# Patient Record
Sex: FEMALE | Race: WHITE | Employment: OTHER
[De-identification: names, ages, dates, MRNs, and addresses within clinical notes are randomized per-mention and may not be internally consistent; named-entity substitution may affect disease eponyms.]

---

## 2017-03-13 ENCOUNTER — SURGERY (OUTPATIENT)
Age: 67
End: 2017-03-13

## 2017-03-13 ENCOUNTER — HOSPITAL ENCOUNTER (OUTPATIENT)
Age: 67
Setting detail: OUTPATIENT SURGERY
Discharge: HOME | End: 2017-03-13
Attending: SPECIALIST | Admitting: SPECIALIST
Payer: MEDICARE

## 2017-03-13 VITALS
SYSTOLIC BLOOD PRESSURE: 121 MMHG | TEMPERATURE: 97.6 F | BODY MASS INDEX: 21.82 KG/M2 | WEIGHT: 144 LBS | OXYGEN SATURATION: 99 % | HEIGHT: 68 IN | DIASTOLIC BLOOD PRESSURE: 70 MMHG | HEART RATE: 98 BPM | RESPIRATION RATE: 21 BRPM

## 2017-03-13 PROCEDURE — 76040000019: Performed by: SPECIALIST

## 2017-03-13 PROCEDURE — 77030009426 HC FCPS BIOP ENDOSC BSC -B: Performed by: SPECIALIST

## 2017-03-13 PROCEDURE — 88305 TISSUE EXAM BY PATHOLOGIST: CPT | Performed by: SPECIALIST

## 2017-03-13 PROCEDURE — 74011250636 HC RX REV CODE- 250/636: Performed by: SPECIALIST

## 2017-03-13 RX ORDER — EPINEPHRINE 0.1 MG/ML
1 INJECTION INTRACARDIAC; INTRAVENOUS
Status: DISCONTINUED | OUTPATIENT
Start: 2017-03-13 | End: 2017-03-13 | Stop reason: HOSPADM

## 2017-03-13 RX ORDER — GLUCOSAMINE SULFATE 1500 MG
1000 POWDER IN PACKET (EA) ORAL DAILY
COMMUNITY

## 2017-03-13 RX ORDER — FENTANYL CITRATE 50 UG/ML
200 INJECTION, SOLUTION INTRAMUSCULAR; INTRAVENOUS
Status: DISCONTINUED | OUTPATIENT
Start: 2017-03-13 | End: 2017-03-13 | Stop reason: HOSPADM

## 2017-03-13 RX ORDER — NALOXONE HYDROCHLORIDE 0.4 MG/ML
0.4 INJECTION, SOLUTION INTRAMUSCULAR; INTRAVENOUS; SUBCUTANEOUS
Status: DISCONTINUED | OUTPATIENT
Start: 2017-03-13 | End: 2017-03-13 | Stop reason: HOSPADM

## 2017-03-13 RX ORDER — BISMUTH SUBSALICYLATE 262 MG
1 TABLET,CHEWABLE ORAL DAILY
COMMUNITY

## 2017-03-13 RX ORDER — SODIUM CHLORIDE 9 MG/ML
50 INJECTION, SOLUTION INTRAVENOUS CONTINUOUS
Status: DISCONTINUED | OUTPATIENT
Start: 2017-03-13 | End: 2017-03-13 | Stop reason: HOSPADM

## 2017-03-13 RX ORDER — ASCORBIC ACID 500 MG
1000 TABLET ORAL DAILY
COMMUNITY

## 2017-03-13 RX ORDER — SODIUM CHLORIDE 0.9 % (FLUSH) 0.9 %
5-10 SYRINGE (ML) INJECTION AS NEEDED
Status: DISCONTINUED | OUTPATIENT
Start: 2017-03-13 | End: 2017-03-13 | Stop reason: HOSPADM

## 2017-03-13 RX ORDER — MIDAZOLAM HYDROCHLORIDE 1 MG/ML
.25-1 INJECTION, SOLUTION INTRAMUSCULAR; INTRAVENOUS
Status: DISCONTINUED | OUTPATIENT
Start: 2017-03-13 | End: 2017-03-13 | Stop reason: HOSPADM

## 2017-03-13 RX ORDER — ATROPINE SULFATE 0.1 MG/ML
0.5 INJECTION INTRAVENOUS
Status: DISCONTINUED | OUTPATIENT
Start: 2017-03-13 | End: 2017-03-13 | Stop reason: HOSPADM

## 2017-03-13 RX ORDER — LANSOPRAZOLE 30 MG/1
30 CAPSULE, DELAYED RELEASE ORAL
COMMUNITY

## 2017-03-13 RX ORDER — SODIUM CHLORIDE 0.9 % (FLUSH) 0.9 %
5-10 SYRINGE (ML) INJECTION EVERY 8 HOURS
Status: DISCONTINUED | OUTPATIENT
Start: 2017-03-13 | End: 2017-03-13 | Stop reason: HOSPADM

## 2017-03-13 RX ORDER — FLUMAZENIL 0.1 MG/ML
0.2 INJECTION INTRAVENOUS
Status: DISCONTINUED | OUTPATIENT
Start: 2017-03-13 | End: 2017-03-13 | Stop reason: HOSPADM

## 2017-03-13 RX ORDER — DEXTROMETHORPHAN/PSEUDOEPHED 2.5-7.5/.8
1.2 DROPS ORAL
Status: DISCONTINUED | OUTPATIENT
Start: 2017-03-13 | End: 2017-03-13 | Stop reason: HOSPADM

## 2017-03-13 RX ADMIN — MIDAZOLAM HYDROCHLORIDE 2 MG: 1 INJECTION, SOLUTION INTRAMUSCULAR; INTRAVENOUS at 12:33

## 2017-03-13 RX ADMIN — FENTANYL CITRATE 25 MCG: 50 INJECTION, SOLUTION INTRAMUSCULAR; INTRAVENOUS at 12:39

## 2017-03-13 RX ADMIN — FENTANYL CITRATE 50 MCG: 50 INJECTION, SOLUTION INTRAMUSCULAR; INTRAVENOUS at 12:24

## 2017-03-13 RX ADMIN — SODIUM CHLORIDE 50 ML/HR: 900 INJECTION, SOLUTION INTRAVENOUS at 12:17

## 2017-03-13 RX ADMIN — MIDAZOLAM HYDROCHLORIDE 2 MG: 1 INJECTION, SOLUTION INTRAMUSCULAR; INTRAVENOUS at 12:29

## 2017-03-13 RX ADMIN — MIDAZOLAM HYDROCHLORIDE 2 MG: 1 INJECTION, SOLUTION INTRAMUSCULAR; INTRAVENOUS at 12:24

## 2017-03-13 RX ADMIN — MIDAZOLAM HYDROCHLORIDE 1 MG: 1 INJECTION, SOLUTION INTRAMUSCULAR; INTRAVENOUS at 12:39

## 2017-03-13 RX ADMIN — FENTANYL CITRATE 50 MCG: 50 INJECTION, SOLUTION INTRAMUSCULAR; INTRAVENOUS at 12:29

## 2017-03-13 NOTE — ROUTINE PROCESS
Odessa Arriola  1950  [id number]    Situation:  Verbal report received from: Joel Villafana RN  Procedure: Procedure(s):  ESOPHAGOGASTRODUODENOSCOPY (EGD)  ESOPHAGOGASTRODUODENAL (EGD) BIOPSY    Background:    Preoperative diagnosis: BARRETTS  Postoperative diagnosis: 1.- Spaulding's Esophagus    :  Dr. Kelly Frances  Assistant(s): Endoscopy Technician-1: Luciano Sarkar IV  Endoscopy RN-1: Aguila Nicholas RN    Specimens:   ID Type Source Tests Collected by Time Destination   1 : Esophageal Biopsies at 35 cm Preservative   Milan Capps MD 3/13/2017 1236 Pathology   2 : Esophageal Biopsies at MD Roberto 3/13/2017 1239 Pathology     H. Pylori  no    Assessment:  Intra-procedure medications   Versed 7 mg  Fentanyl 125 mcg  Anesthesia gave intra-procedure sedation and medications, see anesthesia flow sheet no    Intravenous fluids: NS@ KVO     Vital signs stable     Abdominal assessment: round and soft     Recommendation:  Discharge patient per MD order.     Family or Friend   Permission to share finding with family or friend yes

## 2017-03-13 NOTE — H&P
Pre-endoscopy H and P     The patient was seen and examined in the endoscopy suite. The airway was assessed and docuemented. The problem list and medications were reviewed. There is no problem list on file for this patient. Social History     Social History    Marital status: SINGLE     Spouse name: N/A    Number of children: N/A    Years of education: N/A     Occupational History    Not on file. Social History Main Topics    Smoking status: Never Smoker    Smokeless tobacco: Not on file    Alcohol use 0.6 oz/week     1 Glasses of wine per week    Drug use: No    Sexual activity: Not on file     Other Topics Concern    Not on file     Social History Narrative    No narrative on file     History reviewed. No pertinent past medical history. Prior to Admission Medications   Prescriptions Last Dose Informant Patient Reported? Taking? Omega-3 Fatty Acids (FISH OIL) 500 mg cap 3/13/2017 at Unknown time  Yes Yes   Sig: Take  by mouth. ascorbic acid, vitamin C, (VITAMIN C) 500 mg tablet 3/12/2017 at Unknown time  Yes Yes   Sig: Take 500 mg by mouth. cholecalciferol (VITAMIN D3) 1,000 unit cap 3/12/2017 at Unknown time  Yes Yes   Sig: Take 1,000 Units by mouth daily. lansoprazole (PREVACID) 30 mg capsule 3/13/2017 at Unknown time  Yes Yes   Sig: Take 30 mg by mouth Daily (before breakfast). multivitamin (ONE A DAY) tablet 3/6/2017 at Unknown time  Yes Yes   Sig: Take 1 Tab by mouth daily. Facility-Administered Medications: None       Chief complaint, history of present illness, and review of systems and Past medical History are positive for: GERD and Spaulding esophagus. The heart, lungs and mental status were satisfactory for the administration of sedation and for the procedure. I discussed with the patient the objectives, risks, consequences and alternatives to the procedure.      Plan: Endoscopic procedure with sedation     Rashi Hernandez MD   3/13/2017  12:20 PM

## 2017-03-13 NOTE — PROCEDURES
[address] [facility], [address] [place] [address]                 NAME:  Riana Alexander   :   1950   MRN:   [id number]     Date/Time:  3/13/2017 12:48 PM    Esophagogastroduodenoscopy (EGD) Procedure Note    :  Tracee Dsouza MD    Referring Provider:  PROVIDER UNKNOWN    Anethesia/Sedation:  Versed 6 mg IV and Fentanyl 100 mcg IV    Preoperative diagnosis: BARRETTS    Postoperative diagnosis: 1.- Spaulding's Esophagus    Procedure Details     After infom consent was obtained for the procedure, with all risks and benefits of procedure explained the patient was taken to the endoscopy suite and placed in the left lateral decubitus position. Following sequential administration of sedation as per above, the [id number] gastroscope was inserted into the mouth and advanced under direct vision to second portion of the duodenum. A careful inspection was made as the gastroscope was withdrawn, including a retroflexed view of the proximal stomach; findings and interventions are described below. Findings:  Esophagus:Spaulding esophagus, four quadrant biopsies were obtained from 35, 33 cm according to [facility] protocol. Moderate hiatal hernia. Stomach:normal   Duodenum/jejunum:normal      Therapies:  none    Specimens: esophageal biopsy           EBL: None    Complications:   None; patient tolerated the procedure well. Impression:    See Postoperative diagnosis above    Recommendations:  -Acid suppression with a proton pump inhibitor. , -Await pathology.     Discharge disposition:  Home in the company of  when able to ambulate    Tracee Dsouza MD

## 2017-03-13 NOTE — DISCHARGE INSTRUCTIONS
Allen Pepe  [id number]  1950    EGD DISCHARGE INSTRUCTIONS  Discomfort:  Sore throat- warm salt water gargle  redness at IV site- apply warm compress to area; if redness or soreness persist- contact your physician  Gaseous discomfort- walking, belching will help relieve any discomfort  You may not operate a vehicle for 12 hours  You may not engage in an occupation involving machinery or appliances for rest of today. You may not drink alcoholic beverages for at least 12 hours  Avoid making any critical decisions for at least 24 hour  DIET  You may resume your regular diet - however -  remember your colon is empty and a heavy meal will produce gas. Avoid these foods:  vegetables, fried / greasy foods, carbonated drinks  MEDICATIONS   Regarding Aspirin or Nonsteroidal medications specifically, please see below. ACTIVITY  You may resume your normal daily activities. Spend the remainder of the day resting -  avoid any strenuous activity. CALL M.D. ANY SIGN OF   Increasing pain, nausea, vomiting  Abdominal distension (swelling)  New increased bleeding (oral or rectal)  Fever (chills)  Pain in chest area  Bloody discharge from nose or mouth  Shortness of breath    You may not  take any Advil, Aspirin, Ibuprofen, Motrin, Aleve, or Goodys for 10 days, ONLY  Tylenol as needed for pain.     Follow-up Instructions:   Call Dr. Sabino Oppenheim  Results of procedure / biopsy in 10 days  Telephone #  [phone]        DISCHARGE SUMMARY from Nurse    The following personal items collected during your admission are returned to you:   Dental Appliance: Dental Appliances: None  Vision: Visual Aid: Glasses  Hearing Aid:    Jewelry:    Clothing:    Other Valuables:    Valuables sent to safe:

## 2017-03-13 NOTE — IP AVS SNAPSHOT
Summary of Care Report The Summary of Care report has been created to help improve care coordination. Users with access to [employer] or [address] (Web-based application) may access additional patient information including the Discharge Summary. If you are not currently a [address] user and need more information, please call the number listed below in the [place] [address] section and ask to be connected with Medical Records. Facility Information Name Address Phone [address] [id number] [phone] Patient Information Patient Name Sex  Gris Urban ([id number]) Female 1950 Discharge Information Admitting Provider Service Area Unit Slava Nazario MD / [address] [place] [address] [phone] Discharge Provider Discharge Date/Time Discharge Disposition Destination (none) (none) (none) (none) Patient Language Language ENGLISH [13] You are allergic to the following No active allergies Current Discharge Medication List  
  
CONTINUE these medications which have NOT CHANGED Dose & Instructions Dispensing Information Comments FISH  mg Cap Generic drug:  Omega-3 Fatty Acids Take  by mouth. Refills:  0  
   
 multivitamin tablet Commonly known as:  ONE A DAY Dose:  1 Tab Take 1 Tab by mouth daily. Refills:  0 PREVACID 30 mg capsule Generic drug:  lansoprazole Dose:  30 mg Take 30 mg by mouth Daily (before breakfast). Refills:  0  
   
 VITAMIN C 500 mg tablet Generic drug:  ascorbic acid (vitamin C) Dose:  500 mg Take 500 mg by mouth. Refills:  0  
   
 VITAMIN D3 1,000 unit Cap Generic drug:  cholecalciferol Dose:  1000 Units Take 1,000 Units by mouth daily. Refills:  0 Surgery Information ID Date/Time Status Primary Surgeon All Procedures Location [id number] 3/13/2017 1200 Unposted Scar Slaughter MD ESOPHAGOGASTRODUODENOSCOPY (EGD) ESOPHAGOGASTRODUODENAL (EGD) BIOPSY [facility] ENDOSCOPY Follow-up Information None Discharge Instructions Tamy Murphy 
[id number] 1950 EGD DISCHARGE INSTRUCTIONS Discomfort: 
Sore throat- warm salt water gargle 
redness at IV site- apply warm compress to area; if redness or soreness persist- contact your physician Gaseous discomfort- walking, belching will help relieve any discomfort You may not operate a vehicle for 12 hours You may not engage in an occupation involving machinery or appliances for rest of today. You may not drink alcoholic beverages for at least 12 hours Avoid making any critical decisions for at least 24 hour DIET You may resume your regular diet  however -  remember your colon is empty and a heavy meal will produce gas. Avoid these foods:  vegetables, fried / greasy foods, carbonated drinks MEDICATIONS Regarding Aspirin or Nonsteroidal medications specifically, please see below. ACTIVITY You may resume your normal daily activities. Spend the remainder of the day resting -  avoid any strenuous activity. CALL M.D. ANY SIGN OF Increasing pain, nausea, vomiting Abdominal distension (swelling) New increased bleeding (oral or rectal) Fever (chills) Pain in chest area Bloody discharge from nose or mouth Shortness of breath You may not  take any Advil, Aspirin, Ibuprofen, Motrin, Aleve, or Goodys for 10 days, ONLY  Tylenol as needed for pain. Follow-up Instructions: 
 Call Dr. Mark Cortes Results of procedure / biopsy in 10 days Telephone #  [phone] DISCHARGE SUMMARY from Nurse The following personal items collected during your admission are returned to you:  
Dental Appliance: Dental Appliances: None Vision: Visual Aid: Glasses Hearing Aid:   
Jewelry:   
Clothing: Other Valuables:   
Valuables sent to safe:   
 
 
 
 
  
 
 
Chart Review Routing History No Routing History on File

## 2017-03-13 NOTE — IP AVS SNAPSHOT
[address] [place] [address] 
[phone] Patient: Jane Phillips MRN: [id number] FYE:[birth year] You are allergic to the following No active allergies Recent Documentation Height Weight Breastfeeding? BMI Smoking Status 1.727 m 65.3 kg No 21.9 kg/m2 Never Smoker Emergency Contacts Name Discharge Info Relation Home Work Mobile Tanya Rosen  Unknown [9] [phone] About your hospitalization You were admitted on:  March 13, 2017 You last received care in the:  [facility] ENDOSCOPY You were discharged on:  March 13, 2017 Unit phone number:  [phone] Why you were hospitalized Your primary diagnosis was:  Not on File Providers Seen During Your Hospitalizations Provider Role Specialty Primary office phone Yoko Singh MD Attending Provider Gastroenterology [phone] Your Primary Care Physician (PCP) Primary Care Physician Office Phone Office Fax UNKNOWN, PROVIDER ** None ** ** None ** Follow-up Information None Current Discharge Medication List  
  
CONTINUE these medications which have NOT CHANGED Dose & Instructions Dispensing Information Comments Morning Noon Evening Bedtime FISH  mg Cap Generic drug:  Omega-3 Fatty Acids Your last dose was: Your next dose is: Other:  _________ Take  by mouth. Refills:  0  
     
   
   
   
  
 multivitamin tablet Commonly known as:  ONE A DAY Your last dose was: Your next dose is: Other:  _________ Dose:  1 Tab Take 1 Tab by mouth daily. Refills:  0 PREVACID 30 mg capsule Generic drug:  lansoprazole Your last dose was: Your next dose is: Other:  _________ Dose:  30 mg Take 30 mg by mouth Daily (before breakfast). Refills:  0 VITAMIN C 500 mg tablet Generic drug:  ascorbic acid (vitamin C) Your last dose was: Your next dose is: Other:  _________ Dose:  500 mg Take 500 mg by mouth. Refills:  0  
     
   
   
   
  
 VITAMIN D3 1,000 unit Cap Generic drug:  cholecalciferol Your last dose was: Your next dose is: Other:  _________ Dose:  1000 Units Take 1,000 Units by mouth daily. Refills:  0 Discharge Instructions Areli Landrum 
[id number] 1950 EGD DISCHARGE INSTRUCTIONS Discomfort: 
Sore throat- warm salt water gargle 
redness at IV site- apply warm compress to area; if redness or soreness persist- contact your physician Gaseous discomfort- walking, belching will help relieve any discomfort You may not operate a vehicle for 12 hours You may not engage in an occupation involving machinery or appliances for rest of today. You may not drink alcoholic beverages for at least 12 hours Avoid making any critical decisions for at least 24 hour DIET You may resume your regular diet  however -  remember your colon is empty and a heavy meal will produce gas. Avoid these foods:  vegetables, fried / greasy foods, carbonated drinks MEDICATIONS Regarding Aspirin or Nonsteroidal medications specifically, please see below. ACTIVITY You may resume your normal daily activities. Spend the remainder of the day resting -  avoid any strenuous activity. CALL M.D. ANY SIGN OF Increasing pain, nausea, vomiting Abdominal distension (swelling) New increased bleeding (oral or rectal) Fever (chills) Pain in chest area Bloody discharge from nose or mouth Shortness of breath You may not  take any Advil, Aspirin, Ibuprofen, Motrin, Aleve, or Goodys for 10 days, ONLY  Tylenol as needed for pain. Follow-up Instructions: 
 Call Dr. Haydee Rene Results of procedure / biopsy in 10 days Telephone #  [phone] DISCHARGE SUMMARY from Nurse The following personal items collected during your admission are returned to you:  
Dental Appliance: Dental Appliances: None Vision: Visual Aid: Glasses Hearing Aid:   
Jewelry:   
Clothing:   
Other Valuables:   
Valuables sent to safe:   
 
 
 
 
  
 
 
Discharge Orders None Introducing [facility]! [employer] introduces [employer] patient portal. Now you can access parts of your medical record, email your doctor's office, and request medication refills online. 1. In your internet browser, go to [email or web address] 2. Click on the First Time User? Click Here link in the Sign In box. You will see the New Member Sign Up page. 3. Enter your [employer] Access Code exactly as it appears below. You will not need to use this code after youve completed the sign-up process. If you do not sign up before the expiration date, you must request a new code. · [employer] Access Code: [email or web address] Expires: 6/11/2017 11:10 AM 
 
4. Enter the last four digits of your Social Security Number (xxxx) and Date of Birth (mm/dd/yyyy) as indicated and click Submit. You will be taken to the next sign-up page. 5. Create a Starline ID. This will be your Starline login ID and cannot be changed, so think of one that is secure and easy to remember. 6. Create a [employer] password. You can change your password at any time. 7. Enter your Password Reset Question and Answer. This can be used at a later time if you forget your password. 8. Enter your e-mail address. You will receive e-mail notification when new information is available in [address] 9. Click Sign Up. You can now view and download portions of your medical record. 10. Click the Download Summary menu link to download a portable copy of your medical information.  
 
If you have questions, please visit the Frequently Asked Questions section of the [employer] Remember, MyChart is NOT to be used for urgent needs. For medical emergencies, dial 911. Now available from your iPhone and Android! General Information Please provide this summary of care documentation to your next provider. Patient Signature:  ____________________________________________________________ Date:  ____________________________________________________________  
  
Janyth Mins Provider Signature:  ____________________________________________________________ Date:  ____________________________________________________________

## 2020-02-13 ENCOUNTER — ANESTHESIA (OUTPATIENT)
Dept: ENDOSCOPY | Age: 70
End: 2020-02-13
Payer: MEDICARE

## 2020-02-13 ENCOUNTER — ANESTHESIA EVENT (OUTPATIENT)
Dept: ENDOSCOPY | Age: 70
End: 2020-02-13
Payer: MEDICARE

## 2020-02-13 ENCOUNTER — HOSPITAL ENCOUNTER (OUTPATIENT)
Age: 70
Setting detail: OUTPATIENT SURGERY
Discharge: HOME | End: 2020-02-13
Attending: SPECIALIST | Admitting: SPECIALIST
Payer: MEDICARE

## 2020-02-13 VITALS
HEIGHT: 67 IN | WEIGHT: 138 LBS | TEMPERATURE: 97.9 F | OXYGEN SATURATION: 100 % | HEART RATE: 91 BPM | BODY MASS INDEX: 21.66 KG/M2 | RESPIRATION RATE: 18 BRPM | DIASTOLIC BLOOD PRESSURE: 75 MMHG | SYSTOLIC BLOOD PRESSURE: 120 MMHG

## 2020-02-13 PROCEDURE — 77030021593 HC FCPS BIOP ENDOSC BSC -A: Performed by: SPECIALIST

## 2020-02-13 PROCEDURE — 76060000031 HC ANESTHESIA FIRST 0.5 HR: Performed by: SPECIALIST

## 2020-02-13 PROCEDURE — 76040000019: Performed by: SPECIALIST

## 2020-02-13 PROCEDURE — 74011000250 HC RX REV CODE- 250: Performed by: NURSE ANESTHETIST, CERTIFIED REGISTERED

## 2020-02-13 PROCEDURE — 88305 TISSUE EXAM BY PATHOLOGIST: CPT

## 2020-02-13 PROCEDURE — 74011250636 HC RX REV CODE- 250/636: Performed by: NURSE ANESTHETIST, CERTIFIED REGISTERED

## 2020-02-13 PROCEDURE — 74011250636 HC RX REV CODE- 250/636: Performed by: SPECIALIST

## 2020-02-13 RX ORDER — NALOXONE HYDROCHLORIDE 0.4 MG/ML
0.4 INJECTION, SOLUTION INTRAMUSCULAR; INTRAVENOUS; SUBCUTANEOUS
Status: DISCONTINUED | OUTPATIENT
Start: 2020-02-13 | End: 2020-02-13 | Stop reason: HOSPADM

## 2020-02-13 RX ORDER — SODIUM CHLORIDE 9 MG/ML
50 INJECTION, SOLUTION INTRAVENOUS CONTINUOUS
Status: DISCONTINUED | OUTPATIENT
Start: 2020-02-13 | End: 2020-02-13 | Stop reason: HOSPADM

## 2020-02-13 RX ORDER — DEXTROMETHORPHAN/PSEUDOEPHED 2.5-7.5/.8
1.2 DROPS ORAL
Status: DISCONTINUED | OUTPATIENT
Start: 2020-02-13 | End: 2020-02-13 | Stop reason: HOSPADM

## 2020-02-13 RX ORDER — FLUMAZENIL 0.1 MG/ML
0.2 INJECTION INTRAVENOUS
Status: DISCONTINUED | OUTPATIENT
Start: 2020-02-13 | End: 2020-02-13 | Stop reason: HOSPADM

## 2020-02-13 RX ORDER — SODIUM CHLORIDE 0.9 % (FLUSH) 0.9 %
5-40 SYRINGE (ML) INJECTION EVERY 8 HOURS
Status: DISCONTINUED | OUTPATIENT
Start: 2020-02-13 | End: 2020-02-13 | Stop reason: HOSPADM

## 2020-02-13 RX ORDER — SODIUM CHLORIDE 0.9 % (FLUSH) 0.9 %
5-40 SYRINGE (ML) INJECTION AS NEEDED
Status: DISCONTINUED | OUTPATIENT
Start: 2020-02-13 | End: 2020-02-13 | Stop reason: HOSPADM

## 2020-02-13 RX ORDER — EPINEPHRINE 0.1 MG/ML
1 INJECTION INTRACARDIAC; INTRAVENOUS
Status: DISCONTINUED | OUTPATIENT
Start: 2020-02-13 | End: 2020-02-13 | Stop reason: HOSPADM

## 2020-02-13 RX ORDER — PROPOFOL 10 MG/ML
INJECTION, EMULSION INTRAVENOUS AS NEEDED
Status: DISCONTINUED | OUTPATIENT
Start: 2020-02-13 | End: 2020-02-13 | Stop reason: HOSPADM

## 2020-02-13 RX ORDER — LIDOCAINE HYDROCHLORIDE 20 MG/ML
INJECTION, SOLUTION EPIDURAL; INFILTRATION; INTRACAUDAL; PERINEURAL AS NEEDED
Status: DISCONTINUED | OUTPATIENT
Start: 2020-02-13 | End: 2020-02-13 | Stop reason: HOSPADM

## 2020-02-13 RX ORDER — ATROPINE SULFATE 0.1 MG/ML
0.5 INJECTION INTRAVENOUS
Status: DISCONTINUED | OUTPATIENT
Start: 2020-02-13 | End: 2020-02-13 | Stop reason: HOSPADM

## 2020-02-13 RX ADMIN — PROPOFOL 130 MG: 10 INJECTION, EMULSION INTRAVENOUS at 13:26

## 2020-02-13 RX ADMIN — LIDOCAINE HYDROCHLORIDE 100 MG: 20 INJECTION, SOLUTION EPIDURAL; INFILTRATION; INTRACAUDAL; PERINEURAL at 13:26

## 2020-02-13 NOTE — ANESTHESIA PREPROCEDURE EVALUATION
Relevant Problems   No relevant active problems       Anesthetic History   No history of anesthetic complications            Review of Systems / Medical History  Patient summary reviewed, nursing notes reviewed and pertinent labs reviewed    Pulmonary  Within defined limits                 Neuro/Psych   Within defined limits           Cardiovascular  Within defined limits                     GI/Hepatic/Renal  Within defined limits              Endo/Other  Within defined limits           Other Findings              Physical Exam    Airway  Mallampati: II  TM Distance: > 6 cm  Neck ROM: normal range of motion   Mouth opening: Normal     Cardiovascular  Regular rate and rhythm,  S1 and S2 normal,  no murmur, click, rub, or gallop             Dental  No notable dental hx       Pulmonary  Breath sounds clear to auscultation               Abdominal  GI exam deferred       Other Findings            Anesthetic Plan    ASA: 1  Anesthesia type: MAC            Anesthetic plan and risks discussed with: Patient

## 2020-02-13 NOTE — PROCEDURES
[address]  [address]                 NAME:  Silvia Aldana   :   1950   MRN:   [id number]     Date/Time:  2020 1:42 PM    Esophagogastroduodenoscopy (EGD) Procedure Note    :  Mo Harrison MD    Referring Provider:  Unknown, Provider    Anethesia/Sedation:  MAC anesthesia Propofol    Preoperative diagnosis: BARRETTS ESOPHAGUS, GERD    Postoperative diagnosis: 1)Barretts esophagus  2)hiatal hernia    Procedure Details     After infom consent was obtained for the procedure, with all risks and benefits of procedure explained the patient was taken to the endoscopy suite and placed in the left lateral decubitus position. Following sequential administration of sedation as per above, the [id number] gastroscope was inserted into the mouth and advanced under direct vision to second portion of the duodenum. A careful inspection was made as the gastroscope was withdrawn, including a retroflexed view of the proximal stomach; findings and interventions are described below. Findings:  Esophagus:Spaulding mucosa with Z line at 32 cm. Four quadrants biopsies done at 32, 34, 36 cm. Moderate hiatal hernia noted. Stomach:normal   Duodenum/jejunum:normal      Therapies:  none    Specimens: distal esophageal bx           EBL: None    Complications:   None; patient tolerated the procedure well. Impression:    See Postoperative diagnosis above    Recommendations:  -Acid suppression with a proton pump inhibitor. , -Await pathology.     Discharge disposition:  Home in the company of  when able to ambulate    Mo Harrison MD

## 2020-02-13 NOTE — DISCHARGE INSTRUCTIONS
Amelia Actis  [id number]  1950    EGD DISCHARGE INSTRUCTIONS  Discomfort:  Sore throat- throat lozenges or warm salt water gargle  redness at IV site- apply warm compress to area; if redness or soreness persist- contact your physician  Gaseous discomfort- walking, belching will help relieve any discomfort  You may not operate a vehicle for 12 hours  You may not engage in an occupation involving machinery or appliances for rest of today. You may not drink alcoholic beverages for at least 12 hours  Avoid making any critical decisions for at least 24 hour  DIET  You may resume your regular diet - however -  remember your colon is empty and a heavy meal will produce gas. Avoid these foods:  vegetables, fried / greasy foods, carbonated drinks  MEDICATIONS   Regarding Aspirin or Nonsteroidal medications specifically, please see below. ACTIVITY  You may resume your normal daily activities. Spend the remainder of the day resting -  avoid any strenuous activity. CALL M.D. ANY SIGN OF   Increasing pain, nausea, vomiting  Abdominal distension (swelling)  New increased bleeding (oral or rectal)  Fever (chills)  Pain in chest area  Bloody discharge from nose or mouth  Shortness of breath    You may not  take any Advil, Aspirin, Ibuprofen, Motrin, Aleve, or Goodys for 10 days, ONLY  Tylenol as needed for pain.     Post procedure diagnosis: 1)Barretts esophagus  2)hiatal hernia      Follow-up Instructions:   Call Dr. Brigid Hobbs  Results of procedure / biopsy in 10 days  Telephone #  [phone]        DISCHARGE SUMMARY from Nurse    The following personal items collected during your admission are returned to you:   Dental Appliance: Dental Appliances: None  Vision: Visual Aid: Glasses  Hearing Aid:    Barrington Silk:    Clothing:    Other Valuables:    Valuables sent to safe:          Patient Education        Hiatal Hernia: Care Instructions  Your Care Instructions  A hiatal hernia occurs when part of the stomach bulges into the chest cavity. A hiatal hernia may allow stomach acid and juices to back up into the esophagus (acid reflux). This can cause a feeling of burning, warmth, heat, or pain behind the breastbone. This feeling may often occur after you eat, soon after you lie down, or when you bend forward, and it may come and go. You also may have a sour taste in your mouth. These symptoms are commonly known as heartburn or reflux. But not all hiatal hernias cause symptoms. Follow-up care is a key part of your treatment and safety. Be sure to make and go to all appointments, and call your doctor if you are having problems. It's also a good idea to know your test results and keep a list of the medicines you take. How can you care for yourself at home? · Take your medicines exactly as prescribed. Call your doctor if you think you are having a problem with your medicine. · Do not take aspirin or other nonsteroidal anti-inflammatory drugs (NSAIDs), such as ibuprofen (Advil, Motrin) or naproxen (Aleve), unless your doctor says it is okay. Ask your doctor what you can take for pain. · Your doctor may recommend over-the-counter medicine. For mild or occasional indigestion, antacids such as Tums, Gaviscon, Maalox, or Mylanta may help. Your doctor also may recommend over-the-counter acid reducers, such as famotidine (Pepcid AC), cimetidine (Tagamet HB), ranitidine (Zantac 75 and Zantac 150), or omeprazole (Prilosec). Read and follow all instructions on the label. If you use these medicines often, talk with your doctor. · Change your eating habits. ? It's best to eat several small meals instead of two or three large meals. ? After you eat, wait 2 to 3 hours before you lie down. Late-night snacks aren't a good idea. ? Chocolate, mint, and alcohol can make heartburn worse. They relax the valve between the esophagus and the stomach. ?  Spicy foods, foods that have a lot of acid (like tomatoes and oranges), and coffee can make heartburn symptoms worse in some people. If your symptoms are worse after you eat a certain food, you may want to stop eating that food to see if your symptoms get better. · Do not smoke or chew tobacco.  · If you get heartburn at night, raise the head of your bed 6 to 8 inches by putting the frame on blocks or placing a foam wedge under the head of your mattress. (Adding extra pillows does not work.)  · Do not wear tight clothing around your middle. · Lose weight if you need to. Losing just 5 to 10 pounds can help. When should you call for help? Call your doctor now or seek immediate medical care if:    · You have new or worse belly pain.     · You are vomiting.    Watch closely for changes in your health, and be sure to contact your doctor if:    · You have new or worse symptoms of indigestion.     · You have trouble or pain swallowing.     · You are losing weight.     · You do not get better as expected. Where can you learn more? Go to http://yoseph-marta.info/. Enter Y667 in the search box to learn more about \"Hiatal Hernia: Care Instructions. \"  Current as of: November 7, 2018  Content Version: 12.2  © 5928-5135 [employer]. Care instructions adapted under license by [employer] (which disclaims liability or warranty for this information). If you have questions about a medical condition or this instruction, always ask your healthcare professional. [place] [address] any warranty or liability for your use of this information.

## 2020-02-13 NOTE — H&P
Pre-endoscopy H and P     The patient was seen and examined in the endoscopy suite. The airway was assessed and docuemented. The problem list and medications were reviewed. There is no problem list on file for this patient. Social History     Socioeconomic History    Marital status: SINGLE     Spouse name: Not on file    Number of children: Not on file    Years of education: Not on file    Highest education level: Not on file   Occupational History    Not on file   Social Needs    Financial resource strain: Not on file    Food insecurity:     Worry: Not on file     Inability: Not on file    Transportation needs:     Medical: Not on file     Non-medical: Not on file   Tobacco Use    Smoking status: Never Smoker   Substance and Sexual Activity    Alcohol use: Yes     Alcohol/week: 1.0 standard drinks     Types: 1 Glasses of wine per week    Drug use: No    Sexual activity: Not on file   Lifestyle    Physical activity:     Days per week: Not on file     Minutes per session: Not on file    Stress: Not on file   Relationships    Social connections:     Talks on phone: Not on file     Gets together: Not on file     Attends Worship service: Not on file     Active member of club or organization: Not on file     Attends meetings of clubs or organizations: Not on file     Relationship status: Not on file    Intimate partner violence:     Fear of current or ex partner: Not on file     Emotionally abused: Not on file     Physically abused: Not on file     Forced sexual activity: Not on file   Other Topics Concern    Not on file   Social History Narrative    Not on file     No past medical history on file. Prior to Admission Medications   Prescriptions Last Dose Informant Patient Reported? Taking? Omega-3 Fatty Acids (FISH OIL) 500 mg cap   Yes No   Sig: Take  by mouth. ascorbic acid, vitamin C, (VITAMIN C) 500 mg tablet   Yes No   Sig: Take 500 mg by mouth.    cholecalciferol (VITAMIN D3) 1,000 unit cap   Yes No   Sig: Take 1,000 Units by mouth daily. lansoprazole (PREVACID) 30 mg capsule   Yes No   Sig: Take 30 mg by mouth Daily (before breakfast). multivitamin (ONE A DAY) tablet   Yes No   Sig: Take 1 Tab by mouth daily. Facility-Administered Medications: None       Chief complaint, history of present illness, and review of systems and Past medical History are positive for: Spaulding esophagus and GERD    The heart, lungs and mental status were satisfactory for the administration of sedation and for the procedure. I discussed with the patient the objectives, risks, consequences and alternatives to the procedure.      Plan: Endoscopic procedure with sedation     Slava Nazario MD   2/13/2020  12:39 PM

## 2020-02-13 NOTE — ROUTINE PROCESS
Jane Kim  1950  [id number]    Situation  Verbal report received from: BETY Guthrie, RN  Procedure: Procedure(s):  ESOPHAGOGASTRODUODENOSCOPY (EGD)  ESOPHAGOGASTRODUODENAL (EGD) BIOPSY    Background:    Preoperative diagnosis: BARRETTS ESOPHAGUS, GERD  Postoperative diagnosis: 1)Barretts esophagus  2)hiatal hernia    :  Dr. Luann Cervantes  Assistant(s): Endoscopy Technician-1: Cait RAYMOND  Endoscopy RN-1: Avani Vieyra RN    Specimens:   ID Type Source Tests Collected by Time Destination   1 : 36cm bx for Barretts Preservative Esophagus, Distal  Aiden Casiano MD 2/13/2020 1331 Pathology   2 : 34cm bx for Barretts Preservative Esophagus, Distal  Aiden Casiano MD 2/13/2020 1333 Pathology   3 : 32cm bx for Barretts Preservative Esophagus, Distal  Aiden Casiano MD 2/13/2020 1334 Pathology     H. Pylori  no    Assessment:  Intra-procedure medications Anesthesia gave intra-procedure sedation and medications, see anesthesia flow sheet yes    Intravenous fluids: NS@ KVO     Vital signs stable     Abdominal assessment: round and soft     Recommendation:  Discharge patient per MD order.     Family or Friend   Permission to share finding with family or friend yes

## 2020-02-13 NOTE — ANESTHESIA POSTPROCEDURE EVALUATION
Procedure(s):  ESOPHAGOGASTRODUODENOSCOPY (EGD)  ESOPHAGOGASTRODUODENAL (EGD) BIOPSY. MAC    <BSHSIANPOST>    Vitals Value Taken Time   /65 2/13/2020  1:44 PM   Temp     Pulse 92 2/13/2020  1:49 PM   Resp 22 2/13/2020  1:49 PM   SpO2 100 % 2/13/2020  1:49 PM   Vitals shown include unvalidated device data.

## 2020-09-10 ENCOUNTER — HOSPITAL ENCOUNTER (OUTPATIENT)
Dept: CT IMAGING | Age: 70
Discharge: HOME | DRG: 329 | End: 2020-09-10
Attending: SPECIALIST
Payer: MEDICARE

## 2020-09-10 DIAGNOSIS — R10.32 LEFT LOWER QUADRANT PAIN: ICD-10-CM

## 2020-09-10 DIAGNOSIS — R10.31 RIGHT LOWER QUADRANT PAIN: ICD-10-CM

## 2020-09-10 DIAGNOSIS — R19.4 CHANGE IN BOWEL HABITS: ICD-10-CM

## 2020-09-10 DIAGNOSIS — R63.4 UNEXPLAINED WEIGHT LOSS: ICD-10-CM

## 2020-09-10 LAB — CREAT BLD-MCNC: 0.8 MG/DL (ref 0.6–1.3)

## 2020-09-10 PROCEDURE — 74011000636 HC RX REV CODE- 636: Performed by: SPECIALIST

## 2020-09-10 PROCEDURE — 74177 CT ABD & PELVIS W/CONTRAST: CPT

## 2020-09-10 PROCEDURE — 74011000258 HC RX REV CODE- 258: Performed by: SPECIALIST

## 2020-09-10 PROCEDURE — 82565 ASSAY OF CREATININE: CPT

## 2020-09-10 RX ORDER — SODIUM CHLORIDE 0.9 % (FLUSH) 0.9 %
10 SYRINGE (ML) INJECTION
Status: COMPLETED | OUTPATIENT
Start: 2020-09-10 | End: 2020-09-10

## 2020-09-10 RX ADMIN — SODIUM CHLORIDE 100 ML: 900 INJECTION, SOLUTION INTRAVENOUS at 14:41

## 2020-09-10 RX ADMIN — IOPAMIDOL 100 ML: 755 INJECTION, SOLUTION INTRAVENOUS at 14:41

## 2020-09-10 RX ADMIN — Medication 10 ML: at 14:41

## 2020-09-10 RX ADMIN — IOHEXOL 50 ML: 240 INJECTION, SOLUTION INTRATHECAL; INTRAVASCULAR; INTRAVENOUS; ORAL at 14:41

## 2020-09-11 ENCOUNTER — HOSPITAL ENCOUNTER (INPATIENT)
Age: 70
LOS: 24 days | Discharge: NURSING, REHAB OR LONG-TERM CARE | DRG: 329 | End: 2020-10-05
Attending: EMERGENCY MEDICINE | Admitting: HOSPITALIST
Payer: MEDICARE

## 2020-09-11 ENCOUNTER — APPOINTMENT (OUTPATIENT)
Dept: GENERAL RADIOLOGY | Age: 70
DRG: 329 | End: 2020-09-11
Attending: EMERGENCY MEDICINE
Payer: MEDICARE

## 2020-09-11 ENCOUNTER — HOSPITAL ENCOUNTER (OUTPATIENT)
Dept: PREADMISSION TESTING | Age: 70
Discharge: HOME | DRG: 329 | End: 2020-09-11
Payer: MEDICARE

## 2020-09-11 DIAGNOSIS — D72.829 LEUKOCYTOSIS, UNSPECIFIED TYPE: ICD-10-CM

## 2020-09-11 DIAGNOSIS — Z90.710 H/O TOTAL HYSTERECTOMY: ICD-10-CM

## 2020-09-11 DIAGNOSIS — R19.00 PELVIC MASS: Primary | ICD-10-CM

## 2020-09-11 DIAGNOSIS — N13.30 HYDRONEPHROSIS, RIGHT: ICD-10-CM

## 2020-09-11 DIAGNOSIS — D75.839 THROMBOCYTOSIS: ICD-10-CM

## 2020-09-11 DIAGNOSIS — C18.7 MALIGNANT NEOPLASM OF SIGMOID COLON (HCC): Chronic | ICD-10-CM

## 2020-09-11 DIAGNOSIS — Z01.812 PRE-PROCEDURE LAB EXAM: ICD-10-CM

## 2020-09-11 LAB
ALBUMIN SERPL-MCNC: 2.7 G/DL (ref 3.5–5)
ALBUMIN/GLOB SERPL: 0.6 {RATIO} (ref 1.1–2.2)
ALP SERPL-CCNC: 95 U/L (ref 45–117)
ALT SERPL-CCNC: 21 U/L (ref 12–78)
ANION GAP SERPL CALC-SCNC: 7 MMOL/L (ref 5–15)
ANION GAP SERPL CALC-SCNC: 9 MMOL/L (ref 5–15)
APPEARANCE UR: ABNORMAL
AST SERPL-CCNC: 23 U/L (ref 15–37)
BACTERIA URNS QL MICRO: NEGATIVE /HPF
BASOPHILS # BLD: 0 K/UL (ref 0–0.1)
BASOPHILS NFR BLD: 0 % (ref 0–1)
BILIRUB SERPL-MCNC: 0.5 MG/DL (ref 0.2–1)
BILIRUB UR QL: NEGATIVE
BUN SERPL-MCNC: 11 MG/DL (ref 6–20)
BUN SERPL-MCNC: 12 MG/DL (ref 6–20)
BUN/CREAT SERPL: 14 (ref 12–20)
BUN/CREAT SERPL: 14 (ref 12–20)
CALCIUM SERPL-MCNC: 7.8 MG/DL (ref 8.5–10.1)
CALCIUM SERPL-MCNC: 8 MG/DL (ref 8.5–10.1)
CEA SERPL-MCNC: 7.4 NG/ML
CHLORIDE SERPL-SCNC: 90 MMOL/L (ref 97–108)
CHLORIDE SERPL-SCNC: 92 MMOL/L (ref 97–108)
CO2 SERPL-SCNC: 23 MMOL/L (ref 21–32)
CO2 SERPL-SCNC: 24 MMOL/L (ref 21–32)
COLOR UR: ABNORMAL
CORTIS SERPL-MCNC: 26.9 UG/DL
CREAT SERPL-MCNC: 0.78 MG/DL (ref 0.55–1.02)
CREAT SERPL-MCNC: 0.84 MG/DL (ref 0.55–1.02)
DIFFERENTIAL METHOD BLD: ABNORMAL
EOSINOPHIL # BLD: 0 K/UL (ref 0–0.4)
EOSINOPHIL NFR BLD: 0 % (ref 0–7)
EPITH CASTS URNS QL MICRO: ABNORMAL /LPF
ERYTHROCYTE [DISTWIDTH] IN BLOOD BY AUTOMATED COUNT: 16.8 % (ref 11.5–14.5)
FERRITIN SERPL-MCNC: 20 NG/ML (ref 26–388)
GLOBULIN SER CALC-MCNC: 4.2 G/DL (ref 2–4)
GLUCOSE SERPL-MCNC: 106 MG/DL (ref 65–100)
GLUCOSE SERPL-MCNC: 112 MG/DL (ref 65–100)
GLUCOSE UR STRIP.AUTO-MCNC: NEGATIVE MG/DL
HCT VFR BLD AUTO: 29.1 % (ref 35–47)
HGB BLD-MCNC: 9 G/DL (ref 11.5–16)
HGB UR QL STRIP: NEGATIVE
IMM GRANULOCYTES # BLD AUTO: 0.1 K/UL (ref 0–0.04)
IMM GRANULOCYTES NFR BLD AUTO: 1 % (ref 0–0.5)
INR PPP: 1 (ref 0.9–1.1)
IRON SATN MFR SERPL: 4 % (ref 20–50)
IRON SERPL-MCNC: 13 UG/DL (ref 35–150)
KETONES UR QL STRIP.AUTO: 15 MG/DL
LEUKOCYTE ESTERASE UR QL STRIP.AUTO: ABNORMAL
LIPASE SERPL-CCNC: 98 U/L (ref 73–393)
LYMPHOCYTES # BLD: 0.7 K/UL (ref 0.8–3.5)
LYMPHOCYTES NFR BLD: 5 % (ref 12–49)
MCH RBC QN AUTO: 20.5 PG (ref 26–34)
MCHC RBC AUTO-ENTMCNC: 30.9 G/DL (ref 30–36.5)
MCV RBC AUTO: 66.4 FL (ref 80–99)
MONOCYTES # BLD: 0.7 K/UL (ref 0–1)
MONOCYTES NFR BLD: 5 % (ref 5–13)
NEUTS SEG # BLD: 13 K/UL (ref 1.8–8)
NEUTS SEG NFR BLD: 89 % (ref 32–75)
NITRITE UR QL STRIP.AUTO: NEGATIVE
NRBC # BLD: 0 K/UL (ref 0–0.01)
NRBC BLD-RTO: 0 PER 100 WBC
OSMOLALITY SERPL: 256 MOSM/KG H2O
OSMOLALITY UR: 646 MOSM/KG H2O
PH UR STRIP: 7 [PH] (ref 5–8)
PLATELET # BLD AUTO: 604 K/UL (ref 150–400)
PMV BLD AUTO: 8.2 FL (ref 8.9–12.9)
POTASSIUM SERPL-SCNC: 3.4 MMOL/L (ref 3.5–5.1)
POTASSIUM SERPL-SCNC: 3.4 MMOL/L (ref 3.5–5.1)
PROT SERPL-MCNC: 6.9 G/DL (ref 6.4–8.2)
PROT UR STRIP-MCNC: 100 MG/DL
PROTHROMBIN TIME: 10.3 SEC (ref 9–11.1)
RBC # BLD AUTO: 4.38 M/UL (ref 3.8–5.2)
RBC #/AREA URNS HPF: ABNORMAL /HPF (ref 0–5)
RBC MORPH BLD: ABNORMAL
SODIUM SERPL-SCNC: 122 MMOL/L (ref 136–145)
SODIUM SERPL-SCNC: 123 MMOL/L (ref 136–145)
SODIUM UR-SCNC: 75 MMOL/L
SP GR UR REFRACTOMETRY: 1.03 (ref 1–1.03)
TIBC SERPL-MCNC: 338 UG/DL (ref 250–450)
TSH SERPL DL<=0.05 MIU/L-ACNC: 1.04 UIU/ML (ref 0.36–3.74)
TSH SERPL DL<=0.05 MIU/L-ACNC: 1.09 UIU/ML (ref 0.36–3.74)
UR CULT HOLD, URHOLD: NORMAL
UROBILINOGEN UR QL STRIP.AUTO: 1 EU/DL (ref 0.2–1)
WBC # BLD AUTO: 14.5 K/UL (ref 3.6–11)
WBC URNS QL MICRO: ABNORMAL /HPF (ref 0–4)

## 2020-09-11 PROCEDURE — 74011250636 HC RX REV CODE- 250/636: Performed by: HOSPITALIST

## 2020-09-11 PROCEDURE — 65660000000 HC RM CCU STEPDOWN

## 2020-09-11 PROCEDURE — 84443 ASSAY THYROID STIM HORMONE: CPT

## 2020-09-11 PROCEDURE — 85610 PROTHROMBIN TIME: CPT

## 2020-09-11 PROCEDURE — 71045 X-RAY EXAM CHEST 1 VIEW: CPT

## 2020-09-11 PROCEDURE — 82728 ASSAY OF FERRITIN: CPT

## 2020-09-11 PROCEDURE — 74011000258 HC RX REV CODE- 258: Performed by: HOSPITALIST

## 2020-09-11 PROCEDURE — 80053 COMPREHEN METABOLIC PANEL: CPT

## 2020-09-11 PROCEDURE — 83540 ASSAY OF IRON: CPT

## 2020-09-11 PROCEDURE — 83930 ASSAY OF BLOOD OSMOLALITY: CPT

## 2020-09-11 PROCEDURE — 86301 IMMUNOASSAY TUMOR CA 19-9: CPT

## 2020-09-11 PROCEDURE — 87635 SARS-COV-2 COVID-19 AMP PRB: CPT

## 2020-09-11 PROCEDURE — 81001 URINALYSIS AUTO W/SCOPE: CPT

## 2020-09-11 PROCEDURE — 83935 ASSAY OF URINE OSMOLALITY: CPT

## 2020-09-11 PROCEDURE — 84300 ASSAY OF URINE SODIUM: CPT

## 2020-09-11 PROCEDURE — 85025 COMPLETE CBC W/AUTO DIFF WBC: CPT

## 2020-09-11 PROCEDURE — 82533 TOTAL CORTISOL: CPT

## 2020-09-11 PROCEDURE — 36415 COLL VENOUS BLD VENIPUNCTURE: CPT

## 2020-09-11 PROCEDURE — 99284 EMERGENCY DEPT VISIT MOD MDM: CPT

## 2020-09-11 PROCEDURE — 82378 CARCINOEMBRYONIC ANTIGEN: CPT

## 2020-09-11 PROCEDURE — 83690 ASSAY OF LIPASE: CPT

## 2020-09-11 PROCEDURE — 87086 URINE CULTURE/COLONY COUNT: CPT

## 2020-09-11 RX ORDER — ACETAMINOPHEN 650 MG/1
650 SUPPOSITORY RECTAL
Status: DISCONTINUED | OUTPATIENT
Start: 2020-09-11 | End: 2020-09-18

## 2020-09-11 RX ORDER — POLYETHYLENE GLYCOL 3350 17 G/17G
17 POWDER, FOR SOLUTION ORAL DAILY PRN
Status: DISCONTINUED | OUTPATIENT
Start: 2020-09-11 | End: 2020-09-18

## 2020-09-11 RX ORDER — PROMETHAZINE HYDROCHLORIDE 25 MG/1
12.5 TABLET ORAL
Status: DISCONTINUED | OUTPATIENT
Start: 2020-09-11 | End: 2020-09-18

## 2020-09-11 RX ORDER — PANTOPRAZOLE SODIUM 40 MG/1
40 TABLET, DELAYED RELEASE ORAL
Status: DISCONTINUED | OUTPATIENT
Start: 2020-09-12 | End: 2020-09-20

## 2020-09-11 RX ORDER — SODIUM CHLORIDE 0.9 % (FLUSH) 0.9 %
5-40 SYRINGE (ML) INJECTION AS NEEDED
Status: DISCONTINUED | OUTPATIENT
Start: 2020-09-11 | End: 2020-10-05 | Stop reason: HOSPADM

## 2020-09-11 RX ORDER — ONDANSETRON 2 MG/ML
4 INJECTION INTRAMUSCULAR; INTRAVENOUS
Status: DISCONTINUED | OUTPATIENT
Start: 2020-09-11 | End: 2020-09-18

## 2020-09-11 RX ORDER — ACETAMINOPHEN 325 MG/1
650 TABLET ORAL
Status: DISCONTINUED | OUTPATIENT
Start: 2020-09-11 | End: 2020-09-18

## 2020-09-11 RX ORDER — SODIUM CHLORIDE 0.9 % (FLUSH) 0.9 %
5-40 SYRINGE (ML) INJECTION EVERY 8 HOURS
Status: DISCONTINUED | OUTPATIENT
Start: 2020-09-11 | End: 2020-10-05 | Stop reason: HOSPADM

## 2020-09-11 RX ORDER — SODIUM CHLORIDE 9 MG/ML
50 INJECTION, SOLUTION INTRAVENOUS CONTINUOUS
Status: DISPENSED | OUTPATIENT
Start: 2020-09-11 | End: 2020-09-12

## 2020-09-11 RX ADMIN — SODIUM CHLORIDE 75 ML/HR: 9 INJECTION, SOLUTION INTRAVENOUS at 15:30

## 2020-09-11 RX ADMIN — Medication 10 ML: at 21:35

## 2020-09-11 RX ADMIN — CEFTRIAXONE SODIUM 1 G: 1 INJECTION, POWDER, FOR SOLUTION INTRAMUSCULAR; INTRAVENOUS at 14:59

## 2020-09-11 NOTE — ACP (ADVANCE CARE PLANNING)
Advance Care Planning Note    Name: Zeferino Pyle  YOB: 1950  MRN: [id number]  Admission Date: 9/11/2020 11:17 AM    Date of discussion: 9/11/2020    Active Diagnoses:    Hospital Problems  Never Reviewed          Codes Class Noted POA    Hydronephrosis ICD-10-CM: N13.30  ICD-9-CM: 591  9/11/2020 Unknown        Pelvic mass ICD-10-CM: R19.00  ICD-9-CM: 789.30  9/11/2020 Unknown               These active diagnoses are of sufficient risk that focused discussion on advance care planning is indicated in order to allow the patient to thoughtfully consider personal goals of care, and if situations arise that prevent the ability to personally give input, to ensure appropriate representation of their personal desires for different levels and aggressiveness of care. Discussion:     Persons present and participating in discussion: Joe Feliciano MD,     Discussion: CPR/ACLS/Cardioversion/Intubation/BiPAP/Renal failure requiring dialysis. Patient confirmed full code. No Order FULL CODE   Time Spent:     Total time spent face-to-face in education and discussion: 10 minutes. Shabnam Bardales MD  9/11/2020  2:22 PM    Patient's emergency contacts:  Extended Emergency Contact Information  [facility]  [facility] Phone: [phone]  Relation: Other Relative  Secondary Emergency Contact: Sree Cervantes, [address] Phone: [phone]  Relation: Chandan Christy

## 2020-09-11 NOTE — CONSULTS
[facility]   [address] [facility], [address] [place] [address], [facility]  Phone: [phone] NOTE     Patient: Bryson Garcia MRN: [id number]  PCP: Ambrosio Maynard MD   :     1950  Age:   72 y.o. Sex:  female      Referring physician: Dejuan Aaron MD  Reason for consultation: 72 y.o. female with Pelvic mass [R19.00]  Hydronephrosis [K85.16] complicated by Hyponatremia   Admission Date: 2020 11:17 AM  LOS: 0 days      ASSESSMENT and PLAN :   Hyponatremia :  - acute on chronic   - baseline Na unknown   - looks clinically dry on exam   - r/o SIADH secondary to maligancy   - Urine chemistries pending   - continue w/ NS as ordered   - labs Q6 hr     R hydronephrosis   - Likely 2/2 extrinsic compression from sigmoid mass   - urology consulted     Sigmoid mass w/ possible LN mets   - per GI     Microcytic anemia   GERD     Care Plan discussed with:  Pt       Thank you for consulting [facility] in the care of your patient. Subjective:   HPI: Bryson Garcia is a 72 y.o.  female who has been admitted to the hospital for worsening abdominal pain. She was diagnosed with sigmoid mass / possible malignancy on CT yesterday and was scheduled to CSY by Dr Jodi Bliss next week.  On admission she was noted to have Hyponatremia with Na of 122 for which we were asked to see her   She reports hx of Hyponatremia when she was at [facility] in    Reports 10 lb weight loss in last 6 months   Denies any N/V/CP/SOB   Reports worsening R sided pelvic and loin pain   CT showed R hydronephrosis and urology has been consulted   Denies any diuretic use  No Alcohol use reported   Non smoker         Past Medical Hx:   Past Medical History:   Diagnosis Date    GERD (gastroesophageal reflux disease)     Ill-defined condition     Spaulding's esophagus        Past Surgical Hx:     Past Surgical History:   Procedure Laterality Date    HX APPENDECTOMY      HX ENDOSCOPY  03/13/2017    Dr. Gavi Coley HX ENDOSCOPY  02/13/2020    Dr. Gavi Coley HX GYN      Left oopherectomy         No Known Allergies    Social Hx:  reports that she has never smoked. She has never used smokeless tobacco. She reports current alcohol use of about 1.0 standard drinks of alcohol per week. She reports that she does not use drugs. Family History   Problem Relation Age of Onset    Cancer Mother     Heart Disease Father     Diabetes Brother        Review of Systems:  A thorough twelve point review of system was performed today. Pertinent positives and negatives are mentioned in the HPI. The reminder of the ROS is negative and noncontributory. Objective:    Vitals:    Vitals:    09/11/20 1027 09/11/20 1739   BP: 124/74 126/70   Pulse: 91 85   Resp: 16 15   Temp: 98.2 °F (36.8 °C) 98.4 °F (36.9 °C)   SpO2: 100% 100%     I&O's:  No intake/output data recorded. Visit Vitals  /70 (BP 1 Location: Left arm, BP Patient Position: At rest)   Pulse 85   Temp 98.4 °F (36.9 °C)   Resp 15   SpO2 100%       Physical Exam:  General:  No apparent Distress  HEENT: PERRL,  No Pallor , No Icterus  Neck: Supple,no mass palpable  Lungs : CTA  CVS: RRR, S1 S2 normal, No murmur   Abdomen: Soft, NT, BS +  Extremities: no Edema  Skin: No rash or lesions.   MS: No joint swelling, erythema, warmth  Neurologic: non focal, AAO x 3  Psych: normal affect/    Laboratory Results:    Recent Labs     09/11/20  1455 09/11/20  1139   * 123*   K 3.4* 3.4*   CL 90* 92*   CO2 23 24   * 112*   BUN 11 12   CREA 0.78 0.84   CA 7.8* 8.0*   ALB  --  2.7*   ALT  --  21   INR  --  1.0     Recent Labs     09/11/20  1139   WBC 14.5*   HGB 9.0*   HCT 29.1*   *     No results found for: SDES  No results found for: CULT  Recent Results (from the past 24 hour(s))   URINALYSIS W/MICROSCOPIC    Collection Time: 09/11/20 10:57 AM   Result Value Ref Range    Color YELLOW/STRAW      Appearance TURBID (A) CLEAR      Specific gravity 1.027 1.003 - 1.030      pH (UA) 7.0 5.0 - 8.0      Protein 100 (A) NEG mg/dL    Glucose Negative NEG mg/dL    Ketone 15 (A) NEG mg/dL    Bilirubin Negative NEG      Blood Negative NEG      Urobilinogen 1.0 0.2 - 1.0 EU/dL    Nitrites Negative NEG      Leukocyte Esterase TRACE (A) NEG      WBC 0-4 0 - 4 /hpf    RBC 0-5 0 - 5 /hpf    Epithelial cells FEW FEW /lpf    Bacteria Negative NEG /hpf   URINE CULTURE HOLD SAMPLE    Collection Time: 09/11/20 10:57 AM    Specimen: Serum; Urine   Result Value Ref Range    Urine culture hold        Urine on hold in Microbiology dept for 2 days. If unpreserved urine is submitted, it cannot be used for addtional testing after 24 hours, recollection will be required. CBC WITH AUTOMATED DIFF    Collection Time: 09/11/20 11:39 AM   Result Value Ref Range    WBC 14.5 (H) 3.6 - 11.0 K/uL    RBC 4.38 3.80 - 5.20 M/uL    HGB 9.0 (L) 11.5 - 16.0 g/dL    HCT 29.1 (L) 35.0 - 47.0 %    MCV 66.4 (L) 80.0 - 99.0 FL    MCH 20.5 (L) 26.0 - 34.0 PG    MCHC 30.9 30.0 - 36.5 g/dL    RDW 16.8 (H) 11.5 - 14.5 %    PLATELET 164 (H) 185 - 400 K/uL    MPV 8.2 (L) 8.9 - 12.9 FL    NRBC 0.0 0  WBC    ABSOLUTE NRBC 0.00 0.00 - 0.01 K/uL    NEUTROPHILS 89 (H) 32 - 75 %    LYMPHOCYTES 5 (L) 12 - 49 %    MONOCYTES 5 5 - 13 %    EOSINOPHILS 0 0 - 7 %    BASOPHILS 0 0 - 1 %    IMMATURE GRANULOCYTES 1 (H) 0.0 - 0.5 %    ABS. NEUTROPHILS 13.0 (H) 1.8 - 8.0 K/UL    ABS. LYMPHOCYTES 0.7 (L) 0.8 - 3.5 K/UL    ABS. MONOCYTES 0.7 0.0 - 1.0 K/UL    ABS. EOSINOPHILS 0.0 0.0 - 0.4 K/UL    ABS. BASOPHILS 0.0 0.0 - 0.1 K/UL    ABS. IMM.  GRANS. 0.1 (H) 0.00 - 0.04 K/UL    DF SMEAR SCANNED      RBC COMMENTS HYPOCHROMIA  3+        RBC COMMENTS MICROCYTOSIS  2+        RBC COMMENTS OVALOCYTES  PRESENT       PROTHROMBIN TIME + INR    Collection Time: 09/11/20 11:39 AM   Result Value Ref Range    INR 1.0 0.9 - 1.1      Prothrombin time 10.3 9.0 - 56.0 sec   METABOLIC PANEL, COMPREHENSIVE Collection Time: 09/11/20 11:39 AM   Result Value Ref Range    Sodium 123 (L) 136 - 145 mmol/L    Potassium 3.4 (L) 3.5 - 5.1 mmol/L    Chloride 92 (L) 97 - 108 mmol/L    CO2 24 21 - 32 mmol/L    Anion gap 7 5 - 15 mmol/L    Glucose 112 (H) 65 - 100 mg/dL    BUN 12 6 - 20 MG/DL    Creatinine 0.84 0.55 - 1.02 MG/DL    BUN/Creatinine ratio 14 12 - 20      GFR est AA >60 >60 ml/min/1.73m2    GFR est non-AA >60 >60 ml/min/1.73m2    Calcium 8.0 (L) 8.5 - 10.1 MG/DL    Bilirubin, total 0.5 0.2 - 1.0 MG/DL    ALT (SGPT) 21 12 - 78 U/L    AST (SGOT) 23 15 - 37 U/L    Alk.  phosphatase 95 45 - 117 U/L    Protein, total 6.9 6.4 - 8.2 g/dL    Albumin 2.7 (L) 3.5 - 5.0 g/dL    Globulin 4.2 (H) 2.0 - 4.0 g/dL    A-G Ratio 0.6 (L) 1.1 - 2.2     LIPASE    Collection Time: 09/11/20 11:39 AM   Result Value Ref Range    Lipase 98 73 - 393 U/L   OSMOLALITY, SERUM/PLASMA    Collection Time: 09/11/20  2:55 PM   Result Value Ref Range    Osmolality, serum/plasma 256 mOsm/kg O6T   METABOLIC PANEL, BASIC    Collection Time: 09/11/20  2:55 PM   Result Value Ref Range    Sodium 122 (L) 136 - 145 mmol/L    Potassium 3.4 (L) 3.5 - 5.1 mmol/L    Chloride 90 (L) 97 - 108 mmol/L    CO2 23 21 - 32 mmol/L    Anion gap 9 5 - 15 mmol/L    Glucose 106 (H) 65 - 100 mg/dL    BUN 11 6 - 20 MG/DL    Creatinine 0.78 0.55 - 1.02 MG/DL    BUN/Creatinine ratio 14 12 - 20      GFR est AA >60 >60 ml/min/1.73m2    GFR est non-AA >60 >60 ml/min/1.73m2    Calcium 7.8 (L) 8.5 - 10.1 MG/DL   TSH 3RD GENERATION    Collection Time: 09/11/20  2:55 PM   Result Value Ref Range    TSH 1.04 0.36 - 3.74 uIU/mL   CORTISOL    Collection Time: 09/11/20  2:55 PM   Result Value Ref Range    Cortisol, random 26.9 ug/dL   FERRITIN    Collection Time: 09/11/20  2:55 PM   Result Value Ref Range    Ferritin 20 (L) 26 - 388 NG/ML   IRON PROFILE    Collection Time: 09/11/20  2:55 PM   Result Value Ref Range    Iron 13 (L) 35 - 150 ug/dL    TIBC 338 250 - 450 ug/dL    Iron % saturation 4 (L) 20 - 50 %   CEA    Collection Time: 09/11/20  2:55 PM   Result Value Ref Range    CEA 7.4 ng/mL   TSH 3RD GENERATION    Collection Time: 09/11/20  2:55 PM   Result Value Ref Range    TSH 1.09 0.36 - 3.74 uIU/mL         Urine dipstick:   Lab Results   Component Value Date/Time    Color YELLOW/STRAW 09/11/2020 10:57 AM    Appearance TURBID (A) 09/11/2020 10:57 AM    Specific gravity 1.027 09/11/2020 10:57 AM    pH (UA) 7.0 09/11/2020 10:57 AM    Protein 100 (A) 09/11/2020 10:57 AM    Glucose Negative 09/11/2020 10:57 AM    Ketone 15 (A) 09/11/2020 10:57 AM    Bilirubin Negative 09/11/2020 10:57 AM    Urobilinogen 1.0 09/11/2020 10:57 AM    Nitrites Negative 09/11/2020 10:57 AM    Leukocyte Esterase TRACE (A) 09/11/2020 10:57 AM    Epithelial cells FEW 09/11/2020 10:57 AM    Bacteria Negative 09/11/2020 10:57 AM    WBC 0-4 09/11/2020 10:57 AM    RBC 0-5 09/11/2020 10:57 AM       I have reviewed the following: All pertinent labs, microbiology data, radiology imaging for my assessment     Medications list Personally Reviewed   [x]      Yes     []               No       Medications:  Prior to Admission medications    Medication Sig Start Date End Date Taking? Authorizing Provider   denosumab (PROLIA) 60 mg/mL injection 60 mg by SubCUTAneous route. Provider, Historical   lansoprazole (PREVACID) 30 mg capsule Take 30 mg by mouth Daily (before breakfast). Provider, Historical   multivitamin (ONE A DAY) tablet Take 1 Tab by mouth daily. Provider, Historical   ascorbic acid, vitamin C, (VITAMIN C) 500 mg tablet Take 500 mg by mouth. Provider, Historical   cholecalciferol (VITAMIN D3) 1,000 unit cap Take 1,000 Units by mouth daily. Provider, Historical   Omega-3 Fatty Acids (FISH OIL) 500 mg cap Take  by mouth. Provider, Historical        Thank you for allowing us to participate in the care of this patient. We will follow patient.  Please dont hesitate to call with any questions    Edwin Mackenzie [address] [facility]   [address] [facility] [place] , [facility]  Phone - [phone]   Fax - [phone]  [email or web address]

## 2020-09-11 NOTE — CONSULTS
Requesting Provider: Yajaira Goncalves MD - Reason for Consultation: \"b/l hydronephrosis, R>L, pelvic mass\"  Pre-existing [facility] Patient:   No                Patient: Lynette Reid MRN: [id number]  SSN: [id number]    YOB: 1950  Age: 72 y.o. Sex: female     Location: R33/R33       Code Status: No Order   PCP: Chanel Austin MD  - [phone]   Emergency Contact:  Primary Emergency Contact: Loren Celeste, Home Phone: [phone]   Race/Islam/Language: WHITE OR Varun Kevin / Dianaluigiitte Duck / Darryl Horseman: Payor: Tiffanie Vyas / Plan: Ijeoma Norwood / Product Type: Medicare /    Prior Admission Data: 2/13/20 [facility] ENDOSCOPY Anahy Dia   Hospitalized:  Hospital Day: 1 - Admitted 9/11/2020 11:17 AM     CONSULTANTS  IP CONSULT TO GASTROENTEROLOGY  IP CONSULT TO UROLOGY  IP CONSULT TO NEPHROLOGY  IP CONSULT TO COLORECTAL SURGERY   ADMISSION DIAGNOSES    ICD-10-CM ICD-9-CM   1. Pelvic mass  R19.00 789.30   2. Hydronephrosis, right  N13.30 591         Assessment/Plan:       · B/L hydronephrosis, R>L  · Pelvic mass    CT abd/pelvis: 9/10/2020  IMPRESSION:     Large, irregular pelvic mass measuring approximately 9 cm likely representing  neoplasm arising from the sigmoid colon. Adnexal neoplasm is a possibility. This  does appear to be separate from the uterus. Small amount of pelvic free fluid. Associated colonic bowel wall thickening. There is focal gas in the upper  presacral region which is unclear if this is intraluminal or contained  extraluminal collection.     No definite distant metastatic disease. Borderline enlarged retroperitoneal  lymph nodes. 6 mm right lower lobe lung nodule. Mass effect from the pelvic mass  causing moderate right and minimal left hydronephrosis. -NPO after midnight for possible b/l stent placement (okay to order regular diet from  standpoint for the remainder of the day). -Cr wnl, however pt is experiencing R>L flank pain.  Hydro is present from kidney, down ureter to pelvic mass, would consider drainage, if agreeable to all services involved. Would only consider IR neph tube placement if unable to pass stents.   -Will await input from consultants prior to definitive stent placement. Suspect gyn mass over colorectal. On call MD will see pt tomorrow.  -Continue to monitor creatinine. CC: Back Pain   HPI: She is a 72 y.o. female w/ no significant past medical hx, that presented to ER w/ cc of gas pain for several months, right lower quad pain x 2 days. CT scan yesterday that revealed a large pelvic mass possibly causing moderate right hydronephrosis, mild left hydronephrosis. AFVSS  WBC 14.5  Started on Rocephin. UA w/ trace leuks. Cr WNL. Unable to perform pelvic exam, pt in chair, no bed. Pt states she had salpingo-oophorecetomy several years ago but still has all other female organs. She denies gross hematuria and blood in stool. She states she has been experiencing flank pain, R>L for the past 2 days. Problem: flank pain; Location: right kidney; Severity: moderate; Timin days, Context: as above in HPI; Better/Worse: TBD, Associated s/s:pain     Temp (24hrs), Av.2 °F (36.8 °C), Min:98.2 °F (36.8 °C), Max:98.2 °F (36.8 °C)    Urinary Status: Voiding, Frequency, Right flank pain  Creatinine   Date/Time Value Ref Range Status   2020 11:39 AM 0.84 0.55 - 1.02 MG/DL Final     Current Antimicrobial Therapy (168h ago, onward)    Ordered     Start Stop    20 1418  cefTRIAXone (ROCEPHIN) 1 g in 0.9% sodium chloride (MBP/ADV) 50 mL  1 g,   IntraVENous,   EVERY 24 HOURS      20 1500 20 1459        Key Anti-Platelet Anticoagulant Meds     The patient is on no antiplatelet meds or anticoagulants.         Diet: No diet orders on file -       Labs     Lab Results   Component Value Date/Time    WBC 14.5 (H) 2020 11:39 AM    HCT 29.1 (L) 2020 11:39 AM    PLATELET 818 (H)  11:39 AM    Sodium 123 (L) 09/11/2020 11:39 AM    Potassium 3.4 (L) 09/11/2020 11:39 AM    Chloride 92 (L) 09/11/2020 11:39 AM    CO2 24 09/11/2020 11:39 AM    BUN 12 09/11/2020 11:39 AM    Creatinine 0.84 09/11/2020 11:39 AM    Glucose 112 (H) 09/11/2020 11:39 AM    Calcium 8.0 (L) 09/11/2020 11:39 AM    INR 1.0 09/11/2020 11:39 AM     UA:   Lab Results   Component Value Date/Time    Color YELLOW/STRAW 09/11/2020 10:57 AM    Appearance TURBID (A) 09/11/2020 10:57 AM    Specific gravity 1.027 09/11/2020 10:57 AM    pH (UA) 7.0 09/11/2020 10:57 AM    Protein 100 (A) 09/11/2020 10:57 AM    Glucose Negative 09/11/2020 10:57 AM    Ketone 15 (A) 09/11/2020 10:57 AM    Bilirubin Negative 09/11/2020 10:57 AM    Urobilinogen 1.0 09/11/2020 10:57 AM    Nitrites Negative 09/11/2020 10:57 AM    Leukocyte Esterase TRACE (A) 09/11/2020 10:57 AM    Epithelial cells FEW 09/11/2020 10:57 AM    Bacteria Negative 09/11/2020 10:57 AM    WBC 0-4 09/11/2020 10:57 AM    RBC 0-5 09/11/2020 10:57 AM     Imaging     Results for orders placed during the hospital encounter of 09/10/20   CT ABD PELV W CONT    Narrative INDICATION: Left lower abdominal pain    COMPARISON: None    TECHNIQUE:   Thin axial images were obtained through the abdomen and pelvis following  intravenous iodinated contrast administration. Coronal and sagittal  reconstructions were generated. Oral contrast was administered. CT dose  reduction was achieved through use of a standardized protocol tailored for this  examination and automatic exposure control for dose modulation. FINDINGS:     LIVER: No mass or biliary dilatation. GALLBLADDER: No calcified gallstone  SPLEEN: Unremarkable  PANCREAS: No mass or ductal dilatation. ADRENALS: Unremarkable. KIDNEYS/URETERS: Symmetric nephrograms with a couple low-density left renal  lesions too small to characterize. No evidence for enhancing renal mass. Moderate right hydronephrosis and hydroureter down into the pelvis.  Minimal  prominence of the left collecting system. PERITONEUM: Borderline enlarged aortocaval and periaortic retroperitoneal lymph  nodes (series 3, image 46) of uncertain significance. No definite abdominal  lymphadenopathy. No ascites. COLON: Large irregular mass in the lower pelvis which may be arising from the  sigmoid colon measuring roughly 9.0 x 8.1 cm. There is adjacent moderate  irregular colon wall thickening. This appears to be separate from the uterus  most apparent on the sagittal view. There is focal gas in the presacral region  measuring 4.8 x 2.6 cm (series 3, image 64) which is unclear if this is  intraluminal or contained extraluminal collection. APPENDIX: Not clearly seen  SMALL BOWEL: No dilatation or wall thickening. STOMACH: Unremarkable. PELVIS: No pelvic lymphadenopathy. Small amount of pelvic free fluid. BONES: No destructive bone lesion. VISUALIZED THORAX: Stable 6 mm right lower lobe lung nodule (series 3, image 11)  ADDITIONAL COMMENTS: N/A      Impression IMPRESSION:    Large, irregular pelvic mass measuring approximately 9 cm likely representing  neoplasm arising from the sigmoid colon. Adnexal neoplasm is a possibility. This  does appear to be separate from the uterus. Small amount of pelvic free fluid. Associated colonic bowel wall thickening. There is focal gas in the upper  presacral region which is unclear if this is intraluminal or contained  extraluminal collection. No definite distant metastatic disease. Borderline enlarged retroperitoneal  lymph nodes. 6 mm right lower lobe lung nodule. Mass effect from the pelvic mass  causing moderate right and minimal left hydronephrosis. Findings discussed with Dr. Danita Marcum at the time of dictation           US Results (most recent):  Results from [place] encounter on 03/08/11   US ABDOMEN COMPLETE    Narrative **Final Report**       ICD Codes / Adm. Diagnosis: 789.01  789.06 / RIGHT UPPER QUADRANT PAIN    Examination:  [facility]  - [id number] - Mar  8 2011 10:55AM  Accession No:  [id number]  Reason:  RIGHT UPPER QUAD PAIN      REPORT:  Ultrasonography of the abdomen was performed. The aorta and pancreas are   obscured by bowel gas. The liver is normal in echotexture. Portal vein flow   is antegrade. There is no intrahepatic ductal dilatation or mass. The   common bile duct measures 3.6 mm. The gallbladder is fluid filled. There is   a wall adherent echogenic focus in the gallbladder. There is no   cholelithiasis, pericholecystic fluid collection, or gallbladder wall   thickening. The spleen appears unremarkable. The right kidney measures 11.6   cm and the left kidney measures 10.7 cm. The kidneys are normal in   echotexture and there is no hydronephrosis or perinephric fluid collection. There are calcifications in the lower pole of the left kidney. The inferior   vena cava was not well-seen. IMPRESSION:   1. Gallbladder polyp. 2. Calcifications lower pole left kidney. Interpreting/Reading Doctor: Jeremy Rebolledo ([id number])  Transcribed:  on 03/08/2011  Approved: Jeremy Rebolledo ([id number])  03/08/2011             Distribution:  Attending Doctor: Shea Go               Cultures     All Micro Results     Procedure Component Value Units Date/Time    CULTURE, URINE [[id number]] Collected:  09/11/20 1057    Order Status:  Completed Specimen:  Urine from Clean catch Updated:  09/11/20 1506    CULTURE, URINE [[id number]]     Order Status:  Canceled Specimen:  Urine from [address] [place] [address] [[id number]] Collected:  09/11/20 1057    Order Status:  Completed Specimen:  Urine from Serum Updated:  09/11/20 1116     Urine culture hold       Urine on hold in Microbiology dept for 2 days. If unpreserved urine is submitted, it cannot be used for addtional testing after 24 hours, recollection will be required.                  Past History: (Complete 2+/3 areas)   No Known Allergies   Current Facility-Administered Medications Medication Dose Route Frequency    0.9% sodium chloride infusion  75 mL/hr IntraVENous CONTINUOUS    cefTRIAXone (ROCEPHIN) 1 g in 0.9% sodium chloride (MBP/ADV) 50 mL  1 g IntraVENous Q24H     Current Outpatient Medications   Medication Sig    denosumab (PROLIA) 60 mg/mL injection 60 mg by SubCUTAneous route.  lansoprazole (PREVACID) 30 mg capsule Take 30 mg by mouth Daily (before breakfast).  multivitamin (ONE A DAY) tablet Take 1 Tab by mouth daily.  ascorbic acid, vitamin C, (VITAMIN C) 500 mg tablet Take 500 mg by mouth.  cholecalciferol (VITAMIN D3) 1,000 unit cap Take 1,000 Units by mouth daily.  Omega-3 Fatty Acids (FISH OIL) 500 mg cap Take  by mouth. Prior to Admission medications    Medication Sig Start Date End Date Taking? Authorizing Provider   denosumab (PROLIA) 60 mg/mL injection 60 mg by SubCUTAneous route. Provider, Historical   lansoprazole (PREVACID) 30 mg capsule Take 30 mg by mouth Daily (before breakfast). Provider, Historical   multivitamin (ONE A DAY) tablet Take 1 Tab by mouth daily. Provider, Historical   ascorbic acid, vitamin C, (VITAMIN C) 500 mg tablet Take 500 mg by mouth. Provider, Historical   cholecalciferol (VITAMIN D3) 1,000 unit cap Take 1,000 Units by mouth daily. Provider, Historical   Omega-3 Fatty Acids (FISH OIL) 500 mg cap Take  by mouth. Provider, Historical        PMHx:  has a past medical history of GERD (gastroesophageal reflux disease) and Ill-defined condition. PSurgHx:  has a past surgical history that includes hx gyn and hx appendectomy. PSocHx:  reports that she has never smoked. She has never used smokeless tobacco. She reports current alcohol use of about 1.0 standard drinks of alcohol per week. She reports that she does not use drugs.    ROS:  (Complete - 10 systems) - DENIES: Weightloss (Constitutional), Dry mouth (ENMT), Chest pain (CV), SOB (Respiratory), Constipation (GI), Weakness (MS), Pallor (Skin), TIA Sx (Neuro), Confusion (Psych), Easy bruising (Heme)    Physical Exam: (Comprehesive - 8+ 1995 Systems)     (1) Constitutional:  FIO2:   on SpO2: O2 Sat (%): 100 %       Patient Vitals for the past 24 hrs:   BP Temp Pulse Resp SpO2   09/11/20 1027 124/74 98.2 °F (36.8 °C) 91 16 100 %          (2) ENMT:   moist mucous membranes, normal sinuses   (3) Respiratory:  breathing easily, no distress   (4) GI:  no abdominal masses, tenderness   (5) :   No Hogan, urine clear   (6) Lymphatic:  no adenopathy, neck supple   (7) Muscloskeletal:  no gross deformity, normal ROM   (8) Skin:  no rash, warm & dry   (9) Neuro:  no focal deficits, normal speech      Signed By: Ravi Young NP  - September 11, 2020

## 2020-09-11 NOTE — ED PROVIDER NOTES
HPI .  Patient reports several months of gas pain and \"\"blockages. \"  Patient feels like she is lost 12 pounds. She says her appetite is okay. Pain has involved the left lower quadrant but more recently she has developed right lower quadrant pain especially right flank pain in the last day or 2. Patient had a CT scan yesterday which shows a large pelvic mass possibly arising from her sigmoid colon and also right hydronephrosis. Pain is constant. Patient had a COVID test and brought in stool specimens earlier today because she has a colonoscopy planned in 3 days. Pain is described as moderate and constant. Past Medical History:   Diagnosis Date    GERD (gastroesophageal reflux disease)     Ill-defined condition     Spaulding's esophagus       Past Surgical History:   Procedure Laterality Date    HX APPENDECTOMY      HX GYN      Left oopherectomy         Family History:   Problem Relation Age of Onset    Cancer Mother     Heart Disease Father     Diabetes Brother        Social History     Socioeconomic History    Marital status: SINGLE     Spouse name: Not on file    Number of children: Not on file    Years of education: Not on file    Highest education level: Not on file   Occupational History    Not on file   Social Needs    Financial resource strain: Not on file    Food insecurity     Worry: Not on file     Inability: Not on file    Transportation needs     Medical: Not on file     Non-medical: Not on file   Tobacco Use    Smoking status: Never Smoker    Smokeless tobacco: Never Used   Substance and Sexual Activity    Alcohol use:  Yes     Alcohol/week: 1.0 standard drinks     Types: 1 Glasses of wine per week    Drug use: No    Sexual activity: Not on file   Lifestyle    Physical activity     Days per week: Not on file     Minutes per session: Not on file    Stress: Not on file   Relationships    Social connections     Talks on phone: Not on file     Gets together: Not on file Attends [employer] service: Not on file     Active member of club or organization: Not on file     Attends meetings of clubs or organizations: Not on file     Relationship status: Not on file    Intimate partner violence     Fear of current or ex partner: Not on file     Emotionally abused: Not on file     Physically abused: Not on file     Forced sexual activity: Not on file   Other Topics Concern    Not on file   Social History Narrative    Not on file         ALLERGIES: Patient has no known allergies. Review of Systems   Constitutional: Positive for unexpected weight change. Negative for appetite change. HENT: Negative for congestion. Eyes: Negative for redness. Respiratory: Negative for shortness of breath. Cardiovascular: Negative for chest pain. Gastrointestinal: Positive for abdominal pain. Genitourinary: Positive for difficulty urinating and flank pain. Neurological: Negative for speech difficulty. Psychiatric/Behavioral: Positive for dysphoric mood. Negative for agitation and behavioral problems. Vitals:    09/11/20 1027   BP: 124/74   Pulse: 91   Resp: 16   Temp: 98.2 °F (36.8 °C)   SpO2: 100%            Physical Exam  Vitals signs and nursing note reviewed. Constitutional:       Appearance: She is well-developed. Comments: thin   HENT:      Head: Normocephalic and atraumatic. Eyes:      Pupils: Pupils are equal, round, and reactive to light. Neck:      Musculoskeletal: Normal range of motion and neck supple. Cardiovascular:      Rate and Rhythm: Normal rate and regular rhythm. Heart sounds: Normal heart sounds. No murmur. No friction rub. No gallop. Pulmonary:      Effort: Pulmonary effort is normal. No respiratory distress. Breath sounds: No wheezing or rales. Abdominal:      Palpations: Abdomen is soft. Tenderness: There is abdominal tenderness. There is no rebound.       Comments: Lower abdominal tenderness   Musculoskeletal: Normal range of motion. General: No tenderness. Skin:     Findings: No erythema. Neurological:      Mental Status: She is alert. Cranial Nerves: No cranial nerve deficit. Comments: Motor; symmetric   Psychiatric:         Behavior: Behavior normal.          MDM       Procedures          Hospitalist Marco Text for Admission  12:37 PM    ED Room Number: R33/R33  Patient Name and age:  Ulysses Corporal 72 y.o.  female  Working Diagnosis:   1. Pelvic mass    2.  Hydronephrosis, right      Readmission: no  Isolation Requirements:  no  Recommended Level of Care:  med/surg  Code Status:  FULL  Other: Consults for Dr. Baldo Trinidad gastroenterology and [facility] placed but they have not called back yet  Department:[facility] Adult ED - [phone]

## 2020-09-11 NOTE — CONSULTS
[address]  [address] [facility] 140 [facility], [address]  [phone]                     GI CONSULTATION NOTE      NAME:  Herchel Gowers   :   1950   MRN:   [id number]       Referring Physician: Dr. Lucas Adler Date: 2020     Chief Complaint: Abdominal pain/abdominal mass    History of Present Illness:  Patient is a 72 y.o. who is seen in consultation at the request of Dr. Donnie Sepulveda for abdominal pain/mass. Patient comes into emergency room with complaint of left abdominal/ flank pain that was sudden and severe. Patient reports she already having intermittent left upper quadrant  right lower abdominal pain for several months. Patient reports having follow up with Dr. Perry Cao on last week 2020 for symptoms and is scheduled for colonoscopy on this Tuesday 9/15/2020. She also had CT scan and lab work ordered that was performed  on yesteday. She stated that she had unintentional weight loss of 12-15 pounds in the last months. She denies changes with appetite Denies fever, no chills, no nausea, no vomiting, no chest pain, no shortness of breath. She reports having loose stools and took stool studies sample to [facility] today. Denies any melena/ no hematochezia. Patient denies smoking, no alcohol       PMH:  Past Medical History:   Diagnosis Date    GERD (gastroesophageal reflux disease)     Ill-defined condition     Spaulding's esophagus       PSH:  Past Surgical History:   Procedure Laterality Date    HX APPENDECTOMY      HX GYN      Left oopherectomy       Allergies:  No Known Allergies    Home Medications:  Prior to Admission Medications   Prescriptions Last Dose Informant Patient Reported? Taking? Omega-3 Fatty Acids (FISH OIL) 500 mg cap   Yes No   Sig: Take  by mouth. ascorbic acid, vitamin C, (VITAMIN C) 500 mg tablet   Yes No   Sig: Take 500 mg by mouth. cholecalciferol (VITAMIN D3) 1,000 unit cap   Yes No   Sig: Take 1,000 Units by mouth daily.    denosumab (PROLIA) 60 mg/mL injection   Yes No   Si mg by SubCUTAneous route. lansoprazole (PREVACID) 30 mg capsule   Yes No   Sig: Take 30 mg by mouth Daily (before breakfast). multivitamin (ONE A DAY) tablet   Yes No   Sig: Take 1 Tab by mouth daily. Facility-Administered Medications: None       Hospital Medications:  Current Facility-Administered Medications   Medication Dose Route Frequency    0.9% sodium chloride infusion  75 mL/hr IntraVENous CONTINUOUS    cefTRIAXone (ROCEPHIN) 1 g in 0.9% sodium chloride (MBP/ADV) 50 mL  1 g IntraVENous Q24H     Current Outpatient Medications   Medication Sig    denosumab (PROLIA) 60 mg/mL injection 60 mg by SubCUTAneous route.  lansoprazole (PREVACID) 30 mg capsule Take 30 mg by mouth Daily (before breakfast).  multivitamin (ONE A DAY) tablet Take 1 Tab by mouth daily.  ascorbic acid, vitamin C, (VITAMIN C) 500 mg tablet Take 500 mg by mouth.  cholecalciferol (VITAMIN D3) 1,000 unit cap Take 1,000 Units by mouth daily.  Omega-3 Fatty Acids (FISH OIL) 500 mg cap Take  by mouth. Social History:  Social History     Tobacco Use    Smoking status: Never Smoker    Smokeless tobacco: Never Used   Substance Use Topics    Alcohol use:  Yes     Alcohol/week: 1.0 standard drinks     Types: 1 Glasses of wine per week       Family History:  Family History   Problem Relation Age of Onset    Cancer Mother     Heart Disease Father     Diabetes Brother        Review of Systems:    Constitutional: negative fever, negative chills, + weight loss  Eyes:   negative visual changes  ENT:   negative sore throat, tongue or lip swelling  Respiratory:  negative cough, negative dyspnea  Cards:  negative for chest pain, palpitations, lower extremity edema  GI:   See HPI  :  negative for frequency, dysuria  Integument:  negative for rash and pruritus  Heme:  negative for easy bruising and gum/nose bleeding  Musculoskel: negative for myalgias,  back pain and muscle weakness  Neuro: negative for headaches, dizziness, vertigo  Psych:  negative for feelings of anxiety, depression      Objective:     Patient Vitals for the past 8 hrs:   BP Temp Pulse Resp SpO2   09/11/20 1027 124/74 98.2 °F (36.8 °C) 91 16 100 %     No intake/output data recorded. No intake/output data recorded. PHYSICAL EXAM:  General: WD, WN. Alert, cooperative, no acute distress   HEENT: NC, Atraumatic. PERRLA, EOMI. Anicteric sclerae. Lungs:  CTA Bilaterally. No Wheezing/Rhonchi/Rales. Heart:  Regular  rhythm,  No murmur (), No Rubs, No Gallops  Abdomen: Soft, rounded, + hypoactive bowel sounds,    Extremities: No c/c/e  Neurologic:  CN 2-12 gi, Alert and oriented X 3. No acute neurological distress   Psych:   Good insight. Not anxious nor agitated. Data Review     Recent Labs     09/11/20  1139   WBC 14.5*   HGB 9.0*   HCT 29.1*   *     Recent Labs     09/11/20  1139   *   K 3.4*   CL 92*   CO2 24   BUN 12   CREA 0.84   *   CA 8.0*     Recent Labs     09/11/20  1139   AP 95   TP 6.9   ALB 2.7*   GLOB 4.2*   LPSE 98     Recent Labs     09/11/20  1139   INR 1.0   PTP 10.3        Assessment:   Patient Active Problem List   Diagnosis Code    Hydronephrosis N13.30    Pelvic mass R19.00              Plan:   Abdominal pain:  -Abdomen/pelvic CT scan 9/10/2020:Large, irregular pelvic mass measuring approximately 9 cm likely representing neoplasm arising from the sigmoid colon. Adnexal neoplasm is a possibility. This does appear to be separate from the uterus. Small amount of pelvic free fluid. Associated colonic bowel wall thickening. There is focal gas in the upper presacral region which is unclear if this is intraluminal or contained  extraluminal collection. No definite distant metastatic disease. Borderline enlarged retroperitoneallymph nodes. 6 mm right lower lobe lung nodule. Mass effect from the pelvic masscausing moderate right and minimal left hydronephrosis.   -Admit per hospitalist   -IV hydration  -May have clear liquids.    -pain management   -Plans for colonoscopy of Tuesday with Dr. Jama Kirby, with gently prep patient with Golytely starting at 10 am on Monday 9/14/2020  -Consult Colorectal surgery  -CEA and Ca Ag 19 ordered    Discussed with Dr. Serina Christine

## 2020-09-11 NOTE — ROUTINE PROCESS
TRANSFER - OUT REPORT:    Verbal report given to Staff, RN (name) on Blake Garcia  being transferred to [facility] (unit) for routine progression of care       Report consisted of patients Situation, Background, Assessment and   Recommendations(SBAR). Information from the following report(s) SBAR, ED Summary, Intake/Output, MAR and Recent Results was reviewed with the receiving nurse. Lines:   Peripheral IV 09/11/20 Left Antecubital (Active)        Opportunity for questions and clarification was provided.       Patient transported with:   Kari

## 2020-09-11 NOTE — H&P
History & Physical    Primary Care Provider: Mary Grace Bar MD  Source of Information: Patient     History of Presenting Illness:   Zeferino Pyle is a 72 y.o. female who presents with abdominal pain      72 WF with history of GERD, Spaulding's esophagus. Patient reports several months of gas pain and \"\"blockages. \"  Patient feels like she is lost 12 pounds last 3 months. she was scheduled to have colonoscopy in July but was canceled. She says her appetite is okay. Pain has involved the left lower quadrant but more recently she has developed right lower quadrant pain especially right flank pain in the last day or 2. Increasing urinary frequency and slow flow of urine. Patient had a CT scan yesterday which shows a large pelvic mass possibly arising from her sigmoid colon and also right hydronephrosis. Pain is constant. She is scheduled to have colonoscopy next Tuesday. Patient had a COVID screen test this morning for her future procedure done in [facility] outpatient  lab and brought in stool specimens earlier today. Pain is described as moderate and constant, more in RLQ. Last BM was yesterday, soft. Denied blood in stool   Poor oral intake, is drinking water. She said she was in [facility] last months and she was told she has low sodium problem. Lives home alone, denied fever, no COVID 19 contact history      Review of Systems:  General: no fever, HPI  HEENT: no headache, no vision changes, no nose discharge, no hearing changes   RES: no wheezing, no cough, no sob  CVS: no cp, no palpitation.   Muscular: no joint swelling, no muscle pain, no leg swelling  Skin: no rash, no itching   GI: no vomiting,HPI,   : hpi, + dysuria, no hematuria  Hemo: no gum bleeding, no petechial   Neuro: no sensation changes, no focal weakness   Endo: no polydipsia   Psych: denied depression     Past Medical History:   Diagnosis Date    GERD (gastroesophageal reflux disease)     Ill-defined condition Spaulding's esophagus      Past Surgical History:   Procedure Laterality Date    HX APPENDECTOMY      HX GYN      Left oopherectomy     Prior to Admission medications    Medication Sig Start Date End Date Taking? Authorizing Provider   denosumab (PROLIA) 60 mg/mL injection 60 mg by SubCUTAneous route. Provider, Historical   lansoprazole (PREVACID) 30 mg capsule Take 30 mg by mouth Daily (before breakfast). Provider, Historical   multivitamin (ONE A DAY) tablet Take 1 Tab by mouth daily. Provider, Historical   ascorbic acid, vitamin C, (VITAMIN C) 500 mg tablet Take 500 mg by mouth. Provider, Historical   cholecalciferol (VITAMIN D3) 1,000 unit cap Take 1,000 Units by mouth daily. Provider, Historical   Omega-3 Fatty Acids (FISH OIL) 500 mg cap Take  by mouth. Provider, Historical     No Known Allergies   Family History   Problem Relation Age of Onset    Cancer Mother     Heart Disease Father     Diabetes Brother         SOCIAL HISTORY:  Patient resides:  Independently x   Assisted Living    SNF    With family care       Smoking history:   None x   Former    Chronic      Alcohol history:   None x   Social    Chronic      Ambulates:   Independently x   w/cane    w/walker    w/wc    CODE STATUS:  DNR    Full x   Other      Objective:     Physical Exam:     Visit Vitals  /74 (BP 1 Location: Left arm, BP Patient Position: Sitting)   Pulse 91   Temp 98.2 °F (36.8 °C)   Resp 16   SpO2 100%           General:  Alert, cooperative, no distress, appears stated age. Ox4    Head:  Normocephalic, without obvious abnormality, atraumatic. Eyes:  Conjunctivae/corneas clear. PERRL, EOMs intact. Nose: Nares normal. Septum midline. Mucosa normal. No drainage or sinus tenderness. Throat: Lips, mucosa, and tongue normal. Teeth and gums normal.   Neck: Supple, symmetrical, trachea midline, no adenopathy, thyroid: no enlargement/tenderness/nodules, no carotid bruit and no JVD.    Back:   Symmetric, no curvature. ROM normal. No CVA tenderness. Lungs:   Clear to auscultation bilaterally. Chest wall:  No tenderness or deformity. Heart:  Regular rate and rhythm, S1, S2 normal, no murmur, click, rub or gallop. Abdomen:   Soft, RLQ tenderness, no guarding. Rt cva tenderness. Extremities: Extremities normal, atraumatic, no cyanosis or edema. Pulses: 2+ and symmetric all extremities. Skin: Skin color, texture, turgor normal. No rashes or lesions   Neurologic: CNII-XII intact. 5/5, no sensation changes. Data Review:     Recent Days:  Recent Labs     09/11/20  1139   WBC 14.5*   HGB 9.0*   HCT 29.1*   *     Recent Labs     09/11/20  1139   *   K 3.4*   CL 92*   CO2 24   *   BUN 12   CREA 0.84   CA 8.0*   ALB 2.7*   ALT 21   INR 1.0     No results for input(s): PH, PCO2, PO2, HCO3, FIO2 in the last 72 hours. 24 Hour Results:  Recent Results (from the past 24 hour(s))   CREATININE, POC    Collection Time: 09/10/20  2:32 PM   Result Value Ref Range    Creatinine (POC) 0.8 0.6 - 1.3 mg/dL    GFRAA, POC >60 >60 ml/min/1.73m2    GFRNA, POC >60 >60 ml/min/1.73m2   URINALYSIS W/MICROSCOPIC    Collection Time: 09/11/20 10:57 AM   Result Value Ref Range    Color YELLOW/STRAW      Appearance TURBID (A) CLEAR      Specific gravity 1.027 1.003 - 1.030      pH (UA) 7.0 5.0 - 8.0      Protein 100 (A) NEG mg/dL    Glucose Negative NEG mg/dL    Ketone 15 (A) NEG mg/dL    Bilirubin Negative NEG      Blood Negative NEG      Urobilinogen 1.0 0.2 - 1.0 EU/dL    Nitrites Negative NEG      Leukocyte Esterase TRACE (A) NEG      WBC 0-4 0 - 4 /hpf    RBC 0-5 0 - 5 /hpf    Epithelial cells FEW FEW /lpf    Bacteria Negative NEG /hpf   URINE CULTURE HOLD SAMPLE    Collection Time: 09/11/20 10:57 AM    Specimen: Serum; Urine   Result Value Ref Range    Urine culture hold        Urine on hold in Microbiology dept for 2 days.   If unpreserved urine is submitted, it cannot be used for addtional testing after 24 hours, recollection will be required. CBC WITH AUTOMATED DIFF    Collection Time: 09/11/20 11:39 AM   Result Value Ref Range    WBC 14.5 (H) 3.6 - 11.0 K/uL    RBC 4.38 3.80 - 5.20 M/uL    HGB 9.0 (L) 11.5 - 16.0 g/dL    HCT 29.1 (L) 35.0 - 47.0 %    MCV 66.4 (L) 80.0 - 99.0 FL    MCH 20.5 (L) 26.0 - 34.0 PG    MCHC 30.9 30.0 - 36.5 g/dL    RDW 16.8 (H) 11.5 - 14.5 %    PLATELET 542 (H) 534 - 400 K/uL    MPV 8.2 (L) 8.9 - 12.9 FL    NRBC 0.0 0  WBC    ABSOLUTE NRBC 0.00 0.00 - 0.01 K/uL    NEUTROPHILS 89 (H) 32 - 75 %    LYMPHOCYTES 5 (L) 12 - 49 %    MONOCYTES 5 5 - 13 %    EOSINOPHILS 0 0 - 7 %    BASOPHILS 0 0 - 1 %    IMMATURE GRANULOCYTES 1 (H) 0.0 - 0.5 %    ABS. NEUTROPHILS 13.0 (H) 1.8 - 8.0 K/UL    ABS. LYMPHOCYTES 0.7 (L) 0.8 - 3.5 K/UL    ABS. MONOCYTES 0.7 0.0 - 1.0 K/UL    ABS. EOSINOPHILS 0.0 0.0 - 0.4 K/UL    ABS. BASOPHILS 0.0 0.0 - 0.1 K/UL    ABS. IMM. GRANS. 0.1 (H) 0.00 - 0.04 K/UL    DF SMEAR SCANNED      RBC COMMENTS HYPOCHROMIA  3+        RBC COMMENTS MICROCYTOSIS  2+        RBC COMMENTS OVALOCYTES  PRESENT       PROTHROMBIN TIME + INR    Collection Time: 09/11/20 11:39 AM   Result Value Ref Range    INR 1.0 0.9 - 1.1      Prothrombin time 10.3 9.0 - 01.7 sec   METABOLIC PANEL, COMPREHENSIVE    Collection Time: 09/11/20 11:39 AM   Result Value Ref Range    Sodium 123 (L) 136 - 145 mmol/L    Potassium 3.4 (L) 3.5 - 5.1 mmol/L    Chloride 92 (L) 97 - 108 mmol/L    CO2 24 21 - 32 mmol/L    Anion gap 7 5 - 15 mmol/L    Glucose 112 (H) 65 - 100 mg/dL    BUN 12 6 - 20 MG/DL    Creatinine 0.84 0.55 - 1.02 MG/DL    BUN/Creatinine ratio 14 12 - 20      GFR est AA >60 >60 ml/min/1.73m2    GFR est non-AA >60 >60 ml/min/1.73m2    Calcium 8.0 (L) 8.5 - 10.1 MG/DL    Bilirubin, total 0.5 0.2 - 1.0 MG/DL    ALT (SGPT) 21 12 - 78 U/L    AST (SGOT) 23 15 - 37 U/L    Alk.  phosphatase 95 45 - 117 U/L    Protein, total 6.9 6.4 - 8.2 g/dL    Albumin 2.7 (L) 3.5 - 5.0 g/dL    Globulin 4.2 (H) 2.0 - 4.0 g/dL    A-G Ratio 0.6 (L) 1.1 - 2.2     LIPASE    Collection Time: 09/11/20 11:39 AM   Result Value Ref Range    Lipase 98 73 - 393 U/L         Imaging:   Ct Abd Pelv W Cont    Result Date: 9/10/2020  IMPRESSION: Large, irregular pelvic mass measuring approximately 9 cm likely representing neoplasm arising from the sigmoid colon. Adnexal neoplasm is a possibility. This does appear to be separate from the uterus. Small amount of pelvic free fluid. Associated colonic bowel wall thickening. There is focal gas in the upper presacral region which is unclear if this is intraluminal or contained extraluminal collection. No definite distant metastatic disease. Borderline enlarged retroperitoneal lymph nodes. 6 mm right lower lobe lung nodule. Mass effect from the pelvic mass causing moderate right and minimal left hydronephrosis. Findings discussed with Dr. Boogie Bermudez at the time of dictation     Xr Chest [facility]    Result Date: 9/11/2020  IMPRESSION: No acute process. Assessment:     Active Problems:    Hydronephrosis (9/11/2020)      Pelvic mass (9/11/2020)           Plan:     1. Large pelvic mass: with history of weight lost, low appetite, and microcytic anemia likely colon cancer. GI consult. She will need colorectal surgeon consult. Plan discussed with Dr. Boogie Bermudez. Will give clear liquid diet for now. 2. Moderate right hydronephrosis and minimal left hydronephrosis: caused by external mass effect, has right flank pain, urinary frequency. UA is clear. But with her leukocytosis, would give IV rocephin for possible urinary infection. Urologist consult   3. Hyponatremia: may due to poor oral intake, can't rule out SIADH. Renal consult. Check urine and plasma osmolarity, urine Na, TSH, cortisol level. Start IV NS at 76 cc/hr for now. Will check Na in 6 hours. Keep tele. 4. Microcytic anemia: likely iron deficiency, ? From slow GIB. Check ferritin and iron level   5.  H/o GERD with Spaulding's esophagea: last EGD Feb this year.  Continue PPI. 6. Pt had her COVID 19 screen test done this morning at [facility] outpatient lab. She does not need isolation. Pt confirmed she is full code. Pt has full capacity, making her own decision. Would like her friend Alfred Yariel [phone] as her emergency contact.          Signed By: Yolanda Kenney MD     September 11, 2020

## 2020-09-11 NOTE — ED TRIAGE NOTES
Arrives ambulatory for c/o Right flank/back that started yesterday. Pt also reports increased urination. Denies fevers. C/o RLQ abdominal pain that is chronic and is Colonoscopy scheduled for Tuesday.

## 2020-09-11 NOTE — CONSULTS
Colorectal Surgery Consultation Note          NAME:  Nate Hollis   :   1950   MRN:   [id number]     Referring Provider/Physician:  Viktoriya Ledezma NP; Adine Schlatter, MD    Consultation Date: 2020    Chief Complaint:  Pelvic mass     History of Present Illness: The patient is a 67-year-old female who has been experiencing abdominal pain for a period of weeks. This pain has been accompanied by unintentional weight loss and a change in her stools. She reports that she usually moves her bowels once per day and that the frequency has not really changed. The stool consistency has, however, become more variable and generally looser than it used to be. She denies seeing any blood in the stool. Her last colonoscopy was probably performed approximately 10 years ago, and she had been scheduled to undergo another one on 9/15/2020. A CT scan of the abdomen and plevis was performed yesterday, and it was interpreted as revealing a large pelvic mass possibly arising from th sigmoid colon. After the scan, she experienced a marked increase in her pain, particularly in the right flank. She presented to the ER,  and she is now being admitted for further evaluation and treatment. PMH:  Past Medical History:   Diagnosis Date    GERD (gastroesophageal reflux disease)     Ill-defined condition     Spaulding's esophagus       PSH:  Past Surgical History:   Procedure Laterality Date    HX APPENDECTOMY  age 17    HX COLONOSCOPY  circa     No neoplasms.  HX ENDOSCOPY  2017    Dr. Mk Payne HX ENDOSCOPY  2020    Dr. Mehdi Bennett oopherectomy for treatment of benign mass. Home Medications:  Prior to Admission Medications   Prescriptions Last Dose Informant Patient Reported? Taking? Omega-3 Fatty Acids (FISH OIL) 500 mg cap   Yes No   Sig: Take  by mouth. ascorbic acid, vitamin C, (VITAMIN C) 500 mg tablet   Yes No   Sig: Take 500 mg by mouth.    cholecalciferol (VITAMIN D3) 1,000 unit cap   Yes No   Sig: Take 1,000 Units by mouth daily. denosumab (PROLIA) 60 mg/mL injection   Yes No   Si mg by SubCUTAneous route. lansoprazole (PREVACID) 30 mg capsule   Yes No   Sig: Take 30 mg by mouth Daily (before breakfast). multivitamin (ONE A DAY) tablet   Yes No   Sig: Take 1 Tab by mouth daily. Facility-Administered Medications: None       Hospital Medications:  Current Facility-Administered Medications   Medication Dose Route Frequency    0.9% sodium chloride infusion  75 mL/hr IntraVENous CONTINUOUS    cefTRIAXone (ROCEPHIN) 1 g in 0.9% sodium chloride (MBP/ADV) 50 mL  1 g IntraVENous Q24H     Current Outpatient Medications   Medication Sig    denosumab (PROLIA) 60 mg/mL injection 60 mg by SubCUTAneous route.  lansoprazole (PREVACID) 30 mg capsule Take 30 mg by mouth Daily (before breakfast).  multivitamin (ONE A DAY) tablet Take 1 Tab by mouth daily.  ascorbic acid, vitamin C, (VITAMIN C) 500 mg tablet Take 500 mg by mouth.  cholecalciferol (VITAMIN D3) 1,000 unit cap Take 1,000 Units by mouth daily.  Omega-3 Fatty Acids (FISH OIL) 500 mg cap Take  by mouth. Allergies:  No Known Allergies    Family History:  Family History   Problem Relation Age of Onset    Cancer Mother     Heart Disease Father     Diabetes Brother        Social History:  Social History     Tobacco Use    Smoking status: Never Smoker    Smokeless tobacco: Never Used   Substance Use Topics    Alcohol use:  Yes     Alcohol/week: 1.0 standard drinks     Types: 1 Glasses of wine per week       Review of Systems:    Symptom Y/N Comments  Symptom Y/N Comments   Fever/Chills N   Chest Pain N    Cough N   Abdominal Pain Y    Sputum N   Joint Pain     SOB/ECHEVERRIA N   Pruritis/Rash     Nausea/vomit N   Tolerating PT/OT     Diarrhea    Tolerating Diet Y    Constipation    Other       Could NOT obtain due to:        Objective:     Patient Vitals for the past 8 hrs:   BP Temp Pulse Resp SpO2 09/11/20 1739 126/70 98.4 °F (36.9 °C) 85 15 100 %   09/11/20 1027 124/74 98.2 °F (36.8 °C) 91 16 100 %     09/11 0701 - 09/11 1900  In: 50 [I.V.:50]  Out: -   No intake/output data recorded. PHYSICAL EXAMINATION:    GENERAL:  Pale and fatigued; siting in chair in a room in the ER hallway. HEENT:  Anicteric. ABDOMEN:  Examination deferred until the patient is in a more approriate location. NEURO:  Alert and oriented. Data Review     Recent Labs     09/11/20  1139   WBC 14.5*   HGB 9.0*   HCT 29.1*   *     Recent Labs     09/11/20  1455 09/11/20  1139   * 123*   K 3.4* 3.4*   CL 90* 92*   CO2 23 24   BUN 11 12   CREA 0.78 0.84   * 112*   CA 7.8* 8.0*     Recent Labs     09/11/20  1139   AP 95   TP 6.9   ALB 2.7*   GLOB 4.2*   LPSE 98     Recent Labs     09/11/20  1139   INR 1.0   PTP 10.3                       Assessment and Plan:      I agree with the plans for ureteral stent placement tomorrow and colonoscopy on Monday or Tuesday (9/14/2020 or 9/15/2020). It is unclear whether the pelvic mass is colonic or gynecologic in origin, and the colonoscopy should help to sort that out. If it is colonic, then surgery will be needed and could perhaps be performed as soon as Tuesday 9/15/2020. Although it might be premature to discuss all of the details of surgery, I suggested to the patient we we should have a preliminary discussion sometime before the colonoscopy has been performed. I will follow her progress.     Active Problems:    Hydronephrosis (9/11/2020)      Pelvic mass (9/11/2020)

## 2020-09-12 ENCOUNTER — APPOINTMENT (OUTPATIENT)
Dept: GENERAL RADIOLOGY | Age: 70
DRG: 329 | End: 2020-09-12
Attending: UROLOGY
Payer: MEDICARE

## 2020-09-12 ENCOUNTER — ANESTHESIA EVENT (OUTPATIENT)
Dept: SURGERY | Age: 70
DRG: 329 | End: 2020-09-12
Payer: MEDICARE

## 2020-09-12 ENCOUNTER — ANESTHESIA (OUTPATIENT)
Dept: SURGERY | Age: 70
DRG: 329 | End: 2020-09-12
Payer: MEDICARE

## 2020-09-12 LAB
ALBUMIN SERPL-MCNC: 2.2 G/DL (ref 3.5–5)
ALBUMIN SERPL-MCNC: 2.5 G/DL (ref 3.5–5)
ALBUMIN/GLOB SERPL: 0.6 {RATIO} (ref 1.1–2.2)
ALP SERPL-CCNC: 92 U/L (ref 45–117)
ALT SERPL-CCNC: 19 U/L (ref 12–78)
ANION GAP SERPL CALC-SCNC: 7 MMOL/L (ref 5–15)
ANION GAP SERPL CALC-SCNC: 9 MMOL/L (ref 5–15)
AST SERPL-CCNC: 21 U/L (ref 15–37)
BACTERIA SPEC CULT: NORMAL
BASOPHILS # BLD: 0 K/UL (ref 0–0.1)
BASOPHILS NFR BLD: 0 % (ref 0–1)
BILIRUB SERPL-MCNC: 0.6 MG/DL (ref 0.2–1)
BUN SERPL-MCNC: 8 MG/DL (ref 6–20)
BUN SERPL-MCNC: 9 MG/DL (ref 6–20)
BUN/CREAT SERPL: 10 (ref 12–20)
BUN/CREAT SERPL: 11 (ref 12–20)
CALCIUM SERPL-MCNC: 7.5 MG/DL (ref 8.5–10.1)
CALCIUM SERPL-MCNC: 7.7 MG/DL (ref 8.5–10.1)
CC UR VC: NORMAL
CHLORIDE SERPL-SCNC: 101 MMOL/L (ref 97–108)
CHLORIDE SERPL-SCNC: 96 MMOL/L (ref 97–108)
CO2 SERPL-SCNC: 22 MMOL/L (ref 21–32)
CO2 SERPL-SCNC: 24 MMOL/L (ref 21–32)
CORTIS AM PEAK SERPL-MCNC: 29.9 UG/DL (ref 4.3–22.45)
CREAT SERPL-MCNC: 0.78 MG/DL (ref 0.55–1.02)
CREAT SERPL-MCNC: 0.83 MG/DL (ref 0.55–1.02)
DIFFERENTIAL METHOD BLD: ABNORMAL
EOSINOPHIL # BLD: 0 K/UL (ref 0–0.4)
EOSINOPHIL NFR BLD: 0 % (ref 0–7)
ERYTHROCYTE [DISTWIDTH] IN BLOOD BY AUTOMATED COUNT: 17 % (ref 11.5–14.5)
GLOBULIN SER CALC-MCNC: 4 G/DL (ref 2–4)
GLUCOSE SERPL-MCNC: 89 MG/DL (ref 65–100)
GLUCOSE SERPL-MCNC: 95 MG/DL (ref 65–100)
HCT VFR BLD AUTO: 29.9 % (ref 35–47)
HGB BLD-MCNC: 9.1 G/DL (ref 11.5–16)
IMM GRANULOCYTES # BLD AUTO: 0.2 K/UL (ref 0–0.04)
IMM GRANULOCYTES NFR BLD AUTO: 1 % (ref 0–0.5)
LYMPHOCYTES # BLD: 1 K/UL (ref 0.8–3.5)
LYMPHOCYTES NFR BLD: 5 % (ref 12–49)
MAGNESIUM SERPL-MCNC: 2.1 MG/DL (ref 1.6–2.4)
MCH RBC QN AUTO: 20.2 PG (ref 26–34)
MCHC RBC AUTO-ENTMCNC: 30.4 G/DL (ref 30–36.5)
MCV RBC AUTO: 66.4 FL (ref 80–99)
MONOCYTES # BLD: 0.8 K/UL (ref 0–1)
MONOCYTES NFR BLD: 4 % (ref 5–13)
NEUTS SEG # BLD: 17.5 K/UL (ref 1.8–8)
NEUTS SEG NFR BLD: 90 % (ref 32–75)
NRBC # BLD: 0 K/UL (ref 0–0.01)
NRBC BLD-RTO: 0 PER 100 WBC
PHOSPHATE SERPL-MCNC: 1.6 MG/DL (ref 2.6–4.7)
PHOSPHATE SERPL-MCNC: 1.7 MG/DL (ref 2.6–4.7)
PLATELET # BLD AUTO: 663 K/UL (ref 150–400)
PMV BLD AUTO: 8.9 FL (ref 8.9–12.9)
POTASSIUM SERPL-SCNC: 3.5 MMOL/L (ref 3.5–5.1)
POTASSIUM SERPL-SCNC: 3.5 MMOL/L (ref 3.5–5.1)
PROT SERPL-MCNC: 6.5 G/DL (ref 6.4–8.2)
RBC # BLD AUTO: 4.5 M/UL (ref 3.8–5.2)
RBC MORPH BLD: ABNORMAL
SARS-COV-2, COV2NT: NOT DETECTED
SERVICE CMNT-IMP: NORMAL
SODIUM SERPL-SCNC: 127 MMOL/L (ref 136–145)
SODIUM SERPL-SCNC: 132 MMOL/L (ref 136–145)
WBC # BLD AUTO: 19.5 K/UL (ref 3.6–11)

## 2020-09-12 PROCEDURE — 77030019927 HC TBNG IRR CYSTO BAXT -A: Performed by: UROLOGY

## 2020-09-12 PROCEDURE — 80053 COMPREHEN METABOLIC PANEL: CPT

## 2020-09-12 PROCEDURE — 74011000250 HC RX REV CODE- 250: Performed by: NURSE ANESTHETIST, CERTIFIED REGISTERED

## 2020-09-12 PROCEDURE — 76060000032 HC ANESTHESIA 0.5 TO 1 HR: Performed by: UROLOGY

## 2020-09-12 PROCEDURE — 74011000258 HC RX REV CODE- 258: Performed by: INTERNAL MEDICINE

## 2020-09-12 PROCEDURE — 85025 COMPLETE CBC W/AUTO DIFF WBC: CPT

## 2020-09-12 PROCEDURE — 74011000258 HC RX REV CODE- 258: Performed by: HOSPITALIST

## 2020-09-12 PROCEDURE — 84100 ASSAY OF PHOSPHORUS: CPT

## 2020-09-12 PROCEDURE — 83735 ASSAY OF MAGNESIUM: CPT

## 2020-09-12 PROCEDURE — 74011250636 HC RX REV CODE- 250/636: Performed by: INTERNAL MEDICINE

## 2020-09-12 PROCEDURE — C1769 GUIDE WIRE: HCPCS | Performed by: UROLOGY

## 2020-09-12 PROCEDURE — 82533 TOTAL CORTISOL: CPT

## 2020-09-12 PROCEDURE — 74011250636 HC RX REV CODE- 250/636: Performed by: NURSE ANESTHETIST, CERTIFIED REGISTERED

## 2020-09-12 PROCEDURE — 74011250636 HC RX REV CODE- 250/636: Performed by: HOSPITALIST

## 2020-09-12 PROCEDURE — 80069 RENAL FUNCTION PANEL: CPT

## 2020-09-12 PROCEDURE — 74011000250 HC RX REV CODE- 250: Performed by: INTERNAL MEDICINE

## 2020-09-12 PROCEDURE — 74420 UROGRAPHY RTRGR +-KUB: CPT

## 2020-09-12 PROCEDURE — 36415 COLL VENOUS BLD VENIPUNCTURE: CPT

## 2020-09-12 PROCEDURE — C1758 CATHETER, URETERAL: HCPCS | Performed by: UROLOGY

## 2020-09-12 PROCEDURE — 87040 BLOOD CULTURE FOR BACTERIA: CPT

## 2020-09-12 PROCEDURE — BT1D1ZZ FLUOROSCOPY OF RIGHT KIDNEY, URETER AND BLADDER USING LOW OSMOLAR CONTRAST: ICD-10-PCS | Performed by: UROLOGY

## 2020-09-12 PROCEDURE — 65410000002 HC RM PRIVATE OB

## 2020-09-12 PROCEDURE — 77030018832 HC SOL IRR H20 ICUM -A: Performed by: UROLOGY

## 2020-09-12 PROCEDURE — 77030040830 HC CATH URETH FOL MDII -A: Performed by: UROLOGY

## 2020-09-12 PROCEDURE — 77030008684 HC TU ET CUF COVD -B: Performed by: NURSE ANESTHETIST, CERTIFIED REGISTERED

## 2020-09-12 PROCEDURE — 76210000016 HC OR PH I REC 1 TO 1.5 HR: Performed by: UROLOGY

## 2020-09-12 PROCEDURE — 76010000138 HC OR TIME 0.5 TO 1 HR: Performed by: UROLOGY

## 2020-09-12 PROCEDURE — 0T768DZ DILATION OF RIGHT URETER WITH INTRALUMINAL DEVICE, VIA NATURAL OR ARTIFICIAL OPENING ENDOSCOPIC: ICD-10-PCS | Performed by: UROLOGY

## 2020-09-12 PROCEDURE — 77030026438 HC STYL ET INTUB CARD -A: Performed by: NURSE ANESTHETIST, CERTIFIED REGISTERED

## 2020-09-12 PROCEDURE — 74011000636 HC RX REV CODE- 636: Performed by: UROLOGY

## 2020-09-12 PROCEDURE — 2709999900 HC NON-CHARGEABLE SUPPLY: Performed by: UROLOGY

## 2020-09-12 RX ORDER — FENTANYL CITRATE 50 UG/ML
25 INJECTION, SOLUTION INTRAMUSCULAR; INTRAVENOUS
Status: DISCONTINUED | OUTPATIENT
Start: 2020-09-12 | End: 2020-09-12 | Stop reason: HOSPADM

## 2020-09-12 RX ORDER — SODIUM CHLORIDE, SODIUM LACTATE, POTASSIUM CHLORIDE, CALCIUM CHLORIDE 600; 310; 30; 20 MG/100ML; MG/100ML; MG/100ML; MG/100ML
INJECTION, SOLUTION INTRAVENOUS
Status: DISCONTINUED | OUTPATIENT
Start: 2020-09-12 | End: 2020-09-12 | Stop reason: HOSPADM

## 2020-09-12 RX ORDER — SODIUM CHLORIDE 0.9 % (FLUSH) 0.9 %
5-40 SYRINGE (ML) INJECTION EVERY 8 HOURS
Status: DISCONTINUED | OUTPATIENT
Start: 2020-09-12 | End: 2020-09-12 | Stop reason: HOSPADM

## 2020-09-12 RX ORDER — PHENYLEPHRINE HCL IN 0.9% NACL 0.4MG/10ML
SYRINGE (ML) INTRAVENOUS AS NEEDED
Status: DISCONTINUED | OUTPATIENT
Start: 2020-09-12 | End: 2020-09-12 | Stop reason: HOSPADM

## 2020-09-12 RX ORDER — ONDANSETRON 2 MG/ML
4 INJECTION INTRAMUSCULAR; INTRAVENOUS AS NEEDED
Status: DISCONTINUED | OUTPATIENT
Start: 2020-09-12 | End: 2020-09-12 | Stop reason: HOSPADM

## 2020-09-12 RX ORDER — HYDROMORPHONE HYDROCHLORIDE 1 MG/ML
0.2 INJECTION, SOLUTION INTRAMUSCULAR; INTRAVENOUS; SUBCUTANEOUS
Status: DISCONTINUED | OUTPATIENT
Start: 2020-09-12 | End: 2020-09-12 | Stop reason: HOSPADM

## 2020-09-12 RX ORDER — SODIUM CHLORIDE 0.9 % (FLUSH) 0.9 %
5-40 SYRINGE (ML) INJECTION AS NEEDED
Status: DISCONTINUED | OUTPATIENT
Start: 2020-09-12 | End: 2020-09-12 | Stop reason: SDUPTHER

## 2020-09-12 RX ORDER — LIDOCAINE HYDROCHLORIDE 10 MG/ML
0.1 INJECTION, SOLUTION EPIDURAL; INFILTRATION; INTRACAUDAL; PERINEURAL AS NEEDED
Status: DISCONTINUED | OUTPATIENT
Start: 2020-09-12 | End: 2020-09-12 | Stop reason: HOSPADM

## 2020-09-12 RX ORDER — SODIUM CHLORIDE, SODIUM LACTATE, POTASSIUM CHLORIDE, CALCIUM CHLORIDE 600; 310; 30; 20 MG/100ML; MG/100ML; MG/100ML; MG/100ML
50 INJECTION, SOLUTION INTRAVENOUS CONTINUOUS
Status: DISCONTINUED | OUTPATIENT
Start: 2020-09-12 | End: 2020-09-12 | Stop reason: HOSPADM

## 2020-09-12 RX ORDER — ACETAMINOPHEN 325 MG/1
650 TABLET ORAL ONCE
Status: DISCONTINUED | OUTPATIENT
Start: 2020-09-12 | End: 2020-09-12 | Stop reason: HOSPADM

## 2020-09-12 RX ORDER — SODIUM CHLORIDE 0.9 % (FLUSH) 0.9 %
5-40 SYRINGE (ML) INJECTION EVERY 8 HOURS
Status: DISCONTINUED | OUTPATIENT
Start: 2020-09-12 | End: 2020-09-12 | Stop reason: SDUPTHER

## 2020-09-12 RX ORDER — LIDOCAINE HYDROCHLORIDE 20 MG/ML
INJECTION, SOLUTION EPIDURAL; INFILTRATION; INTRACAUDAL; PERINEURAL AS NEEDED
Status: DISCONTINUED | OUTPATIENT
Start: 2020-09-12 | End: 2020-09-12 | Stop reason: HOSPADM

## 2020-09-12 RX ORDER — ENOXAPARIN SODIUM 100 MG/ML
40 INJECTION SUBCUTANEOUS DAILY
Status: DISCONTINUED | OUTPATIENT
Start: 2020-09-12 | End: 2020-09-18

## 2020-09-12 RX ORDER — FENTANYL CITRATE 50 UG/ML
50 INJECTION, SOLUTION INTRAMUSCULAR; INTRAVENOUS AS NEEDED
Status: DISCONTINUED | OUTPATIENT
Start: 2020-09-12 | End: 2020-09-12 | Stop reason: HOSPADM

## 2020-09-12 RX ORDER — FENTANYL CITRATE 50 UG/ML
INJECTION, SOLUTION INTRAMUSCULAR; INTRAVENOUS AS NEEDED
Status: DISCONTINUED | OUTPATIENT
Start: 2020-09-12 | End: 2020-09-12 | Stop reason: HOSPADM

## 2020-09-12 RX ORDER — ONDANSETRON 2 MG/ML
INJECTION INTRAMUSCULAR; INTRAVENOUS AS NEEDED
Status: DISCONTINUED | OUTPATIENT
Start: 2020-09-12 | End: 2020-09-12 | Stop reason: HOSPADM

## 2020-09-12 RX ORDER — MIDAZOLAM HYDROCHLORIDE 1 MG/ML
1 INJECTION, SOLUTION INTRAMUSCULAR; INTRAVENOUS AS NEEDED
Status: DISCONTINUED | OUTPATIENT
Start: 2020-09-12 | End: 2020-09-12 | Stop reason: HOSPADM

## 2020-09-12 RX ORDER — DEXMEDETOMIDINE HYDROCHLORIDE 100 UG/ML
INJECTION, SOLUTION INTRAVENOUS AS NEEDED
Status: DISCONTINUED | OUTPATIENT
Start: 2020-09-12 | End: 2020-09-12 | Stop reason: HOSPADM

## 2020-09-12 RX ORDER — PROPOFOL 10 MG/ML
INJECTION, EMULSION INTRAVENOUS AS NEEDED
Status: DISCONTINUED | OUTPATIENT
Start: 2020-09-12 | End: 2020-09-12 | Stop reason: HOSPADM

## 2020-09-12 RX ORDER — DEXAMETHASONE SODIUM PHOSPHATE 4 MG/ML
INJECTION, SOLUTION INTRA-ARTICULAR; INTRALESIONAL; INTRAMUSCULAR; INTRAVENOUS; SOFT TISSUE AS NEEDED
Status: DISCONTINUED | OUTPATIENT
Start: 2020-09-12 | End: 2020-09-12 | Stop reason: HOSPADM

## 2020-09-12 RX ORDER — MIDAZOLAM HYDROCHLORIDE 1 MG/ML
INJECTION, SOLUTION INTRAMUSCULAR; INTRAVENOUS AS NEEDED
Status: DISCONTINUED | OUTPATIENT
Start: 2020-09-12 | End: 2020-09-12 | Stop reason: HOSPADM

## 2020-09-12 RX ORDER — SODIUM CHLORIDE 0.9 % (FLUSH) 0.9 %
5-40 SYRINGE (ML) INJECTION AS NEEDED
Status: DISCONTINUED | OUTPATIENT
Start: 2020-09-12 | End: 2020-09-12 | Stop reason: HOSPADM

## 2020-09-12 RX ADMIN — SODIUM CHLORIDE 75 ML/HR: 9 INJECTION, SOLUTION INTRAVENOUS at 09:11

## 2020-09-12 RX ADMIN — Medication 10 ML: at 06:43

## 2020-09-12 RX ADMIN — MIDAZOLAM 2 MG: 1 INJECTION INTRAMUSCULAR; INTRAVENOUS at 16:08

## 2020-09-12 RX ADMIN — FENTANYL CITRATE 25 MCG: 50 INJECTION, SOLUTION INTRAMUSCULAR; INTRAVENOUS at 16:28

## 2020-09-12 RX ADMIN — PROPOFOL 30 MG: 10 INJECTION, EMULSION INTRAVENOUS at 16:24

## 2020-09-12 RX ADMIN — LIDOCAINE HYDROCHLORIDE 60 MG: 20 INJECTION, SOLUTION EPIDURAL; INFILTRATION; INTRACAUDAL; PERINEURAL at 16:15

## 2020-09-12 RX ADMIN — CEFTRIAXONE SODIUM 1 G: 1 INJECTION, POWDER, FOR SOLUTION INTRAMUSCULAR; INTRAVENOUS at 15:18

## 2020-09-12 RX ADMIN — SODIUM CHLORIDE, POTASSIUM CHLORIDE, SODIUM LACTATE AND CALCIUM CHLORIDE: 600; 310; 30; 20 INJECTION, SOLUTION INTRAVENOUS at 15:30

## 2020-09-12 RX ADMIN — DEXMEDETOMIDINE HYDROCHLORIDE 10 MCG: 100 INJECTION, SOLUTION, CONCENTRATE INTRAVENOUS at 16:28

## 2020-09-12 RX ADMIN — Medication 80 MCG: at 16:33

## 2020-09-12 RX ADMIN — CALCIUM GLUCONATE 2 G: 94 INJECTION, SOLUTION INTRAVENOUS at 11:39

## 2020-09-12 RX ADMIN — FENTANYL CITRATE 50 MCG: 50 INJECTION, SOLUTION INTRAMUSCULAR; INTRAVENOUS at 16:15

## 2020-09-12 RX ADMIN — SODIUM CHLORIDE: 900 INJECTION, SOLUTION INTRAVENOUS at 20:58

## 2020-09-12 RX ADMIN — DEXAMETHASONE SODIUM PHOSPHATE 8 MG: 4 INJECTION, SOLUTION INTRAMUSCULAR; INTRAVENOUS at 16:17

## 2020-09-12 RX ADMIN — ONDANSETRON HYDROCHLORIDE 4 MG: 2 INJECTION, SOLUTION INTRAMUSCULAR; INTRAVENOUS at 16:40

## 2020-09-12 RX ADMIN — FENTANYL CITRATE 25 MCG: 50 INJECTION, SOLUTION INTRAMUSCULAR; INTRAVENOUS at 16:34

## 2020-09-12 RX ADMIN — PROPOFOL 120 MG: 10 INJECTION, EMULSION INTRAVENOUS at 16:15

## 2020-09-12 NOTE — PROGRESS NOTES
Gastroenterology Progress Note    9/12/2020    Admit Date: 9/11/2020    Subjective: Follow up for: abnormal CT      She is beeing ruled out for COVID 19. Urology/CRS notes reviewed. I discussed the case with her RN,      Current Facility-Administered Medications   Medication Dose Route Frequency    potassium phosphate 20 mmol in 0.9% sodium chloride 250 mL infusion   IntraVENous ONCE    enoxaparin (LOVENOX) injection 40 mg  40 mg SubCUTAneous DAILY    0.9% sodium chloride infusion  50 mL/hr IntraVENous CONTINUOUS    pantoprazole (PROTONIX) tablet 40 mg  40 mg Oral ACB&D    sodium chloride (NS) flush 5-40 mL  5-40 mL IntraVENous Q8H    sodium chloride (NS) flush 5-40 mL  5-40 mL IntraVENous PRN    acetaminophen (TYLENOL) tablet 650 mg  650 mg Oral Q6H PRN    Or    acetaminophen (TYLENOL) suppository 650 mg  650 mg Rectal Q6H PRN    polyethylene glycol (MIRALAX) packet 17 g  17 g Oral DAILY PRN    promethazine (PHENERGAN) tablet 12.5 mg  12.5 mg Oral Q6H PRN    Or    ondansetron (ZOFRAN) injection 4 mg  4 mg IntraVENous Q6H PRN    cefTRIAXone (ROCEPHIN) 1 g in 0.9% sodium chloride (MBP/ADV) 50 mL  1 g IntraVENous Q24H        Objective:     Blood pressure 127/72, pulse 95, temperature 99 °F (37.2 °C), resp. rate 24, height 5' 7\" (1.702 m), weight 58.6 kg (129 lb 3 oz), SpO2 90 %. No intake/output data recorded.     09/10 1901 - 09/12 0700  In: 50 [I.V.:50]  Out: -         Physical Examination:     Pt not seen as she is a COVID r/o    Data Review    Recent Results (from the past 24 hour(s))   OSMOLALITY, SERUM/PLASMA    Collection Time: 09/11/20  2:55 PM   Result Value Ref Range    Osmolality, serum/plasma 256 mOsm/kg U3T   METABOLIC PANEL, BASIC    Collection Time: 09/11/20  2:55 PM   Result Value Ref Range    Sodium 122 (L) 136 - 145 mmol/L    Potassium 3.4 (L) 3.5 - 5.1 mmol/L    Chloride 90 (L) 97 - 108 mmol/L    CO2 23 21 - 32 mmol/L    Anion gap 9 5 - 15 mmol/L    Glucose 106 (H) 65 - 100 mg/dL    BUN 11 6 - 20 MG/DL    Creatinine 0.78 0.55 - 1.02 MG/DL    BUN/Creatinine ratio 14 12 - 20      GFR est AA >60 >60 ml/min/1.73m2    GFR est non-AA >60 >60 ml/min/1.73m2    Calcium 7.8 (L) 8.5 - 10.1 MG/DL   TSH 3RD GENERATION    Collection Time: 09/11/20  2:55 PM   Result Value Ref Range    TSH 1.04 0.36 - 3.74 uIU/mL   CORTISOL    Collection Time: 09/11/20  2:55 PM   Result Value Ref Range    Cortisol, random 26.9 ug/dL   FERRITIN    Collection Time: 09/11/20  2:55 PM   Result Value Ref Range    Ferritin 20 (L) 26 - 388 NG/ML   IRON PROFILE    Collection Time: 09/11/20  2:55 PM   Result Value Ref Range    Iron 13 (L) 35 - 150 ug/dL    TIBC 338 250 - 450 ug/dL    Iron % saturation 4 (L) 20 - 50 %   CEA    Collection Time: 09/11/20  2:55 PM   Result Value Ref Range    CEA 7.4 ng/mL   TSH 3RD GENERATION    Collection Time: 09/11/20  2:55 PM   Result Value Ref Range    TSH 1.09 0.36 - 3.74 uIU/mL   CBC WITH AUTOMATED DIFF    Collection Time: 09/12/20  3:27 AM   Result Value Ref Range    WBC 19.5 (H) 3.6 - 11.0 K/uL    RBC 4.50 3.80 - 5.20 M/uL    HGB 9.1 (L) 11.5 - 16.0 g/dL    HCT 29.9 (L) 35.0 - 47.0 %    MCV 66.4 (L) 80.0 - 99.0 FL    MCH 20.2 (L) 26.0 - 34.0 PG    MCHC 30.4 30.0 - 36.5 g/dL    RDW 17.0 (H) 11.5 - 14.5 %    PLATELET 771 (H) 971 - 400 K/uL    MPV 8.9 8.9 - 12.9 FL    NRBC 0.0 0  WBC    ABSOLUTE NRBC 0.00 0.00 - 0.01 K/uL    NEUTROPHILS 90 (H) 32 - 75 %    LYMPHOCYTES 5 (L) 12 - 49 %    MONOCYTES 4 (L) 5 - 13 %    EOSINOPHILS 0 0 - 7 %    BASOPHILS 0 0 - 1 %    IMMATURE GRANULOCYTES 1 (H) 0.0 - 0.5 %    ABS. NEUTROPHILS 17.5 (H) 1.8 - 8.0 K/UL    ABS. LYMPHOCYTES 1.0 0.8 - 3.5 K/UL    ABS. MONOCYTES 0.8 0.0 - 1.0 K/UL    ABS. EOSINOPHILS 0.0 0.0 - 0.4 K/UL    ABS. BASOPHILS 0.0 0.0 - 0.1 K/UL    ABS. IMM.  GRANS. 0.2 (H) 0.00 - 0.04 K/UL    DF SMEAR SCANNED      RBC COMMENTS ANISOCYTOSIS  1+        RBC COMMENTS HYPOCHROMIA  3+        RBC COMMENTS MICROCYTOSIS  2+       METABOLIC PANEL, COMPREHENSIVE    Collection Time: 09/12/20  3:27 AM   Result Value Ref Range    Sodium 127 (L) 136 - 145 mmol/L    Potassium 3.5 3.5 - 5.1 mmol/L    Chloride 96 (L) 97 - 108 mmol/L    CO2 22 21 - 32 mmol/L    Anion gap 9 5 - 15 mmol/L    Glucose 89 65 - 100 mg/dL    BUN 9 6 - 20 MG/DL    Creatinine 0.83 0.55 - 1.02 MG/DL    BUN/Creatinine ratio 11 (L) 12 - 20      GFR est AA >60 >60 ml/min/1.73m2    GFR est non-AA >60 >60 ml/min/1.73m2    Calcium 7.7 (L) 8.5 - 10.1 MG/DL    Bilirubin, total 0.6 0.2 - 1.0 MG/DL    ALT (SGPT) 19 12 - 78 U/L    AST (SGOT) 21 15 - 37 U/L    Alk.  phosphatase 92 45 - 117 U/L    Protein, total 6.5 6.4 - 8.2 g/dL    Albumin 2.5 (L) 3.5 - 5.0 g/dL    Globulin 4.0 2.0 - 4.0 g/dL    A-G Ratio 0.6 (L) 1.1 - 2.2     MAGNESIUM    Collection Time: 09/12/20  3:27 AM   Result Value Ref Range    Magnesium 2.1 1.6 - 2.4 mg/dL   PHOSPHORUS    Collection Time: 09/12/20  3:27 AM   Result Value Ref Range    Phosphorus 1.6 (L) 2.6 - 4.7 MG/DL   CORTISOL, AM    Collection Time: 09/12/20  3:27 AM   Result Value Ref Range    Cortisol, a.m. 29.9 (H) 4.30 - 22.45 ug/dL   RENAL FUNCTION PANEL    Collection Time: 09/12/20 11:51 AM   Result Value Ref Range    Sodium 132 (L) 136 - 145 mmol/L    Potassium 3.5 3.5 - 5.1 mmol/L    Chloride 101 97 - 108 mmol/L    CO2 24 21 - 32 mmol/L    Anion gap 7 5 - 15 mmol/L    Glucose 95 65 - 100 mg/dL    BUN 8 6 - 20 MG/DL    Creatinine 0.78 0.55 - 1.02 MG/DL    BUN/Creatinine ratio 10 (L) 12 - 20      GFR est AA >60 >60 ml/min/1.73m2    GFR est non-AA >60 >60 ml/min/1.73m2    Calcium 7.5 (L) 8.5 - 10.1 MG/DL    Phosphorus 1.7 (L) 2.6 - 4.7 MG/DL    Albumin 2.2 (L) 3.5 - 5.0 g/dL     Recent Labs     09/12/20  0327 09/11/20  1139   WBC 19.5* 14.5*   HGB 9.1* 9.0*   HCT 29.9* 29.1*   * 604*     Recent Labs     09/12/20  1151 09/12/20  0327 09/11/20  1455   * 127* 122*   K 3.5 3.5 3.4*    96* 90*   CO2 24 22 23   BUN 8 9 11   CREA 0.78 0.83 0.78   GLU 95 89 106*   CA 7.5* 7.7* 7.8*   MG  --  2.1  --    PHOS 1.7* 1.6*  --      Recent Labs     09/12/20  1151 09/12/20  0327 09/11/20  1139   AP  --  92 95   TP  --  6.5 6.9   ALB 2.2* 2.5* 2.7*   GLOB  --  4.0 4.2*   LPSE  --   --  98     Recent Labs     09/11/20  1139   INR 1.0   PTP 10.3      Recent Labs     09/11/20  1455   TIBC 338   PSAT 4*   FERR 20*      No results found for: FOL, RBCF   No results for input(s): PH, PCO2, PO2 in the last 72 hours. No results for input(s): CPK, CKNDX, TROIQ in the last 72 hours. No lab exists for component: CPKMB  No results found for: CHOL, CHOLX, CHLST, CHOLV, HDL, HDLP, LDL, LDLC, DLDLP, TGLX, TRIGL, TRIGP, CHHD, CHHDX  No components found for: [place]  Lab Results   Component Value Date/Time    Color YELLOW/STRAW 09/11/2020 10:57 AM    Appearance TURBID (A) 09/11/2020 10:57 AM    Specific gravity 1.027 09/11/2020 10:57 AM    pH (UA) 7.0 09/11/2020 10:57 AM    Protein 100 (A) 09/11/2020 10:57 AM    Glucose Negative 09/11/2020 10:57 AM    Ketone 15 (A) 09/11/2020 10:57 AM    Bilirubin Negative 09/11/2020 10:57 AM    Urobilinogen 1.0 09/11/2020 10:57 AM    Nitrites Negative 09/11/2020 10:57 AM    Leukocyte Esterase TRACE (A) 09/11/2020 10:57 AM    Epithelial cells FEW 09/11/2020 10:57 AM    Bacteria Negative 09/11/2020 10:57 AM    WBC 0-4 09/11/2020 10:57 AM    RBC 0-5 09/11/2020 10:57 AM        ROS: -CP, SOB, Dysuria, palpitations, cough. Assessment:    Active Problems:    Hydronephrosis (9/11/2020)      Pelvic mass (9/11/2020)             Plan/Discussion:     · Abdomen/pelvic CT scan 9/10/2020 with a large, irregular pelvic mass measuring a 9 cm likely representing neoplasm arising from the sigmoid colon. Adnexal neoplasm is a possibility. · Colonoscopy after the weekend to help delineate site of origin. · We will prep her tomorrow and see if we can do a colonoscopy Monday. · I reviewed urology and CRS notes and plans. Signed By: Jeimy Figueroa.  Olesya Wang MD    9/12/2020 12:50 PM

## 2020-09-12 NOTE — PROGRESS NOTES
Hospitalist Progress Note  Alyse Myers MD  Answering service: [phone] from in house phone      Date of Service:  2020  NAME:  Ulysses Corporal  :  1950  MRN:  [id number]    Admission Summary:   70F p/w abdo pain  Interval history / Subjective:   Patient seen and examined at bedside, feels ok, no acute complaint/symptoms. No n/v. No abdo pain now. Hx revisited. Was due for routine colonoscopy now. Explained CT findings/possible ca. Assessment & Plan:     #. Large pelvic mass: suspect colonic Ca  #. Microcytic Anemia: likely GI blood loss- 2nd to above. Monitor HB. #. R side moderate hydroUretero-Nephrosis. Mild L side: 2nd to above- Urology cs pending. No UTI  - clear liquid diet, GI following, colorectal surgery pending. Screening COVID test pending  - CXR: NAD. CT a/p: large 9CM pelvic mass likely colonic Ca. UA: no bacteria. #. Leucocytosis: no clears signs of infections. Urine culture pending. Empiric Abx. Check BCs  #. GERD: chronic, Stable, home regimen  #. HypoNatremia: mild, monitor    Code status: Full  DVT prophylaxis: lovenox  Care Plan discussed with: Patient/Family and Nurse  Disposition: TBD     Hospital Problems  Never Reviewed          Codes Class Noted POA    Hydronephrosis ICD-10-CM: N13.30  ICD-9-CM: 720  2020 Unknown        Pelvic mass ICD-10-CM: R19.00  ICD-9-CM: 789.30  2020 Unknown            Review of Systems:   Pertinent items are mentioned in interval history. Vital Signs:    Last 24hrs VS reviewed since prior progress note.  Most recent are:  Visit Vitals  /77 (BP 1 Location: Left arm, BP Patient Position: At rest)   Pulse (!) 101   Temp 99.3 °F (37.4 °C)   Resp 27   Wt 58.6 kg (129 lb 3 oz)   SpO2 97%   BMI 20.23 kg/m²         Intake/Output Summary (Last 24 hours) at 2020 1052  Last data filed at 2020 0804  Gross per 24 hour   Intake 50 ml   Output --   Net 50 ml Physical Examination:   General:  Alert, oriented, No acute distress, slim  Resp:  No accessory muscle use, Good AE, no wheezes, no rhonchi  Abd:  Soft, non-tender, non-distended, BS+  Extremities:  No cyanosis or clubbing, no significant edema  Neuro:  Grossly normal, no focal neuro deficits, follows commands   Psych:  Good insight, not agitated. Data Review:    Review and/or order of clinical lab test  Review and/or order of tests in the radiology section of CPT  Review and/or order of tests in the medicine section of [facility]  Labs:     Recent Labs     09/12/20 0327 09/11/20  1139   WBC 19.5* 14.5*   HGB 9.1* 9.0*   HCT 29.9* 29.1*   * 604*     Recent Labs     09/12/20 0327 09/11/20  1455 09/11/20  1139   * 122* 123*   K 3.5 3.4* 3.4*   CL 96* 90* 92*   CO2 22 23 24   BUN 9 11 12   CREA 0.83 0.78 0.84   GLU 89 106* 112*   CA 7.7* 7.8* 8.0*   MG 2.1  --   --    PHOS 1.6*  --   --      Recent Labs     09/12/20 0327 09/11/20  1139   ALT 19 21   AP 92 95   TBILI 0.6 0.5   TP 6.5 6.9   ALB 2.5* 2.7*   GLOB 4.0 4.2*   LPSE  --  98     Recent Labs     09/11/20  1139   INR 1.0   PTP 10.3      Recent Labs     09/11/20  1455   TIBC 338   PSAT 4*   FERR 20*      No results found for: FOL, RBCF   No results for input(s): PH, PCO2, PO2 in the last 72 hours. No results for input(s): CPK, CKNDX, TROIQ in the last 72 hours.     No lab exists for component: CPKMB  No results found for: CHOL, CHOLX, CHLST, CHOLV, HDL, HDLP, LDL, LDLC, DLDLP, TGLX, TRIGL, TRIGP, CHHD, CHHDX  No results found for: [facility]  Lab Results   Component Value Date/Time    Color YELLOW/STRAW 09/11/2020 10:57 AM    Appearance TURBID (A) 09/11/2020 10:57 AM    Specific gravity 1.027 09/11/2020 10:57 AM    pH (UA) 7.0 09/11/2020 10:57 AM    Protein 100 (A) 09/11/2020 10:57 AM    Glucose Negative 09/11/2020 10:57 AM    Ketone 15 (A) 09/11/2020 10:57 AM    Bilirubin Negative 09/11/2020 10:57 AM    Urobilinogen 1.0 09/11/2020 10:57 AM Nitrites Negative 09/11/2020 10:57 AM    Leukocyte Esterase TRACE (A) 09/11/2020 10:57 AM    Epithelial cells FEW 09/11/2020 10:57 AM    Bacteria Negative 09/11/2020 10:57 AM    WBC 0-4 09/11/2020 10:57 AM    RBC 0-5 09/11/2020 10:57 AM     Medications Reviewed:     Current Facility-Administered Medications   Medication Dose Route Frequency    0.9% sodium chloride infusion  50 mL/hr IntraVENous CONTINUOUS    pantoprazole (PROTONIX) tablet 40 mg  40 mg Oral ACB&D    sodium chloride (NS) flush 5-40 mL  5-40 mL IntraVENous Q8H    sodium chloride (NS) flush 5-40 mL  5-40 mL IntraVENous PRN    acetaminophen (TYLENOL) tablet 650 mg  650 mg Oral Q6H PRN    Or    acetaminophen (TYLENOL) suppository 650 mg  650 mg Rectal Q6H PRN    polyethylene glycol (MIRALAX) packet 17 g  17 g Oral DAILY PRN    promethazine (PHENERGAN) tablet 12.5 mg  12.5 mg Oral Q6H PRN    Or    ondansetron (ZOFRAN) injection 4 mg  4 mg IntraVENous Q6H PRN    cefTRIAXone (ROCEPHIN) 1 g in 0.9% sodium chloride (MBP/ADV) 50 mL  1 g IntraVENous Q24H   ______________________________________________________________________  EXPECTED LENGTH OF STAY: 2d 2h  ACTUAL LENGTH OF STAY:          1               Apoorva Caicedo MD

## 2020-09-12 NOTE — PROGRESS NOTES
Nephrology Progress Note  Aurora Bolden  Date of Admission : 9/11/2020    CC: Follow up for hyponatremia       Assessment and Plan     Hyponatremia :  - acute on chronic   - baseline Na unknown   - appears to be SIADH   - continue w/ NS as ordered   - follow labs Q12 hrs for now     R hydronephrosis   - Likely 2/2 extrinsic compression from sigmoid mass   - possible stent placement today     Sigmoid mass w/ possible LN mets   - per GI      Microcytic anemia   GERD        Interval History:  Seen and examined. NPO for possible stent. Na improving on NS. No cp, sob, nv/d/ reported. Current Medications: all current  Medications have been eviewed in EPIC  Review of Systems: Pertinent items are noted in HPI. Objective:  Vitals:    Vitals:    09/11/20 2243 09/12/20 0325 09/12/20 0804 09/12/20 0915   BP: (!) 155/80 137/71 132/77    Pulse: (!) 101 (!) 108 98 (!) 101   Resp: 25 30 22 27   Temp: 98.4 °F (36.9 °C) 98.5 °F (36.9 °C) 98.3 °F (36.8 °C) 99.3 °F (37.4 °C)   SpO2: 100% 98% 98% 97%   Weight:  58.6 kg (129 lb 3 oz)       Intake and Output:  No intake/output data recorded. 09/10 1901 - 09/12 0700  In: 50 [I.V.:50]  Out: -     Physical Examination:  General: NAD,Conversant   Neck:  Supple, no mass  Resp:  Lungs CTA B/L, no wheezing , normal respiratory effort  CV:  RRR,  no murmur or rub, no LE edema  GI:  Soft, NT, + Bowel sounds, no hepatosplenomegaly  Neurologic:  Non focal  Psych:             AAO x 3 appropriate affect   Skin:  No Rash      []    High complexity decision making was performed  []    Patient is at high-risk of decompensation with multiple organ involvement    Lab Data Personally Reviewed: I have reviewed all the pertinent labs, microbiology data and radiology studies during assessment.     Recent Labs     09/12/20  0327 09/11/20  1455 09/11/20  1139   * 122* 123*   K 3.5 3.4* 3.4*   CL 96* 90* 92*   CO2 22 23 24   GLU 89 106* 112*   BUN 9 11 12   CREA 0.83 0.78 0.84   CA 7.7* 7.8* 8.0*   MG 2.1  --   --    PHOS 1.6*  --   --    ALB 2.5*  --  2.7*   ALT 19  --  21   INR  --   --  1.0     Recent Labs     09/12/20  0327 09/11/20  1139   WBC 19.5* 14.5*   HGB 9.1* 9.0*   HCT 29.9* 29.1*   * 604*     No results found for: SDES  No results found for: CULT  Recent Results (from the past 24 hour(s))   URINALYSIS W/MICROSCOPIC    Collection Time: 09/11/20 10:57 AM   Result Value Ref Range    Color YELLOW/STRAW      Appearance TURBID (A) CLEAR      Specific gravity 1.027 1.003 - 1.030      pH (UA) 7.0 5.0 - 8.0      Protein 100 (A) NEG mg/dL    Glucose Negative NEG mg/dL    Ketone 15 (A) NEG mg/dL    Bilirubin Negative NEG      Blood Negative NEG      Urobilinogen 1.0 0.2 - 1.0 EU/dL    Nitrites Negative NEG      Leukocyte Esterase TRACE (A) NEG      WBC 0-4 0 - 4 /hpf    RBC 0-5 0 - 5 /hpf    Epithelial cells FEW FEW /lpf    Bacteria Negative NEG /hpf   URINE CULTURE HOLD SAMPLE    Collection Time: 09/11/20 10:57 AM    Specimen: Serum; Urine   Result Value Ref Range    Urine culture hold        Urine on hold in Microbiology dept for 2 days. If unpreserved urine is submitted, it cannot be used for addtional testing after 24 hours, recollection will be required.    OSMOLALITY, UR    Collection Time: 09/11/20 10:57 AM   Result Value Ref Range    Osmolality,urine 646 MOSM/kg H2O   SODIUM, UR, RANDOM    Collection Time: 09/11/20 10:57 AM   Result Value Ref Range    Sodium,urine random 75 MMOL/L   CBC WITH AUTOMATED DIFF    Collection Time: 09/11/20 11:39 AM   Result Value Ref Range    WBC 14.5 (H) 3.6 - 11.0 K/uL    RBC 4.38 3.80 - 5.20 M/uL    HGB 9.0 (L) 11.5 - 16.0 g/dL    HCT 29.1 (L) 35.0 - 47.0 %    MCV 66.4 (L) 80.0 - 99.0 FL    MCH 20.5 (L) 26.0 - 34.0 PG    MCHC 30.9 30.0 - 36.5 g/dL    RDW 16.8 (H) 11.5 - 14.5 %    PLATELET 204 (H) 251 - 400 K/uL    MPV 8.2 (L) 8.9 - 12.9 FL    NRBC 0.0 0  WBC    ABSOLUTE NRBC 0.00 0.00 - 0.01 K/uL    NEUTROPHILS 89 (H) 32 - 75 %    LYMPHOCYTES 5 (L) 12 - 49 %    MONOCYTES 5 5 - 13 %    EOSINOPHILS 0 0 - 7 %    BASOPHILS 0 0 - 1 %    IMMATURE GRANULOCYTES 1 (H) 0.0 - 0.5 %    ABS. NEUTROPHILS 13.0 (H) 1.8 - 8.0 K/UL    ABS. LYMPHOCYTES 0.7 (L) 0.8 - 3.5 K/UL    ABS. MONOCYTES 0.7 0.0 - 1.0 K/UL    ABS. EOSINOPHILS 0.0 0.0 - 0.4 K/UL    ABS. BASOPHILS 0.0 0.0 - 0.1 K/UL    ABS. IMM. GRANS. 0.1 (H) 0.00 - 0.04 K/UL    DF SMEAR SCANNED      RBC COMMENTS HYPOCHROMIA  3+        RBC COMMENTS MICROCYTOSIS  2+        RBC COMMENTS OVALOCYTES  PRESENT       PROTHROMBIN TIME + INR    Collection Time: 09/11/20 11:39 AM   Result Value Ref Range    INR 1.0 0.9 - 1.1      Prothrombin time 10.3 9.0 - 09.1 sec   METABOLIC PANEL, COMPREHENSIVE    Collection Time: 09/11/20 11:39 AM   Result Value Ref Range    Sodium 123 (L) 136 - 145 mmol/L    Potassium 3.4 (L) 3.5 - 5.1 mmol/L    Chloride 92 (L) 97 - 108 mmol/L    CO2 24 21 - 32 mmol/L    Anion gap 7 5 - 15 mmol/L    Glucose 112 (H) 65 - 100 mg/dL    BUN 12 6 - 20 MG/DL    Creatinine 0.84 0.55 - 1.02 MG/DL    BUN/Creatinine ratio 14 12 - 20      GFR est AA >60 >60 ml/min/1.73m2    GFR est non-AA >60 >60 ml/min/1.73m2    Calcium 8.0 (L) 8.5 - 10.1 MG/DL    Bilirubin, total 0.5 0.2 - 1.0 MG/DL    ALT (SGPT) 21 12 - 78 U/L    AST (SGOT) 23 15 - 37 U/L    Alk.  phosphatase 95 45 - 117 U/L    Protein, total 6.9 6.4 - 8.2 g/dL    Albumin 2.7 (L) 3.5 - 5.0 g/dL    Globulin 4.2 (H) 2.0 - 4.0 g/dL    A-G Ratio 0.6 (L) 1.1 - 2.2     LIPASE    Collection Time: 09/11/20 11:39 AM   Result Value Ref Range    Lipase 98 73 - 393 U/L   OSMOLALITY, SERUM/PLASMA    Collection Time: 09/11/20  2:55 PM   Result Value Ref Range    Osmolality, serum/plasma 256 mOsm/kg I2V   METABOLIC PANEL, BASIC    Collection Time: 09/11/20  2:55 PM   Result Value Ref Range    Sodium 122 (L) 136 - 145 mmol/L    Potassium 3.4 (L) 3.5 - 5.1 mmol/L    Chloride 90 (L) 97 - 108 mmol/L    CO2 23 21 - 32 mmol/L    Anion gap 9 5 - 15 mmol/L    Glucose 106 (H) 65 - 100 mg/dL BUN 11 6 - 20 MG/DL    Creatinine 0.78 0.55 - 1.02 MG/DL    BUN/Creatinine ratio 14 12 - 20      GFR est AA >60 >60 ml/min/1.73m2    GFR est non-AA >60 >60 ml/min/1.73m2    Calcium 7.8 (L) 8.5 - 10.1 MG/DL   TSH 3RD GENERATION    Collection Time: 09/11/20  2:55 PM   Result Value Ref Range    TSH 1.04 0.36 - 3.74 uIU/mL   CORTISOL    Collection Time: 09/11/20  2:55 PM   Result Value Ref Range    Cortisol, random 26.9 ug/dL   FERRITIN    Collection Time: 09/11/20  2:55 PM   Result Value Ref Range    Ferritin 20 (L) 26 - 388 NG/ML   IRON PROFILE    Collection Time: 09/11/20  2:55 PM   Result Value Ref Range    Iron 13 (L) 35 - 150 ug/dL    TIBC 338 250 - 450 ug/dL    Iron % saturation 4 (L) 20 - 50 %   CEA    Collection Time: 09/11/20  2:55 PM   Result Value Ref Range    CEA 7.4 ng/mL   TSH 3RD GENERATION    Collection Time: 09/11/20  2:55 PM   Result Value Ref Range    TSH 1.09 0.36 - 3.74 uIU/mL   CBC WITH AUTOMATED DIFF    Collection Time: 09/12/20  3:27 AM   Result Value Ref Range    WBC 19.5 (H) 3.6 - 11.0 K/uL    RBC 4.50 3.80 - 5.20 M/uL    HGB 9.1 (L) 11.5 - 16.0 g/dL    HCT 29.9 (L) 35.0 - 47.0 %    MCV 66.4 (L) 80.0 - 99.0 FL    MCH 20.2 (L) 26.0 - 34.0 PG    MCHC 30.4 30.0 - 36.5 g/dL    RDW 17.0 (H) 11.5 - 14.5 %    PLATELET 396 (H) 029 - 400 K/uL    MPV 8.9 8.9 - 12.9 FL    NRBC 0.0 0  WBC    ABSOLUTE NRBC 0.00 0.00 - 0.01 K/uL    NEUTROPHILS 90 (H) 32 - 75 %    LYMPHOCYTES 5 (L) 12 - 49 %    MONOCYTES 4 (L) 5 - 13 %    EOSINOPHILS 0 0 - 7 %    BASOPHILS 0 0 - 1 %    IMMATURE GRANULOCYTES 1 (H) 0.0 - 0.5 %    ABS. NEUTROPHILS 17.5 (H) 1.8 - 8.0 K/UL    ABS. LYMPHOCYTES 1.0 0.8 - 3.5 K/UL    ABS. MONOCYTES 0.8 0.0 - 1.0 K/UL    ABS. EOSINOPHILS 0.0 0.0 - 0.4 K/UL    ABS. BASOPHILS 0.0 0.0 - 0.1 K/UL    ABS. IMM.  GRANS. 0.2 (H) 0.00 - 0.04 K/UL    DF SMEAR SCANNED      RBC COMMENTS ANISOCYTOSIS  1+        RBC COMMENTS HYPOCHROMIA  3+        RBC COMMENTS MICROCYTOSIS  2+       METABOLIC PANEL, COMPREHENSIVE    Collection Time: 09/12/20  3:27 AM   Result Value Ref Range    Sodium 127 (L) 136 - 145 mmol/L    Potassium 3.5 3.5 - 5.1 mmol/L    Chloride 96 (L) 97 - 108 mmol/L    CO2 22 21 - 32 mmol/L    Anion gap 9 5 - 15 mmol/L    Glucose 89 65 - 100 mg/dL    BUN 9 6 - 20 MG/DL    Creatinine 0.83 0.55 - 1.02 MG/DL    BUN/Creatinine ratio 11 (L) 12 - 20      GFR est AA >60 >60 ml/min/1.73m2    GFR est non-AA >60 >60 ml/min/1.73m2    Calcium 7.7 (L) 8.5 - 10.1 MG/DL    Bilirubin, total 0.6 0.2 - 1.0 MG/DL    ALT (SGPT) 19 12 - 78 U/L    AST (SGOT) 21 15 - 37 U/L    Alk. phosphatase 92 45 - 117 U/L    Protein, total 6.5 6.4 - 8.2 g/dL    Albumin 2.5 (L) 3.5 - 5.0 g/dL    Globulin 4.0 2.0 - 4.0 g/dL    A-G Ratio 0.6 (L) 1.1 - 2.2     MAGNESIUM    Collection Time: 09/12/20  3:27 AM   Result Value Ref Range    Magnesium 2.1 1.6 - 2.4 mg/dL   PHOSPHORUS    Collection Time: 09/12/20  3:27 AM   Result Value Ref Range    Phosphorus 1.6 (L) 2.6 - 4.7 MG/DL   CORTISOL, AM    Collection Time: 09/12/20  3:27 AM   Result Value Ref Range    Cortisol, a.m. 29.9 (H) 4.30 - 22.45 ug/dL                 Kenya Simmons MD  [address] [place] [address]  Phone - [phone]   Fax - [phone]  [email or web address]

## 2020-09-12 NOTE — PERIOP NOTES
Barbara Macario TRANSFER - OUT REPORT:    Verbal report given to [facility] RN(name) on Aurelio Gilbert  being transferred to 309(unit) for routine post - op       Report consisted of patients Situation, Background, Assessment and   Recommendations(SBAR). Time Pre op antibiotic given:n/a  Hogan Present on Transfer to floor:yes  Order for Hogan on Chart:yes      Information from the following report(s) SBAR, Procedure Summary, Intake/Output and MAR was reviewed with the receiving nurse. Opportunity for questions and clarification was provided. Is the patient on 02? NO           Is the patient on a monitor? NO    Is the nurse transporting with the patient? NO    Surgical Waiting Area notified of patient's transfer from PACU? The following personal items collected during your admission accompanied patient upon transfer:   Dental Appliance: Dental Appliances: None  Vision: Visual Aid: Glasses, With patient  Hearing Aid:    Jewelry: Jewelry: None  Clothing: Clothing: None  Other Valuables: Other Valuables:  At bedside(glasses at bedside in pacu)  Valuables sent to safe:

## 2020-09-12 NOTE — PERIOP NOTES
[employer] 8 f x 28 cm stent inserted I right ureter by Dr Linden Guzman. Reference number [id number].  Expiration date 01-

## 2020-09-12 NOTE — PROGRESS NOTES
TRANSFER - IN REPORT:    Verbal report received from [facility] (name) on Alvin Pratt  being received from ED (unit) for routine progression of care      Report consisted of patients Situation, Background, Assessment and   Recommendations(SBAR). Information from the following report(s) SBAR, Kardex, Intake/Output, MAR, Accordion and Recent Results was reviewed with the receiving nurse. Opportunity for questions and clarification was provided. Assessment completed upon patients arrival to unit and care assumed. Bedside shift change report given to Tom Johnson RN  (oncoming nurse) by Janee Hurst RN  (offgoing nurse). Report included the following information SBAR, Kardex, Intake/Output, MAR and Accordion. Problem: Falls - Risk of  Goal: *Absence of Falls  Description: Document Richard Merlin Fall Risk and appropriate interventions in the flowsheet.   Outcome: Progressing Towards Goal  Note: Fall Risk Interventions:                 Elimination Interventions: Call light in reach, Patient to call for help with toileting needs, Toileting schedule/hourly rounds

## 2020-09-12 NOTE — ANESTHESIA PREPROCEDURE EVALUATION
Relevant Problems   No relevant active problems       Anesthetic History   No history of anesthetic complications            Review of Systems / Medical History  Patient summary reviewed, nursing notes reviewed and pertinent labs reviewed    Pulmonary  Within defined limits                 Neuro/Psych   Within defined limits           Cardiovascular  Within defined limits                     GI/Hepatic/Renal     GERD           Endo/Other  Within defined limits           Other Findings   Comments: R hydronephrosis due to large pelvic mass         Physical Exam    Airway  Mallampati: I  TM Distance: 4 - 6 cm  Neck ROM: normal range of motion   Mouth opening: Normal     Cardiovascular  Regular rate and rhythm,  S1 and S2 normal,  no murmur, click, rub, or gallop             Dental  No notable dental hx       Pulmonary  Breath sounds clear to auscultation               Abdominal  GI exam deferred       Other Findings            Anesthetic Plan    ASA: 2  Anesthesia type: general          Induction: Intravenous  Anesthetic plan and risks discussed with: Patient

## 2020-09-12 NOTE — PROGRESS NOTES
R hydro due to pelvic mass  Leukocytosis  Afebrile at current  NPO  Plan cystoscopy and stent this afternoon

## 2020-09-12 NOTE — PROGRESS NOTES
Problem: Falls - Risk of 
Goal: *Absence of Falls Description: Document Doroteo Castano Fall Risk and appropriate interventions in the flowsheet. Outcome: Progressing Towards Goal 
Note: Fall Risk Interventions: 
  
 
  
 
Medication Interventions: Assess postural VS orthostatic hypotension, Evaluate medications/consider consulting pharmacy Elimination Interventions: Call light in reach History of Falls Interventions: Utilize gait belt for transfer/ambulation

## 2020-09-12 NOTE — ANESTHESIA POSTPROCEDURE EVALUATION
Post-Anesthesia Evaluation and Assessment    Patient: Tam Mcgowan MRN: [id number]  SSN: [id number]    YOB: 1950  Age: 72 y.o. Sex: female      I have evaluated the patient and they are stable and ready for discharge from the PACU. Cardiovascular Function/Vital Signs  Visit Vitals  BP (!) 96/54   Pulse 85   Temp 36.2 °C (97.2 °F)   Resp 22   Ht 5' 7\" (1.702 m)   Wt 58.6 kg (129 lb 3 oz)   SpO2 99%   BMI 20.23 kg/m²       Patient is status post General anesthesia for Procedure(s):  CYSTOSCOPY INSERTION URETERAL STENT RIGHT. Nausea/Vomiting: None    Postoperative hydration reviewed and adequate. Pain:  Pain Scale 1: Numeric (0 - 10) (09/12/20 1455)  Pain Intensity 1: 3 (09/12/20 1455)   Managed    Neurological Status: At baseline    Mental Status, Level of Consciousness: Alert and  oriented to person, place, and time    Pulmonary Status:   O2 Device: Room air (09/12/20 1651)   Adequate oxygenation and airway patent    Complications related to anesthesia: None    Post-anesthesia assessment completed.  No concerns    Signed By: Jeff Reddy MD     September 12, 2020

## 2020-09-12 NOTE — PROGRESS NOTES
Received report from floor nurse, Sade Brewer, [place]. IV Calcium Gluconate and Potassium Phosphate have not been administered yet due to patient being taken to surgery. Patient currently in PACU. TRANSFER - IN REPORT:    Verbal report received from ZAKIA Carvajal(name) on Aurelio Gilbert  being received from PACU(unit) for routine progression of care      Report consisted of patients Situation, Background, Assessment and   Recommendations(SBAR). Information from the following report(s) SBAR, Kardex, OR Summary, Procedure Summary, Intake/Output, MAR, Accordion and Recent Results was reviewed with the receiving nurse. Opportunity for questions and clarification was provided. Assessment completed upon patients arrival to unit and care assumed.

## 2020-09-12 NOTE — PROGRESS NOTES
TRANSFER - OUT REPORT:    Verbal report given to Ute(name) on Aurora Bolden  being transferred to 3[facility](unit) for routine progression of care       Report consisted of patients Situation, Background, Assessment and   Recommendations(SBAR). Information from the following report(s) SBAR, Kardex, Intake/Output, MAR, Accordion and Recent Results was reviewed with the receiving nurse. Lines:   Peripheral IV 09/11/20 Left Antecubital (Active)   Site Assessment Clean, dry, & intact 09/12/20 1732   Phlebitis Assessment 0 09/12/20 1732   Infiltration Assessment 0 09/12/20 1732   Dressing Status Clean, dry, & intact 09/12/20 1732   Dressing Type Transparent 09/12/20 1732   Hub Color/Line Status Infusing 09/12/20 1732   Action Taken Open ports on tubing capped 09/12/20 1732   Alcohol Cap Used Yes 09/12/20 1732        Opportunity for questions and clarification was provided. Patient transported with:   Patient-specific medications from Pharmacy     Pt cellphone, ring, necklace, watch, purse and other personal belongings handed to ZAKIA Castro on                         Problem: Falls - Risk of  Goal: *Absence of Falls  Description: Document Rochele [place] Fall Risk and appropriate interventions in the flowsheet.   Outcome: Progressing Towards Goal  Note: Fall Risk Interventions:            Medication Interventions: Evaluate medications/consider consulting pharmacy, Patient to call before getting OOB, Teach patient to arise slowly, Utilize gait belt for transfers/ambulation    Elimination Interventions: Call light in reach, Patient to call for help with toileting needs, Stay With Me (per policy), Toilet paper/wipes in reach, Toileting schedule/hourly rounds    History of Falls Interventions: Evaluate medications/consider consulting pharmacy, Room close to nurse's station, Utilize gait belt for transfer/ambulation         Problem: Patient Education: Go to Patient Education Activity  Goal: Patient/Family Education  Outcome: Progressing Towards Goal

## 2020-09-12 NOTE — BRIEF OP NOTE
Brief Postoperative Note    Patient: Herchel Gowers  YOB: 1950  MRN: [id number]    Date of Procedure: 9/12/2020     Pre-Op Diagnosis: Right pelvic mass    Post-Op Diagnosis: Same as preoperative diagnosis. Procedure(s):  CYSTOSCOPY INSERTION URETERAL STENT RIGHT    Surgeon(s):  Karen Warren MD    Surgical Assistant: None    Anesthesia: General     Estimated Blood Loss (mL): Minimal    Complications: None    Specimens: * No specimens in log *     Implants: * No implants in log *    Drains:   Nephroureteral Drain 09/12/20 Right Ureter (Active)       Findings: 8x 28 stent, severe hydro    Electronically Signed by Marilyn De Paz MD on 9/12/2020 at 4:38 PM    Dictation#: [id number]    Dispo: molly pepper in AM, follow for post obstructive diuresis.

## 2020-09-13 LAB
ANION GAP SERPL CALC-SCNC: 9 MMOL/L (ref 5–15)
BUN SERPL-MCNC: 12 MG/DL (ref 6–20)
BUN/CREAT SERPL: 16 (ref 12–20)
CALCIUM SERPL-MCNC: 8.6 MG/DL (ref 8.5–10.1)
CHLORIDE SERPL-SCNC: 104 MMOL/L (ref 97–108)
CO2 SERPL-SCNC: 20 MMOL/L (ref 21–32)
CREAT SERPL-MCNC: 0.73 MG/DL (ref 0.55–1.02)
GLUCOSE SERPL-MCNC: 114 MG/DL (ref 65–100)
HCT VFR BLD AUTO: 35.2 % (ref 35–47)
HGB BLD-MCNC: 10.4 G/DL (ref 11.5–16)
POTASSIUM SERPL-SCNC: 4.2 MMOL/L (ref 3.5–5.1)
SODIUM SERPL-SCNC: 133 MMOL/L (ref 136–145)

## 2020-09-13 PROCEDURE — 85018 HEMOGLOBIN: CPT

## 2020-09-13 PROCEDURE — 74011250636 HC RX REV CODE- 250/636: Performed by: INTERNAL MEDICINE

## 2020-09-13 PROCEDURE — 74011000258 HC RX REV CODE- 258: Performed by: HOSPITALIST

## 2020-09-13 PROCEDURE — 74011000250 HC RX REV CODE- 250: Performed by: INTERNAL MEDICINE

## 2020-09-13 PROCEDURE — 74011250636 HC RX REV CODE- 250/636: Performed by: HOSPITALIST

## 2020-09-13 PROCEDURE — 80048 BASIC METABOLIC PNL TOTAL CA: CPT

## 2020-09-13 PROCEDURE — 74011250637 HC RX REV CODE- 250/637: Performed by: HOSPITALIST

## 2020-09-13 PROCEDURE — 65410000002 HC RM PRIVATE OB

## 2020-09-13 PROCEDURE — 36415 COLL VENOUS BLD VENIPUNCTURE: CPT

## 2020-09-13 RX ORDER — FLUMAZENIL 0.1 MG/ML
0.2 INJECTION INTRAVENOUS
Status: CANCELLED | OUTPATIENT
Start: 2020-09-13 | End: 2020-09-13

## 2020-09-13 RX ORDER — SODIUM CHLORIDE 0.9 % (FLUSH) 0.9 %
5-40 SYRINGE (ML) INJECTION EVERY 8 HOURS
Status: CANCELLED | OUTPATIENT
Start: 2020-09-13

## 2020-09-13 RX ORDER — MIDAZOLAM HYDROCHLORIDE 1 MG/ML
.25-5 INJECTION, SOLUTION INTRAMUSCULAR; INTRAVENOUS
Status: CANCELLED | OUTPATIENT
Start: 2020-09-13 | End: 2020-09-13

## 2020-09-13 RX ORDER — ATROPINE SULFATE 0.1 MG/ML
0.5 INJECTION INTRAVENOUS
Status: CANCELLED | OUTPATIENT
Start: 2020-09-13 | End: 2020-09-14

## 2020-09-13 RX ORDER — EPINEPHRINE 0.1 MG/ML
1 INJECTION INTRACARDIAC; INTRAVENOUS
Status: CANCELLED | OUTPATIENT
Start: 2020-09-13 | End: 2020-09-14

## 2020-09-13 RX ORDER — DEXTROMETHORPHAN/PSEUDOEPHED 2.5-7.5/.8
1.2 DROPS ORAL
Status: CANCELLED | OUTPATIENT
Start: 2020-09-13

## 2020-09-13 RX ORDER — NALOXONE HYDROCHLORIDE 0.4 MG/ML
0.4 INJECTION, SOLUTION INTRAMUSCULAR; INTRAVENOUS; SUBCUTANEOUS
Status: CANCELLED | OUTPATIENT
Start: 2020-09-13 | End: 2020-09-13

## 2020-09-13 RX ORDER — SODIUM CHLORIDE 0.9 % (FLUSH) 0.9 %
5-40 SYRINGE (ML) INJECTION AS NEEDED
Status: CANCELLED | OUTPATIENT
Start: 2020-09-13

## 2020-09-13 RX ADMIN — ENOXAPARIN SODIUM 40 MG: 40 INJECTION SUBCUTANEOUS at 11:48

## 2020-09-13 RX ADMIN — CEFTRIAXONE SODIUM 1 G: 1 INJECTION, POWDER, FOR SOLUTION INTRAMUSCULAR; INTRAVENOUS at 15:09

## 2020-09-13 RX ADMIN — PANTOPRAZOLE SODIUM 40 MG: 40 TABLET, DELAYED RELEASE ORAL at 16:06

## 2020-09-13 RX ADMIN — POLYETHYLENE GLYCOL 3350, SODIUM SULFATE ANHYDROUS, SODIUM BICARBONATE, SODIUM CHLORIDE, POTASSIUM CHLORIDE 4000 ML: 236; 22.74; 6.74; 5.86; 2.97 POWDER, FOR SOLUTION ORAL at 15:16

## 2020-09-13 RX ADMIN — Medication 10 ML: at 22:00

## 2020-09-13 RX ADMIN — PANTOPRAZOLE SODIUM 40 MG: 40 TABLET, DELAYED RELEASE ORAL at 06:40

## 2020-09-13 NOTE — PROGRESS NOTES
Results reviewed  Na stable at 133 today  Cont present care  Will f/u in AM  Call with any questions

## 2020-09-13 NOTE — PROGRESS NOTES
0800 Verbal shift change report given to Elui Gonzales RN (oncoming nurse) by Lupis Chong RN (offgoing nurse). Report included the following information SBAR, Kardex, Procedure Summary, Intake/Output, MAR, Accordion, Recent Results and Med Rec Status.

## 2020-09-13 NOTE — PROGRESS NOTES
Bedside and Verbal shift change report given to JAIRO Romo RN (oncoming nurse) by Nunu Jensen RN (offgoing nurse). Report included the following information SBAR, Kardex, Procedure Summary, Intake/Output, MAR, Accordion and Recent Results.

## 2020-09-13 NOTE — PROGRESS NOTES
Hospitalist Progress Note  Sean Kenyon MD  Answering service: [phone] OR [phone] from in house phone      Date of Service:  2020  NAME:  Herchel Gowers  :  1950  MRN:  [id number]    Admission Summary:   70F p/w abdo pain  Interval history / Subjective:   Patient seen and examined this afternoon. No new complaint. \" I have to drink this showing me the [place]. \" No other complaints. Assessment & Plan:     # Large pelvic mass: suspect colonic Ca  # Microcytic Anemia: likely GI blood loss- 2nd to above. Monitor HB. - clear liquid diet, Bowel prep tonight   - Plan for Colonoscopy tomorrow   - GI/Colorectal surgery following       # R side moderate hydroUreteroNephrosis. Mild L side: 2nd to above- Urology cs pending. No UTI  - CXR: NAD. CT a/p: large 9CM pelvic mass likely colonic Ca. UA: no bacteria.  - Doing well post JJ stent placement  - Will need outpatient  follow up for stent management. #. Leucocytosis: no clears signs of infections. Urine culture pending. Empiric Abx. Check BCs  #. GERD: chronic, Stable, home regimen  #. HypoNatremia: mild, monitor    COVID negative     Code status: Full  DVT prophylaxis: lovenox  Care Plan discussed with: Patient/Family and Nurse  Disposition: TBD     Hospital Problems  Never Reviewed          Codes Class Noted POA    Hydronephrosis ICD-10-CM: N13.30  ICD-9-CM: 342  2020 Unknown        Pelvic mass ICD-10-CM: R19.00  ICD-9-CM: 789.30  2020 Unknown            Review of Systems:   Pertinent items are mentioned in interval history. Vital Signs:    Last 24hrs VS reviewed since prior progress note.  Most recent are:  Visit Vitals  /63 (BP 1 Location: Right arm, BP Patient Position: At rest)   Pulse 100   Temp 97.6 °F (36.4 °C)   Resp 16   Ht 5' 7\" (1.702 m)   Wt 58.6 kg (129 lb 3 oz)   SpO2 100%   Breastfeeding No   BMI 20.23 kg/m²         Intake/Output Summary (Last 24 hours) at 9/13/2020 1741  Last data filed at 9/13/2020 1058  Gross per 24 hour   Intake --   Output 2000 ml   Net -2000 ml        Physical Examination:   General:  Alert, oriented, No acute distress, slim  Resp:  No accessory muscle use, Good AE, no wheezes, no rhonchi  Abd:  Soft, non-tender, non-distended, BS+  Extremities:  No cyanosis or clubbing, no significant edema  Neuro:  Grossly normal, no focal neuro deficits, follows commands   Psych:  Good insight, not agitated. Data Review:    Review and/or order of clinical lab test  Review and/or order of tests in the radiology section of CPT  Review and/or order of tests in the medicine section of [facility]  Labs:     Recent Labs     09/13/20  0600 09/12/20  0327 09/11/20  1139   WBC  --  19.5* 14.5*   HGB 10.4* 9.1* 9.0*   HCT 35.2 29.9* 29.1*   PLT  --  663* 604*     Recent Labs     09/13/20  0600 09/12/20  1151 09/12/20  0327   * 132* 127*   K 4.2 3.5 3.5    101 96*   CO2 20* 24 22   BUN 12 8 9   CREA 0.73 0.78 0.83   * 95 89   CA 8.6 7.5* 7.7*   MG  --   --  2.1   PHOS  --  1.7* 1.6*     Recent Labs     09/12/20  1151 09/12/20  0327 09/11/20  1139   ALT  --  19 21   AP  --  92 95   TBILI  --  0.6 0.5   TP  --  6.5 6.9   ALB 2.2* 2.5* 2.7*   GLOB  --  4.0 4.2*   LPSE  --   --  98     Recent Labs     09/11/20  1139   INR 1.0   PTP 10.3      Recent Labs     09/11/20  1455   TIBC 338   PSAT 4*   FERR 20*      No results found for: FOL, RBCF   No results for input(s): PH, PCO2, PO2 in the last 72 hours. No results for input(s): CPK, CKNDX, TROIQ in the last 72 hours.     No lab exists for component: CPKMB  No results found for: CHOL, CHOLX, CHLST, CHOLV, HDL, HDLP, LDL, LDLC, DLDLP, TGLX, TRIGL, TRIGP, CHHD, CHHDX  No results found for: [facility]  Lab Results   Component Value Date/Time    Color YELLOW/STRAW 09/11/2020 10:57 AM    Appearance TURBID (A) 09/11/2020 10:57 AM    Specific gravity 1.027 09/11/2020 10:57 AM    pH (UA) 7.0 09/11/2020 10:57 AM Protein 100 (A) 09/11/2020 10:57 AM    Glucose Negative 09/11/2020 10:57 AM    Ketone 15 (A) 09/11/2020 10:57 AM    Bilirubin Negative 09/11/2020 10:57 AM    Urobilinogen 1.0 09/11/2020 10:57 AM    Nitrites Negative 09/11/2020 10:57 AM    Leukocyte Esterase TRACE (A) 09/11/2020 10:57 AM    Epithelial cells FEW 09/11/2020 10:57 AM    Bacteria Negative 09/11/2020 10:57 AM    WBC 0-4 09/11/2020 10:57 AM    RBC 0-5 09/11/2020 10:57 AM     Medications Reviewed:     Current Facility-Administered Medications   Medication Dose Route Frequency    enoxaparin (LOVENOX) injection 40 mg  40 mg SubCUTAneous DAILY    pantoprazole (PROTONIX) tablet 40 mg  40 mg Oral ACB&D    sodium chloride (NS) flush 5-40 mL  5-40 mL IntraVENous Q8H    sodium chloride (NS) flush 5-40 mL  5-40 mL IntraVENous PRN    acetaminophen (TYLENOL) tablet 650 mg  650 mg Oral Q6H PRN    Or    acetaminophen (TYLENOL) suppository 650 mg  650 mg Rectal Q6H PRN    polyethylene glycol (MIRALAX) packet 17 g  17 g Oral DAILY PRN    promethazine (PHENERGAN) tablet 12.5 mg  12.5 mg Oral Q6H PRN    Or    ondansetron (ZOFRAN) injection 4 mg  4 mg IntraVENous Q6H PRN    cefTRIAXone (ROCEPHIN) 1 g in 0.9% sodium chloride (MBP/ADV) 50 mL  1 g IntraVENous Q24H   ______________________________________________________________________  EXPECTED LENGTH OF STAY: 2d 2h  ACTUAL LENGTH OF STAY:          2               Funmi Travis MD

## 2020-09-13 NOTE — OP NOTES
[facility]  OPERATIVE REPORT    Name:  Belen Moya  MR#:  [id number]  :  1950  ACCOUNT #:  [de-identified]  DATE OF SERVICE:  2020    PREOPERATIVE DIAGNOSES:  1.  Pelvic mass. 2.  Right hydronephrosis from extrinsic compression. POSTOPERATIVE DIAGNOSES:  1.  Pelvic mass. 2.  Right hydronephrosis from extrinsic compression. PROCEDURES PERFORMED:  1. Cystoscopy. 2.  Right retrograde pyelogram with interpretation. 3.  Right ureteral stent placement. 4.  Hogan catheter placement. SURGEON:  Leo Car MD    ANESTHESIA:  General.    COMPLICATIONS:  None. SPECIMENS REMOVED:  None. IMPLANTS:  None. ESTIMATED BLOOD LOSS:  None. DRAINS:  None. FINDINGS:  8 x 28 ureteral stent on right. 16-Swedish Hogan catheter. Severe hydronephrosis. INDICATIONS FOR PROCEDURE:  The patient is a 71-year-old female, who was found to have pelvic mass causing external compression of the right ureter and severe right hydronephrosis. She has elevated white count and there is a concern for infected kidney. The patient is being brought to the operating room today for ureteral stent. PROCEDURE IN DETAIL:  After obtaining informed consent, the patient was brought back to the operating room, placed on the operating room table in supine position. Anesthesia was induced. Airway was established without complication, and the patient was prepped and draped in the usual aseptic fashion in dorsal lithotomy position. After final time-out, rigid cystoscope was inserted into the bladder. Urethra was normal.  Bladder neck normal.  Trigone normal.  There were no urothelial lesions. There was external compression on the dome of the bladder. The right ureteral orifice was cannulated with TigerTail and contrast was injected. RADIOGRAPHIC INTERPRETATION OF RIGHT RETROGRADE PYELOGRAM:  Fluoroscopic imaging revealed hydronephrotic ureter with tortuosity, which was severe. Distally, there was narrowing of the ureter consistent with extrinsic compression causing obstruction. Next, 0.035 sensor wire was advanced up to the kidney and the TigerTail was exchanged for an 8 x 28 ureteral stent. After removing the wire, a good curl was seen in the kidney and bladder ensuring appropriate positioning. There was no string. Urine was seen to drain through the stent. The bladder was drained and the scope removed. A 16-Macedonian Hogan was inserted into the bladder. The patient was awoken from anesthesia and taken to recovery in healthy and stable condition having tolerated the procedure well.         MD KAILEE Beasley/[email or web address]/  D:  09/12/2020 16:44  T:  09/12/2020 20:02  JOB #:  [id number]

## 2020-09-13 NOTE — PROGRESS NOTES
Gastroenterology Progress Note    9/13/2020    Admit Date: 9/11/2020    Subjective: Follow up for: abnormal CT      Delightful lady in NAD. Had stenting done. Eating red/organe jello      Current Facility-Administered Medications   Medication Dose Route Frequency    enoxaparin (LOVENOX) injection 40 mg  40 mg SubCUTAneous DAILY    pantoprazole (PROTONIX) tablet 40 mg  40 mg Oral ACB&D    sodium chloride (NS) flush 5-40 mL  5-40 mL IntraVENous Q8H    sodium chloride (NS) flush 5-40 mL  5-40 mL IntraVENous PRN    acetaminophen (TYLENOL) tablet 650 mg  650 mg Oral Q6H PRN    Or    acetaminophen (TYLENOL) suppository 650 mg  650 mg Rectal Q6H PRN    polyethylene glycol (MIRALAX) packet 17 g  17 g Oral DAILY PRN    promethazine (PHENERGAN) tablet 12.5 mg  12.5 mg Oral Q6H PRN    Or    ondansetron (ZOFRAN) injection 4 mg  4 mg IntraVENous Q6H PRN    cefTRIAXone (ROCEPHIN) 1 g in 0.9% sodium chloride (MBP/ADV) 50 mL  1 g IntraVENous Q24H        Objective:     Blood pressure 106/67, pulse 87, temperature 97.4 °F (36.3 °C), resp. rate 16, height 5' 7\" (1.702 m), weight 58.6 kg (129 lb 3 oz), SpO2 100 %, not currently breastfeeding. 09/13 0701 - 09/13 1900  In: -   Out: 200 [Urine:200]    09/11 1901 - 09/13 0700  In: 5507 [I.V.:1650]  Out: 1975 [FAEIX:4389]        Physical Examination:     Gen:AAO x 3,  HEENT: EOMI  GI: soft w/+BS.     Data Review    Recent Results (from the past 24 hour(s))   METABOLIC PANEL, BASIC    Collection Time: 09/13/20  6:00 AM   Result Value Ref Range    Sodium 133 (L) 136 - 145 mmol/L    Potassium 4.2 3.5 - 5.1 mmol/L    Chloride 104 97 - 108 mmol/L    CO2 20 (L) 21 - 32 mmol/L    Anion gap 9 5 - 15 mmol/L    Glucose 114 (H) 65 - 100 mg/dL    BUN 12 6 - 20 MG/DL    Creatinine 0.73 0.55 - 1.02 MG/DL    BUN/Creatinine ratio 16 12 - 20      GFR est AA >60 >60 ml/min/1.73m2    GFR est non-AA >60 >60 ml/min/1.73m2    Calcium 8.6 8.5 - 10.1 MG/DL   HGB & HCT    Collection Time: 09/13/20  6:00 AM   Result Value Ref Range    HGB 10.4 (L) 11.5 - 16.0 g/dL    HCT 35.2 35.0 - 47.0 %     Recent Labs     09/13/20  0600 09/12/20 0327 09/11/20  1139   WBC  --  19.5* 14.5*   HGB 10.4* 9.1* 9.0*   HCT 35.2 29.9* 29.1*   PLT  --  663* 604*     Recent Labs     09/13/20  0600 09/12/20  1151 09/12/20  0327   * 132* 127*   K 4.2 3.5 3.5    101 96*   CO2 20* 24 22   BUN 12 8 9   CREA 0.73 0.78 0.83   * 95 89   CA 8.6 7.5* 7.7*   MG  --   --  2.1   PHOS  --  1.7* 1.6*     Recent Labs     09/12/20  1151 09/12/20 0327 09/11/20  1139   AP  --  92 95   TP  --  6.5 6.9   ALB 2.2* 2.5* 2.7*   GLOB  --  4.0 4.2*   LPSE  --   --  98     Recent Labs     09/11/20  1139   INR 1.0   PTP 10.3      Recent Labs     09/11/20  1455   TIBC 338   PSAT 4*   FERR 20*      No results found for: FOL, RBCF   No results for input(s): PH, PCO2, PO2 in the last 72 hours. No results for input(s): CPK, CKNDX, TROIQ in the last 72 hours. No lab exists for component: CPKMB  No results found for: CHOL, CHOLX, CHLST, CHOLV, HDL, HDLP, LDL, LDLC, DLDLP, TGLX, TRIGL, TRIGP, CHHD, CHHDX  No components found for: [place]  Lab Results   Component Value Date/Time    Color YELLOW/STRAW 09/11/2020 10:57 AM    Appearance TURBID (A) 09/11/2020 10:57 AM    Specific gravity 1.027 09/11/2020 10:57 AM    pH (UA) 7.0 09/11/2020 10:57 AM    Protein 100 (A) 09/11/2020 10:57 AM    Glucose Negative 09/11/2020 10:57 AM    Ketone 15 (A) 09/11/2020 10:57 AM    Bilirubin Negative 09/11/2020 10:57 AM    Urobilinogen 1.0 09/11/2020 10:57 AM    Nitrites Negative 09/11/2020 10:57 AM    Leukocyte Esterase TRACE (A) 09/11/2020 10:57 AM    Epithelial cells FEW 09/11/2020 10:57 AM    Bacteria Negative 09/11/2020 10:57 AM    WBC 0-4 09/11/2020 10:57 AM    RBC 0-5 09/11/2020 10:57 AM        ROS: -CP, SOB, Dysuria, palpitations, cough.     Assessment:    Active Problems:    Hydronephrosis (9/11/2020)      Pelvic mass (9/11/2020) Plan/Discussion:     · Abdomen/pelvic CT scan 9/10/2020 with a large, irregular pelvic mass measuring a 9 cm likely representing neoplasm arising from the sigmoid colon. Adnexal neoplasm is a possibility. Colonoscopy tommorow to help delineate site of origin. · CRS notes and plans reviewed. · I spoke with her RN(Ayse). No red/orange fluid. Will order prep. Signed By: Kamilah Penn.  Willie Rubio MD    9/13/2020

## 2020-09-13 NOTE — PROGRESS NOTES
Colorectal Surgery Note    The patient is having less right flank pain since undergoing ureteral stent placement yesterday. She has been tolerating a low fiber diet. She remains hemodynamically stable and afebrile. Although it remains unclear what the diagnosis is or whether or not she will need a colon resection, we spent approximately 30 minutes discussing what would be involved if a sigmoid resection were to be performed. We mostly concentrated on the risks anfd the functional consequences. We did not discuss length of stay, post-discharge convalescence, or any of the other treatment issues that will be important if the diagnosis is colon cancer. I believe that Dr. Toni Whiting plans to perform a colonoscopy on her tomorrow. If so, bowel preparation will be ordered by the covering gastroenterologist.  For now, I will change her diet to clear liquids. I will add her on for surgery for 9/15/2020 (just in case) and I will follow up with her after the colonoscopy tomorrow.

## 2020-09-14 ENCOUNTER — ANESTHESIA EVENT (OUTPATIENT)
Dept: ENDOSCOPY | Age: 70
DRG: 329 | End: 2020-09-14
Payer: MEDICARE

## 2020-09-14 ENCOUNTER — ANESTHESIA (OUTPATIENT)
Dept: ENDOSCOPY | Age: 70
DRG: 329 | End: 2020-09-14
Payer: MEDICARE

## 2020-09-14 PROBLEM — C18.7 MALIGNANT NEOPLASM OF SIGMOID COLON (HCC): Chronic | Status: ACTIVE | Noted: 2020-09-14

## 2020-09-14 LAB
ANION GAP SERPL CALC-SCNC: 8 MMOL/L (ref 5–15)
BUN SERPL-MCNC: 15 MG/DL (ref 6–20)
BUN/CREAT SERPL: 17 (ref 12–20)
CALCIUM SERPL-MCNC: 7.8 MG/DL (ref 8.5–10.1)
CHLORIDE SERPL-SCNC: 101 MMOL/L (ref 97–108)
CO2 SERPL-SCNC: 24 MMOL/L (ref 21–32)
CREAT SERPL-MCNC: 0.89 MG/DL (ref 0.55–1.02)
ERYTHROCYTE [DISTWIDTH] IN BLOOD BY AUTOMATED COUNT: 17 % (ref 11.5–14.5)
GLUCOSE SERPL-MCNC: 113 MG/DL (ref 65–100)
HCT VFR BLD AUTO: 26.5 % (ref 35–47)
HGB BLD-MCNC: 8.3 G/DL (ref 11.5–16)
MCH RBC QN AUTO: 20.3 PG (ref 26–34)
MCHC RBC AUTO-ENTMCNC: 31.3 G/DL (ref 30–36.5)
MCV RBC AUTO: 64.8 FL (ref 80–99)
NRBC # BLD: 0 K/UL (ref 0–0.01)
NRBC BLD-RTO: 0 PER 100 WBC
PLATELET # BLD AUTO: 582 K/UL (ref 150–400)
PMV BLD AUTO: 9 FL (ref 8.9–12.9)
POTASSIUM SERPL-SCNC: 3.4 MMOL/L (ref 3.5–5.1)
RBC # BLD AUTO: 4.09 M/UL (ref 3.8–5.2)
SODIUM SERPL-SCNC: 133 MMOL/L (ref 136–145)
WBC # BLD AUTO: 28.1 K/UL (ref 3.6–11)

## 2020-09-14 PROCEDURE — 80048 BASIC METABOLIC PNL TOTAL CA: CPT

## 2020-09-14 PROCEDURE — 74011250636 HC RX REV CODE- 250/636: Performed by: INTERNAL MEDICINE

## 2020-09-14 PROCEDURE — 74011250636 HC RX REV CODE- 250/636: Performed by: NURSE ANESTHETIST, CERTIFIED REGISTERED

## 2020-09-14 PROCEDURE — 74011000258 HC RX REV CODE- 258: Performed by: INTERNAL MEDICINE

## 2020-09-14 PROCEDURE — 77030021593 HC FCPS BIOP ENDOSC BSC -A: Performed by: INTERNAL MEDICINE

## 2020-09-14 PROCEDURE — 88341 IMHCHEM/IMCYTCHM EA ADD ANTB: CPT

## 2020-09-14 PROCEDURE — 88305 TISSUE EXAM BY PATHOLOGIST: CPT

## 2020-09-14 PROCEDURE — 65410000002 HC RM PRIVATE OB

## 2020-09-14 PROCEDURE — 74011000258 HC RX REV CODE- 258: Performed by: HOSPITALIST

## 2020-09-14 PROCEDURE — 0DBN8ZX EXCISION OF SIGMOID COLON, VIA NATURAL OR ARTIFICIAL OPENING ENDOSCOPIC, DIAGNOSTIC: ICD-10-PCS | Performed by: INTERNAL MEDICINE

## 2020-09-14 PROCEDURE — 76040000019: Performed by: INTERNAL MEDICINE

## 2020-09-14 PROCEDURE — 76060000031 HC ANESTHESIA FIRST 0.5 HR: Performed by: INTERNAL MEDICINE

## 2020-09-14 PROCEDURE — 36415 COLL VENOUS BLD VENIPUNCTURE: CPT

## 2020-09-14 PROCEDURE — 74011250637 HC RX REV CODE- 250/637: Performed by: HOSPITALIST

## 2020-09-14 PROCEDURE — 74011250637 HC RX REV CODE- 250/637: Performed by: COLON & RECTAL SURGERY

## 2020-09-14 PROCEDURE — 74011000250 HC RX REV CODE- 250: Performed by: NURSE ANESTHETIST, CERTIFIED REGISTERED

## 2020-09-14 PROCEDURE — 74011250636 HC RX REV CODE- 250/636: Performed by: HOSPITALIST

## 2020-09-14 PROCEDURE — 85027 COMPLETE CBC AUTOMATED: CPT

## 2020-09-14 PROCEDURE — 88342 IMHCHEM/IMCYTCHM 1ST ANTB: CPT

## 2020-09-14 RX ORDER — NEOMYCIN SULFATE 500 MG/1
1000 TABLET ORAL ONCE
Status: COMPLETED | OUTPATIENT
Start: 2020-09-14 | End: 2020-09-14

## 2020-09-14 RX ORDER — NALOXONE HYDROCHLORIDE 0.4 MG/ML
0.4 INJECTION, SOLUTION INTRAMUSCULAR; INTRAVENOUS; SUBCUTANEOUS
Status: DISCONTINUED | OUTPATIENT
Start: 2020-09-14 | End: 2020-09-14 | Stop reason: HOSPADM

## 2020-09-14 RX ORDER — PROPOFOL 10 MG/ML
INJECTION, EMULSION INTRAVENOUS AS NEEDED
Status: DISCONTINUED | OUTPATIENT
Start: 2020-09-14 | End: 2020-09-14 | Stop reason: HOSPADM

## 2020-09-14 RX ORDER — METRONIDAZOLE 250 MG/1
500 TABLET ORAL ONCE
Status: COMPLETED | OUTPATIENT
Start: 2020-09-15 | End: 2020-09-15

## 2020-09-14 RX ORDER — METRONIDAZOLE 250 MG/1
500 TABLET ORAL ONCE
Status: COMPLETED | OUTPATIENT
Start: 2020-09-14 | End: 2020-09-14

## 2020-09-14 RX ORDER — MAGNESIUM CITRATE
296 SOLUTION, ORAL ORAL
Status: COMPLETED | OUTPATIENT
Start: 2020-09-14 | End: 2020-09-14

## 2020-09-14 RX ORDER — FLUMAZENIL 0.1 MG/ML
0.2 INJECTION INTRAVENOUS
Status: DISCONTINUED | OUTPATIENT
Start: 2020-09-14 | End: 2020-09-14 | Stop reason: HOSPADM

## 2020-09-14 RX ORDER — ATROPINE SULFATE 0.1 MG/ML
0.5 INJECTION INTRAVENOUS
Status: DISCONTINUED | OUTPATIENT
Start: 2020-09-14 | End: 2020-09-14 | Stop reason: HOSPADM

## 2020-09-14 RX ORDER — SODIUM CHLORIDE 0.9 % (FLUSH) 0.9 %
5-40 SYRINGE (ML) INJECTION AS NEEDED
Status: CANCELLED | OUTPATIENT
Start: 2020-09-14

## 2020-09-14 RX ORDER — SODIUM CHLORIDE 0.9 % (FLUSH) 0.9 %
5-40 SYRINGE (ML) INJECTION EVERY 8 HOURS
Status: CANCELLED | OUTPATIENT
Start: 2020-09-14

## 2020-09-14 RX ORDER — LIDOCAINE HYDROCHLORIDE 20 MG/ML
INJECTION, SOLUTION EPIDURAL; INFILTRATION; INTRACAUDAL; PERINEURAL AS NEEDED
Status: DISCONTINUED | OUTPATIENT
Start: 2020-09-14 | End: 2020-09-14 | Stop reason: HOSPADM

## 2020-09-14 RX ORDER — DEXTROMETHORPHAN/PSEUDOEPHED 2.5-7.5/.8
1.2 DROPS ORAL
Status: DISCONTINUED | OUTPATIENT
Start: 2020-09-14 | End: 2020-09-14 | Stop reason: HOSPADM

## 2020-09-14 RX ORDER — SODIUM CHLORIDE 9 MG/ML
INJECTION, SOLUTION INTRAVENOUS
Status: DISCONTINUED | OUTPATIENT
Start: 2020-09-14 | End: 2020-09-14 | Stop reason: HOSPADM

## 2020-09-14 RX ORDER — EPINEPHRINE 0.1 MG/ML
1 INJECTION INTRACARDIAC; INTRAVENOUS
Status: DISCONTINUED | OUTPATIENT
Start: 2020-09-14 | End: 2020-09-14 | Stop reason: HOSPADM

## 2020-09-14 RX ORDER — SODIUM CHLORIDE 9 MG/ML
50 INJECTION, SOLUTION INTRAVENOUS CONTINUOUS
Status: CANCELLED | OUTPATIENT
Start: 2020-09-14 | End: 2020-09-14

## 2020-09-14 RX ORDER — NEOMYCIN SULFATE 500 MG/1
1000 TABLET ORAL ONCE
Status: COMPLETED | OUTPATIENT
Start: 2020-09-15 | End: 2020-09-15

## 2020-09-14 RX ADMIN — METRONIDAZOLE 500 MG: 250 TABLET ORAL at 23:05

## 2020-09-14 RX ADMIN — SODIUM CHLORIDE: 900 INJECTION, SOLUTION INTRAVENOUS at 14:15

## 2020-09-14 RX ADMIN — LIDOCAINE HYDROCHLORIDE 60 MG: 20 INJECTION, SOLUTION EPIDURAL; INFILTRATION; INTRACAUDAL; PERINEURAL at 14:26

## 2020-09-14 RX ADMIN — PROPOFOL 25 MG: 10 INJECTION, EMULSION INTRAVENOUS at 14:29

## 2020-09-14 RX ADMIN — Medication 10 ML: at 06:39

## 2020-09-14 RX ADMIN — Medication 10 ML: at 21:55

## 2020-09-14 RX ADMIN — PROPOFOL 75 MG: 10 INJECTION, EMULSION INTRAVENOUS at 14:26

## 2020-09-14 RX ADMIN — IRON SUCROSE 200 MG: 20 INJECTION, SOLUTION INTRAVENOUS at 11:54

## 2020-09-14 RX ADMIN — METRONIDAZOLE 500 MG: 250 TABLET ORAL at 21:54

## 2020-09-14 RX ADMIN — PANTOPRAZOLE SODIUM 40 MG: 40 TABLET, DELAYED RELEASE ORAL at 15:48

## 2020-09-14 RX ADMIN — CEFTRIAXONE SODIUM 1 G: 1 INJECTION, POWDER, FOR SOLUTION INTRAMUSCULAR; INTRAVENOUS at 15:47

## 2020-09-14 RX ADMIN — MAGNESIUM CITRATE 296 ML: 1.75 LIQUID ORAL at 20:15

## 2020-09-14 RX ADMIN — Medication 10 ML: at 15:49

## 2020-09-14 RX ADMIN — NEOMYCIN SULFATE 1000 MG: 500 TABLET ORAL at 21:54

## 2020-09-14 RX ADMIN — PROPOFOL 25 MG: 10 INJECTION, EMULSION INTRAVENOUS at 14:31

## 2020-09-14 RX ADMIN — PHENYLEPHRINE HYDROCHLORIDE 200 MCG: 10 INJECTION INTRAVENOUS at 14:31

## 2020-09-14 RX ADMIN — NEOMYCIN SULFATE 1000 MG: 500 TABLET ORAL at 23:06

## 2020-09-14 NOTE — PROGRESS NOTES
Hospitalist Progress Note  Nyasia Pizano MD  Answering service: [phone] from in house phone      Date of Service:  2020  NAME:  Ana Rosa Palacios  :  1950  MRN:  [id number]    Admission Summary:   70F p/w abdo pain  Interval history / Subjective:     2020 : For Colon  Stent feels \"uncomfortable\" for stent management post discharge;      Assessment & Plan:     # Large pelvic mass: suspect colonic Ca--colon 2020 pending. # Microcytic Anemia: likely GI blood loss- 2nd to above. Monitor HB. - clear liquid diet, Bowel prep tonight   - Plan for Colonoscopy tomorrow   - GI/Colorectal surgery following   Lab Results   Component Value Date/Time    Iron 13 (L) 2020 02:55 PM    TIBC 338 2020 02:55 PM    Iron % saturation 4 (L) 2020 02:55 PM    Ferritin 20 (L) 2020 02:55 PM    for iv replacement of iron --iron sucrose     Iron  deficiency, see above       # R side moderate hydroUreteroNephrosis. Mild L side: 2nd to above- Urology cs pending. No UTI  - CXR: NAD. CT a/p: large 9CM pelvic mass likely colonic Ca. UA: no bacteria.  - Doing well post JJ stent placement  - Will need outpatient  follow up for stent management. #. Leucocytosis: no clears signs of infections. Urine culture pending. Empiric Abx. Check BCs  #. GERD: chronic, Stable, home regimen  #. HypoNatremia: mild, monitor    COVID negative     Code status: Full  DVT prophylaxis: lovenox  Care Plan discussed with: Patient/Family and Nurse  Disposition: TBD     Hospital Problems  Date Reviewed: 2020          Codes Class Noted POA    Hydronephrosis ICD-10-CM: N13.30  ICD-9-CM: 697  2020 Unknown        Pelvic mass ICD-10-CM: R19.00  ICD-9-CM: 789.30  2020 Unknown            Review of Systems:   Pertinent items are mentioned in interval history. Vital Signs:    Last 24hrs VS reviewed since prior progress note.  Most recent are:  Visit Vitals  /67 (BP 1 Location: Left arm, BP Patient Position: At rest)   Pulse 93   Temp 97.6 °F (36.4 °C)   Resp 18   Ht 5' 7\" (1.702 m)   Wt 58.6 kg (129 lb 3 oz)   SpO2 100%   Breastfeeding No   BMI 20.23 kg/m²         Intake/Output Summary (Last 24 hours) at 9/14/2020 1018  Last data filed at 9/13/2020 1058  Gross per 24 hour   Intake --   Output 200 ml   Net -200 ml        Physical Examination:   General:  Alert, oriented, No acute distress, slim  Resp:  No accessory muscle use, Good AE, no wheezes, no rhonchi  Abd:  Soft, non-tender, non-distended, BS+  Extremities:  No cyanosis or clubbing, no significant edema  Neuro:  Grossly normal, no focal neuro deficits, follows commands   Psych:  Good insight, not agitated. Data Review:    Review and/or order of clinical lab test  Review and/or order of tests in the radiology section of CPT  Review and/or order of tests in the medicine section of CPT  Labs:     Recent Labs     09/14/20  0526 09/13/20  0600 09/12/20  0327   WBC 28.1*  --  19.5*   HGB 8.3* 10.4* 9.1*   HCT 26.5* 35.2 29.9*   *  --  663*     Recent Labs     09/14/20  0526 09/13/20  0600 09/12/20  1151 09/12/20  0327   * 133* 132* 127*   K 3.4* 4.2 3.5 3.5    104 101 96*   CO2 24 20* 24 22   BUN 15 12 8 9   CREA 0.89 0.73 0.78 0.83   * 114* 95 89   CA 7.8* 8.6 7.5* 7.7*   MG  --   --   --  2.1   PHOS  --   --  1.7* 1.6*     Recent Labs     09/12/20  1151 09/12/20  0327 09/11/20  1139   ALT  --  19 21   AP  --  92 95   TBILI  --  0.6 0.5   TP  --  6.5 6.9   ALB 2.2* 2.5* 2.7*   GLOB  --  4.0 4.2*   LPSE  --   --  98     Recent Labs     09/11/20  1139   INR 1.0   PTP 10.3      Recent Labs     09/11/20  1455   TIBC 338   PSAT 4*   FERR 20*      No results found for: FOL, RBCF   No results for input(s): PH, PCO2, PO2 in the last 72 hours. No results for input(s): CPK, CKNDX, TROIQ in the last 72 hours.     No lab exists for component: CPKMB  No results found for: CHOL, CHOLX, CHLST, CHOLV, HDL, HDLP, LDL, LDLC, DLDLP, TGLX, TRIGL, TRIGP, CHHD, CHHDX  No results found for: [facility]  Lab Results   Component Value Date/Time    Color YELLOW/STRAW 09/11/2020 10:57 AM    Appearance TURBID (A) 09/11/2020 10:57 AM    Specific gravity 1.027 09/11/2020 10:57 AM    pH (UA) 7.0 09/11/2020 10:57 AM    Protein 100 (A) 09/11/2020 10:57 AM    Glucose Negative 09/11/2020 10:57 AM    Ketone 15 (A) 09/11/2020 10:57 AM    Bilirubin Negative 09/11/2020 10:57 AM    Urobilinogen 1.0 09/11/2020 10:57 AM    Nitrites Negative 09/11/2020 10:57 AM    Leukocyte Esterase TRACE (A) 09/11/2020 10:57 AM    Epithelial cells FEW 09/11/2020 10:57 AM    Bacteria Negative 09/11/2020 10:57 AM    WBC 0-4 09/11/2020 10:57 AM    RBC 0-5 09/11/2020 10:57 AM     Medications Reviewed:     Current Facility-Administered Medications   Medication Dose Route Frequency    enoxaparin (LOVENOX) injection 40 mg  40 mg SubCUTAneous DAILY    pantoprazole (PROTONIX) tablet 40 mg  40 mg Oral ACB&D    sodium chloride (NS) flush 5-40 mL  5-40 mL IntraVENous Q8H    sodium chloride (NS) flush 5-40 mL  5-40 mL IntraVENous PRN    acetaminophen (TYLENOL) tablet 650 mg  650 mg Oral Q6H PRN    Or    acetaminophen (TYLENOL) suppository 650 mg  650 mg Rectal Q6H PRN    polyethylene glycol (MIRALAX) packet 17 g  17 g Oral DAILY PRN    promethazine (PHENERGAN) tablet 12.5 mg  12.5 mg Oral Q6H PRN    Or    ondansetron (ZOFRAN) injection 4 mg  4 mg IntraVENous Q6H PRN    cefTRIAXone (ROCEPHIN) 1 g in 0.9% sodium chloride (MBP/ADV) 50 mL  1 g IntraVENous Q24H   ______________________________________________________________________  EXPECTED LENGTH OF STAY: 2d 2h  ACTUAL LENGTH OF STAY:          3               Gigi Bailey MD

## 2020-09-14 NOTE — PROGRESS NOTES
Bedside and Verbal shift change report given to Yuval Almeida (oncoming nurse) by Wilian Haro RN (offgoing nurse). Report included the following information SBAR, Kardex, Intake/Output, MAR and Recent Results.        1315: pt off unit to colonoscopy

## 2020-09-14 NOTE — PROCEDURES
[facility].  [address] [address] [address], [address]  [phone]                              Colonoscopy Procedure Note      Indications:    Abnormal CT with pelvic mass     :  Faustina Diaz MD    Surgical Assistant: None    Implants: none    Referring Provider: Viviane Mendieta MD    Sedation:  MAC anesthesia    Procedure Details:  After informed consent was obtained with all risks and benefits of procedure explained and preoperative exam completed, the patient was taken to the endoscopy suite and placed in the left lateral decubitus position. Upon sequential sedation as per above, a digital rectal exam was performed  And was normal.  The Olympus videocolonoscope  was inserted in the rectum and carefully advanced to the sigmoid colon. The quality of preparation was inadequate. The colonoscope was slowly withdrawn with careful evaluation between folds. Retroflexion in the rectum was performed and was normal..     Findings:   Rectum: no mucosal lesion appreciated  stool present;  Sigmoid: A large circumferential, ulcerated fungating mass was noted in proximal sigmoid colon. Mass was obstructing the lumen and could not be traversed. Gayland Tyler was present. Descending Colon: not intubated  Transverse Colon: not intubated  Ascending Colon: not intubated  Cecum: not intubated  Terminal Ileum: not intubated    Interventions:  biopsy of colon sigmoid colon    Specimen Removed:    ID Type Source Tests Collected by Time Destination   1 : Sigmoid mass biopsy Preservative   Hari Parson MD 9/14/2020 1429 Pathology       Complications: None. EBL:  None. Recommendations:   -Await pathology.  -Clear liquid diet  -Resume normal medication(s).      Discharge Disposition:  Continue care in hospital.    Faustina Diaz MD  9/14/2020  2:40 PM

## 2020-09-14 NOTE — ROUTINE PROCESS
[facility]  1950  [id number]    Situation:  Verbal report received from: Arin Lozano  Procedure: Procedure(s):  COLONOSCOPY  COLON BIOPSY    Background:    Preoperative diagnosis: Anemia  Postoperative diagnosis: 1. Sigmoid Mass       :  Dr. Rivas Hdz  Assistant(s): Endoscopy Technician-1: Jose Alvarez  Endoscopy RN-1: Billy Cartagena RN    Specimens:   ID Type Source Tests Collected by Time Destination   1 : Sigmoid mass biopsy Preservative   Shireen Skelton MD 9/14/2020 1429 Pathology     H. Pylori  no    Assessment:  Intra-procedure medications   Anesthesia gave intra-procedure sedation and medications, see anesthesia flow sheet yes    Intravenous fluids: NS@ KVO     Vital signs stable yes    Abdominal assessment: round and soft yes    Recommendation:  Discharge patient per MD order yes.   Return to floor yes  Family or Friend fam  Permission to share finding with family or friend yes

## 2020-09-14 NOTE — PROGRESS NOTES
General Daily Progress Note    Admission Date: 9/11/2020  Hospital Day 4  Post-Op Day Not Applicable  Subjective:   No pain. No nausea. Tolerating clear liquids. Tolerated bowel preparation yesterday for today's colonoscopy, but still passing some fecal material.      Objective:     Patient Vitals for the past 24 hrs:   BP Temp Pulse Resp SpO2   09/14/20 1711 116/70 98.5 °F (36.9 °C) 99 16 99 %   09/14/20 1611 112/64 98.1 °F (36.7 °C) 98 16 98 %   09/14/20 1511 103/64 98.2 °F (36.8 °C) 94 16 --   09/14/20 1435 (!) 112/55 -- 71 21 100 %   09/14/20 1326 118/71 98.9 °F (37.2 °C) 98 26 100 %   09/14/20 1226 112/62 98.3 °F (36.8 °C) 96 18 96 %   09/14/20 0756 108/67 97.6 °F (36.4 °C) 93 18 100 %   09/14/20 0017 106/69 97.5 °F (36.4 °C) (!) 110 18 96 %     No intake/output data recorded. 09/13 0701 - 09/14 1900  In: 440 [P.O.:240; I.V.:200]  Out: 200 [Urine:200]    Physical Examination:  General Appearance - Pale. No distress. Abdomen - Soft. Somewhat distended. Tender to palpation int he suprapubic area. Infraumbilical midline and transverse right lower quadrant scars. No inguinal lymphadenopathy. Neuro - Alert and oriented.             Data Review   Recent Results (from the past 24 hour(s))   CBC W/O DIFF    Collection Time: 09/14/20  5:26 AM   Result Value Ref Range    WBC 28.1 (H) 3.6 - 11.0 K/uL    RBC 4.09 3.80 - 5.20 M/uL    HGB 8.3 (L) 11.5 - 16.0 g/dL    HCT 26.5 (L) 35.0 - 47.0 %    MCV 64.8 (L) 80.0 - 99.0 FL    MCH 20.3 (L) 26.0 - 34.0 PG    MCHC 31.3 30.0 - 36.5 g/dL    RDW 17.0 (H) 11.5 - 14.5 %    PLATELET 044 (H) 159 - 400 K/uL    MPV 9.0 8.9 - 12.9 FL    NRBC 0.0 0  WBC    ABSOLUTE NRBC 0.00 0.00 - 5.85 K/uL   METABOLIC PANEL, BASIC    Collection Time: 09/14/20  5:26 AM   Result Value Ref Range    Sodium 133 (L) 136 - 145 mmol/L    Potassium 3.4 (L) 3.5 - 5.1 mmol/L    Chloride 101 97 - 108 mmol/L    CO2 24 21 - 32 mmol/L    Anion gap 8 5 - 15 mmol/L    Glucose 113 (H) 65 - 100 mg/dL BUN 15 6 - 20 MG/DL    Creatinine 0.89 0.55 - 1.02 MG/DL    BUN/Creatinine ratio 17 12 - 20      GFR est AA >60 >60 ml/min/1.73m2    GFR est non-AA >60 >60 ml/min/1.73m2    Calcium 7.8 (L) 8.5 - 10.1 MG/DL           Assessment:     Principal Problem:    Malignant neoplasm of sigmoid colon ([facility] [address] (9/14/2020)    Active Problems:    Hydronephrosis (9/11/2020)      Pelvic mass (9/11/2020)      The colonoscopy today revealed a fungating proximal sigmoid colon mass that was partially obstructing and that would not permit the passage of the colonoscope beyond it. Biopsies were obtained. The mass is almost certainly malignant but could still be either primary colon cancer or metastatic disease from an ovarian primary. The increasing leukocytosis is concerning for the possibility of a contained perforation and a developing abscess, and the partial obstruction is also worrisome. Although the biopsy results are unlikely to be available tomorrow, I have proposed performing surgery in the afternoon to resect the diseased colonic segment.   We discussed the risks of surgery in detail yesterday, and today we discussed other issues/topics including:  colorectal cancer staging; the chance of needing to convert from the hand-assisted laparoscopic technique to the open technique; the chance that there could be other important colonic pathology that has not yet been diagnosed secondary to the colonoscopy being incomplete; the increased risk of anastomotic leak associated with malnutrition and/or the presence of a pelvic abscess; the possibility that a Elvis's procedure will need to be performed; the possibility that a loop ileostomy could be created in order to divert the flow of stool while a coloproctostomy heals; the possibility that an intra-operative gynecologic oncology consultation could be needed and could result in a hysterectomy and/or oophorectomy being performed; expectations during the recover from surgery, the usual post-operative activity restrictions, and the possible need to go to a transitional facility before going home. The patient appeared to have understood all of the above, and she has agreed to proceed with the operation. Plan:   Clear liquid diet until 10:00 AM tomorrow. Additional bowel preparation tonight and tomorrow with magnesium citrate, neomycin, and oral metronidazole. Repeat labs, including type and screen, in the morning. Ostomy site marking by CURTIS tomorrow (for possible colostomy or ileostomy).     Surgery has been scheduled for tomorrow at 1:30 PM.

## 2020-09-14 NOTE — PROGRESS NOTES
Nephrology Progress Note  Kay Settler  Date of Admission : 9/11/2020    CC: Follow up for hyponatremia       Assessment and Plan     Hyponatremia :  - acute on chronic   - baseline Na unknown   - appears to be SIADH   - Na stable  - hold on further IVF  - daily labs while here     R hydronephrosis   - Likely 2/2 extrinsic compression from sigmoid mass   - s/p stent placement on 9/12 and doing well     Sigmoid mass w/ possible LN mets   - per GI   - colonoscopy today     Microcytic anemia   GERD        Interval History:  Seen and examined. Na stable. For colonoscopy today. No cp, sob, nv/d/ reported. Current Medications: all current  Medications have been eviewed in EPIC  Review of Systems: Pertinent items are noted in HPI. Objective:  Vitals:    Vitals:    09/13/20 1544 09/13/20 1935 09/14/20 0017 09/14/20 0756   BP: 111/63 120/75 106/69 108/67   Pulse: 100 100 (!) 110 93   Resp: 16 16 18 18   Temp: 97.6 °F (36.4 °C) 97.4 °F (36.3 °C) 97.5 °F (36.4 °C) 97.6 °F (36.4 °C)   SpO2: 100% 98% 96% 100%   Weight:       Height:         Intake and Output:  No intake/output data recorded. 09/12 1901 - 09/14 0700  In: -   Out: 2000 [Urine:2000]    Physical Examination:  General: NAD,Conversant   Neck:  Supple, no mass  Resp:  Lungs CTA B/L, no wheezing , normal respiratory effort  CV:  RRR,  no murmur or rub, no LE edema  GI:  Soft, NT, + Bowel sounds, no hepatosplenomegaly  Neurologic:  Non focal  Psych:             AAO x 3 appropriate affect   Skin:  No Rash      []    High complexity decision making was performed  []    Patient is at high-risk of decompensation with multiple organ involvement    Lab Data Personally Reviewed: I have reviewed all the pertinent labs, microbiology data and radiology studies during assessment.     Recent Labs     09/14/20  0526 09/13/20  0600 09/12/20  1151 09/12/20  0327   * 133* 132* 127*   K 3.4* 4.2 3.5 3.5    104 101 96*   CO2 24 20* 24 22   * 114* 95 89 BUN 15 12 8 9   CREA 0.89 0.73 0.78 0.83   CA 7.8* 8.6 7.5* 7.7*   MG  --   --   --  2.1   PHOS  --   --  1.7* 1.6*   ALB  --   --  2.2* 2.5*   ALT  --   --   --  19     Recent Labs     09/14/20  0526 09/13/20  0600 09/12/20  0327   WBC 28.1*  --  19.5*   HGB 8.3* 10.4* 9.1*   HCT 26.5* 35.2 29.9*   *  --  663*     No results found for: SDES  Lab Results   Component Value Date/Time    Culture result: NO GROWTH 2 DAYS 09/12/2020 11:51 AM    Culture result: MIXED UROGENITAL DILCIA ISOLATED 09/11/2020 10:57 AM     Recent Results (from the past 24 hour(s))   CBC W/O DIFF    Collection Time: 09/14/20  5:26 AM   Result Value Ref Range    WBC 28.1 (H) 3.6 - 11.0 K/uL    RBC 4.09 3.80 - 5.20 M/uL    HGB 8.3 (L) 11.5 - 16.0 g/dL    HCT 26.5 (L) 35.0 - 47.0 %    MCV 64.8 (L) 80.0 - 99.0 FL    MCH 20.3 (L) 26.0 - 34.0 PG    MCHC 31.3 30.0 - 36.5 g/dL    RDW 17.0 (H) 11.5 - 14.5 %    PLATELET 672 (H) 404 - 400 K/uL    MPV 9.0 8.9 - 12.9 FL    NRBC 0.0 0  WBC    ABSOLUTE NRBC 0.00 0.00 - 1.22 K/uL   METABOLIC PANEL, BASIC    Collection Time: 09/14/20  5:26 AM   Result Value Ref Range    Sodium 133 (L) 136 - 145 mmol/L    Potassium 3.4 (L) 3.5 - 5.1 mmol/L    Chloride 101 97 - 108 mmol/L    CO2 24 21 - 32 mmol/L    Anion gap 8 5 - 15 mmol/L    Glucose 113 (H) 65 - 100 mg/dL    BUN 15 6 - 20 MG/DL    Creatinine 0.89 0.55 - 1.02 MG/DL    BUN/Creatinine ratio 17 12 - 20      GFR est AA >60 >60 ml/min/1.73m2    GFR est non-AA >60 >60 ml/min/1.73m2    Calcium 7.8 (L) 8.5 - 10.1 MG/DL                 Sandeep Olivia MD  [facility]   [address] [address]  Phone - [phone]   Fax - [phone]  [email or web address]

## 2020-09-14 NOTE — PROGRESS NOTES
T.O.C:   Pt expected to d/c to home   Family to provide transport at d/c   Emergency Contact: Rebekah Vogel [phone]     Reason for Admission:   Abdominal pain; rt pelvic mass                   RUR Score:    10%                 Plan for utilizing home health:   None       PCP: First and Last name:  Emmanuel aNik  [phone]   Name of Practice:    Are you a current patient: Yes/No:    Approximate date of last visit: Aug 31 2020   Can you participate in a virtual visit with your PCP:  yes                    Current Advanced Directive/Advance Care Plan: Has AMD at home, advised to bring to hospital                         Transition of Care Plan:  D/C to home with f/u with  Dr Jose Drake and PCP (has appointment in Nov. for labs.)                CM met with pt at bedside; verified demographics, insurance, PCP and emergency contact. Pt expected to d/c to home  to provide transport herself if able; has friends that can come if transport is needed. Prior to admission pt ambulates independently; has adequate support. Pt does have AMD, not on file; advised to bring copy in to be placed in chart. No other issues at present. CM will continue to follow. Nayla Bella RN    Care Management Interventions  PCP Verified by CM:  Yes  Last Visit to PCP: 08/31/20  Palliative Care Criteria Met (RRAT>21 & CHF Dx)?: No  MyChart Signup: No  Discharge Durable Medical Equipment: No  Physical Therapy Consult: No  Occupational Therapy Consult: No  Speech Therapy Consult: No  Current Support Network: Lives Alone  Confirm Follow Up Transport: Self(if unable to drive self, has friends to )  The Plan for Transition of Care is Related to the Following Treatment Goals : medically stable  The Patient and/or Patient Representative was Provided with a Choice of Provider and Agrees with the Discharge Plan?: No(n/a)  Freedom of Choice List was Provided with Basic Dialogue that Supports the Patient's Individualized Plan of Care/Goals, Treatment Preferences and Shares the Quality Data Associated with the Providers?: No(n/a)  Discharge Location  Discharge Placement: Gurwinder Davila RN    Medicare pt has received, reviewed, and signed 1st IM letter informing them of their right to appeal the discharge. Signed copy has been placed on pt bedside chart.     Sarath Garza RN

## 2020-09-14 NOTE — ANESTHESIA PREPROCEDURE EVALUATION
Relevant Problems   No relevant active problems       Anesthetic History   No history of anesthetic complications            Review of Systems / Medical History  Patient summary reviewed, nursing notes reviewed and pertinent labs reviewed    Pulmonary  Within defined limits                 Neuro/Psych   Within defined limits           Cardiovascular  Within defined limits                Exercise tolerance: >4 METS     GI/Hepatic/Renal     GERD: well controlled    Renal disease      Comments: hydroneph s/p stent on 9/10 Endo/Other  Within defined limits           Other Findings   Comments: Pelvic/colon mass         Physical Exam    Airway  Mallampati: II  TM Distance: > 6 cm  Neck ROM: normal range of motion   Mouth opening: Normal     Cardiovascular  Regular rate and rhythm,  S1 and S2 normal,  no murmur, click, rub, or gallop             Dental  No notable dental hx       Pulmonary  Breath sounds clear to auscultation               Abdominal  GI exam deferred       Other Findings            Anesthetic Plan    ASA: 3  Anesthesia type: MAC          Induction: Intravenous  Anesthetic plan and risks discussed with: Patient

## 2020-09-14 NOTE — PROGRESS NOTES
Bedside and Verbal shift change report given to Pardeep Jimenez (oncoming nurse) by Andrew Major (offgoing nurse). Report included the following information SBAR, Kardex, Procedure Summary, Intake/Output, MAR, Accordion and Recent Results.

## 2020-09-14 NOTE — PROGRESS NOTES
TRANSFER - IN REPORT:    Verbal report received from [facility]) on Aurelio Vladimir  being received from Endo(unit) for routine progression of care      Report consisted of patients Situation, Background, Assessment and   Recommendations(SBAR). Information from the following report(s) SBAR, Kardex, Intake/Output, MAR and Recent Results was reviewed with the receiving nurse. Opportunity for questions and clarification was provided. Assessment completed upon patients arrival to unit and care assumed.

## 2020-09-14 NOTE — ANESTHESIA POSTPROCEDURE EVALUATION
Post-Anesthesia Evaluation and Assessment    Patient: Cristela Victor MRN: [id number]  SSN: [id number]    YOB: 1950  Age: 72 y.o. Sex: female      I have evaluated the patient and they are stable and ready for discharge from the PACU. Cardiovascular Function/Vital Signs  Visit Vitals  /64 (BP 1 Location: Right arm, BP Patient Position: At rest)   Pulse 94   Temp 36.8 °C (98.2 °F)   Resp 16   Ht 5' 7\" (1.702 m)   Wt 58.6 kg (129 lb 3 oz)   SpO2 100%   Breastfeeding No   BMI 20.23 kg/m²       Patient is status post MAC anesthesia for Procedure(s):  COLONOSCOPY  COLON BIOPSY. Nausea/Vomiting: None    Postoperative hydration reviewed and adequate. Pain:  Pain Scale 1: Numeric (0 - 10) (09/14/20 1326)  Pain Intensity 1: 0 (09/14/20 1326)   Managed    Neurological Status:   Neuro (WDL): Within Defined Limits (09/12/20 1735)  Neuro  LUE Motor Response: Purposeful (09/12/20 1735)  LLE Motor Response: Purposeful (09/12/20 1735)  RUE Motor Response: Purposeful (09/12/20 1735)  RLE Motor Response: Purposeful (09/12/20 1735)   At baseline    Mental Status, Level of Consciousness: Alert and  oriented to person, place, and time    Pulmonary Status:   O2 Device: Nasal cannula (09/14/20 1435)   Adequate oxygenation and airway patent    Complications related to anesthesia: None    Post-anesthesia assessment completed. No concerns    Signed By: Jailyn Hurst MD     September 14, 2020              Procedure(s):  COLONOSCOPY  COLON BIOPSY. MAC    <BSHSIANPOST>    INITIAL Post-op Vital signs:   Vitals Value Taken Time   /64 9/14/2020  3:11 PM   Temp 36.8 °C (98.2 °F) 9/14/2020  3:11 PM   Pulse 94 9/14/2020  3:11 PM   Resp 0 9/14/2020  3:39 PM   SpO2 0 % 9/14/2020  3:03 PM   Vitals shown include unvalidated device data.

## 2020-09-14 NOTE — PROGRESS NOTES
Corinne Burnett  1950  [id number]    Situation:    Scheduled Procedure: Procedure(s):  COLONOSCOPY  Verbal report received from: Tricia Nunn RN  Preoperative diagnosis: Anemia    Background:    Procedure: Procedure(s):  COLONOSCOPY  Physician performing procedure; Dr. Paco Painting RN    NPO Status/Last PO Intake: since midnight    Pregnancy Test:Not applicable     Is the patient taking Blood Thinners: NO     Is the patient diabetic:no             Does the patient have a Pacemaker/Defibrillator in place?: no   Does the patient need antibiotics before/during/after procedure: no     If the patient is having a colon, How much prep was drank? 100%   What were the Colon prep results? good   Does the patient have SCD in place:yes     Assessment:  Are the vital signs stable prior to patient coming to ENDO?  yes  Is the patient alert/oriented and able to sign consent for the procedures:yes  How does the patient's abdomen feel prior to coming to ENDO? round and soft yes   Does the patient have a patient IV in place? yes

## 2020-09-14 NOTE — PROGRESS NOTES
TRANSFER - OUT REPORT:    Verbal report given to Maxwell Cedillo RN(name) on Aurelio Gilbert  being transferred to [facility](unit) for routine progression of care       Report consisted of patients Situation, Background, Assessment and   Recommendations(SBAR). Information from the following report(s) SBAR was reviewed with the receiving nurse. Lines:   Peripheral IV 09/11/20 Left Antecubital (Active)   Site Assessment Clean, dry, & intact 09/14/20 0809   Phlebitis Assessment 0 09/14/20 0809   Infiltration Assessment 0 09/14/20 0809   Dressing Status Clean, dry, & intact 09/14/20 0809   Dressing Type Transparent;Tape 09/14/20 0809   Hub Color/Line Status Capped 09/14/20 0809   Action Taken Open ports on tubing capped 09/14/20 0809   Alcohol Cap Used Yes 09/14/20 0809        Opportunity for questions and clarification was provided.

## 2020-09-15 ENCOUNTER — APPOINTMENT (OUTPATIENT)
Dept: CT IMAGING | Age: 70
DRG: 329 | End: 2020-09-15
Attending: PHYSICIAN ASSISTANT
Payer: MEDICARE

## 2020-09-15 LAB
ALBUMIN SERPL-MCNC: 2.1 G/DL (ref 3.5–5)
ALBUMIN/GLOB SERPL: 0.6 {RATIO} (ref 1.1–2.2)
ALP SERPL-CCNC: 84 U/L (ref 45–117)
ALT SERPL-CCNC: 16 U/L (ref 12–78)
ANION GAP SERPL CALC-SCNC: 8 MMOL/L (ref 5–15)
AST SERPL-CCNC: 20 U/L (ref 15–37)
BASOPHILS # BLD: 0 K/UL (ref 0–0.1)
BASOPHILS NFR BLD: 0 % (ref 0–1)
BILIRUB SERPL-MCNC: 0.3 MG/DL (ref 0.2–1)
BUN SERPL-MCNC: 6 MG/DL (ref 6–20)
BUN/CREAT SERPL: 14 (ref 12–20)
CALCIUM SERPL-MCNC: 7.2 MG/DL (ref 8.5–10.1)
CANCER AG125 SERPL-ACNC: 11 U/ML (ref 1.5–35)
CHLORIDE SERPL-SCNC: 101 MMOL/L (ref 97–108)
CO2 SERPL-SCNC: 22 MMOL/L (ref 21–32)
CREAT SERPL-MCNC: 0.43 MG/DL (ref 0.55–1.02)
DIFFERENTIAL METHOD BLD: ABNORMAL
EOSINOPHIL # BLD: 0 K/UL (ref 0–0.4)
EOSINOPHIL NFR BLD: 0 % (ref 0–7)
ERYTHROCYTE [DISTWIDTH] IN BLOOD BY AUTOMATED COUNT: 17.3 % (ref 11.5–14.5)
GLOBULIN SER CALC-MCNC: 3.3 G/DL (ref 2–4)
GLUCOSE SERPL-MCNC: 96 MG/DL (ref 65–100)
HCT VFR BLD AUTO: 25.2 % (ref 35–47)
HGB BLD-MCNC: 7.6 G/DL (ref 11.5–16)
IMM GRANULOCYTES # BLD AUTO: 0.1 K/UL (ref 0–0.04)
IMM GRANULOCYTES NFR BLD AUTO: 1 % (ref 0–0.5)
LYMPHOCYTES # BLD: 1.2 K/UL (ref 0.8–3.5)
LYMPHOCYTES NFR BLD: 8 % (ref 12–49)
MAGNESIUM SERPL-MCNC: 1.6 MG/DL (ref 1.6–2.4)
MCH RBC QN AUTO: 19.8 PG (ref 26–34)
MCHC RBC AUTO-ENTMCNC: 30.2 G/DL (ref 30–36.5)
MCV RBC AUTO: 65.8 FL (ref 80–99)
MONOCYTES # BLD: 1 K/UL (ref 0–1)
MONOCYTES NFR BLD: 7 % (ref 5–13)
NEUTS SEG # BLD: 12.4 K/UL (ref 1.8–8)
NEUTS SEG NFR BLD: 84 % (ref 32–75)
NRBC # BLD: 0 K/UL (ref 0–0.01)
NRBC BLD-RTO: 0 PER 100 WBC
PHOSPHATE SERPL-MCNC: 1.4 MG/DL (ref 2.6–4.7)
PLATELET # BLD AUTO: 244 K/UL (ref 150–400)
PMV BLD AUTO: 10 FL (ref 8.9–12.9)
POTASSIUM SERPL-SCNC: 2.7 MMOL/L (ref 3.5–5.1)
PROT SERPL-MCNC: 5.4 G/DL (ref 6.4–8.2)
RBC # BLD AUTO: 3.83 M/UL (ref 3.8–5.2)
RBC MORPH BLD: ABNORMAL
SODIUM SERPL-SCNC: 131 MMOL/L (ref 136–145)
WBC # BLD AUTO: 14.7 K/UL (ref 3.6–11)

## 2020-09-15 PROCEDURE — 3E0436Z INTRODUCTION OF NUTRITIONAL SUBSTANCE INTO CENTRAL VEIN, PERCUTANEOUS APPROACH: ICD-10-PCS | Performed by: COLON & RECTAL SURGERY

## 2020-09-15 PROCEDURE — 74011250636 HC RX REV CODE- 250/636: Performed by: HOSPITALIST

## 2020-09-15 PROCEDURE — 74011000258 HC RX REV CODE- 258: Performed by: COLON & RECTAL SURGERY

## 2020-09-15 PROCEDURE — 74011250636 HC RX REV CODE- 250/636: Performed by: COLON & RECTAL SURGERY

## 2020-09-15 PROCEDURE — 02HV33Z INSERTION OF INFUSION DEVICE INTO SUPERIOR VENA CAVA, PERCUTANEOUS APPROACH: ICD-10-PCS | Performed by: COLON & RECTAL SURGERY

## 2020-09-15 PROCEDURE — 86900 BLOOD TYPING SEROLOGIC ABO: CPT

## 2020-09-15 PROCEDURE — 76937 US GUIDE VASCULAR ACCESS: CPT

## 2020-09-15 PROCEDURE — 74011000636 HC RX REV CODE- 636: Performed by: RADIOLOGY

## 2020-09-15 PROCEDURE — 74011000250 HC RX REV CODE- 250: Performed by: HOSPITALIST

## 2020-09-15 PROCEDURE — 74011250637 HC RX REV CODE- 250/637: Performed by: COLON & RECTAL SURGERY

## 2020-09-15 PROCEDURE — 74011250636 HC RX REV CODE- 250/636: Performed by: INTERNAL MEDICINE

## 2020-09-15 PROCEDURE — 71260 CT THORAX DX C+: CPT

## 2020-09-15 PROCEDURE — 74011000258 HC RX REV CODE- 258: Performed by: HOSPITALIST

## 2020-09-15 PROCEDURE — 77030041978 HC DVC TIP TRC VPS TELE -B

## 2020-09-15 PROCEDURE — 86304 IMMUNOASSAY TUMOR CA 125: CPT

## 2020-09-15 PROCEDURE — 77030020365 HC SOL INJ SOD CL 0.9% 50ML

## 2020-09-15 PROCEDURE — 74011000258 HC RX REV CODE- 258: Performed by: INTERNAL MEDICINE

## 2020-09-15 PROCEDURE — 84100 ASSAY OF PHOSPHORUS: CPT

## 2020-09-15 PROCEDURE — 36573 INSJ PICC RS&I 5 YR+: CPT | Performed by: COLON & RECTAL SURGERY

## 2020-09-15 PROCEDURE — 36415 COLL VENOUS BLD VENIPUNCTURE: CPT

## 2020-09-15 PROCEDURE — 74011000250 HC RX REV CODE- 250: Performed by: COLON & RECTAL SURGERY

## 2020-09-15 PROCEDURE — 85025 COMPLETE CBC W/AUTO DIFF WBC: CPT

## 2020-09-15 PROCEDURE — 99223 1ST HOSP IP/OBS HIGH 75: CPT | Performed by: OBSTETRICS & GYNECOLOGY

## 2020-09-15 PROCEDURE — 65410000002 HC RM PRIVATE OB

## 2020-09-15 PROCEDURE — 86923 COMPATIBILITY TEST ELECTRIC: CPT

## 2020-09-15 PROCEDURE — 80053 COMPREHEN METABOLIC PANEL: CPT

## 2020-09-15 PROCEDURE — 83735 ASSAY OF MAGNESIUM: CPT

## 2020-09-15 PROCEDURE — 74011250637 HC RX REV CODE- 250/637: Performed by: HOSPITALIST

## 2020-09-15 PROCEDURE — C1751 CATH, INF, PER/CENT/MIDLINE: HCPCS

## 2020-09-15 RX ORDER — MAGNESIUM SULFATE 100 %
4 CRYSTALS MISCELLANEOUS AS NEEDED
Status: DISCONTINUED | OUTPATIENT
Start: 2020-09-15 | End: 2020-09-28

## 2020-09-15 RX ORDER — DEXTROSE MONOHYDRATE 100 MG/ML
0-250 INJECTION, SOLUTION INTRAVENOUS AS NEEDED
Status: DISCONTINUED | OUTPATIENT
Start: 2020-09-15 | End: 2020-09-18

## 2020-09-15 RX ORDER — INSULIN LISPRO 100 [IU]/ML
INJECTION, SOLUTION INTRAVENOUS; SUBCUTANEOUS EVERY 6 HOURS
Status: DISCONTINUED | OUTPATIENT
Start: 2020-09-15 | End: 2020-09-28

## 2020-09-15 RX ORDER — POTASSIUM CHLORIDE 14.9 MG/ML
10 INJECTION INTRAVENOUS
Status: COMPLETED | OUTPATIENT
Start: 2020-09-15 | End: 2020-09-15

## 2020-09-15 RX ORDER — SODIUM CHLORIDE 0.9 % (FLUSH) 0.9 %
10 SYRINGE (ML) INJECTION
Status: DISPENSED | OUTPATIENT
Start: 2020-09-15 | End: 2020-09-16

## 2020-09-15 RX ADMIN — POTASSIUM CHLORIDE 10 MEQ: 14.9 INJECTION, SOLUTION INTRAVENOUS at 11:08

## 2020-09-15 RX ADMIN — PANTOPRAZOLE SODIUM 40 MG: 40 TABLET, DELAYED RELEASE ORAL at 16:08

## 2020-09-15 RX ADMIN — CEFTRIAXONE SODIUM 1 G: 1 INJECTION, POWDER, FOR SOLUTION INTRAMUSCULAR; INTRAVENOUS at 16:07

## 2020-09-15 RX ADMIN — Medication 10 ML: at 06:35

## 2020-09-15 RX ADMIN — POTASSIUM CHLORIDE 10 MEQ: 14.9 INJECTION, SOLUTION INTRAVENOUS at 13:49

## 2020-09-15 RX ADMIN — IOPAMIDOL 100 ML: 612 INJECTION, SOLUTION INTRAVENOUS at 19:22

## 2020-09-15 RX ADMIN — IRON SUCROSE 200 MG: 20 INJECTION, SOLUTION INTRAVENOUS at 12:08

## 2020-09-15 RX ADMIN — PANTOPRAZOLE SODIUM 40 MG: 40 TABLET, DELAYED RELEASE ORAL at 06:34

## 2020-09-15 RX ADMIN — ASCORBIC ACID: 500 INJECTION, SOLUTION INTRAMUSCULAR; INTRAVENOUS; SUBCUTANEOUS at 18:56

## 2020-09-15 RX ADMIN — Medication 10 ML: at 14:00

## 2020-09-15 RX ADMIN — Medication 10 ML: at 21:00

## 2020-09-15 RX ADMIN — NEOMYCIN SULFATE 1000 MG: 500 TABLET ORAL at 06:34

## 2020-09-15 RX ADMIN — POTASSIUM CHLORIDE 10 MEQ: 14.9 INJECTION, SOLUTION INTRAVENOUS at 14:00

## 2020-09-15 RX ADMIN — METRONIDAZOLE 500 MG: 250 TABLET ORAL at 06:34

## 2020-09-15 RX ADMIN — SODIUM CHLORIDE: 900 INJECTION, SOLUTION INTRAVENOUS at 12:44

## 2020-09-15 RX ADMIN — POTASSIUM CHLORIDE 10 MEQ: 14.9 INJECTION, SOLUTION INTRAVENOUS at 10:45

## 2020-09-15 RX ADMIN — POTASSIUM CHLORIDE 10 MEQ: 14.9 INJECTION, SOLUTION INTRAVENOUS at 09:49

## 2020-09-15 NOTE — PROGRESS NOTES
Nephrology Progress Note  Shalonda Espinosa  Date of Admission : 9/11/2020    CC: Follow up for hyponatremia       Assessment and Plan     Hyponatremia :  - appears to be SIADH   - Na stable  - hold on further IVF  - due to start TPN today  - daily labs while here    Hypokalemia:  - IV repletion ordered  - should improve w/ TPN     R hydronephrosis   - Likely 2/2 extrinsic compression from sigmoid mass   - s/p stent placement on 9/12 and doing well     Sigmoid mass w/ possible LN mets    - colonoscopy with biopsy done 9/15     Microcytic anemia   GERD        Interval History:  Seen and examined. Na stable. K low. She feels ok. No cp, sob, n/v/d reported at this time    Current Medications: all current  Medications have been eviewed in EPIC  Review of Systems: Pertinent items are noted in HPI. Objective:  Vitals:    Vitals:    09/15/20 0633 09/15/20 0723 09/15/20 0752 09/15/20 1038   BP: 121/69 112/69 (!) 107/58    Pulse: 93 (!) 102 90    Resp: 16 18 18    Temp: 98.9 °F (37.2 °C) 97.5 °F (36.4 °C) 97.8 °F (36.6 °C)    SpO2: 95% 97% 99%    Weight:    54.4 kg (120 lb)   Height:    5' 6\" (1.676 m)     Intake and Output:  09/15 0701 - 09/15 1900  In: 240 [P.O.:240]  Out: -   09/13 1901 - 09/15 0700  In: 440 [P.O.:240; I.V.:200]  Out: -     Physical Examination:  General: NAD,Conversant   Neck:  Supple, no mass  Resp:  Lungs CTA B/L, no wheezing , normal respiratory effort  CV:  RRR,  no murmur or rub, no LE edema  GI:  Soft, NT, + Bowel sounds, no hepatosplenomegaly  Neurologic:  Non focal  Psych:             AAO x 3 appropriate affect   Skin:  No Rash      []    High complexity decision making was performed  []    Patient is at high-risk of decompensation with multiple organ involvement    Lab Data Personally Reviewed: I have reviewed all the pertinent labs, microbiology data and radiology studies during assessment.     Recent Labs     09/15/20  0641 09/14/20  0526 09/13/20  0600 09/12/20  1151   * 133* 133* 132*   K 2.7* 3.4* 4.2 3.5    101 104 101   CO2 22 24 20* 24   GLU 96 113* 114* 95   BUN 6 15 12 8   CREA 0.43* 0.89 0.73 0.78   CA 7.2* 7.8* 8.6 7.5*   MG 1.6  --   --   --    PHOS 1.4*  --   --  1.7*   ALB 2.1*  --   --  2.2*   ALT 16  --   --   --      Recent Labs     09/15/20  0641 09/14/20  0526 09/13/20  0600   WBC 14.7* 28.1*  --    HGB 7.6* 8.3* 10.4*   HCT 25.2* 26.5* 35.2    582*  --      No results found for: SDES  Lab Results   Component Value Date/Time    Culture result: NO GROWTH 3 DAYS 09/12/2020 11:51 AM    Culture result: MIXED UROGENITAL DILCIA ISOLATED 09/11/2020 10:57 AM     Recent Results (from the past 24 hour(s))   CBC WITH AUTOMATED DIFF    Collection Time: 09/15/20  6:41 AM   Result Value Ref Range    WBC 14.7 (H) 3.6 - 11.0 K/uL    RBC 3.83 3.80 - 5.20 M/uL    HGB 7.6 (L) 11.5 - 16.0 g/dL    HCT 25.2 (L) 35.0 - 47.0 %    MCV 65.8 (L) 80.0 - 99.0 FL    MCH 19.8 (L) 26.0 - 34.0 PG    MCHC 30.2 30.0 - 36.5 g/dL    RDW 17.3 (H) 11.5 - 14.5 %    PLATELET 321 879 - 508 K/uL    MPV 10.0 8.9 - 12.9 FL    NRBC 0.0 0  WBC    ABSOLUTE NRBC 0.00 0.00 - 0.01 K/uL    NEUTROPHILS 84 (H) 32 - 75 %    LYMPHOCYTES 8 (L) 12 - 49 %    MONOCYTES 7 5 - 13 %    EOSINOPHILS 0 0 - 7 %    BASOPHILS 0 0 - 1 %    IMMATURE GRANULOCYTES 1 (H) 0.0 - 0.5 %    ABS. NEUTROPHILS 12.4 (H) 1.8 - 8.0 K/UL    ABS. LYMPHOCYTES 1.2 0.8 - 3.5 K/UL    ABS. MONOCYTES 1.0 0.0 - 1.0 K/UL    ABS. EOSINOPHILS 0.0 0.0 - 0.4 K/UL    ABS. BASOPHILS 0.0 0.0 - 0.1 K/UL    ABS. IMM.  GRANS. 0.1 (H) 0.00 - 0.04 K/UL    DF SMEAR SCANNED      RBC COMMENTS ANISOCYTOSIS  1+        RBC COMMENTS MICROCYTOSIS  2+        RBC COMMENTS HYPOCHROMIA  3+        RBC COMMENTS MARILEE CELLS  PRESENT        RBC COMMENTS OVALOCYTES  PRESENT       METABOLIC PANEL, COMPREHENSIVE    Collection Time: 09/15/20  6:41 AM   Result Value Ref Range    Sodium 131 (L) 136 - 145 mmol/L    Potassium 2.7 (LL) 3.5 - 5.1 mmol/L    Chloride 101 97 - 108 mmol/L CO2 22 21 - 32 mmol/L    Anion gap 8 5 - 15 mmol/L    Glucose 96 65 - 100 mg/dL    BUN 6 6 - 20 MG/DL    Creatinine 0.43 (L) 0.55 - 1.02 MG/DL    BUN/Creatinine ratio 14 12 - 20      GFR est AA >60 >60 ml/min/1.73m2    GFR est non-AA >60 >60 ml/min/1.73m2    Calcium 7.2 (L) 8.5 - 10.1 MG/DL    Bilirubin, total 0.3 0.2 - 1.0 MG/DL    ALT (SGPT) 16 12 - 78 U/L    AST (SGOT) 20 15 - 37 U/L    Alk. phosphatase 84 45 - 117 U/L    Protein, total 5.4 (L) 6.4 - 8.2 g/dL    Albumin 2.1 (L) 3.5 - 5.0 g/dL    Globulin 3.3 2.0 - 4.0 g/dL    A-G Ratio 0.6 (L) 1.1 - 2.2     MAGNESIUM    Collection Time: 09/15/20  6:41 AM   Result Value Ref Range    Magnesium 1.6 1.6 - 2.4 mg/dL   PHOSPHORUS    Collection Time: 09/15/20  6:41 AM   Result Value Ref Range    Phosphorus 1.4 (L) 2.6 - 4.7 MG/DL   TYPE & SCREEN    Collection Time: 09/15/20  6:41 AM   Result Value Ref Range    Crossmatch Expiration 09/18/2020     ABO/Rh(D) B POSITIVE     Antibody screen NEG                  Jono Davis MD  [address]  Phone - [phone]   Fax - [phone]  [email or web address]. [email or web address]

## 2020-09-15 NOTE — PROGRESS NOTES
Bedside and Verbal shift change report given to [address] Elizabeth (oncoming nurse) by Woodrow Cardoza RN (offgoing nurse).  Report included the following information SBAR, Kardex, Procedure Summary, Intake/Output and MAR.    0800- assessment complete  0930- patient no longer doing to surgery today, no longer NPO can be on clear liquid diet  0950- Potassium runs started  1000James Guerrier MD consulted from Urology  1010[facility] Perez MD consulted from [address] [place] [address]

## 2020-09-15 NOTE — CONSULTS
Requesting Provider: Carlos Altman MD - Reason for Consultation: \"ureteral resection\"  Pre-existing [facility] Patient:   No                Patient: Pancho Robertr MRN: [id number]  SSN: [id number]    YOB: 1950  Age: 72 y.o. Sex: female     Location: [facility]/01       Code Status: Full Code   PCP: Viviane Mendieta MD  - [phone]   Emergency Contact:  Primary Emergency Contact: Emily Rhodes, Home Phone: [phone]   Race/Buddhism/Language: WHITE OR  / Jennett Dues / Will Bickers: Payor: King Lebron / Plan: Wali Purple / Product Type: Medicare /    Prior Admission Data: 2/13/20 [facility] ENDOSCOPY Marcie Garcia   Hospitalized:  Hospital Day: 5 - Admitted 9/11/2020 11:17 AM   POD # 1 Day Post-Op Procedure(s):  COLONOSCOPY  COLON BIOPSY by Hari Parson MD - Blood Loss: * No values recorded between 9/14/2020  2:25 PM and 9/14/2020  2:36 PM * 0 Hr 11 Min 4 Sec     CONSULTANTS  IP CONSULT TO GASTROENTEROLOGY  IP CONSULT TO UROLOGY  IP CONSULT TO NEPHROLOGY  IP CONSULT TO GYNECOLOGICAL ONCOLOGY  IP CONSULT TO UROLOGY  IP CONSULT TO COLORECTAL SURGERY  IP CONSULT TO COLORECTAL SURGERY   ADMISSION DIAGNOSES    ICD-10-CM ICD-9-CM   1. Pelvic mass  R19.00 789.30   2. Hydronephrosis, right  N13.30 591         Assessment/Plan:       pod3 R ureteral stent placement   B/L hydronephrosis, R>L  Pelvic mass  - agree with proceeding with possible R ureteral resection. Surgery will likely be Thursday, 09/17 afternoon with colorectal and gyn/onc. Dr. Kole Pabon will touch base with time to coordinate an available urologist.    Case discussed with Dr. Bryson Avelar     CC: Back Pain   HPI: She is a 72 y.o. female w/ no significant past medical hx, that presented to ER w/ cc of gas pain for several months, right lower quad pain x 2 days. CT scan yesterday that revealed a large pelvic mass possibly causing moderate right hydronephrosis, mild left hydronephrosis.   AFVSS  WBC 14.5 Started on Rocephin. UA w/ trace leuks. Cr WNL. Originally consulted on Friday, 09/11 for a pelvic mass resulting in right hydronephrosis. S/p R ureteral stent placement on 09/12/2020 with Dr. Barbara Hernandez. Per op note findings Urethra was normal.  Bladder neck normal.  Trigone normal.  There were no urothelial lesions. There was external compression on the dome of the bladder. The right ureteral orifice was cannulated with TigerTail and contrast was injected. [facility] was reconsulted today for reevaluation for a possible ureteral resection. Per colorectal, there are new concerns about involvement of the uterus and the right ureter. It appears that a hysterectomy, colon resection, and resection of a segment of the right ureter could be required. Per gyn/onc, patient appears to have an advanced colorectal cancer arising within the sigmoid colon, possibly with adnexal, or even uterine, involvement. CT of the chest to rule out distant mets. We will also check a CA-125. She was tentatively scheduled by Dr. Robert Andrade for Thursday, 9/17, but neither I or my partner will be available. Either of us would be available on Friday 9/18.       afvss  WBC: 14.7  Hgb: 7.6  Cr: 0.43  CA-125: 11  UA: wnl  +rocpehin  +TPN     CT abd/pelv w:  KIDNEYS/URETERS: Symmetric nephrograms with a couple low-density left renal  lesions too small to characterize. No evidence for enhancing renal mass. Moderate right hydronephrosis and hydroureter down into the pelvis. Minimal  prominence of the left collecting system. PERITONEUM: Borderline enlarged aortocaval and periaortic retroperitoneal lymph  nodes (series 3, image 46) of uncertain significance. No definite abdominal  lymphadenopathy. No ascites. COLON: Large irregular mass in the lower pelvis which may be arising from the  sigmoid colon measuring roughly 9.0 x 8.1 cm. There is adjacent moderate  irregular colon wall thickening.  This appears to be separate from the uterus  most apparent on the sagittal view. There is focal gas in the presacral region  measuring 4.8 x 2.6 cm (series 3, image 64) which is unclear if this is  intraluminal or contained extraluminal collection. IMPRESSION:  Large, irregular pelvic mass measuring approximately 9 cm likely representing  neoplasm arising from the sigmoid colon. Adnexal neoplasm is a possibility. This  does appear to be separate from the uterus. Small amount of pelvic free fluid. Associated colonic bowel wall thickening. There is focal gas in the upper  presacral region which is unclear if this is intraluminal or contained  extraluminal collection.     No definite distant metastatic disease. Borderline enlarged retroperitoneal  lymph nodes. 6 mm right lower lobe lung nodule. Mass effect from the pelvic mass  causing moderate right and minimal left hydronephrosis. CT chest w:  IMPRESSION:     1. 7.5 mm left lower lobe pulmonary nodule. 2. Small right pleural effusion. 3. Mild right basilar atelectasis. Temp (24hrs), Av.3 °F (36.8 °C), Min:97.5 °F (36.4 °C), Max:98.9 °F (37.2 °C)    Urinary Status: Voiding  Creatinine   Date/Time Value Ref Range Status   09/15/2020 06:41 AM 0.43 (L) 0.55 - 1.02 MG/DL Final   2020 05:26 AM 0.89 0.55 - 1.02 MG/DL Final   2020 06:00 AM 0.73 0.55 - 1.02 MG/DL Final   2020 11:51 AM 0.78 0.55 - 1.02 MG/DL Final   2020 03:27 AM 0.83 0.55 - 1.02 MG/DL Final     Current Antimicrobial Therapy (168h ago, onward)    Ordered     Start Stop    20 1418  cefTRIAXone (ROCEPHIN) 1 g in 0.9% sodium chloride (MBP/ADV) 50 mL  1 g,   IntraVENous,   EVERY 24 HOURS      20 1500 20 1459        Key Anti-Platelet Anticoagulant Meds     The patient is on no antiplatelet meds or anticoagulants. Diet: DIET ONE TIME MESSAGE  DIET ONE TIME MESSAGE  DIET CLEAR LIQUID  DIET NUTRITIONAL SUPPLEMENTS All Meals;  Ensure Clear  TPN ADULT - CENTRAL - % Diet Eaten: 100 %     Labs     Lab Results   Component Value Date/Time    WBC 14.7 (H) 09/15/2020 06:41 AM    HCT 25.2 (L) 09/15/2020 06:41 AM    PLATELET 033 46/31/3068 06:41 AM    Sodium 131 (L) 09/15/2020 06:41 AM    Potassium 2.7 (LL) 09/15/2020 06:41 AM    Chloride 101 09/15/2020 06:41 AM    CO2 22 09/15/2020 06:41 AM    BUN 6 09/15/2020 06:41 AM    Creatinine 0.43 (L) 09/15/2020 06:41 AM    Glucose 96 09/15/2020 06:41 AM    Calcium 7.2 (L) 09/15/2020 06:41 AM    Magnesium 1.6 09/15/2020 06:41 AM    INR 1.0 09/11/2020 11:39 AM     UA:   Lab Results   Component Value Date/Time    Color YELLOW/STRAW 09/11/2020 10:57 AM    Appearance TURBID (A) 09/11/2020 10:57 AM    Specific gravity 1.027 09/11/2020 10:57 AM    pH (UA) 7.0 09/11/2020 10:57 AM    Protein 100 (A) 09/11/2020 10:57 AM    Glucose Negative 09/11/2020 10:57 AM    Ketone 15 (A) 09/11/2020 10:57 AM    Bilirubin Negative 09/11/2020 10:57 AM    Urobilinogen 1.0 09/11/2020 10:57 AM    Nitrites Negative 09/11/2020 10:57 AM    Leukocyte Esterase TRACE (A) 09/11/2020 10:57 AM    Epithelial cells FEW 09/11/2020 10:57 AM    Bacteria Negative 09/11/2020 10:57 AM    WBC 0-4 09/11/2020 10:57 AM    RBC 0-5 09/11/2020 10:57 AM     Imaging     Results for orders placed during the hospital encounter of 09/10/20   CT ABD PELV W CONT    Narrative INDICATION: Left lower abdominal pain    COMPARISON: None    TECHNIQUE:   Thin axial images were obtained through the abdomen and pelvis following  intravenous iodinated contrast administration. Coronal and sagittal  reconstructions were generated. Oral contrast was administered. CT dose  reduction was achieved through use of a standardized protocol tailored for this  examination and automatic exposure control for dose modulation. FINDINGS:     LIVER: No mass or biliary dilatation. GALLBLADDER: No calcified gallstone  SPLEEN: Unremarkable  PANCREAS: No mass or ductal dilatation. ADRENALS: Unremarkable.   KIDNEYS/URETERS: Symmetric nephrograms with a couple low-density left renal  lesions too small to characterize. No evidence for enhancing renal mass. Moderate right hydronephrosis and hydroureter down into the pelvis. Minimal  prominence of the left collecting system. PERITONEUM: Borderline enlarged aortocaval and periaortic retroperitoneal lymph  nodes (series 3, image 46) of uncertain significance. No definite abdominal  lymphadenopathy. No ascites. COLON: Large irregular mass in the lower pelvis which may be arising from the  sigmoid colon measuring roughly 9.0 x 8.1 cm. There is adjacent moderate  irregular colon wall thickening. This appears to be separate from the uterus  most apparent on the sagittal view. There is focal gas in the presacral region  measuring 4.8 x 2.6 cm (series 3, image 64) which is unclear if this is  intraluminal or contained extraluminal collection. APPENDIX: Not clearly seen  SMALL BOWEL: No dilatation or wall thickening. STOMACH: Unremarkable. PELVIS: No pelvic lymphadenopathy. Small amount of pelvic free fluid. BONES: No destructive bone lesion. VISUALIZED THORAX: Stable 6 mm right lower lobe lung nodule (series 3, image 11)  ADDITIONAL COMMENTS: N/A      Impression IMPRESSION:    Large, irregular pelvic mass measuring approximately 9 cm likely representing  neoplasm arising from the sigmoid colon. Adnexal neoplasm is a possibility. This  does appear to be separate from the uterus. Small amount of pelvic free fluid. Associated colonic bowel wall thickening. There is focal gas in the upper  presacral region which is unclear if this is intraluminal or contained  extraluminal collection. No definite distant metastatic disease. Borderline enlarged retroperitoneal  lymph nodes. 6 mm right lower lobe lung nodule. Mass effect from the pelvic mass  causing moderate right and minimal left hydronephrosis.     Findings discussed with Dr. Danita Marcum at the time of dictation           US Results (most recent):  Results from [facility] Encounter encounter on 03/08/11   US ABDOMEN COMPLETE    Narrative **Final Report**       ICD Codes / Adm. Diagnosis: 789.01  789.06 / RIGHT UPPER QUADRANT PAIN    Examination:  Dipti Morning  - [id number] - Mar  8 2011 10:55AM  Accession No:  [id number]  Reason:  RIGHT UPPER QUAD PAIN      REPORT:  Ultrasonography of the abdomen was performed. The aorta and pancreas are   obscured by bowel gas. The liver is normal in echotexture. Portal vein flow   is antegrade. There is no intrahepatic ductal dilatation or mass. The   common bile duct measures 3.6 mm. The gallbladder is fluid filled. There is   a wall adherent echogenic focus in the gallbladder. There is no   cholelithiasis, pericholecystic fluid collection, or gallbladder wall   thickening. The spleen appears unremarkable. The right kidney measures 11.6   cm and the left kidney measures 10.7 cm. The kidneys are normal in   echotexture and there is no hydronephrosis or perinephric fluid collection. There are calcifications in the lower pole of the left kidney. The inferior   vena cava was not well-seen. IMPRESSION:   1. Gallbladder polyp. 2. Calcifications lower pole left kidney.             Interpreting/Reading Doctor: Cheri Blanc ([id number])  Transcribed:  on 03/08/2011  Approved: Cheri Blanc ([id number])  03/08/2011             Distribution:  Attending Doctor: Kaylin Bowels               Cultures     All Micro Results     Procedure Component Value Units Date/Time    CULTURE, BLOOD, PAIRED [[id number]] Collected:  09/12/20 1151    Order Status:  Completed Specimen:  Blood Updated:  09/15/20 0506     Special Requests: NO SPECIAL REQUESTS        Culture result: NO GROWTH 3 DAYS       CULTURE, URINE [[id number]] Collected:  09/11/20 1057    Order Status:  Completed Specimen:  Urine from Clean catch Updated:  09/12/20 1225     Special Requests: NO SPECIAL REQUESTS        [place] Count --        [id number]  COLONIES/mL       Culture result:       MIXED UROGENITAL DILCIA ISOLATED          CULTURE, URINE [[id number]]     Order Status:  Canceled Specimen:  Urine from Clean catch     URINE CULTURE HOLD SAMPLE [[id number]] Collected:  09/11/20 1057    Order Status:  Completed Specimen:  Urine from Serum Updated:  09/11/20 1116     Urine culture hold       Urine on hold in Microbiology dept for 2 days. If unpreserved urine is submitted, it cannot be used for addtional testing after 24 hours, recollection will be required. Past History: (Complete 2+/3 areas)   No Known Allergies   Current Facility-Administered Medications   Medication Dose Route Frequency    potassium phosphate 30 mmol in 0.9% sodium chloride 250 mL infusion   IntraVENous ONCE    potassium chloride 10 mEq in 50 ml IVPB  10 mEq IntraVENous Q1H    TPN ADULT - CENTRAL   IntraVENous CONTINUOUS    insulin lispro (HUMALOG) injection   SubCUTAneous Q6H    glucose chewable tablet 16 g  4 Tab Oral PRN    glucagon (GLUCAGEN) injection 1 mg  1 mg IntraMUSCular PRN    dextrose 10% infusion 0-250 mL  0-250 mL IntraVENous PRN    iron sucrose (VENOFER) 200 mg in 0.9% sodium chloride 100 mL IVPB  200 mg IntraVENous Q24H    [Held by provider] enoxaparin (LOVENOX) injection 40 mg  40 mg SubCUTAneous DAILY    pantoprazole (PROTONIX) tablet 40 mg  40 mg Oral ACB&D    sodium chloride (NS) flush 5-40 mL  5-40 mL IntraVENous Q8H    sodium chloride (NS) flush 5-40 mL  5-40 mL IntraVENous PRN    acetaminophen (TYLENOL) tablet 650 mg  650 mg Oral Q6H PRN    Or    acetaminophen (TYLENOL) suppository 650 mg  650 mg Rectal Q6H PRN    polyethylene glycol (MIRALAX) packet 17 g  17 g Oral DAILY PRN    promethazine (PHENERGAN) tablet 12.5 mg  12.5 mg Oral Q6H PRN    Or    ondansetron (ZOFRAN) injection 4 mg  4 mg IntraVENous Q6H PRN    cefTRIAXone (ROCEPHIN) 1 g in 0.9% sodium chloride (MBP/ADV) 50 mL  1 g IntraVENous Q24H    Prior to Admission medications    Medication Sig Start Date End Date Taking?  Authorizing Provider denosumab (PROLIA) 60 mg/mL injection 60 mg by SubCUTAneous route. Provider, Historical   lansoprazole (PREVACID) 30 mg capsule Take 30 mg by mouth Daily (before breakfast). Provider, Historical   multivitamin (ONE A DAY) tablet Take 1 Tab by mouth daily. Provider, Historical   ascorbic acid, vitamin C, (VITAMIN C) 500 mg tablet Take 500 mg by mouth. Provider, Historical   cholecalciferol (VITAMIN D3) 1,000 unit cap Take 1,000 Units by mouth daily. Provider, Historical   Omega-3 Fatty Acids (FISH OIL) 500 mg cap Take  by mouth. Provider, Historical        PMHx:  has a past medical history of GERD (gastroesophageal reflux disease) and Ill-defined condition. PSurgHx:  has a past surgical history that includes hx gyn; hx appendectomy (age 17); hx endoscopy (03/13/2017); hx endoscopy (02/13/2020); hx colonoscopy (circa 2010); and colonoscopy (Left, 9/14/2020). PSocHx:  reports that she has never smoked. She has never used smokeless tobacco. She reports current alcohol use of about 1.0 standard drinks of alcohol per week. She reports that she does not use drugs.    ROS:  (Complete - 10 systems) - DENIES: Weightloss (Constitutional), Dry mouth (ENMT), Chest pain (CV), SOB (Respiratory), Constipation (GI), Weakness (MS), Pallor (Skin), TIA Sx (Neuro), Confusion (Psych), Easy bruising (Heme)    Physical Exam: (Comprehesive - 8+ 1995 Systems)     (1) Constitutional:  FIO2:   on SpO2: O2 Sat (%): 99 %  O2 Device: Room air    Patient Vitals for the past 24 hrs:   BP Temp Pulse Resp SpO2 Height Weight   09/15/20 1038 -- -- -- -- -- 5' 6\" (1.676 m) 54.4 kg (120 lb)   09/15/20 0752 (!) 107/58 97.8 °F (36.6 °C) 90 18 99 % -- --   09/15/20 0723 112/69 97.5 °F (36.4 °C) (!) 102 18 97 % -- --   09/15/20 0633 121/69 98.9 °F (37.2 °C) 93 16 95 % -- --   09/14/20 2114 114/69 98.1 °F (36.7 °C) (!) 103 16 100 % -- --   09/14/20 1711 116/70 98.5 °F (36.9 °C) 99 16 99 % -- --   09/14/20 1611 112/64 98.1 °F (36.7 °C) 98 16 98 % -- --   09/14/20 1511 103/64 98.2 °F (36.8 °C) 94 16 -- -- --   09/14/20 1435 (!) 112/55 -- 71 21 100 % -- --   09/14/20 1326 118/71 98.9 °F (37.2 °C) 98 26 100 % -- --   09/14/20 1226 112/62 98.3 °F (36.8 °C) 96 18 96 % -- --       Date 09/14/20 0700 - 09/15/20 0659 09/15/20 0700 - 09/16/20 0659   Shift 5562-0474 4367-9073 24 Hour Total 1449-3217 8267-8153 24 Hour Total   INTAKE   P.O. 240  240 240  240     P. O. 240  240 240  240   I. V.(mL/kg/hr) 200(0.3)  200(0.1)        Volume (0.9% sodium chloride infusion) 200  200      Shift Total(mL/kg) 440(7.5)  440(7.5) 240(4.4)  240(4.4)   OUTPUT   Urine(mL/kg/hr)           Urine Occurrence(s)    3 x  3 x   Stool           Stool Occurrence(s)    2 x  2 x   Shift Total(mL/kg)           440 240  240   Weight (kg) 58.6 58.6 58.6 54.4 54.4 54.4                                                         Signed By: Alysa Gonzales NP  - September 15, 2020

## 2020-09-15 NOTE — PROGRESS NOTES
Hospitalist Progress Note  Tono Phipps MD  Answering service: [phone] OR [phone] from in house phone      Date of Service:  9/15/2020  NAME:  Aspen Peña  :  1950  MRN:  [id number]    Admission Summary:   70F p/w abdominal  pain    Interval history / Subjective:     Patient seen and examined at bedside she was a scheduled for the surgery but later deemed to be rescheduled for Thursday because need to be coordinated with the GI and urology the pelvic mass seems to be from gynecologic origin and irregular and concerning for invading the ureter     Assessment & Plan:     # Large pelvic mass: suspect colonic Ca--colon 9/15/2020 pending. Now getting evaluated by GI OB/GYN and urology  -Surgical plan as per colorectal surgery       # Microcytic Anemia: likely GI blood loss- 2nd to above. Monitor HB. - clear liquid diet, Bowel prep tonight   - Plan for Colonoscopy tomorrow    - GI/Colorectal surgery following   -Iron supplement IV Venofer      # R side moderate hydroUreteroNephrosis. Mild L side: 2nd to above- Urology cs pending.   - No UTI  - CXR: NAD. CT a/p: large 9CM pelvic mass likely colonic Ca. UA: no bacteria.  - Doing well post JJ stent placement  - Will need outpatient  follow up for stent management. #. Leucocytosis: no clears signs of infections. Urine culture pending. Empiric Abx. Check BCs  #. GERD: chronic, Stable, home regimen  #.  HypoNatremia: mild, monitor  # Severe hypokalemia and hypophosphatemia replete IV    COVID negative     Code status: Full  DVT prophylaxis: lovenox  Care Plan discussed with: Patient/Family and Nurse  Disposition: TBD     Hospital Problems  Date Reviewed: 2020          Codes Class Noted POA    * (Principal) Malignant neoplasm of sigmoid colon ([facility] [address] (Chronic) ICD-10-CM: C18.7  ICD-9-CM: 153.3  2020 Yes        Hydronephrosis ICD-10-CM: N13.30  ICD-9-CM: 646  2020 Unknown Pelvic mass ICD-10-CM: R19.00  ICD-9-CM: 789.30  9/11/2020 Unknown            Review of Systems:   Pertinent items are mentioned in interval history. Xr Retrograde Pyelogram    Result Date: 9/12/2020  IMPRESSION: 1. INTERPRETATION PROVIDED FOR COMPLIANCE ONLY AT NO CHARGE     Xr Chest Port    Result Date: 9/11/2020  IMPRESSION: No acute process. Vital Signs:    Last 24hrs VS reviewed since prior progress note. Most recent are:  Visit Vitals  /78   Pulse 91   Temp 97.6 °F (36.4 °C)   Resp 18   Ht 5' 6\" (1.676 m)   Wt 54.4 kg (120 lb)   SpO2 100%   Breastfeeding No   BMI 19.37 kg/m²         Intake/Output Summary (Last 24 hours) at 9/15/2020 1303  Last data filed at 9/15/2020 0949  Gross per 24 hour   Intake 680 ml   Output --   Net 680 ml        Physical Examination:   General:  Alert, oriented, No acute distress, slim  Resp:  No accessory muscle use, Good AE, no wheezes, no rhonchi  Abd:  Soft, non-tender, non-distended, BS+  Extremities:  No cyanosis or clubbing, no significant edema  Neuro:  Grossly normal, no focal neuro deficits, follows commands   Psych:  Good insight, not agitated. Data Review:    Review and/or order of clinical lab test  Review and/or order of tests in the radiology section of CPT  Review and/or order of tests in the medicine section of CPT  Labs:     Recent Labs     09/15/20  0641 09/14/20  0526   WBC 14.7* 28.1*   HGB 7.6* 8.3*   HCT 25.2* 26.5*    582*     Recent Labs     09/15/20  0641 09/14/20  0526 09/13/20  0600   * 133* 133*   K 2.7* 3.4* 4.2    101 104   CO2 22 24 20*   BUN 6 15 12   CREA 0.43* 0.89 0.73   GLU 96 113* 114*   CA 7.2* 7.8* 8.6   MG 1.6  --   --    PHOS 1.4*  --   --      Recent Labs     09/15/20  0641   ALT 16   AP 84   TBILI 0.3   TP 5.4*   ALB 2.1*   GLOB 3.3     No results for input(s): INR, PTP, APTT, INREXT, INREXT in the last 72 hours. No results for input(s): FE, TIBC, PSAT, FERR in the last 72 hours.    No results found for: FOL, RBCF   No results for input(s): PH, PCO2, PO2 in the last 72 hours. No results for input(s): CPK, CKNDX, TROIQ in the last 72 hours.     No lab exists for component: CPKMB  No results found for: CHOL, CHOLX, CHLST, CHOLV, HDL, HDLP, LDL, LDLC, DLDLP, TGLX, TRIGL, TRIGP, CHHD, CHHDX  No results found for: [facility]  Lab Results   Component Value Date/Time    Color YELLOW/STRAW 09/11/2020 10:57 AM    Appearance TURBID (A) 09/11/2020 10:57 AM    Specific gravity 1.027 09/11/2020 10:57 AM    pH (UA) 7.0 09/11/2020 10:57 AM    Protein 100 (A) 09/11/2020 10:57 AM    Glucose Negative 09/11/2020 10:57 AM    Ketone 15 (A) 09/11/2020 10:57 AM    Bilirubin Negative 09/11/2020 10:57 AM    Urobilinogen 1.0 09/11/2020 10:57 AM    Nitrites Negative 09/11/2020 10:57 AM    Leukocyte Esterase TRACE (A) 09/11/2020 10:57 AM    Epithelial cells FEW 09/11/2020 10:57 AM    Bacteria Negative 09/11/2020 10:57 AM    WBC 0-4 09/11/2020 10:57 AM    RBC 0-5 09/11/2020 10:57 AM     Medications Reviewed:     Current Facility-Administered Medications   Medication Dose Route Frequency    potassium phosphate 30 mmol in 0.9% sodium chloride 250 mL infusion   IntraVENous ONCE    potassium chloride 10 mEq in 50 ml IVPB  10 mEq IntraVENous Q1H    TPN ADULT - CENTRAL   IntraVENous CONTINUOUS    insulin lispro (HUMALOG) injection   SubCUTAneous Q6H    glucose chewable tablet 16 g  4 Tab Oral PRN    glucagon (GLUCAGEN) injection 1 mg  1 mg IntraMUSCular PRN    dextrose 10% infusion 0-250 mL  0-250 mL IntraVENous PRN    iron sucrose (VENOFER) 200 mg in 0.9% sodium chloride 100 mL IVPB  200 mg IntraVENous Q24H    [Held by provider] enoxaparin (LOVENOX) injection 40 mg  40 mg SubCUTAneous DAILY    pantoprazole (PROTONIX) tablet 40 mg  40 mg Oral ACB&D    sodium chloride (NS) flush 5-40 mL  5-40 mL IntraVENous Q8H    sodium chloride (NS) flush 5-40 mL  5-40 mL IntraVENous PRN    acetaminophen (TYLENOL) tablet 650 mg  650 mg Oral Q6H PRN Or    acetaminophen (TYLENOL) suppository 650 mg  650 mg Rectal Q6H PRN    polyethylene glycol (MIRALAX) packet 17 g  17 g Oral DAILY PRN    promethazine (PHENERGAN) tablet 12.5 mg  12.5 mg Oral Q6H PRN    Or    ondansetron (ZOFRAN) injection 4 mg  4 mg IntraVENous Q6H PRN    cefTRIAXone (ROCEPHIN) 1 g in 0.9% sodium chloride (MBP/ADV) 50 mL  1 g IntraVENous Q24H   ______________________________________________________________________  EXPECTED LENGTH OF STAY: 2d 2h  ACTUAL LENGTH OF STAY:          4               Uma Hylton MD

## 2020-09-15 NOTE — CONSULTS
[facility] GYNECOLOGIC ONCOLOGY  [address] [facility], [facility] Mathias Moritz [address]  P [phone]  F [phone]       Pancho Gregory       [id number]  1950    Admitted 2020 Date 9/15/2020     Admit dx: Pelvic mass [R19.00]  Hydronephrosis [N13.30]      HPI:    Pancho Gregory is a 72 y.o.  female with a hx of barrets esophagus reports she was in good health until one week ago she noted back pain presenting to ED. She also reported ~15 lb wt loss over two months. She has chronic reflux, but denied any other sx including abd/pelvic pain, N/V, F/C, sweats, leg swelling, bloody stools/urine, dysuria, vaginal bleeding/discharge. Imaging followed 9/10/20 revealed a large pelvic mass:  Large, irregular pelvic mass measuring approximately 9 cm likely representing neoplasm arising from the sigmoid colon. Adnexal neoplasm is a possibility. No definite distant metastatic disease. Borderline enlarged retroperitoneal  lymph nodes. 6 mm right lower lobe lung nodule. Mass effect from the pelvic mass  causing moderate right and minimal left hydronephrosis. A right ureteral stent was placed on . Colonoscopy  showed an obstructing sigmoid mass, ulcerative/fungating. Path is pending. CEA is slightly elevated at 7.4. She was anticipated to have surgery this morning with Dr. Yamileth Jackson for planned sigmoid resection with possible colostomy. On further review of imaging, it appeared to involve the uterus/adnexa and ureter. Consultation is requested for further E&M and surgical coordination. She reports hx of appendectomy in distant past and oophorectomy of uncertain laterality in  for a benign pelvic mass. She is single, .  She was last seen in  by her gynecologist for women's exam and pap, reports this returned normal and does not recall any abnl findings during bimanual exam. Last colonoscopy prior to findings was 10 years ago, normal.  Mammogram current and normal.  Family hx significant for pancreatic cancer in mother at age 77 and \"stomach\" cancer in her paternal grandmother. She is nonsmoker, no alcohol/drugs, otherwise healthy and independent, lives alone with her pet cat. Patient Active Problem List   Diagnosis Code    Hydronephrosis N13.30    Pelvic mass R19.00    Malignant neoplasm of sigmoid colon (HCC) C18.7       Past Medical History:   Diagnosis Date    GERD (gastroesophageal reflux disease)     Ill-defined condition     Spaulding's esophagus      Past Surgical History:   Procedure Laterality Date    COLONOSCOPY Left 9/14/2020    COLONOSCOPY performed by Laurence Muñoz MD at [address] [place] [address] Sw ENDOSCOPY    HX APPENDECTOMY  age 17    HX COLONOSCOPY  circa 2010    No neoplasms.  HX ENDOSCOPY  03/13/2017    Dr. Chadwick Armendariz HX ENDOSCOPY  02/13/2020    Dr. Buzz Barry oopherectomy for treatment of benign mass. Social History     Tobacco Use    Smoking status: Never Smoker    Smokeless tobacco: Never Used   Substance Use Topics    Alcohol use:  Yes     Alcohol/week: 1.0 standard drinks     Types: 1 Glasses of wine per week      Family History   Problem Relation Age of Onset    Cancer Mother     Heart Disease Father     Diabetes Brother       Current Facility-Administered Medications   Medication Dose Route Frequency    potassium phosphate 30 mmol in 0.9% sodium chloride 250 mL infusion   IntraVENous ONCE    potassium chloride 10 mEq in 50 ml IVPB  10 mEq IntraVENous Q1H    TPN ADULT - CENTRAL   IntraVENous CONTINUOUS    insulin lispro (HUMALOG) injection   SubCUTAneous Q6H    glucose chewable tablet 16 g  4 Tab Oral PRN    glucagon (GLUCAGEN) injection 1 mg  1 mg IntraMUSCular PRN    dextrose 10% infusion 0-250 mL  0-250 mL IntraVENous PRN    iron sucrose (VENOFER) 200 mg in 0.9% sodium chloride 100 mL IVPB  200 mg IntraVENous Q24H    [Held by provider] enoxaparin (LOVENOX) injection 40 mg  40 mg SubCUTAneous DAILY    pantoprazole (PROTONIX) tablet 40 mg  40 mg Oral ACB&D    sodium chloride (NS) flush 5-40 mL  5-40 mL IntraVENous Q8H    sodium chloride (NS) flush 5-40 mL  5-40 mL IntraVENous PRN    acetaminophen (TYLENOL) tablet 650 mg  650 mg Oral Q6H PRN    Or    acetaminophen (TYLENOL) suppository 650 mg  650 mg Rectal Q6H PRN    polyethylene glycol (MIRALAX) packet 17 g  17 g Oral DAILY PRN    promethazine (PHENERGAN) tablet 12.5 mg  12.5 mg Oral Q6H PRN    Or    ondansetron (ZOFRAN) injection 4 mg  4 mg IntraVENous Q6H PRN    cefTRIAXone (ROCEPHIN) 1 g in 0.9% sodium chloride (MBP/ADV) 50 mL  1 g IntraVENous Q24H     No Known Allergies     Review of Systems  A comprehensive review of systems was negative except for that written in the History of Present Illness. , 10 point ROS    OBJECTIVE:    Physical Exam  Visit Vitals  BP (!) 107/58 (BP 1 Location: Right arm, BP Patient Position: At rest)   Pulse 90   Temp 97.8 °F (36.6 °C)   Resp 18   Ht 5' 6\" (1.676 m)   Wt 120 lb (54.4 kg)   SpO2 99%   Breastfeeding No   BMI 19.37 kg/m²     General appearance: alert, cooperative, no distress, appears stated age, facial wasting  Eyes: conjunctivae/corneas clear. PER, EOM's intact. Back: symmetric, no curvature. ROM normal. No CVA tenderness. Abdomen: soft, mildly distended, nontender throughout. Low midline scar d/t prior oophorectomy.  No hernia  Pelvic: deferred, patient getting PICC line for planned TPN  Extremities: extremities normal, atraumatic, no cyanosis or edema  Pulses: 2+ and symmetric  Skin: Skin color, texture, turgor normal. No rashes or lesions  Lymph nodes: Cervical, supraclavicular, and inguinal nodes normal.  Neurologic: Grossly normal    Data Review      Recent Results (from the past 24 hour(s))   CBC WITH AUTOMATED DIFF    Collection Time: 09/15/20  6:41 AM   Result Value Ref Range    WBC 14.7 (H) 3.6 - 11.0 K/uL    RBC 3.83 3.80 - 5.20 M/uL    HGB 7.6 (L) 11.5 - 16.0 g/dL    HCT 25.2 (L) 35.0 - 47.0 %    MCV 65.8 (L) 80.0 - 99.0 FL    MCH 19.8 (L) 26.0 - 34.0 PG    MCHC 30.2 30.0 - 36.5 g/dL    RDW 17.3 (H) 11.5 - 14.5 %    PLATELET 088 485 - 823 K/uL    MPV 10.0 8.9 - 12.9 FL    NRBC 0.0 0  WBC    ABSOLUTE NRBC 0.00 0.00 - 0.01 K/uL    NEUTROPHILS 84 (H) 32 - 75 %    LYMPHOCYTES 8 (L) 12 - 49 %    MONOCYTES 7 5 - 13 %    EOSINOPHILS 0 0 - 7 %    BASOPHILS 0 0 - 1 %    IMMATURE GRANULOCYTES 1 (H) 0.0 - 0.5 %    ABS. NEUTROPHILS 12.4 (H) 1.8 - 8.0 K/UL    ABS. LYMPHOCYTES 1.2 0.8 - 3.5 K/UL    ABS. MONOCYTES 1.0 0.0 - 1.0 K/UL    ABS. EOSINOPHILS 0.0 0.0 - 0.4 K/UL    ABS. BASOPHILS 0.0 0.0 - 0.1 K/UL    ABS. IMM. GRANS. 0.1 (H) 0.00 - 0.04 K/UL    DF SMEAR SCANNED      RBC COMMENTS ANISOCYTOSIS  1+        RBC COMMENTS MICROCYTOSIS  2+        RBC COMMENTS HYPOCHROMIA  3+        RBC COMMENTS MARILEE CELLS  PRESENT        RBC COMMENTS OVALOCYTES  PRESENT       METABOLIC PANEL, COMPREHENSIVE    Collection Time: 09/15/20  6:41 AM   Result Value Ref Range    Sodium 131 (L) 136 - 145 mmol/L    Potassium 2.7 (LL) 3.5 - 5.1 mmol/L    Chloride 101 97 - 108 mmol/L    CO2 22 21 - 32 mmol/L    Anion gap 8 5 - 15 mmol/L    Glucose 96 65 - 100 mg/dL    BUN 6 6 - 20 MG/DL    Creatinine 0.43 (L) 0.55 - 1.02 MG/DL    BUN/Creatinine ratio 14 12 - 20      GFR est AA >60 >60 ml/min/1.73m2    GFR est non-AA >60 >60 ml/min/1.73m2    Calcium 7.2 (L) 8.5 - 10.1 MG/DL    Bilirubin, total 0.3 0.2 - 1.0 MG/DL    ALT (SGPT) 16 12 - 78 U/L    AST (SGOT) 20 15 - 37 U/L    Alk.  phosphatase 84 45 - 117 U/L    Protein, total 5.4 (L) 6.4 - 8.2 g/dL    Albumin 2.1 (L) 3.5 - 5.0 g/dL    Globulin 3.3 2.0 - 4.0 g/dL    A-G Ratio 0.6 (L) 1.1 - 2.2     MAGNESIUM    Collection Time: 09/15/20  6:41 AM   Result Value Ref Range    Magnesium 1.6 1.6 - 2.4 mg/dL   PHOSPHORUS    Collection Time: 09/15/20  6:41 AM   Result Value Ref Range    Phosphorus 1.4 (L) 2.6 - 4.7 MG/DL   TYPE & SCREEN    Collection Time: 09/15/20  6:41 AM   Result Value Ref Range    Crossmatch Expiration 09/18/2020     ABO/Rh(D) B POSITIVE     Antibody screen NEG        Imaging  CT Results (most recent):  Results from [place] encounter on 09/10/20   CT ABD PELV W CONT    Narrative INDICATION: Left lower abdominal pain    COMPARISON: None    TECHNIQUE:   Thin axial images were obtained through the abdomen and pelvis following  intravenous iodinated contrast administration. Coronal and sagittal  reconstructions were generated. Oral contrast was administered. CT dose  reduction was achieved through use of a standardized protocol tailored for this  examination and automatic exposure control for dose modulation. FINDINGS:     LIVER: No mass or biliary dilatation. GALLBLADDER: No calcified gallstone  SPLEEN: Unremarkable  PANCREAS: No mass or ductal dilatation. ADRENALS: Unremarkable. KIDNEYS/URETERS: Symmetric nephrograms with a couple low-density left renal  lesions too small to characterize. No evidence for enhancing renal mass. Moderate right hydronephrosis and hydroureter down into the pelvis. Minimal  prominence of the left collecting system. PERITONEUM: Borderline enlarged aortocaval and periaortic retroperitoneal lymph  nodes (series 3, image 46) of uncertain significance. No definite abdominal  lymphadenopathy. No ascites. COLON: Large irregular mass in the lower pelvis which may be arising from the  sigmoid colon measuring roughly 9.0 x 8.1 cm. There is adjacent moderate  irregular colon wall thickening. This appears to be separate from the uterus  most apparent on the sagittal view. There is focal gas in the presacral region  measuring 4.8 x 2.6 cm (series 3, image 64) which is unclear if this is  intraluminal or contained extraluminal collection. APPENDIX: Not clearly seen  SMALL BOWEL: No dilatation or wall thickening. STOMACH: Unremarkable. PELVIS: No pelvic lymphadenopathy. Small amount of pelvic free fluid. BONES: No destructive bone lesion.    VISUALIZED THORAX: Stable 6 mm right lower lobe lung nodule (series 3, image 11)  ADDITIONAL COMMENTS: N/A      Impression IMPRESSION:    Large, irregular pelvic mass measuring approximately 9 cm likely representing  neoplasm arising from the sigmoid colon. Adnexal neoplasm is a possibility. This  does appear to be separate from the uterus. Small amount of pelvic free fluid. Associated colonic bowel wall thickening. There is focal gas in the upper  presacral region which is unclear if this is intraluminal or contained  extraluminal collection. No definite distant metastatic disease. Borderline enlarged retroperitoneal  lymph nodes. 6 mm right lower lobe lung nodule. Mass effect from the pelvic mass  causing moderate right and minimal left hydronephrosis. Findings discussed with Dr. Toni Whiting at the time of dictation             IMPRESSION/PLAN:    Patient Active Problem List   Diagnosis Code    Hydronephrosis N13.30    Pelvic mass R19.00    Malignant neoplasm of sigmoid colon ([facility] [address] C18.7       I reviewed with Kay Settler her medical records, physical exam, and review of symptoms. 72 y.o. female with a new pelvic mass/obstructing sigmoid lesion of uncertain primary with associated right hydronephrosis. -Path pending sigmoid lesion  -CT chest eval for metastatic disease  -Possible uterine/ovarian primary in differential as this can cause a similar clinical picture as presented  -Currently posted for Thursday afternoon.   Dr. Carissa Moser or Dr. Buddy Vaughn to coordinate for gynecologic oncology  -Anemia 2/2 possible microscopic blood loss, malignancy  -TPN anticipated today  -Urology/nephrology following      Thank you for allowing us to participate in her care      Signed By: Milad Dhaliwal PA-C     9/15/2020/11:49 AM

## 2020-09-15 NOTE — WOUND CARE
Stoma Marking Note:     Type of ostomy scheduled: marked for colostomy and ileostomy by Dr. Rinku Buck    Introduced self and services to patient. Patient able to verbalize knowledge of procedure consistent with consent. Stoma site marked with surgical marker in left and right upper quadrants. Stoma site chosen is in the patients visual field and within the rectus muscle while avoiding the following: umbilicus, wrinkles, dips, skin folds, rib cage, iliac crest and belt/pants/underwear line. Patient is small framed along with hunched posture. Site was assessed in the lying, sitting and standing positions. Abdominal topography unremarkable ( 2 abdominal creases near umbilicus). Patient understands that the final location will be determined by the surgeon and the site is only a guideline. Pt concerns discussed: none at this time, anxious to make a phone call. Plan to follow after surgery for teaching. Will likely need home health care for further teaching after discharge.     Mian Butler RN, BSN  Wound Care Department  office: [phone]  Pager [phone]

## 2020-09-15 NOTE — PROGRESS NOTES
Colorectal Surgery Note    After reviewing the CT scan again this morning with two radiologists, I have new concerns about involvement of the uterus and the right ureter. It appears that a hysterectomy might be needed along wioth the colon resection. It also appears likely that resection of a segment of the right ureter could be required. I have explained to the patient that I think we should postpone the operation and obtain Gyn-Onc and Urology consultations. In the meantime, she may continue to consume a clear liquid diet but I will request PICC placement and order TPN. I will reschedule the operation for Thursday (9/17/2020), but that too could of course change.

## 2020-09-15 NOTE — PROCEDURES
PICC Placement Note    PRE-PROCEDURE VERIFICATION  Correct Procedure: yes  Correct Site:  yes  Temperature: Temp: 97.8 °F (36.6 °C), Temperature Source: Temp Source: Oral  Recent Labs     09/15/20  0641   BUN 6   CREA 0.43*      WBC 14.7*     Allergies: Patient has no known allergies. Education materials, including PICC Booklet, for PICC Care given to patient: yes. See Patient Education activity for further details. PROCEDURE DETAIL  A triple lumen PICC line was started for vascular access. The following documentation is in addition to the PICC properties in the lines/airways flowsheet :  Lot #: [id number]  Was xylocaine 1% used intradermally:  yes  Catheter Length: 38 (cm)  External Length: 0 (cm)  Arm Circumference: 22 (cm)  Vein Selection for PICC:right basilic  Central Line Bundle followed yes  Complication Related to Insertion: none    The placement was verified by VPS tip trakcer: The  tip location is in the superior vena cava. See ECG results for PICC tip placement. Report given to nurse, Tray Parks is okay to use.     Lyudmila Alcaraz RN

## 2020-09-15 NOTE — PROGRESS NOTES
Gastroenterology Progress Note    9/15/2020    Admit Date: 9/11/2020    Subjective:     She has no complaints today after colonoscopy yesterday. She had a little more diarrhea afterwards, but denies blood in stool or abdominal pain. Current Facility-Administered Medications   Medication Dose Route Frequency    potassium phosphate 30 mmol in 0.9% sodium chloride 250 mL infusion   IntraVENous ONCE    potassium chloride 10 mEq in 50 ml IVPB  10 mEq IntraVENous Q1H    TPN ADULT - CENTRAL   IntraVENous CONTINUOUS    insulin lispro (HUMALOG) injection   SubCUTAneous Q6H    glucose chewable tablet 16 g  4 Tab Oral PRN    glucagon (GLUCAGEN) injection 1 mg  1 mg IntraMUSCular PRN    dextrose 10% infusion 0-250 mL  0-250 mL IntraVENous PRN    iron sucrose (VENOFER) 200 mg in 0.9% sodium chloride 100 mL IVPB  200 mg IntraVENous Q24H    [Held by provider] enoxaparin (LOVENOX) injection 40 mg  40 mg SubCUTAneous DAILY    pantoprazole (PROTONIX) tablet 40 mg  40 mg Oral ACB&D    sodium chloride (NS) flush 5-40 mL  5-40 mL IntraVENous Q8H    sodium chloride (NS) flush 5-40 mL  5-40 mL IntraVENous PRN    acetaminophen (TYLENOL) tablet 650 mg  650 mg Oral Q6H PRN    Or    acetaminophen (TYLENOL) suppository 650 mg  650 mg Rectal Q6H PRN    polyethylene glycol (MIRALAX) packet 17 g  17 g Oral DAILY PRN    promethazine (PHENERGAN) tablet 12.5 mg  12.5 mg Oral Q6H PRN    Or    ondansetron (ZOFRAN) injection 4 mg  4 mg IntraVENous Q6H PRN    cefTRIAXone (ROCEPHIN) 1 g in 0.9% sodium chloride (MBP/ADV) 50 mL  1 g IntraVENous Q24H        Objective:     Blood pressure (!) 107/58, pulse 90, temperature 97.8 °F (36.6 °C), resp. rate 18, height 5' 6\" (1.676 m), weight 54.4 kg (120 lb), SpO2 99 %, not currently breastfeeding. Physical Examination:     Gen:       AAO x 3,  HEENT: NC, AT  GI:          soft w/+BS.   Psych:   No anxiety or agitation    Data Review    Recent Results (from the past 24 hour(s)) CBC WITH AUTOMATED DIFF    Collection Time: 09/15/20  6:41 AM   Result Value Ref Range    WBC 14.7 (H) 3.6 - 11.0 K/uL    RBC 3.83 3.80 - 5.20 M/uL    HGB 7.6 (L) 11.5 - 16.0 g/dL    HCT 25.2 (L) 35.0 - 47.0 %    MCV 65.8 (L) 80.0 - 99.0 FL    MCH 19.8 (L) 26.0 - 34.0 PG    MCHC 30.2 30.0 - 36.5 g/dL    RDW 17.3 (H) 11.5 - 14.5 %    PLATELET 788 596 - 993 K/uL    MPV 10.0 8.9 - 12.9 FL    NRBC 0.0 0  WBC    ABSOLUTE NRBC 0.00 0.00 - 0.01 K/uL    NEUTROPHILS 84 (H) 32 - 75 %    LYMPHOCYTES 8 (L) 12 - 49 %    MONOCYTES 7 5 - 13 %    EOSINOPHILS 0 0 - 7 %    BASOPHILS 0 0 - 1 %    IMMATURE GRANULOCYTES 1 (H) 0.0 - 0.5 %    ABS. NEUTROPHILS 12.4 (H) 1.8 - 8.0 K/UL    ABS. LYMPHOCYTES 1.2 0.8 - 3.5 K/UL    ABS. MONOCYTES 1.0 0.0 - 1.0 K/UL    ABS. EOSINOPHILS 0.0 0.0 - 0.4 K/UL    ABS. BASOPHILS 0.0 0.0 - 0.1 K/UL    ABS. IMM. GRANS. 0.1 (H) 0.00 - 0.04 K/UL    DF SMEAR SCANNED      RBC COMMENTS ANISOCYTOSIS  1+        RBC COMMENTS MICROCYTOSIS  2+        RBC COMMENTS HYPOCHROMIA  3+        RBC COMMENTS MARILEE CELLS  PRESENT        RBC COMMENTS OVALOCYTES  PRESENT       METABOLIC PANEL, COMPREHENSIVE    Collection Time: 09/15/20  6:41 AM   Result Value Ref Range    Sodium 131 (L) 136 - 145 mmol/L    Potassium 2.7 (LL) 3.5 - 5.1 mmol/L    Chloride 101 97 - 108 mmol/L    CO2 22 21 - 32 mmol/L    Anion gap 8 5 - 15 mmol/L    Glucose 96 65 - 100 mg/dL    BUN 6 6 - 20 MG/DL    Creatinine 0.43 (L) 0.55 - 1.02 MG/DL    BUN/Creatinine ratio 14 12 - 20      GFR est AA >60 >60 ml/min/1.73m2    GFR est non-AA >60 >60 ml/min/1.73m2    Calcium 7.2 (L) 8.5 - 10.1 MG/DL    Bilirubin, total 0.3 0.2 - 1.0 MG/DL    ALT (SGPT) 16 12 - 78 U/L    AST (SGOT) 20 15 - 37 U/L    Alk.  phosphatase 84 45 - 117 U/L    Protein, total 5.4 (L) 6.4 - 8.2 g/dL    Albumin 2.1 (L) 3.5 - 5.0 g/dL    Globulin 3.3 2.0 - 4.0 g/dL    A-G Ratio 0.6 (L) 1.1 - 2.2     MAGNESIUM    Collection Time: 09/15/20  6:41 AM   Result Value Ref Range    Magnesium 1.6 1.6 - 2.4 mg/dL   PHOSPHORUS    Collection Time: 09/15/20  6:41 AM   Result Value Ref Range    Phosphorus 1.4 (L) 2.6 - 4.7 MG/DL   TYPE & SCREEN    Collection Time: 09/15/20  6:41 AM   Result Value Ref Range    Crossmatch Expiration 09/18/2020     ABO/Rh(D) B POSITIVE     Antibody screen NEG      Recent Labs     09/15/20  0641 09/14/20  0526   WBC 14.7* 28.1*   HGB 7.6* 8.3*   HCT 25.2* 26.5*    582*     Recent Labs     09/15/20  0641 09/14/20  0526 09/13/20  0600   * 133* 133*   K 2.7* 3.4* 4.2    101 104   CO2 22 24 20*   BUN 6 15 12   CREA 0.43* 0.89 0.73   GLU 96 113* 114*   CA 7.2* 7.8* 8.6   MG 1.6  --   --    PHOS 1.4*  --   --      Recent Labs     09/15/20  0641   AP 84   TP 5.4*   ALB 2.1*   GLOB 3.3       Lab Results   Component Value Date/Time    Color YELLOW/STRAW 09/11/2020 10:57 AM    Appearance TURBID (A) 09/11/2020 10:57 AM    Specific gravity 1.027 09/11/2020 10:57 AM    pH (UA) 7.0 09/11/2020 10:57 AM    Protein 100 (A) 09/11/2020 10:57 AM    Glucose Negative 09/11/2020 10:57 AM    Ketone 15 (A) 09/11/2020 10:57 AM    Bilirubin Negative 09/11/2020 10:57 AM    Urobilinogen 1.0 09/11/2020 10:57 AM    Nitrites Negative 09/11/2020 10:57 AM    Leukocyte Esterase TRACE (A) 09/11/2020 10:57 AM    Epithelial cells FEW 09/11/2020 10:57 AM    Bacteria Negative 09/11/2020 10:57 AM    WBC 0-4 09/11/2020 10:57 AM    RBC 0-5 09/11/2020 10:57 AM          Assessment / Plan:    Malignant neoplasm of sigmoid colon / Pelvic mass  - Abdomen/pelvic CT scan 9/10/2020 - large, irregular pelvic mass measuring a 9 cm likely representing neoplasm arising from the sigmoid colon. Adnexal neoplasm is a possibility.   - Colonoscopy 9/14/20 - A large circumferential, ulcerated fungating mass was noted in proximal sigmoid colon. Mass was obstructing the lumen and could not be traversed. [place] was present.   Path pending  - Dr. Angela Patterson for surgery - After reviewing the CT scan with two radiologists, I have new concerns about involvement of the uterus and the right ureter. It appears that a hysterectomy might be needed along wioth the colon resection. It also appears likely that resection of a segment of the right ureter could be required     I have examined the patient. I have reviewed the chart and agree with the documentation recorded by the NP, including the assessment, treatment plan, and disposition. ASSESSMENT AND PLAN:  Malignant mass of sigmoid colon with possible ureter, uterine involvement, surgery planned for later this week. Surgical management per Dr. Jaime Potts recommendations.       Lelo Salinas MD

## 2020-09-16 ENCOUNTER — ANESTHESIA EVENT (OUTPATIENT)
Dept: SURGERY | Age: 70
DRG: 329 | End: 2020-09-16
Payer: MEDICARE

## 2020-09-16 PROBLEM — E43 SEVERE PROTEIN-CALORIE MALNUTRITION (HCC): Chronic | Status: ACTIVE | Noted: 2020-09-16

## 2020-09-16 PROBLEM — D64.9 ANEMIA: Chronic | Status: ACTIVE | Noted: 2020-09-16

## 2020-09-16 LAB
ALBUMIN SERPL-MCNC: 2.1 G/DL (ref 3.5–5)
ALBUMIN/GLOB SERPL: 0.6 {RATIO} (ref 1.1–2.2)
ALP SERPL-CCNC: 76 U/L (ref 45–117)
ALT SERPL-CCNC: 15 U/L (ref 12–78)
ANION GAP SERPL CALC-SCNC: 6 MMOL/L (ref 5–15)
AST SERPL-CCNC: 17 U/L (ref 15–37)
BASOPHILS # BLD: 0 K/UL (ref 0–0.1)
BASOPHILS NFR BLD: 0 % (ref 0–1)
BILIRUB SERPL-MCNC: 0.2 MG/DL (ref 0.2–1)
BUN SERPL-MCNC: 4 MG/DL (ref 6–20)
BUN/CREAT SERPL: 7 (ref 12–20)
CALCIUM SERPL-MCNC: 7.8 MG/DL (ref 8.5–10.1)
CHLORIDE SERPL-SCNC: 106 MMOL/L (ref 97–108)
CO2 SERPL-SCNC: 24 MMOL/L (ref 21–32)
CREAT SERPL-MCNC: 0.54 MG/DL (ref 0.55–1.02)
DIFFERENTIAL METHOD BLD: ABNORMAL
EOSINOPHIL # BLD: 0.1 K/UL (ref 0–0.4)
EOSINOPHIL NFR BLD: 1 % (ref 0–7)
ERYTHROCYTE [DISTWIDTH] IN BLOOD BY AUTOMATED COUNT: 17.5 % (ref 11.5–14.5)
GLOBULIN SER CALC-MCNC: 3.8 G/DL (ref 2–4)
GLUCOSE BLD STRIP.AUTO-MCNC: 119 MG/DL (ref 65–100)
GLUCOSE BLD STRIP.AUTO-MCNC: 126 MG/DL (ref 65–100)
GLUCOSE BLD STRIP.AUTO-MCNC: 142 MG/DL (ref 65–100)
GLUCOSE BLD STRIP.AUTO-MCNC: 152 MG/DL (ref 65–100)
GLUCOSE BLD STRIP.AUTO-MCNC: 92 MG/DL (ref 65–100)
GLUCOSE SERPL-MCNC: 165 MG/DL (ref 65–100)
HCT VFR BLD AUTO: 27.5 % (ref 35–47)
HGB BLD-MCNC: 8.2 G/DL (ref 11.5–16)
IMM GRANULOCYTES # BLD AUTO: 0.1 K/UL (ref 0–0.04)
IMM GRANULOCYTES NFR BLD AUTO: 1 % (ref 0–0.5)
LYMPHOCYTES # BLD: 1 K/UL (ref 0.8–3.5)
LYMPHOCYTES NFR BLD: 9 % (ref 12–49)
MAGNESIUM SERPL-MCNC: 1.6 MG/DL (ref 1.6–2.4)
MCH RBC QN AUTO: 19.9 PG (ref 26–34)
MCHC RBC AUTO-ENTMCNC: 29.8 G/DL (ref 30–36.5)
MCV RBC AUTO: 66.6 FL (ref 80–99)
MONOCYTES # BLD: 0.5 K/UL (ref 0–1)
MONOCYTES NFR BLD: 4 % (ref 5–13)
NEUTS SEG # BLD: 9.8 K/UL (ref 1.8–8)
NEUTS SEG NFR BLD: 85 % (ref 32–75)
NRBC # BLD: 0 K/UL (ref 0–0.01)
NRBC BLD-RTO: 0 PER 100 WBC
PHOSPHATE SERPL-MCNC: 1.7 MG/DL (ref 2.6–4.7)
PLATELET # BLD AUTO: 259 K/UL (ref 150–400)
PMV BLD AUTO: 9.8 FL (ref 8.9–12.9)
POTASSIUM SERPL-SCNC: 3 MMOL/L (ref 3.5–5.1)
PROT SERPL-MCNC: 5.9 G/DL (ref 6.4–8.2)
RBC # BLD AUTO: 4.13 M/UL (ref 3.8–5.2)
RBC MORPH BLD: ABNORMAL
RBC MORPH BLD: ABNORMAL
SERVICE CMNT-IMP: ABNORMAL
SERVICE CMNT-IMP: NORMAL
SODIUM SERPL-SCNC: 136 MMOL/L (ref 136–145)
WBC # BLD AUTO: 11.5 K/UL (ref 3.6–11)

## 2020-09-16 PROCEDURE — 74011000250 HC RX REV CODE- 250: Performed by: COLON & RECTAL SURGERY

## 2020-09-16 PROCEDURE — 74011250637 HC RX REV CODE- 250/637: Performed by: COLON & RECTAL SURGERY

## 2020-09-16 PROCEDURE — 84100 ASSAY OF PHOSPHORUS: CPT

## 2020-09-16 PROCEDURE — 74011250636 HC RX REV CODE- 250/636: Performed by: INTERNAL MEDICINE

## 2020-09-16 PROCEDURE — 85025 COMPLETE CBC W/AUTO DIFF WBC: CPT

## 2020-09-16 PROCEDURE — 65410000002 HC RM PRIVATE OB

## 2020-09-16 PROCEDURE — 74011250636 HC RX REV CODE- 250/636: Performed by: COLON & RECTAL SURGERY

## 2020-09-16 PROCEDURE — 83735 ASSAY OF MAGNESIUM: CPT

## 2020-09-16 PROCEDURE — 74011000258 HC RX REV CODE- 258: Performed by: INTERNAL MEDICINE

## 2020-09-16 PROCEDURE — 80053 COMPREHEN METABOLIC PANEL: CPT

## 2020-09-16 PROCEDURE — 74011250637 HC RX REV CODE- 250/637: Performed by: HOSPITALIST

## 2020-09-16 PROCEDURE — 82962 GLUCOSE BLOOD TEST: CPT

## 2020-09-16 PROCEDURE — 74011250636 HC RX REV CODE- 250/636: Performed by: HOSPITALIST

## 2020-09-16 PROCEDURE — 74011000258 HC RX REV CODE- 258: Performed by: COLON & RECTAL SURGERY

## 2020-09-16 PROCEDURE — 36415 COLL VENOUS BLD VENIPUNCTURE: CPT

## 2020-09-16 RX ORDER — NEOMYCIN SULFATE 500 MG/1
500 TABLET ORAL ONCE
Status: COMPLETED | OUTPATIENT
Start: 2020-09-17 | End: 2020-09-17

## 2020-09-16 RX ORDER — POTASSIUM CHLORIDE 14.9 MG/ML
10 INJECTION INTRAVENOUS
Status: COMPLETED | OUTPATIENT
Start: 2020-09-16 | End: 2020-09-16

## 2020-09-16 RX ORDER — NEOMYCIN SULFATE 500 MG/1
500 TABLET ORAL ONCE
Status: COMPLETED | OUTPATIENT
Start: 2020-09-16 | End: 2020-09-16

## 2020-09-16 RX ORDER — METRONIDAZOLE 250 MG/1
500 TABLET ORAL ONCE
Status: COMPLETED | OUTPATIENT
Start: 2020-09-16 | End: 2020-09-16

## 2020-09-16 RX ORDER — METRONIDAZOLE 250 MG/1
500 TABLET ORAL ONCE
Status: COMPLETED | OUTPATIENT
Start: 2020-09-17 | End: 2020-09-17

## 2020-09-16 RX ADMIN — METRONIDAZOLE 500 MG: 250 TABLET ORAL at 15:57

## 2020-09-16 RX ADMIN — POTASSIUM CHLORIDE 10 MEQ: 14.9 INJECTION, SOLUTION INTRAVENOUS at 15:57

## 2020-09-16 RX ADMIN — METRONIDAZOLE 500 MG: 250 TABLET ORAL at 16:58

## 2020-09-16 RX ADMIN — POTASSIUM CHLORIDE 10 MEQ: 14.9 INJECTION, SOLUTION INTRAVENOUS at 13:32

## 2020-09-16 RX ADMIN — NEOMYCIN SULFATE 500 MG: 500 TABLET ORAL at 18:19

## 2020-09-16 RX ADMIN — Medication 10 ML: at 06:39

## 2020-09-16 RX ADMIN — NEOMYCIN SULFATE 500 MG: 500 TABLET ORAL at 15:00

## 2020-09-16 RX ADMIN — PANTOPRAZOLE SODIUM 40 MG: 40 TABLET, DELAYED RELEASE ORAL at 06:39

## 2020-09-16 RX ADMIN — POTASSIUM CHLORIDE 10 MEQ: 14.9 INJECTION, SOLUTION INTRAVENOUS at 14:41

## 2020-09-16 RX ADMIN — POTASSIUM CHLORIDE 10 MEQ: 14.9 INJECTION, SOLUTION INTRAVENOUS at 16:58

## 2020-09-16 RX ADMIN — I.V. FAT EMULSION 250 ML: 20 EMULSION INTRAVENOUS at 19:11

## 2020-09-16 RX ADMIN — PANTOPRAZOLE SODIUM 40 MG: 40 TABLET, DELAYED RELEASE ORAL at 15:57

## 2020-09-16 RX ADMIN — SODIUM CHLORIDE: 900 INJECTION, SOLUTION INTRAVENOUS at 11:43

## 2020-09-16 RX ADMIN — IRON SUCROSE 200 MG: 20 INJECTION, SOLUTION INTRAVENOUS at 11:43

## 2020-09-16 RX ADMIN — ASCORBIC ACID: 500 INJECTION, SOLUTION INTRAMUSCULAR; INTRAVENOUS; SUBCUTANEOUS at 19:11

## 2020-09-16 RX ADMIN — Medication 10 ML: at 14:00

## 2020-09-16 RX ADMIN — POTASSIUM CHLORIDE 10 MEQ: 14.9 INJECTION, SOLUTION INTRAVENOUS at 12:26

## 2020-09-16 NOTE — PROGRESS NOTES
Urology    Originally consulted on Friday, 09/11 for a pelvic mass resulting in right hydronephrosis. S/p R ureteral stent placement on 09/12/2020 with Dr. Yaritza Sanches. Per op note findings Urethra was normal.  Bladder neck normal.  Trigone normal.  There were no urothelial lesions. Saba Dart was external compression on the dome of the bladder.  The right ureteral orifice was cannulated with TigerTail and contrast was injected.     [facility] was reconsulted yesterday for reevaluation for a possible ureteral resection. Per colorectal, there are new concerns about involvement of the uterus and the right ureter.  It appears that a hysterectomy, colon resection, and resection of a segment of the right ureter could be required.     Per gyn/onc, patient appears to have an advanced colorectal cancer arising within the sigmoid colon, possibly with adnexal, or even uterine, involvement. CT of the chest to rule out distant mets.  We will also check a CA-125. Per Dr. Jayson Franklin, urology needed tomorrow for a a left ureteral catheter placement at the beginning and later a urethral resection if needed     afvss  WBC: 11.5  Hgb: 8.2  Cr: 0.54  CA-125: 11  UA: wnl  +rocephin     CT abd/pelv w:  KIDNEYS/URETERS: Symmetric nephrograms with a couple low-density left renal  lesions too small to characterize. No evidence for enhancing renal mass. Moderate right hydronephrosis and hydroureter down into the pelvis. Minimal  prominence of the left collecting system. PERITONEUM: Borderline enlarged aortocaval and periaortic retroperitoneal lymph  nodes (series 3, image 46) of uncertain significance. No definite abdominal  lymphadenopathy. No ascites. COLON: Large irregular mass in the lower pelvis which may be arising from the  sigmoid colon measuring roughly 9.0 x 8.1 cm. There is adjacent moderate  irregular colon wall thickening. This appears to be separate from the uterus  most apparent on the sagittal view.  There is focal gas in the presacral region  measuring 4.8 x 2.6 cm (series 3, image 64) which is unclear if this is  intraluminal or contained extraluminal collection.     IMPRESSION:  Large, irregular pelvic mass measuring approximately 9 cm likely representing  neoplasm arising from the sigmoid colon. Adnexal neoplasm is a possibility. This  does appear to be separate from the uterus. Small amount of pelvic free fluid. Associated colonic bowel wall thickening. There is focal gas in the upper  presacral region which is unclear if this is intraluminal or contained  extraluminal collection.     No definite distant metastatic disease. Borderline enlarged retroperitoneal  lymph nodes. 6 mm right lower lobe lung nodule. Mass effect from the pelvic mass  causing moderate right and minimal left hydronephrosis.     CT chest w:  IMPRESSION:     1. 7.5 mm left lower lobe pulmonary nodule. 2. Small right pleural effusion. 3. Mild right basilar atelectasis. PLAN:     pod4 R ureteral stent placement   B/L hydronephrosis, R>L  Pelvic mass  -  left ureteral catheter placement and later a right urethral resection if needed scheduled for tomorrow, 09/17/2020 at 12:30 with Dr. Amilcar Reyes. Urology will be working alongside Colorectal and Gyn/Onc for a sigmoid colon resection and possible hysterectomy. NPO after MN.  Please obtain consent     Case discussed with Dr. Haney Slice

## 2020-09-16 NOTE — PROGRESS NOTES
[address] [facility].  [address] [address] [address], [address]  [phone]                     GI PROGRESS NOTE    Patient Name: Mavis Ray      : 1950      MRN: [id number]  Admit Date: 2020  Today's Date: 2020    Subjective:     Chart review done today. Colorectal, Gyn onc and Nephrology on board. Objective:     Blood pressure (!) 145/83, pulse 89, temperature 97.8 °F (36.6 °C), resp. rate 16, height 5' 6\" (1.676 m), weight 54.1 kg (119 lb 3.2 oz), SpO2 99 %, not currently breastfeeding. Data Review:    Recent Results (from the past 24 hour(s))   GLUCOSE, POC    Collection Time: 20 12:27 AM   Result Value Ref Range    Glucose (POC) 126 (H) 65 - 100 mg/dL    Performed by Garland Sumner    GLUCOSE, POC    Collection Time: 20  6:42 AM   Result Value Ref Range    Glucose (POC) 92 65 - 100 mg/dL    Performed by Maria M Desouza    CBC WITH AUTOMATED DIFF    Collection Time: 20  8:42 AM   Result Value Ref Range    WBC 11.5 (H) 3.6 - 11.0 K/uL    RBC 4.13 3.80 - 5.20 M/uL    HGB 8.2 (L) 11.5 - 16.0 g/dL    HCT 27.5 (L) 35.0 - 47.0 %    MCV 66.6 (L) 80.0 - 99.0 FL    MCH 19.9 (L) 26.0 - 34.0 PG    MCHC 29.8 (L) 30.0 - 36.5 g/dL    RDW 17.5 (H) 11.5 - 14.5 %    PLATELET 674 095 - 880 K/uL    MPV 9.8 8.9 - 12.9 FL    NRBC 0.0 0  WBC    ABSOLUTE NRBC 0.00 0.00 - 0.01 K/uL    NEUTROPHILS 85 (H) 32 - 75 %    LYMPHOCYTES 9 (L) 12 - 49 %    MONOCYTES 4 (L) 5 - 13 %    EOSINOPHILS 1 0 - 7 %    BASOPHILS 0 0 - 1 %    IMMATURE GRANULOCYTES 1 (H) 0.0 - 0.5 %    ABS. NEUTROPHILS 9.8 (H) 1.8 - 8.0 K/UL    ABS. LYMPHOCYTES 1.0 0.8 - 3.5 K/UL    ABS. MONOCYTES 0.5 0.0 - 1.0 K/UL    ABS. EOSINOPHILS 0.1 0.0 - 0.4 K/UL    ABS. BASOPHILS 0.0 0.0 - 0.1 K/UL    ABS. IMM.  GRANS. 0.1 (H) 0.00 - 0.04 K/UL    DF SMEAR SCANNED      RBC COMMENTS ANISOCYTOSIS  1+        RBC COMMENTS OVALOCYTES  PRESENT       METABOLIC PANEL, COMPREHENSIVE    Collection Time: 20  8:42 AM Result Value Ref Range    Sodium 136 136 - 145 mmol/L    Potassium 3.0 (L) 3.5 - 5.1 mmol/L    Chloride 106 97 - 108 mmol/L    CO2 24 21 - 32 mmol/L    Anion gap 6 5 - 15 mmol/L    Glucose 165 (H) 65 - 100 mg/dL    BUN 4 (L) 6 - 20 MG/DL    Creatinine 0.54 (L) 0.55 - 1.02 MG/DL    BUN/Creatinine ratio 7 (L) 12 - 20      GFR est AA >60 >60 ml/min/1.73m2    GFR est non-AA >60 >60 ml/min/1.73m2    Calcium 7.8 (L) 8.5 - 10.1 MG/DL    Bilirubin, total 0.2 0.2 - 1.0 MG/DL    ALT (SGPT) 15 12 - 78 U/L    AST (SGOT) 17 15 - 37 U/L    Alk. phosphatase 76 45 - 117 U/L    Protein, total 5.9 (L) 6.4 - 8.2 g/dL    Albumin 2.1 (L) 3.5 - 5.0 g/dL    Globulin 3.8 2.0 - 4.0 g/dL    A-G Ratio 0.6 (L) 1.1 - 2.2     MAGNESIUM    Collection Time: 09/16/20  8:42 AM   Result Value Ref Range    Magnesium 1.6 1.6 - 2.4 mg/dL   PHOSPHORUS    Collection Time: 09/16/20  8:42 AM   Result Value Ref Range    Phosphorus 1.7 (L) 2.6 - 4.7 MG/DL   GLUCOSE, POC    Collection Time: 09/16/20 12:02 PM   Result Value Ref Range    Glucose (POC) 119 (H) 65 - 100 mg/dL    Performed by Coty Degroot          Assessment     Malignant neoplasm of sigmoid colon / Pelvic mass with possible adnexal and ureteral involvement    - Abdomen/pelvic CT scan 9/10/2020 - large, irregular pelvic mass measuring a 9 cm likely representing neoplasm arising from the sigmoid colon. Adnexal neoplasm is a possibility.   - Colonoscopy 9/14/20 - A large circumferential, ulcerated fungating mass was noted in proximal sigmoid colon. Mass was obstructing the lumen and could not be traversed. Na Durant was present.   Path pending  - Dr. Rachel Gallo for surgery - After reviewing the CT scan with two radiologists, I have new concerns about involvement of the uterus and the right ureter.  It appears that a hysterectomy might be needed along wioth the colon resection.  It also appears likely that resection of a segment of the right ureter could be required  - Nephrology and Gyn Onc on board as well    At this time, GI is signing off. Please call us if needed again during this admission.

## 2020-09-16 NOTE — PROGRESS NOTES
Bedside and Verbal shift change report given to VANDANA Donald RN (oncoming nurse) by Deangelo Cortez RN (offgoing nurse). Report included the following information SBAR, Kardex, Procedure Summary, Intake/Output, MAR, Accordion and Recent Results.

## 2020-09-16 NOTE — PROGRESS NOTES
Gynecologic Oncology - [address], [address] [facility]  P [phone]  F [phone]       Patient: Pancho Robertr  Admit Date: 9/11/2020  Admit Dx: Pelvic mass [R19.00]  Hydronephrosis [N13.30]    Subjective:     No pain, N/V. tolerating liquid diet    Objective:     Date 09/15/20 0700 - 09/16/20 0659 09/16/20 0700 - 09/17/20 0659   Shift 8111-0100 5168-9741 24 Hour Total 5314-7734 2148-7969 24 Hour Total   INTAKE   P.O. 240 240 480 600  600     P. O. 240 240 480 600  600   I. V.(mL/kg/hr)  65.8(0.1) 65.8(0.1)        Volume (TPN ADULT - CENTRAL)  65.8 65.8      Shift Total(mL/kg) 240(4.4) 305.8(5.7) 545. 8(10.1) 600(11.1)  600(11.1)   OUTPUT   Urine(mL/kg/hr)           Urine Occurrence(s) 3 x 1 x 4 x 1 x  1 x   Stool           Stool Occurrence(s) 2 x  2 x      Shift Total(mL/kg)          305.8 545.8 600  600   Weight (kg) 54.4 54.1 54.1 54.1 54.1 54.1       Physical Exam  /77 (BP 1 Location: Left arm, BP Patient Position: At rest)   Pulse 72   Temp 98.3 °F (36.8 °C)   Resp 16   Ht 5' 6\" (1.676 m)   Wt 119 lb 3.2 oz (54.1 kg)   SpO2 98%   Breastfeeding No   BMI 19.24 kg/m²      General:  alert, cooperative, no distress     Cardiac:  Regular rate and rhythm        Lungs:  CTA  Abdomen:  soft, protuberant, nondistended, nontender   Extremity: no edema    Wt Readings from Last 3 Encounters:   09/16/20 119 lb 3.2 oz (54.1 kg)   02/13/20 138 lb (62.6 kg)   03/13/17 144 lb (65.3 kg)         Data Review      Recent Labs     09/16/20  0842 09/15/20  0641 09/14/20  0526   WBC 11.5* 14.7* 28.1*   ANEU 9.8* 12.4*  --    HGB 8.2* 7.6* 8.3*   HCT 27.5* 25.2* 26.5*    244 582*     Recent Labs     09/16/20  0842 09/15/20  0641 09/14/20  0526    131* 133*   K 3.0* 2.7* 3.4*    101 101   * 96 113*   BUN 4* 6 15   CREA 0.54* 0.43* 0.89   CA 7.8* 7.2* 7.8*   MG 1.6 1.6  --    PHOS 1.7* 1.4*  --    ALB 2.1* 2.1*  --    TBILI 0.2 0.3  --    ALT 15 16 --        CEA (ng/mL)   Date Value   09/11/2020 7.4      CA-125 (U/mL)   Date Value   09/15/2020 11         9/10/20  INDICATION: Left lower abdominal pain     COMPARISON: None     TECHNIQUE:   Thin axial images were obtained through the abdomen and pelvis following  intravenous iodinated contrast administration. Coronal and sagittal  reconstructions were generated. Oral contrast was administered. CT dose  reduction was achieved through use of a standardized protocol tailored for this  examination and automatic exposure control for dose modulation.      FINDINGS:      LIVER: No mass or biliary dilatation. GALLBLADDER: No calcified gallstone  SPLEEN: Unremarkable  PANCREAS: No mass or ductal dilatation. ADRENALS: Unremarkable. KIDNEYS/URETERS: Symmetric nephrograms with a couple low-density left renal  lesions too small to characterize. No evidence for enhancing renal mass. Moderate right hydronephrosis and hydroureter down into the pelvis. Minimal  prominence of the left collecting system. PERITONEUM: Borderline enlarged aortocaval and periaortic retroperitoneal lymph  nodes (series 3, image 46) of uncertain significance. No definite abdominal  lymphadenopathy. No ascites. COLON: Large irregular mass in the lower pelvis which may be arising from the  sigmoid colon measuring roughly 9.0 x 8.1 cm. There is adjacent moderate  irregular colon wall thickening. This appears to be separate from the uterus  most apparent on the sagittal view. There is focal gas in the presacral region  measuring 4.8 x 2.6 cm (series 3, image 64) which is unclear if this is  intraluminal or contained extraluminal collection. APPENDIX: Not clearly seen  SMALL BOWEL: No dilatation or wall thickening. STOMACH: Unremarkable. PELVIS: No pelvic lymphadenopathy. Small amount of pelvic free fluid. BONES: No destructive bone lesion.    VISUALIZED THORAX: Stable 6 mm right lower lobe lung nodule (series 3, image 11)  ADDITIONAL COMMENTS: N/A     IMPRESSION  IMPRESSION:     Large, irregular pelvic mass measuring approximately 9 cm likely representing  neoplasm arising from the sigmoid colon. Adnexal neoplasm is a possibility. This  does appear to be separate from the uterus. Small amount of pelvic free fluid. Associated colonic bowel wall thickening. There is focal gas in the upper  presacral region which is unclear if this is intraluminal or contained  extraluminal collection.     No definite distant metastatic disease. Borderline enlarged retroperitoneal  lymph nodes. 6 mm right lower lobe lung nodule. Mass effect from the pelvic mass  causing moderate right and minimal left hydronephrosis. CT Results (most recent):  Results from Hospital Encounter encounter on 09/11/20   CT CHEST W CONT    Narrative INDICATION: extent of disease, new pelvic/colon mass. Cancer unknown primary    COMPARISON: None    TECHNIQUE:  Following the uneventful intravenous administration of 100 cc  Isovue-300, 5 mm axial images were obtained through the thorax. Coronal and  sagittal reformats were generated. CT dose reduction was achieved through use  of a standardized protocol tailored for this examination and automatic exposure  control for dose modulation. FINDINGS:    CHEST WALL: No mass or axillary lymphadenopathy. THYROID: No nodule. MEDIASTINUM: No mass or lymphadenopathy. MARYURI: No mass or lymphadenopathy. THORACIC AORTA: No dissection or aneurysm. MAIN PULMONARY ARTERY: Normal in caliber. TRACHEA/BRONCHI: Patent. ESOPHAGUS: No wall thickening or dilatation. HEART: Normal in size. PLEURA: Small right pleural effusion. Mild right basilar atelectasis. LUNGS: 7.5 mm left lower lobe pulmonary nodule. BONES: Dextroscoliosis. Impression IMPRESSION:    1. 7.5 mm left lower lobe pulmonary nodule. 2. Small right pleural effusion. 3. Mild right basilar atelectasis.        Path:  colonosocopy 9/14    FINAL PATHOLOGIC DIAGNOSIS   Sigmoid mass, biopsy: Adenocarcinoma, invasive (See comment)   Comment   Immunohistochemical stains to further classify this malignancy are in progress, and results will be issued in an addendum. Assessment/Plan:     Admitted for Pelvic mass [R19.00]  Hydronephrosis [N13.30]    Active Hospital Problems    Diagnosis Date Noted    Malignant neoplasm of sigmoid colon ([facility] [address] 09/14/2020    Hydronephrosis 09/11/2020    Pelvic mass 09/11/2020   Anemia    Mendy Mercerec 2 Days Post-Op Procedure(s):  COLONOSCOPY  COLON BIOPSY for 1. Sigmoid Mass       -Metastatic adenocarcinoma likely colon, possibly gynecologic origin  -Anemia: d/t above. Fe infusion. T/S for OR  -Right hydro s/p stent. Urology consulted for PRN debulking/ureteral resection/repair  -Hypokalemia: supplementing.   -Likely moderate prot calorie malnutrition: On TPN per primary team  -Leukocytosis: reactive vs infectious, no micro to suggest infection. On abx  Disposition: Plan to OR Thursday afternoon, discussed surgery and associated risks, consented for [facility], tumor debulking and staging. Colorectal lead for anticipated colon resection and urology consultation with possible urologic resection/repair.       Lamine Taylor PA-C

## 2020-09-16 NOTE — PROGRESS NOTES
Hospitalist Progress Note  Uma Hylton MD  Answering service: [phone] OR [phone] from in house phone      Date of Service:  2020  NAME:  Mehdi Chanel  :  1950  MRN:  [id number]    Admission Summary:   70F p/w abdominal  pain    Interval history / Subjective:     Patient seen and examined at bedside electrolytes being repleted patient is scheduled for surgery tomorrow she will have left ureteral catheter placement and later a right urethral resection if needed scheduled for tomorrow, 2020 at 12:30 with Dr. Ashley Medley. Urology will be working alongside Colorectal and Gyn/Onc for a sigmoid colon resection and possible hysterectomy. Assessment & Plan:     # Large pelvic mass: suspect colonic Ca--colon 2020 pending. Now getting evaluated by GI OB/GYN and urology  -Surgical plan as above    # Microcytic Anemia: likely GI blood loss- 2nd to above. Monitor HGB. - clear liquid diet, n.p.o. midnight  - GI/Colorectal surgery following   - Iron supplement IV Venofer    # R side moderate hydroUreteroNephrosis. Mild L side: 2nd to above- Urology cs pending.   - Doing well post JJ stent placement  -Further plan as above for surgery tomorrow      #. Leucocytosis: no clears signs of infections. Urine culture pending. Empiric Abx trending down  #. GERD: chronic, Stable, home regimen  #.  HypoNatremia: mild, monitor  # Severe hypokalemia and hypophosphatemia replete IV  #  TPN - per surgery    COVID negative     Code status: Full  DVT prophylaxis: lovenox  Care Plan discussed with: Patient/Family and Nurse  Disposition: TBD     Hospital Problems  Date Reviewed: 2020          Codes Class Noted POA    * (Principal) Malignant neoplasm of sigmoid colon ([facility] [address] (Chronic) ICD-10-CM: C18.7  ICD-9-CM: 153.3  2020 Yes        Hydronephrosis ICD-10-CM: N13.30  ICD-9-CM: 700  2020 Unknown        Pelvic mass ICD-10-CM: R19.00  ICD-9-CM: 789.30 9/11/2020 Unknown            Review of Systems:   Pertinent items are mentioned in interval history. Xr Retrograde Pyelogram    Result Date: 9/12/2020  IMPRESSION: 1. INTERPRETATION PROVIDED FOR COMPLIANCE ONLY AT NO CHARGE     Ct Chest W Cont    Result Date: 9/15/2020  IMPRESSION: 1. 7.5 mm left lower lobe pulmonary nodule. 2. Small right pleural effusion. 3. Mild right basilar atelectasis. Xr Chest Port    Result Date: 9/11/2020  IMPRESSION: No acute process. Vital Signs:    Last 24hrs VS reviewed since prior progress note. Most recent are:  Visit Vitals  /77 (BP 1 Location: Left arm, BP Patient Position: At rest)   Pulse 72   Temp 98.3 °F (36.8 °C)   Resp 16   Ht 5' 6\" (1.676 m)   Wt 54.1 kg (119 lb 3.2 oz)   SpO2 98%   Breastfeeding No   BMI 19.24 kg/m²         Intake/Output Summary (Last 24 hours) at 9/16/2020 1103  Last data filed at 9/16/2020 0802  Gross per 24 hour   Intake 905.8 ml   Output --   Net 905.8 ml        Physical Examination:   General:  Alert, oriented, No acute distress, slim  Resp:  No accessory muscle use, Good AE, no wheezes, no rhonchi  Abd:  Soft, non-tender, non-distended, BS+  Extremities:  No cyanosis or clubbing, no significant edema  Neuro:  Grossly normal, no focal neuro deficits, follows commands   Psych:  Good insight, not agitated.     Data Review:    Review and/or order of clinical lab test  Review and/or order of tests in the radiology section of CPT  Review and/or order of tests in the medicine section of CPT  Labs:     Recent Labs     09/16/20  0842 09/15/20  0641   WBC 11.5* 14.7*   HGB 8.2* 7.6*   HCT 27.5* 25.2*    244     Recent Labs     09/16/20  0842 09/15/20  0641 09/14/20  0526    131* 133*   K 3.0* 2.7* 3.4*    101 101   CO2 24 22 24   BUN 4* 6 15   CREA 0.54* 0.43* 0.89   * 96 113*   CA 7.8* 7.2* 7.8*   MG 1.6 1.6  --    PHOS 1.7* 1.4*  --      Recent Labs     09/16/20  0842 09/15/20  0641   ALT 15 16   AP 76 84   TBILI 0.2 0.3   TP 5.9* 5.4*   ALB 2.1* 2.1*   GLOB 3.8 3.3     No results for input(s): INR, PTP, APTT, INREXT, INREXT in the last 72 hours. No results for input(s): FE, TIBC, PSAT, FERR in the last 72 hours. No results found for: FOL, RBCF   No results for input(s): PH, PCO2, PO2 in the last 72 hours. No results for input(s): CPK, CKNDX, TROIQ in the last 72 hours.     No lab exists for component: CPKMB  No results found for: CHOL, CHOLX, CHLST, CHOLV, HDL, HDLP, LDL, LDLC, DLDLP, TGLX, TRIGL, TRIGP, CHHD, CHHDX  Lab Results   Component Value Date/Time    Glucose (POC) 92 09/16/2020 06:42 AM    Glucose (POC) 126 (H) 09/16/2020 12:27 AM     Lab Results   Component Value Date/Time    Color YELLOW/STRAW 09/11/2020 10:57 AM    Appearance TURBID (A) 09/11/2020 10:57 AM    Specific gravity 1.027 09/11/2020 10:57 AM    pH (UA) 7.0 09/11/2020 10:57 AM    Protein 100 (A) 09/11/2020 10:57 AM    Glucose Negative 09/11/2020 10:57 AM    Ketone 15 (A) 09/11/2020 10:57 AM    Bilirubin Negative 09/11/2020 10:57 AM    Urobilinogen 1.0 09/11/2020 10:57 AM    Nitrites Negative 09/11/2020 10:57 AM    Leukocyte Esterase TRACE (A) 09/11/2020 10:57 AM    Epithelial cells FEW 09/11/2020 10:57 AM    Bacteria Negative 09/11/2020 10:57 AM    WBC 0-4 09/11/2020 10:57 AM    RBC 0-5 09/11/2020 10:57 AM     Medications Reviewed:     Current Facility-Administered Medications   Medication Dose Route Frequency    potassium phosphate 20 mmol in 0.9% sodium chloride 250 mL infusion   IntraVENous ONCE    TPN ADULT - CENTRAL   IntraVENous CONTINUOUS    fat emulsion 20% (LIPOSYN, INTRAlipid) infusion 250 mL  250 mL IntraVENous Q MON, WED & FRI    potassium chloride 10 mEq in 50 ml IVPB  10 mEq IntraVENous Q1H    TPN ADULT - CENTRAL   IntraVENous CONTINUOUS    insulin lispro (HUMALOG) injection   SubCUTAneous Q6H    glucose chewable tablet 16 g  4 Tab Oral PRN    glucagon (GLUCAGEN) injection 1 mg  1 mg IntraMUSCular PRN    dextrose 10% infusion 0-250 mL  0-250 mL IntraVENous PRN    iron sucrose (VENOFER) 200 mg in 0.9% sodium chloride 100 mL IVPB  200 mg IntraVENous Q24H    [Held by provider] enoxaparin (LOVENOX) injection 40 mg  40 mg SubCUTAneous DAILY    pantoprazole (PROTONIX) tablet 40 mg  40 mg Oral ACB&D    sodium chloride (NS) flush 5-40 mL  5-40 mL IntraVENous Q8H    sodium chloride (NS) flush 5-40 mL  5-40 mL IntraVENous PRN    acetaminophen (TYLENOL) tablet 650 mg  650 mg Oral Q6H PRN    Or    acetaminophen (TYLENOL) suppository 650 mg  650 mg Rectal Q6H PRN    polyethylene glycol (MIRALAX) packet 17 g  17 g Oral DAILY PRN    promethazine (PHENERGAN) tablet 12.5 mg  12.5 mg Oral Q6H PRN    Or    ondansetron (ZOFRAN) injection 4 mg  4 mg IntraVENous Q6H PRN   ______________________________________________________________________  EXPECTED LENGTH OF STAY: 3d 14h  ACTUAL LENGTH OF STAY:          5               Alma Estrada MD

## 2020-09-16 NOTE — PROGRESS NOTES
General Daily Progress Note    Admission Date: 9/11/2020  Hospital Day 6  Post-Op Day Not Applicable  Subjective:   No pain. Tolerating clear liquid diet. Still passing liquid stool with some particles. Objective:     Patient Vitals for the past 24 hrs:   BP Temp Pulse Resp SpO2 Height Weight   09/16/20 1528 134/86 97.6 °F (36.4 °C) 88 16 99 % -- --   09/16/20 1236 (!) 145/83 97.8 °F (36.6 °C) 89 16 99 % -- --   09/16/20 0928 -- -- -- -- -- 5' 6\" (1.676 m) --   09/16/20 0802 129/77 98.3 °F (36.8 °C) 72 16 98 % -- --   09/16/20 0008 127/75 98 °F (36.7 °C) 92 16 96 % -- 54.1 kg (119 lb 3.2 oz)   09/15/20 2030 138/76 97.5 °F (36.4 °C) 90 16 99 % -- --   09/15/20 1614 127/77 98.6 °F (37 °C) 86 16 98 % -- --     09/16 0701 - 09/16 1900  In: 600 [P.O.:600]  Out: -   09/14 1901 - 09/16 0700  In: 545.8 [P.O.:480; I.V.:65.8]  Out: -       Physical Examination:  General Appearance - Pale. No distress. Abdomen - Soft. Somewhat distended. Mildly tender to palpation in the suprapubic area. Infraumbilical midline and transverse right lower quadrant scars. Bilateral ostomy site marks in place. Extremities - Right upper extremity PICC in place. No edema. Neuro - Alert and oriented.             Data Review   Recent Results (from the past 24 hour(s))   GLUCOSE, POC    Collection Time: 09/16/20 12:27 AM   Result Value Ref Range    Glucose (POC) 126 (H) 65 - 100 mg/dL    Performed by Garland Sumner    GLUCOSE, POC    Collection Time: 09/16/20  6:42 AM   Result Value Ref Range    Glucose (POC) 92 65 - 100 mg/dL    Performed by Maria M Desouza    CBC WITH AUTOMATED DIFF    Collection Time: 09/16/20  8:42 AM   Result Value Ref Range    WBC 11.5 (H) 3.6 - 11.0 K/uL    RBC 4.13 3.80 - 5.20 M/uL    HGB 8.2 (L) 11.5 - 16.0 g/dL    HCT 27.5 (L) 35.0 - 47.0 %    MCV 66.6 (L) 80.0 - 99.0 FL    MCH 19.9 (L) 26.0 - 34.0 PG    MCHC 29.8 (L) 30.0 - 36.5 g/dL    RDW 17.5 (H) 11.5 - 14.5 %    PLATELET 057 760 - 844 K/uL    MPV 9.8 8.9 - 12.9 FL    NRBC 0.0 0  WBC    ABSOLUTE NRBC 0.00 0.00 - 0.01 K/uL    NEUTROPHILS 85 (H) 32 - 75 %    LYMPHOCYTES 9 (L) 12 - 49 %    MONOCYTES 4 (L) 5 - 13 %    EOSINOPHILS 1 0 - 7 %    BASOPHILS 0 0 - 1 %    IMMATURE GRANULOCYTES 1 (H) 0.0 - 0.5 %    ABS. NEUTROPHILS 9.8 (H) 1.8 - 8.0 K/UL    ABS. LYMPHOCYTES 1.0 0.8 - 3.5 K/UL    ABS. MONOCYTES 0.5 0.0 - 1.0 K/UL    ABS. EOSINOPHILS 0.1 0.0 - 0.4 K/UL    ABS. BASOPHILS 0.0 0.0 - 0.1 K/UL    ABS. IMM. GRANS. 0.1 (H) 0.00 - 0.04 K/UL    DF SMEAR SCANNED      RBC COMMENTS ANISOCYTOSIS  1+        RBC COMMENTS OVALOCYTES  PRESENT       METABOLIC PANEL, COMPREHENSIVE    Collection Time: 09/16/20  8:42 AM   Result Value Ref Range    Sodium 136 136 - 145 mmol/L    Potassium 3.0 (L) 3.5 - 5.1 mmol/L    Chloride 106 97 - 108 mmol/L    CO2 24 21 - 32 mmol/L    Anion gap 6 5 - 15 mmol/L    Glucose 165 (H) 65 - 100 mg/dL    BUN 4 (L) 6 - 20 MG/DL    Creatinine 0.54 (L) 0.55 - 1.02 MG/DL    BUN/Creatinine ratio 7 (L) 12 - 20      GFR est AA >60 >60 ml/min/1.73m2    GFR est non-AA >60 >60 ml/min/1.73m2    Calcium 7.8 (L) 8.5 - 10.1 MG/DL    Bilirubin, total 0.2 0.2 - 1.0 MG/DL    ALT (SGPT) 15 12 - 78 U/L    AST (SGOT) 17 15 - 37 U/L    Alk. phosphatase 76 45 - 117 U/L    Protein, total 5.9 (L) 6.4 - 8.2 g/dL    Albumin 2.1 (L) 3.5 - 5.0 g/dL    Globulin 3.8 2.0 - 4.0 g/dL    A-G Ratio 0.6 (L) 1.1 - 2.2     MAGNESIUM    Collection Time: 09/16/20  8:42 AM   Result Value Ref Range    Magnesium 1.6 1.6 - 2.4 mg/dL   PHOSPHORUS    Collection Time: 09/16/20  8:42 AM   Result Value Ref Range    Phosphorus 1.7 (L) 2.6 - 4.7 MG/DL   GLUCOSE, POC    Collection Time: 09/16/20 12:02 PM   Result Value Ref Range    Glucose (POC) 119 (H) 65 - 100 mg/dL    Performed by [facility]      Chest CT (9/15/2020):  7.5 mm left lower lobe pulmonary nodule; small right pleural effusion; mild basilar atelectasis.         Assessment/Plan:     Principal Problem:    Malignant neoplasm of sigmoid colon ([address] (9/14/2020)    Active Problems:    Hydronephrosis (9/11/2020)      Pelvic mass (9/11/2020)      Severe protein-calorie malnutrition ([address] (9/16/2020)      Anemia (9/16/2020)      The patient remains stable. RUE PICC placement was performed on 9/15/2020 and TPN was begun. The electrolytes are being corrected. The patient has been seen by the Gynecologic-Oncology and Urology consultants, and we have coordinated to plan the operation for tomorrow. It will now consist of cystoscopy and placement of a left ureteral catheter, sigmoid colon resection and possible creation of a colostomy or an ileostomy, total abdominal hysterectomy and salpingo-oophorectomy, possible tumor debulking, possible staging procedures, and possible ureteral resection and reconstruction. The operation is scheduled to begin at 12:30 PM tomorrow and will be performed using an open technique. It will be an ERAS case; therefore epidural catheter placement of intraoperative and post-operative analgesia will be requested.

## 2020-09-16 NOTE — PROGRESS NOTES
Comprehensive Nutrition Assessment    Type and Reason for Visit: Initial    Nutrition Recommendations/Plan:     Continue thiamin in TPN x 10 days. Continue to monitor lytes for trends and replete prior to TPN advancements. Increase TPN gradually to goal: AA 5% D20 @ 70 ml/hr with 20%  ml x 3 /week = 1680 ml, 1692 total calories, 84 grams protein and GIR: 4.3 mgCHO/kg/min. Nutrition Assessment:      72year old admitted for abdominal pain with suspected colon cancer with concern for involvement of the uterus and the right ureter. Rt side moderate hydroureteronephrosis. Pt for surgical intervention tomorrow. TPN initiated at low rate. Agree with low volume d/t poor nutritional status upon admission. Potassium and phos remains depleted but trending upwards, corrected calcium 9.3 (wdl), sodium improved. Continue to adjust TPN towards nutritional needs. Agree with addition of thiamin and vit C. Pt noted to have 18# or 13% wt loss over the past 6 months reflecting severe wt change. Pt noted that appetite was good but just lost weight. Usually consumed 1-2 Premier Protein shakes weekly.          Malnutrition Assessment:  Malnutrition Status:  Severe malnutrition    Context:  Chronic illness     Findings of the 6 clinical characteristics of malnutrition:   Energy Intake:  7 - 75% or less est energy requirements for 1 month or longer  Weight Loss:  7.00 - Greater than 10% over 6 months     Body Fat Loss:  7 - Severe body fat loss, Fat overlying ribs, Triceps, Orbital   Muscle Mass Loss:  7 - Severe muscle mass loss, Clavicles (pectoralis &deltoids), Temples (temporalis), Hand (interosseous)   Strength:  Not performed       Estimated Daily Nutrient Needs:  Energy (kcal):  8795-2706 kcal/day ( 30-35 kcal/kg)  Protein (g):  65-70 gm/day (1.2-1.3 gm/kg)       Fluid (ml/day):  1 ml/kcal    Wounds:    None       Current Nutrition Therapies:   Current Parenteral Nutrition Orders:  · Type and Formula:   AA 5% D20 @ 42 ml/hr   · Lipids:  none  · Current PN Order Provides:   1000 ml, 880 total calories and 50 grams protein       Anthropometric Measures:  · Height:  5' 6\" (167.6 cm)  · Current Body Wt:  54.4 kg (119 lb 14.9 oz)     · Usual Body Wt:  136 lb     · Ideal Body Wt:  130 lbs:  92.3 %   · BMI Category:  Underweight (BMI less than 22) age over 72       Nutrition Diagnosis:   · Inadequate oral intake related to altered GI function as evidenced by nutrition support-parenteral nutrition      Nutrition Interventions:   Food and/or Nutrient Delivery: Continue current diet, Continue parenteral nutrition  Nutrition Education and Counseling: No recommendations at this time  Coordination of Nutrition Care: Continued inpatient monitoring    Goals:  Meet % nutritional needs via TPN x 5-7 days. Nutrition Monitoring and Evaluation:   Behavioral-Environmental Outcomes:    Food/Nutrient Intake Outcomes: Parenteral nutrition intake/tolerance  Physical Signs/Symptoms Outcomes: Biochemical data, GI status, Fluid status or edema, Hemodynamic status, Weight    Discharge Planning:     Too soon to determine     Electronically signed by Satya Linder RD on 9/16/2020 at 9:34 AM

## 2020-09-16 NOTE — PROGRESS NOTES
Nephrology Progress Note  Kelly Terry  Date of Admission : 9/11/2020    CC: Follow up for hyponatremia       Assessment and Plan     Hyponatremia :  - appears to be SIADH   - Na stable  - daily labs while here    Hypokalemia:  - adjust in TPN     R hydronephrosis   - Likely 2/2 extrinsic compression from sigmoid mass   - s/p stent placement on 9/12     Sigmoid mass w/ possible LN mets  With possible uterine and ureteral involvement  - colonoscopy with biopsy done 9/15  - plan to go to OR tomorrow     Microcytic anemia   GERD        Interval History:  Seen and examined. Na stable. K low. She feels ok. No cp, sob, n/v/d reported at this time    Current Medications: all current  Medications have been eviewed in EPIC  Review of Systems: Pertinent items are noted in HPI. Objective:  Vitals:    Vitals:    09/16/20 0008 09/16/20 0802 09/16/20 0928 09/16/20 1236   BP: 127/75 129/77  (!) 145/83   Pulse: 92 72  89   Resp: 16 16 16   Temp: 98 °F (36.7 °C) 98.3 °F (36.8 °C)  97.8 °F (36.6 °C)   SpO2: 96% 98%  99%   Weight: 54.1 kg (119 lb 3.2 oz)      Height:   5' 6\" (1.676 m)      Intake and Output:  09/16 0701 - 09/16 1900  In: 600 [P.O.:600]  Out: -   09/14 1901 - 09/16 0700  In: 545.8 [P.O.:480; I.V.:65.8]  Out: -     Physical Examination:  General: NAD,Conversant   Neck:  Supple, no mass  Resp:  Lungs CTA B/L, no wheezing , normal respiratory effort  CV:  RRR,  no murmur or rub, no LE edema  GI:  Soft, NT, + Bowel sounds, no hepatosplenomegaly  Neurologic:  Non focal  Psych:             AAO x 3 appropriate affect   Skin:  No Rash      []    High complexity decision making was performed  []    Patient is at high-risk of decompensation with multiple organ involvement    Lab Data Personally Reviewed: I have reviewed all the pertinent labs, microbiology data and radiology studies during assessment.     Recent Labs     09/16/20  0842 09/15/20  0641 09/14/20  0526    131* 133*   K 3.0* 2.7* 3.4*    101 101   CO2 24 22 24   * 96 113*   BUN 4* 6 15   CREA 0.54* 0.43* 0.89   CA 7.8* 7.2* 7.8*   MG 1.6 1.6  --    PHOS 1.7* 1.4*  --    ALB 2.1* 2.1*  --    ALT 15 16  --      Recent Labs     09/16/20  0842 09/15/20  0641 09/14/20  0526   WBC 11.5* 14.7* 28.1*   HGB 8.2* 7.6* 8.3*   HCT 27.5* 25.2* 26.5*    244 582*     No results found for: SDES  Lab Results   Component Value Date/Time    Culture result: NO GROWTH 4 DAYS 09/12/2020 11:51 AM    Culture result: MIXED UROGENITAL DILCIA ISOLATED 09/11/2020 10:57 AM     Recent Results (from the past 24 hour(s))   GLUCOSE, POC    Collection Time: 09/16/20 12:27 AM   Result Value Ref Range    Glucose (POC) 126 (H) 65 - 100 mg/dL    Performed by Serge Doss    GLUCOSE, POC    Collection Time: 09/16/20  6:42 AM   Result Value Ref Range    Glucose (POC) 92 65 - 100 mg/dL    Performed by Marjan Odonnell    CBC WITH AUTOMATED DIFF    Collection Time: 09/16/20  8:42 AM   Result Value Ref Range    WBC 11.5 (H) 3.6 - 11.0 K/uL    RBC 4.13 3.80 - 5.20 M/uL    HGB 8.2 (L) 11.5 - 16.0 g/dL    HCT 27.5 (L) 35.0 - 47.0 %    MCV 66.6 (L) 80.0 - 99.0 FL    MCH 19.9 (L) 26.0 - 34.0 PG    MCHC 29.8 (L) 30.0 - 36.5 g/dL    RDW 17.5 (H) 11.5 - 14.5 %    PLATELET 592 599 - 499 K/uL    MPV 9.8 8.9 - 12.9 FL    NRBC 0.0 0  WBC    ABSOLUTE NRBC 0.00 0.00 - 0.01 K/uL    NEUTROPHILS 85 (H) 32 - 75 %    LYMPHOCYTES 9 (L) 12 - 49 %    MONOCYTES 4 (L) 5 - 13 %    EOSINOPHILS 1 0 - 7 %    BASOPHILS 0 0 - 1 %    IMMATURE GRANULOCYTES 1 (H) 0.0 - 0.5 %    ABS. NEUTROPHILS 9.8 (H) 1.8 - 8.0 K/UL    ABS. LYMPHOCYTES 1.0 0.8 - 3.5 K/UL    ABS. MONOCYTES 0.5 0.0 - 1.0 K/UL    ABS. EOSINOPHILS 0.1 0.0 - 0.4 K/UL    ABS. BASOPHILS 0.0 0.0 - 0.1 K/UL    ABS. IMM.  GRANS. 0.1 (H) 0.00 - 0.04 K/UL    DF SMEAR SCANNED      RBC COMMENTS ANISOCYTOSIS  1+        RBC COMMENTS OVALOCYTES  PRESENT       METABOLIC PANEL, COMPREHENSIVE    Collection Time: 09/16/20  8:42 AM   Result Value Ref Range Sodium 136 136 - 145 mmol/L    Potassium 3.0 (L) 3.5 - 5.1 mmol/L    Chloride 106 97 - 108 mmol/L    CO2 24 21 - 32 mmol/L    Anion gap 6 5 - 15 mmol/L    Glucose 165 (H) 65 - 100 mg/dL    BUN 4 (L) 6 - 20 MG/DL    Creatinine 0.54 (L) 0.55 - 1.02 MG/DL    BUN/Creatinine ratio 7 (L) 12 - 20      GFR est AA >60 >60 ml/min/1.73m2    GFR est non-AA >60 >60 ml/min/1.73m2    Calcium 7.8 (L) 8.5 - 10.1 MG/DL    Bilirubin, total 0.2 0.2 - 1.0 MG/DL    ALT (SGPT) 15 12 - 78 U/L    AST (SGOT) 17 15 - 37 U/L    Alk. phosphatase 76 45 - 117 U/L    Protein, total 5.9 (L) 6.4 - 8.2 g/dL    Albumin 2.1 (L) 3.5 - 5.0 g/dL    Globulin 3.8 2.0 - 4.0 g/dL    A-G Ratio 0.6 (L) 1.1 - 2.2     MAGNESIUM    Collection Time: 09/16/20  8:42 AM   Result Value Ref Range    Magnesium 1.6 1.6 - 2.4 mg/dL   PHOSPHORUS    Collection Time: 09/16/20  8:42 AM   Result Value Ref Range    Phosphorus 1.7 (L) 2.6 - 4.7 MG/DL   GLUCOSE, POC    Collection Time: 09/16/20 12:02 PM   Result Value Ref Range    Glucose (POC) 119 (H) 65 - 100 mg/dL    Performed by Vita Fall MD  [address]  Phone - [phone]   Fax - [phone]  [email or web address]

## 2020-09-17 ENCOUNTER — ANESTHESIA (OUTPATIENT)
Dept: SURGERY | Age: 70
DRG: 329 | End: 2020-09-17
Payer: MEDICARE

## 2020-09-17 LAB
ALBUMIN SERPL-MCNC: 2.2 G/DL (ref 3.5–5)
ANION GAP SERPL CALC-SCNC: 4 MMOL/L (ref 5–15)
ANION GAP SERPL CALC-SCNC: 5 MMOL/L (ref 5–15)
BACTERIA SPEC CULT: NORMAL
BASOPHILS # BLD: 0.3 K/UL (ref 0–0.1)
BASOPHILS NFR BLD: 1 % (ref 0–1)
BASOPHILS NFR BLD: NORMAL % (ref 0–1)
BUN SERPL-MCNC: 12 MG/DL (ref 6–20)
BUN SERPL-MCNC: 6 MG/DL (ref 6–20)
BUN/CREAT SERPL: 13 (ref 12–20)
BUN/CREAT SERPL: 17 (ref 12–20)
CALCIUM SERPL-MCNC: 6 MG/DL (ref 8.5–10.1)
CALCIUM SERPL-MCNC: 8.1 MG/DL (ref 8.5–10.1)
CANCER AG19-9 SERPL-ACNC: 4 U/ML (ref 0–35)
CHLORIDE SERPL-SCNC: 106 MMOL/L (ref 97–108)
CHLORIDE SERPL-SCNC: 115 MMOL/L (ref 97–108)
CO2 SERPL-SCNC: 19 MMOL/L (ref 21–32)
CO2 SERPL-SCNC: 25 MMOL/L (ref 21–32)
CREAT SERPL-MCNC: 0.45 MG/DL (ref 0.55–1.02)
CREAT SERPL-MCNC: 0.71 MG/DL (ref 0.55–1.02)
DIFFERENTIAL METHOD BLD: ABNORMAL
EOSINOPHIL # BLD: 0 K/UL (ref 0–0.4)
EOSINOPHIL NFR BLD: 0 % (ref 0–7)
EOSINOPHIL NFR BLD: NORMAL % (ref 0–7)
ERYTHROCYTE [DISTWIDTH] IN BLOOD BY AUTOMATED COUNT: 24.6 % (ref 11.5–14.5)
ERYTHROCYTE [DISTWIDTH] IN BLOOD BY AUTOMATED COUNT: NORMAL % (ref 11.5–14.5)
GLUCOSE BLD STRIP.AUTO-MCNC: 121 MG/DL (ref 65–100)
GLUCOSE BLD STRIP.AUTO-MCNC: 282 MG/DL (ref 65–100)
GLUCOSE SERPL-MCNC: 120 MG/DL (ref 65–100)
GLUCOSE SERPL-MCNC: 272 MG/DL (ref 65–100)
HCT VFR BLD AUTO: 37.5 % (ref 35–47)
HCT VFR BLD AUTO: NORMAL % (ref 35–47)
HGB BLD-MCNC: 11.5 G/DL (ref 11.5–16)
HGB BLD-MCNC: NORMAL G/DL (ref 11.5–16)
HISTORY CHECKED?,CKHIST: NORMAL
IMM GRANULOCYTES # BLD AUTO: 0.6 K/UL (ref 0–0.04)
IMM GRANULOCYTES NFR BLD AUTO: 2 % (ref 0–0.5)
LYMPHOCYTES # BLD: 0.9 K/UL (ref 0.8–3.5)
LYMPHOCYTES NFR BLD: 3 % (ref 12–49)
LYMPHOCYTES NFR BLD: NORMAL % (ref 12–49)
MAGNESIUM SERPL-MCNC: 1.5 MG/DL (ref 1.6–2.4)
MAGNESIUM SERPL-MCNC: 1.7 MG/DL (ref 1.6–2.4)
MCH RBC QN AUTO: 23.5 PG (ref 26–34)
MCH RBC QN AUTO: NORMAL PG (ref 26–34)
MCHC RBC AUTO-ENTMCNC: 30.7 G/DL (ref 30–36.5)
MCHC RBC AUTO-ENTMCNC: NORMAL G/DL (ref 30–36.5)
MCV RBC AUTO: 76.7 FL (ref 80–99)
MCV RBC AUTO: NORMAL FL (ref 80–99)
MONOCYTES # BLD: 0.9 K/UL (ref 0–1)
MONOCYTES NFR BLD: 3 % (ref 5–13)
MONOCYTES NFR BLD: NORMAL % (ref 5–13)
NEUTS SEG # BLD: 26.8 K/UL (ref 1.8–8)
NEUTS SEG # BLD: NORMAL K/UL (ref 1.8–8)
NEUTS SEG NFR BLD: 91 % (ref 32–75)
NEUTS SEG NFR BLD: NORMAL % (ref 32–75)
NRBC # BLD: 0.03 K/UL (ref 0–0.01)
NRBC # BLD: NORMAL K/UL (ref 0–0.01)
NRBC BLD-RTO: 0.1 PER 100 WBC
NRBC BLD-RTO: NORMAL PER 100 WBC
PHOSPHATE SERPL-MCNC: 2.1 MG/DL (ref 2.6–4.7)
PHOSPHATE SERPL-MCNC: 2.6 MG/DL (ref 2.6–4.7)
PLATELET # BLD AUTO: 110 K/UL (ref 150–400)
PLATELET # BLD AUTO: NORMAL K/UL (ref 150–400)
PLATELET COMMENTS,PCOM: ABNORMAL
PMV BLD AUTO: 9.3 FL (ref 8.9–12.9)
PMV BLD AUTO: NORMAL FL (ref 8.9–12.9)
POTASSIUM SERPL-SCNC: 3.5 MMOL/L (ref 3.5–5.1)
POTASSIUM SERPL-SCNC: 3.9 MMOL/L (ref 3.5–5.1)
RBC # BLD AUTO: 4.89 M/UL (ref 3.8–5.2)
RBC # BLD AUTO: NORMAL M/UL (ref 3.8–5.2)
RBC MORPH BLD: ABNORMAL
SERVICE CMNT-IMP: ABNORMAL
SERVICE CMNT-IMP: ABNORMAL
SERVICE CMNT-IMP: NORMAL
SODIUM SERPL-SCNC: 136 MMOL/L (ref 136–145)
SODIUM SERPL-SCNC: 138 MMOL/L (ref 136–145)
WBC # BLD AUTO: 29.5 K/UL (ref 3.6–11)
WBC # BLD AUTO: NORMAL K/UL (ref 3.6–11)

## 2020-09-17 PROCEDURE — 74011250636 HC RX REV CODE- 250/636: Performed by: ANESTHESIOLOGY

## 2020-09-17 PROCEDURE — 80048 BASIC METABOLIC PNL TOTAL CA: CPT

## 2020-09-17 PROCEDURE — 77030008462 HC STPLR SKN PROX J&J -A: Performed by: COLON & RECTAL SURGERY

## 2020-09-17 PROCEDURE — 77030002966 HC SUT PDS J&J -A: Performed by: COLON & RECTAL SURGERY

## 2020-09-17 PROCEDURE — 74011636637 HC RX REV CODE- 636/637: Performed by: COLON & RECTAL SURGERY

## 2020-09-17 PROCEDURE — P9016 RBC LEUKOCYTES REDUCED: HCPCS

## 2020-09-17 PROCEDURE — 0UT10ZZ RESECTION OF LEFT OVARY, OPEN APPROACH: ICD-10-PCS | Performed by: OBSTETRICS & GYNECOLOGY

## 2020-09-17 PROCEDURE — 77030040923 HC STPLR ENDOSC ECHELON J&J -E: Performed by: COLON & RECTAL SURGERY

## 2020-09-17 PROCEDURE — 74011250637 HC RX REV CODE- 250/637: Performed by: COLON & RECTAL SURGERY

## 2020-09-17 PROCEDURE — 0DTN0ZZ RESECTION OF SIGMOID COLON, OPEN APPROACH: ICD-10-PCS | Performed by: COLON & RECTAL SURGERY

## 2020-09-17 PROCEDURE — 0T760DZ DILATION OF RIGHT URETER WITH INTRALUMINAL DEVICE, OPEN APPROACH: ICD-10-PCS | Performed by: UROLOGY

## 2020-09-17 PROCEDURE — 85018 HEMOGLOBIN: CPT

## 2020-09-17 PROCEDURE — C1765 ADHESION BARRIER: HCPCS | Performed by: COLON & RECTAL SURGERY

## 2020-09-17 PROCEDURE — 0T778DZ DILATION OF LEFT URETER WITH INTRALUMINAL DEVICE, VIA NATURAL OR ARTIFICIAL OPENING ENDOSCOPIC: ICD-10-PCS | Performed by: UROLOGY

## 2020-09-17 PROCEDURE — 74011000258 HC RX REV CODE- 258: Performed by: ANESTHESIOLOGY

## 2020-09-17 PROCEDURE — 76010000184 HC OR TIME 8.5 TO 9 HR INTENSV-TIER 1: Performed by: COLON & RECTAL SURGERY

## 2020-09-17 PROCEDURE — 84100 ASSAY OF PHOSPHORUS: CPT

## 2020-09-17 PROCEDURE — 30233N1 TRANSFUSION OF NONAUTOLOGOUS RED BLOOD CELLS INTO PERIPHERAL VEIN, PERCUTANEOUS APPROACH: ICD-10-PCS | Performed by: NURSE ANESTHETIST, CERTIFIED REGISTERED

## 2020-09-17 PROCEDURE — 00HU33Z INSERTION OF INFUSION DEVICE INTO SPINAL CANAL, PERCUTANEOUS APPROACH: ICD-10-PCS | Performed by: ANESTHESIOLOGY

## 2020-09-17 PROCEDURE — 74011000250 HC RX REV CODE- 250: Performed by: COLON & RECTAL SURGERY

## 2020-09-17 PROCEDURE — 77030002996 HC SUT SLK J&J -A: Performed by: COLON & RECTAL SURGERY

## 2020-09-17 PROCEDURE — 77030020061 HC IV BLD WRMR ADMIN SET 3M -B: Performed by: NURSE ANESTHETIST, CERTIFIED REGISTERED

## 2020-09-17 PROCEDURE — 77030005537 HC CATH URETH BARD -A: Performed by: COLON & RECTAL SURGERY

## 2020-09-17 PROCEDURE — C2617 STENT, NON-COR, TEM W/O DEL: HCPCS | Performed by: COLON & RECTAL SURGERY

## 2020-09-17 PROCEDURE — C1758 CATHETER, URETERAL: HCPCS | Performed by: COLON & RECTAL SURGERY

## 2020-09-17 PROCEDURE — P9045 ALBUMIN (HUMAN), 5%, 250 ML: HCPCS | Performed by: NURSE ANESTHETIST, CERTIFIED REGISTERED

## 2020-09-17 PROCEDURE — 0TP90DZ REMOVAL OF INTRALUMINAL DEVICE FROM URETER, OPEN APPROACH: ICD-10-PCS | Performed by: UROLOGY

## 2020-09-17 PROCEDURE — 74011000258 HC RX REV CODE- 258: Performed by: NURSE ANESTHETIST, CERTIFIED REGISTERED

## 2020-09-17 PROCEDURE — 77030019927 HC TBNG IRR CYSTO BAXT -A: Performed by: COLON & RECTAL SURGERY

## 2020-09-17 PROCEDURE — 77030040506 HC DRN WND MDII -A: Performed by: COLON & RECTAL SURGERY

## 2020-09-17 PROCEDURE — 0T160ZB BYPASS RIGHT URETER TO BLADDER, OPEN APPROACH: ICD-10-PCS | Performed by: UROLOGY

## 2020-09-17 PROCEDURE — 88360 TUMOR IMMUNOHISTOCHEM/MANUAL: CPT

## 2020-09-17 PROCEDURE — 74011000250 HC RX REV CODE- 250: Performed by: NURSE ANESTHETIST, CERTIFIED REGISTERED

## 2020-09-17 PROCEDURE — 65410000002 HC RM PRIVATE OB

## 2020-09-17 PROCEDURE — 77030008771 HC TU NG SALEM SUMP -A: Performed by: NURSE ANESTHETIST, CERTIFIED REGISTERED

## 2020-09-17 PROCEDURE — 77030040361 HC SLV COMPR DVT MDII -B: Performed by: COLON & RECTAL SURGERY

## 2020-09-17 PROCEDURE — 88307 TISSUE EXAM BY PATHOLOGIST: CPT

## 2020-09-17 PROCEDURE — 77030020263 HC SOL INJ SOD CL0.9% LFCR 1000ML: Performed by: COLON & RECTAL SURGERY

## 2020-09-17 PROCEDURE — 77030040831 HC BAG URINE DRNG MDII -A: Performed by: COLON & RECTAL SURGERY

## 2020-09-17 PROCEDURE — 74011000258 HC RX REV CODE- 258: Performed by: COLON & RECTAL SURGERY

## 2020-09-17 PROCEDURE — 2709999900 HC NON-CHARGEABLE SUPPLY: Performed by: COLON & RECTAL SURGERY

## 2020-09-17 PROCEDURE — 88309 TISSUE EXAM BY PATHOLOGIST: CPT

## 2020-09-17 PROCEDURE — 77030013079 HC BLNKT BAIR HGGR 3M -A: Performed by: NURSE ANESTHETIST, CERTIFIED REGISTERED

## 2020-09-17 PROCEDURE — 76060000048 HC ANESTHESIA 8.5 TO 9 HR: Performed by: COLON & RECTAL SURGERY

## 2020-09-17 PROCEDURE — 74011250636 HC RX REV CODE- 250/636: Performed by: INTERNAL MEDICINE

## 2020-09-17 PROCEDURE — 85025 COMPLETE CBC W/AUTO DIFF WBC: CPT

## 2020-09-17 PROCEDURE — A4300 CATH IMPL VASC ACCESS PORTAL: HCPCS

## 2020-09-17 PROCEDURE — 0D1B0Z4 BYPASS ILEUM TO CUTANEOUS, OPEN APPROACH: ICD-10-PCS | Performed by: COLON & RECTAL SURGERY

## 2020-09-17 PROCEDURE — 77030008684 HC TU ET CUF COVD -B: Performed by: NURSE ANESTHETIST, CERTIFIED REGISTERED

## 2020-09-17 PROCEDURE — 77030005513 HC CATH URETH FOL11 MDII -B: Performed by: COLON & RECTAL SURGERY

## 2020-09-17 PROCEDURE — 77030009962 HC RELD STPLR CR J&J -C: Performed by: COLON & RECTAL SURGERY

## 2020-09-17 PROCEDURE — 77030040922 HC BLNKT HYPOTHRM STRY -A

## 2020-09-17 PROCEDURE — 74011000250 HC RX REV CODE- 250: Performed by: ANESTHESIOLOGY

## 2020-09-17 PROCEDURE — 77030019702 HC WRP THER MENM -C: Performed by: COLON & RECTAL SURGERY

## 2020-09-17 PROCEDURE — 77030026438 HC STYL ET INTUB CARD -A: Performed by: NURSE ANESTHETIST, CERTIFIED REGISTERED

## 2020-09-17 PROCEDURE — 0UT60ZZ RESECTION OF LEFT FALLOPIAN TUBE, OPEN APPROACH: ICD-10-PCS | Performed by: OBSTETRICS & GYNECOLOGY

## 2020-09-17 PROCEDURE — 74011250637 HC RX REV CODE- 250/637: Performed by: HOSPITALIST

## 2020-09-17 PROCEDURE — 77030002916 HC SUT ETHLN J&J -A: Performed by: COLON & RECTAL SURGERY

## 2020-09-17 PROCEDURE — 77030005546 HC CATH URETH FOL 3W BARD -A: Performed by: COLON & RECTAL SURGERY

## 2020-09-17 PROCEDURE — 77030002986 HC SUT PROL J&J -A: Performed by: COLON & RECTAL SURGERY

## 2020-09-17 PROCEDURE — 83735 ASSAY OF MAGNESIUM: CPT

## 2020-09-17 PROCEDURE — P9045 ALBUMIN (HUMAN), 5%, 250 ML: HCPCS | Performed by: ANESTHESIOLOGY

## 2020-09-17 PROCEDURE — 0TB60ZZ EXCISION OF RIGHT URETER, OPEN APPROACH: ICD-10-PCS | Performed by: UROLOGY

## 2020-09-17 PROCEDURE — 82040 ASSAY OF SERUM ALBUMIN: CPT

## 2020-09-17 PROCEDURE — 88305 TISSUE EXAM BY PATHOLOGIST: CPT

## 2020-09-17 PROCEDURE — 77030034698 HC LIGASURE MRYLND OPN SEAL DIV COVD -F: Performed by: COLON & RECTAL SURGERY

## 2020-09-17 PROCEDURE — C1769 GUIDE WIRE: HCPCS | Performed by: COLON & RECTAL SURGERY

## 2020-09-17 PROCEDURE — 77030031139 HC SUT VCRL2 J&J -A: Performed by: COLON & RECTAL SURGERY

## 2020-09-17 PROCEDURE — 36415 COLL VENOUS BLD VENIPUNCTURE: CPT

## 2020-09-17 PROCEDURE — 77030011264 HC ELECTRD BLD EXT COVD -A: Performed by: COLON & RECTAL SURGERY

## 2020-09-17 PROCEDURE — 76210000004 HC OR PH I REC 4.5 TO 5 HR: Performed by: COLON & RECTAL SURGERY

## 2020-09-17 PROCEDURE — 77030016154: Performed by: COLON & RECTAL SURGERY

## 2020-09-17 PROCEDURE — 77030036731 HC STPLR ENDOSC J&J -F: Performed by: COLON & RECTAL SURGERY

## 2020-09-17 PROCEDURE — 82962 GLUCOSE BLOOD TEST: CPT

## 2020-09-17 PROCEDURE — 0UT90ZZ RESECTION OF UTERUS, OPEN APPROACH: ICD-10-PCS | Performed by: OBSTETRICS & GYNECOLOGY

## 2020-09-17 PROCEDURE — 0DBP0ZZ EXCISION OF RECTUM, OPEN APPROACH: ICD-10-PCS | Performed by: COLON & RECTAL SURGERY

## 2020-09-17 PROCEDURE — 74011250636 HC RX REV CODE- 250/636: Performed by: COLON & RECTAL SURGERY

## 2020-09-17 PROCEDURE — 74011250636 HC RX REV CODE- 250/636: Performed by: NURSE ANESTHETIST, CERTIFIED REGISTERED

## 2020-09-17 DEVICE — URETERAL STENT SET
Type: IMPLANTABLE DEVICE | Status: FUNCTIONAL
Brand: CONTOUR™

## 2020-09-17 RX ORDER — ACETAMINOPHEN 500 MG
1000 TABLET ORAL ONCE
Status: DISCONTINUED | OUTPATIENT
Start: 2020-09-17 | End: 2020-09-17 | Stop reason: HOSPADM

## 2020-09-17 RX ORDER — ONDANSETRON 2 MG/ML
4 INJECTION INTRAMUSCULAR; INTRAVENOUS AS NEEDED
Status: DISCONTINUED | OUTPATIENT
Start: 2020-09-17 | End: 2020-09-18 | Stop reason: HOSPADM

## 2020-09-17 RX ORDER — SODIUM CHLORIDE 9 MG/ML
75 INJECTION, SOLUTION INTRAVENOUS CONTINUOUS
Status: DISCONTINUED | OUTPATIENT
Start: 2020-09-17 | End: 2020-09-18

## 2020-09-17 RX ORDER — FENTANYL CITRATE 50 UG/ML
50 INJECTION, SOLUTION INTRAMUSCULAR; INTRAVENOUS
Status: DISCONTINUED | OUTPATIENT
Start: 2020-09-17 | End: 2020-09-17 | Stop reason: HOSPADM

## 2020-09-17 RX ORDER — PHENYLEPHRINE HCL IN 0.9% NACL 0.4MG/10ML
SYRINGE (ML) INTRAVENOUS AS NEEDED
Status: DISCONTINUED | OUTPATIENT
Start: 2020-09-17 | End: 2020-09-17 | Stop reason: HOSPADM

## 2020-09-17 RX ORDER — SODIUM CHLORIDE 9 MG/ML
250 INJECTION, SOLUTION INTRAVENOUS AS NEEDED
Status: DISCONTINUED | OUTPATIENT
Start: 2020-09-17 | End: 2020-09-18

## 2020-09-17 RX ORDER — LIDOCAINE HYDROCHLORIDE 20 MG/ML
INJECTION, SOLUTION EPIDURAL; INFILTRATION; INTRACAUDAL; PERINEURAL AS NEEDED
Status: DISCONTINUED | OUTPATIENT
Start: 2020-09-17 | End: 2020-09-17 | Stop reason: HOSPADM

## 2020-09-17 RX ORDER — ALBUMIN HUMAN 50 G/1000ML
25 SOLUTION INTRAVENOUS ONCE
Status: COMPLETED | OUTPATIENT
Start: 2020-09-18 | End: 2020-09-17

## 2020-09-17 RX ORDER — DEXAMETHASONE SODIUM PHOSPHATE 4 MG/ML
INJECTION, SOLUTION INTRA-ARTICULAR; INTRALESIONAL; INTRAMUSCULAR; INTRAVENOUS; SOFT TISSUE AS NEEDED
Status: DISCONTINUED | OUTPATIENT
Start: 2020-09-17 | End: 2020-09-17 | Stop reason: HOSPADM

## 2020-09-17 RX ORDER — MIDAZOLAM HYDROCHLORIDE 1 MG/ML
1 INJECTION, SOLUTION INTRAMUSCULAR; INTRAVENOUS AS NEEDED
Status: DISCONTINUED | OUTPATIENT
Start: 2020-09-17 | End: 2020-09-17 | Stop reason: HOSPADM

## 2020-09-17 RX ORDER — CELECOXIB 200 MG/1
200 CAPSULE ORAL ONCE
Status: DISCONTINUED | OUTPATIENT
Start: 2020-09-17 | End: 2020-09-17 | Stop reason: HOSPADM

## 2020-09-17 RX ORDER — MAGNESIUM SULFATE HEPTAHYDRATE 40 MG/ML
INJECTION, SOLUTION INTRAVENOUS AS NEEDED
Status: DISCONTINUED | OUTPATIENT
Start: 2020-09-17 | End: 2020-09-17 | Stop reason: HOSPADM

## 2020-09-17 RX ORDER — SODIUM CHLORIDE, SODIUM LACTATE, POTASSIUM CHLORIDE, CALCIUM CHLORIDE 600; 310; 30; 20 MG/100ML; MG/100ML; MG/100ML; MG/100ML
INJECTION, SOLUTION INTRAVENOUS
Status: DISCONTINUED | OUTPATIENT
Start: 2020-09-17 | End: 2020-09-17 | Stop reason: HOSPADM

## 2020-09-17 RX ORDER — ROCURONIUM BROMIDE 10 MG/ML
INJECTION, SOLUTION INTRAVENOUS AS NEEDED
Status: DISCONTINUED | OUTPATIENT
Start: 2020-09-17 | End: 2020-09-17 | Stop reason: HOSPADM

## 2020-09-17 RX ORDER — SUCCINYLCHOLINE CHLORIDE 20 MG/ML
INJECTION INTRAMUSCULAR; INTRAVENOUS AS NEEDED
Status: DISCONTINUED | OUTPATIENT
Start: 2020-09-17 | End: 2020-09-17 | Stop reason: HOSPADM

## 2020-09-17 RX ORDER — BUPIVACAINE HYDROCHLORIDE 5 MG/ML
INJECTION, SOLUTION EPIDURAL; INTRACAUDAL
Status: COMPLETED | OUTPATIENT
Start: 2020-09-17 | End: 2020-09-17

## 2020-09-17 RX ORDER — PROPOFOL 10 MG/ML
INJECTION, EMULSION INTRAVENOUS AS NEEDED
Status: DISCONTINUED | OUTPATIENT
Start: 2020-09-17 | End: 2020-09-17 | Stop reason: HOSPADM

## 2020-09-17 RX ORDER — FENTANYL CITRATE 50 UG/ML
25 INJECTION, SOLUTION INTRAMUSCULAR; INTRAVENOUS
Status: DISCONTINUED | OUTPATIENT
Start: 2020-09-17 | End: 2020-09-18 | Stop reason: HOSPADM

## 2020-09-17 RX ORDER — SODIUM CHLORIDE, SODIUM LACTATE, POTASSIUM CHLORIDE, CALCIUM CHLORIDE 600; 310; 30; 20 MG/100ML; MG/100ML; MG/100ML; MG/100ML
75 INJECTION, SOLUTION INTRAVENOUS CONTINUOUS
Status: DISCONTINUED | OUTPATIENT
Start: 2020-09-17 | End: 2020-09-17 | Stop reason: HOSPADM

## 2020-09-17 RX ORDER — ONDANSETRON 2 MG/ML
INJECTION INTRAMUSCULAR; INTRAVENOUS AS NEEDED
Status: DISCONTINUED | OUTPATIENT
Start: 2020-09-17 | End: 2020-09-17 | Stop reason: HOSPADM

## 2020-09-17 RX ORDER — GABAPENTIN 300 MG/1
600 CAPSULE ORAL ONCE
Status: DISCONTINUED | OUTPATIENT
Start: 2020-09-17 | End: 2020-09-17 | Stop reason: HOSPADM

## 2020-09-17 RX ORDER — CALCIUM CHLORIDE INJECTION 100 MG/ML
1.5 INJECTION, SOLUTION INTRAVENOUS
Status: DISCONTINUED | OUTPATIENT
Start: 2020-09-17 | End: 2020-09-17 | Stop reason: CLARIF

## 2020-09-17 RX ORDER — ALVIMOPAN 12 MG/1
12 CAPSULE ORAL ONCE
Status: COMPLETED | OUTPATIENT
Start: 2020-09-17 | End: 2020-09-17

## 2020-09-17 RX ORDER — FENTANYL CITRATE 50 UG/ML
INJECTION, SOLUTION INTRAMUSCULAR; INTRAVENOUS AS NEEDED
Status: DISCONTINUED | OUTPATIENT
Start: 2020-09-17 | End: 2020-09-17 | Stop reason: HOSPADM

## 2020-09-17 RX ORDER — BUPIVACAINE HYDROCHLORIDE AND EPINEPHRINE 5; 5 MG/ML; UG/ML
30 INJECTION, SOLUTION EPIDURAL; INTRACAUDAL; PERINEURAL ONCE
Status: COMPLETED | OUTPATIENT
Start: 2020-09-17 | End: 2020-09-17

## 2020-09-17 RX ORDER — ALBUMIN HUMAN 50 G/1000ML
SOLUTION INTRAVENOUS AS NEEDED
Status: DISCONTINUED | OUTPATIENT
Start: 2020-09-17 | End: 2020-09-17 | Stop reason: HOSPADM

## 2020-09-17 RX ORDER — SODIUM CHLORIDE 9 MG/ML
INJECTION, SOLUTION INTRAVENOUS
Status: DISCONTINUED | OUTPATIENT
Start: 2020-09-17 | End: 2020-09-17 | Stop reason: HOSPADM

## 2020-09-17 RX ORDER — KETAMINE HYDROCHLORIDE 10 MG/ML
INJECTION, SOLUTION INTRAMUSCULAR; INTRAVENOUS AS NEEDED
Status: DISCONTINUED | OUTPATIENT
Start: 2020-09-17 | End: 2020-09-17 | Stop reason: HOSPADM

## 2020-09-17 RX ADMIN — Medication 80 MCG: at 17:28

## 2020-09-17 RX ADMIN — ALVIMOPAN 12 MG: 12 CAPSULE ORAL at 11:35

## 2020-09-17 RX ADMIN — Medication 80 MCG: at 12:55

## 2020-09-17 RX ADMIN — DEXAMETHASONE SODIUM PHOSPHATE 8 MG: 4 INJECTION, SOLUTION INTRAMUSCULAR; INTRAVENOUS at 12:59

## 2020-09-17 RX ADMIN — PROPOFOL 100 MG: 10 INJECTION, EMULSION INTRAVENOUS at 12:53

## 2020-09-17 RX ADMIN — ROCURONIUM BROMIDE 10 MG: 10 SOLUTION INTRAVENOUS at 18:45

## 2020-09-17 RX ADMIN — SUGAMMADEX 104 MG: 100 INJECTION, SOLUTION INTRAVENOUS at 20:30

## 2020-09-17 RX ADMIN — Medication 80 MCG: at 20:12

## 2020-09-17 RX ADMIN — SODIUM CHLORIDE: 900 INJECTION, SOLUTION INTRAVENOUS at 12:50

## 2020-09-17 RX ADMIN — BUPIVACAINE HYDROCHLORIDE 5 ML/HR: 5 INJECTION, SOLUTION EPIDURAL; INTRACAUDAL at 21:58

## 2020-09-17 RX ADMIN — KETAMINE HYDROCHLORIDE 30 MG: 10 INJECTION, SOLUTION INTRAMUSCULAR; INTRAVENOUS at 12:53

## 2020-09-17 RX ADMIN — FENTANYL CITRATE 50 MCG: 50 INJECTION, SOLUTION INTRAMUSCULAR; INTRAVENOUS at 12:53

## 2020-09-17 RX ADMIN — ROCURONIUM BROMIDE 10 MG: 10 SOLUTION INTRAVENOUS at 19:16

## 2020-09-17 RX ADMIN — FENTANYL CITRATE 50 MCG: 50 INJECTION, SOLUTION INTRAMUSCULAR; INTRAVENOUS at 12:00

## 2020-09-17 RX ADMIN — BUPIVACAINE HYDROCHLORIDE 6 ML/HR: 5 INJECTION, SOLUTION EPIDURAL; INTRACAUDAL at 13:30

## 2020-09-17 RX ADMIN — ALBUMIN (HUMAN) 25 G: 12.5 INJECTION, SOLUTION INTRAVENOUS at 23:27

## 2020-09-17 RX ADMIN — CALCIUM CHLORIDE 1.5 G: 100 INJECTION INTRAVENOUS; INTRAVENTRICULAR at 23:03

## 2020-09-17 RX ADMIN — ALBUMIN (HUMAN) 250 ML: 12.5 INJECTION, SOLUTION INTRAVENOUS at 13:35

## 2020-09-17 RX ADMIN — ROCURONIUM BROMIDE 10 MG: 10 SOLUTION INTRAVENOUS at 12:53

## 2020-09-17 RX ADMIN — SODIUM CHLORIDE, POTASSIUM CHLORIDE, SODIUM LACTATE AND CALCIUM CHLORIDE: 600; 310; 30; 20 INJECTION, SOLUTION INTRAVENOUS at 12:50

## 2020-09-17 RX ADMIN — Medication 80 MCG: at 19:49

## 2020-09-17 RX ADMIN — ONDANSETRON HYDROCHLORIDE 4 MG: 2 INJECTION, SOLUTION INTRAMUSCULAR; INTRAVENOUS at 20:30

## 2020-09-17 RX ADMIN — PANTOPRAZOLE SODIUM 40 MG: 40 TABLET, DELAYED RELEASE ORAL at 06:53

## 2020-09-17 RX ADMIN — SODIUM CHLORIDE: 900 INJECTION, SOLUTION INTRAVENOUS at 19:36

## 2020-09-17 RX ADMIN — NEOMYCIN SULFATE 500 MG: 500 TABLET ORAL at 01:07

## 2020-09-17 RX ADMIN — PHENYLEPHRINE HYDROCHLORIDE 40 MCG/MIN: 10 INJECTION INTRAVENOUS at 13:00

## 2020-09-17 RX ADMIN — FENTANYL CITRATE 50 MCG: 50 INJECTION, SOLUTION INTRAMUSCULAR; INTRAVENOUS at 13:59

## 2020-09-17 RX ADMIN — SUCCINYLCHOLINE CHLORIDE 140 MG: 20 INJECTION, SOLUTION INTRAMUSCULAR; INTRAVENOUS at 12:54

## 2020-09-17 RX ADMIN — KETAMINE HYDROCHLORIDE 20 MG: 10 INJECTION, SOLUTION INTRAMUSCULAR; INTRAVENOUS at 13:10

## 2020-09-17 RX ADMIN — CEFOTETAN DISODIUM 2 G: 2 INJECTION, POWDER, FOR SOLUTION INTRAMUSCULAR; INTRAVENOUS at 13:20

## 2020-09-17 RX ADMIN — ALBUMIN (HUMAN) 250 ML: 12.5 INJECTION, SOLUTION INTRAVENOUS at 15:22

## 2020-09-17 RX ADMIN — ROCURONIUM BROMIDE 20 MG: 10 SOLUTION INTRAVENOUS at 15:30

## 2020-09-17 RX ADMIN — METRONIDAZOLE 500 MG: 250 TABLET ORAL at 01:07

## 2020-09-17 RX ADMIN — PHENYLEPHRINE HYDROCHLORIDE 60 MCG/MIN: 10 INJECTION INTRAVENOUS at 21:30

## 2020-09-17 RX ADMIN — ROCURONIUM BROMIDE 40 MG: 10 SOLUTION INTRAVENOUS at 13:10

## 2020-09-17 RX ADMIN — MAGNESIUM SULFATE 2 G: 2 INJECTION INTRAVENOUS at 13:05

## 2020-09-17 RX ADMIN — DEXMEDETOMIDINE HYDROCHLORIDE 6 MCG: 100 INJECTION, SOLUTION, CONCENTRATE INTRAVENOUS at 20:03

## 2020-09-17 RX ADMIN — DEXMEDETOMIDINE HYDROCHLORIDE 4 MCG: 100 INJECTION, SOLUTION, CONCENTRATE INTRAVENOUS at 21:22

## 2020-09-17 RX ADMIN — ONDANSETRON HYDROCHLORIDE 4 MG: 2 INJECTION, SOLUTION INTRAMUSCULAR; INTRAVENOUS at 13:45

## 2020-09-17 RX ADMIN — Medication 80 MCG: at 18:10

## 2020-09-17 RX ADMIN — INSULIN LISPRO 2 UNITS: 100 INJECTION, SOLUTION INTRAVENOUS; SUBCUTANEOUS at 23:43

## 2020-09-17 RX ADMIN — DEXMEDETOMIDINE HYDROCHLORIDE 10 MCG: 100 INJECTION, SOLUTION, CONCENTRATE INTRAVENOUS at 13:59

## 2020-09-17 RX ADMIN — SODIUM CHLORIDE: 900 INJECTION, SOLUTION INTRAVENOUS at 17:02

## 2020-09-17 RX ADMIN — LIDOCAINE HYDROCHLORIDE 80 MG: 20 INJECTION, SOLUTION EPIDURAL; INFILTRATION; INTRACAUDAL; PERINEURAL at 12:53

## 2020-09-17 RX ADMIN — DEXMEDETOMIDINE HYDROCHLORIDE 10 MCG: 100 INJECTION, SOLUTION, CONCENTRATE INTRAVENOUS at 14:30

## 2020-09-17 RX ADMIN — MIDAZOLAM 2 MG: 1 INJECTION INTRAMUSCULAR; INTRAVENOUS at 12:00

## 2020-09-17 RX ADMIN — CEFOTETAN DISODIUM 2 G: 2 INJECTION, POWDER, FOR SOLUTION INTRAMUSCULAR; INTRAVENOUS at 19:20

## 2020-09-17 RX ADMIN — ASCORBIC ACID: 500 INJECTION, SOLUTION INTRAMUSCULAR; INTRAVENOUS; SUBCUTANEOUS at 21:56

## 2020-09-17 RX ADMIN — SODIUM CHLORIDE 75 ML/HR: 900 INJECTION, SOLUTION INTRAVENOUS at 23:45

## 2020-09-17 RX ADMIN — BUPIVACAINE HYDROCHLORIDE 5 MG: 5 INJECTION, SOLUTION EPIDURAL; INTRACAUDAL; PERINEURAL at 12:21

## 2020-09-17 NOTE — PERIOP NOTES
Contour SOFT PERCUFLEX MATERIAL Ureteral Stent inserted into right ureter  6F x 26cm  REF # [id number]  LOT # [id number]  EXP 2023-03-03

## 2020-09-17 NOTE — PERIOP NOTES
Family updated at 2:20 PM by Matheus Diaz, spoke with Adriana Gonsales at [phone] per patient's verbal instruction during pre-op interview. 4:28 PM  Family updated at 4:28 PM by Matheus Diaz, spoke with Adriana Gonsales. 6:33 PM  Family updated at 6:34 PM by Matheus Diaz, spoke with Adriana Gonsales.

## 2020-09-17 NOTE — PROGRESS NOTES
1005: CBC sent twice this morning. Both times lab called to say sample had clotted. 1st sample drawn by night shift RN out of PICC line. 2nd sample drawn by day shift RN from peripheral stick. 2nd time 1/2 of the CBC was able to result. Will draw for a third time if pt allows    1034: TRANSFER - OUT REPORT:    Verbal report given to Summer RN(name) on Pancho   being transferred to Pre op holding(unit) for ordered procedure       Report consisted of patients Situation, Background, Assessment and   Recommendations(SBAR). Information from the following report(s) SBAR, Kardex, Intake/Output, MAR and Recent Results was reviewed with the receiving nurse. Lines:   PICC Triple Lumen 68/39/34 Right;Basilic (Active)   Central Line Being Utilized Yes 09/16/20 2030   Criteria for Appropriate Use Total parenteral nutrition 09/16/20 2030   Site Assessment Clean, dry, & intact 09/16/20 2030   Phlebitis Assessment 0 09/16/20 2030   Infiltration Assessment 0 09/16/20 2030   Arm Circumference (cm) 22 cm 09/15/20 1218   Date of Last Dressing Change 09/15/20 09/15/20 1218   Dressing Status Clean, dry, & intact 09/16/20 2030   External Catheter Length (cm) 0 centimeters 09/15/20 1218   Dressing Type Disk with Chlorhexadine gluconate (CHG); Transparent 09/16/20 2030   Action Taken Open ports on tubing capped 09/16/20 2030   Hub Color/Line Status Maria Fernanda [place]; Infusing 09/16/20 2030   Positive Blood Return (Site #1) Yes 09/16/20 2030   Hub Color/Line Status Red; Infusing 09/16/20 2030   Positive Blood Return (Site #2) Yes 09/16/20 2030   Hub Color/Line Status White;Capped 09/16/20 2030   Positive Blood Return (Site #3) Yes 09/16/20 2030   Alcohol Cap Used Yes 09/16/20 2030       Peripheral IV 09/14/20 Left Arm (Active)   Site Assessment Clean, dry, & intact 09/16/20 2030   Phlebitis Assessment 0 09/16/20 2030   Infiltration Assessment 0 09/16/20 2030   Dressing Status Clean, dry, & intact 09/16/20 2030   Dressing Type Transparent;Tape 09/16/20 2030   Hub Color/Line Status Pink;Capped 09/16/20 2030   Action Taken Open ports on tubing capped 09/16/20 2030   Alcohol Cap Used Yes 09/16/20 2030        Opportunity for questions and clarification was provided.       Patient transported with:   Bliips

## 2020-09-17 NOTE — BRIEF OP NOTE
Brief Postoperative Note    Patient: Marilin Hooper  YOB: 1950  MRN: [id number]    Date of Procedure: 9/17/2020     Pre-Op Diagnosis: COLON CANCER involving right distal ureter    Post-Op Diagnosis: Same as preoperative diagnosis.       Procedure(s):  Right ureteral reimplant with psoas hitch, right stent placement    Surgeon(s):  Nathalie Sinclair MD    Surgical Assistant:  Dr. Chloé Bro    Anesthesia: General     Estimated Blood Loss (mL):  minimal    Complications: None    Specimens:  none    Implants: right 6 Fr x 26 cm JJ ureteral stent    Drains:  Dr. Chloé Bro to put in VALENTINE drain    Findings:  Transected right ureter at the level of the iliac vessels    Electronically Signed by Vanessa Lord MD on 9/17/2020 at 7:56 PM

## 2020-09-17 NOTE — BRIEF OP NOTE
Brief Postoperative Note    Patient: Solo Ricardo  YOB: 1950  MRN: [id number]    Date of Procedure: 9/17/2020     Pre-Op Diagnosis: COLON CANCER    Post-Op Diagnosis: Same as preoperative diagnosis. Procedure(s):    Cystoscopy left Ureteral catheter, simple pepper, distal right ureterecotmy    Surgeon(s):  MD Nisha Browne MD Linus Bombard, MD    Surgical Assistant: None    Anesthesia: General     Estimated Blood Loss (mL): 0    Complications: None    Specimens: * No specimens in log *     Implants: * No implants in log *    Drains:   Nephroureteral Drain 09/12/20 Right Ureter (Active)       Findings: normal cysto, external compression on dome; right distal ureter involved in mass. transected at bladder and just inside pelvic inlet at iliac artery in order to get mass resected.      Electronically Signed by Marvin Vick MD on 9/17/2020 at 1:17 PM    Dictation#: [id number]

## 2020-09-17 NOTE — PROGRESS NOTES
Bedside and Verbal shift change report given to Charley Almazan RN and Enriqueta Cohen (oncoming nurse) by Alvarez Esqueda RN (offgoing nurse). Report included the following information SBAR, Kardex, Intake/Output, MAR and Recent Results. 0900: Pt cleaned with Hibiclens.

## 2020-09-17 NOTE — PROGRESS NOTES
Colorectal Surgery Note    The patient has been prepared for the operation. All questions have been answered. We will proceed as planned.

## 2020-09-17 NOTE — PERIOP NOTES
Left ureteral stent placed by urology surgeon.   Ref [id number]  Ref [id number]  5.0Fr/ 70cm  Open end ureteral catheter  Lot [id number]  Expiration date: 07-

## 2020-09-17 NOTE — OP NOTES
[address]   OPERATIVE REPORT    Name:  Teo Bosch  MR#:  [id number]  :  1950  ACCOUNT #:  [de-identified]  DATE OF SERVICE:  2020    PREOPERATIVE DIAGNOSIS:  Colon cancer. POSTOPERATIVE DIAGNOSIS:  Colon cancer. PROCEDURES PERFORMED:  Cystoscopy, left ureteral catheter insertion, simple Hogan catheter placement. Right distal ureterectomy    SURGEON:  Aaron Kern MD    ANESTHESIA:  General.    COMPLICATIONS:  None. SPECIMENS REMOVED:  None. IMPLANTS:  None. ESTIMATED BLOOD LOSS:  None. FINDINGS:  Normal cystoscopy with external compression on the dome of pelvic mass. INDICATIONS FOR PROCEDURE:  The patient is a 79-year-old female with a pelvic mass. She is being brought to the operating room today by Dr. Pilar Arenas and Dr. Ac Alicea for resection of this mass. Left ureteral stent was requested. PROCEDURE IN DETAIL:  After obtaining informed consent, the patient was brought back to the operating room and placed on the operating table in supine position. Anesthesia was induced. Airway was established without complication. The patient was prepped and draped in usual aseptic fashion. After final time-out, rigid cystoscope was inserted into the bladder. Urethra was normal, bladder neck normal and trigone normal.  There was a double-J stent coming out of the right ureteral orifice. Urinary urothelium looked normal.  There was extrinsic compression from the dome of the bladder. Left ureteral orifice was identified and cannulated with a TigerTail and advanced until soft resistance was met. The scope was broken and removed from the patient, leaving the TigerTail in place. A 16-Prydeinig Hogan catheter was inserted into the bladder with 10 mL in the balloon for drainage. The TigerTail was secured to the catheter using the blue clips and then connected using the connection device.   The patient was left on the table for Dr. Pilar Arenas and Dr. Ac Alicea to do their part of the surgery. Hours later Dr. Katie Tidwell called me back to the room as the mass was involving the right ureter. I readily identified the proximal right ureter with stent distal and proximal to the tumor. Using Bovie I dissected the tumor off the dome of the bladder until the distal ureter was identified. I did not create any cystostomies. I used scissors to transect the ureter above and below the tumor and the previously placed stent was removed. Dr. Katie Tidwell removed the remainder of the tumor attachments from the rectum with the distal right ureter contained in the tumor. Patient was left in place for the bowel anastomosis. Dr. Mark Presley was called to the room for the ureteral reimplant.      Maribell Solis MD      [id number]  D:  09/17/2020 13:21  T:  09/17/2020 19:26  JOB #:  [id number]

## 2020-09-17 NOTE — PROGRESS NOTES
Bedside and Verbal shift change report given to VANDANA Mckeon RN (oncoming nurse) by Roel Maynard RN (offgoing nurse). Report included the following information SBAR, Kardex and Procedure Summary.     2230 Pt cleaned with Hibiclens     0530 Pt cleaned with Hibiclens

## 2020-09-17 NOTE — PROGRESS NOTES
Patient in the [address] [place] [address] from today  Will f/u tomorrow  Call with any questions

## 2020-09-17 NOTE — ANESTHESIA PREPROCEDURE EVALUATION
Relevant Problems   No relevant active problems       Anesthetic History   No history of anesthetic complications            Review of Systems / Medical History  Patient summary reviewed, nursing notes reviewed and pertinent labs reviewed    Pulmonary  Within defined limits                 Neuro/Psych   Within defined limits           Cardiovascular  Within defined limits                Exercise tolerance: >4 METS     GI/Hepatic/Renal     GERD: well controlled    Renal disease      Comments: hydroneph s/p stent on 9/10 Endo/Other  Within defined limits           Other Findings   Comments: Pelvic/colon mass           Physical Exam    Airway  Mallampati: II  TM Distance: > 6 cm  Neck ROM: normal range of motion   Mouth opening: Normal     Cardiovascular  Regular rate and rhythm,  S1 and S2 normal,  no murmur, click, rub, or gallop             Dental  No notable dental hx       Pulmonary  Breath sounds clear to auscultation               Abdominal  GI exam deferred       Other Findings            Anesthetic Plan    ASA: 3  Anesthesia type: general          Induction: Intravenous  Anesthetic plan and risks discussed with: Patient

## 2020-09-17 NOTE — BRIEF OP NOTE
Brief Postoperative Note    Patient: Aurora Bolden  YOB: 1950  MRN: [id number]    Date of Procedure: 9/17/2020     Pre-Op Diagnosis: COLON CANCER with involvement of the uterus. Post-Op Diagnosis: Same as preoperative diagnosis. Procedure(s):  TOTAL ABDOMINAL HYSTERECTOMY, LEFT SALPINGO OOPHORECTOMY    Surgeon(s):  Beverly Ream, MD Raechel Prader, MD Lorenz Simpler, MD    Surgical Assistant: Physician Assistant: Cami Warren PA-C    Anesthesia: General     Estimated Blood Loss (mL): 272     Complications: None    Specimens: * No specimens in log *     Implants: * No implants in log *    Drains:   Nephroureteral Drain 09/12/20 Right Ureter (Active)       Findings: Please see Dr. Tenzin Winslow operative report for full details. The uterus was overall normal appearing, except that posteriorly it was clearly involved by the sigmoid mass. The culdesac occupied by the mass. The left tube and ovary were normal appearing. The right adnexa was surgically absent. The uterus and mass were unable to be  from the right pelvic side-wall and retroperitoneum. After the uterus was  from the vagina and the vaginal cuff closed, the case was turned back over to Dr. Dejah Donato. The uterus was left attached to the sigmoid mass to be removed en bloc.      Electronically Signed by Wyatt Omalley MD on 9/17/2020 at 3:47 PM

## 2020-09-17 NOTE — ANESTHESIA PROCEDURE NOTES
Epidural Block    Start time: 9/17/2020 12:10 PM  End time: 9/17/2020 12:20 PM  Performed by: Dov Morelos MD  Authorized by: Dov Morelos MD     Pre-Procedure  Preanesthetic Checklist: patient identified, risks and benefits discussed, site marked, patient being monitored and timeout performed    Timeout Time: 12:10        Epidural:   Patient position:  Left lateral decubitus  Prep region:  Lumbar  Prep: Chlorhexidine    Location:  L2-3    Needle and Epidural Catheter:   Needle Type:  Tuohy  Needle Gauge:  17 G  Injection Technique:  Loss of resistance using air  Attempts:  1  Catheter Size:  19 G  Events: no blood with aspiration, no cerebrospinal fluid with aspiration, no paresthesia and negative aspiration test    Test Dose:  Bupivacaine 0.25% and negative    Assessment:   Catheter Secured:  Tegaderm and tape  Insertion:  Uncomplicated  Patient tolerance:  Patient tolerated the procedure well with no immediate complications

## 2020-09-17 NOTE — PROGRESS NOTES
I discussed left ureteral stent placement, possible right ureteral resection with reimplant possible Boari flap and possible intestinal interposition and injury to nearby structures. Discussed risks of bleeding infection, urine leak stricture, discussed pepper for 2 weeks if reimplant is done. I discussedthat if only left catheter is placed this is removed at end of the case and she can follow up after recovery for removal of right ureteral stent which was previously placed. She understood.

## 2020-09-18 LAB
ABO + RH BLD: NORMAL
ALBUMIN SERPL-MCNC: 2.3 G/DL (ref 3.5–5)
ANION GAP SERPL CALC-SCNC: 2 MMOL/L (ref 5–15)
BASOPHILS # BLD: 0 K/UL (ref 0–0.1)
BASOPHILS NFR BLD: 0 % (ref 0–1)
BLD PROD TYP BPU: NORMAL
BLD PROD TYP BPU: NORMAL
BLOOD GROUP ANTIBODIES SERPL: NORMAL
BPU ID: NORMAL
BPU ID: NORMAL
BUN SERPL-MCNC: 13 MG/DL (ref 6–20)
BUN/CREAT SERPL: 28 (ref 12–20)
CALCIUM SERPL-MCNC: 7.3 MG/DL (ref 8.5–10.1)
CHLORIDE SERPL-SCNC: 115 MMOL/L (ref 97–108)
CO2 SERPL-SCNC: 19 MMOL/L (ref 21–32)
CREAT SERPL-MCNC: 0.46 MG/DL (ref 0.55–1.02)
CROSSMATCH RESULT,%XM: NORMAL
CROSSMATCH RESULT,%XM: NORMAL
DIFFERENTIAL METHOD BLD: ABNORMAL
EOSINOPHIL # BLD: 0 K/UL (ref 0–0.4)
EOSINOPHIL NFR BLD: 0 % (ref 0–7)
ERYTHROCYTE [DISTWIDTH] IN BLOOD BY AUTOMATED COUNT: 23.6 % (ref 11.5–14.5)
GLUCOSE BLD STRIP.AUTO-MCNC: 142 MG/DL (ref 65–100)
GLUCOSE BLD STRIP.AUTO-MCNC: 148 MG/DL (ref 65–100)
GLUCOSE BLD STRIP.AUTO-MCNC: 164 MG/DL (ref 65–100)
GLUCOSE BLD STRIP.AUTO-MCNC: 184 MG/DL (ref 65–100)
GLUCOSE SERPL-MCNC: 192 MG/DL (ref 65–100)
HCT VFR BLD AUTO: 28.8 % (ref 35–47)
HGB BLD-MCNC: 8.8 G/DL (ref 11.5–16)
IMM GRANULOCYTES # BLD AUTO: 0.4 K/UL (ref 0–0.04)
IMM GRANULOCYTES NFR BLD AUTO: 2 % (ref 0–0.5)
LYMPHOCYTES # BLD: 1.4 K/UL (ref 0.8–3.5)
LYMPHOCYTES NFR BLD: 8 % (ref 12–49)
MAGNESIUM SERPL-MCNC: 1.6 MG/DL (ref 1.6–2.4)
MCH RBC QN AUTO: 23.5 PG (ref 26–34)
MCHC RBC AUTO-ENTMCNC: 30.6 G/DL (ref 30–36.5)
MCV RBC AUTO: 76.8 FL (ref 80–99)
MONOCYTES # BLD: 0.9 K/UL (ref 0–1)
MONOCYTES NFR BLD: 5 % (ref 5–13)
NEUTS SEG # BLD: 15 K/UL (ref 1.8–8)
NEUTS SEG NFR BLD: 85 % (ref 32–75)
NRBC # BLD: 0 K/UL (ref 0–0.01)
NRBC BLD-RTO: 0 PER 100 WBC
PHOSPHATE SERPL-MCNC: 1.6 MG/DL (ref 2.6–4.7)
PLATELET # BLD AUTO: 187 K/UL (ref 150–400)
PLATELET COMMENTS,PCOM: ABNORMAL
PMV BLD AUTO: 8.9 FL (ref 8.9–12.9)
POTASSIUM SERPL-SCNC: 3.4 MMOL/L (ref 3.5–5.1)
RBC # BLD AUTO: 3.75 M/UL (ref 3.8–5.2)
RBC MORPH BLD: ABNORMAL
SERVICE CMNT-IMP: ABNORMAL
SODIUM SERPL-SCNC: 136 MMOL/L (ref 136–145)
SPECIMEN EXP DATE BLD: NORMAL
STATUS OF UNIT,%ST: NORMAL
STATUS OF UNIT,%ST: NORMAL
UNIT DIVISION, %UDIV: 0
UNIT DIVISION, %UDIV: 0
WBC # BLD AUTO: 17.7 K/UL (ref 3.6–11)

## 2020-09-18 PROCEDURE — 74011000250 HC RX REV CODE- 250: Performed by: COLON & RECTAL SURGERY

## 2020-09-18 PROCEDURE — 74011250637 HC RX REV CODE- 250/637: Performed by: COLON & RECTAL SURGERY

## 2020-09-18 PROCEDURE — 83735 ASSAY OF MAGNESIUM: CPT

## 2020-09-18 PROCEDURE — 80048 BASIC METABOLIC PNL TOTAL CA: CPT

## 2020-09-18 PROCEDURE — 74011000258 HC RX REV CODE- 258: Performed by: COLON & RECTAL SURGERY

## 2020-09-18 PROCEDURE — 74011250636 HC RX REV CODE- 250/636: Performed by: ANESTHESIOLOGY

## 2020-09-18 PROCEDURE — 97110 THERAPEUTIC EXERCISES: CPT

## 2020-09-18 PROCEDURE — 82962 GLUCOSE BLOOD TEST: CPT

## 2020-09-18 PROCEDURE — 85025 COMPLETE CBC W/AUTO DIFF WBC: CPT

## 2020-09-18 PROCEDURE — 82040 ASSAY OF SERUM ALBUMIN: CPT

## 2020-09-18 PROCEDURE — 36415 COLL VENOUS BLD VENIPUNCTURE: CPT

## 2020-09-18 PROCEDURE — 84100 ASSAY OF PHOSPHORUS: CPT

## 2020-09-18 PROCEDURE — 74011250636 HC RX REV CODE- 250/636: Performed by: COLON & RECTAL SURGERY

## 2020-09-18 PROCEDURE — 65660000000 HC RM CCU STEPDOWN

## 2020-09-18 PROCEDURE — 97161 PT EVAL LOW COMPLEX 20 MIN: CPT

## 2020-09-18 PROCEDURE — P9045 ALBUMIN (HUMAN), 5%, 250 ML: HCPCS | Performed by: ANESTHESIOLOGY

## 2020-09-18 RX ORDER — CELECOXIB 100 MG/1
100 CAPSULE ORAL 2 TIMES DAILY
Status: DISCONTINUED | OUTPATIENT
Start: 2020-09-19 | End: 2020-09-27

## 2020-09-18 RX ORDER — ONDANSETRON 4 MG/1
4 TABLET, ORALLY DISINTEGRATING ORAL
Status: DISCONTINUED | OUTPATIENT
Start: 2020-09-18 | End: 2020-10-05 | Stop reason: HOSPADM

## 2020-09-18 RX ORDER — HEPARIN SODIUM 5000 [USP'U]/ML
5000 INJECTION, SOLUTION INTRAVENOUS; SUBCUTANEOUS EVERY 12 HOURS
Status: DISCONTINUED | OUTPATIENT
Start: 2020-09-18 | End: 2020-09-19

## 2020-09-18 RX ORDER — ALBUMIN HUMAN 50 G/1000ML
12.5 SOLUTION INTRAVENOUS ONCE
Status: COMPLETED | OUTPATIENT
Start: 2020-09-18 | End: 2020-09-18

## 2020-09-18 RX ORDER — KETOROLAC TROMETHAMINE 30 MG/ML
15 INJECTION, SOLUTION INTRAMUSCULAR; INTRAVENOUS EVERY 6 HOURS
Status: DISPENSED | OUTPATIENT
Start: 2020-09-18 | End: 2020-09-19

## 2020-09-18 RX ORDER — HEPARIN SODIUM 5000 [USP'U]/ML
5000 INJECTION, SOLUTION INTRAVENOUS; SUBCUTANEOUS EVERY 8 HOURS
Status: DISCONTINUED | OUTPATIENT
Start: 2020-09-18 | End: 2020-09-18

## 2020-09-18 RX ORDER — BACITRACIN 500 UNIT/G
1 PACKET (EA) TOPICAL AS NEEDED
Status: DISCONTINUED | OUTPATIENT
Start: 2020-09-18 | End: 2020-10-05 | Stop reason: HOSPADM

## 2020-09-18 RX ORDER — OXYCODONE HYDROCHLORIDE 5 MG/1
5 TABLET ORAL
Status: DISCONTINUED | OUTPATIENT
Start: 2020-09-18 | End: 2020-09-27

## 2020-09-18 RX ORDER — ALVIMOPAN 12 MG/1
12 CAPSULE ORAL 2 TIMES DAILY
Status: COMPLETED | OUTPATIENT
Start: 2020-09-18 | End: 2020-09-24

## 2020-09-18 RX ORDER — NALOXONE HYDROCHLORIDE 0.4 MG/ML
0.4 INJECTION, SOLUTION INTRAMUSCULAR; INTRAVENOUS; SUBCUTANEOUS AS NEEDED
Status: DISCONTINUED | OUTPATIENT
Start: 2020-09-18 | End: 2020-10-05 | Stop reason: HOSPADM

## 2020-09-18 RX ADMIN — Medication 10 ML: at 18:20

## 2020-09-18 RX ADMIN — KETOROLAC TROMETHAMINE 15 MG: 30 INJECTION, SOLUTION INTRAMUSCULAR at 13:58

## 2020-09-18 RX ADMIN — HEPARIN SODIUM 5000 UNITS: 5000 INJECTION INTRAVENOUS; SUBCUTANEOUS at 23:30

## 2020-09-18 RX ADMIN — SODIUM CHLORIDE, SODIUM LACTATE, POTASSIUM CHLORIDE, AND CALCIUM CHLORIDE 600 ML: 600; 310; 30; 20 INJECTION, SOLUTION INTRAVENOUS at 14:47

## 2020-09-18 RX ADMIN — SODIUM CHLORIDE: 900 INJECTION, SOLUTION INTRAVENOUS at 09:50

## 2020-09-18 RX ADMIN — Medication 10 ML: at 23:29

## 2020-09-18 RX ADMIN — KETOROLAC TROMETHAMINE 15 MG: 30 INJECTION, SOLUTION INTRAMUSCULAR at 23:30

## 2020-09-18 RX ADMIN — ACETAMINOPHEN 1000 MG: 650 SOLUTION ORAL at 10:01

## 2020-09-18 RX ADMIN — Medication 10 ML: at 16:52

## 2020-09-18 RX ADMIN — ALVIMOPAN 12 MG: 12 CAPSULE ORAL at 10:06

## 2020-09-18 RX ADMIN — BUPIVACAINE HYDROCHLORIDE 2 ML/HR: 5 INJECTION, SOLUTION EPIDURAL; INTRACAUDAL at 09:48

## 2020-09-18 RX ADMIN — ASCORBIC ACID: 500 INJECTION, SOLUTION INTRAMUSCULAR; INTRAVENOUS; SUBCUTANEOUS at 18:21

## 2020-09-18 RX ADMIN — KETOROLAC TROMETHAMINE 15 MG: 30 INJECTION, SOLUTION INTRAMUSCULAR at 06:08

## 2020-09-18 RX ADMIN — PANTOPRAZOLE SODIUM 40 MG: 40 TABLET, DELAYED RELEASE ORAL at 16:51

## 2020-09-18 RX ADMIN — PANTOPRAZOLE SODIUM 40 MG: 40 TABLET, DELAYED RELEASE ORAL at 07:05

## 2020-09-18 RX ADMIN — ALVIMOPAN 12 MG: 12 CAPSULE ORAL at 20:34

## 2020-09-18 RX ADMIN — ONDANSETRON 4 MG: 4 TABLET, ORALLY DISINTEGRATING ORAL at 07:52

## 2020-09-18 RX ADMIN — HYDROMORPHONE HYDROCHLORIDE 2 ML/HR: 2 INJECTION, SOLUTION INTRAMUSCULAR; INTRAVENOUS; SUBCUTANEOUS at 18:08

## 2020-09-18 RX ADMIN — ALBUMIN (HUMAN) 12.5 G: 12.5 INJECTION, SOLUTION INTRAVENOUS at 17:34

## 2020-09-18 RX ADMIN — HEPARIN SODIUM 5000 UNITS: 5000 INJECTION INTRAVENOUS; SUBCUTANEOUS at 10:01

## 2020-09-18 RX ADMIN — I.V. FAT EMULSION 250 ML: 20 EMULSION INTRAVENOUS at 20:35

## 2020-09-18 RX ADMIN — ACETAMINOPHEN 1000 MG: 650 SOLUTION ORAL at 16:51

## 2020-09-18 NOTE — PROGRESS NOTES
Bedside and Verbal shift change report given to [address] (oncoming nurse) by Krishna Wyatt (offgoing nurse). Report included the following information Kardex, Procedure Summary, Intake/Output, MAR, Recent Results and Cardiac Rhythm NSR.

## 2020-09-18 NOTE — PROGRESS NOTES
General Daily Progress Note    Admission Date: 9/11/2020  Hospital Day 8  Post-Op Day 1  Subjective:   Dizziness that began last night is a bit better but has not resolved. No nausea now. Had spit up some eater this morning. Good pain control. Ambulated with nurse. Objective:     Patient Vitals for the past 24 hrs:   BP Temp Pulse Resp SpO2 Weight   09/18/20 1909 (!) 119/58 97.4 °F (36.3 °C) 89 17 100 % --   09/18/20 1815 (!) 108/56 -- 79 -- -- --   09/18/20 1814 -- 97.4 °F (36.3 °C) 75 15 100 % --   09/18/20 1548 (!) 86/48 -- 81 -- 100 % --   09/18/20 1546 (!) 98/48 97.5 °F (36.4 °C) (!) 59 16 100 % --   09/18/20 1224 (!) 85/47 97.4 °F (36.3 °C) 80 15 100 % --   09/18/20 1129 -- -- -- 21 -- --   09/18/20 0852 (!) 95/48 97.6 °F (36.4 °C) 81 13 100 % --   09/18/20 0246 114/65 98.2 °F (36.8 °C) 76 10 -- 58.9 kg (129 lb 13.6 oz)   09/18/20 0145 (!) 92/55 -- 80 10 100 % --   09/18/20 0130 107/66 -- 80 12 100 % --   09/18/20 0030 123/74 -- 91 21 99 % --   09/18/20 0015 (!) 97/57 98.1 °F (36.7 °C) 82 12 100 % --   09/17/20 2345 105/63 -- 87 12 100 % --   09/17/20 2330 117/71 -- 84 13 100 % --   09/17/20 2315 (!) 92/58 -- 85 13 99 % --   09/17/20 2300 101/62 -- 97 15 99 % --   09/17/20 2245 103/63 -- 88 17 100 % --   09/17/20 2230 (!) 89/55 -- 84 12 99 % --   09/17/20 2215 (!) 92/57 -- 83 13 99 % --   09/17/20 2200 (!) 103/59 -- 86 15 100 % --   09/17/20 2145 (!) 98/58 -- 89 15 99 % --   09/17/20 2140 105/62 -- 95 17 99 % --   09/17/20 2138 (!) 80/54 -- 96 16 98 % --   09/17/20 2135 (!) 86/49 97.9 °F (36.6 °C) 95 18 98 % --   09/17/20 2133 (!) 86/53 -- -- -- -- --     No intake/output data recorded. 09/17 0701 - 09/18 1900  In: 9335 [P.O.:210; I.V.:6085]  Out: 5768 [Urine:2785; Drains:885]    Physical Examination:  General Appearance - No distress. Heart - Normal sinus rhythm. Abdomen - Non-distended. Soft. Appropriately tender. Ileostomy viable. Midline dressing clean, dry, and intact.   VALENTINE drainage serosanguinous.  - Hogan catheter draining eneida-appearing urine. Neurological - Alert and oriented. Extremities - Pneumatic compression stockings in use. Data Review   Recent Results (from the past 24 hour(s))   CBC WITH AUTOMATED DIFF    Collection Time: 09/17/20  9:45 PM   Result Value Ref Range    WBC 29.5 (H) 3.6 - 11.0 K/uL    RBC 4.89 3.80 - 5.20 M/uL    HGB 11.5 11.5 - 16.0 g/dL    HCT 37.5 35.0 - 47.0 %    MCV 76.7 (L) 80.0 - 99.0 FL    MCH 23.5 (L) 26.0 - 34.0 PG    MCHC 30.7 30.0 - 36.5 g/dL    RDW 24.6 (H) 11.5 - 14.5 %    PLATELET 414 (L) 751 - 400 K/uL    MPV 9.3 8.9 - 12.9 FL    NRBC 0.1 (H) 0  WBC    ABSOLUTE NRBC 0.03 (H) 0.00 - 0.01 K/uL    NEUTROPHILS 91 (H) 32 - 75 %    LYMPHOCYTES 3 (L) 12 - 49 %    MONOCYTES 3 (L) 5 - 13 %    EOSINOPHILS 0 0 - 7 %    BASOPHILS 1 0 - 1 %    IMMATURE GRANULOCYTES 2 (H) 0.0 - 0.5 %    ABS. NEUTROPHILS 26.8 (H) 1.8 - 8.0 K/UL    ABS. LYMPHOCYTES 0.9 0.8 - 3.5 K/UL    ABS. MONOCYTES 0.9 0.0 - 1.0 K/UL    ABS. EOSINOPHILS 0.0 0.0 - 0.4 K/UL    ABS. BASOPHILS 0.3 (H) 0.0 - 0.1 K/UL    ABS. IMM.  GRANS. 0.6 (H) 0.00 - 0.04 K/UL    DF SMEAR SCANNED      PLATELET COMMENTS CLUMPED PLATELETS      RBC COMMENTS ANISOCYTOSIS  3+        RBC COMMENTS MICROCYTOSIS  1+        RBC COMMENTS MARILEE CELLS  PRESENT       METABOLIC PANEL, BASIC    Collection Time: 09/17/20  9:45 PM   Result Value Ref Range    Sodium 138 136 - 145 mmol/L    Potassium 3.9 3.5 - 5.1 mmol/L    Chloride 115 (H) 97 - 108 mmol/L    CO2 19 (L) 21 - 32 mmol/L    Anion gap 4 (L) 5 - 15 mmol/L    Glucose 272 (H) 65 - 100 mg/dL    BUN 12 6 - 20 MG/DL    Creatinine 0.71 0.55 - 1.02 MG/DL    BUN/Creatinine ratio 17 12 - 20      GFR est AA >60 >60 ml/min/1.73m2    GFR est non-AA >60 >60 ml/min/1.73m2    Calcium 6.0 (LL) 8.5 - 10.1 MG/DL   MAGNESIUM    Collection Time: 09/17/20  9:45 PM   Result Value Ref Range    Magnesium 1.7 1.6 - 2.4 mg/dL   PHOSPHORUS    Collection Time: 09/17/20  9:45 PM Result Value Ref Range    Phosphorus 2.6 2.6 - 4.7 MG/DL   GLUCOSE, POC    Collection Time: 09/17/20 10:47 PM   Result Value Ref Range    Glucose (POC) 282 (H) 65 - 100 mg/dL    Performed by Arleen Patino Dr, BASIC    Collection Time: 09/18/20  3:35 AM   Result Value Ref Range    Sodium 136 136 - 145 mmol/L    Potassium 3.4 (L) 3.5 - 5.1 mmol/L    Chloride 115 (H) 97 - 108 mmol/L    CO2 19 (L) 21 - 32 mmol/L    Anion gap 2 (L) 5 - 15 mmol/L    Glucose 192 (H) 65 - 100 mg/dL    BUN 13 6 - 20 MG/DL    Creatinine 0.46 (L) 0.55 - 1.02 MG/DL    BUN/Creatinine ratio 28 (H) 12 - 20      GFR est AA >60 >60 ml/min/1.73m2    GFR est non-AA >60 >60 ml/min/1.73m2    Calcium 7.3 (L) 8.5 - 10.1 MG/DL   MAGNESIUM    Collection Time: 09/18/20  3:35 AM   Result Value Ref Range    Magnesium 1.6 1.6 - 2.4 mg/dL   PHOSPHORUS    Collection Time: 09/18/20  3:35 AM   Result Value Ref Range    Phosphorus 1.6 (L) 2.6 - 4.7 MG/DL   ALBUMIN    Collection Time: 09/18/20  3:35 AM   Result Value Ref Range    Albumin 2.3 (L) 3.5 - 5.0 g/dL   GLUCOSE, POC    Collection Time: 09/18/20  6:49 AM   Result Value Ref Range    Glucose (POC) 184 (H) 65 - 100 mg/dL    Performed by Marjorie Mckinney    CBC WITH AUTOMATED DIFF    Collection Time: 09/18/20  7:14 AM   Result Value Ref Range    WBC 17.7 (H) 3.6 - 11.0 K/uL    RBC 3.75 (L) 3.80 - 5.20 M/uL    HGB 8.8 (L) 11.5 - 16.0 g/dL    HCT 28.8 (L) 35.0 - 47.0 %    MCV 76.8 (L) 80.0 - 99.0 FL    MCH 23.5 (L) 26.0 - 34.0 PG    MCHC 30.6 30.0 - 36.5 g/dL    RDW 23.6 (H) 11.5 - 14.5 %    PLATELET 363 598 - 913 K/uL    MPV 8.9 8.9 - 12.9 FL    NRBC 0.0 0  WBC    ABSOLUTE NRBC 0.00 0.00 - 0.01 K/uL    NEUTROPHILS 85 (H) 32 - 75 %    LYMPHOCYTES 8 (L) 12 - 49 %    MONOCYTES 5 5 - 13 %    EOSINOPHILS 0 0 - 7 %    BASOPHILS 0 0 - 1 %    IMMATURE GRANULOCYTES 2 (H) 0.0 - 0.5 %    ABS. NEUTROPHILS 15.0 (H) 1.8 - 8.0 K/UL    ABS. LYMPHOCYTES 1.4 0.8 - 3.5 K/UL    ABS.  MONOCYTES 0.9 0.0 - 1.0 K/UL ABS. EOSINOPHILS 0.0 0.0 - 0.4 K/UL    ABS. BASOPHILS 0.0 0.0 - 0.1 K/UL    ABS. IMM. GRANS. 0.4 (H) 0.00 - 0.04 K/UL    DF SMEAR SCANNED      PLATELET COMMENTS CLUMPED PLATELETS      RBC COMMENTS ANISOCYTOSIS  2+        RBC COMMENTS MICROCYTOSIS  1+        RBC COMMENTS HYPOCHROMIA  1+        RBC COMMENTS MARILEE CELLS  PRESENT       GLUCOSE, POC    Collection Time: 09/18/20  1:37 PM   Result Value Ref Range    Glucose (POC) 164 (H) 65 - 100 mg/dL    Performed by [employer]            Assessment:     Principal Problem:    Malignant neoplasm of sigmoid colon ([facility] [address] (9/14/2020)    Active Problems:    Hydronephrosis (9/11/2020)      Pelvic mass (9/11/2020)      Severe protein-calorie malnutrition ([facility] [address] (9/16/2020)      Anemia (9/16/2020)        1 Day Post-Op s/p Low anterior resection, mobilization of the splenic flexure, coloproctostomy, and creation of loop ileostomy. [Total abdominal hysterectomy and left salpingo-oophorectomy performed by Larisa Navarro MD]  [Cystoscopy and placement of left ureteral catheter performed by Zaria Mullen MD]  [Distal right ureterectomy performed by Angela Smith III, MD]  [Right ureteral re-implant with psoas hitch and placement of right ureteral stent performed by Tori Quiroz MD]    Transferred to [facility] (stepdown) from recover room. Hemodynamically stable. Hemoglobin acceptable. Dizziness possibly related to epidural analgesia. Adjustments being made. Urine output adequate. Creatinine within normal limits. Hogan catheter in place and will need to remain for 14 days (per Dr. María Madsen). Hypophosphatemia treated. Malnutrition being treated with TPN. Tolerating small quantities of clear liquids so far. Needs physical therapy. Needs ostomy education. Plan:   Continue clear liquid diet and Ensure Enlive. Continue TPN and lipids. Hogan catheter to remain for 14 days.   Ambulate if dizziness and blood pressure will permit it.  Physical therapy. Ostomy education. Incentive spirometer.

## 2020-09-18 NOTE — ROUTINE PROCESS
VS taken at time Dr. Syed Mcgraw rounded; He noted the vital signs. No new order obtain from him. 8006:  Pt report feeling better after decrease epidural infusion by half. Had plan to call anesthesia if decreasing rate did not help with dizziness. About 1415 ambulated pt about 55 ft total.  Pt c/o dizziness throughout but gait was steady. Will call anesthesia. 1700: Pt still c/o dizziness but reports \"much better\" . SBP 80s without new symptoms. Paged anesthesiologist for assist from Dr. Johnnie Bishop as Dr. Linda Pruitt left for the day. Orders obtained.

## 2020-09-18 NOTE — ROUTINE PROCESS
12:  Dr. Rosemarie Bautista notified of patient c/o dizziness while resting in beed. He suspect epdiural.  Will call anesthesia for assistance.

## 2020-09-18 NOTE — OP NOTES
Gynecologic Oncology Operative Report    Mavis Ray  9/17/2020    Pre-operative dx:  1) Locally advance colon cancer involving the uterus     Post-operative dx:  Same     Procedure: Total abdominal hysterectomy, left salpingo-oophorectomy (prior surgical RSO)     Surgeon:  Rajiv Portillo MD     Assistant:  Aleah Hill surgical assistance was required throughout the case due to the complexity of the procedure. He assisted with dissection, development of the retroperitoneal spaces, and identification of pertinent anatomy during the procedure. Anesthesia:  GETA     EBL:  200cc during our portion of the case    Antibiotics: per primary team    VTE Prophylaxis: SCDs     Complications:  None    Implants: None     Specimens:  Uterus, cervix, left tube/ovary     Operative indications:  72 y.o. female with large colonic mass involving the uterus    Operative findings:  Please see Dr. Cyndra Aase operative report for full details. The uterus was overall normal appearing, except that posteriorly it was clearly involved by the sigmoid mass. The culdesac occupied by the mass. The left tube and ovary were normal appearing. The right adnexa was surgically absent. The uterus and mass were unable to be  from the right pelvic side-wall and retroperitoneum. After the uterus was  from the vagina and the vaginal cuff closed, the case was turned back over to Dr. Michael Abernathy. The uterus was left attached to the sigmoid mass to be removed en bloc. Operative report: Please see Dr. Cyndra Aase operative report for entry into the abdomen and initial operative findings. At the time I scrubbed into the case, Dr. Mark Roberts was ready for our team to perform the hysterectomy so he could continue with his dissection. He had staples and transected the proximal sigmoid colon already.  At this point, The bowel was freed up and placed up into the upper abdomen, packed away and a Bookwalter abdominal retractor was then placed. Round ligaments were then divided on each side with electrocautery and the retroperitoneal space was opened bilaterally. The ureters were identified and preserved. They could be palpated with stents in place. Given the right ovary was previously surgically absent, only the left ovary had to be removed. The left infundibulopelvic ligaments was skeletonized then sealed and divided with the LigaSure device. The right pelvic side-wall was dense, fibrotic and completely involved with the colonic mass. The cecum was also dilated and pushing into the retro-peritoneal space. Dissection was continued inferiorly along the left broad ligament and the bladder flap was created with electrocautery and the bladder was dissected down off the lower uterine segment and cervix. As noted above the posterior aspect of the uterus was completely involved with the colonic tumor; however, the lower cervix was only somewhat adherent posteriorly, and a plane was developed along the recto vaginal septum. The uterine vessels on the left were then skeletonized, clamped with Zeppelin clamps, divided, and suture ligated. Additional cervical pedicles were taken in a similar fashion on the left. The vagina was entered, and in an atypical fashion, a hysterectomy was performed in a reverse fashion on the right. The vagina was clamped and the right uterine vessels were clamped from medial to lateral as the right pelvic side-wall could not be developed given the involvement of the mass. The right uterine vessels were then divided and suture ligated. The vaginal cuff was then grasped with Edna clamps. The vaginal cuff was then closed with 0-Vicryl  with multiple figure-of-eight sutures with careful attention to include the vaginal mucosa within the closure and avoid the bladder. At this point the case was turned back over the to Dr. Marissa Galloway. The uterus was left attached to the main colonic mass.      Jono Rios, MD  9/17/2020

## 2020-09-18 NOTE — BRIEF OP NOTE
Brief Postoperative Note    Patient: Fredy Kennedy  YOB: 1950  MRN: [id number]    Date of Procedure: 9/17/2020     Pre-Op Diagnosis:  Sigmoid colon cancer involving uterus and right ureter  Post-Op Diagnosis:  Sigmoid colon cancer involving uterus and right ureter    Procedures:  Low anterior resection, mobilization of the splenic flexure, coloproctostomy, and creation of loop ileostomy. [Total abdominal hysterectomy and left salpingo-oophorectomy performed by Avtar Chavarria MD]  [Cystoscopy and placement of left ureteral catheter performed by Munir Higgins III, MD]  [Distal right ureterectomy performed by Munir Higgins III, MD]  [Right ureteral re-implant with psoas hitch and placement of right ureteral stent performed by Diaz Doyle MD]  Surgeon:  Elmer Carmichael MD  Surgical Assistants:   Emily Dodge SA; Felton Vieyra  Anesthesia: General endotracheal and epidural  Specimens:   ID Type Source Tests Collected by Time Destination   1 : Sigmoid colon, left fallopian tube, uterus, left ovary, proximal rectum, right ureteral segment Fresh Colon  Cherrie Barron MD 9/17/2020 1759 Pathology   2 : Distal rectal margin Fresh Rectal  Cherrie Barron MD 9/17/2020 1807 Pathology   3 : Anastamotic donuts Fresh Anastamosis   Cherrie Barron MD 9/17/2020 1858 Pathology   Tubes and Drains:  10 mm Elkin-Stewart drain in the pelvis  Estimated Blood Loss:  950 mL  Crystalloid:  3600 mL  Albumin:  500 mL  Urine:  1350 mL  PRBCs:  2 units  Complications:  None apparent  Implants:  None  Findings:   Nearly obstructing sigmoid colon mass with apparent invasion into another portion of the sigmoid, invasion into the uterus, and encasement of the right ureter. No evidence of distant metastatic disease. Dilated colon proximal to the mass.     Electronically Signed by Elmer Carmichael MD

## 2020-09-18 NOTE — PROGRESS NOTES
Nephrology Progress Note  Mehdi Limb  Date of Admission : 9/11/2020    CC: Follow up for hyponatremia       Assessment and Plan     Hyponatremia :  - Na stable  - cont to monitor daily    Hypokalemia:  - replete PRN     R hydronephrosis   - Likely 2/2 extrinsic compression from sigmoid mass   - s/p stent placement on 9/12     Sigmoid mass w/ possible LN mets  With possible uterine and ureteral involvement:  - extensive surgery on 9/17: Open low anterior resection, coloproctostomy, creation of loop ileostomy,  Total abd hysterectomy, Left salpingo oophorectomy, distal R ureterectomy, R ureteral implant and stent    Microcytic anemia   GERD     Will sign off case. Please call us back with any issues. Interval History:  Seen and examined. Post op day 1. Na stable. K low. She feels ok. No cp, sob, n/v/d reported at this time    Current Medications: all current  Medications have been eviewed in EPIC  Review of Systems: Pertinent items are noted in HPI.     Objective:  Vitals:    Vitals:    09/18/20 0130 09/18/20 0145 09/18/20 0246 09/18/20 0852   BP: 107/66 (!) 92/55 114/65 (!) 95/48   Pulse: 80 80 76 81   Resp: 12 10 10 13   Temp:   98.2 °F (36.8 °C) 97.6 °F (36.4 °C)   SpO2: 100% 100%  100%   Weight:   58.9 kg (129 lb 13.6 oz)    Height:         Intake and Output:  09/18 0701 - 09/18 1900  In: -   Out: 80 [Drains:80]  09/16 1901 - 09/18 0700  In: 5453.1 [I.V.:4833.1]  Out: 7521 [Urine:2025; Drains:555]    Physical Examination:  General: NAD,Conversant   Neck:  Supple, no mass  Resp:  Lungs CTA B/L, no wheezing , normal respiratory effort  CV:  RRR,  no murmur or rub, no LE edema  GI:  Soft, NT, + Bowel sounds, no hepatosplenomegaly  Neurologic:  Non focal  Psych:             AAO x 3 appropriate affect   Skin:  No Rash      []    High complexity decision making was performed  []    Patient is at high-risk of decompensation with multiple organ involvement    Lab Data Personally Reviewed: I have reviewed all the pertinent labs, microbiology data and radiology studies during assessment. Recent Labs     09/18/20  0335 09/17/20  2145 09/17/20  0540 09/16/20  0842    138 136 136   K 3.4* 3.9 3.5 3.0*   * 115* 106 106   CO2 19* 19* 25 24   * 272* 120* 165*   BUN 13 12 6 4*   CREA 0.46* 0.71 0.45* 0.54*   CA 7.3* 6.0* 8.1* 7.8*   MG 1.6 1.7 1.5* 1.6   PHOS 1.6* 2.6 2.1* 1.7*   ALB  --   --  2.2* 2.1*   ALT  --   --   --  15     Recent Labs     09/18/20  0714 09/17/20 2145 09/17/20  0902   WBC 17.7* 29.5* PLEASE DISREGARD RESULTS   HGB 8.8* 11.5 PLEASE DISREGARD RESULTS   HCT 28.8* 37.5 PLEASE DISREGARD RESULTS    110* PLEASE DISREGARD RESULTS     No results found for: SDES  Lab Results   Component Value Date/Time    Culture result: NO GROWTH 5 DAYS 09/12/2020 11:51 AM    Culture result: MIXED UROGENITAL DILCIA ISOLATED 09/11/2020 10:57 AM     Recent Results (from the past 24 hour(s))   RBC, ALLOCATE    Collection Time: 09/17/20  1:30 PM   Result Value Ref Range    HISTORY CHECKED? Historical check performed    CBC WITH AUTOMATED DIFF    Collection Time: 09/17/20  9:45 PM   Result Value Ref Range    WBC 29.5 (H) 3.6 - 11.0 K/uL    RBC 4.89 3.80 - 5.20 M/uL    HGB 11.5 11.5 - 16.0 g/dL    HCT 37.5 35.0 - 47.0 %    MCV 76.7 (L) 80.0 - 99.0 FL    MCH 23.5 (L) 26.0 - 34.0 PG    MCHC 30.7 30.0 - 36.5 g/dL    RDW 24.6 (H) 11.5 - 14.5 %    PLATELET 422 (L) 652 - 400 K/uL    MPV 9.3 8.9 - 12.9 FL    NRBC 0.1 (H) 0  WBC    ABSOLUTE NRBC 0.03 (H) 0.00 - 0.01 K/uL    NEUTROPHILS 91 (H) 32 - 75 %    LYMPHOCYTES 3 (L) 12 - 49 %    MONOCYTES 3 (L) 5 - 13 %    EOSINOPHILS 0 0 - 7 %    BASOPHILS 1 0 - 1 %    IMMATURE GRANULOCYTES 2 (H) 0.0 - 0.5 %    ABS. NEUTROPHILS 26.8 (H) 1.8 - 8.0 K/UL    ABS. LYMPHOCYTES 0.9 0.8 - 3.5 K/UL    ABS. MONOCYTES 0.9 0.0 - 1.0 K/UL    ABS. EOSINOPHILS 0.0 0.0 - 0.4 K/UL    ABS. BASOPHILS 0.3 (H) 0.0 - 0.1 K/UL    ABS. IMM.  GRANS. 0.6 (H) 0.00 - 0.04 K/UL    DF SMEAR SCANNED      PLATELET COMMENTS CLUMPED PLATELETS      RBC COMMENTS ANISOCYTOSIS  3+        RBC COMMENTS MICROCYTOSIS  1+        RBC COMMENTS MARILEE CELLS  PRESENT       METABOLIC PANEL, BASIC    Collection Time: 09/17/20  9:45 PM   Result Value Ref Range    Sodium 138 136 - 145 mmol/L    Potassium 3.9 3.5 - 5.1 mmol/L    Chloride 115 (H) 97 - 108 mmol/L    CO2 19 (L) 21 - 32 mmol/L    Anion gap 4 (L) 5 - 15 mmol/L    Glucose 272 (H) 65 - 100 mg/dL    BUN 12 6 - 20 MG/DL    Creatinine 0.71 0.55 - 1.02 MG/DL    BUN/Creatinine ratio 17 12 - 20      GFR est AA >60 >60 ml/min/1.73m2    GFR est non-AA >60 >60 ml/min/1.73m2    Calcium 6.0 (LL) 8.5 - 10.1 MG/DL   MAGNESIUM    Collection Time: 09/17/20  9:45 PM   Result Value Ref Range    Magnesium 1.7 1.6 - 2.4 mg/dL   PHOSPHORUS    Collection Time: 09/17/20  9:45 PM   Result Value Ref Range    Phosphorus 2.6 2.6 - 4.7 MG/DL   GLUCOSE, POC    Collection Time: 09/17/20 10:47 PM   Result Value Ref Range    Glucose (POC) 282 (H) 65 - 100 mg/dL    Performed by Brandon Young    METABOLIC PANEL, BASIC    Collection Time: 09/18/20  3:35 AM   Result Value Ref Range    Sodium 136 136 - 145 mmol/L    Potassium 3.4 (L) 3.5 - 5.1 mmol/L    Chloride 115 (H) 97 - 108 mmol/L    CO2 19 (L) 21 - 32 mmol/L    Anion gap 2 (L) 5 - 15 mmol/L    Glucose 192 (H) 65 - 100 mg/dL    BUN 13 6 - 20 MG/DL    Creatinine 0.46 (L) 0.55 - 1.02 MG/DL    BUN/Creatinine ratio 28 (H) 12 - 20      GFR est AA >60 >60 ml/min/1.73m2    GFR est non-AA >60 >60 ml/min/1.73m2    Calcium 7.3 (L) 8.5 - 10.1 MG/DL   MAGNESIUM    Collection Time: 09/18/20  3:35 AM   Result Value Ref Range    Magnesium 1.6 1.6 - 2.4 mg/dL   PHOSPHORUS    Collection Time: 09/18/20  3:35 AM   Result Value Ref Range    Phosphorus 1.6 (L) 2.6 - 4.7 MG/DL   GLUCOSE, POC    Collection Time: 09/18/20  6:49 AM   Result Value Ref Range    Glucose (POC) 184 (H) 65 - 100 mg/dL    Performed by Amee Mirza    CBC WITH AUTOMATED DIFF    Collection Time: 09/18/20  7:14 AM   Result Value Ref Range    WBC 17.7 (H) 3.6 - 11.0 K/uL    RBC 3.75 (L) 3.80 - 5.20 M/uL    HGB 8.8 (L) 11.5 - 16.0 g/dL    HCT 28.8 (L) 35.0 - 47.0 %    MCV 76.8 (L) 80.0 - 99.0 FL    MCH 23.5 (L) 26.0 - 34.0 PG    MCHC 30.6 30.0 - 36.5 g/dL    RDW 23.6 (H) 11.5 - 14.5 %    PLATELET 777 568 - 183 K/uL    MPV 8.9 8.9 - 12.9 FL    NRBC 0.0 0  WBC    ABSOLUTE NRBC 0.00 0.00 - 0.01 K/uL    NEUTROPHILS 85 (H) 32 - 75 %    LYMPHOCYTES 8 (L) 12 - 49 %    MONOCYTES 5 5 - 13 %    EOSINOPHILS 0 0 - 7 %    BASOPHILS 0 0 - 1 %    IMMATURE GRANULOCYTES 2 (H) 0.0 - 0.5 %    ABS. NEUTROPHILS 15.0 (H) 1.8 - 8.0 K/UL    ABS. LYMPHOCYTES 1.4 0.8 - 3.5 K/UL    ABS. MONOCYTES 0.9 0.0 - 1.0 K/UL    ABS. EOSINOPHILS 0.0 0.0 - 0.4 K/UL    ABS. BASOPHILS 0.0 0.0 - 0.1 K/UL    ABS. IMM. GRANS. 0.4 (H) 0.00 - 0.04 K/UL    DF SMEAR SCANNED      PLATELET COMMENTS CLUMPED PLATELETS      RBC COMMENTS ANISOCYTOSIS  2+        RBC COMMENTS MICROCYTOSIS  1+        RBC COMMENTS HYPOCHROMIA  1+        RBC COMMENTS MARILEE CELLS  PRESENT                     Taylor Mccall MD  [address] [place] [address], [address]  Phone - [phone]   Fax - [phone]  [email or web address]

## 2020-09-18 NOTE — PERIOP NOTES
TRANSFER - OUT REPORT:    Verbal report given to Laya Bustamante(name) on Aurelio Gilbert  being transferred to [facility](unit) for routine post - op       Report consisted of patients Situation, Background, Assessment and   Recommendations(SBAR). Time Pre op antibiotic given:1920  Anesthesia Stop time: 56  Hogan Present on Transfer to floor:yes  Order for Hogan on Chart:yes  Discharge Prescriptions with Chart:no    Information from the following report(s) SBAR, OR Summary, Intake/Output, Recent Results, Cardiac Rhythm NSR and Alarm Parameters  was reviewed with the receiving nurse. Opportunity for questions and clarification was provided. Is the patient on 02? YES       L/Min 2       Other 0    Is the patient on a monitor? YES    Is the nurse transporting with the patient? YES    Surgical Waiting Area notified of patient's transfer from PACU? NO      The following personal items collected during your admission accompanied patient upon transfer:   Dental Appliance: Dental Appliances: None  Vision: Visual Aid: Glasses, With patient  Hearing Aid:    Jewelry: Jewelry: None  Clothing: Clothing: None  Other Valuables: Other Valuables:  At bedside(glasses at bedside in pacu)  Valuables sent to safe:

## 2020-09-18 NOTE — PROGRESS NOTES
Problem: Mobility Impaired (Adult and Pediatric)  Goal: *Acute Goals and Plan of Care (Insert Text)  Description: FUNCTIONAL STATUS PRIOR TO ADMISSION: Patient was independent and active without use of DME. Driving. No falls. HOME SUPPORT PRIOR TO ADMISSION: The patient lived alone and has friends to provide assistance at d/c as needed. Physical Therapy Goals  Initiated 9/18/2020  1. Patient will move from supine to sit and sit to supine , scoot up and down, and roll side to side in bed with modified independence within 7 day(s). 2.  Patient will transfer from bed to chair and chair to bed with modified independence using the least restrictive device within 7 day(s). 3.  Patient will perform sit to stand with modified independence within 7 day(s). 4.  Patient will ambulate with modified independence for 150 feet with the least restrictive device within 7 day(s). 5.  Patient will ascend/descend 12 stairs with bilateral handrail(s) with modified independence within 7 day(s). 6.  Patient will improve Tinetti score by 4-5 points within 7 days.      Outcome: Progressing Towards Goal   PHYSICAL THERAPY EVALUATION  Patient: Shalonda Espinosa (36 y.o. female)  Date: 9/18/2020  Primary Diagnosis: Pelvic mass [R19.00]  Hydronephrosis [N13.30]  Procedure(s) (LRB):  Open low anterior resection, mobilization of splenic flexture, coloproctostomy, creation of loop ileostomy (E R A S) (N/A)  TOTAL ABDOMINAL HYSTERECTOMY, LEFT SALPINGO OOPHORECTOMY (N/A)  Cystoscopy, insertion and removal of left Ureteral catheter, simple pepper, distal right ureterectomy, right ureteral reimplant, right stent placement (Left) 1 Day Post-Op   Precautions:   Fall(ERAS)    ASSESSMENT  Based on the objective data described below, the patient presents with dizziness (on epidural for pain management), generalized weakness, impaired balance, and decreased activity tolerance following admission for abdominal pain and now POD 1 from extensive surgery described above. Receiving fluid bolus after hypotension with mobility earlier this PM. Attempted standing marching at chair but required mod A and return to chair due to dizziness. BP actually increased with activity (101/55 to 118/48 after activity) but patient increasingly symptomatic. Educated on seated exercise and typical mobility progression. Will mobilize with nursing over the weekend and therapy to follow up Monday. If continues to be symptomatic with activity, recommend 2 assist in setting of line management and impaired balance. Hopeful for good progress with acute PT and d/c home with HHPT, however PT will continue to assess. Current Level of Function Impacting Discharge (mobility/balance): CGA-mod A    Functional Outcome Measure: The patient scored 1/28 on the Tinetti outcome measure which is indicative of high fall risk. Other factors to consider for discharge: lives alone, extensive surgery, new ostomy     Patient will benefit from skilled therapy intervention to address the above noted impairments. PLAN :  Recommendations and Planned Interventions: bed mobility training, transfer training, gait training, therapeutic exercises, neuromuscular re-education, edema management/control, patient and family training/education, and therapeutic activities      Frequency/Duration: Patient will be followed by physical therapy:  5 times a week to address goals. Recommendation for discharge: (in order for the patient to meet his/her long term goals)  To be determined: based on acute progression, hopeful home with HHPT and friend assistance    This discharge recommendation:  Has not yet been discussed the attending provider and/or case management    IF patient discharges home will need the following DME: to be determined (TBD)         SUBJECTIVE:   Patient stated I'm just so dizzy.     OBJECTIVE DATA SUMMARY:   HISTORY:    Past Medical History:   Diagnosis Date    GERD (gastroesophageal reflux disease)     Ill-defined condition     Spaulding's esophagus     Past Surgical History:   Procedure Laterality Date    COLONOSCOPY Left 9/14/2020    COLONOSCOPY performed by Lacey Esparza MD at [facility] ENDOSCOPY    HX APPENDECTOMY  age 17    HX COLONOSCOPY  circa 2010    No neoplasms. HX ENDOSCOPY  03/13/2017    Dr. Vedia Skiff ENDOSCOPY  02/13/2020    Dr. Vedia Skiff GYN      Left oopherectomy for treatment of benign mass. Personal factors and/or comorbidities impacting plan of care: PMH    Home Situation  Home Environment: Private residence  # Steps to Enter: 4  Rails to Enter: Yes  Hand Rails : Bilateral  One/Two Story Residence: Two story  # of Interior Steps: 12  Interior Rails: Both  Lift Chair Available: No  Living Alone: Yes  Support Systems: Friends \ neighbors  Patient Expects to be Discharged to[de-identified] Private residence  Current DME Used/Available at Home: None    EXAMINATION/PRESENTATION/DECISION MAKING:   Critical Behavior:  Neurologic State: Alert  Orientation Level: Oriented X4  Cognition: Follows commands  Hearing: Auditory  Auditory Impairment: None  Range Of Motion:  AROM: Generally decreased, functional  Strength:    Strength: Generally decreased, functional  Tone & Sensation:   Tone: Normal  Sensation: Intact  Coordination:  Coordination: Generally decreased, functional  Functional Mobility:  Transfers:  Sit to Stand: Contact guard assistance  Stand to Sit: Contact guard assistance  Balance:   Sitting: Intact; With support  Standing: Impaired; With support  Standing - Static: Poor  Standing - Dynamic : Poor  Ambulation/Gait Training:   Attempted standing marches x5 in front of chair, required mod A to steady and return to sitting due to dizziness  Therapeutic Exercises:   Educated on LAQ, seated marches in chair and quad sets and ankle pumps in bed    Functional Measure:  Tinetti test:    Sitting Balance: 1  Arises: 0  Attempts to Rise: 0  Immediate Standing Balance: 0  Standing Balance: 0  Nudged: 0  Eyes Closed: 0  Turn 360 Degrees - Continuous/Discontinuous: 0  Turn 360 Degrees - Steady/Unsteady: 0  Sitting Down: 0  Balance Score: 1 Balance total score  Indication of Gait: 0  R Step Length/Height: 0  L Step Length/Height: 0  R Foot Clearance: 0  L Foot Clearance: 0  Step Symmetry: 0  Step Continuity: 0  Path: 0  Trunk: 0  Walking Time: 0  Gait Score: 0 Gait total score  Total Score: 1/28 Overall total score         Tinetti Tool Score Risk of Falls  <19 = High Fall Risk  19-24 = Moderate Fall Risk  25-28 = Low Fall Risk  Tinetti ME. Performance-Oriented Assessment of Mobility Problems in Elderly Patients. Parra 66; [id number]. (Scoring Description: PT Bulletin Feb. 10, 1993)    Older adults: Lizzy Cervantes et al, 2009; n = 1000 [facility] elderly evaluated with ABC, AGUILA, ADL, and IADL)  · Mean AGUILA score for males aged 69-68 years = 26.21(3.40)  · Mean AGUILA score for females age 69-68 years = 25.16(4.30)  · Mean AGUILA score for males over 80 years = 23.29(6.02)  · Mean AGUILA score for females over 80 years = 17.20(8.32)            Physical Therapy Evaluation Charge Determination   History Examination Presentation Decision-Making   HIGH Complexity :3+ comorbidities / personal factors will impact the outcome/ POC  HIGH Complexity : 4+ Standardized tests and measures addressing body structure, function, activity limitation and / or participation in recreation  LOW Complexity : Stable, uncomplicated  Other outcome measures Tinetti 1/28  HIGH       Based on the above components, the patient evaluation is determined to be of the following complexity level: LOW     Pain Rating:  Denied pain    Activity Tolerance:   Poor and dizzy  Please refer to the flowsheet for vital signs taken during this treatment. After treatment patient left in no apparent distress:   Sitting in chair and Call bell within reach    COMMUNICATION/EDUCATION:   The patients plan of care was discussed with: Registered nurse.      Fall prevention education was provided and the patient/caregiver indicated understanding., Patient/family have participated as able in goal setting and plan of care. , and Patient/family agree to work toward stated goals and plan of care.     Thank you for this referral.  Shara Peace, PT, DPT   Time Calculation: 14 mins

## 2020-09-18 NOTE — OP NOTES
[address]   OPERATIVE REPORT    Name:  Teo Bosch  MR#:  [id number]  :  1950  ACCOUNT #:  [de-identified]  DATE OF SERVICE:  2020      PREOPERATIVE DIAGNOSIS:  Colon cancer involving right distal ureter. POSTOPERATIVE DIAGNOSIS:  Colon cancer involving right distal ureter. PROCEDURES PERFORMED:  Right ureteral reimplant, right ureteral stent placement. SURGEON:  Juliette Cruz MD    ASSISTANT:  Giancarlo Romero MD    ANESTHESIA:  General.    COMPLICATIONS:  None. SPECIMENS REMOVED:  None. IMPLANTS:  Right 6-Urdu x 26 cm double-J ureteral stent. ESTIMATED BLOOD LOSS:  Minimal.    DRAINS:  Dr. Pilar Arenas to put in VALENTINE drain. INDICATIONS FOR PROCEDURE:  The patient is a 29-year-old female who is undergoing surgery by Dr. Pilar Arenas for colon cancer. Her mass involved the right distal ureter. Dr. Khoa Hernández of Urology assisted him with the beginning of the procedure and they deemed it necessary to transect the right ureter in order to excise the mass. After Dr. Pilar Arenas completed excision of the mass and reanastomosis of the bowel, I joined the surgery to perform a right ureteral reimplant. PROCEDURE AND FINDINGS:  At the time I joined the surgery, the patient was under anesthesia with a low abdominal incision and exposure of the lower abdomen and pelvis. The transected right ureter was approximately at the level of the right iliac vessels. The bladder was mobilized by taking down the lateral peritoneal attachment. The ureter was mobilized proximally for 6 to 8 cm. With those maneuvers, there appeared to be adequate length for a tension-free anastomosis. A psoas hitch was performed fixing the right bladder dome to the right psoas muscle with an 0 Vicryl suture. A small cystostomy was then made in the right dome of the bladder. The ureter was spatulated for approximately 1 cm.   The right ureter was anastomosed to the cystotomy with running 4-0 Vicryl sutures up either side. Prior to completion of the anastomosis, a 6-Algerian x 26 cm double-J ureteral stent was advanced proximally over a wire. The wire was removed and the distal curl of the stent was placed into the bladder. The anastomosis was then completed and appeared watertight. There were no apparent complications and the patient tolerated this portion of the procedure well. Dr. Rosetta Quiroga then took over again and indicated that he would place a drain at the conclusion of his procedure.         MD RUTH ANN Bergeron/JENNIFER_I/  D:  09/17/2020 20:05  T:  09/18/2020 0:12  JOB #:  [id number]

## 2020-09-18 NOTE — ROUTINE PROCESS
Physical Therapy  9/18/2020    Order received, chart reviewed. Patient's resting BP 92/54 and c/o dizziness. Noted anesthesia to come look at epidural. Spoke with patient to gather history and assess dizziness, however she had difficulty maintaining an alert state for conversation and drifting off to sleep in between questions. RN stated patient came out of surgery at 2AM. Will hold PT at this time due to dizziness and difficulty maintaining alert state and f/u closer to lunch today for mobility. Note patient is ERAS. Thank you.     Gabriela Anglin, PT, DPT

## 2020-09-18 NOTE — PERIOP NOTES
Patient: Eugenia Alaniz MRN: [id number]  SSN: [id number]   YOB: 1950  Age: 72 y.o. Sex: female     Patient is status post Procedure(s):  Open low anterior resection, mobilization of splenic flexture, coloproctostomy, creation of loop ileostomy (E R A S)  TOTAL ABDOMINAL HYSTERECTOMY, LEFT SALPINGO OOPHORECTOMY  Cystoscopy, insertion and removal of left Ureteral catheter, simple pepper, distal right ureterectomy, right ureteral reimplant, right stent placement. Surgeon(s) and Role:  Panel 1:     * Kalen Araiza MD - Primary  Panel 2:     * Christiano Narvaez MD - Primary  Panel 3:     * Codey Anderson MD - Primary     * Inés Dutta MD - Co-Surgeon    Local/Dose/Irrigation:  See mar          PICC Triple Lumen 04/77/57 Right;Basilic (Active)   Central Line Being Utilized Yes 09/17/20 0745   Criteria for Appropriate Use Total parenteral nutrition 09/17/20 0745   Site Assessment Clean, dry, & intact 09/17/20 0745   Phlebitis Assessment 0 09/17/20 0745   Infiltration Assessment 0 09/17/20 0745   Arm Circumference (cm) 22 cm 09/15/20 1218   Date of Last Dressing Change 09/15/20 09/15/20 1218   Dressing Status Clean, dry, & intact 09/17/20 0745   External Catheter Length (cm) 0 centimeters 09/15/20 1218   Dressing Type Disk with Chlorhexadine gluconate (CHG) 09/17/20 0745   Action Taken Open ports on tubing capped 09/17/20 0745   Hub Color/Line Status Iuka Beams; Infusing 09/17/20 0745   Positive Blood Return (Site #1) Yes 09/16/20 2030   Hub Color/Line Status Red; Infusing 09/16/20 2030   Positive Blood Return (Site #2) Yes 09/16/20 2030   Hub Color/Line Status White;Capped 09/16/20 2030   Positive Blood Return (Site #3) Yes 09/16/20 2030   Alcohol Cap Used Yes 09/17/20 0745          Peripheral IV 09/14/20 Left Arm (Active)   Site Assessment Clean, dry, & intact 09/17/20 0745   Phlebitis Assessment 0 09/17/20 0745   Infiltration Assessment 0 09/17/20 0745   Dressing Status Clean, dry, & intact 09/17/20 0745   Dressing Type Transparent;Tape 09/17/20 0745   Hub Color/Line Status Pink;Capped 09/17/20 0745   Action Taken Open ports on tubing capped 09/17/20 0745   Alcohol Cap Used Yes 09/17/20 0745          Elkin-Stewart Drain 09/17/20 Left; Lower Abdomen (Active)   Status Charged 09/17/20 1955       Nephroureteral Drain 09/12/20 Right Ureter (Active)      Airway - Endotracheal Tube 09/17/20 Oral (Active)        Epidural/Intrathecal 09/17/20 Lumbar spine (comment) (Active)               Dressing/Packing:  Wound Abdomen-Dressing Type: Staples;Non adherent;4 x 4;Special tape (comment) (09/17/20 2031)  Wound Perineum-Dressing Type: Radha-pad (09/17/20 2031)

## 2020-09-18 NOTE — ANESTHESIA POSTPROCEDURE EVALUATION
Post-Anesthesia Evaluation and Assessment    Patient: Mayank Frost MRN: [id number]  SSN: [id number]    YOB: 1950  Age: 72 y.o. Sex: female      I have evaluated the patient and they are stable and ready for discharge from the PACU. Cardiovascular Function/Vital Signs  Visit Vitals  /74   Pulse 91   Temp 36.7 °C (98.1 °F)   Resp 21   Ht 5' 6\" (1.676 m)   Wt 52.2 kg (115 lb)   SpO2 99%   Breastfeeding No   BMI 18.56 kg/m²       Patient is status post General anesthesia for Procedure(s):  Open low anterior resection, mobilization of splenic flexture, coloproctostomy, creation of loop ileostomy (E R A S)  TOTAL ABDOMINAL HYSTERECTOMY, LEFT SALPINGO OOPHORECTOMY  Cystoscopy, insertion and removal of left Ureteral catheter, simple pepper, distal right ureterectomy, right ureteral reimplant, right stent placement. Nausea/Vomiting: None    Postoperative hydration reviewed and adequate. Pain:  Pain Scale 1: Numeric (0 - 10) (09/18/20 0015)  Pain Intensity 1: 0 (09/18/20 0015)   Managed    Neurological Status:   Neuro (WDL): Within Defined Limits (09/18/20 0015)  Neuro  Neurologic State: Sleeping;Eyes open spontaneously (09/18/20 0015)  Orientation Level: Oriented X4 (09/18/20 0015)  Cognition: Follows commands (09/18/20 0015)  Speech: Clear (09/18/20 0015)  LUE Motor Response: Purposeful;Weak (09/18/20 0015)  LLE Motor Response: Purposeful;Weak (09/18/20 0015)  RUE Motor Response: Purposeful;Weak (09/18/20 0015)  RLE Motor Response: Purposeful;Weak (09/18/20 0015)   At baseline    Mental Status, Level of Consciousness: Alert and  oriented to person, place, and time    Pulmonary Status:   O2 Device: Nasal cannula (09/18/20 0015)   Adequate oxygenation and airway patent    Complications related to anesthesia: None    Post-anesthesia assessment completed.  No concerns    Signed By: Andres Anglin MD     September 18, 2020              Procedure(s):  Open low anterior resection, mobilization of splenic flexture, coloproctostomy, creation of loop ileostomy (E R A S)  TOTAL ABDOMINAL HYSTERECTOMY, LEFT SALPINGO OOPHORECTOMY  Cystoscopy, insertion and removal of left Ureteral catheter, simple pepper, distal right ureterectomy, right ureteral reimplant, right stent placement. general    <BSHSIANPOST>    INITIAL Post-op Vital signs:   Vitals Value Taken Time   /63 9/18/2020  1:00 AM   Temp 36.7 °C (98.1 °F) 9/18/2020 12:15 AM   Pulse 80 9/18/2020  1:11 AM   Resp 11 9/18/2020  1:11 AM   SpO2 100 % 9/18/2020  1:11 AM   Vitals shown include unvalidated device data.

## 2020-09-18 NOTE — PROGRESS NOTES
Progress Note    Patient: Edwina Boyle MRN: [id number]  SSN: [id number]    YOB: 1950  Age: 72 y.o. Sex: female      Admit Date: 9/11/2020    LOS: 7 days     Subjective:     POD 1 pelvic lass resection with right ditsal ureterectomy and ureteral reimplant. Doing welll. Pain controlled dizzy, likely from epidural. Urine clear yellow. Cr normal.     Objective:     Vitals:    09/18/20 0130 09/18/20 0145 09/18/20 0246 09/18/20 0852   BP: 107/66 (!) 92/55 114/65 (!) 95/48   Pulse: 80 80 76 81   Resp: 12 10 10 13   Temp:   98.2 °F (36.8 °C) 97.6 °F (36.4 °C)   SpO2: 100% 100%  100%   Weight:   58.9 kg (129 lb 13.6 oz)    Height:            Intake and Output:  Current Shift: 09/18 0701 - 09/18 1900  In: 90 [P.O.:90]  Out: 430 [Urine:350; Drains:80]  Last three shifts: 09/16 1901 - 09/18 0700  In: 6363.7 [I.V.:5743.7]  Out: 3530 [Urine:2025; Drains:555]    Physical Exam:   GENERAL: alert, cooperative, no distress, appears stated age  ABDOMEN: appropriate post op    Lab/Data Review:  BMP:   Lab Results   Component Value Date/Time     09/18/2020 03:35 AM    K 3.4 (L) 09/18/2020 03:35 AM     (H) 09/18/2020 03:35 AM    CO2 19 (L) 09/18/2020 03:35 AM    AGAP 2 (L) 09/18/2020 03:35 AM     (H) 09/18/2020 03:35 AM    BUN 13 09/18/2020 03:35 AM    CREA 0.46 (L) 09/18/2020 03:35 AM    GFRAA >60 09/18/2020 03:35 AM    GFRNA >60 09/18/2020 03:35 AM     CBC:   Lab Results   Component Value Date/Time    WBC 17.7 (H) 09/18/2020 07:14 AM    HGB 8.8 (L) 09/18/2020 07:14 AM    HCT 28.8 (L) 09/18/2020 07:14 AM     09/18/2020 07:14 AM            Assessment:     Principal Problem:    Malignant neoplasm of sigmoid colon ([address] (9/14/2020)    Active Problems:    Hydronephrosis (9/11/2020)      Pelvic mass (9/11/2020)      Severe protein-calorie malnutrition ([address] (9/16/2020)      Anemia (9/16/2020)        Plan:     Doing well urologically.     Prior to hal drain removal, please check hal Cr and if it is serum level, not elevated, then ok to remove. If elevated, please call urology service. RTC 2 weeks with [facility], for cystogram and void trial.  Stent removal in 4- 6 weeks.      Signed By: Neo Larose MD     September 18, 2020

## 2020-09-18 NOTE — PROGRESS NOTES
Nutrition Education    · Verbally reviewed information with Patient  · Educated on Low Fiber diet  · Written educational materials provided. · Contact name and number provided. · Refer to Patient Education activity for more details. Pt has premiere protein at home. Pt does not like white bread. Loves eggo waffles though. Dislikes broccoli. States she is dizzy still. Gave pt handout for low fiber education.      Electronically signed by Tyree Boyle RD on 9/18/2020     Contact Number: [phone]

## 2020-09-18 NOTE — PROGRESS NOTES
Progress Note    Patient: Rodo Singh MRN: [id number]  SSN: [id number]    YOB: 1950  Age: 72 y.o. Sex: female        ADMITTED:  2020 to Thien Choe MD  for Pelvic mass [R19.00]  Hydronephrosis [N13.30]         Rodo Singh is 1 Day Post-Op Procedure(s):  Open low anterior resection, mobilization of splenic flexture, coloproctostomy, creation of loop ileostomy (E R A S)  TOTAL ABDOMINAL HYSTERECTOMY, LEFT SALPINGO OOPHORECTOMY  Cystoscopy, insertion and removal of left Ureteral catheter, simple pepper, distal right ureterectomy, right ureteral reimplant, right stent placement. Resting comfortably. Denies pain. Reported dizziness earlier in the day. VALENTINE wIth SS drainage. Output 555cc. Pepper in place with clear yellow UA. UOP 3530cc. Afebrile; 95/48  WBC: 17.7  Hgb: 8.8  Cr: 0.46  CA-125: 11  UA: wnl    Vitals:  Temp (24hrs), Av °F (36.7 °C), Min:97.6 °F (36.4 °C), Max:98.2 °F (36.8 °C)     Blood pressure (!) 95/48, pulse 81, temperature 97.6 °F (36.4 °C), resp. rate 13, height 5' 6\" (1.676 m), weight 58.9 kg (129 lb 13.6 oz), SpO2 100 %, not currently breastfeeding.       I&O's:  1901 -  07  In: 5453.1 [I.V.:4833.1]  Out: 9889 [Urine:; Drains:555]    07 - 1900  In: -   Out: 80 [Drains:80]       Labs:   Recent Labs     20  0714 20  2145 20  0902   WBC 17.7* 29.5* PLEASE DISREGARD RESULTS   HGB 8.8* 11.5 PLEASE DISREGARD RESULTS   HCT 28.8* 37.5 PLEASE DISREGARD RESULTS    110* PLEASE DISREGARD RESULTS     Recent Labs     20  0335 20  2145 20  0540    138 136   K 3.4* 3.9 3.5   * 115* 106   CO2 19* 19* 25   * 272* 120*   BUN 13 12 6   CREA 0.46* 0.71 0.45*   CA 7.3* 6.0* 8.1*        Cultures:      Imaging:       Assessment:     - 1 Day Post-Op Procedure(s):  Open low anterior resection, mobilization of splenic flexture, coloproctostomy, creation of loop ileostomy (E R A S)  TOTAL ABDOMINAL HYSTERECTOMY, LEFT SALPINGO OOPHORECTOMY  Cystoscopy, insertion and removal of left Ureteral catheter, simple pepper, distal right ureterectomy, right ureteral reimplant, right stent placement    Principal Problem:    Malignant neoplasm of sigmoid colon ([facility] [address] (9/14/2020)    Active Problems:    Hydronephrosis (9/11/2020)      Pelvic mass (9/11/2020)      Severe protein-calorie malnutrition ([address] (9/16/2020)      Anemia (9/16/2020)        Plan:      Pepper x1 week and void trial  Please leave stent in place for 4-6 weeks  Drain d/c per colorectal. Recommend if output low to remove, if output is high please check VALENTINE creat  Will see over the weekend    Case discussed with Dr. Citlali Bobo By: Arci Cedlilo NP - September 18, 2020

## 2020-09-18 NOTE — PROGRESS NOTES
Anesthesia Pain Rounds    Post op Day 1, pt pain is well controlled. She has ambulated. Blood Pressure has been running low SBP 80s  and patient c/o dizziness. Epidural is only at 2 ml/hr. Most likely, hypovolemia. Will give LR 600ml bolus and staff will reassess.     Hussain Ribeiro MD

## 2020-09-18 NOTE — PROGRESS NOTES
Problem: Falls - Risk of  Goal: *Absence of Falls  Description: Document Genesis Cm Fall Risk and appropriate interventions in the flowsheet. Outcome: Progressing Towards Goal  Note: Fall Risk Interventions:  Mobility Interventions: PT Consult for mobility concerns, OT consult for ADLs, Utilize walker, cane, or other assistive device, Strengthening exercises (ROM-active/passive)         Medication Interventions: Teach patient to arise slowly, Patient to call before getting OOB    Elimination Interventions: Call light in reach    History of Falls Interventions: Assess for delayed presentation/identification of injury for 48 hrs (comment for end date), Evaluate medications/consider consulting pharmacy, Investigate reason for fall         Problem: Pain  Goal: *Control of Pain  Outcome: Progressing Towards Goal     Problem: Discharge Planning  Goal: *Knowledge of medication management  Outcome: Progressing Towards Goal     Problem: General Medical Care Plan  Goal: *Absence of infection signs and symptoms  Outcome: Progressing Towards Goal  Goal: *Optimal pain control at patient's stated goal  Outcome: Progressing Towards Goal  Goal: *Fluid volume balance  Outcome: Progressing Towards Goal  Goal: *Optimize nutritional status  Outcome: Progressing Towards Goal  Goal: *Anxiety reduced or absent  Outcome: Progressing Towards Goal     Problem: Pressure Injury - Risk of  Goal: *Prevention of pressure injury  Description: Document Andry Scale and appropriate interventions in the flowsheet.   Outcome: Progressing Towards Goal  Note: Pressure Injury Interventions:  Sensory Interventions: Assess changes in LOC    Moisture Interventions: Absorbent underpads    Activity Interventions: Assess need for specialty bed    Mobility Interventions: Trapeze to reposition, Suspension boots, Pressure redistribution bed/mattress (bed type), HOB 30 degrees or less    Nutrition Interventions: Document food/fluid/supplement intake(and on TPN)

## 2020-09-18 NOTE — WOUND CARE
Ostomy Visit: post op day #1 from:  Low anterior resection, mobilization of the splenic flexure, coloproctostomy, and creation of loop ileostomy with Dr. Syed Mcgraw. [Total abdominal hysterectomy and left salpingo-oophorectomy performed by Skip Montiel MD]  [Cystoscopy and placement of left ureteral catheter performed by Kota Jaimes MD]  [Distal right ureterectomy performed by Beatriz Savage III, MD]  [Right ureteral re-implant with psoas hitch and placement of right ureteral stent performed by Nohemi Gutiérrez MD]  Donna Aragon is resting on a [employer] bed with accumax mattress. She reports feeling dizzy at baseline in bed and a little groggy this morning. 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 Appears pale. Introduced self, role and plan for ostomy teaching. She includes herself only as learner (lives alone). Once her post op symptoms resolve, believe she will be able to comprehend and provide self care with her ostomy. Assessment:  RLQ ileostomy - moist pink stoma, soft; just fitting in two piece appliance that is in place post op; red rubber tube as darren horizontally under stoma with sutures on either side. No output or gas in appliance. Currently stoma is not rubbing on ring of appliance, however, if she requires fluids today, may have edema - will monitor and recheck later today. Midline abdominal dressing in place without drainage noted. Plan:  Brought packet of written literature and put at bedside for review this weekend. If current symptoms corrected by afternoon, will work with her to begin teaching. Monday she will hopefully change herself with guidance. Will check back on her later today. 1420: Addendum: Back by to check on patient; she walked a little way out into kim with her nurse Trent and then to chair. Her stoma is pink, not rubbing on appliance ring. NO output even serous in appliance nor gas.  Hailey Jimenez has not had to empty all shift and notes same. One episode of emesis after drinking earlier today; patient denies nausea or hiccups. 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 She still reports feeling dizzy.   Carlos Enrique Toribio RN,CWCN

## 2020-09-18 NOTE — ADVANCED PRACTICE NURSE
Checked on patient with epidural (hydromorphone with dilaudid). Patient reports no pain. Epidural is at 4 mL/hr. Looked back 11 hours and patient has not attempted any bolus doses. No reported side effects.   Did have some nausea but thinks she drank too much water earlier

## 2020-09-18 NOTE — PROGRESS NOTES
[facility]  [address] [facility], [address], [address]  P [phone]  F [phone]       Patient Name: Andrew South Date: 9/11/2020   OR Date: 9/17/2020       Visit Date: 9/18/2020        SUBJECTIVE:    1 Day Post-Op Procedure(s):  Open low anterior resection, mobilization of splenic flexture, coloproctostomy, creation of loop ileostomy (E R A S)  TOTAL ABDOMINAL HYSTERECTOMY, LEFT SALPINGO OOPHORECTOMY  Cystoscopy, insertion and removal of left Ureteral catheter, simple pepper, distal right ureterectomy, right ureteral reimplant, right stent placement for COLON CANCER    Complains of dizziness this morning. Dr. Chase Lyons aware and Anesthesia to come evaluate given epidural. Otherwise patient is drowsy, but alert and oriented. Denies significant pain. Lying in bed.      OBJECTIVE:    Patient Vitals for the past 24 hrs:   Temp Pulse Resp BP SpO2   09/18/20 0852 97.6 °F (36.4 °C) 81 13 (!) 95/48 100 %   09/18/20 0246 98.2 °F (36.8 °C) 76 10 114/65 --   09/18/20 0145 -- 80 10 (!) 92/55 100 %   09/18/20 0130 -- 80 12 107/66 100 %   09/18/20 0030 -- 91 21 123/74 99 %   09/18/20 0015 98.1 °F (36.7 °C) 82 12 (!) 97/57 100 %   09/17/20 2345 -- 87 12 105/63 100 %   09/17/20 2330 -- 84 13 117/71 100 %   09/17/20 2315 -- 85 13 (!) 92/58 99 %   09/17/20 2300 -- 97 15 101/62 99 %   09/17/20 2245 -- 88 17 103/63 100 %   09/17/20 2230 -- 84 12 (!) 89/55 99 %   09/17/20 2215 -- 83 13 (!) 92/57 99 %   09/17/20 2200 -- 86 15 (!) 103/59 100 %   09/17/20 2145 -- 89 15 (!) 98/58 99 %   09/17/20 2140 -- 95 17 105/62 99 %   09/17/20 2138 -- 96 16 (!) 80/54 98 %   09/17/20 2135 97.9 °F (36.6 °C) 95 18 (!) 86/49 98 %   09/17/20 2133 -- -- -- (!) 86/53 --       Date 09/17/20 0700 - 09/18/20 0659 09/18/20 0700 - 09/19/20 0659   Shift 9676-0110 6363-0640 24 Hour Total 5519-4130 3494-7843 24 Hour Total   INTAKE   I.V.(mL/kg/hr) 1000(1.6) 2700(3.8) 3700(2.6)        Volume (lactated Ringers infusion)  700 700 Volume (0.9% sodium chloride infusion) 1000 2000 3000      Blood 620  620        Volume (TRANSFUSE PACKED RBC'S) 310  310        Volume (TRANSFUSE PACKED RBC'S) 310  310      Shift Total(mL/kg) 1620(31.1) 2700(45.8) 0387(22.8)      OUTPUT   Urine(mL/kg/hr) 1100(1.8) 925(1.3) 2025(1.4)        Urine Output 2499 674 2745        Urine Output (mL) (Urinary Catheter 09/17/20 2- way; [place])  675 675      Drains  555 555 80  80     Output (ml) ([place]-Stewart Drain 09/17/20 Left; Lower Abdomen)  555 555 80  80   Stool  0 0        Stool Occurrence(s)  3 x 3 x        Output (ml) (Ileostomy 09/17/20 Right ;Mid Abdomen)  0 0      Blood 950  950        Estimated Blood Loss 950  950      Shift Total(mL/kg) 2050(39.3) 1480(25.1) 3530(59.9) 80(1.4)  80(1.4)   NET -430 1220 790 -80  -80   Weight (kg) 52.2 58.9 58.9 58.9 58.9 58.9       Physical Exam     General:  alert, cooperative, no distress  Abdomen:  Soft, appropriately tender       Wound:  clean, dry   Extremity: extremities normal, atraumatic, no cyanosis or edema    Date Review  Lab Results   Component Value Date/Time    WBC 17.7 (H) 09/18/2020 07:14 AM    ABS. NEUTROPHILS 15.0 (H) 09/18/2020 07:14 AM    HGB 8.8 (L) 09/18/2020 07:14 AM    HCT 28.8 (L) 09/18/2020 07:14 AM    MCV 76.8 (L) 09/18/2020 07:14 AM    MCH 23.5 (L) 09/18/2020 07:14 AM    PLATELET 140 90/14/6517 07:14 AM     Lab Results   Component Value Date/Time    Sodium 136 09/18/2020 03:35 AM    Potassium 3.4 (L) 09/18/2020 03:35 AM    Chloride 115 (H) 09/18/2020 03:35 AM    CO2 19 (L) 09/18/2020 03:35 AM    Glucose 192 (H) 09/18/2020 03:35 AM    BUN 13 09/18/2020 03:35 AM    Creatinine 0.46 (L) 09/18/2020 03:35 AM    Calcium 7.3 (L) 09/18/2020 03:35 AM    Albumin 2.2 (L) 09/17/2020 05:40 AM    Bilirubin, total 0.2 09/16/2020 08:42 AM    ALT (SGPT) 15 09/16/2020 08:42 AM    Alk.  phosphatase 76 09/16/2020 08:42 AM     IMPRESSION/PLAN:    1 Day Post-Op Procedure(s):  Open low anterior resection, mobilization of splenic flexture, coloproctostomy, creation of loop ileostomy (E R A S)  TOTAL ABDOMINAL HYSTERECTOMY, LEFT SALPINGO OOPHORECTOMY  Cystoscopy, insertion and removal of left Ureteral catheter, simple pepper, distal right ureterectomy, right ureteral reimplant, right stent placement for COLON CANCER    Oncologic:  Locally advanced colon cancer. Plan per primary team. S/p ORLANDO/LSO (already surgically absent right ovary) at time of surgery with Dr. Kimberlyn Peoples, given involvement with primary colonic tumor. Dispostion:  Doing well POD #1. Postop care per primary team. Patient to follow-up with myself (Gyn Onc) in 6 weeks for postoperative visit. Gyn Onc will follow from [facility]. Do not hesitate to contact our team with any questions or concerns.          Chela Mcginnis MD

## 2020-09-19 PROBLEM — D69.6 THROMBOCYTOPENIA (HCC): Status: ACTIVE | Noted: 2020-09-19

## 2020-09-19 LAB
ANION GAP SERPL CALC-SCNC: 4 MMOL/L (ref 5–15)
BASOPHILS # BLD: 0 K/UL (ref 0–0.1)
BASOPHILS NFR BLD: 0 % (ref 0–1)
BUN SERPL-MCNC: 21 MG/DL (ref 6–20)
BUN/CREAT SERPL: 37 (ref 12–20)
CALCIUM SERPL-MCNC: 7.6 MG/DL (ref 8.5–10.1)
CHLORIDE SERPL-SCNC: 110 MMOL/L (ref 97–108)
CO2 SERPL-SCNC: 23 MMOL/L (ref 21–32)
CREAT SERPL-MCNC: 0.57 MG/DL (ref 0.55–1.02)
DIFFERENTIAL METHOD BLD: ABNORMAL
EOSINOPHIL # BLD: 0.4 K/UL (ref 0–0.4)
EOSINOPHIL NFR BLD: 2 % (ref 0–7)
ERYTHROCYTE [DISTWIDTH] IN BLOOD BY AUTOMATED COUNT: 24.7 % (ref 11.5–14.5)
GLUCOSE BLD STRIP.AUTO-MCNC: 126 MG/DL (ref 65–100)
GLUCOSE BLD STRIP.AUTO-MCNC: 134 MG/DL (ref 65–100)
GLUCOSE BLD STRIP.AUTO-MCNC: 138 MG/DL (ref 65–100)
GLUCOSE BLD STRIP.AUTO-MCNC: 141 MG/DL (ref 65–100)
GLUCOSE BLD STRIP.AUTO-MCNC: 144 MG/DL (ref 65–100)
GLUCOSE SERPL-MCNC: 112 MG/DL (ref 65–100)
HCT VFR BLD AUTO: 25.1 % (ref 35–47)
HGB BLD-MCNC: 10.2 G/DL (ref 11.5–16)
HGB BLD-MCNC: 10.5 G/DL (ref 11.5–16)
HGB BLD-MCNC: 11.6 G/DL (ref 11.5–16)
HGB BLD-MCNC: 12 G/DL (ref 11.5–16)
HGB BLD-MCNC: 7.1 G/DL (ref 11.5–16)
HGB BLD-MCNC: 7.8 G/DL (ref 11.5–16)
HGB BLD-MCNC: 8.4 G/DL (ref 11.5–16)
HGB BLD-MCNC: 9.9 G/DL (ref 11.5–16)
IMM GRANULOCYTES # BLD AUTO: 0 K/UL
IMM GRANULOCYTES NFR BLD AUTO: 0 %
LYMPHOCYTES # BLD: 1 K/UL (ref 0.8–3.5)
LYMPHOCYTES NFR BLD: 5 % (ref 12–49)
MAGNESIUM SERPL-MCNC: 1.5 MG/DL (ref 1.6–2.4)
MCH RBC QN AUTO: 23.8 PG (ref 26–34)
MCHC RBC AUTO-ENTMCNC: 31.1 G/DL (ref 30–36.5)
MCV RBC AUTO: 76.5 FL (ref 80–99)
METAMYELOCYTES NFR BLD MANUAL: 1 %
MONOCYTES # BLD: 1 K/UL (ref 0–1)
MONOCYTES NFR BLD: 5 % (ref 5–13)
NEUTS BAND NFR BLD MANUAL: 2 % (ref 0–6)
NEUTS SEG # BLD: 16.9 K/UL (ref 1.8–8)
NEUTS SEG NFR BLD: 85 % (ref 32–75)
NRBC # BLD: 0 K/UL (ref 0–0.01)
NRBC BLD-RTO: 0 PER 100 WBC
PHOSPHATE SERPL-MCNC: 2.9 MG/DL (ref 2.6–4.7)
PLATELET # BLD AUTO: 101 K/UL (ref 150–400)
PMV BLD AUTO: 10.1 FL (ref 8.9–12.9)
POTASSIUM SERPL-SCNC: 3.4 MMOL/L (ref 3.5–5.1)
RBC # BLD AUTO: 3.28 M/UL (ref 3.8–5.2)
RBC MORPH BLD: ABNORMAL
SERVICE CMNT-IMP: ABNORMAL
SODIUM SERPL-SCNC: 137 MMOL/L (ref 136–145)
WBC # BLD AUTO: 19.4 K/UL (ref 3.6–11)

## 2020-09-19 PROCEDURE — 74011250636 HC RX REV CODE- 250/636: Performed by: COLON & RECTAL SURGERY

## 2020-09-19 PROCEDURE — 65660000000 HC RM CCU STEPDOWN

## 2020-09-19 PROCEDURE — 85025 COMPLETE CBC W/AUTO DIFF WBC: CPT

## 2020-09-19 PROCEDURE — 74011250637 HC RX REV CODE- 250/637: Performed by: COLON & RECTAL SURGERY

## 2020-09-19 PROCEDURE — 83735 ASSAY OF MAGNESIUM: CPT

## 2020-09-19 PROCEDURE — 82962 GLUCOSE BLOOD TEST: CPT

## 2020-09-19 PROCEDURE — 36415 COLL VENOUS BLD VENIPUNCTURE: CPT

## 2020-09-19 PROCEDURE — 74011000258 HC RX REV CODE- 258: Performed by: COLON & RECTAL SURGERY

## 2020-09-19 PROCEDURE — 80048 BASIC METABOLIC PNL TOTAL CA: CPT

## 2020-09-19 PROCEDURE — 74011000250 HC RX REV CODE- 250: Performed by: COLON & RECTAL SURGERY

## 2020-09-19 PROCEDURE — 84100 ASSAY OF PHOSPHORUS: CPT

## 2020-09-19 RX ORDER — ACETAMINOPHEN 500 MG
1000 TABLET ORAL EVERY 6 HOURS
Status: DISCONTINUED | OUTPATIENT
Start: 2020-09-19 | End: 2020-09-22

## 2020-09-19 RX ADMIN — CELECOXIB 100 MG: 100 CAPSULE ORAL at 17:39

## 2020-09-19 RX ADMIN — ALVIMOPAN 12 MG: 12 CAPSULE ORAL at 09:32

## 2020-09-19 RX ADMIN — ACETAMINOPHEN 1000 MG: 500 TABLET ORAL at 15:40

## 2020-09-19 RX ADMIN — ASCORBIC ACID: 500 INJECTION, SOLUTION INTRAMUSCULAR; INTRAVENOUS; SUBCUTANEOUS at 19:42

## 2020-09-19 RX ADMIN — ALVIMOPAN 12 MG: 12 CAPSULE ORAL at 17:39

## 2020-09-19 RX ADMIN — PANTOPRAZOLE SODIUM 40 MG: 40 TABLET, DELAYED RELEASE ORAL at 15:50

## 2020-09-19 RX ADMIN — Medication 10 ML: at 22:00

## 2020-09-19 RX ADMIN — Medication 10 ML: at 14:17

## 2020-09-19 RX ADMIN — Medication 10 ML: at 06:51

## 2020-09-19 RX ADMIN — ONDANSETRON 4 MG: 4 TABLET, ORALLY DISINTEGRATING ORAL at 22:34

## 2020-09-19 RX ADMIN — HEPARIN SODIUM 5000 UNITS: 5000 INJECTION INTRAVENOUS; SUBCUTANEOUS at 09:32

## 2020-09-19 RX ADMIN — ONDANSETRON 4 MG: 4 TABLET, ORALLY DISINTEGRATING ORAL at 17:45

## 2020-09-19 RX ADMIN — ACETAMINOPHEN 1000 MG: 650 SOLUTION ORAL at 09:34

## 2020-09-19 RX ADMIN — CELECOXIB 100 MG: 100 CAPSULE ORAL at 06:37

## 2020-09-19 RX ADMIN — PANTOPRAZOLE SODIUM 40 MG: 40 TABLET, DELAYED RELEASE ORAL at 06:37

## 2020-09-19 NOTE — ROUTINE PROCESS
1110:  Will place thigh high TEDs after bath. Let pt know not to have her SCD off to do leg exercise-to keep it on while moving her legs as she is at high risk for blood clots. Pt voiced she wanted it off for some easier mobility moving her legs in bed. I added that her doctor is concern of blood clots and wanted it off.    1144:  Pt sitting in chair after bath, thigh high TEDS intact. 1734:  Pt sitting in chiar post walking about 200 ft. Longest distance thus far. Noted patient walking independently faster with each walk. No c/o dizziness at this time. 1930:  Pt met her goal of 3 brisk walk in the hallway today. Pain is controlled. HR does increase during and post ambulation but comes back down. Pt not in any distress during any of the walks.

## 2020-09-19 NOTE — PROGRESS NOTES
Anesthesia post op pain 2 Days Post-Op       Patient is s/p surgery and with an epidural for post operative pain. Epidural site is in intact and site is dry and infusing well. Current rate is 2cc's/hr. The patient relates the following scales: pain,none ; itching, none ; nausea, none. All sympyoms were treated with medications per protocol. Plan: Continue the epidural and treat breakthrough symptoms as needed with protocol medictions.

## 2020-09-19 NOTE — PROGRESS NOTES
Problem: Falls - Risk of  Goal: *Absence of Falls  Description: Document Ivin Cespedes Fall Risk and appropriate interventions in the flowsheet.   Outcome: Progressing Towards Goal  Note: Fall Risk Interventions:  Mobility Interventions: Assess mobility with egress test, PT Consult for mobility concerns, Communicate number of staff needed for ambulation/transfer         Medication Interventions: Teach patient to arise slowly, Patient to call before getting OOB    Elimination Interventions: Call light in reach    History of Falls Interventions: Utilize gait belt for transfer/ambulation         Problem: Pain  Goal: *Control of Pain  Outcome: Progressing Towards Goal     Problem: General Medical Care Plan  Goal: *Vital signs within specified parameters  Outcome: Progressing Towards Goal  Goal: *Labs within defined limits  Outcome: Progressing Towards Goal  Goal: *Absence of infection signs and symptoms  Outcome: Progressing Towards Goal  Goal: *Optimal pain control at patient's stated goal  Outcome: Progressing Towards Goal  Goal: *Skin integrity maintained  Outcome: Progressing Towards Goal  Goal: *Fluid volume balance  Outcome: Progressing Towards Goal  Goal: *Optimize nutritional status  Outcome: Progressing Towards Goal

## 2020-09-19 NOTE — PROGRESS NOTES
Problem: Falls - Risk of  Goal: *Absence of Falls  Description: Document Brad Smith Fall Risk and appropriate interventions in the flowsheet.   Outcome: Progressing Towards Goal  Note: Fall Risk Interventions:  Mobility Interventions: Patient to call before getting OOB         Medication Interventions: Teach patient to arise slowly    Elimination Interventions: Call light in reach    History of Falls Interventions: Utilize gait belt for transfer/ambulation         Problem: Patient Education: Go to Patient Education Activity  Goal: Patient/Family Education  Outcome: Progressing Towards Goal     Problem: Pain  Goal: *Control of Pain  Outcome: Progressing Towards Goal     Problem: Discharge Planning  Goal: *Discharge to safe environment  Outcome: Progressing Towards Goal  Goal: *Knowledge of medication management  Outcome: Progressing Towards Goal  Goal: *Knowledge of discharge instructions  Outcome: Progressing Towards Goal     Problem: Patient Education: Go to Patient Education Activity  Goal: Patient/Family Education  Outcome: Progressing Towards Goal     Problem: General Medical Care Plan  Goal: *Vital signs within specified parameters  Outcome: Progressing Towards Goal  Goal: *Labs within defined limits  Outcome: Progressing Towards Goal  Goal: *Absence of infection signs and symptoms  Outcome: Progressing Towards Goal  Goal: *Optimal pain control at patient's stated goal  Outcome: Progressing Towards Goal  Goal: *Skin integrity maintained  Outcome: Progressing Towards Goal  Goal: *Fluid volume balance  Outcome: Progressing Towards Goal  Goal: *Optimize nutritional status  Outcome: Progressing Towards Goal  Goal: *Anxiety reduced or absent  Outcome: Progressing Towards Goal  Goal: *Progressive mobility and function (eg: ADL's)  Outcome: Progressing Towards Goal     Problem: Patient Education: Go to Patient Education Activity  Goal: Patient/Family Education  Outcome: Progressing Towards Goal     Problem: Nutrition Deficit  Goal: *Optimize nutritional status  Outcome: Progressing Towards Goal     Problem: Pressure Injury - Risk of  Goal: *Prevention of pressure injury  Description: Document Andry Scale and appropriate interventions in the flowsheet.   Outcome: Progressing Towards Goal  Note: Pressure Injury Interventions:  Sensory Interventions: Assess changes in LOC    Moisture Interventions: Absorbent underpads    Activity Interventions: Assess need for specialty bed    Mobility Interventions: Trapeze to reposition    Nutrition Interventions: Document food/fluid/supplement intake

## 2020-09-19 NOTE — ROUTINE PROCESS
Bedside and Verbal shift change report given to Rafiq Roberts (oncoming nurse) by Carole Garcia (offgoing nurse). Report included the following information SBAR, Kardex, Intake/Output, MAR, Recent Results and Cardiac Rhythm NSR.

## 2020-09-19 NOTE — OP NOTES
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 PROCEDURES PERFORMED:  Low anterior resection, mobilization of splenic flexure, coloproctostomy, and creation of loop ileostomy. SURGEON:  Malissa Cheng MD    ASSISTANTS:  Danielle Gardner SA and Felton Hopkins    ANESTHESIA:  General endotracheal and epidural.    SPECIMENS REMOVED:  1. Sigmoid colon, left fallopian tube, uterus, left ovary, proximal rectum, right ureteral segment. 2.  Distal rectal margin. 3.  Anastomotic donuts. TUBES AND DRAINS:  10-mm Elkin-Stewart drain in the pelvis. ESTIMATED BLOOD LOSS:  950 mL. IV FLUIDS:  Crystalloid 3600 mL; albumin 500 mL. URINE OUTPUT:  1350 mL. PACKED RED BLOOD CELLS:  2 units. COMPLICATIONS:  None apparent. IMPLANTS:  None. INDICATIONS FOR PROCEDURE:  The patient is a 71-year-old female who has been experiencing abdominal pain for a period of weeks. This pain has been accompanied by unintentional weight loss and a change in her stools. She was evaluated by Dr. Maurice Almeida and scheduled for a colonoscopy to be performed on 09/15/2020. A CT scan of the abdomen and pelvis was also ordered, and that was performed as an outpatient test on 09/10/2020. The CT scan was interpreted as revealing a large pelvic mass, possibly arising from the sigmoid colon. After the scan, the patient experienced a marked increase in her pain, particularly in the right flank. She presented to the emergency room and was admitted to the hospital on 2020 for further evaluation and treatment. The pain was attributed to right-sided hydronephrosis secondary to compression of the right ureter by the pelvic mass.  The hydronephrosis was treated on 09/12/2020 by placement of a right ureteral stent by Ayla Horn MD.  Subsequently, the patient had much less pain and the workup of the pelvic mass continued. She underwent a bowel preparation on 09/13/2020 and a colonoscopy performed by Dr. Celsa Young on 09/14/2020. The colonoscopy revealed a large fungating circumferential and partially-obstructing tumor of the proximal sigmoid colon. It was not possible to advance the scope beyond the mass. Biopsies were obtained and were later reported to be adenocarcinoma most consistent with a colonic primary. The need for surgical resection, which would be complicated by apparent involvement of the uterus and also possibly the right ovary, was explained. The Gynecologic Oncology and Urology consultants were asked to give their opinions and to participate in care of this patient, and they did so. The plan therefore became to perform an open resection of the sigmoid colon along with the uterus and left ovary and possibly the right ureteral segment. The risks were discussed in great detail, and the patient agreed to proceed. OPERATIVE FINDINGS:  There was a nearly-obstructing sigmoid colon mass with apparent invasion into another portion of the sigmoid, invasion into the uterus, and encasement of the right ureter. There was no evidence of distant metastatic disease. The colon proximal to the mass was dilated all the way to the ileocecal valve. DESCRIPTION OF PROCEDURE:  After informed consent was obtained and after an epidural catheter had been placed by the anesthesiologist, the patient was taken to the operating room and placed in the supine position on the operating table. Standard monitoring devices were attached and adequate general endotracheal anesthesia was induced.   She was then repositioned into lithotomy with the lower extremities fitted with pneumatic compression stockings and supported by the padded hydraulic Tono dyson. The perineum was prepped and draped in the usual sterile fashion, and Manish Morrell MD performed cystoscopy and placement of a left ureteral catheter and a Hogan catheter. Once the urologic procedure had been completed, digital rectal examination was performed followed by the insertion of a 3-way Hogan catheter with a 30 mL balloon into the rectum. Using this catheter, copious rectal and distal sigmoid irrigation was performed using several hundred milliliters of sterile water until the effluent ran clear. The lumen was then irrigated with Betadine solution and the catheter was left to gravity drainage until much later in the case when its removal was required. The patient was repositioned into a low lithotomy with a gel pad beneath the sacrum and both upper extremities tucked. An orogastric tube was inserted by the anesthetist.  2 g of cefotetan was administered parenterally. The abdomen had been marked bilaterally preoperatively by the wound and ostomy care nurse for a colostomy or an ileostomy. The patient had undergone mechanical and antibiotic bowel preparation. The abdomen and perineum were prepped and draped in the usual sterile fashion. 0.5% bupivacaine with epinephrine was infiltrated subcutaneously, and the abdomen was entered through a midline incision that was extended through the subcutaneous adipose tissues and linea alba using a combination of sharp dissection and the electrocautery. Some adhesions to the anterior abdominal wall were taken down. The abdomen was explored and, once the sufficient space had been created, the large wound protector was inserted. The mass filled with the pelvis and was obviously adherent to the uterus. The sigmoid colon was tortuous and the anatomy was distorted by the tumor. The left ureteral catheter was easily palpated. The right ureteral catheter was difficult to locate. A Bookwalter retractor was put in place.   The mobilization of the sigmoid colon was begun by locating the inferior mesenteric vessels, dividing them between clamps, and then ligating them with #2-0 silk ties and 2-0 silk sutures. Mesenteric division was then performed using electrocautery and the LigaSure. While awaiting the arrival of the gynecologic oncologist, Mary Terry MD, additional mobilization of the proximal portion of the sigmoid colon was performed. The colon was then cleared of its mesentery in this area and divided using the MICHAELA 75 stapling device with a blue cartridge. Around this time, Dr. Brett Lopez arrived and scrubbed in. He assessed the uterus and the left ovary and the attachments to the diseased colon, and he determined that the hysterectomy did indeed need to be performed. While I observed, he performed that procedure with his assistant. This included the division of the proximal vagina and closure of the defect. The uterus and left ovary and fallopian tube were left attached to the colon. Dr. Brett Lopez and his assistant scrubbed out and the mobilization of left colon was resumed. The dissection was performed carefully in order to avoid injuring other structures including the bladder and the ureters. Gradually, the colon was mobilized and dissection deep into the extraperitoneal pelvis was performed using a combination of electrocautery and the LigaSure. The circumferential dissection was continued until the sigmoid colon and proximal rectum were almost completely freed except for the area adherent to and apparently encasing the distal right ureter. At this point, Dr. Nadeem Kaiser was asked to return. He scrubbed in and assessed the ureter and the bladder and he determined that resection of that segment of the ureter was needed. He performed that while I assisted. The division of the ureter also included the removal of the preexisting right-sided double-J stent.   Because the re-implantation of the ureter would alter the anatomy such that it would be more difficult to create the coloproctostomy, it was decided that the latter should be done first.  Dr. Tali Cuellar therefore scrubbed out and the mobilization of the rectal remnant continued. It should be noted that before Dr. Ronen Mueller arrival, the rectum had been divided in its lower half using the [place] Contour stapling device with a blue cartridge. The examination of the rectal stump now revealed that there was some question as to the viability of the left side of the stapled rectum. For this reason, an additional resection was performed which removed the staple line and a bit more of the rectum. This resection was also performed using the Contour stapling device with a blue cartridge. As stated above, Dr. Tali Cuellar had scrubbed in and performed the resection of the right ureteral segment. This segment was left attached to the rest of the specimen and after some additional dissection, the new specimen consisting of sigmoid colon, proximal rectum, uterus, left fallopian tube and ovary, and right ureteral segment was removed en bloc and taken to the back table. It was opened and the primary site of the tumor was examined. The margins were grossly negative. Returning to the abdomen after changing gown and gloves, mobilization of the splenic flexure was performed using the electrocautery and the LigaSure to divide the lateral peritoneal attachments, the lienocolic ligament, and additional portions of the left colon mesentery. Once sufficient length appeared to have been created, the stapled end of the bowel was opened and the EEA sizers were used. The #28 easily fit into the lumen; so the #29 [facility] stapling device was selected. The anvil was dipped in Betadine, and after a 2-0 Prolene pursestring suture had been placed in the cut end of the colon, the anvil was inserted into the colonic lumen. The pursestring suture was tied around the anvil shaft.   Some additional preparation of this end was required, including the placement of a second 2-0 Prolene pursestring suture in order to pull one corner of the transverse staple line toward the shaft so that it would be included in the anastomotic donut and not the anastomosis itself. Once this was done, the inside of the shaft of the anvil was irrigated with Betadine. The bowel was returned to the abdominal cavity. The length was sufficient for the creation of a tension-free coloproctostomy. The assistant went to the perineum and, after removal of the rectal catheter, gently dilated the anus digitally. The EEA sizers were lubricated and inserted one at a time into the rectum. The #25 and then #28 could each be easily advanced to the stapled end of the rectum. The #29 [facility] stapling device was lubricated and inserted transanally. It was advanced to the stapled end of the rectum and the spike was brought out through the approximate midportion. The anvil spike and shaft were engaged and the stapler was slowly closed while care was taken to prevent twisting of the colon or incorporation of any extraneous tissues into the anastomosis. The stapler was then fired, opened and removed. Inspection of the anastomotic donuts revealed both to be intact. Inspection and palpation of the colon and the rectal remnant revealed no undue tension and apparently good blood supply. The rigid proctosigmoidoscope was prepared and inserted transanally into the rectal remnant. With the colon proximal to the anastomosis gently occluded, the proctoscope was used to inflate the bowel. The bowel held the air and there was no bubbling seen in irrigant instilled into the pelvis. Leak testing was repeated and once again it was negative. The anastomosis was therefore judged to be airtight. The pelvis was copiously irrigated.   A 10-mm Elkin-Stewart drain was placed in the pelvis and brought out through a left lower quadrant stab incision and secured to the skin with a 3-0 nylon suture. At this point, Radames Martinez MD scrubbed into the case and performed the re-implantation of the right ureter and the placement of a new right ureteral stent. Because of the patient's significant malnutrition and overall poor health related to the tumor, it was decided that fecal diversion should be performed in order to protect the coloproctostomy. The patient had been marked for a possible ileostomy. The site was identified, and a circular skin incision was made around the radha. The incision was extended through the subcutaneous adipose tissues. The anterior rectus sheath was exposed and incised. The muscle fibers were bluntly  and then the posterior sheath was incised using the electrocautery. The cecum and terminal ileum were identified. An ileal loop in the terminal portion was brought up to the abdominal wall. It appeared to be of ample length for creation of a good loop ileostomy. A small window was created in the mesentery at the portion of the bowel selected. A 16-Portuguese red rubber catheter was passed through that opening. The catheter was then used to gently apply traction and to pull the ileal loop through the ostomy site wound that had just been created. This ileal loop was left in place while additional irrigation of the abdomen was performed. Six small sheets of Seprafilm were placed on the viscera with none directly on the anastomosis. An attempt was made to wrap the ileostomy with the Seprafilm. Per protocol, gowns and gloves were changed and the dedicated closing instrument set was opened. The abdomen was re-draped. The midline fascial closure was performed using two running #1 PDS sutures. The subcutaneous portion of the wound was irrigated with saline and some 2-0 Vicryl sutures were placed to reapproximate the adipose tissue and close the dead space. The skin was then closed with staples.     The skin closure in the midline wound was performed with staples. The wound was then covered with a temporary dressing and the ileostomy was matured by incising it transversely and then everting the bowel wall to allow for creation of the mucocutaneous junction. The suturing began with three tripartite 3-0 Vicryl sutures superiorly and three more inferiorly. The afferent limb of the loop ileostomy was located superior to the efferent limb. Additional 3-0 Vicryl sutures were then used to fill the gaps in between the other sutures. Upon completion, the ileostomy was viable and not particularly edematous. An ostomy appliance was obtained and cut to the appropriate specifications and put in place. A dry dressing was applied to the midline incision. There were no apparent complications, and the patient appeared to have tolerated the procedure well. At its conclusion, she was extubated and transported in stable condition to recovery room. All sponge, needle, and instrument counts were correct.         Yelena Saldana MD      [id number]  D:  09/19/2020 0:08  T:  09/19/2020 8:24  JOB #:  [id number]  CC:  MD Romulo Golden MD

## 2020-09-19 NOTE — PROGRESS NOTES
General Daily Progress Note    Admission Date: 9/11/2020  Hospital Day 9  Post-Op Day 2  Subjective:   Dizziness is decreasing. No nausea. Hiccupping and burping. Pain well controlled. No chest pain or dyspnea. No calf pain. Objective:     Patient Vitals for the past 24 hrs:   BP Temp Pulse Resp SpO2 Weight   09/19/20 0749 123/66 98.2 °F (36.8 °C) 95 17 100 % --   09/19/20 0747 123/66 98.2 °F (36.8 °C) 100 17 100 % --   09/19/20 0403 -- -- -- -- -- 60 kg (132 lb 4.4 oz)   09/19/20 0305 (!) 130/50 98.5 °F (36.9 °C) (!) 111 16 99 % --   09/18/20 2342 (!) 101/47 98.2 °F (36.8 °C) 91 16 98 % --   09/18/20 2304 (!) 97/44 98.2 °F (36.8 °C) 95 14 100 % --   09/18/20 2000 -- -- -- -- 100 % --   09/18/20 1909 (!) 119/58 97.4 °F (36.3 °C) 89 17 100 % --   09/18/20 1815 (!) 108/56 -- 79 -- -- --   09/18/20 1814 -- 97.4 °F (36.3 °C) 75 15 100 % --   09/18/20 1548 (!) 86/48 -- 81 -- 100 % --   09/18/20 1546 (!) 98/48 97.5 °F (36.4 °C) (!) 59 16 100 % --   09/18/20 1224 (!) 85/47 97.4 °F (36.3 °C) 80 15 100 % --   09/18/20 1129 -- -- -- 21 -- --     09/19 0701 - 09/19 1900  In: -   Out: 100 [Drains:100]  09/17 1901 - 09/19 0700  In: 6405.5 [P.O.:390; I.V.:6015.5]  Out: 4038 [Urine:2550; Drains:1105]      Physical Examination:  General Appearance - No distress. Heart - Sinus tachycardia. Abdomen - Somewhat distended. Appropriately tender. Ileostomy viable. Midline dressing clean, dry, and intact. VALENTINE drainage serosanguinous.  - Hogan catheter draining normal-appearing urine. Neurological - Alert and oriented. Extremities - No significant edema.             Data Review   Recent Results (from the past 24 hour(s))   GLUCOSE, POC    Collection Time: 09/18/20  1:37 PM   Result Value Ref Range    Glucose (POC) 164 (H) 65 - 100 mg/dL    Performed by [address] [place] [address], POC    Collection Time: 09/18/20  7:49 PM   Result Value Ref Range    Glucose (POC) 142 (H) 65 - 100 mg/dL    Performed by Debbie Arriaga GLUCOSE, POC    Collection Time: 09/18/20 11:36 PM   Result Value Ref Range    Glucose (POC) 148 (H) 65 - 100 mg/dL    Performed by [address], BASIC    Collection Time: 09/19/20  4:33 AM   Result Value Ref Range    Sodium 137 136 - 145 mmol/L    Potassium 3.4 (L) 3.5 - 5.1 mmol/L    Chloride 110 (H) 97 - 108 mmol/L    CO2 23 21 - 32 mmol/L    Anion gap 4 (L) 5 - 15 mmol/L    Glucose 112 (H) 65 - 100 mg/dL    BUN 21 (H) 6 - 20 MG/DL    Creatinine 0.57 0.55 - 1.02 MG/DL    BUN/Creatinine ratio 37 (H) 12 - 20      GFR est AA >60 >60 ml/min/1.73m2    GFR est non-AA >60 >60 ml/min/1.73m2    Calcium 7.6 (L) 8.5 - 10.1 MG/DL   MAGNESIUM    Collection Time: 09/19/20  4:33 AM   Result Value Ref Range    Magnesium 1.5 (L) 1.6 - 2.4 mg/dL   PHOSPHORUS    Collection Time: 09/19/20  4:33 AM   Result Value Ref Range    Phosphorus 2.9 2.6 - 4.7 MG/DL   CBC WITH AUTOMATED DIFF    Collection Time: 09/19/20  6:00 AM   Result Value Ref Range    WBC 19.4 (H) 3.6 - 11.0 K/uL    RBC 3.28 (L) 3.80 - 5.20 M/uL    HGB 7.8 (L) 11.5 - 16.0 g/dL    HCT 25.1 (L) 35.0 - 47.0 %    MCV 76.5 (L) 80.0 - 99.0 FL    MCH 23.8 (L) 26.0 - 34.0 PG    MCHC 31.1 30.0 - 36.5 g/dL    RDW 24.7 (H) 11.5 - 14.5 %    PLATELET 136 (L) 513 - 400 K/uL    MPV 10.1 8.9 - 12.9 FL    NRBC 0.0 0  WBC    ABSOLUTE NRBC 0.00 0.00 - 0.01 K/uL    NEUTROPHILS 85 (H) 32 - 75 %    BAND NEUTROPHILS 2 0 - 6 %    LYMPHOCYTES 5 (L) 12 - 49 %    MONOCYTES 5 5 - 13 %    EOSINOPHILS 2 0 - 7 %    BASOPHILS 0 0 - 1 %    METAMYELOCYTES 1 (H) 0 %    IMMATURE GRANULOCYTES 0 %    ABS. NEUTROPHILS 16.9 (H) 1.8 - 8.0 K/UL    ABS. LYMPHOCYTES 1.0 0.8 - 3.5 K/UL    ABS. MONOCYTES 1.0 0.0 - 1.0 K/UL    ABS. EOSINOPHILS 0.4 0.0 - 0.4 K/UL    ABS. BASOPHILS 0.0 0.0 - 0.1 K/UL    ABS. IMM.  GRANS. 0.0 K/UL    DF MANUAL      RBC COMMENTS ANISOCYTOSIS  2+        RBC COMMENTS POLYCHROMASIA  1+        RBC COMMENTS MARILEE CELLS  PRESENT       GLUCOSE, POC    Collection Time: 09/19/20  6:05 AM   Result Value Ref Range    Glucose (POC) 134 (H) 65 - 100 mg/dL    Performed by Fatoumata Godinez            Assessment:     Principal Problem:    Malignant neoplasm of sigmoid colon ([facility] [address] (9/14/2020)    Active Problems:    Hydronephrosis (9/11/2020)      Pelvic mass (9/11/2020)      Severe protein-calorie malnutrition ([address] (9/16/2020)      Anemia (9/16/2020)      Thrombocytopenia ([address] (9/19/2020)        2 Days Post-Op s/p Low anterior resection, mobilization of the splenic flexure, coloproctostomy, and creation of loop ileostomy. [Total abdominal hysterectomy and left salpingo-oophorectomy performed by Jose Cano MD]  [Cystoscopy and placement of left ureteral catheter performed by Preethi Bowman MD]  [Distal right ureterectomy performed by Wily Friedman III, MD]  [Right ureteral re-implant with psoas hitch and placement of right ureteral stent performed by Radames Martinez MD]     Transferred to [facility] (stepdown) from recover room. Tachycardic now after just sitting up, but hemodynamically stable. Hemoglobin low but acceptable. Thrombocytopenia is new.     Urine output adequate. Creatinine within normal limits. Hogan catheter in place and will need to remain for 14 days (per Dr. Tali Cuellar). Leukocytosis increased versus yesterday morning, but still less than it was immediately after surgery.     Hypophosphatemia corrected. Mildly hypokalemic and hypomagnesemic.     Malnutrition being treated with TPN. Awaiting return of GI function. Tolerating moderate quantities of clear liquids so far.     Needs physical therapy.     Needs ostomy education. The patient should remain in the stepdown unit. Plan:   Begin full liquid diet with caution. Continue Ensure Enlive. Continue TPN and lipids. Discontinue heparin secondary to thrombocytopenia. Continue mechanical DVT prophylaxis.     Will check VALENTINE drain fluid creatinine before removal.  Hogan catheter to remain for 14 days.    Ambulate. Physical therapy. Ostomy education. Incentive spirometer.

## 2020-09-19 NOTE — PROGRESS NOTES
No current complaints. Urine appears clear in tubing.     UO: 675/760/865  VALENTINE: 330/220/100    Exam:  VSS/AF  Abd: soft    Creatinine 0.57, Hgb 7.8    Impression: POD #2 s/p LAR with distal right ureterectomy and ureteroneocystotomy    Plan:  -leave pepper catheter for 2 weeks (to be removed as outpatient after cystogram in our office)  -please see Dr. Mabelene Ganser note 9/18/20 prior to drain removal

## 2020-09-20 ENCOUNTER — APPOINTMENT (OUTPATIENT)
Dept: GENERAL RADIOLOGY | Age: 70
DRG: 329 | End: 2020-09-20
Attending: COLON & RECTAL SURGERY
Payer: MEDICARE

## 2020-09-20 LAB
ALBUMIN SERPL-MCNC: 2 G/DL (ref 3.5–5)
ALBUMIN/GLOB SERPL: 0.6 {RATIO} (ref 1.1–2.2)
ALP SERPL-CCNC: 91 U/L (ref 45–117)
ALT SERPL-CCNC: 16 U/L (ref 12–78)
ANION GAP SERPL CALC-SCNC: 5 MMOL/L (ref 5–15)
APPEARANCE UR: ABNORMAL
AST SERPL-CCNC: 16 U/L (ref 15–37)
BACTERIA URNS QL MICRO: NEGATIVE /HPF
BASOPHILS # BLD: 0.2 K/UL (ref 0–0.1)
BASOPHILS NFR BLD: 1 % (ref 0–1)
BILIRUB SERPL-MCNC: 0.3 MG/DL (ref 0.2–1)
BILIRUB UR QL: NEGATIVE
BUN SERPL-MCNC: 22 MG/DL (ref 6–20)
BUN/CREAT SERPL: 43 (ref 12–20)
CALCIUM SERPL-MCNC: 7.4 MG/DL (ref 8.5–10.1)
CHLORIDE SERPL-SCNC: 109 MMOL/L (ref 97–108)
CO2 SERPL-SCNC: 22 MMOL/L (ref 21–32)
COLOR UR: ABNORMAL
CREAT SERPL-MCNC: 0.51 MG/DL (ref 0.55–1.02)
DIFFERENTIAL METHOD BLD: ABNORMAL
EOSINOPHIL # BLD: 0.7 K/UL (ref 0–0.4)
EOSINOPHIL NFR BLD: 3 % (ref 0–7)
EPITH CASTS URNS QL MICRO: ABNORMAL /LPF
ERYTHROCYTE [DISTWIDTH] IN BLOOD BY AUTOMATED COUNT: 26.5 % (ref 11.5–14.5)
GLOBULIN SER CALC-MCNC: 3.2 G/DL (ref 2–4)
GLUCOSE BLD STRIP.AUTO-MCNC: 137 MG/DL (ref 65–100)
GLUCOSE BLD STRIP.AUTO-MCNC: 150 MG/DL (ref 65–100)
GLUCOSE BLD STRIP.AUTO-MCNC: 159 MG/DL (ref 65–100)
GLUCOSE BLD STRIP.AUTO-MCNC: 175 MG/DL (ref 65–100)
GLUCOSE SERPL-MCNC: 130 MG/DL (ref 65–100)
GLUCOSE UR STRIP.AUTO-MCNC: NEGATIVE MG/DL
HCT VFR BLD AUTO: 33.2 % (ref 35–47)
HGB BLD-MCNC: 10.4 G/DL (ref 11.5–16)
HGB UR QL STRIP: ABNORMAL
HYALINE CASTS URNS QL MICRO: ABNORMAL /LPF (ref 0–5)
IMM GRANULOCYTES # BLD AUTO: 0 K/UL
IMM GRANULOCYTES NFR BLD AUTO: 0 %
KETONES UR QL STRIP.AUTO: NEGATIVE MG/DL
LACTATE SERPL-SCNC: 1.6 MMOL/L (ref 0.4–2)
LEUKOCYTE ESTERASE UR QL STRIP.AUTO: ABNORMAL
LYMPHOCYTES # BLD: 0.9 K/UL (ref 0.8–3.5)
LYMPHOCYTES NFR BLD: 4 % (ref 12–49)
MAGNESIUM SERPL-MCNC: 1.9 MG/DL (ref 1.6–2.4)
MCH RBC QN AUTO: 24 PG (ref 26–34)
MCHC RBC AUTO-ENTMCNC: 31.3 G/DL (ref 30–36.5)
MCV RBC AUTO: 76.5 FL (ref 80–99)
METAMYELOCYTES NFR BLD MANUAL: 1 %
MONOCYTES # BLD: 1.2 K/UL (ref 0–1)
MONOCYTES NFR BLD: 5 % (ref 5–13)
MUCOUS THREADS URNS QL MICRO: ABNORMAL /LPF
NEUTS BAND NFR BLD MANUAL: 1 % (ref 0–6)
NEUTS SEG # BLD: 20 K/UL (ref 1.8–8)
NEUTS SEG NFR BLD: 85 % (ref 32–75)
NITRITE UR QL STRIP.AUTO: NEGATIVE
NRBC # BLD: 0 K/UL (ref 0–0.01)
NRBC BLD-RTO: 0 PER 100 WBC
PH UR STRIP: 5.5 [PH] (ref 5–8)
PHOSPHATE SERPL-MCNC: 2.4 MG/DL (ref 2.6–4.7)
PLATELET # BLD AUTO: 136 K/UL (ref 150–400)
PMV BLD AUTO: 8.9 FL (ref 8.9–12.9)
POTASSIUM SERPL-SCNC: 4.4 MMOL/L (ref 3.5–5.1)
PROT SERPL-MCNC: 5.2 G/DL (ref 6.4–8.2)
PROT UR STRIP-MCNC: 100 MG/DL
RBC # BLD AUTO: 4.34 M/UL (ref 3.8–5.2)
RBC #/AREA URNS HPF: >100 /HPF (ref 0–5)
RBC MORPH BLD: ABNORMAL
SERVICE CMNT-IMP: ABNORMAL
SODIUM SERPL-SCNC: 136 MMOL/L (ref 136–145)
SP GR UR REFRACTOMETRY: 1.03 (ref 1–1.03)
UA: UC IF INDICATED,UAUC: ABNORMAL
UROBILINOGEN UR QL STRIP.AUTO: 0.2 EU/DL (ref 0.2–1)
WBC # BLD AUTO: 23.3 K/UL (ref 3.6–11)
WBC URNS QL MICRO: ABNORMAL /HPF (ref 0–4)
YEAST BUDDING URNS QL: PRESENT

## 2020-09-20 PROCEDURE — 87040 BLOOD CULTURE FOR BACTERIA: CPT

## 2020-09-20 PROCEDURE — 87015 SPECIMEN INFECT AGNT CONCNTJ: CPT

## 2020-09-20 PROCEDURE — 74011250636 HC RX REV CODE- 250/636: Performed by: COLON & RECTAL SURGERY

## 2020-09-20 PROCEDURE — 65660000000 HC RM CCU STEPDOWN

## 2020-09-20 PROCEDURE — 83735 ASSAY OF MAGNESIUM: CPT

## 2020-09-20 PROCEDURE — 82962 GLUCOSE BLOOD TEST: CPT

## 2020-09-20 PROCEDURE — 83605 ASSAY OF LACTIC ACID: CPT

## 2020-09-20 PROCEDURE — 87186 SC STD MICRODIL/AGAR DIL: CPT

## 2020-09-20 PROCEDURE — C9113 INJ PANTOPRAZOLE SODIUM, VIA: HCPCS | Performed by: COLON & RECTAL SURGERY

## 2020-09-20 PROCEDURE — 74011250637 HC RX REV CODE- 250/637: Performed by: COLON & RECTAL SURGERY

## 2020-09-20 PROCEDURE — 85025 COMPLETE CBC W/AUTO DIFF WBC: CPT

## 2020-09-20 PROCEDURE — 80053 COMPREHEN METABOLIC PANEL: CPT

## 2020-09-20 PROCEDURE — 84100 ASSAY OF PHOSPHORUS: CPT

## 2020-09-20 PROCEDURE — 87106 FUNGI IDENTIFICATION YEAST: CPT

## 2020-09-20 PROCEDURE — 81001 URINALYSIS AUTO W/SCOPE: CPT

## 2020-09-20 PROCEDURE — 36415 COLL VENOUS BLD VENIPUNCTURE: CPT

## 2020-09-20 PROCEDURE — 74011000258 HC RX REV CODE- 258: Performed by: COLON & RECTAL SURGERY

## 2020-09-20 PROCEDURE — 74011000250 HC RX REV CODE- 250: Performed by: COLON & RECTAL SURGERY

## 2020-09-20 PROCEDURE — 87205 SMEAR GRAM STAIN: CPT

## 2020-09-20 PROCEDURE — 71046 X-RAY EXAM CHEST 2 VIEWS: CPT

## 2020-09-20 RX ORDER — METOPROLOL TARTRATE 5 MG/5ML
2.5 INJECTION INTRAVENOUS
Status: DISCONTINUED | OUTPATIENT
Start: 2020-09-20 | End: 2020-10-03

## 2020-09-20 RX ADMIN — ONDANSETRON 4 MG: 4 TABLET, ORALLY DISINTEGRATING ORAL at 08:58

## 2020-09-20 RX ADMIN — SODIUM CHLORIDE 40 MG: 9 INJECTION INTRAMUSCULAR; INTRAVENOUS; SUBCUTANEOUS at 08:58

## 2020-09-20 RX ADMIN — Medication 10 ML: at 15:20

## 2020-09-20 RX ADMIN — PROMETHAZINE HYDROCHLORIDE 12.5 MG: 25 INJECTION INTRAMUSCULAR; INTRAVENOUS at 00:24

## 2020-09-20 RX ADMIN — ALVIMOPAN 12 MG: 12 CAPSULE ORAL at 18:17

## 2020-09-20 RX ADMIN — ASCORBIC ACID: 500 INJECTION, SOLUTION INTRAMUSCULAR; INTRAVENOUS; SUBCUTANEOUS at 18:21

## 2020-09-20 RX ADMIN — SODIUM CHLORIDE 40 MG: 9 INJECTION INTRAMUSCULAR; INTRAVENOUS; SUBCUTANEOUS at 23:21

## 2020-09-20 RX ADMIN — SODIUM CHLORIDE 10 MG: 9 INJECTION INTRAMUSCULAR; INTRAVENOUS; SUBCUTANEOUS at 23:21

## 2020-09-20 RX ADMIN — METOPROLOL TARTRATE 2.5 MG: 1 INJECTION, SOLUTION INTRAVENOUS at 18:31

## 2020-09-20 RX ADMIN — PIPERACILLIN AND TAZOBACTAM 3.38 G: 3; .375 INJECTION, POWDER, LYOPHILIZED, FOR SOLUTION INTRAVENOUS at 11:08

## 2020-09-20 RX ADMIN — ONDANSETRON 4 MG: 4 TABLET, ORALLY DISINTEGRATING ORAL at 13:07

## 2020-09-20 RX ADMIN — Medication 10 ML: at 18:32

## 2020-09-20 RX ADMIN — PIPERACILLIN AND TAZOBACTAM 3.38 G: 3; .375 INJECTION, POWDER, LYOPHILIZED, FOR SOLUTION INTRAVENOUS at 18:15

## 2020-09-20 RX ADMIN — Medication 10 ML: at 07:15

## 2020-09-20 RX ADMIN — ALVIMOPAN 12 MG: 12 CAPSULE ORAL at 08:59

## 2020-09-20 RX ADMIN — ACETAMINOPHEN 1000 MG: 500 TABLET ORAL at 08:59

## 2020-09-20 RX ADMIN — Medication 10 ML: at 22:00

## 2020-09-20 RX ADMIN — ONDANSETRON 4 MG: 4 TABLET, ORALLY DISINTEGRATING ORAL at 18:32

## 2020-09-20 RX ADMIN — ACETAMINOPHEN 1000 MG: 500 TABLET ORAL at 18:15

## 2020-09-20 NOTE — PROGRESS NOTES
General Daily Progress Note    Admission Date: 9/11/2020  Hospital Day 10  Post-Op Day 3  Subjective:   Has not tolerated full liquid diet. Having a lot of nausea. Has also been vomiting small quantities of bilious fluid. No chest pain or dyspnea. Not dizzy. Was able to ambulate in the halls yesterday. Objective:     Patient Vitals for the past 24 hrs:   BP Temp Pulse Resp SpO2 Weight   09/20/20 0852 -- -- (!) 127 -- -- --   09/20/20 0759 (!) 127/50 97.6 °F (36.4 °C) (!) 113 22 100 % --   09/20/20 0755 -- -- -- -- 100 % --   09/20/20 0024 128/72 97 °F (36.1 °C) (!) 109 18 98 % --   09/20/20 0003 -- -- -- -- -- 63.5 kg (140 lb)   09/19/20 2314 137/78 96.9 °F (36.1 °C) (!) 106 18 100 % --   09/19/20 2023 138/83 97.4 °F (36.3 °C) (!) 107 18 100 % --   09/19/20 1906 134/71 -- (!) 107 18 98 % --   09/19/20 1826 -- -- (!) 113 -- -- --   09/19/20 1746 -- -- (!) 115 -- -- --   09/19/20 1734 -- -- (!) 122 -- -- --   09/19/20 1538 139/74 97.6 °F (36.4 °C) 99 16 100 % --   09/19/20 1200 123/61 97.4 °F (36.3 °C) (!) 101 19 97 % --   09/19/20 1149 128/72 -- (!) 112 18 99 % --     09/20 0701 - 09/20 1900  In: -   Out: 416 [Urine:680; Drains:55]  09/18 1901 - 09/20 0700  In: 2925.8 [P.O.:600; I.V.:2325.8]  Out: 2790 [Urine:1990; Drains:800]    Physical Examination:  General Appearance - No distress. Heart - Sinus tachycardia. Abdomen - More distended.  Appropriately tender.  Ileostomy edematous, but viable. Midline incision clean, dry, intact, and without associated erythema.  VALENTINE drainage serosanguinous.  - Hogan catheter draining normal-appearing urine. Neurological - Alert and oriented.             Data Review   Recent Results (from the past 24 hour(s))   GLUCOSE, POC    Collection Time: 09/19/20 11:03 AM   Result Value Ref Range    Glucose (POC) 138 (H) 65 - 100 mg/dL    Performed by [facility]    GLUCOSE, POC    Collection Time: 09/19/20  5:17 PM   Result Value Ref Range    Glucose (POC) 126 (H) 65 - 100 mg/dL    Performed by Rosa White    GLUCOSE, POC    Collection Time: 09/19/20  9:29 PM   Result Value Ref Range    Glucose (POC) 141 (H) 65 - 100 mg/dL    Performed by Ashley MURPHY    GLUCOSE, POC    Collection Time: 09/19/20 11:24 PM   Result Value Ref Range    Glucose (POC) 144 (H) 65 - 100 mg/dL    Performed by [address], COMPREHENSIVE    Collection Time: 09/20/20  4:50 AM   Result Value Ref Range    Sodium 136 136 - 145 mmol/L    Potassium 4.4 3.5 - 5.1 mmol/L    Chloride 109 (H) 97 - 108 mmol/L    CO2 22 21 - 32 mmol/L    Anion gap 5 5 - 15 mmol/L    Glucose 130 (H) 65 - 100 mg/dL    BUN 22 (H) 6 - 20 MG/DL    Creatinine 0.51 (L) 0.55 - 1.02 MG/DL    BUN/Creatinine ratio 43 (H) 12 - 20      GFR est AA >60 >60 ml/min/1.73m2    GFR est non-AA >60 >60 ml/min/1.73m2    Calcium 7.4 (L) 8.5 - 10.1 MG/DL    Bilirubin, total 0.3 0.2 - 1.0 MG/DL    ALT (SGPT) 16 12 - 78 U/L    AST (SGOT) 16 15 - 37 U/L    Alk.  phosphatase 91 45 - 117 U/L    Protein, total 5.2 (L) 6.4 - 8.2 g/dL    Albumin 2.0 (L) 3.5 - 5.0 g/dL    Globulin 3.2 2.0 - 4.0 g/dL    A-G Ratio 0.6 (L) 1.1 - 2.2     PHOSPHORUS    Collection Time: 09/20/20  4:50 AM   Result Value Ref Range    Phosphorus 2.4 (L) 2.6 - 4.7 MG/DL   MAGNESIUM    Collection Time: 09/20/20  4:50 AM   Result Value Ref Range    Magnesium 1.9 1.6 - 2.4 mg/dL   GLUCOSE, POC    Collection Time: 09/20/20  6:43 AM   Result Value Ref Range    Glucose (POC) 159 (H) 65 - 100 mg/dL    Performed by Ashlie Nash    CBC WITH AUTOMATED DIFF    Collection Time: 09/20/20  7:20 AM   Result Value Ref Range    WBC 23.3 (H) 3.6 - 11.0 K/uL    RBC 4.34 3.80 - 5.20 M/uL    HGB 10.4 (L) 11.5 - 16.0 g/dL    HCT 33.2 (L) 35.0 - 47.0 %    MCV 76.5 (L) 80.0 - 99.0 FL    MCH 24.0 (L) 26.0 - 34.0 PG    MCHC 31.3 30.0 - 36.5 g/dL    RDW 26.5 (H) 11.5 - 14.5 %    PLATELET 871 (L) 098 - 400 K/uL    MPV 8.9 8.9 - 12.9 FL    NRBC 0.0 0  WBC    ABSOLUTE NRBC 0.00 0.00 - 0.01 K/uL    NEUTROPHILS 85 (H) 32 - 75 %    BAND NEUTROPHILS 1 0 - 6 %    LYMPHOCYTES 4 (L) 12 - 49 %    MONOCYTES 5 5 - 13 %    EOSINOPHILS 3 0 - 7 %    BASOPHILS 1 0 - 1 %    METAMYELOCYTES 1 (H) 0 %    IMMATURE GRANULOCYTES 0 %    ABS. NEUTROPHILS 20.0 (H) 1.8 - 8.0 K/UL    ABS. LYMPHOCYTES 0.9 0.8 - 3.5 K/UL    ABS. MONOCYTES 1.2 (H) 0.0 - 1.0 K/UL    ABS. EOSINOPHILS 0.7 (H) 0.0 - 0.4 K/UL    ABS. BASOPHILS 0.2 (H) 0.0 - 0.1 K/UL    ABS. IMM. GRANS. 0.0 K/UL    DF MANUAL      RBC COMMENTS ANISOCYTOSIS  3+        RBC COMMENTS MICROCYTOSIS  1+        RBC COMMENTS HYPOCHROMIA  1+        RBC COMMENTS MARILEE CELLS  1+        RBC COMMENTS ACANTHOCYTES PRESENT            Assessment:     Principal Problem:    Malignant neoplasm of sigmoid colon ([address] (9/14/2020)    Active Problems:    Hydronephrosis (9/11/2020)      Pelvic mass (9/11/2020)      Severe protein-calorie malnutrition ([address] (9/16/2020)      Anemia (9/16/2020)      Thrombocytopenia ([address] (9/19/2020)        3 Days Post-Op s/p Low anterior resection, mobilization of the splenic flexure, coloproctostomy, and creation of loop ileostomy. [Total abdominal hysterectomy and left salpingo-oophorectomy performed by Cesar Montero MD]  [Cystoscopy and placement of left ureteral catheter performed by Dale Brooks MD]  [Distal right ureterectomy performed by Toby Montero III, MD]  [Right ureteral re-implant with psoas hitch and placement of right ureteral stent performed by Natalie Medrano MD]     Transferred to [facility] (stepdown) from recovery room.     Persistently tachycardic, but not hypotensive or febrile. Hemoglobin low but acceptable. Thrombocytopenia is better today than it was yesterday.     Urine output adequate. Creatinine within normal limits. Hogan catheter in place and will need to remain for 14 days (per Dr. Charles Rubi).     Leukocytosis increased again, but still less than it was immediately after surgery or 3 days before surgery.   No apparent wound infection. Doubt pneumonia.     Electrolyte abnormalities have been corrected.     Malnutrition being treated with TPN. Awaiting return of GI function. Nausea and vomiting are probably secondary to ileus.     Needs physical therapy.     Needs ostomy education.     The patient should remain in the stepdown unit. Plan:   Return to clear liquid diet plus Ensure Enlive. Continue TPN and lipids. Chest radiographs to rule out pneumonia. Culture blood, urine, and abdominal fluid. Check lactate level. Begin Zosyn after microbiology specimens have been collected.     Discontinue heparin secondary to thrombocytopenia. Continue mechanical DVT prophylaxis.     Will check VALENTINE drain fluid creatinine before removal.  Hogan catheter to remain for 14 days.     Ambulate. Physical therapy. Ostomy education. Incentive spirometer.

## 2020-09-20 NOTE — PROGRESS NOTES
Progress Note    Patient: Fredy Kennedy MRN: [id number]  SSN: [id number]    YOB: 1950  Age: 72 y.o. Sex: female        ADMITTED:  2020 to Cherrie Barron MD by Yomaira Hurst MD for Pelvic mass [R19.00]  Hydronephrosis [N13.30]   POD #  3 Days Post-OpProcedure(s):  Open low anterior resection, mobilization of splenic flexture, coloproctostomy, creation of loop ileostomy (E R A S)  TOTAL ABDOMINAL HYSTERECTOMY, LEFT SALPINGO OOPHORECTOMY  Cystoscopy, insertion and removal of left Ureteral catheter, simple pepper, distal right ureterectomy, right ureteral reimplant, right stent placement     Assessment/Plan:  POD #3 s/p LAR with distal right ureterectomy and ureteroneocystotomy, psoas hitch  -same plan     Fredy Kennedy denies flatus. No specific complaints from a  standpoint. VALENTINE: 360/220/55  UO: 425/700/680    Diet: DIET NUTRITIONAL SUPPLEMENTS Breakfast, Lunch; Ensure Enlive  TPN ADULT - CENTRAL  DIET CLEAR LIQUID  TPN ADULT - CENTRAL         Vitals:  Temp (24hrs), Av.3 °F (36.3 °C), Min:96.9 °F (36.1 °C), Max:97.6 °F (36.4 °C)  Blood pressure (Abnormal) 127/50, pulse (Abnormal) 127, temperature 97.6 °F (36.4 °C), resp. rate 22, height 5' 6\" (1.676 m), weight 63.5 kg (140 lb), SpO2 100 %, not currently breastfeeding. I&O's:   0701 -  0700  In: 1800.5 [P.O.:600;  I.V.:1200.5]  Out: 1705 [Urine:1125; Drains:580]    Exam:   Alert NAD, seen while Dr. Robin Rebolledo talking to her         Labs:  CBC   Lab Results   Component Value Date/Time    WBC 23.3 (H) 2020 07:20 AM    WBC 19.4 (H) 2020 06:00 AM    HGB 10.4 (L) 2020 07:20 AM    HGB 7.8 (L) 2020 06:00 AM    PLATELET 808 (L)  07:20 AM   BMP   Lab Results   Component Value Date/Time    Sodium 136 2020 04:50 AM    Potassium 4.4 2020 04:50 AM    Chloride 109 (H) 2020 04:50 AM    CO2 22 2020 04:50 AM    BUN 22 (H) 2020 04:50 AM    Creatinine 0.51 (L) 09/20/2020 04:50 AM    Creatinine 0.57 09/19/2020 04:33 AM    Glucose 130 (H) 09/20/2020 04:50 AM    Calcium 7.4 (L) 09/20/2020 04:50 AM    Magnesium 1.9 09/20/2020 04:50 AM    Phosphorus 2.4 (L) 09/20/2020 04:50 AM           Signed By: Puma Young MD - September 20, 2020

## 2020-09-20 NOTE — PROGRESS NOTES
Patient is nausea and is vomiting bile up. She was given Zofran at 11:30 pm and it had no effect on her nausea. Called Dr. Ardie Homans and notified him, he ordered phenergan IV to be given.

## 2020-09-20 NOTE — ROUTINE PROCESS
7856:  Pt settled into chair with minimal assist.  HR 120s during transfer and post sitting in chair. Will continue to monitor if increase HR is with activity/emesis. Pt had a moderate amount of thick greenish emesis. Pt still wants to eat her cream of wheat. Encouraged her not too as she is with emesis but she wants to try in hopes to settle her stomach. About 0932, spoke with Dr Aditi Rhodes about tachycardia 120s post resting but sitting in chair. He voiced he was aware. 1309: Getting ready to walk patient in hallway. Noted HR 130s prior to walk. During walk HR 140s; post walk HR 150s. Pt denies CP, SOB, dizziness, or palpitations. Upon return to bed HR 120s. Pt notified of plan to get up and walk again about 1600 then sit in chair for dinner. Pt is very acceptable of the plan. 1617:  Pt returned from radiology department. Pt agreed to walk in hallway. Asked pt to walk how ever much distance she can walk. Pt choose to walk the approximent 200 ft from her room to the outside unit hallway and back without ECHEVERRIA or SOB. Pt settled into chair. She is phlegmy-spitting up clear, thick sputum in her emesis bag. Mouth care provided. Noted HR 130s postwalk/sitting in chair then slowly goes down to 120s. Today patient's HR only to 110s when resting quietly in bed. About 1650: Paged Dr. Aditi Rhodes to update him of continued tachycardia without any other symptoms. 1838:  Pt took third 200 feet walk for today; this time in about 6 minutes  HR more controlled after walk after Metoprolol iv. Pt still nauseated between bile like output and sputum.

## 2020-09-20 NOTE — PROGRESS NOTES
Problem: Falls - Risk of  Goal: *Absence of Falls  Description: Document José Luis Ansari Fall Risk and appropriate interventions in the flowsheet.   Outcome: Progressing Towards Goal  Note: Fall Risk Interventions:  Mobility Interventions: Assess mobility with egress test         Medication Interventions: Teach patient to arise slowly    Elimination Interventions: Call light in reach    History of Falls Interventions: Utilize gait belt for transfer/ambulation         Problem: Patient Education: Go to Patient Education Activity  Goal: Patient/Family Education  Outcome: Progressing Towards Goal     Problem: Pain  Goal: *Control of Pain  Outcome: Progressing Towards Goal     Problem: Discharge Planning  Goal: *Discharge to safe environment  Outcome: Progressing Towards Goal  Goal: *Knowledge of medication management  Outcome: Progressing Towards Goal  Goal: *Knowledge of discharge instructions  Outcome: Progressing Towards Goal     Problem: Patient Education: Go to Patient Education Activity  Goal: Patient/Family Education  Outcome: Progressing Towards Goal     Problem: General Medical Care Plan  Goal: *Vital signs within specified parameters  Outcome: Progressing Towards Goal  Goal: *Labs within defined limits  Outcome: Progressing Towards Goal  Goal: *Absence of infection signs and symptoms  Outcome: Progressing Towards Goal  Goal: *Optimal pain control at patient's stated goal  Outcome: Progressing Towards Goal  Goal: *Skin integrity maintained  Outcome: Progressing Towards Goal  Goal: *Fluid volume balance  Outcome: Progressing Towards Goal  Goal: *Optimize nutritional status  Outcome: Progressing Towards Goal  Goal: *Anxiety reduced or absent  Outcome: Progressing Towards Goal  Goal: *Progressive mobility and function (eg: ADL's)  Outcome: Progressing Towards Goal

## 2020-09-20 NOTE — PROGRESS NOTES
0450 purple and green top sent for labs  0700 Lab called said that purple top had clotted redraw order was placed, new purple top was sent. Spoke with Dr. Syed Mcgraw he requested that I speak with Lab about missing BMP- Lab called while I was on the phone with him confirmed that they had the lab work.             Patient has experienced nausea and vomiting throughout the night and did not want to take her 3 am acetaminophen and her 6 am Celebrex until breakfast.

## 2020-09-20 NOTE — PROGRESS NOTES
Spiritual Care Assessment/Progress Note  [facility]      NAME: Pramod Zuñiga      MRN: [id number]  AGE: 72 y.o. SEX: female  Oriental orthodox Affiliation: Taoist   Language: English     9/20/2020     Total Time (in minutes): 7     [facility] through conversation with:         [x]Patient        [] Family    [] Friend(s)        Reason for Consult: Initial/Spiritual assessment, patient floor     Spiritual beliefs: (Please include comment if needed)     [x] Identifies with a moise tradition:         [] Supported by a moise community:            [] Claims no spiritual orientation:           [] Seeking spiritual identity:                [] Adheres to an individual form of spirituality:           [] Not able to assess:                           Identified resources for coping:      [] Prayer                               [] Music                  [] Guided Imagery     [] Family/friends                 [] Pet visits     [] Devotional reading                         [x] Unknown     [] Other:                                               Interventions offered during this visit: (See comments for more details)                Plan of Care:     [] Support spiritual and/or cultural needs    [] Support AMD and/or advance care planning process      [] Support grieving process   [] Coordinate Rites and/or Rituals    [] Coordination with community clergy   [] No spiritual needs identified at this time   [] Detailed Plan of Care below (See Comments)  [] Make referral to Music Therapy  [] Make referral to Pet Therapy     [] Make referral to Addiction services  [] Make referral to [facility]  [] Make referral to Spiritual Care Partner  [] No future visits requested        [x] Follow up visits as needed     Comments:  for initial visit. Pt was sleeping and did not wake. Please contact [phone] [address] for further support.  follow up as needed.      eduPad Joana Tello M.Div, [facility] [place]   [phone])

## 2020-09-20 NOTE — ROUTINE PROCESS
Bedside and Verbal shift change report given to Gasper (oncoming nurse) by Stalin Hair (offgoing nurse). Report included the following information SBAR, OR Summary, Intake/Output, MAR, Recent Results and Cardiac Rhythm NSR/NST with activities.

## 2020-09-20 NOTE — ROUTINE PROCESS
Bedside and Verbal shift change report given to [address] [place] [address] (oncoming nurse) by Monika Mullins (offgoing nurse). Report included the following information SBAR, Kardex, OR Summary, Procedure Summary, Intake/Output, MAR, Recent Results and Cardiac Rhythm NSR/NST.

## 2020-09-20 NOTE — PROGRESS NOTES
Problem: Pain  Goal: *Control of Pain  Outcome: Progressing Towards Goal     Problem: General Medical Care Plan  Goal: *Skin integrity maintained  Outcome: Progressing Towards Goal  Goal: *Anxiety reduced or absent  Outcome: Progressing Towards Goal  Goal: *Progressive mobility and function (eg: ADL's)  Outcome: Progressing Towards Goal     Problem: Nutrition Deficit  Goal: *Optimize nutritional status  Outcome: Not Progressing Towards Goal

## 2020-09-21 ENCOUNTER — APPOINTMENT (OUTPATIENT)
Dept: GENERAL RADIOLOGY | Age: 70
DRG: 329 | End: 2020-09-21
Attending: COLON & RECTAL SURGERY
Payer: MEDICARE

## 2020-09-21 LAB
ANION GAP SERPL CALC-SCNC: 5 MMOL/L (ref 5–15)
BASOPHILS # BLD: 0.2 K/UL (ref 0–0.1)
BASOPHILS NFR BLD: 1 % (ref 0–1)
BUN SERPL-MCNC: 32 MG/DL (ref 6–20)
BUN/CREAT SERPL: 58 (ref 12–20)
CALCIUM SERPL-MCNC: 7.7 MG/DL (ref 8.5–10.1)
CHLORIDE SERPL-SCNC: 108 MMOL/L (ref 97–108)
CO2 SERPL-SCNC: 25 MMOL/L (ref 21–32)
CREAT SERPL-MCNC: 0.55 MG/DL (ref 0.55–1.02)
DIFFERENTIAL METHOD BLD: ABNORMAL
EOSINOPHIL # BLD: 0.2 K/UL (ref 0–0.4)
EOSINOPHIL NFR BLD: 1 % (ref 0–7)
ERYTHROCYTE [DISTWIDTH] IN BLOOD BY AUTOMATED COUNT: 27.5 % (ref 11.5–14.5)
GLUCOSE BLD STRIP.AUTO-MCNC: 133 MG/DL (ref 65–100)
GLUCOSE BLD STRIP.AUTO-MCNC: 139 MG/DL (ref 65–100)
GLUCOSE BLD STRIP.AUTO-MCNC: 140 MG/DL (ref 65–100)
GLUCOSE SERPL-MCNC: 131 MG/DL (ref 65–100)
HCT VFR BLD AUTO: 30.7 % (ref 35–47)
HGB BLD-MCNC: 9.5 G/DL (ref 11.5–16)
IMM GRANULOCYTES # BLD AUTO: 0 K/UL
IMM GRANULOCYTES NFR BLD AUTO: 0 %
LYMPHOCYTES # BLD: 2.2 K/UL (ref 0.8–3.5)
LYMPHOCYTES NFR BLD: 11 % (ref 12–49)
MAGNESIUM SERPL-MCNC: 2 MG/DL (ref 1.6–2.4)
MCH RBC QN AUTO: 23.5 PG (ref 26–34)
MCHC RBC AUTO-ENTMCNC: 30.9 G/DL (ref 30–36.5)
MCV RBC AUTO: 76 FL (ref 80–99)
MONOCYTES # BLD: 1 K/UL (ref 0–1)
MONOCYTES NFR BLD: 5 % (ref 5–13)
NEUTS BAND NFR BLD MANUAL: 4 % (ref 0–6)
NEUTS SEG # BLD: 16.4 K/UL (ref 1.8–8)
NEUTS SEG NFR BLD: 78 % (ref 32–75)
NRBC # BLD: 0 K/UL (ref 0–0.01)
NRBC BLD-RTO: 0 PER 100 WBC
PHOSPHATE SERPL-MCNC: 2.9 MG/DL (ref 2.6–4.7)
PLATELET # BLD AUTO: 122 K/UL (ref 150–400)
PLATELET COMMENTS,PCOM: ABNORMAL
PMV BLD AUTO: 9 FL (ref 8.9–12.9)
POTASSIUM SERPL-SCNC: 4.6 MMOL/L (ref 3.5–5.1)
RBC # BLD AUTO: 4.04 M/UL (ref 3.8–5.2)
RBC MORPH BLD: ABNORMAL
SERVICE CMNT-IMP: ABNORMAL
SODIUM SERPL-SCNC: 138 MMOL/L (ref 136–145)
WBC # BLD AUTO: 20 K/UL (ref 3.6–11)

## 2020-09-21 PROCEDURE — 65660000000 HC RM CCU STEPDOWN

## 2020-09-21 PROCEDURE — 80048 BASIC METABOLIC PNL TOTAL CA: CPT

## 2020-09-21 PROCEDURE — C9113 INJ PANTOPRAZOLE SODIUM, VIA: HCPCS | Performed by: COLON & RECTAL SURGERY

## 2020-09-21 PROCEDURE — 74011000258 HC RX REV CODE- 258: Performed by: COLON & RECTAL SURGERY

## 2020-09-21 PROCEDURE — 74011000250 HC RX REV CODE- 250: Performed by: COLON & RECTAL SURGERY

## 2020-09-21 PROCEDURE — 84100 ASSAY OF PHOSPHORUS: CPT

## 2020-09-21 PROCEDURE — 82962 GLUCOSE BLOOD TEST: CPT

## 2020-09-21 PROCEDURE — 36415 COLL VENOUS BLD VENIPUNCTURE: CPT

## 2020-09-21 PROCEDURE — 74011250636 HC RX REV CODE- 250/636: Performed by: COLON & RECTAL SURGERY

## 2020-09-21 PROCEDURE — 85025 COMPLETE CBC W/AUTO DIFF WBC: CPT

## 2020-09-21 PROCEDURE — 97116 GAIT TRAINING THERAPY: CPT

## 2020-09-21 PROCEDURE — 74019 RADEX ABDOMEN 2 VIEWS: CPT

## 2020-09-21 PROCEDURE — 83735 ASSAY OF MAGNESIUM: CPT

## 2020-09-21 PROCEDURE — 74011250637 HC RX REV CODE- 250/637: Performed by: COLON & RECTAL SURGERY

## 2020-09-21 RX ADMIN — ASCORBIC ACID: 500 INJECTION, SOLUTION INTRAMUSCULAR; INTRAVENOUS; SUBCUTANEOUS at 18:08

## 2020-09-21 RX ADMIN — SODIUM CHLORIDE 40 MG: 9 INJECTION INTRAMUSCULAR; INTRAVENOUS; SUBCUTANEOUS at 20:07

## 2020-09-21 RX ADMIN — SODIUM CHLORIDE 40 MG: 9 INJECTION INTRAMUSCULAR; INTRAVENOUS; SUBCUTANEOUS at 09:12

## 2020-09-21 RX ADMIN — Medication 40 ML: at 18:09

## 2020-09-21 RX ADMIN — PIPERACILLIN AND TAZOBACTAM 3.38 G: 3; .375 INJECTION, POWDER, LYOPHILIZED, FOR SOLUTION INTRAVENOUS at 18:24

## 2020-09-21 RX ADMIN — PIPERACILLIN AND TAZOBACTAM 3.38 G: 3; .375 INJECTION, POWDER, LYOPHILIZED, FOR SOLUTION INTRAVENOUS at 04:03

## 2020-09-21 RX ADMIN — PIPERACILLIN AND TAZOBACTAM 3.38 G: 3; .375 INJECTION, POWDER, LYOPHILIZED, FOR SOLUTION INTRAVENOUS at 11:31

## 2020-09-21 RX ADMIN — ACETAMINOPHEN 1000 MG: 500 TABLET ORAL at 06:43

## 2020-09-21 RX ADMIN — ONDANSETRON 4 MG: 4 TABLET, ORALLY DISINTEGRATING ORAL at 20:07

## 2020-09-21 RX ADMIN — ALVIMOPAN 12 MG: 12 CAPSULE ORAL at 09:19

## 2020-09-21 RX ADMIN — ONDANSETRON 4 MG: 4 TABLET, ORALLY DISINTEGRATING ORAL at 04:26

## 2020-09-21 RX ADMIN — ONDANSETRON 4 MG: 4 TABLET, ORALLY DISINTEGRATING ORAL at 09:18

## 2020-09-21 RX ADMIN — ALVIMOPAN 12 MG: 12 CAPSULE ORAL at 20:07

## 2020-09-21 RX ADMIN — ACETAMINOPHEN 1000 MG: 500 TABLET ORAL at 11:30

## 2020-09-21 RX ADMIN — Medication 10 ML: at 09:20

## 2020-09-21 RX ADMIN — I.V. FAT EMULSION 250 ML: 20 EMULSION INTRAVENOUS at 18:08

## 2020-09-21 NOTE — PROGRESS NOTES
General Daily Progress Note    Admission Date: 9/11/2020  Hospital Day 11  Post-Op Day 4  Subjective: Tolerating only water. Still having nausea and vomiting. Little pain. No dyspnea. Objective:     Patient Vitals for the past 24 hrs:   BP Temp Pulse Resp SpO2   09/21/20 0601 -- -- (!) 105 -- --   09/21/20 0402 117/65 97.9 °F (36.6 °C) (!) 112 16 100 %   09/20/20 2328 114/62 97.5 °F (36.4 °C) (!) 116 16 97 %   09/20/20 1903 (!) 140/85 97.4 °F (36.3 °C) 98 20 100 %   09/20/20 1856 -- -- 99 -- --   09/20/20 1836 -- -- (!) 108 -- --   09/20/20 1805 127/79 -- (!) 123 23 99 %   09/20/20 1634 -- -- -- -- 99 %   09/20/20 1505 127/77 98.5 °F (36.9 °C) (!) 115 18 99 %   09/20/20 1414 -- -- (!) 118 -- --   09/20/20 1110 132/85 97.6 °F (36.4 °C) (!) 122 19 100 %   09/20/20 1054 -- -- (!) 123 -- --   09/20/20 0852 -- -- (!) 127 -- --     09/21 0701 - 09/21 1900  In: 240 [P.O.:240]  Out: -   09/19 1901 - 09/21 0700  In: 2015.5 [P.O.:180; I.V.:1835.5]  Out: 2718 [Urine:2210; Drains:508]    Physical Examination:  General Appearance - No distress. Heart - Mild sinus tachycardia. Abdomen - Still quite distended.  Appropriately tender.  Ileostomy edematous, but viable. Midline incision clean, dry, intact, and without associated erythema.  VALENTINE drainage serosanguinous.  - Hogan catheter draining normal-appearing urine. Neurological - Alert and oriented.             Data Review   Recent Results (from the past 24 hour(s))   CULTURE, BLOOD, PAIRED    Collection Time: 09/20/20 10:20 AM    Specimen: Blood   Result Value Ref Range    Special Requests: NO SPECIAL REQUESTS      Culture result: NO GROWTH AFTER 17 HOURS     URINALYSIS W/ REFLEX CULTURE    Collection Time: 09/20/20 10:20 AM    Specimen: Urine   Result Value Ref Range    Color YELLOW/STRAW      Appearance CLOUDY (A) CLEAR      Specific gravity 1.026 1.003 - 1.030      pH (UA) 5.5 5.0 - 8.0      Protein 100 (A) NEG mg/dL    Glucose Negative NEG mg/dL    Ketone Negative NEG mg/dL    Bilirubin Negative NEG      Blood LARGE (A) NEG      Urobilinogen 0.2 0.2 - 1.0 EU/dL    Nitrites Negative NEG      Leukocyte Esterase TRACE (A) NEG      WBC 0-4 0 - 4 /hpf    RBC >100 (H) 0 - 5 /hpf    Epithelial cells FEW FEW /lpf    Bacteria Negative NEG /hpf    UA:UC IF INDICATED CULTURE NOT INDICATED BY UA RESULT CNI      Mucus TRACE (A) NEG /lpf    Hyaline cast 0-2 0 - 5 /lpf    Budding yeast PRESENT (A) NEG     CULTURE, BODY FLUID [place] Speaks STAIN    Collection Time: 09/20/20 10:20 AM    Specimen: Body Fluid   Result Value Ref Range    Special Requests: FROM VALENTINE DRAIN     GRAM STAIN 4+ WBCS SEEN      GRAM STAIN NO ORGANISMS SEEN      Culture result: PENDING    LACTIC ACID    Collection Time: 09/20/20 10:21 AM   Result Value Ref Range    Lactic acid 1.6 0.4 - 2.0 MMOL/L   GLUCOSE, POC    Collection Time: 09/20/20 11:35 AM   Result Value Ref Range    Glucose (POC) 150 (H) 65 - 100 mg/dL    Performed by Aurora Vigil    GLUCOSE, POC    Collection Time: 09/20/20  5:14 PM   Result Value Ref Range    Glucose (POC) 175 (H) 65 - 100 mg/dL    Performed by Ro ANNE (CON)    GLUCOSE, POC    Collection Time: 09/20/20 11:33 PM   Result Value Ref Range    Glucose (POC) 137 (H) 65 - 100 mg/dL    Performed by Julienne Jones    CBC WITH AUTOMATED DIFF    Collection Time: 09/21/20  4:15 AM   Result Value Ref Range    WBC 20.0 (H) 3.6 - 11.0 K/uL    RBC 4.04 3.80 - 5.20 M/uL    HGB 9.5 (L) 11.5 - 16.0 g/dL    HCT 30.7 (L) 35.0 - 47.0 %    MCV 76.0 (L) 80.0 - 99.0 FL    MCH 23.5 (L) 26.0 - 34.0 PG    MCHC 30.9 30.0 - 36.5 g/dL    RDW 27.5 (H) 11.5 - 14.5 %    PLATELET 990 (L) 735 - 400 K/uL    MPV 9.0 8.9 - 12.9 FL    NRBC 0.0 0  WBC    ABSOLUTE NRBC 0.00 0.00 - 0.01 K/uL    NEUTROPHILS 78 (H) 32 - 75 %    BAND NEUTROPHILS 4 0 - 6 %    LYMPHOCYTES 11 (L) 12 - 49 %    MONOCYTES 5 5 - 13 %    EOSINOPHILS 1 0 - 7 %    BASOPHILS 1 0 - 1 %    IMMATURE GRANULOCYTES 0 %    ABS.  NEUTROPHILS 16.4 (H) 1.8 - 8.0 K/UL    ABS. LYMPHOCYTES 2.2 0.8 - 3.5 K/UL    ABS. MONOCYTES 1.0 0.0 - 1.0 K/UL    ABS. EOSINOPHILS 0.2 0.0 - 0.4 K/UL    ABS. BASOPHILS 0.2 (H) 0.0 - 0.1 K/UL    ABS. IMM. GRANS. 0.0 K/UL    DF MANUAL      PLATELET COMMENTS CLUMPED PLATELETS      RBC COMMENTS ANISOCYTOSIS  3+        RBC COMMENTS HYPOCHROMIA  1+        RBC COMMENTS MICROCYTOSIS  1+        RBC COMMENTS OVALOCYTES  1+        RBC COMMENTS POLYCHROMASIA  1+       METABOLIC PANEL, BASIC    Collection Time: 09/21/20  4:15 AM   Result Value Ref Range    Sodium 138 136 - 145 mmol/L    Potassium 4.6 3.5 - 5.1 mmol/L    Chloride 108 97 - 108 mmol/L    CO2 25 21 - 32 mmol/L    Anion gap 5 5 - 15 mmol/L    Glucose 131 (H) 65 - 100 mg/dL    BUN 32 (H) 6 - 20 MG/DL    Creatinine 0.55 0.55 - 1.02 MG/DL    BUN/Creatinine ratio 58 (H) 12 - 20      GFR est AA >60 >60 ml/min/1.73m2    GFR est non-AA >60 >60 ml/min/1.73m2    Calcium 7.7 (L) 8.5 - 10.1 MG/DL   PHOSPHORUS    Collection Time: 09/21/20  4:15 AM   Result Value Ref Range    Phosphorus 2.9 2.6 - 4.7 MG/DL   MAGNESIUM    Collection Time: 09/21/20  4:15 AM   Result Value Ref Range    Magnesium 2.0 1.6 - 2.4 mg/dL   GLUCOSE, POC    Collection Time: 09/21/20  6:41 AM   Result Value Ref Range    Glucose (POC) 140 (H) 65 - 100 mg/dL    Performed by [facility]      Chest Radiographs (9/20/2020):  No pneumonia. Assessment:     Principal Problem:    Malignant neoplasm of sigmoid colon ([address] (9/14/2020)    Active Problems:    Hydronephrosis (9/11/2020)      Pelvic mass (9/11/2020)      Severe protein-calorie malnutrition ([address] (9/16/2020)      Anemia (9/16/2020)      Thrombocytopenia ([address] (9/19/2020)        4 Days Post-Op s/p Low anterior resection, mobilization of the splenic flexure, coloproctostomy, and creation of loop ileostomy.   [Total abdominal hysterectomy and left salpingo-oophorectomy performed by Anila Johnson MD]  [facility] and placement of left ureteral catheter performed by Tray Briones Kenan Rivas III, MD]  [Distal right ureterectomy performed by Dacia Webb III, MD]  [Right ureteral re-implant with psoas hitch and placement of right ureteral stent performed by Hemalatha Wong MD]     Transferred to [facility] (stepdown) from recovery room.     Persistently tachycardic, but not hypotensive or febrile. Hemoglobin low but acceptable. Thrombocytopenia is better today than it was yesterday. Prn metoprolol begun yesterday to reduce heart rate.     Urine output adequate. Creatinine within normal limits. Hogan catheter in place and will need to remain for 14 days post-op (per Dr. Ashlee Long).     Leukocytosis decreased somewhat from yesterday but still significant. No apparent wound infection. No pneumonia. No UTI. Blood and drain fluid culture results are pending. Zosyn was begun on 9/20/2020.        Malnutrition being treated with TPN. Awaiting return of GI function. Ileus versus obstruction, possibly at the level of the ileostomy.     Needs physical therapy.     Needs ostomy education.     The patient should remain in the stepdown unit. Plan:   Continue clear liquid diet plus Ensure Enlive. Continue TPN and lipids. Abdominal radiographs today to evaluate bowel gas pattern.     Follow cultures of blood and abdominal fluid. Continue Zosyn.     Continue to hold anticoagulants secondary to thrombocytopenia. Continue mechanical DVT prophylaxis.     Will check VALENTINE drain fluid creatinine before removal.  Hogan catheter to remain for 14 days.     Ambulate. Physical therapy. Ostomy education. Incentive spirometer.

## 2020-09-21 NOTE — PROGRESS NOTES
Bedside and Verbal shift change report given to lee (oncoming nurse) by Chelsea Evans (offgoing nurse). Report included the following information SBAR, Kardex, Intake/Output, MAR, Recent Results and Cardiac Rhythm NSR.

## 2020-09-21 NOTE — WOUND CARE
Ostomy visit: Post op day 4:   Low anterior resection, mobilization of the splenic flexure, coloproctostomy, and creation of loop ileostomy with Dr. April Calloway. [Total abdominal hysterectomy and left salpingo-oophorectomy performed by Gustav Denver, MD]  [Cystoscopy and placement of left ureteral catheter performed by Kelvin Houston MD]  [Distal right ureterectomy performed by Stephanie Rico III, MD]  [Right ureteral re-implant with psoas hitch and placement of right ureteral stent performed by Casa Ward MD]  Marjan Mendoza is resting on a Versacare bed with accumax mattress. She is alert and oriented; having nausea; Zofran was given this a.m; ostomy output remains nil; no gas being passed with change. For KUB today. Appears frail; but no dizziness now and able to begin teaching. Assessment:  LLQ ileostomy - moist, soft pink, budded stoma, red rubber as darren horizontally under stoma with suture either end; dennis-stoma skin intact, no redness. Scant serous drainage in appliance. Has edema under skin noted on right upper/lateral side of stoma, no tenderness, no redness. Teaching:  Used mirror so patient could visualize stoma throughout. One piece Coloplast appliance for change; showed Marjan Mendoza how to measure stoma/cut out appliance; her nursing student cut out as she was a bit shaky with hands today; she was able to provide teach back verbally on size and how to cut. Fit well around stoma and darren ends; no extra paste or rings used. Showed her how to use closure on bag and reviewed with her that once normal bowel function returns she will be emptying about every two hours during day and most likely at least once during night. Discussed: best timing to change; how often to change - her stoma is well budded and away from folds so anticipate good wear time of appliance, she may want to begin changing every 4 days at baseline.   Also reviewed diet - foods that may cause blockage - symptoms of blockage which may appear similar to current symptoms with no output; nausea/vomitting and some abdominal discomfort. To call MD if this occurs. Recommendations:  Read written literature - did not over weekend, encouraged her to begin reviewing as able. Once output noted, assist her to empty herself with nursing assistance. Plan:  Next visit - patient to remove, cut and apply with [id number] guidance. She is agreeable to plan.    Bari Palmer, RN,CWCN

## 2020-09-21 NOTE — PROGRESS NOTES
Problem: Falls - Risk of  Goal: *Absence of Falls  Description: Document Oliver Bello Fall Risk and appropriate interventions in the flowsheet.   Outcome: Progressing Towards Goal  Note: Fall Risk Interventions:  Mobility Interventions: Assess mobility with egress test, Communicate number of staff needed for ambulation/transfer, Patient to call before getting OOB, Utilize walker, cane, or other assistive device         Medication Interventions: Patient to call before getting OOB, Teach patient to arise slowly    Elimination Interventions: Call light in reach    History of Falls Interventions: Utilize gait belt for transfer/ambulation         Problem: Patient Education: Go to Patient Education Activity  Goal: Patient/Family Education  Outcome: Progressing Towards Goal     Problem: Pain  Goal: *Control of Pain  Outcome: Progressing Towards Goal     Problem: Discharge Planning  Goal: *Discharge to safe environment  Outcome: Progressing Towards Goal  Goal: *Knowledge of medication management  Outcome: Progressing Towards Goal  Goal: *Knowledge of discharge instructions  Outcome: Progressing Towards Goal     Problem: Patient Education: Go to Patient Education Activity  Goal: Patient/Family Education  Outcome: Progressing Towards Goal     Problem: General Medical Care Plan  Goal: *Vital signs within specified parameters  Outcome: Progressing Towards Goal  Goal: *Labs within defined limits  Outcome: Progressing Towards Goal  Goal: *Absence of infection signs and symptoms  Outcome: Progressing Towards Goal  Goal: *Optimal pain control at patient's stated goal  Outcome: Progressing Towards Goal  Goal: *Skin integrity maintained  Outcome: Progressing Towards Goal  Goal: *Fluid volume balance  Outcome: Progressing Towards Goal  Goal: *Optimize nutritional status  Outcome: Progressing Towards Goal  Goal: *Anxiety reduced or absent  Outcome: Progressing Towards Goal  Goal: *Progressive mobility and function (eg: ADL's)  Outcome: Progressing Towards Goal     Problem: Patient Education: Go to Patient Education Activity  Goal: Patient/Family Education  Outcome: Progressing Towards Goal     Problem: Nutrition Deficit  Goal: *Optimize nutritional status  Outcome: Progressing Towards Goal     Problem: Pressure Injury - Risk of  Goal: *Prevention of pressure injury  Description: Document Andry Scale and appropriate interventions in the flowsheet. Outcome: Progressing Towards Goal  Note: Pressure Injury Interventions:  Sensory Interventions: Assess changes in LOC, Avoid rigorous massage over bony prominences, Check visual cues for pain, Float heels, Keep linens dry and wrinkle-free, Maintain/enhance activity level, Minimize linen layers, Turn and reposition approx. every two hours (pillows and wedges if needed)    Moisture Interventions: Absorbent underpads, Internal/External urinary devices, Minimize layers    Activity Interventions: Increase time out of bed    Mobility Interventions: Turn and reposition approx. every two hours(pillow and wedges)    Nutrition Interventions: Document food/fluid/supplement intake, Discuss nutritional consult with provider    Friction and Shear Interventions: Minimize layers                Problem: Patient Education: Go to Patient Education Activity  Goal: Patient/Family Education  Outcome: Progressing Towards Goal     Problem: Ostomy Care  Goal: *Patient pouching appliance will fit properly and maintain integrity at least three to five days  Description: Infection control procedures (eg, clean dressings, clean gloves, hand washing, precautions to isolate wound from contamination, sterile instruments used for wound debridement) should be implemented.   Outcome: Progressing Towards Goal  Goal: *Acceptance of change in body image  Outcome: Progressing Towards Goal     Problem: Patient Education: Go to Patient Education Activity  Goal: Patient/Family Education  Outcome: Progressing Towards Goal     Problem: Patient Education: Go to Patient Education Activity  Goal: Patient/Family Education  Outcome: Progressing Towards Goal

## 2020-09-21 NOTE — PROGRESS NOTES
OLIVIA:    RUR 13%    Patient expected to d/c home    Family will provide transportation at discharge.     Emergency Contact: Dominique Calin [phone]    Ave Main RN/CRM

## 2020-09-21 NOTE — PROGRESS NOTES
Bedside and Verbal shift change report given to Luciano Kessler (oncoming nurse) by [facility] (offgoing nurse). Report included the following information SBAR, OR Summary, Procedure Summary, Intake/Output, MAR and Cardiac Rhythm normal sinus.

## 2020-09-21 NOTE — PROGRESS NOTES
Patient: Mavis Ray MRN: [id number]  SSN: [id number]    YOB: 1950  Age: 72 y.o. Sex: female        ADMITTED: 2020 to Zofia Eric MD by Paulina Graf MD for Pelvic mass [R19.00]  Hydronephrosis [N13.30]  POD# 4 Days Post-Op Procedure(s):  Open low anterior resection, mobilization of splenic flexture, coloproctostomy, creation of loop ileostomy (E R A S)  TOTAL ABDOMINAL HYSTERECTOMY, LEFT SALPINGO OOPHORECTOMY  Cystoscopy, insertion and removal of left Ureteral catheter, simple pepper, distal right ureterectomy, right ureteral reimplant, right stent placement    Mavis Ray is doing well still has some nausea . Pepper in place draining clear yellow urine . Pt deneis any back or flank pain , tolerating r stent . Vitals: Temp (24hrs), Av.8 °F (36.6 °C), Min:97.4 °F (36.3 °C), Max:98.5 °F (36.9 °C)    Blood pressure 117/65, pulse (!) 105, temperature 97.9 °F (36.6 °C), resp. rate 16, height 5' 6\" (1.676 m), weight 63.5 kg (140 lb), SpO2 100 %, not currently breastfeeding.     Intake and Output:  1901 -  0700  In: 2015.5 [P.O.:180; I.V.:1835.5]  Out: 4703 [Urine:2210; Drains:508]   07 - 1900  In: 240 [P.O.:240]  Out: -     Exam:   EXAMINATION:     Appearance: well-developed NAD   Conjunctiva/Lids: conjunctivae and lids normal   External Ears/Nose: normal no lesions or deformities   Neck: trachea midline   Respiratory Effort: breathing easily   Lower Extremity: no edema   Abdomen/Flank: soft non-tender without masses; no CVA tenderness, ostomy RLQ, midline incision intact    Liver/Spleen: no organomegaly   Hernia: no ventral hernia    Gait/Station: Not assessed    Skin Inspection: warm and dry   Mood/Affect: normal         Labs:  CBC:   Lab Results   Component Value Date/Time    WBC 20.0 (H) 2020 04:15 AM    HCT 30.7 (L) 2020 04:15 AM    PLATELET 293 (L)  04:15 AM     BMP:   Lab Results   Component Value Date/Time    Glucose 131 (H) 09/21/2020 04:15 AM    Sodium 138 09/21/2020 04:15 AM    Potassium 4.6 09/21/2020 04:15 AM    Chloride 108 09/21/2020 04:15 AM    CO2 25 09/21/2020 04:15 AM    BUN 32 (H) 09/21/2020 04:15 AM    Creatinine 0.55 09/21/2020 04:15 AM    Calcium 7.7 (L) 09/21/2020 04:15 AM     Cultures:N/A    Imaging: N/A  Assessment/Plan:     1. PO#4 s/p LAR with distal right ureterectomy and ureteroneocystotomy, psoas hitch- CR stable , pepper draining , can DC 9/24 observe for gross hematuria due to stent     Signed By: Ivette Pappas.  Arianna Roy NP - September 21, 2020

## 2020-09-21 NOTE — PROGRESS NOTES
Faculty or Preceptor Review of Student Work    9/21/2020  - Shift times - 0745 to 1300    The student documentation of patient care for Loco Travis has been reviewed and approved. All medications have been administered under the direct supervision of the faculty or preceptor.     Erich Jasmine RN

## 2020-09-21 NOTE — PROGRESS NOTES
Problem: Mobility Impaired (Adult and Pediatric)  Goal: *Acute Goals and Plan of Care (Insert Text)  Description: FUNCTIONAL STATUS PRIOR TO ADMISSION: Patient was independent and active without use of DME. Driving. No falls. HOME SUPPORT PRIOR TO ADMISSION: The patient lived alone and has friends to provide assistance at d/c as needed. Physical Therapy Goals  Initiated 9/18/2020  1. Patient will move from supine to sit and sit to supine , scoot up and down, and roll side to side in bed with modified independence within 7 day(s). 2.  Patient will transfer from bed to chair and chair to bed with modified independence using the least restrictive device within 7 day(s). 3.  Patient will perform sit to stand with modified independence within 7 day(s). 4.  Patient will ambulate with modified independence for 150 feet with the least restrictive device within 7 day(s). 5.  Patient will ascend/descend 12 stairs with bilateral handrail(s) with modified independence within 7 day(s). 6.  Patient will improve Tinetti score by 4-5 points within 7 days. Outcome: Progressing Towards Goal    PHYSICAL THERAPY TREATMENT  Patient: Mehdi Chanel (25 y.o. female)  Date: 9/21/2020  Diagnosis: Pelvic mass [R19.00]  Hydronephrosis [N13.30]   Malignant neoplasm of sigmoid colon (HCC)  Procedure(s) (LRB):  Open low anterior resection, mobilization of splenic flexture, coloproctostomy, creation of loop ileostomy (E R A S) (N/A)  TOTAL ABDOMINAL HYSTERECTOMY, LEFT SALPINGO OOPHORECTOMY (N/A)  Cystoscopy, insertion and removal of left Ureteral catheter, simple pepper, distal right ureterectomy, right ureteral reimplant, right stent placement (Left) 4 Days Post-Op  Precautions: Fall(ERAS)  Chart, physical therapy assessment, plan of care and goals were reviewed. ASSESSMENT  Patient continues with skilled PT services and is progressing towards goals. Pt was able to increase gait tolerance.  Pt requesting to hold onto the IV pole. Pt reports minimal fatigue post gait. Pt may benefit initiation of standing exercises. Pt needs to be ambulating nursing. Current Level of Function Impacting Discharge (mobility/balance):CGA/SBA    Other factors to consider for discharge:          PLAN :  Patient continues to benefit from skilled intervention to address the above impairments. Continue treatment per established plan of care. to address goals. Recommendation for discharge: (in order for the patient to meet his/her long term goals)  To be determined: none vs HHPT    This discharge recommendation:  Has not yet been discussed the attending provider and/or case management    IF patient discharges home will need the following DME: none       SUBJECTIVE:   Patient stated I can go.     OBJECTIVE DATA SUMMARY:   Critical Behavior:  Neurologic State: Alert  Orientation Level: Oriented X4  Cognition: Appropriate decision making, Appropriate for age attention/concentration, Appropriate safety awareness     Functional Mobility Training:  Bed Mobility:                    Transfers:  Sit to Stand: Stand-by assistance  Stand to Sit: Stand-by assistance                             Balance:  Sitting: Intact  Standing: Intact; With support  Ambulation/Gait Training:  Distance (ft): 250 Feet (ft)  Assistive Device: Other (comment)(holding on IV pole)  Ambulation - Level of Assistance: Stand-by assistance;Contact guard assistance        Gait Abnormalities: Decreased step clearance        Base of Support: Narrowed                             Stairs: Therapeutic Exercises:     Pain Rating:  none    Activity Tolerance:   Limited   Please refer to the flowsheet for vital signs taken during this treatment. After treatment patient left in no apparent distress:   Sitting in chair and Call bell within reach    COMMUNICATION/COLLABORATION:   The patients plan of care was discussed with: Registered nurse.      KIMBERLEY Fontanez Calculation: 17 mins

## 2020-09-21 NOTE — PROGRESS NOTES
Anesthesia post op pain 4 Days Post-Op    Patient is s/p surgery and with an epidural for post operative pain. Epidural site is in intact and site is dry and infusing well. Current rate is 2cc's/hr. The patient relates none pain,  none itching and none nausea. All sympyoms were treated with medications per protocol. Patient currently has minimal complaints and is doing well. Plan: Continue the epidural and treat breakthrough symptoms as needed with protocol medictions.

## 2020-09-21 NOTE — PROGRESS NOTES
1974  Sent Pt labs - purple and green top. Encouraged pt to take in some fluids due to her taking in very little. She stated that she is afraid that she will get sick if she does but is willing to try. Pt drank approximately 50cc of ensure and began to have an episode of emesis. PRN zofran given.

## 2020-09-21 NOTE — PROGRESS NOTES
768 [facility] visit. Mrs. Tatum was sitting up in her chair. She is a volunteer at [employer] and she said Fernadna Santillan has brought her communion and the Sacrament of the Sick. Prayer and communion offered. Will plan on visiting on Wednesday.     RACH Ma, RN, ACSW, LCSW   Page:  [phone]

## 2020-09-21 NOTE — PROGRESS NOTES
Patient up to chair with meals; went for Xray; ambulated in hallway working with PT and demonstrating IS  1500.   Dewayne Haynes

## 2020-09-22 ENCOUNTER — APPOINTMENT (OUTPATIENT)
Dept: CT IMAGING | Age: 70
DRG: 329 | End: 2020-09-22
Attending: COLON & RECTAL SURGERY
Payer: MEDICARE

## 2020-09-22 LAB
ALBUMIN SERPL-MCNC: 2 G/DL (ref 3.5–5)
ALBUMIN/GLOB SERPL: 0.6 {RATIO} (ref 1.1–2.2)
ALP SERPL-CCNC: 189 U/L (ref 45–117)
ALT SERPL-CCNC: 36 U/L (ref 12–78)
ANION GAP SERPL CALC-SCNC: 7 MMOL/L (ref 5–15)
AST SERPL-CCNC: 36 U/L (ref 15–37)
BASOPHILS # BLD: 0 K/UL (ref 0–0.1)
BASOPHILS NFR BLD: 0 % (ref 0–1)
BILIRUB SERPL-MCNC: 0.2 MG/DL (ref 0.2–1)
BUN SERPL-MCNC: 32 MG/DL (ref 6–20)
BUN/CREAT SERPL: 65 (ref 12–20)
CALCIUM SERPL-MCNC: 7.6 MG/DL (ref 8.5–10.1)
CHLORIDE SERPL-SCNC: 105 MMOL/L (ref 97–108)
CO2 SERPL-SCNC: 21 MMOL/L (ref 21–32)
CREAT SERPL-MCNC: 0.49 MG/DL (ref 0.55–1.02)
DIFFERENTIAL METHOD BLD: ABNORMAL
EOSINOPHIL # BLD: 0 K/UL (ref 0–0.4)
EOSINOPHIL NFR BLD: 0 % (ref 0–7)
ERYTHROCYTE [DISTWIDTH] IN BLOOD BY AUTOMATED COUNT: 27.9 % (ref 11.5–14.5)
GLOBULIN SER CALC-MCNC: 3.2 G/DL (ref 2–4)
GLUCOSE BLD STRIP.AUTO-MCNC: 131 MG/DL (ref 65–100)
GLUCOSE BLD STRIP.AUTO-MCNC: 132 MG/DL (ref 65–100)
GLUCOSE BLD STRIP.AUTO-MCNC: 133 MG/DL (ref 65–100)
GLUCOSE BLD STRIP.AUTO-MCNC: 139 MG/DL (ref 65–100)
GLUCOSE SERPL-MCNC: 120 MG/DL (ref 65–100)
HCT VFR BLD AUTO: 28.9 % (ref 35–47)
HGB BLD-MCNC: 9 G/DL (ref 11.5–16)
IMM GRANULOCYTES # BLD AUTO: 0 K/UL
IMM GRANULOCYTES NFR BLD AUTO: 0 %
LYMPHOCYTES # BLD: 2.1 K/UL (ref 0.8–3.5)
LYMPHOCYTES NFR BLD: 10 % (ref 12–49)
MAGNESIUM SERPL-MCNC: 1.9 MG/DL (ref 1.6–2.4)
MCH RBC QN AUTO: 24 PG (ref 26–34)
MCHC RBC AUTO-ENTMCNC: 31.1 G/DL (ref 30–36.5)
MCV RBC AUTO: 77.1 FL (ref 80–99)
METAMYELOCYTES NFR BLD MANUAL: 1 %
MONOCYTES # BLD: 1.3 K/UL (ref 0–1)
MONOCYTES NFR BLD: 6 % (ref 5–13)
NEUTS BAND NFR BLD MANUAL: 5 % (ref 0–6)
NEUTS SEG # BLD: 17.5 K/UL (ref 1.8–8)
NEUTS SEG NFR BLD: 78 % (ref 32–75)
NRBC # BLD: 0 K/UL (ref 0–0.01)
NRBC BLD-RTO: 0 PER 100 WBC
PHOSPHATE SERPL-MCNC: 2.7 MG/DL (ref 2.6–4.7)
PLATELET # BLD AUTO: 294 K/UL (ref 150–400)
PLATELET COMMENTS,PCOM: ABNORMAL
PMV BLD AUTO: 9.7 FL (ref 8.9–12.9)
POTASSIUM SERPL-SCNC: 4.5 MMOL/L (ref 3.5–5.1)
PROT SERPL-MCNC: 5.2 G/DL (ref 6.4–8.2)
RBC # BLD AUTO: 3.75 M/UL (ref 3.8–5.2)
RBC MORPH BLD: ABNORMAL
SERVICE CMNT-IMP: ABNORMAL
SODIUM SERPL-SCNC: 133 MMOL/L (ref 136–145)
WBC # BLD AUTO: 21.1 K/UL (ref 3.6–11)

## 2020-09-22 PROCEDURE — 74011250637 HC RX REV CODE- 250/637: Performed by: COLON & RECTAL SURGERY

## 2020-09-22 PROCEDURE — 74011250636 HC RX REV CODE- 250/636: Performed by: COLON & RECTAL SURGERY

## 2020-09-22 PROCEDURE — 74011000258 HC RX REV CODE- 258: Performed by: COLON & RECTAL SURGERY

## 2020-09-22 PROCEDURE — 36415 COLL VENOUS BLD VENIPUNCTURE: CPT

## 2020-09-22 PROCEDURE — C9113 INJ PANTOPRAZOLE SODIUM, VIA: HCPCS | Performed by: COLON & RECTAL SURGERY

## 2020-09-22 PROCEDURE — 74177 CT ABD & PELVIS W/CONTRAST: CPT

## 2020-09-22 PROCEDURE — 83735 ASSAY OF MAGNESIUM: CPT

## 2020-09-22 PROCEDURE — 84100 ASSAY OF PHOSPHORUS: CPT

## 2020-09-22 PROCEDURE — 65660000000 HC RM CCU STEPDOWN

## 2020-09-22 PROCEDURE — 85025 COMPLETE CBC W/AUTO DIFF WBC: CPT

## 2020-09-22 PROCEDURE — 74011000250 HC RX REV CODE- 250: Performed by: COLON & RECTAL SURGERY

## 2020-09-22 PROCEDURE — 74011000636 HC RX REV CODE- 636: Performed by: RADIOLOGY

## 2020-09-22 PROCEDURE — 80053 COMPREHEN METABOLIC PANEL: CPT

## 2020-09-22 RX ORDER — SODIUM CHLORIDE 0.9 % (FLUSH) 0.9 %
10 SYRINGE (ML) INJECTION
Status: COMPLETED | OUTPATIENT
Start: 2020-09-22 | End: 2020-09-22

## 2020-09-22 RX ADMIN — IOHEXOL 50 ML: 240 INJECTION, SOLUTION INTRATHECAL; INTRAVASCULAR; INTRAVENOUS; ORAL at 14:56

## 2020-09-22 RX ADMIN — IOPAMIDOL 100 ML: 755 INJECTION, SOLUTION INTRAVENOUS at 14:55

## 2020-09-22 RX ADMIN — ALVIMOPAN 12 MG: 12 CAPSULE ORAL at 18:10

## 2020-09-22 RX ADMIN — PIPERACILLIN AND TAZOBACTAM 3.38 G: 3; .375 INJECTION, POWDER, LYOPHILIZED, FOR SOLUTION INTRAVENOUS at 03:07

## 2020-09-22 RX ADMIN — ACETAMINOPHEN ORAL SOLUTION 1000 MG: 650 SOLUTION ORAL at 18:10

## 2020-09-22 RX ADMIN — ALVIMOPAN 12 MG: 12 CAPSULE ORAL at 08:48

## 2020-09-22 RX ADMIN — ACETAMINOPHEN ORAL SOLUTION 1000 MG: 650 SOLUTION ORAL at 11:56

## 2020-09-22 RX ADMIN — ACETAMINOPHEN 1000 MG: 500 TABLET ORAL at 06:33

## 2020-09-22 RX ADMIN — ASCORBIC ACID: 500 INJECTION, SOLUTION INTRAMUSCULAR; INTRAVENOUS; SUBCUTANEOUS at 18:07

## 2020-09-22 RX ADMIN — ONDANSETRON 4 MG: 4 TABLET, ORALLY DISINTEGRATING ORAL at 06:27

## 2020-09-22 RX ADMIN — ONDANSETRON 4 MG: 4 TABLET, ORALLY DISINTEGRATING ORAL at 20:03

## 2020-09-22 RX ADMIN — Medication 10 ML: at 14:55

## 2020-09-22 RX ADMIN — ONDANSETRON 4 MG: 4 TABLET, ORALLY DISINTEGRATING ORAL at 08:48

## 2020-09-22 RX ADMIN — PIPERACILLIN AND TAZOBACTAM 3.38 G: 3; .375 INJECTION, POWDER, LYOPHILIZED, FOR SOLUTION INTRAVENOUS at 18:10

## 2020-09-22 RX ADMIN — Medication 20 ML: at 18:10

## 2020-09-22 RX ADMIN — ONDANSETRON 4 MG: 4 TABLET, ORALLY DISINTEGRATING ORAL at 15:35

## 2020-09-22 RX ADMIN — SODIUM CHLORIDE 40 MG: 9 INJECTION INTRAMUSCULAR; INTRAVENOUS; SUBCUTANEOUS at 08:47

## 2020-09-22 RX ADMIN — SODIUM CHLORIDE 40 MG: 9 INJECTION INTRAMUSCULAR; INTRAVENOUS; SUBCUTANEOUS at 20:03

## 2020-09-22 RX ADMIN — PIPERACILLIN AND TAZOBACTAM 3.38 G: 3; .375 INJECTION, POWDER, LYOPHILIZED, FOR SOLUTION INTRAVENOUS at 10:37

## 2020-09-22 NOTE — PROGRESS NOTES
Physical Therapy    Reviewed chart and attempted to treat pt. Pt currently finishing up contrast and awaiting for CT. Pt deferred gait at this time. Pt was able to demonstrate seated exercises. Plan on instructing on standing exercises next session.  Pt plans to ambulate with nursing in the PM

## 2020-09-22 NOTE — ADVANCED PRACTICE NURSE
Provided and reviewed ileostomy output chart and log. Goal is 500-1200 mL daily from ileostomy. Reviewed actions to take is output is out of range and why.   Will reinforce

## 2020-09-22 NOTE — PERIOP NOTES
Anesthesia post op pain 5 Days Post-Op   Patient is s/p surgery and with an epidural for post operative pain. Epidural site is in intact and site is dry and infusing well. Current rate is 2cc's/hr. The patient relates no pain,  no itching and no nausea. All symptoms were treated with medications per protocol. Patient currently has minimal complaints and is doing well. Plan: Discontinue the epidural and treat breakthrough symptoms as needed with protocol medications. Discussed with patient and RN.

## 2020-09-22 NOTE — PROGRESS NOTES
General Daily Progress Note    Admission Date: 9/11/2020  Hospital Day 12  Post-Op Day 5  Subjective:   Still having a lot of nausea. Pain well controlled. No dyspnea. Objective:     Patient Vitals for the past 24 hrs:   BP Temp Pulse Resp SpO2 Height Weight   09/22/20 0825 130/68 98.4 °F (36.9 °C) (!) 101 17 98 % -- --   09/22/20 0646 -- -- -- -- -- -- 60.8 kg (134 lb)   09/22/20 0310 139/71 98.7 °F (37.1 °C) 96 18 97 % -- --   09/21/20 2300 (!) 141/73 98.1 °F (36.7 °C) (!) 109 18 97 % -- --   09/21/20 2246 -- -- 98 -- -- -- --   09/21/20 2012 123/77 97.9 °F (36.6 °C) (!) 113 18 100 % -- --   09/21/20 1807 -- -- (!) 107 17 99 % -- --   09/21/20 1541 128/84 -- (!) 108 18 98 % -- --   09/21/20 1128 109/71 97.8 °F (36.6 °C) (!) 110 18 100 % -- --   09/21/20 0908 118/75 97.8 °F (36.6 °C) (!) 101 18 100 % 5' 6\" (1.676 m) 63.5 kg (140 lb)     09/22 0701 - 09/22 1900  In: 367.5 [I.V.:367.5]  Out: 2120 [Urine:2000; Drains:70]  09/20 1901 - 09/22 0700  In: 3716.4 [P.O.:420; I.V.:3296.4]  Out: 1870 [Urine:1325; Drains:240]      Physical Examination:  General Appearance - No distress. Heart - Normal sinus rhythm. Abdomen - Still quite distended.  Appropriately tender.  Ileostomy prolapsing but viable. Midline incision clean, dry, intact, and without associated erythema.  VALENTINE drainage serosanguinous.  - Hogan catheter draining normal-appearing urine. Neurological - Alert and oriented.             Data Review   Recent Results (from the past 24 hour(s))   GLUCOSE, POC    Collection Time: 09/21/20 11:27 AM   Result Value Ref Range    Glucose (POC) 139 (H) 65 - 100 mg/dL    Performed by Xin Horn, POC    Collection Time: 09/21/20  5:43 PM   Result Value Ref Range    Glucose (POC) 133 (H) 65 - 100 mg/dL    Performed by Sara ANNE (NETTIE)    GLUCOSE, POC    Collection Time: 09/21/20 10:56 PM   Result Value Ref Range    Glucose (POC) 131 (H) 65 - 100 mg/dL    Performed by Amarjit SOW WITH AUTOMATED DIFF    Collection Time: 09/22/20  3:22 AM   Result Value Ref Range    WBC 21.1 (H) 3.6 - 11.0 K/uL    RBC 3.75 (L) 3.80 - 5.20 M/uL    HGB 9.0 (L) 11.5 - 16.0 g/dL    HCT 28.9 (L) 35.0 - 47.0 %    MCV 77.1 (L) 80.0 - 99.0 FL    MCH 24.0 (L) 26.0 - 34.0 PG    MCHC 31.1 30.0 - 36.5 g/dL    RDW 27.9 (H) 11.5 - 14.5 %    PLATELET 655 841 - 126 K/uL    MPV 9.7 8.9 - 12.9 FL    NRBC 0.0 0  WBC    ABSOLUTE NRBC 0.00 0.00 - 0.01 K/uL    NEUTROPHILS 78 (H) 32 - 75 %    BAND NEUTROPHILS 5 0 - 6 %    LYMPHOCYTES 10 (L) 12 - 49 %    MONOCYTES 6 5 - 13 %    EOSINOPHILS 0 0 - 7 %    BASOPHILS 0 0 - 1 %    METAMYELOCYTES 1 (H) 0 %    IMMATURE GRANULOCYTES 0 %    ABS. NEUTROPHILS 17.5 (H) 1.8 - 8.0 K/UL    ABS. LYMPHOCYTES 2.1 0.8 - 3.5 K/UL    ABS. MONOCYTES 1.3 (H) 0.0 - 1.0 K/UL    ABS. EOSINOPHILS 0.0 0.0 - 0.4 K/UL    ABS. BASOPHILS 0.0 0.0 - 0.1 K/UL    ABS. IMM. GRANS. 0.0 K/UL    DF MANUAL      PLATELET COMMENTS CLUMPED PLATELETS      RBC COMMENTS MACROCYTOSIS  1+        RBC COMMENTS ANISOCYTOSIS  2+        RBC COMMENTS HYPOCHROMIA  1+        RBC COMMENTS POLYCHROMASIA  1+        RBC COMMENTS SCHISTOCYTES  PRESENT       METABOLIC PANEL, COMPREHENSIVE    Collection Time: 09/22/20  3:22 AM   Result Value Ref Range    Sodium 133 (L) 136 - 145 mmol/L    Potassium 4.5 3.5 - 5.1 mmol/L    Chloride 105 97 - 108 mmol/L    CO2 21 21 - 32 mmol/L    Anion gap 7 5 - 15 mmol/L    Glucose 120 (H) 65 - 100 mg/dL    BUN 32 (H) 6 - 20 MG/DL    Creatinine 0.49 (L) 0.55 - 1.02 MG/DL    BUN/Creatinine ratio 65 (H) 12 - 20      GFR est AA >60 >60 ml/min/1.73m2    GFR est non-AA >60 >60 ml/min/1.73m2    Calcium 7.6 (L) 8.5 - 10.1 MG/DL    Bilirubin, total 0.2 0.2 - 1.0 MG/DL    ALT (SGPT) 36 12 - 78 U/L    AST (SGOT) 36 15 - 37 U/L    Alk.  phosphatase 189 (H) 45 - 117 U/L    Protein, total 5.2 (L) 6.4 - 8.2 g/dL    Albumin 2.0 (L) 3.5 - 5.0 g/dL    Globulin 3.2 2.0 - 4.0 g/dL    A-G Ratio 0.6 (L) 1.1 - 2.2     MAGNESIUM Collection Time: 09/22/20  3:22 AM   Result Value Ref Range    Magnesium 1.9 1.6 - 2.4 mg/dL   PHOSPHORUS    Collection Time: 09/22/20  3:22 AM   Result Value Ref Range    Phosphorus 2.7 2.6 - 4.7 MG/DL   GLUCOSE, POC    Collection Time: 09/22/20  6:32 AM   Result Value Ref Range    Glucose (POC) 132 (H) 65 - 100 mg/dL    Performed by Roxy Trevizo      Chest Radiographs (9/20/2020):  No pneumonia. Abdominal Radiographs (9/21/2020):  Small bowel distension. Ileus versus obstruction. Assessment:     Principal Problem:    Malignant neoplasm of sigmoid colon ([facility] [address] (9/14/2020)    Active Problems:    Hydronephrosis (9/11/2020)      Pelvic mass (9/11/2020)      Severe protein-calorie malnutrition ([address] (9/16/2020)      Anemia (9/16/2020)      Thrombocytopenia ([facility] [address] (9/19/2020)        5 Days Post-Op s/p Low anterior resection, mobilization of the splenic flexure, coloproctostomy, and creation of loop ileostomy. [Total abdominal hysterectomy and left salpingo-oophorectomy performed by Roger Gagnon MD]  [Cystoscopy and placement of left ureteral catheter performed by Kenneth Bob MD]  [Distal right ureterectomy performed by Kiran Ambriz III, MD]  [Right ureteral re-implant with psoas hitch and placement of right ureteral stent performed by Humberto Flores MD]     Transferred to [facility] (stepdown) from recovery room.     Tachycardia somewhat improved. No hypotension. Hemoglobin low but acceptable. Thrombocytopenia resolved. Prn metoprolol begun yon 9/20/2020 to reduce heart rate.     Urine output adequate. Creatinine within normal limits, but BUN increased. Hogan catheter in place and will need to remain for 14 days post-op (per Dr. Darlene Childers).     Leukocytosis increased versus yesterday. No apparent wound infection. No pneumonia. No UTI. Blood and drain fluid culture results are pending. Zosyn was begun on 9/20/2020.        Malnutrition being treated with TPN.   Awaiting return of GI function. Ileus versus obstruction, possibly at the level of the ileostomy. Ileostomy prolapse was first notice yesterday evening and could be interfering with bowel function.     Needs physical therapy.     Needs ostomy education.     The patient should remain in the stepdown unit. Plan:   Continue clear liquid diet plus Ensure [employer]. Continue TPN and lipids.     CT scan of abdomen and pelvis today to evaluate for possible abscess.     Follow cultures of blood and abdominal fluid. Continue Zosyn.     Continue to hold anticoagulants secondary to recent thrombocytopenia and presence of epidural catheter. Continue mechanical DVT prophylaxis.     Will check VALENTINE drain fluid creatinine before removal.  Hogan catheter to remain  Until POD#14.     Ambulate. Physical therapy. Ostomy education. Incentive spirometer.

## 2020-09-22 NOTE — PROGRESS NOTES
Bedside and Verbal shift change report given to Sunil Cornell (oncoming nurse) by Pratibha Campbell (offgoing nurse). Report included the following information SBAR, Procedure Summary, MAR and Cardiac Rhythm normal sinus.

## 2020-09-22 NOTE — PROGRESS NOTES
Faculty or Preceptor Review of Student Work    9/22/2020  - Shift times - 0745 to 1300    The student documentation of patient care for Otoniel Chavez has been reviewed and approved. All medications have been administered under the direct supervision of the faculty or preceptor.     Hayley Gutierrez RN

## 2020-09-22 NOTE — PROGRESS NOTES
Problem: Falls - Risk of  Goal: *Absence of Falls  Description: Document Ramirez Ulloa Fall Risk and appropriate interventions in the flowsheet.   Outcome: Progressing Towards Goal  Note: Fall Risk Interventions:  Mobility Interventions: Patient to call before getting OOB         Medication Interventions: Teach patient to arise slowly    Elimination Interventions: Call light in reach    History of Falls Interventions: Utilize gait belt for transfer/ambulation

## 2020-09-22 NOTE — WOUND CARE
WOCN Note:     Follow-up visit for POD #5; loop ileostomy  -Dr. April Calloway left abdominal suture line LAUREN. Well approximated and not leaking  -abdomen distended and tender  -ileostomy prolapse  -CT scan today ( if contrast can be drunk) ileus vs obstruction. - epi removed. Assessment:   Patient is A&O x 3, communicative, pepper in with ostomy and mobile to chair / sitting up in the chair. Bed: [facility] Bed with Accumax mattress. Diet: NPO / nauseated. Patient reports no pain presently. LLQ: moist pink, prolapsed stoma, darren remains in and present pouch applied yesterday and is intact with no leaks. Emptied 300cc of liquid stool with patient and Madison Mcnulty noted on I&O's. Bilateral heel, buttocks, and sacral skin intact and without erythema. Heels offloaded on pillow. Recommendations:    Empty appliance when 1/3 full and PRN. Encourage patient/family to notify nurse and  assist w/ pouch emptying to promote self-care. Change appliance twice weekly and immediately with leaking to avoid skin breakdown. DO NOT REINFORCE LEAKS! Ostomy supplies located on [address] and ICU. Supplies left in room. Minimize layers of linen/pads under patient to optimize support surface. Turn/reposition approximately every 2 hours and offload heels. Manage incontinence / promote continence: Hydra Guard Barrier Cream to buttocks and sacrum daily and as needed with incontinence care. Discussed above plan with patient and RN: Marjan Mendoza. Transition of Care: Plan to follow as needed with ostmy teaching while admitted to hospital.     Will resume teaching after prep and CT scan completed [id number]         Joan TEJEDAN RN  Wound Care Department  Office: [phone]  Pager: [phone]

## 2020-09-23 ENCOUNTER — HOME HEALTH ADMISSION (OUTPATIENT)
Dept: HOME HEALTH SERVICES | Facility: HOME HEALTH | Age: 70
End: 2020-09-23

## 2020-09-23 LAB
ANION GAP SERPL CALC-SCNC: 5 MMOL/L (ref 5–15)
BASOPHILS # BLD: 0 K/UL (ref 0–0.1)
BASOPHILS NFR BLD: 0 % (ref 0–1)
BUN SERPL-MCNC: 25 MG/DL (ref 6–20)
BUN/CREAT SERPL: 63 (ref 12–20)
CALCIUM SERPL-MCNC: 7.4 MG/DL (ref 8.5–10.1)
CHLORIDE SERPL-SCNC: 106 MMOL/L (ref 97–108)
CO2 SERPL-SCNC: 22 MMOL/L (ref 21–32)
CREAT SERPL-MCNC: 0.4 MG/DL (ref 0.55–1.02)
DIFFERENTIAL METHOD BLD: ABNORMAL
EOSINOPHIL # BLD: 0.2 K/UL (ref 0–0.4)
EOSINOPHIL NFR BLD: 1 % (ref 0–7)
ERYTHROCYTE [DISTWIDTH] IN BLOOD BY AUTOMATED COUNT: 27.9 % (ref 11.5–14.5)
GLUCOSE BLD STRIP.AUTO-MCNC: 115 MG/DL (ref 65–100)
GLUCOSE BLD STRIP.AUTO-MCNC: 119 MG/DL (ref 65–100)
GLUCOSE BLD STRIP.AUTO-MCNC: 126 MG/DL (ref 65–100)
GLUCOSE BLD STRIP.AUTO-MCNC: 134 MG/DL (ref 65–100)
GLUCOSE BLD STRIP.AUTO-MCNC: 137 MG/DL (ref 65–100)
GLUCOSE SERPL-MCNC: 112 MG/DL (ref 65–100)
HCT VFR BLD AUTO: 28.9 % (ref 35–47)
HGB BLD-MCNC: 9 G/DL (ref 11.5–16)
IMM GRANULOCYTES # BLD AUTO: 0.5 K/UL (ref 0–0.04)
IMM GRANULOCYTES NFR BLD AUTO: 2 % (ref 0–0.5)
LYMPHOCYTES # BLD: 0.9 K/UL (ref 0.8–3.5)
LYMPHOCYTES NFR BLD: 4 % (ref 12–49)
MCH RBC QN AUTO: 23.9 PG (ref 26–34)
MCHC RBC AUTO-ENTMCNC: 31.1 G/DL (ref 30–36.5)
MCV RBC AUTO: 76.7 FL (ref 80–99)
MONOCYTES # BLD: 0.7 K/UL (ref 0–1)
MONOCYTES NFR BLD: 3 % (ref 5–13)
NEUTS SEG # BLD: 20.5 K/UL (ref 1.8–8)
NEUTS SEG NFR BLD: 90 % (ref 32–75)
NRBC # BLD: 0 K/UL (ref 0–0.01)
NRBC BLD-RTO: 0 PER 100 WBC
PLATELET # BLD AUTO: 347 K/UL (ref 150–400)
PLATELET COMMENTS,PCOM: ABNORMAL
PMV BLD AUTO: 9.6 FL (ref 8.9–12.9)
POTASSIUM SERPL-SCNC: 4.4 MMOL/L (ref 3.5–5.1)
RBC # BLD AUTO: 3.77 M/UL (ref 3.8–5.2)
RBC MORPH BLD: ABNORMAL
SERVICE CMNT-IMP: ABNORMAL
SODIUM SERPL-SCNC: 133 MMOL/L (ref 136–145)
WBC # BLD AUTO: 22.8 K/UL (ref 3.6–11)

## 2020-09-23 PROCEDURE — 85025 COMPLETE CBC W/AUTO DIFF WBC: CPT

## 2020-09-23 PROCEDURE — 82962 GLUCOSE BLOOD TEST: CPT

## 2020-09-23 PROCEDURE — 74011250637 HC RX REV CODE- 250/637: Performed by: COLON & RECTAL SURGERY

## 2020-09-23 PROCEDURE — 74011250636 HC RX REV CODE- 250/636: Performed by: COLON & RECTAL SURGERY

## 2020-09-23 PROCEDURE — 74011000250 HC RX REV CODE- 250: Performed by: COLON & RECTAL SURGERY

## 2020-09-23 PROCEDURE — C9113 INJ PANTOPRAZOLE SODIUM, VIA: HCPCS | Performed by: COLON & RECTAL SURGERY

## 2020-09-23 PROCEDURE — 80048 BASIC METABOLIC PNL TOTAL CA: CPT

## 2020-09-23 PROCEDURE — 99223 1ST HOSP IP/OBS HIGH 75: CPT | Performed by: INTERNAL MEDICINE

## 2020-09-23 PROCEDURE — 36415 COLL VENOUS BLD VENIPUNCTURE: CPT

## 2020-09-23 PROCEDURE — 97116 GAIT TRAINING THERAPY: CPT

## 2020-09-23 PROCEDURE — 65660000000 HC RM CCU STEPDOWN

## 2020-09-23 PROCEDURE — 74011000258 HC RX REV CODE- 258: Performed by: COLON & RECTAL SURGERY

## 2020-09-23 RX ADMIN — PIPERACILLIN AND TAZOBACTAM 3.38 G: 3; .375 INJECTION, POWDER, LYOPHILIZED, FOR SOLUTION INTRAVENOUS at 11:13

## 2020-09-23 RX ADMIN — ALVIMOPAN 12 MG: 12 CAPSULE ORAL at 17:46

## 2020-09-23 RX ADMIN — ONDANSETRON 4 MG: 4 TABLET, ORALLY DISINTEGRATING ORAL at 06:00

## 2020-09-23 RX ADMIN — ACETAMINOPHEN ORAL SOLUTION 1000 MG: 650 SOLUTION ORAL at 12:06

## 2020-09-23 RX ADMIN — ONDANSETRON 4 MG: 4 TABLET, ORALLY DISINTEGRATING ORAL at 00:50

## 2020-09-23 RX ADMIN — Medication 10 ML: at 21:38

## 2020-09-23 RX ADMIN — I.V. FAT EMULSION 250 ML: 20 EMULSION INTRAVENOUS at 18:17

## 2020-09-23 RX ADMIN — ASCORBIC ACID: 500 INJECTION, SOLUTION INTRAMUSCULAR; INTRAVENOUS; SUBCUTANEOUS at 18:11

## 2020-09-23 RX ADMIN — ACETAMINOPHEN ORAL SOLUTION 1000 MG: 650 SOLUTION ORAL at 17:46

## 2020-09-23 RX ADMIN — ACETAMINOPHEN ORAL SOLUTION 1000 MG: 650 SOLUTION ORAL at 06:00

## 2020-09-23 RX ADMIN — ALVIMOPAN 12 MG: 12 CAPSULE ORAL at 08:06

## 2020-09-23 RX ADMIN — SODIUM CHLORIDE 40 MG: 9 INJECTION INTRAMUSCULAR; INTRAVENOUS; SUBCUTANEOUS at 21:38

## 2020-09-23 RX ADMIN — ONDANSETRON 4 MG: 4 TABLET, ORALLY DISINTEGRATING ORAL at 12:10

## 2020-09-23 RX ADMIN — SODIUM CHLORIDE 40 MG: 9 INJECTION INTRAMUSCULAR; INTRAVENOUS; SUBCUTANEOUS at 08:06

## 2020-09-23 RX ADMIN — ONDANSETRON 4 MG: 4 TABLET, ORALLY DISINTEGRATING ORAL at 18:09

## 2020-09-23 RX ADMIN — PIPERACILLIN AND TAZOBACTAM 3.38 G: 3; .375 INJECTION, POWDER, LYOPHILIZED, FOR SOLUTION INTRAVENOUS at 03:06

## 2020-09-23 RX ADMIN — PIPERACILLIN AND TAZOBACTAM 3.38 G: 3; .375 INJECTION, POWDER, LYOPHILIZED, FOR SOLUTION INTRAVENOUS at 18:09

## 2020-09-23 NOTE — CONSULTS
[facility] [place] at [address], [address] [place], [address]  W: [phone]  F: [phone]    Reason for Visit:   Aurora Bolden is a 72 y.o. female who is seen in hospital consultation at the request of Dr. Dejah Donato for evaluation of stage III colon cancer. Treatment History:   · 9/10/2020 CT A/P - large, irregular pelvic mass measuring approximately 9cm likely representing neoplasm arising from the sigmoid colon, small amount of pelvic free fluid, colonic bowel wall thickening. Borderline enlarged retroperitoneal lymph nodes. 6 mm right lower lobe lung nodule. Mass effect from pelvic mass causing moderate right and minimal left hydronephrosis. · 9/14/2020: Colonoscopy - A large circumferential, ulcerated fungating mass was noted in proximal sigmoid colon. Mass was obstructing the lumen and could not be traversed. [place] was present. · 9/15/2020: CT Chest - 7.5mm left lower lobe pulmonary nodule, small right pleural effusion, mild right basilar atelectasis  · 9/17/2020: Surgical resection - low anterior resection, mobilization of the splenic flexure, coloproctostomy, creation of loop ileostomy, total abdominal hysterectomy and left salpingo-oophorectomy, distal right ureterectomy, right ureteral re-implant with psoas hitch and placement of right ureteral stent    History of Present Illness:   Patient is a 72 y.o. female who presented to the ED on 9/11/2020 with complaints of right flank/back pain and RLQ abdominal pain. She states she has had several months of gas pain and \"blockages. \" She reports a weight loss of 12 lbs in the last 3 months. CT abd/pelv 9/10/2020 showed large, irregular pelvic mass measuring approximately 9cm likely representing neoplasm arising from the sigmoid colon. She also had hydronephrosis from the mass effect and urethral stent placed. CEA 7.4 on 9/11and colonoscopy performed 9/14 with biopsy + adenocarcinoma.  CT Chest performed showing 7.5mm left lower lobe pulmonary nodule. Mass found to be invading into the uterus and right ureter. Surgical resection performed on 9/17/2020 including low anterior resection, mobilization of the splenic flexure, coloproctostomy, creation of loop ileostomy, total abdominal hysterectomy and left salpingo-oophorectomy, distal right ureterectomy, right ureteral re-implant with psoas hitch and placement of right ureteral stent. Patient remains hospitalized and now with possible SBO or ileus. She is feeling better today, appetite is improving, no nausea, pain is controlled. No cough, sob and is working with PT    Family Hx:  Mother with hx of pancreatic cancer      Past Medical History:   Diagnosis Date    GERD (gastroesophageal reflux disease)     Ill-defined condition     Spaulding's esophagus      Past Surgical History:   Procedure Laterality Date    COLONOSCOPY Left 9/14/2020    COLONOSCOPY performed by Alejo Tate MD at [facility] ENDOSCOPY    HX APPENDECTOMY  age 17    HX COLONOSCOPY  circa 2010    No neoplasms.  HX ENDOSCOPY  03/13/2017    Dr. Angela Foster HX ENDOSCOPY  02/13/2020    Dr. Claudia Zheng oopherectgosia for treatment of benign mass. Social History     Tobacco Use    Smoking status: Never Smoker    Smokeless tobacco: Never Used   Substance Use Topics    Alcohol use:  Yes     Alcohol/week: 1.0 standard drinks     Types: 1 Glasses of wine per week      Family History   Problem Relation Age of Onset    Cancer Mother     Heart Disease Father     Diabetes Brother      Current Facility-Administered Medications   Medication Dose Route Frequency    TPN ADULT - CENTRAL   IntraVENous CONTINUOUS    TPN ADULT - CENTRAL   IntraVENous CONTINUOUS    acetaminophen (TYLENOL) solution 1,000 mg  1,000 mg Oral Q6H    pantoprazole (PROTONIX) 40 mg in 0.9% sodium chloride 10 mL injection  40 mg IntraVENous Q12H    prochlorperazine (COMPAZINE) with saline injection 10 mg  10 mg IntraVENous Q6H PRN    piperacillin-tazobactam (ZOSYN) 3.375 g in 0.9% sodium chloride (MBP/ADV) 100 mL  3.375 g IntraVENous Q8H    metoprolol (LOPRESSOR) injection 2.5 mg  2.5 mg IntraVENous Q4H PRN    alvimopan (ENTEREG) capsule 12 mg  12 mg Oral BID    [Held by provider] celecoxib (CELEBREX) capsule 100 mg  100 mg Oral BID    naloxone (NARCAN) injection 0.4 mg  0.4 mg IntraVENous PRN    oxyCODONE IR (ROXICODONE) tablet 5 mg  5 mg Oral Q4H PRN    ondansetron (ZOFRAN ODT) tablet 4 mg  4 mg Oral Q4H PRN    alteplase (CATHFLO) 1 mg in sterile water (preservative free) 1 mL injection  1 mg InterCATHeter PRN    bacitracin 500 unit/gram packet 1 Packet  1 Packet Topical PRN    fat emulsion 20% (LIPOSYN, INTRAlipid) infusion 250 mL  250 mL IntraVENous Q MON, WED & FRI    insulin lispro (HUMALOG) injection   SubCUTAneous Q6H    glucose chewable tablet 16 g  4 Tab Oral PRN    glucagon (GLUCAGEN) injection 1 mg  1 mg IntraMUSCular PRN    sodium chloride (NS) flush 5-40 mL  5-40 mL IntraVENous Q8H    sodium chloride (NS) flush 5-40 mL  5-40 mL IntraVENous PRN      No Known Allergies     Review of Systems: A complete review of systems was obtained, negative except as described above. Physical Exam:     Visit Vitals  /76 (BP 1 Location: Left arm, BP Patient Position: Sitting)   Pulse (!) 101   Temp 97.5 °F (36.4 °C)   Resp 18   Ht 5' 6\" (1.676 m)   Wt 134 lb (60.8 kg)   SpO2 100%   Breastfeeding No   BMI 21.63 kg/m²     ECOG PS: 1-2  General: No distress but appears frail  Eyes: PERRLA, anicteric sclerae  HENT: Atraumatic, OP clear  Neck: Supple  Lymphatic: No cervical, supraclavicular, or inguinal adenopathy  Respiratory:  normal respiratory effort  CV: Normal rate, no peripheral edema  GI: Soft, stoma noted  MS: Digits without clubbing or cyanosis. Skin: No rashes, ecchymoses, or petechiae.    Psych: Alert, oriented, appropriate affect, normal judgment/insight    Results:     Lab Results   Component Value Date/Time    WBC 22.8 (H) 09/23/2020 03:16 AM    HGB 9.0 (L) 09/23/2020 03:16 AM    HCT 28.9 (L) 09/23/2020 03:16 AM    PLATELET 591 83/43/0197 03:16 AM    MCV 76.7 (L) 09/23/2020 03:16 AM    ABS. NEUTROPHILS 20.5 (H) 09/23/2020 03:16 AM    Hemoglobin (POC) 12.0 09/17/2020 06:35 PM     Lab Results   Component Value Date/Time    Sodium 133 (L) 09/23/2020 03:16 AM    Potassium 4.4 09/23/2020 03:16 AM    Chloride 106 09/23/2020 03:16 AM    CO2 22 09/23/2020 03:16 AM    Glucose 112 (H) 09/23/2020 03:16 AM    BUN 25 (H) 09/23/2020 03:16 AM    Creatinine 0.40 (L) 09/23/2020 03:16 AM    GFR est AA >60 09/23/2020 03:16 AM    GFR est non-AA >60 09/23/2020 03:16 AM    Calcium 7.4 (L) 09/23/2020 03:16 AM    Glucose (POC) 137 (H) 09/23/2020 11:39 AM    Creatinine (POC) 0.8 09/10/2020 02:32 PM     Lab Results   Component Value Date/Time    Bilirubin, total 0.2 09/22/2020 03:22 AM    ALT (SGPT) 36 09/22/2020 03:22 AM    Alk. phosphatase 189 (H) 09/22/2020 03:22 AM    Protein, total 5.2 (L) 09/22/2020 03:22 AM    Albumin 2.0 (L) 09/22/2020 03:22 AM    Globulin 3.2 09/22/2020 03:22 AM     CEA:  Recent Labs     09/11/20  1455   CEA 7.4       CT A/P 9/23/2020  IMPRESSION:  Large, irregular pelvic mass measuring approximately 9 cm likely representing  neoplasm arising from the sigmoid colon. Adnexal neoplasm is a possibility. This  does appear to be separate from the uterus. Small amount of pelvic free fluid. Associated colonic bowel wall thickening. There is focal gas in the upper  presacral region which is unclear if this is intraluminal or contained  extraluminal collection.     No definite distant metastatic disease. Borderline enlarged retroperitoneal  lymph nodes. 6 mm right lower lobe lung nodule. Mass effect from the pelvic mass  causing moderate right and minimal left hydronephrosis. CT Chest 9/15/2020  IMPRESSION:  1. 7.5 mm left lower lobe pulmonary nodule. 2. Small right pleural effusion.   3. Mild right basilar atelectasis. 9/14/2020   Addendum Diagnosis   1. Sigmoid Mass, Biopsy:     Infiltrating adenocarcinoma, most consistent with colon (See comment)   Addendum Comment   Immunohistochemical stains reveal the following staining pattern:   CK7: Scattered cells with cytoplasmic membrane staining   CK20: Positive cytoplasmic staining in normal colonic mucosal epithelial cells and malignant epithelial cells   CDX-2: Diffuse strong nuclear decoration and all tumor cells and normal background colonic epithelial cells   PAX-8: Negative   Estrogen Receptor: Negative   Positive and negative controls stain appropriately. Due to radiologic findings suggesting the possibility of GYN involvement, ER and PAX-8 are performed, which lend support to the diagnosis of a colonic primary, over a tumor of müllerian origin. 9/17/2020   FINAL PATHOLOGIC DIAGNOSIS   1. Sigmoid colon and proximal rectum, uterus, left fallopian tube and ovary and right ureteral segment, low anterior resection:   Sigmoid colon and proximal rectum:  Invasive adenocarcinoma (see synoptic report and comment). Metastatic adenocarcinoma in one of sixteen lymph nodes (1/16). Uterus: Invasive adenocarcinoma extending from adhesed colon into uterine serosa. Endometrial lining shows mucosal atrophy. Left ovary: Benign ovary with serosal inclusion cysts. Left fallopian tube: Benign fallopian tube. Right ureteral segment: Benign segment of ureter densely adhesed to colon. Nodule near right uterine cornu:  Nodular portion of benign smooth muscle consistent with leiomyoma with parenchymal hemorrhage.    COLON AND RECTUM: Resection   SPECIMEN   Procedure: Low anterior resection   TUMOR   Tumor Site: Sigmoid colon   Histologic Type: Adenocarcinoma   Histologic Grade: G2: Moderately differentiated   Tumor Size: 8.0 cm   Tumor Extension: Tumor directly invades adjacent structures:   Posterior uterine serosa   Macroscopic Tumor Perforation: Tumor invades through serosa into surrounding soft tissue   Lymphovascular Invasion: Present   Perineural Invasion: Not identified   Treatment Effect: No known presurgical therapy   MARGINS   Margins: All margins are uninvolved by invasive carcinoma   Margins Examined: Proximal, Distal, Radial (circumferential) or   Mesenteric   Distance of Tumor from Radial (circumferential) Margin: See Comment   LYMPH NODES   Number of Lymph Nodes Involved: 1   Number of Lymph Nodes Examined: 16   Tumor Deposits: Not identified   PATHOLOGIC STAGE CLASSIFICATION (pTNM, AJCC 8th Edition)   Primary Tumor (pT): pT4b   Regional Lymph Nodes (pN): pN1a   2. Distal rectal margin:   Segment of benign large bowel. 3. Anastomotic doughnuts:   Two annular portions of benign large bowel. Comment   Tumor invades into the adherent soft tissue and to within 1.1 cm of the apparent right pelvic sidewall margin. Records reviewed and summarized above. Pathology report(s) reviewed above. Radiology report(s) reviewed above. Assessment:   1) Sigmoid Colon Adenocarcinoma - Stage IIIB  [id number]  CT abd/pelv 9/10/2020 showed large, irregular pelvic mass measuring approximately 9cm likely representing neoplasm arising from the sigmoid colon. CEA 7.4 on 9/11  Colonoscopy performed 9/14 and biopsy + adenocarcinoma  Mass found to be invading into the uterus and right ureter. Surgical resection on 9/17 with creation of loop ileostomy, total abdominal hysterectomy and left salpingo-oophorectomy, distal right ureterectomy  Will request MSI to path    Discussed plan for adjuvant chemotherapy to start 3-5 weeks depending on post-op course. Will set up outpatient follow up in 3 weeks.    Anticipate 6 months    2) Leukocytosis  Elevated on admission with increase post surgery  Blood culture and fluid culture pending  Zosyn started 9/20/2020    3) Anemia  Due to #1  Iron sat of 4% and ferritin of 20 on 9/11  S/p 2 units PRBCs on 9/15 and Venofer 200mg x3 doses  Hgb stable at this time    4) Nausea/Vomiting  CT A/P 9/22 with multiple dilated loops of small bowel suspicious for severe ileus versus high-grade partial small bowel obstruction. Management per primary team    5) Hydronephrosis  Due to mass effect from #1  S/p right ureteral stent placement 9/12  S/p distal right ureterectomy, right ureteral re-implant with psoas hitch and placement of right ureteral stent  Hogan catheter in place. Creatinine stable. Plan:     · MSI to pathology  · Will review in tumor board  · Recommended adjuvant chemotherapy to start after 3-5 weeks depending on post op course  · Will arrange for OP follow up. I appreciate the opportunity to participate in MsPranav Markell carrington.     Signed By: Siena Schmid MD

## 2020-09-23 NOTE — PROGRESS NOTES
OLIVIA:     RUR 14%    Referral sent to [facility] for [facility].     Patient expected to d/c home     Family will provide transportation at discharge.     Emergency Contact: Lalo Posey [phone]    3:00pm [facility] accepted patient for [facility].     Everette Henning RN/CRM

## 2020-09-23 NOTE — PROGRESS NOTES
Bedside shift change report given to Madiha Wright (oncoming nurse) by Cassia Prado RN (offgoing nurse). Report included the following information SBAR, Procedure Summary, Intake/Output, MAR, Recent Results and Cardiac Rhythm sinus tach.

## 2020-09-23 NOTE — PROGRESS NOTES
[facility] visit. Mrs. Tatum received communion today from a friend of her's from the [place]. Assurred her of my continued prayer and will visit on Friday with communion.       RACH Crabtree, RN, ACSW, LCSW   Page:  [phone]

## 2020-09-23 NOTE — PROGRESS NOTES
Transition of Care Plan:     The Plan for Transition of Care is related to the following treatment goals: The Patient and/or patient representative  was provided with a choice of provider and agrees  with the discharge plan. Yes [x] No []    A Freedom of choice list was provided with basic dialogue that supports the patient's individualized plan of care/goals and shares the quality data associated with the providers.        Yes [x] No []

## 2020-09-23 NOTE — PROGRESS NOTES
Problem: Falls - Risk of  Goal: *Absence of Falls  Description: Document Shasta Strauss Fall Risk and appropriate interventions in the flowsheet. Outcome: Progressing Towards Goal  Note: Fall Risk Interventions:  Mobility Interventions: Patient to call before getting OOB         Medication Interventions: Teach patient to arise slowly    Elimination Interventions: Call light in reach    History of Falls Interventions: Door open when patient unattended, Room close to nurse's station         Problem: Pain  Goal: *Control of Pain  Outcome: Progressing Towards Goal     Problem: General Medical Care Plan  Goal: *Vital signs within specified parameters  Outcome: Progressing Towards Goal  Goal: *Labs within defined limits  Outcome: Progressing Towards Goal  Goal: *Absence of infection signs and symptoms  Outcome: Progressing Towards Goal  Goal: *Optimal pain control at patient's stated goal  Outcome: Progressing Towards Goal  Goal: *Skin integrity maintained  Outcome: Progressing Towards Goal  Goal: *Fluid volume balance  Outcome: Progressing Towards Goal  Goal: *Optimize nutritional status  Outcome: Progressing Towards Goal  Goal: *Anxiety reduced or absent  Outcome: Progressing Towards Goal  Goal: *Progressive mobility and function (eg: ADL's)  Outcome: Progressing Towards Goal     Problem: Nutrition Deficit  Goal: *Optimize nutritional status  Outcome: Progressing Towards Goal     Problem: Pressure Injury - Risk of  Goal: *Prevention of pressure injury  Description: Document Andry Scale and appropriate interventions in the flowsheet.   Outcome: Progressing Towards Goal  Note: Pressure Injury Interventions:  Sensory Interventions: Assess changes in LOC    Moisture Interventions: Absorbent underpads    Activity Interventions: Increase time out of bed    Mobility Interventions: Pressure redistribution bed/mattress (bed type)    Nutrition Interventions: Document food/fluid/supplement intake    Friction and Shear Interventions: Minimize layers                Problem: Ostomy Care  Goal: *Patient pouching appliance will fit properly and maintain integrity at least three to five days  Description: Infection control procedures (eg, clean dressings, clean gloves, hand washing, precautions to isolate wound from contamination, sterile instruments used for wound debridement) should be implemented.   Outcome: Progressing Towards Goal

## 2020-09-23 NOTE — PROGRESS NOTES
Problem: Mobility Impaired (Adult and Pediatric)  Goal: *Acute Goals and Plan of Care (Insert Text)  Description: FUNCTIONAL STATUS PRIOR TO ADMISSION: Patient was independent and active without use of DME. Driving. No falls. HOME SUPPORT PRIOR TO ADMISSION: The patient lived alone and has friends to provide assistance at d/c as needed. Physical Therapy Goals  Initiated 9/18/2020  1. Patient will move from supine to sit and sit to supine , scoot up and down, and roll side to side in bed with modified independence within 7 day(s). 2.  Patient will transfer from bed to chair and chair to bed with modified independence using the least restrictive device within 7 day(s). 3.  Patient will perform sit to stand with modified independence within 7 day(s). 4.  Patient will ambulate with modified independence for 150 feet with the least restrictive device within 7 day(s). 5.  Patient will ascend/descend 12 stairs with bilateral handrail(s) with modified independence within 7 day(s). 6.  Patient will improve Tinetti score by 4-5 points within 7 days. Outcome: Progressing Towards Goal     PHYSICAL THERAPY TREATMENT  Patient: Aspen Peña (16 y.o. female)  Date: 9/23/2020  Diagnosis: Pelvic mass [R19.00]  Hydronephrosis [N13.30]   Malignant neoplasm of sigmoid colon (HCC)  Procedure(s) (LRB):  Open low anterior resection, mobilization of splenic flexture, coloproctostomy, creation of loop ileostomy (E R A S) (N/A)  TOTAL ABDOMINAL HYSTERECTOMY, LEFT SALPINGO OOPHORECTOMY (N/A)  Cystoscopy, insertion and removal of left Ureteral catheter, simple pepper, distal right ureterectomy, right ureteral reimplant, right stent placement (Left) 6 Days Post-Op  Precautions: Fall(ERAS)  Chart, physical therapy assessment, plan of care and goals were reviewed. ASSESSMENT  Patient continues with skilled PT services and is progressing towards goals.   Progressed gait training from relying on the IV pole to patient ambulating with no external support. Patient ambulated 250 ft with no device with fairly steady gait. She performs transfers and bed mobility without external assist (SBA only to manage lines). Therapy will continue to follow. Will address steps as appropriate. Currently recommending home with no follow up vs HHPT pending progress. Anticipate none. Recommend with nursing patient to complete as able in order to maintain strength, endurance and independence once Egress Test complete: Walking 2-3x daily in the kim with no device (okay to rely on IV pole as needed) and with staff assist. Thank you for your assistance. Current Level of Function Impacting Discharge (mobility/balance): CGA    Other factors to consider for discharge: PLOF indep         PLAN :  Patient continues to benefit from skilled intervention to address the above impairments. Continue treatment per established plan of care. to address goals. Recommendation for discharge: (in order for the patient to meet his/her long term goals)  To be determined. Anticipate none. This discharge recommendation:  Has been made in collaboration with the attending provider and/or case management    IF patient discharges home will need the following DME: none       SUBJECTIVE:   Patient stated I didn't get to walk yesterday.     OBJECTIVE DATA SUMMARY:   Critical Behavior:  Neurologic State: Alert  Orientation Level: Oriented X4  Cognition: Follows commands     Functional Mobility Training:  Bed Mobility:  Supine to sit: modified independent w/ HOB partially elevated  Sit to supine: n/t* left sitting up in the chair                 Transfers:  Sit to Stand: Stand-by assistance for lines/ equipment  Stand to Sit: Supervision                             Balance:  Sitting: Intact; Without support  Standing: Impaired; Without support  Standing - Static: Good  Standing - Dynamic : Good;Fair  Ambulation/Gait Training:  Distance (ft): 250 Feet (ft)  Assistive Device: Gait belt  Ambulation - Level of Assistance: Contact guard assistance                 Base of Support: Narrowed     Encouraged patient to walk without IV pole support. VC for head up. Completed distance with only one standing rest break          Pain Rating:  Voiced no pain    Activity Tolerance:   Good  Please refer to the flowsheet for vital signs taken during this treatment. After treatment patient left in no apparent distress:   Sitting in chair, Call bell within reach, and meal tray     COMMUNICATION/COLLABORATION:   The patients plan of care was discussed with: Registered nurse.      Lucille Austin PT   Time Calculation: 17 mins

## 2020-09-23 NOTE — WOUND CARE
WOCN Note:      Follow-up visit for POD #6; loop ileostomy  -Dr. Kole Pabon left abdominal suture line LAUREN. Well approximated and not leaking  -abdomen distended and tender  -ileostomy prolapse  -CT scan completed 9-22-20     Assessment:   Patient is A&O x 3, communicative, pepper in. In bed for pouch change will get up to chair upon completion. Bed: [facility] Care Bed with Accumax mattress  Patient reports no pain presently.     LLQ: moist pink, prolapsed stoma, darren remains in and present pouch applied yesterday and is intact with no leaks. Emptied 200cc of liquid stool with patient and Elba Gooden noted on I&O's.     Bilateral heel, buttocks, and sacral skin intact and without erythema. Heels offloaded on pillow. Patient changed pouch with guidance. Removed present pouch with 200cc liquid stool. Cleaned peristomal skin which is pink and stoma is prolapsed. Stoma is measuring 50 and red darren remains intact. Stool is leaking from os and underside of stoma. Cleaned all surfaces. Applied 1 piece Coloplast pouch / used no sting prep on pink skin and cut pouch at 50. Used juliet ring on pouch to contain liquid stool. Patient upset because liqui stool took place with pouch off while changing. Explained to patient this can happen. Presently pouch secure with no leaks. Recommendations:    Empty appliance when 1/3 full and PRN. Encourage patient/family to notify nurse and  assist w/ pouch emptying to promote self-care. Change appliance twice weekly and immediately with leaking to avoid skin breakdown. DO NOT REINFORCE LEAKS! Ostomy supplies located on [address] and ICU. Supplies left in room.     Minimize layers of linen/pads under patient to optimize support surface. Turn/reposition approximately every 2 hours and offload heels.   Manage incontinence / promote continence: Hydra Guard Barrier Cream to buttocks and sacrum daily and as needed with incontinence care. Discussed above plan with patient and RN: Milan Gao. Placed order for Home Health for further ostomy support and teaching.     Transition of Care: Plan to follow as needed with ostmy teaching while admitted to hospital.      Patient may need 2 piece pouching system if 1 piece with juliet leaks. Will monitor. 2 piece maybe easier with darren removal and prolapsed stoma.  To monitor.      Pamela GUERRERO RN  Wound Care Department  Office: [phone]  Pager: [phone]

## 2020-09-23 NOTE — PROGRESS NOTES
Problem: Falls - Risk of  Goal: *Absence of Falls  Description: Document Ashely Burdickccasin Fall Risk and appropriate interventions in the flowsheet.   Outcome: Progressing Towards Goal  Note: Fall Risk Interventions:  Mobility Interventions: Patient to call before getting OOB         Medication Interventions: Teach patient to arise slowly    Elimination Interventions: Call light in reach    History of Falls Interventions: Utilize gait belt for transfer/ambulation

## 2020-09-23 NOTE — ADVANCED PRACTICE NURSE
Tegaderm dressing over epidural site causing itching on patient's back.   Skin is without redness, etc.  Removed tegaderm and applied bandaid

## 2020-09-23 NOTE — PROGRESS NOTES
Comprehensive Nutrition Assessment    Type and Reason for Visit: Reassess    Nutrition Recommendations/Plan:     1. Monitor PO tolerance. On full liquids. Advance as able to Low Fiber. 2. Continue current TPN @ goal: AA 5% D20 @ 70 ml/hr with 20%  ml x 3 /week = 1680 ml, 1692 total calories, 84 grams protein and GIR: 4.3 mgCHO/kg/min. Nutrition Assessment:        Pt POD 6 \"Low anterior resection, mobilization of the splenic flexure, coloproctostomy, and creation of loop ileostomy. Total abdominal hysterectomy and left salpingo-oophorectomy. Cystoscopy and placement of left ureteral catheter. Distal right ureterectomy. Right ureteral re-implant with psoas hitch and placement of right ureteral stent. \" - 9/17/2020     Pt remains on TPN (D20/AA5) @ 70 mL/hr. On Full liquids. Continues with some nausea. Didn't drink much today for breakfast.  Ostomy functioning. Pt noted to have 18lbs or 13% wt loss over the past 6 months reflecting severe wt change. Pt noted that appetite was good but just lost weight. Usually consumed 1-2 Premier Protein shakes weekly.      Malnutrition Assessment:  Malnutrition Status:  Severe malnutrition    Context:  Chronic illness     Findings of the 6 clinical characteristics of malnutrition:   Energy Intake:  7 - 75% or less est energy requirements for 1 month or longer  Weight Loss:  7.00 - Greater than 10% over 6 months     Body Fat Loss:  7 - Severe body fat loss, Fat overlying ribs, Triceps, Orbital   Muscle Mass Loss:  7 - Severe muscle mass loss, Clavicles (pectoralis &deltoids), Temples (temporalis), Hand (interosseous)   Strength:  Not performed       Estimated Daily Nutrient Needs:  Energy (kcal):  3519-9010 kcal/day ( 30-35 kcal/kg)  Protein (g):  65-70 gm/day (1.2-1.3 gm/kg)       Fluid (ml/day):  1 ml/kcal    Wounds:    None       Current Nutrition Therapies:   Current Parenteral Nutrition Orders:  · Type and Formula: D20/AA5 @ 70 mL/hr  · Lipids: 250ml, Three times weekly  · Current PN Order Provides: 1692 kcal per day on a[place]age, 84 g Pro        Anthropometric Measures:  · Height:  5' 6\" (167.6 cm)  · Current Body Wt:  60.8 kg (134 lb 0.6 oz)     · Usual Body Wt:  136 lb     · Ideal Body Wt:  130 lbs:  92.3 %   · BMI Category:  Underweight (BMI less than 22) age over 72       Nutrition Diagnosis:   · Inadequate oral intake related to altered GI function as evidenced by nutrition support-parenteral nutrition      Nutrition Interventions:   Food and/or Nutrient Delivery: Continue current diet, Continue parenteral nutrition  Nutrition Education and Counseling: No recommendations at this time  Coordination of Nutrition Care: Continued inpatient monitoring    Goals:  Meet % nutritional needs via TPN x 5-7 days. Nutrition Monitoring and Evaluation:   Behavioral-Environmental Outcomes: Knowledge or skill  Food/Nutrient Intake Outcomes: Parenteral nutrition intake/tolerance  Physical Signs/Symptoms Outcomes: Biochemical data, GI status, Fluid status or edema, Hemodynamic status, Weight    Discharge Planning:     Too soon to determine     Electronically signed by Parul Venegas RD on 9/23/2020   Pager: [phone]

## 2020-09-23 NOTE — PROGRESS NOTES
General Daily Progress Note    Admission Date: 9/11/2020  Hospital Day 13  Post-Op Day 6  Subjective:   Still having frequent nausea. She thinks being able to ingest something other than clear liquids and medications would help. Pain well controlled. Feels less bloated. Objective:     Patient Vitals for the past 24 hrs:   BP Temp Pulse Resp SpO2 Weight   09/23/20 1100 126/76 97.5 °F (36.4 °C) (!) 101 18 100 % --   09/23/20 0804 135/71 97.8 °F (36.6 °C) (!) 102 18 99 % --   09/23/20 0304 132/68 97.6 °F (36.4 °C) (!) 110 18 98 % 60.8 kg (134 lb)   09/22/20 2307 (!) 144/75 98 °F (36.7 °C) (!) 106 19 99 % --   09/22/20 2007 129/83 97.9 °F (36.6 °C) (!) 106 20 100 % --   09/22/20 1529 130/75 -- (!) 104 20 100 % --     09/23 0701 - 09/23 1900  In: 240 [P.O.:240]  Out: 1120 [Drains:120]  09/21 1901 - 09/23 0700  In: 3517.4 [P.O.:120; I.V.:3397.4]  Out: 5280 [Urine:3450; Drains:430]      Physical Examination:  General Appearance - No distress. Heart - Normal sinus rhythm. Abdomen - Less distended. Softer.  Appropriately tender.  Ileostomy prolapsing but viable. Midline incision clean, dry, intact, and without associated erythema.  VALENTINE drainage serosanguinous.  - Hogan catheter draining normal-appearing urine. Extremities:  No calf tenderness. Neurological - Alert and oriented.             Data Review   Recent Results (from the past 24 hour(s))   GLUCOSE, POC    Collection Time: 09/22/20 11:05 PM   Result Value Ref Range    Glucose (POC) 133 (H) 65 - 100 mg/dL    Performed by Yeyo Medina    CBC WITH AUTOMATED DIFF    Collection Time: 09/23/20  3:16 AM   Result Value Ref Range    WBC 22.8 (H) 3.6 - 11.0 K/uL    RBC 3.77 (L) 3.80 - 5.20 M/uL    HGB 9.0 (L) 11.5 - 16.0 g/dL    HCT 28.9 (L) 35.0 - 47.0 %    MCV 76.7 (L) 80.0 - 99.0 FL    MCH 23.9 (L) 26.0 - 34.0 PG    MCHC 31.1 30.0 - 36.5 g/dL    RDW 27.9 (H) 11.5 - 14.5 %    PLATELET 766 674 - 677 K/uL    MPV 9.6 8.9 - 12.9 FL    NRBC 0.0 0  WBC ABSOLUTE NRBC 0.00 0.00 - 0.01 K/uL    NEUTROPHILS 90 (H) 32 - 75 %    LYMPHOCYTES 4 (L) 12 - 49 %    MONOCYTES 3 (L) 5 - 13 %    EOSINOPHILS 1 0 - 7 %    BASOPHILS 0 0 - 1 %    IMMATURE GRANULOCYTES 2 (H) 0.0 - 0.5 %    ABS. NEUTROPHILS 20.5 (H) 1.8 - 8.0 K/UL    ABS. LYMPHOCYTES 0.9 0.8 - 3.5 K/UL    ABS. MONOCYTES 0.7 0.0 - 1.0 K/UL    ABS. EOSINOPHILS 0.2 0.0 - 0.4 K/UL    ABS. BASOPHILS 0.0 0.0 - 0.1 K/UL    ABS. IMM. GRANS. 0.5 (H) 0.00 - 0.04 K/UL    DF SMEAR SCANNED      PLATELET COMMENTS Large Platelets      RBC COMMENTS ANISOCYTOSIS  2+       METABOLIC PANEL, BASIC    Collection Time: 09/23/20  3:16 AM   Result Value Ref Range    Sodium 133 (L) 136 - 145 mmol/L    Potassium 4.4 3.5 - 5.1 mmol/L    Chloride 106 97 - 108 mmol/L    CO2 22 21 - 32 mmol/L    Anion gap 5 5 - 15 mmol/L    Glucose 112 (H) 65 - 100 mg/dL    BUN 25 (H) 6 - 20 MG/DL    Creatinine 0.40 (L) 0.55 - 1.02 MG/DL    BUN/Creatinine ratio 63 (H) 12 - 20      GFR est AA >60 >60 ml/min/1.73m2    GFR est non-AA >60 >60 ml/min/1.73m2    Calcium 7.4 (L) 8.5 - 10.1 MG/DL   GLUCOSE, POC    Collection Time: 09/23/20  5:56 AM   Result Value Ref Range    Glucose (POC) 134 (H) 65 - 100 mg/dL    Performed by More Sequeira    GLUCOSE, POC    Collection Time: 09/23/20 11:39 AM   Result Value Ref Range    Glucose (POC) 137 (H) 65 - 100 mg/dL    Performed by Prem Estrada      CT scan of Abdomen and Pelvis (9/22/2020):  No apparent abscess. Scant intraperitoneal gas consistent with POD#5. Ileus versus distal small bowel obstruction (at the ileostomy).         Assessment:     Principal Problem:    Malignant neoplasm of sigmoid colon ([address] (9/14/2020)    Active Problems:    Hydronephrosis (9/11/2020)      Pelvic mass (9/11/2020)      Severe protein-calorie malnutrition ([address] (9/16/2020)      Anemia (9/16/2020)      Thrombocytopenia ([facility] [address] (9/19/2020)        6 Days Post-Op s/p Low anterior resection, mobilization of the splenic flexure, coloproctostomy, and creation of loop ileostomy. [Total abdominal hysterectomy and left salpingo-oophorectomy performed by Donaldo Rodriguez MD]  [Cystoscopy and placement of left ureteral catheter performed by Leslie Mckinnon MD]  [Distal right ureterectomy performed by Mayra Koo III, MD]  [Right ureteral re-implant with psoas hitch and placement of right ureteral stent performed by Harriet Wong MD]     Transferred to [facility] (stepdown) from recovery room.     Tachycardia improved. No hypotension. Hemoglobin low but acceptable. Thrombocytopenia resolved. Prn metoprolol begun on 9/20/2020 to reduce heart rate, but not being given frequently.     Urine output adequate. Creatinine within normal limits. BUN elevated, but less than it was yesterday. Hogan catheter in place and will need to remain for 14 days post-op (per Dr. Rosmery Cuello).     Leukocytosis once again increased versus yesterday. No apparent wound infection. No pneumonia. No UTI. Blood and drain fluid culture results are pending. Possible yeast in the drain fluid. Zosyn was begun on 9/20/2020.        Malnutrition being treated with TPN. GI function is beginning to return. Ileus versus obstruction, possibly at the level of the ileostomy, but the ileostomy output has increased significantly. Ileostomy prolapse was first notice in the evening on 9/21/2020, and it could be interfering with bowel function.     Needs physical therapy.     Needs ostomy education.     The patient should remain in the stepdown unit.       Plan:   Begin full liquid diet with caution. Continue Ensure Enlive. Continue TPN and lipids.     CT scan of abdomen and pelvis today to evaluate for possible abscess.     Follow cultures of blood and abdominal fluid. Continue Zosyn. Might need an antifungal medication, e.g. Eraxis.     Continue to hold anticoagulants secondary to recent thrombocytopenia and presence of epidural catheter.   Continue mechanical DVT prophylaxis.     Will check VALENTINE drain fluid creatinine before removal.  Hogan catheter to remain  Until POD#14.     Ambulate. Physical therapy. Ostomy education. Incentive spirometer.

## 2020-09-23 NOTE — PROGRESS NOTES
Bedside shift change report given to Sj Francisco RN (oncoming nurse) by Ezequiel Garcia RN (offgoing nurse).  Report included the following information SBAR, Intake/Output, MAR, Recent Results and Cardiac Rhythm ST.

## 2020-09-24 LAB
ANION GAP SERPL CALC-SCNC: 6 MMOL/L (ref 5–15)
BASOPHILS # BLD: 0 K/UL (ref 0–0.1)
BASOPHILS NFR BLD: 0 % (ref 0–1)
BUN SERPL-MCNC: 23 MG/DL (ref 6–20)
BUN/CREAT SERPL: 61 (ref 12–20)
CALCIUM SERPL-MCNC: 7 MG/DL (ref 8.5–10.1)
CHLORIDE SERPL-SCNC: 109 MMOL/L (ref 97–108)
CO2 SERPL-SCNC: 20 MMOL/L (ref 21–32)
CREAT FLD-MCNC: 0.28 MG/DL
CREAT SERPL-MCNC: 0.38 MG/DL (ref 0.55–1.02)
DIFFERENTIAL METHOD BLD: ABNORMAL
EOSINOPHIL # BLD: 0.4 K/UL (ref 0–0.4)
EOSINOPHIL NFR BLD: 2 % (ref 0–7)
ERYTHROCYTE [DISTWIDTH] IN BLOOD BY AUTOMATED COUNT: 28.1 % (ref 11.5–14.5)
GLUCOSE BLD STRIP.AUTO-MCNC: 121 MG/DL (ref 65–100)
GLUCOSE BLD STRIP.AUTO-MCNC: 122 MG/DL (ref 65–100)
GLUCOSE BLD STRIP.AUTO-MCNC: 128 MG/DL (ref 65–100)
GLUCOSE BLD STRIP.AUTO-MCNC: 132 MG/DL (ref 65–100)
GLUCOSE SERPL-MCNC: 117 MG/DL (ref 65–100)
HCT VFR BLD AUTO: 27.3 % (ref 35–47)
HGB BLD-MCNC: 8.4 G/DL (ref 11.5–16)
IMM GRANULOCYTES # BLD AUTO: 0.4 K/UL (ref 0–0.04)
IMM GRANULOCYTES NFR BLD AUTO: 2 % (ref 0–0.5)
LYMPHOCYTES # BLD: 1.1 K/UL (ref 0.8–3.5)
LYMPHOCYTES NFR BLD: 6 % (ref 12–49)
MAGNESIUM SERPL-MCNC: 1.9 MG/DL (ref 1.6–2.4)
MCH RBC QN AUTO: 24 PG (ref 26–34)
MCHC RBC AUTO-ENTMCNC: 30.8 G/DL (ref 30–36.5)
MCV RBC AUTO: 78 FL (ref 80–99)
MONOCYTES # BLD: 0.6 K/UL (ref 0–1)
MONOCYTES NFR BLD: 3 % (ref 5–13)
NEUTS SEG # BLD: 15.9 K/UL (ref 1.8–8)
NEUTS SEG NFR BLD: 87 % (ref 32–75)
NRBC # BLD: 0 K/UL (ref 0–0.01)
NRBC BLD-RTO: 0 PER 100 WBC
PHOSPHATE SERPL-MCNC: 1.4 MG/DL (ref 2.6–4.7)
PLATELET # BLD AUTO: 419 K/UL (ref 150–400)
PLATELET COMMENTS,PCOM: ABNORMAL
PMV BLD AUTO: 9.8 FL (ref 8.9–12.9)
POTASSIUM SERPL-SCNC: 3.9 MMOL/L (ref 3.5–5.1)
RBC # BLD AUTO: 3.5 M/UL (ref 3.8–5.2)
RBC MORPH BLD: ABNORMAL
SERVICE CMNT-IMP: ABNORMAL
SODIUM SERPL-SCNC: 135 MMOL/L (ref 136–145)
SPECIMEN SOURCE FLD: NORMAL
WBC # BLD AUTO: 18.4 K/UL (ref 3.6–11)

## 2020-09-24 PROCEDURE — 80048 BASIC METABOLIC PNL TOTAL CA: CPT

## 2020-09-24 PROCEDURE — 74011250636 HC RX REV CODE- 250/636: Performed by: COLON & RECTAL SURGERY

## 2020-09-24 PROCEDURE — 65270000029 HC RM PRIVATE

## 2020-09-24 PROCEDURE — 97116 GAIT TRAINING THERAPY: CPT

## 2020-09-24 PROCEDURE — 74011000258 HC RX REV CODE- 258: Performed by: COLON & RECTAL SURGERY

## 2020-09-24 PROCEDURE — 84100 ASSAY OF PHOSPHORUS: CPT

## 2020-09-24 PROCEDURE — 82570 ASSAY OF URINE CREATININE: CPT

## 2020-09-24 PROCEDURE — 74011000250 HC RX REV CODE- 250: Performed by: COLON & RECTAL SURGERY

## 2020-09-24 PROCEDURE — C9113 INJ PANTOPRAZOLE SODIUM, VIA: HCPCS | Performed by: COLON & RECTAL SURGERY

## 2020-09-24 PROCEDURE — 36415 COLL VENOUS BLD VENIPUNCTURE: CPT

## 2020-09-24 PROCEDURE — 83735 ASSAY OF MAGNESIUM: CPT

## 2020-09-24 PROCEDURE — 74011250637 HC RX REV CODE- 250/637: Performed by: COLON & RECTAL SURGERY

## 2020-09-24 PROCEDURE — 82962 GLUCOSE BLOOD TEST: CPT

## 2020-09-24 PROCEDURE — 85025 COMPLETE CBC W/AUTO DIFF WBC: CPT

## 2020-09-24 RX ORDER — ENOXAPARIN SODIUM 100 MG/ML
40 INJECTION SUBCUTANEOUS EVERY 24 HOURS
Status: DISCONTINUED | OUTPATIENT
Start: 2020-09-24 | End: 2020-10-05 | Stop reason: HOSPADM

## 2020-09-24 RX ADMIN — SODIUM CHLORIDE 40 MG: 9 INJECTION INTRAMUSCULAR; INTRAVENOUS; SUBCUTANEOUS at 08:25

## 2020-09-24 RX ADMIN — ALVIMOPAN 12 MG: 12 CAPSULE ORAL at 08:25

## 2020-09-24 RX ADMIN — ACETAMINOPHEN ORAL SOLUTION 1000 MG: 650 SOLUTION ORAL at 23:45

## 2020-09-24 RX ADMIN — ALVIMOPAN 12 MG: 12 CAPSULE ORAL at 18:05

## 2020-09-24 RX ADMIN — ENOXAPARIN SODIUM 40 MG: 40 INJECTION SUBCUTANEOUS at 09:46

## 2020-09-24 RX ADMIN — PIPERACILLIN AND TAZOBACTAM 3.38 G: 3; .375 INJECTION, POWDER, LYOPHILIZED, FOR SOLUTION INTRAVENOUS at 18:06

## 2020-09-24 RX ADMIN — SODIUM CHLORIDE 40 MG: 9 INJECTION INTRAMUSCULAR; INTRAVENOUS; SUBCUTANEOUS at 21:33

## 2020-09-24 RX ADMIN — SODIUM CHLORIDE: 900 INJECTION, SOLUTION INTRAVENOUS at 09:45

## 2020-09-24 RX ADMIN — ASCORBIC ACID: 500 INJECTION, SOLUTION INTRAMUSCULAR; INTRAVENOUS; SUBCUTANEOUS at 18:11

## 2020-09-24 RX ADMIN — Medication 10 ML: at 21:33

## 2020-09-24 RX ADMIN — PIPERACILLIN AND TAZOBACTAM 3.38 G: 3; .375 INJECTION, POWDER, LYOPHILIZED, FOR SOLUTION INTRAVENOUS at 11:44

## 2020-09-24 RX ADMIN — ACETAMINOPHEN ORAL SOLUTION 1000 MG: 650 SOLUTION ORAL at 11:44

## 2020-09-24 RX ADMIN — PIPERACILLIN AND TAZOBACTAM 3.38 G: 3; .375 INJECTION, POWDER, LYOPHILIZED, FOR SOLUTION INTRAVENOUS at 02:09

## 2020-09-24 NOTE — PROGRESS NOTES
Urology    7 Days Post-Op Procedure(s):  Open low anterior resection, mobilization of splenic flexture, coloproctostomy, creation of loop ileostomy (E R A S)  TOTAL ABDOMINAL HYSTERECTOMY, LEFT SALPINGO OOPHORECTOMY  Cystoscopy, insertion and removal of left Ureteral catheter, simple pepper, distal right ureterectomy, right ureteral reimplant, right stent placement. Afebrile;   WBC: 18.4  Bcx: no growth  Hgb: 8.4  Cr: 0.38  HAL drain cx: budding yeast  UA: >100 RBCs  +zosyn  Pepper in place; yesterday UOP: 1000cc  HAL drain, yesterday output: 345cc    09/22/2020  CT abd/pelv w:  IMPRESSION:  1. Status post low anterior resection of a previously noted pelvic mass.     2.  Multiple dilated loops of small bowel. There is moderate fluid and air  within the colon. This may represent a severe ileus versus high-grade partial  small bowel obstruction.     3. No definite abscess or significant fluid is noted within the abdomen and  Pelvis. 09/21/2020  XR ABD  IMPRESSION:  Post surgical changes with multiple dilated loops of small bowel in  the abdomen. Minimal air is noted in the colon. This may represent a severe  ileus versus partial small bowel traction. Continued follow-up is recommended    PLAN:     Prior to hal drain removal, please check hal Cr and if it is serum level, not elevated, then ok to remove. If elevated, please call urology service. - ordered HAL creat     RTC 2 weeks post-op with [facility], for cystogram and void trial.  - Cystogram and voiding trial scheduled 10/06/2020 at 12:30 with Dr. Santy Leary at the [facility] location.     Stent removal roughly 4- 6 weeks post-op    Case discussed with Dr. Tali Cuellar

## 2020-09-24 NOTE — PROGRESS NOTES
1330  Ambulated with Pt in hallway approx. 8 min. Tolerated well    1345  Pt had medium sized loose bowel movement from rectum. Notified Dr. Noam Graham.  No new orders received.

## 2020-09-24 NOTE — WOUND CARE
Ostomy Consult: Follow Up Visit. Chart reviewed. Consulted for new loop ileostomy; post op day # 7. Spoke with patients nurse,  Wilmer Varghese to hand off on visit; emptying ostomy and changing pouch due to leak from lateral side. Vangie Shoemaker is up to chair; she has a [employer] bed with accumax mattress in place. Walked earlier and shared she had BM per rectum which is normal and explained it would cease once pre-op contents eliminated and she may just continue to pass some mucous. Assessment:  RLQ ileostomy - separation of appliance with leak along lateral edge at 9 o'clock; stoma edematous, pink/red and soft. Hector remains in place; dennis-stomal skin intact, no redness. Output watery brown with frothy appearance. Gas in appliance as well. Teaching: We changed appliance; patient reports cutting to 50 mm which as correct from yesterday; we used a Brava ring around opening. Encouraged her to call when she feels appliance filling with liquid and / or gas so staff can assist her to empty to avoid leaking. She verbalized understanding. Also encouraged her to have staff page ostomy nurse M-F during day for assistance if leaking; patient notes appliance began leaking during night. Discussed oral hydration and use of sports drinks and/or ORS to augment fluid and electrolyte balance. Has decreased appetite and reports Ensure clear causes nausea for her. Lips appear dry  Refilled water pitcher for her; she remains on TPN. Ostomy Recommendations:  Change promptly if leaking occurs. One piece with ring or bead of paste around opening. Empty when 1/3 to 1/2 full or when gas filling up - release gas. Have patient empty / use closure with staff for practice. Continue to monitor nutrition, pain, and skin risk scale, and skin assessment. Plan: Will check her tomorrow for wearing of appliance and continued teaching.   Ford Koyanagi, [address] Office [phone]  Pager [phone]

## 2020-09-24 NOTE — PROGRESS NOTES
Problem: Falls - Risk of  Goal: *Absence of Falls  Description: Document Richard Merlin Fall Risk and appropriate interventions in the flowsheet. Outcome: Progressing Towards Goal  Note: Fall Risk Interventions:  Mobility Interventions: Communicate number of staff needed for ambulation/transfer, Patient to call before getting OOB         Medication Interventions: Teach patient to arise slowly    Elimination Interventions: Call light in reach    History of Falls Interventions: Room close to nurse's station, Door open when patient unattended         Problem: Patient Education: Go to Patient Education Activity  Goal: Patient/Family Education  Outcome: Progressing Towards Goal     Problem: Pain  Goal: *Control of Pain  Outcome: Progressing Towards Goal     Problem: General Medical Care Plan  Goal: *Vital signs within specified parameters  Outcome: Progressing Towards Goal  Goal: *Labs within defined limits  Outcome: Progressing Towards Goal  Goal: *Absence of infection signs and symptoms  Outcome: Progressing Towards Goal  Goal: *Optimal pain control at patient's stated goal  Outcome: Progressing Towards Goal  Goal: *Skin integrity maintained  Outcome: Progressing Towards Goal  Goal: *Fluid volume balance  Outcome: Progressing Towards Goal  Goal: *Optimize nutritional status  Note: Document intake, provide desired foods  Goal: *Anxiety reduced or absent  Outcome: Progressing Towards Goal     Problem: Nutrition Deficit  Goal: *Optimize nutritional status  Outcome: Progressing Towards Goal     Problem: Pressure Injury - Risk of  Goal: *Prevention of pressure injury  Description: Document Andry Scale and appropriate interventions in the flowsheet.   Outcome: Progressing Towards Goal  Note: Pressure Injury Interventions:  Sensory Interventions: Assess changes in LOC    Moisture Interventions: Absorbent underpads    Activity Interventions: Increase time out of bed    Mobility Interventions: HOB 30 degrees or less    Nutrition Interventions: Document food/fluid/supplement intake    Friction and Shear Interventions: Minimize layers

## 2020-09-24 NOTE — PROGRESS NOTES
General Daily Progress Note    Admission Date: 9/11/2020  Hospital Day 14  Post-Op Day 7  Subjective: Tolerating full liquid diet do far. Less nausea. Pain mild. Objective:     Patient Vitals for the past 24 hrs:   BP Temp Pulse Resp SpO2   09/24/20 0737 125/61 97.7 °F (36.5 °C) (!) 102 16 98 %   09/24/20 0309 128/69 98.1 °F (36.7 °C) (!) 108 18 99 %   09/23/20 2332 (!) 140/71 98.2 °F (36.8 °C) (!) 107 18 98 %   09/23/20 1953 138/83 97.6 °F (36.4 °C) (!) 106 16 100 %   09/23/20 1606 136/76 98 °F (36.7 °C) (!) 107 18 100 %   09/23/20 1100 126/76 97.5 °F (36.4 °C) (!) 101 18 100 %     09/24 0701 - 09/24 1900  In: -   Out: 690 [Urine:350; Drains:40]  09/22 1901 - 09/24 0700  In: 1890.3 [P.O.:240; I.V.:1650.3]  Out: 5860 [Urine:2000; Drains:535]      Physical Examination:  General Appearance - No distress. Heart - mild sinus tachycardia. Abdomen - Distension about the same as yesterday.  Appropriately tender.  Ileostomy prolapsing but viable. Midline incision clean, dry, intact, and without associated erythema.  VALENTINE drainage serous.  - Hogan catheter draining normal-appearing urine. Neurological - Alert and oriented.             Data Review   Recent Results (from the past 24 hour(s))   GLUCOSE, POC    Collection Time: 09/23/20 11:39 AM   Result Value Ref Range    Glucose (POC) 137 (H) 65 - 100 mg/dL    Performed by Satish Garcia, POC    Collection Time: 09/23/20  5:45 PM   Result Value Ref Range    Glucose (POC) 115 (H) 65 - 100 mg/dL    Performed by Maciej Tomas, POC    Collection Time: 09/23/20  9:49 PM   Result Value Ref Range    Glucose (POC) 119 (H) 65 - 100 mg/dL    Performed by Juan Deng    GLUCOSE, POC    Collection Time: 09/23/20 11:46 PM   Result Value Ref Range    Glucose (POC) 126 (H) 65 - 100 mg/dL    Performed by Juan Deng    CBC WITH AUTOMATED DIFF    Collection Time: 09/24/20  2:48 AM   Result Value Ref Range    WBC 18.4 (H) 3.6 - 11.0 K/uL    RBC 3.50 (L) 3.80 - 5.20 M/uL    HGB 8.4 (L) 11.5 - 16.0 g/dL    HCT 27.3 (L) 35.0 - 47.0 %    MCV 78.0 (L) 80.0 - 99.0 FL    MCH 24.0 (L) 26.0 - 34.0 PG    MCHC 30.8 30.0 - 36.5 g/dL    RDW 28.1 (H) 11.5 - 14.5 %    PLATELET 873 (H) 633 - 400 K/uL    MPV 9.8 8.9 - 12.9 FL    NRBC 0.0 0  WBC    ABSOLUTE NRBC 0.00 0.00 - 0.01 K/uL    NEUTROPHILS 87 (H) 32 - 75 %    LYMPHOCYTES 6 (L) 12 - 49 %    MONOCYTES 3 (L) 5 - 13 %    EOSINOPHILS 2 0 - 7 %    BASOPHILS 0 0 - 1 %    IMMATURE GRANULOCYTES 2 (H) 0.0 - 0.5 %    ABS. NEUTROPHILS 15.9 (H) 1.8 - 8.0 K/UL    ABS. LYMPHOCYTES 1.1 0.8 - 3.5 K/UL    ABS. MONOCYTES 0.6 0.0 - 1.0 K/UL    ABS. EOSINOPHILS 0.4 0.0 - 0.4 K/UL    ABS. BASOPHILS 0.0 0.0 - 0.1 K/UL    ABS. IMM. GRANS. 0.4 (H) 0.00 - 0.04 K/UL    DF SMEAR SCANNED      PLATELET COMMENTS Large Platelets      RBC COMMENTS ANISOCYTOSIS  3+        RBC COMMENTS HYPOCHROMIA  1+        RBC COMMENTS MICROCYTOSIS  1+        RBC COMMENTS MARILEE CELLS  PRESENT       METABOLIC PANEL, BASIC    Collection Time: 09/24/20  2:48 AM   Result Value Ref Range    Sodium 135 (L) 136 - 145 mmol/L    Potassium 3.9 3.5 - 5.1 mmol/L    Chloride 109 (H) 97 - 108 mmol/L    CO2 20 (L) 21 - 32 mmol/L    Anion gap 6 5 - 15 mmol/L    Glucose 117 (H) 65 - 100 mg/dL    BUN 23 (H) 6 - 20 MG/DL    Creatinine 0.38 (L) 0.55 - 1.02 MG/DL    BUN/Creatinine ratio 61 (H) 12 - 20      GFR est AA >60 >60 ml/min/1.73m2    GFR est non-AA >60 >60 ml/min/1.73m2    Calcium 7.0 (L) 8.5 - 10.1 MG/DL   MAGNESIUM    Collection Time: 09/24/20  2:48 AM   Result Value Ref Range    Magnesium 1.9 1.6 - 2.4 mg/dL   PHOSPHORUS    Collection Time: 09/24/20  2:48 AM   Result Value Ref Range    Phosphorus 1.4 (L) 2.6 - 4.7 MG/DL   GLUCOSE, POC    Collection Time: 09/24/20  7:20 AM   Result Value Ref Range    Glucose (POC) 121 (H) 65 - 100 mg/dL    Performed by Cecily Solorzano        CT scan of Abdomen and Pelvis (9/22/2020):  No apparent abscess.   Scant intraperitoneal gas consistent with POD#5. Ileus versus distal small bowel obstruction (at the ileostomy).         Assessment:     Principal Problem:    Malignant neoplasm of sigmoid colon ([address] (9/14/2020)    Active Problems:    Hydronephrosis (9/11/2020)      Pelvic mass (9/11/2020)      Severe protein-calorie malnutrition ([address] (9/16/2020)      Anemia (9/16/2020)      Thrombocytopenia ([address] (9/19/2020)        7 Days Post-Op s/p Low anterior resection, mobilization of the splenic flexure, coloproctostomy, and creation of loop ileostomy. [Total abdominal hysterectomy and left salpingo-oophorectomy performed by Eladio Chavira MD]  [Cystoscopy and placement of left ureteral catheter performed by Sarmad Urbina MD]  [Distal right ureterectomy performed by Kyler Cohen III, MD]  [Right ureteral re-implant with psoas hitch and placement of right ureteral stent performed by Yudi Rodriguez MD]     Transferred to [facility] (stepdown) from recovery room.     Tachycardia improved. No hypotension. Hemoglobin low but acceptable. Thrombocytopenia resolved. Prn metoprolol begun on 9/20/2020 to reduce heart rate, but not being given frequently.     Urine output adequate. Creatinine within normal limits. BUN elevated, but less than it was yesterday. Hogan catheter in place and will need to remain for 14 days post-op (per Dr. Meagan Garcia).     Leukocytosis today (WBC 18.4 K/uL) is somewhat less than it was yesterday and the least that it has been since 9/18/2020 (WBC 17.7 K/uL). No apparent wound infection. No pneumonia. No UTI. Blood and drain fluid culture results are pending. Possible yeast in the drain fluid. Zosyn was begun on 9/20/2020.        Malnutrition being treated with TPN. GI function is returning. Ileus versus obstruction, possibly at the level of the ileostomy, but the ileostomy output has increased significantly.   Ileostomy prolapse was first noticed in the evening on 9/21/2020, and it could be interfering with bowel function.     Needs physical therapy.     Needs ostomy education.     The patient appears to be fit for transfer to remote telemetry. Plan:   Continue full liquid diet with caution. Continue Ensure Enlive. Continue TPN and lipids. Replace phosphorus.     Follow cultures of blood and abdominal fluid. Continue Zosyn. Might need an antifungal medication, e.g. Eraxis.     Begin Lovenox for additional DVT prophylaxis.     Will check VALENTINE drain fluid creatinine before removal.  Hogan catheter to remain  Until POD#14.     Ambulate. Physical therapy. Ostomy education. Incentive spirometer. Transfer to remote telemetry.

## 2020-09-24 NOTE — PROGRESS NOTES
TRANSFER - OUT REPORT:    Verbal report given to ZAKIA Galvan(name) on Eugenia Alaniz  being transferred to (unit) for routine progression of care       Report consisted of patients Situation, Background, Assessment and   Recommendations(SBAR). Information from the following report(s) SBAR, Procedure Summary, Intake/Output, MAR, Recent Results and Cardiac Rhythm NSR, Sinus Tach was reviewed with the receiving nurse. Lines:   PICC Triple Lumen 52/75/38 Right;Basilic (Active)   Central Line Being Utilized Yes 09/24/20 1715   Criteria for Appropriate Use Total parenteral nutrition 09/24/20 1715   Site Assessment Clean, dry, & intact 09/24/20 1715   Phlebitis Assessment 0 09/24/20 1715   Infiltration Assessment 0 09/24/20 1715   Arm Circumference (cm) 22 cm 09/22/20 0825   Date of Last Dressing Change 09/22/20 09/24/20 1715   Dressing Status Clean, dry, & intact 09/24/20 1715   External Catheter Length (cm) 0 centimeters 09/22/20 1627   Dressing Type Disk with Chlorhexadine gluconate (CHG); Transparent 09/24/20 1715   Action Taken Open ports on tubing capped 09/24/20 1715   Hub Color/Line Status White; Infusing 09/24/20 1715   Positive Blood Return (Site #1) Yes 09/24/20 1715   Hub Color/Line Status Red; Infusing 09/24/20 1715   Positive Blood Return (Site #2) Yes 09/24/20 1715   Hub Color/Line Status Christy Beams; Infusing 09/24/20 1715   Positive Blood Return (Site #3) Yes 09/24/20 1715   Alcohol Cap Used Yes 09/24/20 1715       Peripheral IV 09/14/20 Left Arm (Active)   Site Assessment Clean, dry, & intact 09/24/20 1715   Phlebitis Assessment 0 09/24/20 1715   Infiltration Assessment 0 09/24/20 1715   Dressing Status Clean, dry, & intact 09/24/20 1715   Dressing Type Transparent;Tape 09/24/20 1715   Hub Color/Line Status Capped;Pink 09/24/20 1715   Action Taken Open ports on tubing capped 09/24/20 1715   Alcohol Cap Used Yes 09/24/20 1715        Opportunity for questions and clarification was provided.       Patient transported with:  All personal belongings, IV equiptment   Registered Nurse

## 2020-09-24 NOTE — PROGRESS NOTES
Problem: Falls - Risk of  Goal: *Absence of Falls  Description: Document Payton Bruno Fall Risk and appropriate interventions in the flowsheet. Outcome: Progressing Towards Goal  Note: Fall Risk Interventions:  Mobility Interventions: Patient to call before getting OOB         Medication Interventions: Teach patient to arise slowly, Patient to call before getting OOB    Elimination Interventions: Call light in reach    History of Falls Interventions: Door open when patient unattended, Room close to nurse's station         Problem: Patient Education: Go to Patient Education Activity  Goal: Patient/Family Education  Outcome: Progressing Towards Goal     Problem: Pain  Goal: *Control of Pain  Outcome: Progressing Towards Goal     Problem: General Medical Care Plan  Goal: *Vital signs within specified parameters  Outcome: Progressing Towards Goal     Problem: Nutrition Deficit  Goal: *Optimize nutritional status  Outcome: Progressing Towards Goal     Problem: Pressure Injury - Risk of  Goal: *Prevention of pressure injury  Description: Document Andry Scale and appropriate interventions in the flowsheet. Outcome: Progressing Towards Goal  Note: Pressure Injury Interventions:  Sensory Interventions: Assess changes in LOC    Moisture Interventions: Absorbent underpads    Activity Interventions: Increase time out of bed    Mobility Interventions: HOB 30 degrees or less    Nutrition Interventions: Offer support with meals,snacks and hydration, Document food/fluid/supplement intake    Friction and Shear Interventions: Minimize layers                Problem: Ostomy Care  Goal: *Patient pouching appliance will fit properly and maintain integrity at least three to five days  Description: Infection control procedures (eg, clean dressings, clean gloves, hand washing, precautions to isolate wound from contamination, sterile instruments used for wound debridement) should be implemented.   Outcome: Progressing Towards Goal     Problem: Patient Education: Go to Patient Education Activity  Goal: Patient/Family Education  Outcome: Progressing Towards Goal

## 2020-09-24 NOTE — PROGRESS NOTES
18: 35  Attempted to call report 1x. RN to call 4W back. 18:55  Attempted to call report 2x. RN to call 4W back.

## 2020-09-24 NOTE — PROGRESS NOTES
Problem: Mobility Impaired (Adult and Pediatric)  Goal: *Acute Goals and Plan of Care (Insert Text)  Description: FUNCTIONAL STATUS PRIOR TO ADMISSION: Patient was independent and active without use of DME. Driving. No falls. HOME SUPPORT PRIOR TO ADMISSION: The patient lived alone and has friends to provide assistance at d/c as needed. Physical Therapy Goals  Initiated 9/18/2020  1. Patient will move from supine to sit and sit to supine , scoot up and down, and roll side to side in bed with modified independence within 7 day(s). 2.  Patient will transfer from bed to chair and chair to bed with modified independence using the least restrictive device within 7 day(s). 3.  Patient will perform sit to stand with modified independence within 7 day(s). 4.  Patient will ambulate with modified independence for 150 feet with the least restrictive device within 7 day(s). 5.  Patient will ascend/descend 12 stairs with bilateral handrail(s) with modified independence within 7 day(s). 6.  Patient will improve Tinetti score by 4-5 points within 7 days. Outcome: Progressing Towards Goal     PHYSICAL THERAPY TREATMENT  Patient: Eugenia Alaniz (36 y.o. female)  Date: 9/24/2020  Diagnosis: Pelvic mass [R19.00]  Hydronephrosis [N13.30]   Malignant neoplasm of sigmoid colon (HCC)  Procedure(s) (LRB):  Open low anterior resection, mobilization of splenic flexture, coloproctostomy, creation of loop ileostomy (E R A S) (N/A)  TOTAL ABDOMINAL HYSTERECTOMY, LEFT SALPINGO OOPHORECTOMY (N/A)  Cystoscopy, insertion and removal of left Ureteral catheter, simple pepper, distal right ureterectomy, right ureteral reimplant, right stent placement (Left) 7 Days Post-Op  Precautions: Fall(ERAS)  Chart, physical therapy assessment, plan of care and goals were reviewed. ASSESSMENT  Patient continues with skilled PT services and is progressing towards goals.   Patient sitting up in the chair on arrival and motivated to participate. She is modified independent with sit<->stand transfers. She is ambulating w/ no device x 250 ft w/ CGA (w/ min assist only for the IV pole), no standing rest breaks today, steady gait. Recommend she continue to walk in the kim with staff assist 2-3x outside therapy visit. Therapy reassessment next session. Has not completed steps. Anticipate she will have no problems with this. Current Level of Function Impacting Discharge (mobility/balance): CGA to ambulate w/ no device    Other factors to consider for discharge: PLOF indep, lives alone         PLAN :  Patient continues to benefit from skilled intervention to address the above impairments. Continue treatment per established plan of care. to address goals. Recommendation for discharge: (in order for the patient to meet his/her long term goals)  To be determined: Anticipate no therapy follow up    This discharge recommendation:  Has not yet been discussed the attending provider and/or case management    IF patient discharges home will need the following DME: none       SUBJECTIVE:   Patient stated Thank you.     OBJECTIVE DATA SUMMARY:   Critical Behavior:  Neurologic State: Alert  Orientation Level: Oriented X4  Cognition: Follows commands     Functional Mobility Training:  Bed Mobility:   N/t* pt sitting up on arrival; remained sitting after                 Transfers:  Sit to Stand: Modified independent  Stand to Sit: Modified independent                             Balance:  Sitting: Intact; Without support  Standing: Impaired; Without support  Standing - Static: Good  Standing - Dynamic : Good;Fair  Ambulation/Gait Training:  Distance (ft): 250 Feet (ft)  Assistive Device: Gait belt  Ambulation - Level of Assistance: Contact guard assistance                 Base of Support: Narrowed  Patient initially relying on IV pole. Recommended she do without to work to normalize her gait. Patient completed 250 ft w/o AD, steady gait. Pain Rating:  Voiced no pain    Activity Tolerance:   Good and Fair  Please refer to the flowsheet for vital signs taken during this treatment. After treatment patient left in no apparent distress:   Sitting in chair and Call bell within reach    COMMUNICATION/COLLABORATION:   The patients plan of care was discussed with: Registered nurse.      Oswaldo Mueller PT   Time Calculation: 23 mins

## 2020-09-25 LAB
ALBUMIN SERPL-MCNC: 1.8 G/DL (ref 3.5–5)
ALBUMIN/GLOB SERPL: 0.6 {RATIO} (ref 1.1–2.2)
ALP SERPL-CCNC: 406 U/L (ref 45–117)
ALT SERPL-CCNC: 145 U/L (ref 12–78)
ANION GAP SERPL CALC-SCNC: 6 MMOL/L (ref 5–15)
AST SERPL-CCNC: 91 U/L (ref 15–37)
BACTERIA SPEC CULT: NORMAL
BASOPHILS # BLD: 0 K/UL (ref 0–0.1)
BASOPHILS NFR BLD: 0 % (ref 0–1)
BILIRUB SERPL-MCNC: 0.3 MG/DL (ref 0.2–1)
BUN SERPL-MCNC: 20 MG/DL (ref 6–20)
BUN/CREAT SERPL: 61 (ref 12–20)
CALCIUM SERPL-MCNC: 7.2 MG/DL (ref 8.5–10.1)
CHLORIDE SERPL-SCNC: 109 MMOL/L (ref 97–108)
CO2 SERPL-SCNC: 21 MMOL/L (ref 21–32)
CREAT SERPL-MCNC: 0.33 MG/DL (ref 0.55–1.02)
DIFFERENTIAL METHOD BLD: ABNORMAL
EOSINOPHIL # BLD: 0.4 K/UL (ref 0–0.4)
EOSINOPHIL NFR BLD: 3 % (ref 0–7)
GLOBULIN SER CALC-MCNC: 3.2 G/DL (ref 2–4)
GLUCOSE BLD STRIP.AUTO-MCNC: 118 MG/DL (ref 65–100)
GLUCOSE BLD STRIP.AUTO-MCNC: 120 MG/DL (ref 65–100)
GLUCOSE BLD STRIP.AUTO-MCNC: 132 MG/DL (ref 65–100)
GLUCOSE SERPL-MCNC: 110 MG/DL (ref 65–100)
HCT VFR BLD AUTO: 26.9 % (ref 35–47)
HGB BLD-MCNC: 8.2 G/DL (ref 11.5–16)
IMM GRANULOCYTES # BLD AUTO: 0.3 K/UL (ref 0–0.04)
IMM GRANULOCYTES NFR BLD AUTO: 2 % (ref 0–0.5)
LYMPHOCYTES # BLD: 1.2 K/UL (ref 0.8–3.5)
LYMPHOCYTES NFR BLD: 9 % (ref 12–49)
MAGNESIUM SERPL-MCNC: 1.9 MG/DL (ref 1.6–2.4)
MCH RBC QN AUTO: 24.3 PG (ref 26–34)
MCHC RBC AUTO-ENTMCNC: 30.5 G/DL (ref 30–36.5)
MCV RBC AUTO: 79.6 FL (ref 80–99)
MONOCYTES # BLD: 0.5 K/UL (ref 0–1)
MONOCYTES NFR BLD: 4 % (ref 5–13)
NEUTS SEG # BLD: 11.2 K/UL (ref 1.8–8)
NEUTS SEG NFR BLD: 82 % (ref 32–75)
NRBC # BLD: 0 K/UL (ref 0–0.01)
NRBC BLD-RTO: 0 PER 100 WBC
PHOSPHATE SERPL-MCNC: 1.7 MG/DL (ref 2.6–4.7)
PLATELET # BLD AUTO: 354 K/UL (ref 150–400)
PMV BLD AUTO: 9.5 FL (ref 8.9–12.9)
POTASSIUM SERPL-SCNC: 4.2 MMOL/L (ref 3.5–5.1)
PROT SERPL-MCNC: 5 G/DL (ref 6.4–8.2)
RBC # BLD AUTO: 3.38 M/UL (ref 3.8–5.2)
RBC MORPH BLD: ABNORMAL
SERVICE CMNT-IMP: ABNORMAL
SERVICE CMNT-IMP: NORMAL
SODIUM SERPL-SCNC: 136 MMOL/L (ref 136–145)
WBC # BLD AUTO: 13.6 K/UL (ref 3.6–11)

## 2020-09-25 PROCEDURE — 74011000258 HC RX REV CODE- 258: Performed by: COLON & RECTAL SURGERY

## 2020-09-25 PROCEDURE — 65270000029 HC RM PRIVATE

## 2020-09-25 PROCEDURE — 74011000250 HC RX REV CODE- 250: Performed by: COLON & RECTAL SURGERY

## 2020-09-25 PROCEDURE — 74011250636 HC RX REV CODE- 250/636: Performed by: COLON & RECTAL SURGERY

## 2020-09-25 PROCEDURE — 84100 ASSAY OF PHOSPHORUS: CPT

## 2020-09-25 PROCEDURE — 74011250637 HC RX REV CODE- 250/637: Performed by: COLON & RECTAL SURGERY

## 2020-09-25 PROCEDURE — 85025 COMPLETE CBC W/AUTO DIFF WBC: CPT

## 2020-09-25 PROCEDURE — 36415 COLL VENOUS BLD VENIPUNCTURE: CPT

## 2020-09-25 PROCEDURE — 83735 ASSAY OF MAGNESIUM: CPT

## 2020-09-25 PROCEDURE — C9113 INJ PANTOPRAZOLE SODIUM, VIA: HCPCS | Performed by: COLON & RECTAL SURGERY

## 2020-09-25 PROCEDURE — 82962 GLUCOSE BLOOD TEST: CPT

## 2020-09-25 PROCEDURE — 80053 COMPREHEN METABOLIC PANEL: CPT

## 2020-09-25 RX ADMIN — ACETAMINOPHEN ORAL SOLUTION 1000 MG: 650 SOLUTION ORAL at 18:36

## 2020-09-25 RX ADMIN — ACETAMINOPHEN ORAL SOLUTION 1000 MG: 650 SOLUTION ORAL at 06:43

## 2020-09-25 RX ADMIN — I.V. FAT EMULSION 250 ML: 20 EMULSION INTRAVENOUS at 19:16

## 2020-09-25 RX ADMIN — SODIUM CHLORIDE 40 MG: 9 INJECTION INTRAMUSCULAR; INTRAVENOUS; SUBCUTANEOUS at 09:14

## 2020-09-25 RX ADMIN — Medication 10 ML: at 12:54

## 2020-09-25 RX ADMIN — PIPERACILLIN AND TAZOBACTAM 3.38 G: 3; .375 INJECTION, POWDER, LYOPHILIZED, FOR SOLUTION INTRAVENOUS at 03:34

## 2020-09-25 RX ADMIN — PIPERACILLIN AND TAZOBACTAM 3.38 G: 3; .375 INJECTION, POWDER, LYOPHILIZED, FOR SOLUTION INTRAVENOUS at 11:06

## 2020-09-25 RX ADMIN — ENOXAPARIN SODIUM 40 MG: 40 INJECTION SUBCUTANEOUS at 09:14

## 2020-09-25 RX ADMIN — PIPERACILLIN AND TAZOBACTAM 3.38 G: 3; .375 INJECTION, POWDER, LYOPHILIZED, FOR SOLUTION INTRAVENOUS at 19:59

## 2020-09-25 RX ADMIN — ASCORBIC ACID: 500 INJECTION, SOLUTION INTRAMUSCULAR; INTRAVENOUS; SUBCUTANEOUS at 19:08

## 2020-09-25 RX ADMIN — SODIUM CHLORIDE: 900 INJECTION, SOLUTION INTRAVENOUS at 09:14

## 2020-09-25 RX ADMIN — ONDANSETRON 4 MG: 4 TABLET, ORALLY DISINTEGRATING ORAL at 11:00

## 2020-09-25 RX ADMIN — SODIUM CHLORIDE 40 MG: 9 INJECTION INTRAMUSCULAR; INTRAVENOUS; SUBCUTANEOUS at 20:35

## 2020-09-25 RX ADMIN — ACETAMINOPHEN ORAL SOLUTION 1000 MG: 650 SOLUTION ORAL at 12:53

## 2020-09-25 RX ADMIN — Medication 10 ML: at 06:45

## 2020-09-25 NOTE — WOUND CARE
Ostomy Visit: Post op day #8 with new loop ileostomy. Patient calling out due to volume in pouch - emptied 600 cc; had tuna sandwich about 30 minutes prior - brown liquid output with flakes of tuna. Resting on [facility] bed. Assessment:  RLQ ileostomy - moist red soft edematous stoma with good seal on appliance (changed yesterday 9/24 secondary to leak). Held well even under tension of high output. Teaching:  Patient noted appliance filled up quite fast since eating - called for assistance to empty - talked to her nurse Leon Maldonado and she has not been emptying herself - Leon Maldonado encouraged her to do so and I am reinforcing that she must start and become self sufficient in emptying while here. Reinforced need for her to read the written material that we brought last week - found in transfer bags and placed at bedside. Looking for wear time of this appliance - goal is that we will only have to change on Monday giving her a good 3 day wear time. Plan:  Patient to empty over weekend with guidance from nursing staff. Read over ileostomy handouts this weekend. She will change Monday with verbal guidance from [facility] nurse.   Angelica Hodges, [address] [place] [address] Drive  Office [phone]  Pager # [phone]

## 2020-09-25 NOTE — PROGRESS NOTES
Bedside and Verbal shift change report given to Reina  (oncoming nurse) by Ghassan López RN (offgoing nurse). Report included the following information SBAR, Kardex, Intake/Output, MAR and Cardiac Rhythm NSR/ST.

## 2020-09-25 NOTE — PROGRESS NOTES
Physical Therapy    Reviewed chart and attempted to treat pt. Pt reports nausea earlier and just wants to rest. Pt can ambulate with nursing or family. Pt need to be OOB as tolerated. Will follow up on Monday.

## 2020-09-25 NOTE — PROGRESS NOTES
Problem: Falls - Risk of  Goal: *Absence of Falls  Description: Document Jaye Lieberman Fall Risk and appropriate interventions in the flowsheet.   Outcome: Progressing Towards Goal  Note: Fall Risk Interventions:  Mobility Interventions: Utilize gait belt for transfers/ambulation, Utilize walker, cane, or other assistive device, Strengthening exercises (ROM-active/passive), PT Consult for mobility concerns, Patient to call before getting OOB, PT Consult for assist device competence, OT consult for ADLs         Medication Interventions: Teach patient to arise slowly, Patient to call before getting OOB    Elimination Interventions: Bed/chair exit alarm, Patient to call for help with toileting needs, Toilet paper/wipes in reach, Stay With Me (per policy), Elevated toilet seat, Call light in reach, Toileting schedule/hourly rounds    History of Falls Interventions: Consult care management for discharge planning, Door open when patient unattended, Evaluate medications/consider consulting pharmacy, Investigate reason for fall, Room close to nurse's station, Utilize gait belt for transfer/ambulation, Assess for delayed presentation/identification of injury for 48 hrs (comment for end date), Vital signs minimum Q4HRs X 24 hrs (comment for end date)

## 2020-09-25 NOTE — PROGRESS NOTES
General Daily Progress Note    Admission Date: 9/11/2020  Hospital Day 15  Post-Op Day 8  Subjective: Tolerating full liquid diet. Nausea mild. Pain well controlled. Passed stool per rectum yesterday afternoon. None since. Objective:     Patient Vitals for the past 24 hrs:   BP Temp Pulse Resp SpO2 Height Weight   09/25/20 0732 111/66 97.5 °F (36.4 °C) 97 24 99 % -- --   09/25/20 0336 113/68 97.9 °F (36.6 °C) 97 26 99 % -- --   09/24/20 2336 112/64 97.5 °F (36.4 °C) (!) 103 29 99 % -- --   09/24/20 1959 108/64 97.5 °F (36.4 °C) (!) 110 30 99 % -- --   09/24/20 1633 120/61 97.5 °F (36.4 °C) (!) 108 18 100 % -- --   09/24/20 1622 -- -- -- -- -- 5' 6\" (1.676 m) 54.1 kg (119 lb 4.3 oz)   09/24/20 1155 123/68 (!) 96.4 °F (35.8 °C) (!) 112 18 99 % -- --     No intake/output data recorded. 09/23 1901 - 09/25 0700  In: 860 [P.O.:360; I.V.:500]  Out: 2794 [Urine:2375; Drains:615]    Physical Examination:  General Appearance - No distress. Heart - Normal sinus rhythm. Abdomen - Distension about the same as yesterday.  Appropriately tender. Ileostomy prolapsing but viable. Midline incision clean, dry, intact, and without associated erythema.  VALENTINE drainage serous.  - Hogan catheter draining normal-appearing urine. Neurological - Alert and oriented.             Data Review   Recent Results (from the past 24 hour(s))   CREATININE, FLUID    Collection Time: 09/24/20 10:48 AM   Result Value Ref Range    Fluid Type: VALENTINE DRAIN     Creatinine, fluid 0.28 MG/DL   GLUCOSE, POC    Collection Time: 09/24/20 12:04 PM   Result Value Ref Range    Glucose (POC) 122 (H) 65 - 100 mg/dL    Performed by Fadi Gomez, POC    Collection Time: 09/24/20  5:52 PM   Result Value Ref Range    Glucose (POC) 132 (H) 65 - 100 mg/dL    Performed by Richy Geronimo, POC    Collection Time: 09/24/20 11:35 PM   Result Value Ref Range    Glucose (POC) 128 (H) 65 - 100 mg/dL    Performed by JUAN LUIS SOW WITH AUTOMATED DIFF    Collection Time: 09/25/20  3:29 AM   Result Value Ref Range    WBC 13.6 (H) 3.6 - 11.0 K/uL    RBC 3.38 (L) 3.80 - 5.20 M/uL    HGB 8.2 (L) 11.5 - 16.0 g/dL    HCT 26.9 (L) 35.0 - 47.0 %    MCV 79.6 (L) 80.0 - 99.0 FL    MCH 24.3 (L) 26.0 - 34.0 PG    MCHC 30.5 30.0 - 36.5 g/dL    PLATELET 370 932 - 139 K/uL    MPV 9.5 8.9 - 12.9 FL    NRBC 0.0 0  WBC    ABSOLUTE NRBC 0.00 0.00 - 0.01 K/uL    NEUTROPHILS 82 (H) 32 - 75 %    LYMPHOCYTES 9 (L) 12 - 49 %    MONOCYTES 4 (L) 5 - 13 %    EOSINOPHILS 3 0 - 7 %    BASOPHILS 0 0 - 1 %    IMMATURE GRANULOCYTES 2 (H) 0.0 - 0.5 %    ABS. NEUTROPHILS 11.2 (H) 1.8 - 8.0 K/UL    ABS. LYMPHOCYTES 1.2 0.8 - 3.5 K/UL    ABS. MONOCYTES 0.5 0.0 - 1.0 K/UL    ABS. EOSINOPHILS 0.4 0.0 - 0.4 K/UL    ABS. BASOPHILS 0.0 0.0 - 0.1 K/UL    ABS. IMM. GRANS. 0.3 (H) 0.00 - 0.04 K/UL    DF SMEAR SCANNED      RBC COMMENTS MARILEE CELLS  PRESENT        RBC COMMENTS ANISOCYTOSIS  1+        RBC COMMENTS MICROCYTOSIS  1+       METABOLIC PANEL, COMPREHENSIVE    Collection Time: 09/25/20  3:29 AM   Result Value Ref Range    Sodium 136 136 - 145 mmol/L    Potassium 4.2 3.5 - 5.1 mmol/L    Chloride 109 (H) 97 - 108 mmol/L    CO2 21 21 - 32 mmol/L    Anion gap 6 5 - 15 mmol/L    Glucose 110 (H) 65 - 100 mg/dL    BUN 20 6 - 20 MG/DL    Creatinine 0.33 (L) 0.55 - 1.02 MG/DL    BUN/Creatinine ratio 61 (H) 12 - 20      GFR est AA >60 >60 ml/min/1.73m2    GFR est non-AA >60 >60 ml/min/1.73m2    Calcium 7.2 (L) 8.5 - 10.1 MG/DL    Bilirubin, total 0.3 0.2 - 1.0 MG/DL    ALT (SGPT) 145 (H) 12 - 78 U/L    AST (SGOT) 91 (H) 15 - 37 U/L    Alk.  phosphatase 406 (H) 45 - 117 U/L    Protein, total 5.0 (L) 6.4 - 8.2 g/dL    Albumin 1.8 (L) 3.5 - 5.0 g/dL    Globulin 3.2 2.0 - 4.0 g/dL    A-G Ratio 0.6 (L) 1.1 - 2.2     MAGNESIUM    Collection Time: 09/25/20  3:29 AM   Result Value Ref Range    Magnesium 1.9 1.6 - 2.4 mg/dL   PHOSPHORUS    Collection Time: 09/25/20  3:29 AM   Result Value Ref Range Phosphorus 1.7 (L) 2.6 - 4.7 MG/DL   GLUCOSE, POC    Collection Time: 09/25/20  5:25 AM   Result Value Ref Range    Glucose (POC) 120 (H) 65 - 100 mg/dL    Performed by Penelope Patel      CT scan of Abdomen and Pelvis (9/22/2020):  No apparent abscess.  Scant intraperitoneal gas consistent with POD#5.  Ileus versus distal small bowel obstruction (at the ileostomy). Assessment:     Principal Problem:    Malignant neoplasm of sigmoid colon ([address] (9/14/2020)    Active Problems:    Hydronephrosis (9/11/2020)      Pelvic mass (9/11/2020)      Severe protein-calorie malnutrition ([address] (9/16/2020)      Anemia (9/16/2020)      Thrombocytopenia ([facility] [address] (9/19/2020)        8 Days Post-Op s/p Low anterior resection, mobilization of the splenic flexure, coloproctostomy, and creation of loop ileostomy. [Total abdominal hysterectomy and left salpingo-oophorectomy performed by Mika Ramirez MD]  [Cystoscopy and placement of left ureteral catheter performed by Jonathan Gifford MD]  [Distal right ureterectomy performed by Cl Snider III, MD]  [Right ureteral re-implant with psoas hitch and placement of right ureteral stent performed by Ramakrishna Gill MD]     Transferred to [facility] (stepdown) from recovery room. Transferred to remote telemetry on 9/24/2020.     Tachycardia improved. No hypotension. Hemoglobin low but acceptable. Thrombocytopenia resolved. Prn metoprolol begun on 9/20/2020 to reduce heart rate, but not being given frequently.     Urine output adequate. Creatinine within normal limits. Drain fluid creatinine similar to serum level on 9/24/2020. No evidence of a urine leak. Hogan catheter in place and will need to remain for 14 days post-op (per Dr. Machelle Bishop).     Leukocytosis today (WBC 13.6 K/uL) is less than it was yesterday. No apparent wound infection. No pneumonia. No UTI. Blood and drain fluid culture results are final.  Blood was negative.   Drain fluid had yeast in the broth only.  Antoinette Olivas was begun on 9/20/2020.        Malnutrition being treated with TPN. GI function is returning. Ileus versus obstruction, possibly at the level of the ileostomy, but the ileostomy output has increased significantly. Ileostomy prolapse was first noticed in the evening on 9/21/2020, and it could be interfering with bowel function. Still hypophosphatemic.     Receiving physical therapy.     Receiving stomy education. Plan:   Begin low fiber diet with caution. Continue Ensure Enlive. Continue TPN and lipids. Will consider decreasing them tomorrow. Replace phosphorus.     Continue Zosyn. Will hold off on starting an antifungal medication if patient continues to improve.     Continue Lovenox for additional DVT prophylaxis.     Remove VALENTINE drain. Hogan catheter to remain  Until POD#14.     Ambulate. Physical therapy. Ostomy education. Incentive spirometer.

## 2020-09-25 NOTE — PROGRESS NOTES
Dr. Ford Cox paged regarding drain output and order to DC drain. Received order to still remove drain, but instruct pt that area will still drain a moderate amount for the next couple of days, needs to alert nursing to monitor site and change dressing as saturated.

## 2020-09-25 NOTE — PROGRESS NOTES
VAELNTINE drain removed without difficulty. 30ml yellow drainage. Dressing applied to entry site without bleeding noted.

## 2020-09-25 NOTE — ADVANCED PRACTICE NURSE
Assisted patient to the BR to brush her teeth, wash face and comb hair. Once back to bed, patient seemed to be gagging on phlegm and spit up small amount of clear phlegm. Did request zofran for nausea.

## 2020-09-26 LAB
ANION GAP SERPL CALC-SCNC: 8 MMOL/L (ref 5–15)
BASOPHILS # BLD: 0.1 K/UL (ref 0–0.1)
BASOPHILS NFR BLD: 1 % (ref 0–1)
BUN SERPL-MCNC: 12 MG/DL (ref 6–20)
BUN/CREAT SERPL: 39 (ref 12–20)
CALCIUM SERPL-MCNC: 7.5 MG/DL (ref 8.5–10.1)
CHLORIDE SERPL-SCNC: 108 MMOL/L (ref 97–108)
CO2 SERPL-SCNC: 21 MMOL/L (ref 21–32)
CREAT SERPL-MCNC: 0.31 MG/DL (ref 0.55–1.02)
DIFFERENTIAL METHOD BLD: ABNORMAL
EOSINOPHIL # BLD: 0.4 K/UL (ref 0–0.4)
EOSINOPHIL NFR BLD: 3 % (ref 0–7)
ERYTHROCYTE [DISTWIDTH] IN BLOOD BY AUTOMATED COUNT: ABNORMAL % (ref 11.5–14.5)
GLUCOSE BLD STRIP.AUTO-MCNC: 108 MG/DL (ref 65–100)
GLUCOSE BLD STRIP.AUTO-MCNC: 110 MG/DL (ref 65–100)
GLUCOSE BLD STRIP.AUTO-MCNC: 111 MG/DL (ref 65–100)
GLUCOSE BLD STRIP.AUTO-MCNC: 119 MG/DL (ref 65–100)
GLUCOSE BLD STRIP.AUTO-MCNC: 122 MG/DL (ref 65–100)
GLUCOSE SERPL-MCNC: 101 MG/DL (ref 65–100)
HCT VFR BLD AUTO: 26.6 % (ref 35–47)
HGB BLD-MCNC: 8.2 G/DL (ref 11.5–16)
IMM GRANULOCYTES # BLD AUTO: 0.3 K/UL (ref 0–0.04)
IMM GRANULOCYTES NFR BLD AUTO: 2 % (ref 0–0.5)
LYMPHOCYTES # BLD: 1.3 K/UL (ref 0.8–3.5)
LYMPHOCYTES NFR BLD: 9 % (ref 12–49)
MCH RBC QN AUTO: 24.4 PG (ref 26–34)
MCHC RBC AUTO-ENTMCNC: 30.8 G/DL (ref 30–36.5)
MCV RBC AUTO: 79.2 FL (ref 80–99)
MONOCYTES # BLD: 0.6 K/UL (ref 0–1)
MONOCYTES NFR BLD: 4 % (ref 5–13)
NEUTS SEG # BLD: 11.8 K/UL (ref 1.8–8)
NEUTS SEG NFR BLD: 81 % (ref 32–75)
NRBC # BLD: 0 K/UL (ref 0–0.01)
NRBC BLD-RTO: 0 PER 100 WBC
PHOSPHATE SERPL-MCNC: 1.9 MG/DL (ref 2.6–4.7)
PLATELET # BLD AUTO: 443 K/UL (ref 150–400)
PMV BLD AUTO: 9.6 FL (ref 8.9–12.9)
POTASSIUM SERPL-SCNC: 4.2 MMOL/L (ref 3.5–5.1)
RBC # BLD AUTO: 3.36 M/UL (ref 3.8–5.2)
RBC MORPH BLD: ABNORMAL
SERVICE CMNT-IMP: ABNORMAL
SODIUM SERPL-SCNC: 137 MMOL/L (ref 136–145)
WBC # BLD AUTO: 14.5 K/UL (ref 3.6–11)

## 2020-09-26 PROCEDURE — C9113 INJ PANTOPRAZOLE SODIUM, VIA: HCPCS | Performed by: COLON & RECTAL SURGERY

## 2020-09-26 PROCEDURE — 36415 COLL VENOUS BLD VENIPUNCTURE: CPT

## 2020-09-26 PROCEDURE — 85025 COMPLETE CBC W/AUTO DIFF WBC: CPT

## 2020-09-26 PROCEDURE — 74011250636 HC RX REV CODE- 250/636: Performed by: COLON & RECTAL SURGERY

## 2020-09-26 PROCEDURE — 82962 GLUCOSE BLOOD TEST: CPT

## 2020-09-26 PROCEDURE — 74011000258 HC RX REV CODE- 258: Performed by: COLON & RECTAL SURGERY

## 2020-09-26 PROCEDURE — 84100 ASSAY OF PHOSPHORUS: CPT

## 2020-09-26 PROCEDURE — 74011250637 HC RX REV CODE- 250/637: Performed by: COLON & RECTAL SURGERY

## 2020-09-26 PROCEDURE — 74011000250 HC RX REV CODE- 250: Performed by: COLON & RECTAL SURGERY

## 2020-09-26 PROCEDURE — 65270000029 HC RM PRIVATE

## 2020-09-26 PROCEDURE — 80048 BASIC METABOLIC PNL TOTAL CA: CPT

## 2020-09-26 RX ORDER — FLUCONAZOLE 100 MG/1
200 TABLET ORAL DAILY
Status: DISCONTINUED | OUTPATIENT
Start: 2020-09-26 | End: 2020-09-28

## 2020-09-26 RX ORDER — ACETAMINOPHEN 500 MG
1000 TABLET ORAL EVERY 6 HOURS
Status: DISCONTINUED | OUTPATIENT
Start: 2020-09-26 | End: 2020-09-27

## 2020-09-26 RX ADMIN — PIPERACILLIN AND TAZOBACTAM 3.38 G: 3; .375 INJECTION, POWDER, LYOPHILIZED, FOR SOLUTION INTRAVENOUS at 18:17

## 2020-09-26 RX ADMIN — ACETAMINOPHEN ORAL SOLUTION 1000 MG: 650 SOLUTION ORAL at 00:59

## 2020-09-26 RX ADMIN — SODIUM CHLORIDE 40 MG: 9 INJECTION INTRAMUSCULAR; INTRAVENOUS; SUBCUTANEOUS at 20:43

## 2020-09-26 RX ADMIN — Medication 10 ML: at 21:44

## 2020-09-26 RX ADMIN — SODIUM CHLORIDE 40 MG: 9 INJECTION INTRAMUSCULAR; INTRAVENOUS; SUBCUTANEOUS at 09:21

## 2020-09-26 RX ADMIN — ASCORBIC ACID: 500 INJECTION, SOLUTION INTRAMUSCULAR; INTRAVENOUS; SUBCUTANEOUS at 18:50

## 2020-09-26 RX ADMIN — PIPERACILLIN AND TAZOBACTAM 3.38 G: 3; .375 INJECTION, POWDER, LYOPHILIZED, FOR SOLUTION INTRAVENOUS at 03:35

## 2020-09-26 RX ADMIN — SODIUM CHLORIDE: 900 INJECTION, SOLUTION INTRAVENOUS at 09:21

## 2020-09-26 RX ADMIN — ACETAMINOPHEN ORAL SOLUTION 1000 MG: 650 SOLUTION ORAL at 06:49

## 2020-09-26 RX ADMIN — Medication 10 ML: at 01:02

## 2020-09-26 RX ADMIN — PIPERACILLIN AND TAZOBACTAM 3.38 G: 3; .375 INJECTION, POWDER, LYOPHILIZED, FOR SOLUTION INTRAVENOUS at 11:21

## 2020-09-26 RX ADMIN — FLUCONAZOLE 200 MG: 100 TABLET ORAL at 09:45

## 2020-09-26 RX ADMIN — Medication 10 ML: at 14:57

## 2020-09-26 RX ADMIN — Medication 10 ML: at 06:00

## 2020-09-26 RX ADMIN — ENOXAPARIN SODIUM 40 MG: 40 INJECTION SUBCUTANEOUS at 09:23

## 2020-09-26 RX ADMIN — ACETAMINOPHEN 1000 MG: 500 TABLET ORAL at 19:19

## 2020-09-26 RX ADMIN — METOPROLOL TARTRATE 2.5 MG: 1 INJECTION, SOLUTION INTRAVENOUS at 12:26

## 2020-09-26 NOTE — PROGRESS NOTES
Bedside shift change report given to Terell Taylor (oncoming nurse) by Thaddeus Hernández (offgoing nurse). Report included the following information SBAR.

## 2020-09-26 NOTE — PROGRESS NOTES
General Daily Progress Note    Admission Date: 9/11/2020  Hospital Day 16  Post-Op Day 9  Subjective:   Pain well controlled. Less nausea. Passed a small amount of stool per rectum again. Objective:     Patient Vitals for the past 24 hrs:   BP Temp Pulse Resp SpO2 Weight   09/26/20 0607 -- -- -- -- -- 53.6 kg (118 lb 2.7 oz)   09/26/20 0331 (!) 124/59 97.5 °F (36.4 °C) (!) 104 24 98 % --   09/25/20 2329 (!) 111/56 98.7 °F (37.1 °C) 99 24 99 % --   09/25/20 2009 113/70 97.7 °F (36.5 °C) 100 26 100 % --   09/25/20 1606 (!) 102/59 97.5 °F (36.4 °C) (!) 104 25 99 % --   09/25/20 1149 111/62 98.1 °F (36.7 °C) 96 19 100 % --     No intake/output data recorded. 09/24 1901 - 09/26 0700  In: 720 [P.O.:720]  Out: 6075 [Urine:3400; Drains:350]    Physical Examination:  General Appearance - No distress. Heart - Mild sinus tachycardia. Abdomen - Distension about the same as yesterday.  Appropriately tender. Ileostomy prolapsing but viable. Midline incision clean, dry, intact, and without associated erythema.  - Hogan catheter draining normal-appearing urine. Neurological - Alert and oriented.             Data Review   Recent Results (from the past 24 hour(s))   GLUCOSE, POC    Collection Time: 09/25/20 11:56 AM   Result Value Ref Range    Glucose (POC) 132 (H) 65 - 100 mg/dL    Performed by Milo Hernandez    GLUCOSE, POC    Collection Time: 09/25/20  6:04 PM   Result Value Ref Range    Glucose (POC) 118 (H) 65 - 100 mg/dL    Performed by Milo Hernandez    GLUCOSE, POC    Collection Time: 09/26/20 12:35 AM   Result Value Ref Range    Glucose (POC) 110 (H) 65 - 100 mg/dL    Performed by Trey Fabian    CBC WITH AUTOMATED DIFF    Collection Time: 09/26/20  5:26 AM   Result Value Ref Range    WBC 14.5 (H) 3.6 - 11.0 K/uL    RBC 3.36 (L) 3.80 - 5.20 M/uL    HGB 8.2 (L) 11.5 - 16.0 g/dL    HCT 26.6 (L) 35.0 - 47.0 %    MCV 79.2 (L) 80.0 - 99.0 FL    MCH 24.4 (L) 26.0 - 34.0 PG    MCHC 30.8 30.0 - 36.5 g/dL    RDW ABNORMAL 11.5 - 14.5 %    PLATELET 988 (H) 041 - 400 K/uL    MPV 9.6 8.9 - 12.9 FL    NRBC 0.0 0  WBC    ABSOLUTE NRBC 0.00 0.00 - 0.01 K/uL    NEUTROPHILS 81 (H) 32 - 75 %    LYMPHOCYTES 9 (L) 12 - 49 %    MONOCYTES 4 (L) 5 - 13 %    EOSINOPHILS 3 0 - 7 %    BASOPHILS 1 0 - 1 %    IMMATURE GRANULOCYTES 2 (H) 0.0 - 0.5 %    ABS. NEUTROPHILS 11.8 (H) 1.8 - 8.0 K/UL    ABS. LYMPHOCYTES 1.3 0.8 - 3.5 K/UL    ABS. MONOCYTES 0.6 0.0 - 1.0 K/UL    ABS. EOSINOPHILS 0.4 0.0 - 0.4 K/UL    ABS. BASOPHILS 0.1 0.0 - 0.1 K/UL    ABS. IMM. GRANS. 0.3 (H) 0.00 - 0.04 K/UL    DF SMEAR SCANNED      RBC COMMENTS MARILEE CELLS  PRESENT        RBC COMMENTS ANISOCYTOSIS  1+        RBC COMMENTS OVALOCYTES  PRESENT       METABOLIC PANEL, BASIC    Collection Time: 09/26/20  5:26 AM   Result Value Ref Range    Sodium 137 136 - 145 mmol/L    Potassium 4.2 3.5 - 5.1 mmol/L    Chloride 108 97 - 108 mmol/L    CO2 21 21 - 32 mmol/L    Anion gap 8 5 - 15 mmol/L    Glucose 101 (H) 65 - 100 mg/dL    BUN 12 6 - 20 MG/DL    Creatinine 0.31 (L) 0.55 - 1.02 MG/DL    BUN/Creatinine ratio 39 (H) 12 - 20      GFR est AA >60 >60 ml/min/1.73m2    GFR est non-AA >60 >60 ml/min/1.73m2    Calcium 7.5 (L) 8.5 - 10.1 MG/DL   PHOSPHORUS    Collection Time: 09/26/20  5:26 AM   Result Value Ref Range    Phosphorus 1.9 (L) 2.6 - 4.7 MG/DL   GLUCOSE, POC    Collection Time: 09/26/20  6:34 AM   Result Value Ref Range    Glucose (POC) 108 (H) 65 - 100 mg/dL    Performed by Faizan Tapia      CT scan of Abdomen and Pelvis (9/22/2020):  No apparent abscess.  Scant intraperitoneal gas consistent with POD#5.  Ileus versus distal small bowel obstruction (at the ileostomy).       Assessment:     Principal Problem:    Malignant neoplasm of sigmoid colon ([address] (9/14/2020)    Active Problems:    Hydronephrosis (9/11/2020)      Pelvic mass (9/11/2020)      Severe protein-calorie malnutrition ([address] (9/16/2020)      Anemia (9/16/2020)      Thrombocytopenia ([address] (9/19/2020)        9 Days Post-Op s/p Low anterior resection, mobilization of the splenic flexure, coloproctostomy, and creation of loop ileostomy. [Total abdominal hysterectomy and left salpingo-oophorectomy performed by Skip Montiel MD]  [Cystoscopy and placement of left ureteral catheter performed by Kota Jaimes MD]  [Distal right ureterectomy performed by Beatriz Savage III, MD]  [Right ureteral re-implant with psoas hitch and placement of right ureteral stent performed by Nohemi Gutiérrez MD]     Transferred to [id number] (stepdown) from recovery room. Transferred to remote telemetry on 9/24/2020.     Tachycardia improved. No hypotension. Hemoglobin low but acceptable. Thrombocytopenia resolved. Prn metoprolol begun on 9/20/2020 to reduce heart rate, but not being given frequently.     Urine output adequate. Creatinine within normal limits. Drain fluid creatinine was similar to serum level on 9/24/2020. There was no evidence of a urine leak. The VALENTINE drain was removed on 9/25/2020. Hogan catheter in place and will need to remain for 14 days post-op (per Dr. Render Homans).     Leukocytosis today (WBC 14.5 K/uL) is worse than it was yesterday. No apparent wound infection. No pneumonia. No UTI. Blood and drain fluid culture results are final.  Blood was negative. Drain fluid had yeast in the broth only. Zosyn was begun on 9/20/2020.        Malnutrition being treated with TPN. GI function is returning. Ileus versus obstruction, possibly at the level of the ileostomy, but the ileostomy output has increased significantly. Ileostomy prolapse was first noticed in the evening on 9/21/2020, and it could be interfering with bowel function.     Still hypophosphatemic.     Receiving physical therapy.     Receiving stomy education. Plan:   Continue low fiber diet with caution. Continue Ensure Enlive. Continue TPN and lipids. Will decrease TPN with the next bag. Replace phosphorus.     Continue Zosyn.   Begin Diflucan.     Continue Lovenox for additional DVT prophylaxis.     Hogan catheter to remain until POD#14.     Ambulate. Physical therapy. Ostomy education. Incentive spirometer.

## 2020-09-26 NOTE — PROGRESS NOTES
Bedside and Verbal shift change report given to Arelis Renee Carrillo (oncoming nurse) by Moses George (offgoing nurse). Report included the following information SBAR, Kardex, ED Summary, Intake/Output, MAR, Accordion, Recent Results, and Cardiac Rhythm NST . Problem: Falls - Risk of  Goal: *Absence of Falls  Description: Document Chester Le Fall Risk and appropriate interventions in the flowsheet. Outcome: Progressing Towards Goal  Note: Fall Risk Interventions:  Mobility Interventions: Communicate number of staff needed for ambulation/transfer, Patient to call before getting OOB         Medication Interventions: Evaluate medications/consider consulting pharmacy, Patient to call before getting OOB, Teach patient to arise slowly    Elimination Interventions: Bed/chair exit alarm, Call light in reach, Patient to call for help with toileting needs, Stay With Me (per policy), Toileting schedule/hourly rounds    History of Falls Interventions: Room close to nurse's station         Problem: Pain  Goal: *Control of Pain  Outcome: Progressing Towards Goal     Problem: Nutrition Deficit  Goal: *Optimize nutritional status  Outcome: Progressing Towards Goal     Problem: Ostomy Care  Goal: *Patient pouching appliance will fit properly and maintain integrity at least three to five days  Description: Infection control procedures (eg, clean dressings, clean gloves, hand washing, precautions to isolate wound from contamination, sterile instruments used for wound debridement) should be implemented.   Outcome: Progressing Towards Goal  Goal: *Acceptance of change in body image  Outcome: Progressing Towards Goal

## 2020-09-26 NOTE — PROGRESS NOTES
Problem: Falls - Risk of  Goal: *Absence of Falls  Description: Document Trenton Lara Fall Risk and appropriate interventions in the flowsheet.   Outcome: Progressing Towards Goal  Note: Fall Risk Interventions:  Mobility Interventions: Utilize gait belt for transfers/ambulation         Medication Interventions: Teach patient to arise slowly    Elimination Interventions: Bed/chair exit alarm    History of Falls Interventions: Consult care management for discharge planning, Door open when patient unattended, Evaluate medications/consider consulting pharmacy, Investigate reason for fall, Room close to nurse's station, Utilize gait belt for transfer/ambulation, Assess for delayed presentation/identification of injury for 48 hrs (comment for end date), Vital signs minimum Q4HRs X 24 hrs (comment for end date)         Problem: Patient Education: Go to Patient Education Activity  Goal: Patient/Family Education  Outcome: Progressing Towards Goal     Problem: Pain  Goal: *Control of Pain  Outcome: Progressing Towards Goal     Problem: Discharge Planning  Goal: *Discharge to safe environment  Outcome: Progressing Towards Goal  Goal: *Knowledge of medication management  Outcome: Progressing Towards Goal  Goal: *Knowledge of discharge instructions  Outcome: Progressing Towards Goal     Problem: Patient Education: Go to Patient Education Activity  Goal: Patient/Family Education  Outcome: Progressing Towards Goal     Problem: General Medical Care Plan  Goal: *Vital signs within specified parameters  Outcome: Progressing Towards Goal  Goal: *Labs within defined limits  Outcome: Progressing Towards Goal  Goal: *Absence of infection signs and symptoms  Outcome: Progressing Towards Goal  Goal: *Optimal pain control at patient's stated goal  Outcome: Progressing Towards Goal  Goal: *Skin integrity maintained  Outcome: Progressing Towards Goal  Goal: *Fluid volume balance  Outcome: Progressing Towards Goal  Goal: *Optimize nutritional status  Outcome: Progressing Towards Goal  Goal: *Anxiety reduced or absent  Outcome: Progressing Towards Goal  Goal: *Progressive mobility and function (eg: ADL's)  Outcome: Progressing Towards Goal     Problem: Patient Education: Go to Patient Education Activity  Goal: Patient/Family Education  Outcome: Progressing Towards Goal     Problem: Nutrition Deficit  Goal: *Optimize nutritional status  Outcome: Progressing Towards Goal     Problem: Pressure Injury - Risk of  Goal: *Prevention of pressure injury  Description: Document Andry Scale and appropriate interventions in the flowsheet. Outcome: Progressing Towards Goal  Note: Pressure Injury Interventions:  Sensory Interventions: Assess changes in LOC, Assess need for specialty bed, Avoid rigorous massage over bony prominences, Check visual cues for pain, Discuss PT/OT consult with provider, Float heels, Keep linens dry and wrinkle-free, Maintain/enhance activity level, Minimize linen layers, Monitor skin under medical devices, Pad between skin to skin, Pressure redistribution bed/mattress (bed type), Sit a 90-degree angle/use footstool if needed, Turn and reposition approx. every two hours (pillows and wedges if needed), Use 30-degree side-lying position    Moisture Interventions: Absorbent underpads, Apply protective barrier, creams and emollients, Assess need for specialty bed, Contain wound drainage, Internal/External urinary devices, Limit adult briefs, Maintain skin hydration (lotion/cream), Minimize layers, Moisture barrier    Activity Interventions: Pressure redistribution bed/mattress(bed type), Increase time out of bed, PT/OT evaluation    Mobility Interventions: Pressure redistribution bed/mattress (bed type), Turn and reposition approx.  every two hours(pillow and wedges), PT/OT evaluation    Nutrition Interventions: Document food/fluid/supplement intake, Discuss nutritional consult with provider    Friction and Shear Interventions: Minimize layers, HOB 30 degrees or less, Apply protective barrier, creams and emollients, Foam dressings/transparent film/skin sealants                Problem: Patient Education: Go to Patient Education Activity  Goal: Patient/Family Education  Outcome: Progressing Towards Goal     Problem: Ostomy Care  Goal: *Patient pouching appliance will fit properly and maintain integrity at least three to five days  Description: Infection control procedures (eg, clean dressings, clean gloves, hand washing, precautions to isolate wound from contamination, sterile instruments used for wound debridement) should be implemented.   Outcome: Progressing Towards Goal  Goal: *Acceptance of change in body image  Outcome: Progressing Towards Goal     Problem: Patient Education: Go to Patient Education Activity  Goal: Patient/Family Education  Outcome: Progressing Towards Goal     Problem: Patient Education: Go to Patient Education Activity  Goal: Patient/Family Education  Outcome: Progressing Towards Goal

## 2020-09-27 LAB
ANION GAP SERPL CALC-SCNC: 8 MMOL/L (ref 5–15)
BASOPHILS # BLD: 0.1 K/UL (ref 0–0.1)
BASOPHILS NFR BLD: 1 % (ref 0–1)
BUN SERPL-MCNC: 12 MG/DL (ref 6–20)
BUN/CREAT SERPL: 35 (ref 12–20)
CALCIUM SERPL-MCNC: 7.1 MG/DL (ref 8.5–10.1)
CHLORIDE SERPL-SCNC: 106 MMOL/L (ref 97–108)
CO2 SERPL-SCNC: 21 MMOL/L (ref 21–32)
CREAT SERPL-MCNC: 0.34 MG/DL (ref 0.55–1.02)
DIFFERENTIAL METHOD BLD: ABNORMAL
EOSINOPHIL # BLD: 0.4 K/UL (ref 0–0.4)
EOSINOPHIL NFR BLD: 3 % (ref 0–7)
ERYTHROCYTE [DISTWIDTH] IN BLOOD BY AUTOMATED COUNT: ABNORMAL % (ref 11.5–14.5)
GLUCOSE BLD STRIP.AUTO-MCNC: 106 MG/DL (ref 65–100)
GLUCOSE BLD STRIP.AUTO-MCNC: 111 MG/DL (ref 65–100)
GLUCOSE BLD STRIP.AUTO-MCNC: 113 MG/DL (ref 65–100)
GLUCOSE BLD STRIP.AUTO-MCNC: 98 MG/DL (ref 65–100)
GLUCOSE SERPL-MCNC: 114 MG/DL (ref 65–100)
HCT VFR BLD AUTO: 27 % (ref 35–47)
HGB BLD-MCNC: 8.3 G/DL (ref 11.5–16)
IMM GRANULOCYTES # BLD AUTO: 0.1 K/UL (ref 0–0.04)
IMM GRANULOCYTES NFR BLD AUTO: 1 % (ref 0–0.5)
LYMPHOCYTES # BLD: 1.4 K/UL (ref 0.8–3.5)
LYMPHOCYTES NFR BLD: 10 % (ref 12–49)
MAGNESIUM SERPL-MCNC: 1.8 MG/DL (ref 1.6–2.4)
MCH RBC QN AUTO: 24.6 PG (ref 26–34)
MCHC RBC AUTO-ENTMCNC: 30.7 G/DL (ref 30–36.5)
MCV RBC AUTO: 79.9 FL (ref 80–99)
MONOCYTES # BLD: 0.7 K/UL (ref 0–1)
MONOCYTES NFR BLD: 5 % (ref 5–13)
NEUTS SEG # BLD: 11.3 K/UL (ref 1.8–8)
NEUTS SEG NFR BLD: 80 % (ref 32–75)
NRBC # BLD: 0 K/UL (ref 0–0.01)
NRBC BLD-RTO: 0 PER 100 WBC
PHOSPHATE SERPL-MCNC: 1.8 MG/DL (ref 2.6–4.7)
PLATELET # BLD AUTO: 473 K/UL (ref 150–400)
PMV BLD AUTO: 9.8 FL (ref 8.9–12.9)
POTASSIUM SERPL-SCNC: 4.1 MMOL/L (ref 3.5–5.1)
RBC # BLD AUTO: 3.38 M/UL (ref 3.8–5.2)
RBC MORPH BLD: ABNORMAL
SERVICE CMNT-IMP: ABNORMAL
SERVICE CMNT-IMP: NORMAL
SODIUM SERPL-SCNC: 135 MMOL/L (ref 136–145)
WBC # BLD AUTO: 14 K/UL (ref 3.6–11)

## 2020-09-27 PROCEDURE — 74011250636 HC RX REV CODE- 250/636: Performed by: COLON & RECTAL SURGERY

## 2020-09-27 PROCEDURE — 74011000250 HC RX REV CODE- 250: Performed by: COLON & RECTAL SURGERY

## 2020-09-27 PROCEDURE — 85025 COMPLETE CBC W/AUTO DIFF WBC: CPT

## 2020-09-27 PROCEDURE — 65270000029 HC RM PRIVATE

## 2020-09-27 PROCEDURE — 84100 ASSAY OF PHOSPHORUS: CPT

## 2020-09-27 PROCEDURE — 83735 ASSAY OF MAGNESIUM: CPT

## 2020-09-27 PROCEDURE — 74011250637 HC RX REV CODE- 250/637: Performed by: COLON & RECTAL SURGERY

## 2020-09-27 PROCEDURE — C9113 INJ PANTOPRAZOLE SODIUM, VIA: HCPCS | Performed by: COLON & RECTAL SURGERY

## 2020-09-27 PROCEDURE — 80048 BASIC METABOLIC PNL TOTAL CA: CPT

## 2020-09-27 PROCEDURE — 82962 GLUCOSE BLOOD TEST: CPT

## 2020-09-27 PROCEDURE — 36415 COLL VENOUS BLD VENIPUNCTURE: CPT

## 2020-09-27 PROCEDURE — 74011000258 HC RX REV CODE- 258: Performed by: COLON & RECTAL SURGERY

## 2020-09-27 RX ORDER — PANTOPRAZOLE SODIUM 40 MG/1
40 TABLET, DELAYED RELEASE ORAL
Status: DISCONTINUED | OUTPATIENT
Start: 2020-09-28 | End: 2020-10-05 | Stop reason: HOSPADM

## 2020-09-27 RX ORDER — ACETAMINOPHEN 500 MG
1000 TABLET ORAL
Status: DISCONTINUED | OUTPATIENT
Start: 2020-09-27 | End: 2020-10-05 | Stop reason: HOSPADM

## 2020-09-27 RX ADMIN — ACETAMINOPHEN 1000 MG: 500 TABLET ORAL at 07:00

## 2020-09-27 RX ADMIN — DIBASIC SODIUM PHOSPHATE, MONOBASIC POTASSIUM PHOSPHATE AND MONOBASIC SODIUM PHOSPHATE 1 TABLET: 852; 155; 130 TABLET ORAL at 18:44

## 2020-09-27 RX ADMIN — Medication 20 ML: at 14:00

## 2020-09-27 RX ADMIN — SODIUM CHLORIDE: 900 INJECTION, SOLUTION INTRAVENOUS at 11:33

## 2020-09-27 RX ADMIN — DIBASIC SODIUM PHOSPHATE, MONOBASIC POTASSIUM PHOSPHATE AND MONOBASIC SODIUM PHOSPHATE 1 TABLET: 852; 155; 130 TABLET ORAL at 11:33

## 2020-09-27 RX ADMIN — FLUCONAZOLE 200 MG: 100 TABLET ORAL at 09:44

## 2020-09-27 RX ADMIN — ENOXAPARIN SODIUM 40 MG: 40 INJECTION SUBCUTANEOUS at 09:44

## 2020-09-27 RX ADMIN — PIPERACILLIN AND TAZOBACTAM 3.38 G: 3; .375 INJECTION, POWDER, LYOPHILIZED, FOR SOLUTION INTRAVENOUS at 18:43

## 2020-09-27 RX ADMIN — SODIUM CHLORIDE 40 MG: 9 INJECTION INTRAMUSCULAR; INTRAVENOUS; SUBCUTANEOUS at 09:44

## 2020-09-27 RX ADMIN — Medication 10 ML: at 22:00

## 2020-09-27 RX ADMIN — PIPERACILLIN AND TAZOBACTAM 3.38 G: 3; .375 INJECTION, POWDER, LYOPHILIZED, FOR SOLUTION INTRAVENOUS at 11:23

## 2020-09-27 RX ADMIN — PIPERACILLIN AND TAZOBACTAM 3.38 G: 3; .375 INJECTION, POWDER, LYOPHILIZED, FOR SOLUTION INTRAVENOUS at 02:25

## 2020-09-27 RX ADMIN — Medication 10 ML: at 05:34

## 2020-09-27 NOTE — PROGRESS NOTES
General Daily Progress Note    Admission Date: 9/11/2020  Hospital Day 17  Post-Op Day 10  Subjective: Tolerating diet. Objective:     Patient Vitals for the past 24 hrs:   BP Temp Pulse Resp SpO2 Weight   09/27/20 0703 107/60 97.6 °F (36.4 °C) (!) 106 23 99 % --   09/27/20 0533 -- -- -- -- -- 57.7 kg (127 lb 3.2 oz)   09/27/20 0329 (!) 106/55 98.1 °F (36.7 °C) (!) 102 26 99 % --   09/26/20 2304 118/66 97.8 °F (36.6 °C) (!) 102 25 97 % --   09/26/20 1900 115/60 98.1 °F (36.7 °C) (!) 109 24 98 % --   09/26/20 1604 106/65 97.7 °F (36.5 °C) (!) 107 20 99 % --   09/26/20 1234 -- -- 100 -- -- --   09/26/20 1226 115/78 -- (!) 123 -- -- --   09/26/20 1157 118/69 97.7 °F (36.5 °C) (!) 122 30 100 % --     No intake/output data recorded. 09/25 1901 - 09/27 0700  In: 1318 [P.O.:360; I.V.:958]  Out: 4900 [Urine:2775]    Physical Examination:  General Appearance - No distress. Heart - Mild sinus tachycardia. Abdomen - Distension less than yesterday.  Minimal tenderness. Ileostomy prolapsing but viable. Midline incision clean, dry, intact, and without associated erythema. Drain site wound with small amount of serous drainage.  - Hogan catheter draining normal-appearing urine. Neurological - Alert and oriented.             Data Review   Recent Results (from the past 24 hour(s))   GLUCOSE, POC    Collection Time: 09/26/20 11:39 AM   Result Value Ref Range    Glucose (POC) 111 (H) 65 - 100 mg/dL    Performed by Ian Pappas POC    Collection Time: 09/26/20  5:36 PM   Result Value Ref Range    Glucose (POC) 122 (H) 65 - 100 mg/dL    Performed by Dianna Pelaez    GLUCOSE, POC    Collection Time: 09/26/20 11:07 PM   Result Value Ref Range    Glucose (POC) 119 (H) 65 - 100 mg/dL    Performed by Juan Maldonado    CBC WITH AUTOMATED DIFF    Collection Time: 09/27/20  3:11 AM   Result Value Ref Range    WBC 14.0 (H) 3.6 - 11.0 K/uL    RBC 3.38 (L) 3.80 - 5.20 M/uL    HGB 8.3 (L) 11.5 - 16.0 g/dL    HCT 27.0 (L) 35.0 - 47.0 %    MCV 79.9 (L) 80.0 - 99.0 FL    MCH 24.6 (L) 26.0 - 34.0 PG    MCHC 30.7 30.0 - 36.5 g/dL    RDW ABNORMAL 11.5 - 14.5 %    PLATELET 787 (H) 528 - 400 K/uL    MPV 9.8 8.9 - 12.9 FL    NRBC 0.0 0  WBC    ABSOLUTE NRBC 0.00 0.00 - 0.01 K/uL    NEUTROPHILS 80 (H) 32 - 75 %    LYMPHOCYTES 10 (L) 12 - 49 %    MONOCYTES 5 5 - 13 %    EOSINOPHILS 3 0 - 7 %    BASOPHILS 1 0 - 1 %    IMMATURE GRANULOCYTES 1 (H) 0.0 - 0.5 %    ABS. NEUTROPHILS 11.3 (H) 1.8 - 8.0 K/UL    ABS. LYMPHOCYTES 1.4 0.8 - 3.5 K/UL    ABS. MONOCYTES 0.7 0.0 - 1.0 K/UL    ABS. EOSINOPHILS 0.4 0.0 - 0.4 K/UL    ABS. BASOPHILS 0.1 0.0 - 0.1 K/UL    ABS. IMM. GRANS. 0.1 (H) 0.00 - 0.04 K/UL    DF SMEAR SCANNED      RBC COMMENTS ANISOCYTOSIS  1+        RBC COMMENTS OVALOCYTES  1+        RBC COMMENTS POLYCHROMASIA  1+       METABOLIC PANEL, BASIC    Collection Time: 09/27/20  3:11 AM   Result Value Ref Range    Sodium 135 (L) 136 - 145 mmol/L    Potassium 4.1 3.5 - 5.1 mmol/L    Chloride 106 97 - 108 mmol/L    CO2 21 21 - 32 mmol/L    Anion gap 8 5 - 15 mmol/L    Glucose 114 (H) 65 - 100 mg/dL    BUN 12 6 - 20 MG/DL    Creatinine 0.34 (L) 0.55 - 1.02 MG/DL    BUN/Creatinine ratio 35 (H) 12 - 20      GFR est AA >60 >60 ml/min/1.73m2    GFR est non-AA >60 >60 ml/min/1.73m2    Calcium 7.1 (L) 8.5 - 10.1 MG/DL   MAGNESIUM    Collection Time: 09/27/20  3:11 AM   Result Value Ref Range    Magnesium 1.8 1.6 - 2.4 mg/dL   PHOSPHORUS    Collection Time: 09/27/20  3:11 AM   Result Value Ref Range    Phosphorus 1.8 (L) 2.6 - 4.7 MG/DL   GLUCOSE, POC    Collection Time: 09/27/20  5:32 AM   Result Value Ref Range    Glucose (POC) 111 (H) 65 - 100 mg/dL    Performed by Stanford Cespedes        CT scan of Abdomen and Pelvis (9/22/2020):  No apparent abscess.  Scant intraperitoneal gas consistent with POD#5.  Ileus versus distal small bowel obstruction (at the ileostomy).         Assessment:     Principal Problem:    Malignant neoplasm of sigmoid colon ([facility], [address] (9/14/2020)    Active Problems:    Hydronephrosis (9/11/2020)      Pelvic mass (9/11/2020)      Severe protein-calorie malnutrition ([address] (9/16/2020)      Anemia (9/16/2020)      Thrombocytopenia ([facility] [address] (9/19/2020)        10 Days Post-Op s/p Low anterior resection, mobilization of the splenic flexure, coloproctostomy, and creation of loop ileostomy. [Total abdominal hysterectomy and left salpingo-oophorectomy performed by Jorje Chiu MD]  [Cystoscopy and placement of left ureteral catheter performed by Atul Vital MD]  [Distal right ureterectomy performed by Bridget Sanz III, MD]  [Right ureteral re-implant with psoas hitch and placement of right ureteral stent performed by Ernie Yu MD]     Transferred to [id number] (stepdown) from recovery room. Transferred to remote telemetry on 9/24/2020.     Tachycardia tolerable. No hypotension. Hemoglobin low but acceptable. Thrombocytopenia resolved. Prn metoprolol begun on 9/20/2020 to reduce heart rate, but not being given frequently.     Urine output adequate. Creatinine within normal limits. Drain fluid creatinine was similar to serum level on 9/24/2020.  There was no evidence of a urine leak. The VALENTINE drain was removed on 9/25/2020. Hogan catheter in place and will need to remain for 14 days post-op (per Dr. Santy Soriano).     Leukocytosis today (WBC 14.0 K/uL) is slightly less than it was yesterday and still worse than it was on 9/25/2020. No apparent wound infection. No pneumonia. No UTI. Blood and drain fluid culture results are final.  Blood was negative.  Drain fluid had yeast in the broth only. Zosyn was begun on 9/20/2020. Oral Diflucan was begun on 9/26/2020.        Malnutrition being treated with TPN. GI function has returned. Ileus versus obstruction, possibly at the level of the ileostomy, appears to have resolved.   Ileostomy prolapse was first noticed in the evening on 9/21/2020, and it could be interfering with bowel function.     Still hypophosphatemic.     Receiving physical therapy.     Receiving stomy education. Plan:   Continue low fiber diet with caution. Continue Ensure Enlive. Discontinue TPN and lipids after today. Replace phosphorus.     Continue Zosyn. Continue Diflucan.     Continue Lovenox for additional DVT prophylaxis.     Hogan catheter to remain until POD#14.     Ambulate. Physical therapy. Ostomy education. Incentive spirometer.

## 2020-09-27 NOTE — PROGRESS NOTES
Nurse Yadiel Beck gave bedside report to oncoming nurse Annabelle Weber RN by [employer] and Mary

## 2020-09-28 LAB
ANION GAP SERPL CALC-SCNC: 9 MMOL/L (ref 5–15)
BASOPHILS # BLD: 0.2 K/UL (ref 0–0.1)
BASOPHILS NFR BLD: 1 % (ref 0–1)
BUN SERPL-MCNC: 10 MG/DL (ref 6–20)
BUN/CREAT SERPL: 28 (ref 12–20)
CALCIUM SERPL-MCNC: 7.3 MG/DL (ref 8.5–10.1)
CHLORIDE SERPL-SCNC: 104 MMOL/L (ref 97–108)
CO2 SERPL-SCNC: 23 MMOL/L (ref 21–32)
CREAT SERPL-MCNC: 0.36 MG/DL (ref 0.55–1.02)
DIFFERENTIAL METHOD BLD: ABNORMAL
EOSINOPHIL # BLD: 0.4 K/UL (ref 0–0.4)
EOSINOPHIL NFR BLD: 2 % (ref 0–7)
GLUCOSE SERPL-MCNC: 89 MG/DL (ref 65–100)
HCT VFR BLD AUTO: 28.4 % (ref 35–47)
HGB BLD-MCNC: 8.9 G/DL (ref 11.5–16)
IMM GRANULOCYTES # BLD AUTO: 0.2 K/UL (ref 0–0.04)
IMM GRANULOCYTES NFR BLD AUTO: 1 % (ref 0–0.5)
LYMPHOCYTES # BLD: 1.2 K/UL (ref 0.8–3.5)
LYMPHOCYTES NFR BLD: 7 % (ref 12–49)
MCH RBC QN AUTO: 24.5 PG (ref 26–34)
MCHC RBC AUTO-ENTMCNC: 31.3 G/DL (ref 30–36.5)
MCV RBC AUTO: 78 FL (ref 80–99)
MONOCYTES # BLD: 0.5 K/UL (ref 0–1)
MONOCYTES NFR BLD: 3 % (ref 5–13)
NEUTS SEG # BLD: 15.2 K/UL (ref 1.8–8)
NEUTS SEG NFR BLD: 86 % (ref 32–75)
NRBC # BLD: 0 K/UL (ref 0–0.01)
NRBC BLD-RTO: 0 PER 100 WBC
PHOSPHATE SERPL-MCNC: 2.1 MG/DL (ref 2.6–4.7)
PLATELET # BLD AUTO: 618 K/UL (ref 150–400)
PMV BLD AUTO: 9.1 FL (ref 8.9–12.9)
POTASSIUM SERPL-SCNC: 4.1 MMOL/L (ref 3.5–5.1)
RBC # BLD AUTO: 3.64 M/UL (ref 3.8–5.2)
RBC MORPH BLD: ABNORMAL
SODIUM SERPL-SCNC: 136 MMOL/L (ref 136–145)
WBC # BLD AUTO: 17.7 K/UL (ref 3.6–11)

## 2020-09-28 PROCEDURE — 36415 COLL VENOUS BLD VENIPUNCTURE: CPT

## 2020-09-28 PROCEDURE — 36592 COLLECT BLOOD FROM PICC: CPT

## 2020-09-28 PROCEDURE — 74011250636 HC RX REV CODE- 250/636: Performed by: INTERNAL MEDICINE

## 2020-09-28 PROCEDURE — 84100 ASSAY OF PHOSPHORUS: CPT

## 2020-09-28 PROCEDURE — 85025 COMPLETE CBC W/AUTO DIFF WBC: CPT

## 2020-09-28 PROCEDURE — 99221 1ST HOSP IP/OBS SF/LOW 40: CPT | Performed by: INTERNAL MEDICINE

## 2020-09-28 PROCEDURE — 87103 BLOOD FUNGUS CULTURE: CPT

## 2020-09-28 PROCEDURE — 74011000258 HC RX REV CODE- 258: Performed by: INTERNAL MEDICINE

## 2020-09-28 PROCEDURE — 65270000029 HC RM PRIVATE

## 2020-09-28 PROCEDURE — 80048 BASIC METABOLIC PNL TOTAL CA: CPT

## 2020-09-28 PROCEDURE — 74011000250 HC RX REV CODE- 250: Performed by: COLON & RECTAL SURGERY

## 2020-09-28 PROCEDURE — 97116 GAIT TRAINING THERAPY: CPT

## 2020-09-28 PROCEDURE — 74011250637 HC RX REV CODE- 250/637: Performed by: COLON & RECTAL SURGERY

## 2020-09-28 PROCEDURE — 74011000258 HC RX REV CODE- 258: Performed by: COLON & RECTAL SURGERY

## 2020-09-28 PROCEDURE — 74011250636 HC RX REV CODE- 250/636: Performed by: COLON & RECTAL SURGERY

## 2020-09-28 RX ADMIN — SODIUM CHLORIDE 200 MG: 9 INJECTION, SOLUTION INTRAVENOUS at 13:21

## 2020-09-28 RX ADMIN — DIBASIC SODIUM PHOSPHATE, MONOBASIC POTASSIUM PHOSPHATE AND MONOBASIC SODIUM PHOSPHATE 1 TABLET: 852; 155; 130 TABLET ORAL at 19:11

## 2020-09-28 RX ADMIN — Medication 10 ML: at 04:58

## 2020-09-28 RX ADMIN — PIPERACILLIN AND TAZOBACTAM 3.38 G: 3; .375 INJECTION, POWDER, LYOPHILIZED, FOR SOLUTION INTRAVENOUS at 03:00

## 2020-09-28 RX ADMIN — Medication 10 ML: at 21:07

## 2020-09-28 RX ADMIN — DIBASIC SODIUM PHOSPHATE, MONOBASIC POTASSIUM PHOSPHATE AND MONOBASIC SODIUM PHOSPHATE 1 TABLET: 852; 155; 130 TABLET ORAL at 09:28

## 2020-09-28 RX ADMIN — PIPERACILLIN AND TAZOBACTAM 3.38 G: 3; .375 INJECTION, POWDER, LYOPHILIZED, FOR SOLUTION INTRAVENOUS at 11:35

## 2020-09-28 RX ADMIN — Medication 10 ML: at 11:35

## 2020-09-28 RX ADMIN — SODIUM CHLORIDE: 900 INJECTION, SOLUTION INTRAVENOUS at 09:32

## 2020-09-28 RX ADMIN — PIPERACILLIN AND TAZOBACTAM 3.38 G: 3; .375 INJECTION, POWDER, LYOPHILIZED, FOR SOLUTION INTRAVENOUS at 19:11

## 2020-09-28 RX ADMIN — ENOXAPARIN SODIUM 40 MG: 40 INJECTION SUBCUTANEOUS at 09:29

## 2020-09-28 RX ADMIN — PANTOPRAZOLE SODIUM 40 MG: 40 TABLET, DELAYED RELEASE ORAL at 09:29

## 2020-09-28 RX ADMIN — SODIUM CHLORIDE 10 MG: 9 INJECTION INTRAMUSCULAR; INTRAVENOUS; SUBCUTANEOUS at 03:30

## 2020-09-28 RX ADMIN — METOPROLOL TARTRATE 2.5 MG: 1 INJECTION, SOLUTION INTRAVENOUS at 12:37

## 2020-09-28 RX ADMIN — FLUCONAZOLE 200 MG: 100 TABLET ORAL at 09:28

## 2020-09-28 NOTE — WOUND CARE
WOCN Note:      Follow-up visit for POD  #11; loop ileostomy  -Dr. Kimberlyn Peoples left abdominal suture line LAUREN. Well approximated and not leaking  -abdomen distended and tender  -ileostomy prolapse     Assessment:   Patient is A&O x 3, communicative, pepper in. In bed for pouch change will get up to chair upon completion. [address] Bed with Accumax mattress  Patient reports no pain presently.     LLQ: moist pink, prolapsed stoma, darren remains in and present pouch changed Friday with leaking at 9 0'clock with very full bag ( emptied 400cc).    Bilateral heel, buttocks, and sacral skin intact and without erythema.  Heels offloaded on pillow.     Patient changed pouch with guidance. Removed present pouch with 400cc liquid stool. Cleaned peristomal skin which is pink and stoma is prolapsed. Stoma is measuring 50 and red darren remains intact. Applied 1 piece Coloplast pouch / used no sting prep on pink skin and cut pouch at 50. Used juliet ring on pouch to contain liquidstool. Patient cut pouch, applied juliet ring, removed present pouch after emptying, cleaned skin and applied new pouch. no leaking and pouch dated. Recommendations:    Empty appliance when 1/3 full and PRN. Encourage patient/family to notify nurse and  assist w/ pouch emptying to promote self-care. Change appliance twice weekly and immediately with leaking to avoid skin breakdown. DO NOT REINFORCE LEAKS! Ostomy supplies located on [address] and ICU. Supplies left in room.     Minimize layers of linen/pads under patient to optimize support surface.    Turn/reposition approximately every 2 hours and offload heels. Manage incontinence / promote continence: Hydra Guard Barrier Cream to buttocks and sacrum daily and as needed with incontinence care.   Discussed above plan with patient and RN:Alisson.      Transition of Care: Plan to follow as needed with ostmy teaching while admitted to hospital.      Patient may need 2 piece pouching system if 1 piece with juliet leaks. Will monitor. 2 piece maybe easier with darren removal and prolapsed stoma.  To monitor.      Ambar TEJEDAN RN  Wound Care Department  Office: [phone]  Pager: [phone]

## 2020-09-28 NOTE — PROGRESS NOTES
OLIVIA- Home with [id number]    CM still following. This pt will go home with [id number].  Abby Hernandez

## 2020-09-28 NOTE — PROGRESS NOTES
10:43 AM  Ro YAN notified of infectious disease consult via perfect serve. 12:26 PM  Patient complained of pain when peripheral IV flushed, but patient declined having IV removed at this time. 4:00 PM  Patient ambulated in halls with PT, emptying ostomy independently. 8:37 PM  Bedside shift change report given to Paty Armas (oncoming nurse) by Ritu Atkins RN (offgoing nurse). Report included the following information SBAR, Kardex and MAR.

## 2020-09-28 NOTE — CONSULTS
Infectious Disease Consult Note    Reason for Consult: Persistent leukocytosis, postop resection of sigmoid colon mass, hysterectomy, right ureterectomy on 9/17/2020  Date of Consultation: September 28, 2020  Date of Admission: 9/11/2020  Referring Physician: Dr Jayson Franklin       HPI:      Ms Magui Saenz is a 28-year-old lady with Spaulding's esophagus who presented on 9/11/2020 with abdominal pain. She had a sigmoid biopsy done on 9/14/2020 showing an infiltrative adenocarcinoma. She went to the operating room on 9/17/2020 for sigmoid colon mass resection along with total abdominal hysterectomy, left salpingo-oophorectomy, right distal ureterectomy. OR notes indicate she had right ureteral reimplant with psoas hitch and placement of right ureteral stent. She has had persistent leukocytosis. Blood cultures have been negative. She does have 1 VALENTINE drain culture positive for Candida glabrata on 9/20/2020. She has been started on IV Zosyn empirically since 9/20/2020. Fluconazole was later added on 9/26/2020. She does not endorse any naty symptoms today. . She says that the left VALENTINE drain was removed and she does have some serous leakage from that spot. She denies any nausea other than when she brushes her teeth. She denies any vomiting. She has had good ostomy output. She says she is able to eat but does not like the food here. She denies any night sweats. Prior to admission denies any significant weight loss. Says she thinks all of this started in February when she \" bad andrews\". She denies a cough or shortness of breath. She has a Hogan catheter in place    From chart review, SARS-CoV-2 was not detected on 9/11/2020. CEA was 7.4, CA-19-9 was 4. Ca1 25 was 11    She has had a persistent leukocytosis ranging 15-07 with neutrophilic predominance. She also has thrombocytosis, anemia. Renal function is not elevated. LFTs are elevated with normal bilirubin.   Latest albumin is 1.8    Past Medical History:  Past Medical History:   Diagnosis Date    GERD (gastroesophageal reflux disease)     Ill-defined condition     Spaulding's esophagus         Surgical History:  Past Surgical History:   Procedure Laterality Date    COLONOSCOPY Left 9/14/2020    [facility]adia [facility] at [facility] ENDOSCOPY    HX APPENDECTOMY  age 17    HX COLONOSCOPY  circa 2010    No neoplasms.  HX ENDOSCOPY  03/13/2017    Dr. Twila Robertson HX ENDOSCOPY  02/13/2020    Dr. Eusebio Tai oopherectomy for treatment of benign mass. Family History:   Family History   Problem Relation Age of Onset   [facility] Cancer Mother     Heart Disease Father     Diabetes Brother          Social History:     · Living Situation: At home  · Tobacco: Denied  · Alcohol: Denied  · Illicit Drugs: Denied  · Sexual History: Denied  · Travel History denied  · Exposures: Has 1 cat at home    Allergies:  No Known Allergies      Review of Systems:    Attempted 10 point review of systems with patient  Pertinent positives in HPI   all others negative      Medications:  No current facility-administered medications on file prior to encounter. Current Outpatient Medications on File Prior to Encounter   Medication Sig Dispense Refill    denosumab (PROLIA) 60 mg/mL injection 60 mg by SubCUTAneous route.  lansoprazole (PREVACID) 30 mg capsule Take 30 mg by mouth Daily (before breakfast).  multivitamin (ONE A DAY) tablet Take 1 Tab by mouth daily.  ascorbic acid, vitamin C, (VITAMIN C) 500 mg tablet Take 500 mg by mouth.  cholecalciferol (VITAMIN D3) 1,000 unit cap Take 1,000 Units by mouth daily.  Omega-3 Fatty Acids (FISH OIL) 500 mg cap Take  by mouth.              Physical Exam:    Vitals:   Patient Vitals for the past 24 hrs:   Temp Pulse Resp BP SpO2   09/28/20 1308 97.5 °F (36.4 °C) 98 20 105/62 99 %   09/28/20 1236 -- (!) 116 -- 122/68 --   09/28/20 0923 97.7 °F (36.5 °C) (!) 108 20 (!) 104/57 99 % 09/28/20 0454 97.9 °F (36.6 °C) (!) 108 19 106/69 97 %   09/28/20 0345 97.9 °F (36.6 °C) (!) 115 20 (!) 97/55 97 %   09/27/20 2056 97.9 °F (36.6 °C) (!) 110 24 114/65 97 %   09/27/20 1544 98.1 °F (36.7 °C) (!) 104 23 107/63 99 %   ·   · GEN: NAD  · HEENT:  no scleral icterus,no thrush  · CV: S1, S2 heard regularly,   · Lungs: Clear to auscultation bilaterally  · Abdomen: soft, non distended, non tender,+ ostomy bag with stool, site of previous drain with serous leakage, no erythema around that site  · Genitourinary: +  pepper  · Extremities: no edema  · Neuro: Alert, oriented to self, place and situation, moves all extremities to commands, verbal   · Skin: no rash  · Psych: good affect, good eye contact, non tearful   · Lines: Right upper extremity PICC      Labs:   Recent Results (from the past 24 hour(s))   GLUCOSE, POC    Collection Time: 09/27/20  5:59 PM   Result Value Ref Range    Glucose (POC) 113 (H) 65 - 100 mg/dL    Performed by Abhay WORRELL(CON)    GLUCOSE, POC    Collection Time: 09/27/20 11:39 PM   Result Value Ref Range    Glucose (POC) 98 65 - 100 mg/dL    Performed by Moses Rachel    CBC WITH AUTOMATED DIFF    Collection Time: 09/28/20  3:19 AM   Result Value Ref Range    WBC 17.7 (H) 3.6 - 11.0 K/uL    RBC 3.64 (L) 3.80 - 5.20 M/uL    HGB 8.9 (L) 11.5 - 16.0 g/dL    HCT 28.4 (L) 35.0 - 47.0 %    MCV 78.0 (L) 80.0 - 99.0 FL    MCH 24.5 (L) 26.0 - 34.0 PG    MCHC 31.3 30.0 - 36.5 g/dL    PLATELET 931 (H) 706 - 400 K/uL    MPV 9.1 8.9 - 12.9 FL    NRBC 0.0 0  WBC    ABSOLUTE NRBC 0.00 0.00 - 0.01 K/uL    NEUTROPHILS 86 (H) 32 - 75 %    LYMPHOCYTES 7 (L) 12 - 49 %    MONOCYTES 3 (L) 5 - 13 %    EOSINOPHILS 2 0 - 7 %    BASOPHILS 1 0 - 1 %    IMMATURE GRANULOCYTES 1 (H) 0.0 - 0.5 %    ABS. NEUTROPHILS 15.2 (H) 1.8 - 8.0 K/UL    ABS. LYMPHOCYTES 1.2 0.8 - 3.5 K/UL    ABS. MONOCYTES 0.5 0.0 - 1.0 K/UL    ABS. EOSINOPHILS 0.4 0.0 - 0.4 K/UL    ABS. BASOPHILS 0.2 (H) 0.0 - 0.1 K/UL    ABS. IMM. GRANS. 0.2 (H) 0.00 - 0.04 K/UL    DF SMEAR SCANNED      RBC COMMENTS ANISOCYTOSIS  1+       METABOLIC PANEL, BASIC    Collection Time: 09/28/20  3:19 AM   Result Value Ref Range    Sodium 136 136 - 145 mmol/L    Potassium 4.1 3.5 - 5.1 mmol/L    Chloride 104 97 - 108 mmol/L    CO2 23 21 - 32 mmol/L    Anion gap 9 5 - 15 mmol/L    Glucose 89 65 - 100 mg/dL    BUN 10 6 - 20 MG/DL    Creatinine 0.36 (L) 0.55 - 1.02 MG/DL    BUN/Creatinine ratio 28 (H) 12 - 20      GFR est AA >60 >60 ml/min/1.73m2    GFR est non-AA >60 >60 ml/min/1.73m2    Calcium 7.3 (L) 8.5 - 10.1 MG/DL   PHOSPHORUS    Collection Time: 09/28/20  3:19 AM   Result Value Ref Range    Phosphorus 2.1 (L) 2.6 - 4.7 MG/DL       Microbiology Data:        Blood 9/20/20  Component  Value  Ref Range & Units  Status    Special Requests:  NO SPECIAL REQUESTS     Final    Culture result:  NO GROWTH 5 DAYS     Final    Result History         Blood:9/12/20  Component  Value  Ref Range & Units  Status    Special Requests:  NO SPECIAL REQUESTS     Final    Culture result:  NO GROWTH 5 DAYS     Final    Result History          Body fluid VALENTINE drain 9/20/20  Component  Value  Ref Range & Units  Status    Special Requests:  FROM VALENTINE DRAIN   Final    GRAM STAIN  4+ WBCS SEEN     Final    GRAM STAIN  NO ORGANISMS SEEN     Final    Culture result: Abnormal        Final    ABBI GLABRATA ISOLATED FROM THIO BROTH ONLY    Culture result:    Final    NO GROWTH ON SOLID MEDIA 4 DAYS        Urine 9/11/20        Component  Value  Ref Range & Units  Status    Special Requests:  NO SPECIAL REQUESTS     Final    Rolette Count  [id number]   COLONIES/mL     Final    Culture result:      Final    MIXED UROGENITAL DILCIA ISOLATED    Result History         Pathology Results:  9/17/20  1.  Sigmoid colon and proximal rectum, uterus, left fallopian tube and ovary and right ureteral segment, low anterior resection:   Sigmoid colon and proximal rectum:   Invasive adenocarcinoma (see synoptic report and comment). Metastatic adenocarcinoma in one of sixteen lymph nodes (1/16). Uterus:   Invasive adenocarcinoma extending from adhesed colon into uterine serosa. Endometrial lining shows mucosal atrophy. Left ovary:   Benign ovary with serosal inclusion cysts. Left fallopian tube:   Benign fallopian tube. Right ureteral segment:   Benign segment of ureter densely adhesed to colon. Nodule near right uterine cornu:   Nodular portion of benign smooth muscle consistent with leiomyoma with parenchymal hemorrhage. COLON AND RECTUM: Resection   SPECIMEN   Procedure: Low anterior resection   TUMOR   Tumor Site: Sigmoid colon       9/14/20  1. Sigmoid Mass, Biopsy:   Infiltrating adenocarcinoma, most consistent with colon (See comment)       Imaging:   CT A/P W contrast 9/22/20  FINDINGS:   LOWER THORAX: No significant abnormality in the incidentally imaged lower chest.  LIVER: No mass. BILIARY TREE: Gallbladder is within normal limits. CBD is not dilated. SPLEEN: within normal limits. PANCREAS: No mass or ductal dilatation. ADRENALS: Unremarkable. KIDNEYS: Right-sided nephroureteral stent in place. STOMACH: Distended  SMALL BOWEL: Multiple dilated loops of small bowel  COLON: Status post low anterior resection with creation of a loop ileostomy. APPENDIX: Not visualized  PERITONEUM: Small locules of free air anterior to the liver. This may be  postsurgical. Large surgical drain in the pelvis. RETROPERITONEUM: No lymphadenopathy or aortic aneurysm. REPRODUCTIVE ORGANS: Surgically absent  URINARY BLADDER: Decompressed with Hogan catheter in place  BONES: No destructive bone lesion. ABDOMINAL WALL: Postsurgical changes  ADDITIONAL COMMENTS: N/A     IMPRESSION  IMPRESSION:  1. Status post low anterior resection of a previously noted pelvic mass.     2.  Multiple dilated loops of small bowel. There is moderate fluid and air  within the colon.  This may represent a severe ileus versus high-grade partial  small bowel obstruction.     3. No definite abscess or significant fluid is noted within the abdomen and  Pelvis. CT A/P 9/10/20  FINDINGS:      LIVER: No mass or biliary dilatation. GALLBLADDER: No calcified gallstone  SPLEEN: Unremarkable  PANCREAS: No mass or ductal dilatation. ADRENALS: Unremarkable. KIDNEYS/URETERS: Symmetric nephrograms with a couple low-density left renal  lesions too small to characterize. No evidence for enhancing renal mass. Moderate right hydronephrosis and hydroureter down into the pelvis. Minimal  prominence of the left collecting system. PERITONEUM: Borderline enlarged aortocaval and periaortic retroperitoneal lymph  nodes (series 3, image 46) of uncertain significance. No definite abdominal  lymphadenopathy. No ascites. COLON: Large irregular mass in the lower pelvis which may be arising from the  sigmoid colon measuring roughly 9.0 x 8.1 cm. There is adjacent moderate  irregular colon wall thickening. This appears to be separate from the uterus  most apparent on the sagittal view. There is focal gas in the presacral region  measuring 4.8 x 2.6 cm (series 3, image 64) which is unclear if this is  intraluminal or contained extraluminal collection. APPENDIX: Not clearly seen  SMALL BOWEL: No dilatation or wall thickening. STOMACH: Unremarkable. PELVIS: No pelvic lymphadenopathy. Small amount of pelvic free fluid. BONES: No destructive bone lesion. VISUALIZED THORAX: Stable 6 mm right lower lobe lung nodule (series 3, image 11)  ADDITIONAL COMMENTS: N/A     IMPRESSION  IMPRESSION:     Large, irregular pelvic mass measuring approximately 9 cm likely representing  neoplasm arising from the sigmoid colon. Adnexal neoplasm is a possibility. This  does appear to be separate from the uterus. Small amount of pelvic free fluid. Associated colonic bowel wall thickening.  There is focal gas in the upper  presacral region which is unclear if this is intraluminal or contained  extraluminal collection.     No definite distant metastatic disease. Borderline enlarged retroperitoneal  lymph nodes. 6 mm right lower lobe lung nodule. Mass effect from the pelvic mass  causing moderate right and minimal left hydronephrosis. Ct chest 9/15/20   IMPRESSION:     1. 7.5 mm left lower lobe pulmonary nodule. 2. Small right pleural effusion. 3. Mild right basilar atelectasis. Assessment / Plan:       Ms Sejal Castano is a 71-year-old lady with Spaulding's esophagus who presented on 9/11/2020 with abdominal pain. She had a sigmoid biopsy done on 9/14/2020 showing an infiltrative adenocarcinoma. She went to the operating room on 9/17/2020 for sigmoid colon mass resection along with total abdominal hysterectomy, left salpingo-oophorectomy, right distal ureterectomy. OR notes indicate she had right ureteral reimplant with psoas hitch and placement of right ureteral stent. She has had persistent leukocytosis. Blood cultures have been negative. She does have 1 VALENTINE drain culture positive for Candida glabrata on 9/20/2020. She has been started on IV Zosyn empirically since 9/20/2020. Fluconazole was later added on 9/26/2020.    1) sigmoid cancer post op 9/11/20 wt sigmoid colon mass resection along with total abdominal hysterectomy, left salpingo-oophorectomy, right distal ureterectomy. Has ureteral stent   Valentine drain wt C glabrata    Even though cultures from drain can be colonized, recommend treating given persistent leukocytosis and high risk for infection   Stop fluconazole as some strange glabrata can be resistant  Start IV anidulafungin  On empiric IV Zosyn since 9/20/2020  Discussed with micro lab to add susceptibility testing for Candida glabrata  Check fungal blood cultures  Check peripheral smear  No other obvious  localizing signs of infection at this time.   She has some drainage from the previous VALENTINE drain site with serous fluid, no surrounding cellulitis on exam  High risk for C. Difficile infection and if concerns with much more liquidy stool, recommend testing. Stool does not appear to be much liquidy today on my exam  Will consider MRSA coverage if worsening given indwelling stent. Will hold off for now and assess response to change in antifungal first   Monitoring PICC line, if febrile or worsening may need to replace  Ureteral stent in place at risk for infection    2) Spaulding's esophagus history    3) sigmoid adenocarcinoma status post resection  Pathology wt \" Invasive adenocarcinoma   Metastatic adenocarcinoma in one of sixteen lymph nodes (1/16). Uterus:  Invasive adenocarcinoma extending from adhesed colon into uterine serosa. Left ovary:   Benign ovary with serosal inclusion cysts. Left fallopian tube:   Benign fallopian tube. Right ureteral segment:   Benign segment of ureter densely adhesed to colon. Nodule near right uterine cornu:   Nodular portion of benign smooth muscle consistent with leiomyoma with parenchymal hemorrhage. \"    Plans per primary team    4)DVT px       Thank for the opportunity to participate in the care of this patient. Please contact with questions or concerns.      Zaid Edwards,   4:07 PM

## 2020-09-28 NOTE — ADVANCED PRACTICE NURSE
Patient will be going home with pepper (appointment scheduled for cystogram, voiding trial and removal on 10/6. She will need education on how to empty bag and leg bag teaching (if patient desires). [facility] could also check in with patient on pepper care.

## 2020-09-28 NOTE — PROGRESS NOTES
Problem: Mobility Impaired (Adult and Pediatric)  Goal: *Acute Goals and Plan of Care (Insert Text)  Description: FUNCTIONAL STATUS PRIOR TO ADMISSION: Patient was independent and active without use of DME. Driving. No falls. HOME SUPPORT PRIOR TO ADMISSION: The patient lived alone and has friends to provide assistance at d/c as needed. Physical Therapy Goals  Initiated 9/18/2020  1. Patient will move from supine to sit and sit to supine , scoot up and down, and roll side to side in bed with modified independence within 7 day(s). 2.  Patient will transfer from bed to chair and chair to bed with modified independence using the least restrictive device within 7 day(s). 3.  Patient will perform sit to stand with modified independence within 7 day(s). 4.  Patient will ambulate with modified independence for 150 feet with the least restrictive device within 7 day(s). 5.  Patient will ascend/descend 12 stairs with bilateral handrail(s) with modified independence within 7 day(s). 6.  Patient will improve Tinetti score by 4-5 points within 7 days. Outcome: Progressing Towards Goal    PHYSICAL THERAPY TREATMENT: WEEKLY REASSESSMENT  Patient: Mehdi Chanel (30 y.o. female)  Date: 9/28/2020  Primary Diagnosis: Pelvic mass [R19.00]  Hydronephrosis [N13.30]  Procedure(s) (LRB):  Open low anterior resection, mobilization of splenic flexture, coloproctostomy, creation of loop ileostomy (E R A S) (N/A)  TOTAL ABDOMINAL HYSTERECTOMY, LEFT SALPINGO OOPHORECTOMY (N/A)  Cystoscopy, insertion and removal of left Ureteral catheter, simple pepper, distal right ureterectomy, right ureteral reimplant, right stent placement (Left) 11 Days Post-Op   Precautions:   Fall(ERAS)      ASSESSMENT  Patient continues with skilled PT services and is progressing towards goals. Pt tolerates mobility in room without UE support, able to ambulate back and forth from bathroom.  Pt tolerates gait in the hallway and noted to have significant draining from the abdominal drain wound. Pt dressing falling off, redressed with 4x4 gauze and medipore. Pt able to ambulate 330ft with intermittent assist with IV pole. Patient demos increased stability, expect 1-2 more sessions to progress to home DC     Patient's progression toward goals since last assessment: ambulates in hallway, continues to require a minimal amount of cues and sba    Current Level of Function Impacting Discharge (mobility/balance): mild instability, cues for safety    Functional Outcome Measure: The patient scored 22/28 on the tinetti outcome measure which is indicative of moderate risk of fallin. Other factors to consider for discharge: mild cues and stand by assist         PLAN :  Goals have been updated based on progression since last assessment. Patient continues to benefit from skilled intervention to address the above impairments. Recommendations and Planned Interventions: bed mobility training, transfer training, gait training, therapeutic exercises, neuromuscular re-education, patient and family training/education and therapeutic activities      Frequency/Duration: Patient will be followed by physical therapy:  5 times a week to address goals. Recommendation for discharge: (in order for the patient to meet his/her long term goals)  Physical therapy at least 2 days/week in the home     This discharge recommendation:  Has been made in collaboration with the attending provider and/or case management    IF patient discharges home will need the following DME: to be determined (TBD)         SUBJECTIVE:   Patient stated im getting gbetter.     OBJECTIVE DATA SUMMARY:   HISTORY:    Past Medical History:   Diagnosis Date    GERD (gastroesophageal reflux disease)     Ill-defined condition     Spaulding's esophagus     Past Surgical History:   Procedure Laterality Date    COLONOSCOPY Left 9/14/2020    COLONOSCOPY performed by Elisa Ramirez MD at [address] HX APPENDECTOMY  age 17    HX COLONOSCOPY  circa 2010    No neoplasms.  HX ENDOSCOPY  03/13/2017    Dr. Jazmyn Hood HX ENDOSCOPY  02/13/2020    Dr. Mendez Burn oopherectomy for treatment of benign mass. Personal factors and/or comorbidities impacting plan of care:     Home Situation  Home Environment: Private residence  # Steps to Enter: 4  Rails to Enter: Yes  Hand Rails : Bilateral  One/Two Story Residence: Two story  # of Interior Steps: 12  Height of Each Step (in): 10 inches  Interior Rails: Both  Lift Chair Available: No  Living Alone: Yes  Support Systems: Friends \ neighbors  Patient Expects to be Discharged to[de-identified] Private residence  Current DME Used/Available at Home: None    EXAMINATION/PRESENTATION/DECISION MAKING:   Critical Behavior:  Neurologic State: Alert, Appropriate for age  Orientation Level: Oriented X4  Cognition: Appropriate decision making     Hearing:   Auditory  Auditory Impairment: None  Skin:  Intact x incision  Edema: none  Range Of Motion:  AROM: Generally decreased, functional           PROM: Generally decreased, functional           Strength:    Strength: Generally decreased, functional                    Tone & Sensation:   Tone: Normal                              Coordination:  Coordination: Generally decreased, functional  Vision:      Functional Mobility:  Bed Mobility:  Rolling: Modified independent;Supervision  Supine to Sit: Modified independent;Supervision  Sit to Supine: Modified independent;Supervision  Scooting: Supervision;Modified independent  Transfers:  Sit to Stand: Supervision;Modified independent  Stand to Sit: Supervision;Modified independent  Stand Pivot Transfers: Supervision;Modified independent                    Balance:   Sitting: Intact  Standing: Intact  Standing - Static: Good  Standing - Dynamic : Good  Ambulation/Gait Training:  Distance (ft): 330 Feet (ft)  Assistive Device: Gait belt  Ambulation - Level of Assistance: Supervision Gait Description (WDL): Exceptions to WDL  Gait Abnormalities: Decreased step clearance        Base of Support: Narrowed     Speed/Leila: Shuffled;Fluctuations  Step Length: Left shortened;Right shortened     Functional Measure:  Tinetti test:    Sitting Balance: 1  Arises: 1  Attempts to Rise: 2  Immediate Standing Balance: 2  Standing Balance: 2  Nudged: 1  Eyes Closed: 1  Turn 360 Degrees - Continuous/Discontinuous: 1  Turn 360 Degrees - Steady/Unsteady: 1  Sitting Down: 1  Balance Score: 13 Balance total score  Indication of Gait: 1  R Step Length/Height: 1  L Step Length/Height: 1  R Foot Clearance: 1  L Foot Clearance: 1  Step Symmetry: 1  Step Continuity: 1  Path: 1  Trunk: 0  Walking Time: 1  Gait Score: 9 Gait total score  Total Score: 22/28 Overall total score         Tinetti Tool Score Risk of Falls  <19 = High Fall Risk  19-24 = Moderate Fall Risk  25-28 = Low Fall Risk  Tinetti ME. Performance-Oriented Assessment of Mobility Problems in Elderly Patients. Parra 66; [id number]. (Scoring Description: PT Bulletin Feb. 10, 1993)    Older adults: Jarocho Dela Cruz et al, 2009; n = 1000 [facility] elderly evaluated with ABC, AGUILA, ADL, and IADL)  · Mean AGUILA score for males aged 69-68 years = 26.21(3.40)  · Mean AGUILA score for females age 69-68 years = 25.16(4.30)  · Mean AGUILA score for males over 80 years = 23.29(6.02)  · Mean AGUILA score for females over 80 years = 17.20(8.32)       Activity Tolerance:   Fair and requires rest breaks  Please refer to the flowsheet for vital signs taken during this treatment. After treatment patient left in no apparent distress:   Supine in bed and Call bell within reach    COMMUNICATION/EDUCATION:   The patients plan of care was discussed with: Registered nurse and Case management. Fall prevention education was provided and the patient/caregiver indicated understanding. and Patient/family have participated as able in goal setting and plan of care.     Thank you for this referral.  Kelvin Worrell, PT   Time Calculation: 24 mins

## 2020-09-28 NOTE — PROGRESS NOTES
[address] visit. Mrs. Tatum was getting ready to ambulate. She vrified that her friend brought her communion today. Will continue to prayer for her.     RACH Boyd, RN, ACSW, LCSW   Page:  [phone]

## 2020-09-28 NOTE — PROGRESS NOTES
General Daily Progress Note    Admission Date: 9/11/2020  Hospital Day 18  Post-Op Day 11  Subjective: Had one episode of emesis this morning. Tolerated solid food yesterday. Pain mild. Objective:     Patient Vitals for the past 24 hrs:   BP Temp Pulse Resp SpO2 Weight   09/28/20 0608 -- -- -- -- -- 57.7 kg (127 lb 2.9 oz)   09/28/20 0454 106/69 97.9 °F (36.6 °C) (!) 108 19 97 % --   09/28/20 0345 (!) 97/55 97.9 °F (36.6 °C) (!) 115 20 97 % --   09/27/20 2056 114/65 97.9 °F (36.6 °C) (!) 110 24 97 % --   09/27/20 1544 107/63 98.1 °F (36.7 °C) (!) 104 23 99 % --   09/27/20 1218 103/63 98 °F (36.7 °C) (!) 105 27 98 % --     No intake/output data recorded. 09/26 1901 - 09/28 0700  In: 150 [P.O.:50; I.V.:100]  Out: 4200 [Urine:2150]    Physical Examination:  General Appearance - No distress. Heart - Mild sinus tachycardia. Abdomen - More distended than yesterday. Minimal tenderness. Ileostomy prolapsing but viable. Midline incision clean, dry, intact, and without associated erythema. Drain site wound with small amount of serous drainage.  - Hogan catheter draining normal-appearing urine. Neurological - Alert and oriented.             Data Review   Recent Results (from the past 24 hour(s))   GLUCOSE, POC    Collection Time: 09/27/20 11:33 AM   Result Value Ref Range    Glucose (POC) 106 (H) 65 - 100 mg/dL    Performed by [employer](CON)    GLUCOSE, POC    Collection Time: 09/27/20  5:59 PM   Result Value Ref Range    Glucose (POC) 113 (H) 65 - 100 mg/dL    Performed by [employer](CON)    GLUCOSE, POC    Collection Time: 09/27/20 11:39 PM   Result Value Ref Range    Glucose (POC) 98 65 - 100 mg/dL    Performed by Tomasa Pompa    CBC WITH AUTOMATED DIFF    Collection Time: 09/28/20  3:19 AM   Result Value Ref Range    WBC 17.7 (H) 3.6 - 11.0 K/uL    RBC 3.64 (L) 3.80 - 5.20 M/uL    HGB 8.9 (L) 11.5 - 16.0 g/dL    HCT 28.4 (L) 35.0 - 47.0 %    MCV 78.0 (L) 80.0 - 99.0 FL    MCH 24.5 (L) 26.0 - 34.0 PG    MCHC 31.3 30.0 - 36.5 g/dL    PLATELET 683 (H) 273 - 400 K/uL    MPV 9.1 8.9 - 12.9 FL    NRBC 0.0 0  WBC    ABSOLUTE NRBC 0.00 0.00 - 0.01 K/uL    NEUTROPHILS 86 (H) 32 - 75 %    LYMPHOCYTES 7 (L) 12 - 49 %    MONOCYTES 3 (L) 5 - 13 %    EOSINOPHILS 2 0 - 7 %    BASOPHILS 1 0 - 1 %    IMMATURE GRANULOCYTES 1 (H) 0.0 - 0.5 %    ABS. NEUTROPHILS 15.2 (H) 1.8 - 8.0 K/UL    ABS. LYMPHOCYTES 1.2 0.8 - 3.5 K/UL    ABS. MONOCYTES 0.5 0.0 - 1.0 K/UL    ABS. EOSINOPHILS 0.4 0.0 - 0.4 K/UL    ABS. BASOPHILS 0.2 (H) 0.0 - 0.1 K/UL    ABS. IMM. GRANS. 0.2 (H) 0.00 - 0.04 K/UL    DF SMEAR SCANNED      RBC COMMENTS ANISOCYTOSIS  1+       METABOLIC PANEL, BASIC    Collection Time: 09/28/20  3:19 AM   Result Value Ref Range    Sodium 136 136 - 145 mmol/L    Potassium 4.1 3.5 - 5.1 mmol/L    Chloride 104 97 - 108 mmol/L    CO2 23 21 - 32 mmol/L    Anion gap 9 5 - 15 mmol/L    Glucose 89 65 - 100 mg/dL    BUN 10 6 - 20 MG/DL    Creatinine 0.36 (L) 0.55 - 1.02 MG/DL    BUN/Creatinine ratio 28 (H) 12 - 20      GFR est AA >60 >60 ml/min/1.73m2    GFR est non-AA >60 >60 ml/min/1.73m2    Calcium 7.3 (L) 8.5 - 10.1 MG/DL   PHOSPHORUS    Collection Time: 09/28/20  3:19 AM   Result Value Ref Range    Phosphorus 2.1 (L) 2.6 - 4.7 MG/DL     CT scan of Abdomen and Pelvis (9/22/2020):  No apparent abscess.  Scant intraperitoneal gas consistent with POD#5.  Ileus versus distal small bowel obstruction (at the ileostomy). Assessment:     Principal Problem:    Malignant neoplasm of sigmoid colon ([address] (9/14/2020)    Active Problems:    Hydronephrosis (9/11/2020)      Pelvic mass (9/11/2020)      Severe protein-calorie malnutrition ([facility] [address] (9/16/2020)      Anemia (9/16/2020)      Thrombocytopenia ([facility] [address] (9/19/2020)        11 Days Post-Op s/p Low anterior resection, mobilization of the splenic flexure, coloproctostomy, and creation of loop ileostomy.   [Total abdominal hysterectomy and left salpingo-oophorectomy performed by Poonam Tierney Vini Campbell MD]  [Cystoscopy and placement of left ureteral catheter performed by Micaela Gonzalez MD]  [Distal right ureterectomy performed by David Au III, MD]  [Right ureteral re-implant with psoas hitch and placement of right ureteral stent performed by Eliazar Galvin MD]     Transferred to [id number] (stepdown) from recovery room. Transferred to remote telemetry on 9/24/2020.     Tachycardia tolerable. No hypotension. Hemoglobin low but acceptable. Thrombocytopenia resolved. Prn metoprolol begun on 9/20/2020 to reduce heart rate, but not being given frequently.     Urine output adequate. Creatinine within normal limits. Drain fluid creatinine was similar to serum level on 9/24/2020.  There was no evidence of a urine leak.  The VALENTINE drain was removed on 9/25/2020. Hogan catheter in place and will need to remain for 14 days post-op (per Dr. Garfield Sam).     Leukocytosis today (GPT 67.5 K/uL) is worse than it was yesterday. No apparent wound infection. No pneumonia. No UTI. Blood and drain fluid culture results are final.  Blood was negative.  Drain fluid had yeast in the broth only. Zosyn was begun on 9/20/2020. Oral Diflucan was begun on 9/26/2020.        Malnutrition was treated with TPN, which was discontinued yesterday. GI function has returned, but patient still not completely tolerating diet. Ileus versus obstruction, possibly at the level of the ileostomy, appears to have resolved. Ileostomy prolapse was first noticed in the evening on 9/21/2020, and it could be interfering with bowel function.     Hypophosphatemia slightly improved.     Receiving physical therapy.     Receiving stomy education. Plan:   Continue low fiber diet with caution. Continue Ensure Enlive. Replace phosphorus.     Continue Zosyn. Continue Diflucan.   Consult Infectious Diseases regarding leukocytosis.     Continue Lovenox for additional DVT prophylaxis.     Hogan catheter to remain until POD#14.     Ambulate. Physical therapy. Ostomy education. Incentive spirometer.

## 2020-09-29 LAB
ANION GAP SERPL CALC-SCNC: 6 MMOL/L (ref 5–15)
BASOPHILS # BLD: 0.1 K/UL (ref 0–0.1)
BASOPHILS NFR BLD: 1 % (ref 0–1)
BUN SERPL-MCNC: 10 MG/DL (ref 6–20)
BUN/CREAT SERPL: 27 (ref 12–20)
CALCIUM SERPL-MCNC: 7.7 MG/DL (ref 8.5–10.1)
CHLORIDE SERPL-SCNC: 103 MMOL/L (ref 97–108)
CO2 SERPL-SCNC: 26 MMOL/L (ref 21–32)
CREAT SERPL-MCNC: 0.37 MG/DL (ref 0.55–1.02)
DIFFERENTIAL METHOD BLD: ABNORMAL
EOSINOPHIL # BLD: 0.5 K/UL (ref 0–0.4)
EOSINOPHIL NFR BLD: 5 % (ref 0–7)
ERYTHROCYTE [DISTWIDTH] IN BLOOD BY AUTOMATED COUNT: ABNORMAL % (ref 11.5–14.5)
GLUCOSE SERPL-MCNC: 78 MG/DL (ref 65–100)
HCT VFR BLD AUTO: 27.8 % (ref 35–47)
HGB BLD-MCNC: 8.5 G/DL (ref 11.5–16)
IMM GRANULOCYTES # BLD AUTO: 0.2 K/UL (ref 0–0.04)
IMM GRANULOCYTES NFR BLD AUTO: 2 % (ref 0–0.5)
LYMPHOCYTES # BLD: 1.5 K/UL (ref 0.8–3.5)
LYMPHOCYTES NFR BLD: 15 % (ref 12–49)
MAGNESIUM SERPL-MCNC: 1.6 MG/DL (ref 1.6–2.4)
MCH RBC QN AUTO: 24.4 PG (ref 26–34)
MCHC RBC AUTO-ENTMCNC: 30.6 G/DL (ref 30–36.5)
MCV RBC AUTO: 79.9 FL (ref 80–99)
MONOCYTES # BLD: 0.5 K/UL (ref 0–1)
MONOCYTES NFR BLD: 5 % (ref 5–13)
NEUTS SEG # BLD: 7 K/UL (ref 1.8–8)
NEUTS SEG NFR BLD: 72 % (ref 32–75)
NRBC # BLD: 0 K/UL (ref 0–0.01)
NRBC BLD-RTO: 0 PER 100 WBC
PERIPHERAL SMEAR,PSM: NORMAL
PHOSPHATE SERPL-MCNC: 2.2 MG/DL (ref 2.6–4.7)
PLATELET # BLD AUTO: 612 K/UL (ref 150–400)
PMV BLD AUTO: 9.1 FL (ref 8.9–12.9)
POTASSIUM SERPL-SCNC: 3.7 MMOL/L (ref 3.5–5.1)
RBC # BLD AUTO: 3.48 M/UL (ref 3.8–5.2)
RBC MORPH BLD: ABNORMAL
SODIUM SERPL-SCNC: 135 MMOL/L (ref 136–145)
WBC # BLD AUTO: 9.8 K/UL (ref 3.6–11)

## 2020-09-29 PROCEDURE — 74011250637 HC RX REV CODE- 250/637: Performed by: COLON & RECTAL SURGERY

## 2020-09-29 PROCEDURE — 80048 BASIC METABOLIC PNL TOTAL CA: CPT

## 2020-09-29 PROCEDURE — 99232 SBSQ HOSP IP/OBS MODERATE 35: CPT | Performed by: INTERNAL MEDICINE

## 2020-09-29 PROCEDURE — 74011250636 HC RX REV CODE- 250/636: Performed by: COLON & RECTAL SURGERY

## 2020-09-29 PROCEDURE — 83735 ASSAY OF MAGNESIUM: CPT

## 2020-09-29 PROCEDURE — 74011000258 HC RX REV CODE- 258: Performed by: COLON & RECTAL SURGERY

## 2020-09-29 PROCEDURE — 74011000250 HC RX REV CODE- 250: Performed by: COLON & RECTAL SURGERY

## 2020-09-29 PROCEDURE — 65270000029 HC RM PRIVATE

## 2020-09-29 PROCEDURE — 74011250636 HC RX REV CODE- 250/636: Performed by: INTERNAL MEDICINE

## 2020-09-29 PROCEDURE — 84100 ASSAY OF PHOSPHORUS: CPT

## 2020-09-29 PROCEDURE — 85025 COMPLETE CBC W/AUTO DIFF WBC: CPT

## 2020-09-29 PROCEDURE — 74011000258 HC RX REV CODE- 258: Performed by: INTERNAL MEDICINE

## 2020-09-29 PROCEDURE — 36415 COLL VENOUS BLD VENIPUNCTURE: CPT

## 2020-09-29 RX ORDER — METOPROLOL TARTRATE 25 MG/1
12.5 TABLET, FILM COATED ORAL 2 TIMES DAILY
Status: DISCONTINUED | OUTPATIENT
Start: 2020-09-29 | End: 2020-09-30

## 2020-09-29 RX ADMIN — DIBASIC SODIUM PHOSPHATE, MONOBASIC POTASSIUM PHOSPHATE AND MONOBASIC SODIUM PHOSPHATE 1 TABLET: 852; 155; 130 TABLET ORAL at 12:40

## 2020-09-29 RX ADMIN — Medication 10 ML: at 22:00

## 2020-09-29 RX ADMIN — METOPROLOL TARTRATE 12.5 MG: 25 TABLET, FILM COATED ORAL at 19:37

## 2020-09-29 RX ADMIN — PIPERACILLIN AND TAZOBACTAM 3.38 G: 3; .375 INJECTION, POWDER, LYOPHILIZED, FOR SOLUTION INTRAVENOUS at 10:48

## 2020-09-29 RX ADMIN — Medication 10 ML: at 04:09

## 2020-09-29 RX ADMIN — PANTOPRAZOLE SODIUM 40 MG: 40 TABLET, DELAYED RELEASE ORAL at 06:32

## 2020-09-29 RX ADMIN — DIBASIC SODIUM PHOSPHATE, MONOBASIC POTASSIUM PHOSPHATE AND MONOBASIC SODIUM PHOSPHATE 1 TABLET: 852; 155; 130 TABLET ORAL at 22:09

## 2020-09-29 RX ADMIN — SODIUM CHLORIDE 100 MG: 9 INJECTION, SOLUTION INTRAVENOUS at 12:41

## 2020-09-29 RX ADMIN — PIPERACILLIN AND TAZOBACTAM 3.38 G: 3; .375 INJECTION, POWDER, LYOPHILIZED, FOR SOLUTION INTRAVENOUS at 04:08

## 2020-09-29 RX ADMIN — METOPROLOL TARTRATE 2.5 MG: 1 INJECTION, SOLUTION INTRAVENOUS at 17:33

## 2020-09-29 RX ADMIN — Medication 10 ML: at 17:13

## 2020-09-29 RX ADMIN — DIBASIC SODIUM PHOSPHATE, MONOBASIC POTASSIUM PHOSPHATE AND MONOBASIC SODIUM PHOSPHATE 1 TABLET: 852; 155; 130 TABLET ORAL at 17:13

## 2020-09-29 RX ADMIN — ENOXAPARIN SODIUM 40 MG: 40 INJECTION SUBCUTANEOUS at 10:48

## 2020-09-29 RX ADMIN — DIBASIC SODIUM PHOSPHATE, MONOBASIC POTASSIUM PHOSPHATE AND MONOBASIC SODIUM PHOSPHATE 1 TABLET: 852; 155; 130 TABLET ORAL at 10:47

## 2020-09-29 NOTE — PROGRESS NOTES
Infectious Disease Progress Note          HPI:      Ms Julisa Andrade seen  Says feeling pamela  Has some serous drainage from pervious hal site  Denied cough, fever, chills,   tolerating antimicrobials      Medications:  IV Zosyn  Iv Anidulafungin     Physical Exam:    Vitals:   Patient Vitals for the past 24 hrs:   Temp Pulse Resp BP SpO2   09/29/20 1151 97.5 °F (36.4 °C) (!) 118 21 108/72 98 %   09/29/20 0827 97.7 °F (36.5 °C) (!) 117 18 104/73 100 %   09/29/20 0628 97.7 °F (36.5 °C) 97 20 101/60 98 %   09/28/20 2107 98.3 °F (36.8 °C) (!) 101 16 100/64 97 %   09/28/20 1525 98.1 °F (36.7 °C) (!) 103 21 96/62 98 %   09/28/20 1308 97.5 °F (36.4 °C) 98 20 105/62 99 %     · GEN: NAD  · HEENT:  no scleral icterus,no thrush  · CV: S1, S2 heard regularly,   · Lungs: Clear to auscultation bilaterally  · Abdomen: soft, non distended, non tender,+ ostomy bag with stool, site of previous drain with serous leakage, no erythema around that site, incision site without open wounds or erythema around   · Genitourinary: +  pepper  · Extremities: no edema  · Neuro: Alert, oriented to self, place and situation, moves all extremities to commands, verbal   · Skin: no rash  · Psych: good affect, good eye contact, non tearful   · Lines: Right upper extremity PICC      Labs:   Recent Results (from the past 24 hour(s))   PERIPHERAL SMEAR    Collection Time: 09/28/20  7:17 PM   Result Value Ref Range    PERIPHERAL SMEAR        Pathologic examination results can be viewed in [facility] Care Chart Review under the Pathology tab.    CULTURE, BLOOD FOR FUNGUS    Collection Time: 09/28/20  7:17 PM    Specimen: Blood   Result Value Ref Range    Special Requests: HOLD 21 DAYS FOR FUNGUS      Culture result: NO GROWTH AFTER 9 HOURS     CBC WITH AUTOMATED DIFF    Collection Time: 09/29/20  4:08 AM   Result Value Ref Range    WBC 9.8 3.6 - 11.0 K/uL    RBC 3.48 (L) 3.80 - 5.20 M/uL    HGB 8.5 (L) 11.5 - 16.0 g/dL    HCT 27.8 (L) 35.0 - 47.0 %    MCV 79.9 (L) 80.0 - 99.0 FL    MCH 24.4 (L) 26.0 - 34.0 PG    MCHC 30.6 30.0 - 36.5 g/dL    RDW ABNORMAL 11.5 - 14.5 %    PLATELET 552 (H) 255 - 400 K/uL    MPV 9.1 8.9 - 12.9 FL    NRBC 0.0 0  WBC    ABSOLUTE NRBC 0.00 0.00 - 0.01 K/uL    NEUTROPHILS 72 32 - 75 %    LYMPHOCYTES 15 12 - 49 %    MONOCYTES 5 5 - 13 %    EOSINOPHILS 5 0 - 7 %    BASOPHILS 1 0 - 1 %    IMMATURE GRANULOCYTES 2 (H) 0.0 - 0.5 %    ABS. NEUTROPHILS 7.0 1.8 - 8.0 K/UL    ABS. LYMPHOCYTES 1.5 0.8 - 3.5 K/UL    ABS. MONOCYTES 0.5 0.0 - 1.0 K/UL    ABS. EOSINOPHILS 0.5 (H) 0.0 - 0.4 K/UL    ABS. BASOPHILS 0.1 0.0 - 0.1 K/UL    ABS. IMM.  GRANS. 0.2 (H) 0.00 - 0.04 K/UL    DF SMEAR SCANNED      RBC COMMENTS ANISOCYTOSIS  1+       METABOLIC PANEL, BASIC    Collection Time: 09/29/20  4:08 AM   Result Value Ref Range    Sodium 135 (L) 136 - 145 mmol/L    Potassium 3.7 3.5 - 5.1 mmol/L    Chloride 103 97 - 108 mmol/L    CO2 26 21 - 32 mmol/L    Anion gap 6 5 - 15 mmol/L    Glucose 78 65 - 100 mg/dL    BUN 10 6 - 20 MG/DL    Creatinine 0.37 (L) 0.55 - 1.02 MG/DL    BUN/Creatinine ratio 27 (H) 12 - 20      GFR est AA >60 >60 ml/min/1.73m2    GFR est non-AA >60 >60 ml/min/1.73m2    Calcium 7.7 (L) 8.5 - 10.1 MG/DL   MAGNESIUM    Collection Time: 09/29/20  4:08 AM   Result Value Ref Range    Magnesium 1.6 1.6 - 2.4 mg/dL   PHOSPHORUS    Collection Time: 09/29/20  4:08 AM   Result Value Ref Range    Phosphorus 2.2 (L) 2.6 - 4.7 MG/DL       Microbiology Data:        Blood 9/20/20  Component  Value  Ref Range & Units  Status    Special Requests:  NO SPECIAL REQUESTS     Final    Culture result:  NO GROWTH 5 DAYS     Final    Result History         Blood:9/12/20  Component  Value  Ref Range & Units  Status    Special Requests:  NO SPECIAL REQUESTS     Final    Culture result:  NO GROWTH 5 DAYS     Final    Result History          Body fluid VALENTINE drain 9/20/20  Component  Value  Ref Range & Units  Status    Special Requests:  FROM VALENTINE DRAIN   Final    GRAM STAIN  4+ WBCS SEEN     Final    GRAM STAIN  NO ORGANISMS SEEN     Final    Culture result: Abnormal        Final    ABBI GLABRATA ISOLATED FROM THIO BROTH ONLY    Culture result:    Final    NO GROWTH ON SOLID MEDIA 4 DAYS        Urine 9/11/20        Component  Value  Ref Range & Units  Status    Special Requests:  NO SPECIAL REQUESTS     Final    [place] Count  [id number]   COLONIES/mL     Final    Culture result:      Final    MIXED UROGENITAL DILCIA ISOLATED    Result History         Pathology Results:  9/17/20  1. Sigmoid colon and proximal rectum, uterus, left fallopian tube and ovary and right ureteral segment, low anterior resection:   Sigmoid colon and proximal rectum:   Invasive adenocarcinoma (see synoptic report and comment). Metastatic adenocarcinoma in one of sixteen lymph nodes (1/16). Uterus:   Invasive adenocarcinoma extending from adhesed colon into uterine serosa. Endometrial lining shows mucosal atrophy. Left ovary:   Benign ovary with serosal inclusion cysts. Left fallopian tube:   Benign fallopian tube. Right ureteral segment:   Benign segment of ureter densely adhesed to colon. Nodule near right uterine cornu:   Nodular portion of benign smooth muscle consistent with leiomyoma with parenchymal hemorrhage. COLON AND RECTUM: Resection   SPECIMEN   Procedure: Low anterior resection   TUMOR   Tumor Site: Sigmoid colon       9/14/20  1. Sigmoid Mass, Biopsy:   Infiltrating adenocarcinoma, most consistent with colon (See comment)       Imaging:   CT A/P W contrast 9/22/20  FINDINGS:   LOWER THORAX: No significant abnormality in the incidentally imaged lower chest.  LIVER: No mass. BILIARY TREE: Gallbladder is within normal limits. CBD is not dilated. SPLEEN: within normal limits. PANCREAS: No mass or ductal dilatation. ADRENALS: Unremarkable. KIDNEYS: Right-sided nephroureteral stent in place.   STOMACH: Distended  SMALL BOWEL: Multiple dilated loops of small bowel  COLON: Status post low anterior resection with creation of a loop ileostomy. APPENDIX: Not visualized  PERITONEUM: Small locules of free air anterior to the liver. This may be  postsurgical. Large surgical drain in the pelvis. RETROPERITONEUM: No lymphadenopathy or aortic aneurysm. REPRODUCTIVE ORGANS: Surgically absent  URINARY BLADDER: Decompressed with Hogan catheter in place  BONES: No destructive bone lesion. ABDOMINAL WALL: Postsurgical changes  ADDITIONAL COMMENTS: N/A     IMPRESSION  IMPRESSION:  1. Status post low anterior resection of a previously noted pelvic mass.     2.  Multiple dilated loops of small bowel. There is moderate fluid and air  within the colon. This may represent a severe ileus versus high-grade partial  small bowel obstruction.     3. No definite abscess or significant fluid is noted within the abdomen and  Pelvis. CT A/P 9/10/20  FINDINGS:      LIVER: No mass or biliary dilatation. GALLBLADDER: No calcified gallstone  SPLEEN: Unremarkable  PANCREAS: No mass or ductal dilatation. ADRENALS: Unremarkable. KIDNEYS/URETERS: Symmetric nephrograms with a couple low-density left renal  lesions too small to characterize. No evidence for enhancing renal mass. Moderate right hydronephrosis and hydroureter down into the pelvis. Minimal  prominence of the left collecting system. PERITONEUM: Borderline enlarged aortocaval and periaortic retroperitoneal lymph  nodes (series 3, image 46) of uncertain significance. No definite abdominal  lymphadenopathy. No ascites. COLON: Large irregular mass in the lower pelvis which may be arising from the  sigmoid colon measuring roughly 9.0 x 8.1 cm. There is adjacent moderate  irregular colon wall thickening. This appears to be separate from the uterus  most apparent on the sagittal view. There is focal gas in the presacral region  measuring 4.8 x 2.6 cm (series 3, image 64) which is unclear if this is  intraluminal or contained extraluminal collection.   APPENDIX: Not clearly seen  SMALL BOWEL: No dilatation or wall thickening. STOMACH: Unremarkable. PELVIS: No pelvic lymphadenopathy. Small amount of pelvic free fluid. BONES: No destructive bone lesion. VISUALIZED THORAX: Stable 6 mm right lower lobe lung nodule (series 3, image 11)  ADDITIONAL COMMENTS: N/A     IMPRESSION  IMPRESSION:     Large, irregular pelvic mass measuring approximately 9 cm likely representing  neoplasm arising from the sigmoid colon. Adnexal neoplasm is a possibility. This  does appear to be separate from the uterus. Small amount of pelvic free fluid. Associated colonic bowel wall thickening. There is focal gas in the upper  presacral region which is unclear if this is intraluminal or contained  extraluminal collection.     No definite distant metastatic disease. Borderline enlarged retroperitoneal  lymph nodes. 6 mm right lower lobe lung nodule. Mass effect from the pelvic mass  causing moderate right and minimal left hydronephrosis. Ct chest 9/15/20   IMPRESSION:     1. 7.5 mm left lower lobe pulmonary nodule. 2. Small right pleural effusion. 3. Mild right basilar atelectasis. Assessment / Plan:       Ms Jennie Patiño is a 64-year-old lady with Spaulding's esophagus who presented on 9/11/2020 with abdominal pain. She had a sigmoid biopsy done on 9/14/2020 showing an infiltrative adenocarcinoma. She went to the operating room on 9/17/2020 for sigmoid colon mass resection along with total abdominal hysterectomy, left salpingo-oophorectomy, right distal ureterectomy. OR notes indicate she had right ureteral reimplant with psoas hitch and placement of right ureteral stent. She has had persistent leukocytosis. Blood cultures have been negative. She does have 1 VALENTINE drain culture positive for Candida glabrata on 9/20/2020. She has been started on IV Zosyn empirically since 9/20/2020.   Fluconazole was later added on 9/26/2020.    1) sigmoid cancer post op 9/11/20 wt sigmoid colon mass resection along with total abdominal hysterectomy, left salpingo-oophorectomy, right distal ureterectomy. Has ureteral stent   Karthik drain wt C glabrata     Recommendations:  Suspect  WBC elevation  was also a reactory component  Given C glabrata , plan for IV Anidulafungin 100mg daily for a week till 10/5/20. Have written CM orders for this incase plans for DC prior to stop date   On Zosyn since 9/20 and can stop and monitor . If clinical worsening or concerns, restart   Follow fungal blood cx   picc removal after completion of antifungal per surgery if no further needs   Colostomy care   Monitoring labs        2) Spaulding's esophagus history    3) sigmoid adenocarcinoma status post resection  Pathology wt \" Invasive adenocarcinoma   Metastatic adenocarcinoma in one of sixteen lymph nodes (1/16). Uterus:  Invasive adenocarcinoma extending from adhesed colon into uterine serosa. Left ovary:   Benign ovary with serosal inclusion cysts. Left fallopian tube:   Benign fallopian tube. Right ureteral segment:   Benign segment of ureter densely adhesed to colon. Nodule near right uterine cornu:   Nodular portion of benign smooth muscle consistent with leiomyoma with parenchymal hemorrhage. \"    Plans per primary team    4)DVT px       Thank for the opportunity to participate in the care of this patient. Please contact with questions or concerns.      Gee Morales, DO  12:54 PM

## 2020-09-29 NOTE — PROGRESS NOTES
Patient education and teaching provided about pepper care. Hand hygine prior to handling pepper,emptying drainage bag, keeping drainage bag below the bladder, what is a dependant loop and eliminating depending loops, as well as cleaning the line with CHG daily and preforming pericare to decrease risk of infection information was provided. Teach back method was utilized. Patient demonstrated emptying drainage bag, cleaning pepper line with CHG, and cleaning perineum. Patient still needs education and teaching on using a leg bag/ transitioning from large drainage bag to leg bag.

## 2020-09-29 NOTE — PROGRESS NOTES
Bedside shift change report given to Nhi Delgadillo RN (oncoming nurse) by Gabriel Hernandes (offgoing nurse). Report included the following information SBAR, Kardex and MAR.

## 2020-09-29 NOTE — PROGRESS NOTES
09/28/20 2107   Vital Signs   Temp 98.3 °F (36.8 °C)   Temp Source Oral   Pulse (Heart Rate) (!) 101   Heart Rate Source Monitor   Cardiac Rhythm Sinus Tach   Resp Rate 16   O2 Sat (%) 97 %   Level of Consciousness Alert   /64   MAP (Calculated) 76   BP 1 Method Automatic   BP 1 Location Left arm   BP Patient Position At rest   MEWS Score 3     MEWS 3. Patient tachycardic at baseline. Will continue to monitor.

## 2020-09-29 NOTE — PROGRESS NOTES
Problem: Mobility Impaired (Adult and Pediatric)  Goal: *Acute Goals and Plan of Care (Insert Text)  Description: FUNCTIONAL STATUS PRIOR TO ADMISSION: Patient was independent and active without use of DME. Driving. No falls. HOME SUPPORT PRIOR TO ADMISSION: The patient lived alone and has friends to provide assistance at d/c as needed. Physical Therapy Goals  Initiated 9/18/2020  1. Patient will move from supine to sit and sit to supine , scoot up and down, and roll side to side in bed with modified independence within 7 day(s). 2.  Patient will transfer from bed to chair and chair to bed with modified independence using the least restrictive device within 7 day(s). 3.  Patient will perform sit to stand with modified independence within 7 day(s). 4.  Patient will ambulate with modified independence for 150 feet with the least restrictive device within 7 day(s). 5.  Patient will ascend/descend 12 stairs with bilateral handrail(s) with modified independence within 7 day(s). 6.  Patient will improve Tinetti score by 4-5 points within 7 days. Outcome: Progressing Towards Goal   PHYSICAL THERAPY TREATMENT  Patient: Alvin Pratt (26 y.o. female)  Date: 9/29/2020  Diagnosis: Pelvic mass [R19.00]  Hydronephrosis [N13.30]   Malignant neoplasm of sigmoid colon (HCC)  Procedure(s) (LRB):  Open low anterior resection, mobilization of splenic flexture, coloproctostomy, creation of loop ileostomy (E R A S) (N/A)  TOTAL ABDOMINAL HYSTERECTOMY, LEFT SALPINGO OOPHORECTOMY (N/A)  Cystoscopy, insertion and removal of left Ureteral catheter, simple pepper, distal right ureterectomy, right ureteral reimplant, right stent placement (Left) 12 Days Post-Op  Precautions: Fall(ERAS)  Chart, physical therapy assessment, plan of care and goals were reviewed. ASSESSMENT  Patient continues with skilled PT services and is progressing towards goals. Pt presented pleasant and eager for therapy.  She was able to stand independently for 5 minutes in order to drain bag. Pt was able to ambulate 100 ft with IV pole and 300 ft without a device without any needed rest breaks. Pt was able to navigate a flight of stairs, progressing to foot over foot climbing. Pt tolerated all activities well. PT discussed pt was cleared from a PT stand point but could still benefit from getting up and walking the halls at least twice a day with nursing staff, discussed with nursing as well and they were agreeable. Patient is cleared for discharge from PT standpoint:  YES [x]     NO [] , PT available if further needs arise     Current Level of Function Impacting Discharge (mobility/balance): mod I with bed mobility and transfers, Supervision with ambulation, CGA with Stairs, good sitting and standing balance    Other factors to consider for discharge: pt lives alone, has friends local to help, previously independent without use of DME, at baseline function from a mobility stand point         PLAN :  Patient has met their goals and is cleared from physical therapy at this time. Pt is available if further needs arise. Recommendation for discharge: (in order for the patient to meet his/her long term goals)  No skilled physical therapy/ follow up rehabilitation needs identified at this time. This discharge recommendation:  A follow-up discussion with the attending provider and/or case management is planned    IF patient discharges home will need the following DME: none from a mobility stand point       SUBJECTIVE:   Patient stated Governor Lagrange that was fun lets do it again sometime.  After walking and climbing stairs, discussed with pt that she could/should continue to walk daily with nursing    OBJECTIVE DATA SUMMARY:   Critical Behavior:  Neurologic State: Alert  Orientation Level: Oriented X4  Cognition: Appropriate decision making, Appropriate for age attention/concentration, Appropriate safety awareness     Functional Mobility Training:  Bed Mobility:  Rolling: Modified independent  Supine to Sit: Modified independent  Sit to Supine: Modified independent  Scooting: Modified independent     Transfers:  Sit to Stand: Modified independent  Stand to Sit: Modified independent    Balance:  Sitting: Intact  Standing: Intact  Standing - Static: Good  Standing - Dynamic : Good  Ambulation/Gait Training:  Distance (ft): 400 Feet (ft)  Assistive Device: Gait belt(100 ft with IV pole, 300 with no device)  Ambulation - Level of Assistance: Supervision  Gait Abnormalities: Decreased step clearance  Base of Support: Narrowed  Step Length: Left shortened;Right shortened    Stairs:  Number of Stairs Trained: 12  Stairs - Level of Assistance: Contact guard assistance   Rail Use: Left       Activity Tolerance:   Good      After treatment patient left in no apparent distress:   Call bell within reach, Side rails x 3, and sitting EOB for lunch    COMMUNICATION/COLLABORATION:   The patients plan of care was discussed with: Registered nurse and PCT. Regarding student involvement in patient care:  A student participated in this treatment session. Per CMS Medicare statements and APTA guidelines I certify that the following was true:  1. I was present and directly observed the entire session. 2. I made all skilled judgments and clinical decisions regarding care. 3. I am the practitioner responsible for assessment, treatment, and documentation.       Demian Ugalde, SPT

## 2020-09-29 NOTE — PROGRESS NOTES
General Daily Progress Note    Admission Date: 9/11/2020  Hospital Day 19  Post-Op Day 12  Subjective:   Pain well controlled. No nausea overnight. Emptying and changing appliance without assistance. Objective:     Patient Vitals for the past 24 hrs:   BP Temp Pulse Resp SpO2 Weight   09/29/20 0827 104/73 97.7 °F (36.5 °C) (!) 117 18 100 % --   09/29/20 0826 -- -- -- -- -- 50.2 kg (110 lb 10.7 oz)   09/29/20 0628 101/60 97.7 °F (36.5 °C) 97 20 98 % --   09/28/20 2107 100/64 98.3 °F (36.8 °C) (!) 101 16 97 % --   09/28/20 1525 96/62 98.1 °F (36.7 °C) (!) 103 21 98 % --   09/28/20 1308 105/62 97.5 °F (36.4 °C) 98 20 99 % --   09/28/20 1236 122/68 -- (!) 116 -- -- --     No intake/output data recorded. 09/27 1901 - 09/29 0700  In: 150 [P.O.:50; I.V.:100]  Out: 8171 [Urine:1975]    Physical Examination:  General Appearance - No distress. Heart - Mild sinus tachycardia. Abdomen - Less distended than yesterday. Minimal tenderness. Ileostomy prolapsing but viable. Midline incision clean, dry, intact, and without associated erythema.  Drain site wound with scant serous drainage.  - Hogan catheter draining normal-appearing urine. Neurological - Alert and oriented. Data Review   Recent Results (from the past 24 hour(s))   PERIPHERAL SMEAR    Collection Time: 09/28/20  7:17 PM   Result Value Ref Range    PERIPHERAL SMEAR        Pathologic examination results can be viewed in [facility] Chart Review under the Pathology tab.    CULTURE, BLOOD FOR FUNGUS    Collection Time: 09/28/20  7:17 PM    Specimen: Blood   Result Value Ref Range    Special Requests: HOLD 21 DAYS FOR FUNGUS      Culture result: NO GROWTH AFTER 9 HOURS     CBC WITH AUTOMATED DIFF    Collection Time: 09/29/20  4:08 AM   Result Value Ref Range    WBC 9.8 3.6 - 11.0 K/uL    RBC 3.48 (L) 3.80 - 5.20 M/uL    HGB 8.5 (L) 11.5 - 16.0 g/dL    HCT 27.8 (L) 35.0 - 47.0 %    MCV 79.9 (L) 80.0 - 99.0 FL    MCH 24.4 (L) 26.0 - 34.0 PG    MCHC 30.6 30.0 - 36.5 g/dL    RDW ABNORMAL 11.5 - 14.5 %    PLATELET 767 (H) 620 - 400 K/uL    MPV 9.1 8.9 - 12.9 FL    NRBC 0.0 0  WBC    ABSOLUTE NRBC 0.00 0.00 - 0.01 K/uL    NEUTROPHILS 72 32 - 75 %    LYMPHOCYTES 15 12 - 49 %    MONOCYTES 5 5 - 13 %    EOSINOPHILS 5 0 - 7 %    BASOPHILS 1 0 - 1 %    IMMATURE GRANULOCYTES 2 (H) 0.0 - 0.5 %    ABS. NEUTROPHILS 7.0 1.8 - 8.0 K/UL    ABS. LYMPHOCYTES 1.5 0.8 - 3.5 K/UL    ABS. MONOCYTES 0.5 0.0 - 1.0 K/UL    ABS. EOSINOPHILS 0.5 (H) 0.0 - 0.4 K/UL    ABS. BASOPHILS 0.1 0.0 - 0.1 K/UL    ABS. IMM. GRANS. 0.2 (H) 0.00 - 0.04 K/UL    DF SMEAR SCANNED      RBC COMMENTS ANISOCYTOSIS  1+       METABOLIC PANEL, BASIC    Collection Time: 09/29/20  4:08 AM   Result Value Ref Range    Sodium 135 (L) 136 - 145 mmol/L    Potassium 3.7 3.5 - 5.1 mmol/L    Chloride 103 97 - 108 mmol/L    CO2 26 21 - 32 mmol/L    Anion gap 6 5 - 15 mmol/L    Glucose 78 65 - 100 mg/dL    BUN 10 6 - 20 MG/DL    Creatinine 0.37 (L) 0.55 - 1.02 MG/DL    BUN/Creatinine ratio 27 (H) 12 - 20      GFR est AA >60 >60 ml/min/1.73m2    GFR est non-AA >60 >60 ml/min/1.73m2    Calcium 7.7 (L) 8.5 - 10.1 MG/DL   MAGNESIUM    Collection Time: 09/29/20  4:08 AM   Result Value Ref Range    Magnesium 1.6 1.6 - 2.4 mg/dL   PHOSPHORUS    Collection Time: 09/29/20  4:08 AM   Result Value Ref Range    Phosphorus 2.2 (L) 2.6 - 4.7 MG/DL     CT scan of Abdomen and Pelvis (9/22/2020):  No apparent abscess.  Scant intraperitoneal gas consistent with POD#5.  Ileus versus distal small bowel obstruction (at the ileostomy).       Assessment:     Principal Problem:    Malignant neoplasm of sigmoid colon ([address] (9/14/2020)    Active Problems:    Hydronephrosis (9/11/2020)      Pelvic mass (9/11/2020)      Severe protein-calorie malnutrition ([address] (9/16/2020)      Anemia (9/16/2020)      Thrombocytopenia ([address] (9/19/2020)        12 Days Post-Op s/p Low anterior resection, mobilization of the splenic flexure, coloproctostomy, and creation of loop ileostomy. [Total abdominal hysterectomy and left salpingo-oophorectomy performed by Ana Paula Duvall MD]  [Cystoscopy and placement of left ureteral catheter performed by Gio Paz MD]  [Distal right ureterectomy performed by Deonna Aguilera III, MD]  [Right ureteral re-implant with psoas hitch and placement of right ureteral stent performed by Sigifredo Nieto MD]     Transferred to [address] (stepdown) from recovery room. Transferred to remote telemetry on 9/24/2020.     Hemodynamically stable but persistently tachycardic. Hemoglobin low but acceptable. Thrombocytopenia resolved. Prn metoprolol begun on 9/20/2020 to reduce heart rate, but not being given frequently.     Urine output adequate. Creatinine within normal limits. Drain fluid creatinine was similar to serum level on 9/24/2020.  There was no evidence of a urine leak.  The VALENTINE drain was removed on 9/25/2020. Hogan catheter in place and should remain for 14 days post-op (per Dr. Sosa Benedict). Arrangements have been made for cystoscopy and catheter removal to be performed as an outpatient at [facility] on 10/6/2020.     Today, the leukocytosis has resolved. The WBC is within normal limits for the first time during this hospitalization. No apparent wound infection. No pneumonia. No UTI. Blood and drain fluid culture results are final.  Blood was negative.  Drain fluid had yeast in the broth only. Zosyn was begun on 9/20/2020. Oral Diflucan was begun on 9/26/2020 and replaced by Eraxis yesterday by ID (Dr. Marium Singleton).        Malnutrition was treated with TPN, which was discontinued yesterday. GI function has returned, and the patient now seems to be tolerating th elow fiber diet. Ileus versus obstruction, possibly at the level of the ileostomy, appears to have resolved.   Ileostomy prolapse was first noticed in the evening on 9/21/2020, and it could be interfering with bowel function.     Hypophosphatemia gradually improving.     Receiving physical therapy.     Receiving ostomy education. Nearly independent with ostomy care. Plan:   Continue low fiber diet. Continue Ensure Enlive. Continue replacing phosphorus.     Continue Zosyn. Continue Eraxis per ID. Continue Lovenox for additional DVT prophylaxis.     Hogan catheter to remain until removed as an outpatient on 10/6/2020.     Ambulate. Physical therapy. Ostomy education. Incentive spirometer.

## 2020-09-30 LAB
ANION GAP SERPL CALC-SCNC: 6 MMOL/L (ref 5–15)
BASOPHILS # BLD: 0.1 K/UL (ref 0–0.1)
BASOPHILS NFR BLD: 1 % (ref 0–1)
BNP SERPL-MCNC: 37 PG/ML
BUN SERPL-MCNC: 11 MG/DL (ref 6–20)
BUN/CREAT SERPL: 26 (ref 12–20)
CALCIUM SERPL-MCNC: 8.4 MG/DL (ref 8.5–10.1)
CHLORIDE SERPL-SCNC: 103 MMOL/L (ref 97–108)
CO2 SERPL-SCNC: 27 MMOL/L (ref 21–32)
CREAT SERPL-MCNC: 0.42 MG/DL (ref 0.55–1.02)
CRP SERPL-MCNC: 1.17 MG/DL (ref 0–0.6)
DIFFERENTIAL METHOD BLD: ABNORMAL
EOSINOPHIL # BLD: 0.4 K/UL (ref 0–0.4)
EOSINOPHIL NFR BLD: 5 % (ref 0–7)
ERYTHROCYTE [DISTWIDTH] IN BLOOD BY AUTOMATED COUNT: ABNORMAL % (ref 11.5–14.5)
GLUCOSE SERPL-MCNC: 82 MG/DL (ref 65–100)
HCT VFR BLD AUTO: 28 % (ref 35–47)
HGB BLD-MCNC: 8.8 G/DL (ref 11.5–16)
IMM GRANULOCYTES # BLD AUTO: 0.2 K/UL (ref 0–0.04)
IMM GRANULOCYTES NFR BLD AUTO: 2 % (ref 0–0.5)
LYMPHOCYTES # BLD: 1.7 K/UL (ref 0.8–3.5)
LYMPHOCYTES NFR BLD: 19 % (ref 12–49)
MCH RBC QN AUTO: 24.9 PG (ref 26–34)
MCHC RBC AUTO-ENTMCNC: 31.4 G/DL (ref 30–36.5)
MCV RBC AUTO: 79.3 FL (ref 80–99)
MONOCYTES # BLD: 0.5 K/UL (ref 0–1)
MONOCYTES NFR BLD: 6 % (ref 5–13)
NEUTS SEG # BLD: 5.8 K/UL (ref 1.8–8)
NEUTS SEG NFR BLD: 67 % (ref 32–75)
NRBC # BLD: 0 K/UL (ref 0–0.01)
NRBC BLD-RTO: 0 PER 100 WBC
PHOSPHATE SERPL-MCNC: 2.8 MG/DL (ref 2.6–4.7)
PLATELET # BLD AUTO: 735 K/UL (ref 150–400)
PLATELET COMMENTS,PCOM: ABNORMAL
PMV BLD AUTO: 9.4 FL (ref 8.9–12.9)
POTASSIUM SERPL-SCNC: 3.7 MMOL/L (ref 3.5–5.1)
RBC # BLD AUTO: 3.53 M/UL (ref 3.8–5.2)
RBC MORPH BLD: ABNORMAL
SODIUM SERPL-SCNC: 136 MMOL/L (ref 136–145)
TROPONIN I SERPL-MCNC: <0.05 NG/ML
WBC # BLD AUTO: 8.7 K/UL (ref 3.6–11)

## 2020-09-30 PROCEDURE — 86140 C-REACTIVE PROTEIN: CPT

## 2020-09-30 PROCEDURE — 83880 ASSAY OF NATRIURETIC PEPTIDE: CPT

## 2020-09-30 PROCEDURE — 93005 ELECTROCARDIOGRAM TRACING: CPT

## 2020-09-30 PROCEDURE — 65270000029 HC RM PRIVATE

## 2020-09-30 PROCEDURE — 36415 COLL VENOUS BLD VENIPUNCTURE: CPT

## 2020-09-30 PROCEDURE — 84484 ASSAY OF TROPONIN QUANT: CPT

## 2020-09-30 PROCEDURE — 74011000258 HC RX REV CODE- 258: Performed by: INTERNAL MEDICINE

## 2020-09-30 PROCEDURE — 74011250636 HC RX REV CODE- 250/636: Performed by: INTERNAL MEDICINE

## 2020-09-30 PROCEDURE — 74011250636 HC RX REV CODE- 250/636: Performed by: COLON & RECTAL SURGERY

## 2020-09-30 PROCEDURE — 85025 COMPLETE CBC W/AUTO DIFF WBC: CPT

## 2020-09-30 PROCEDURE — 74011250637 HC RX REV CODE- 250/637: Performed by: COLON & RECTAL SURGERY

## 2020-09-30 PROCEDURE — 84100 ASSAY OF PHOSPHORUS: CPT

## 2020-09-30 PROCEDURE — 80048 BASIC METABOLIC PNL TOTAL CA: CPT

## 2020-09-30 RX ADMIN — DIBASIC SODIUM PHOSPHATE, MONOBASIC POTASSIUM PHOSPHATE AND MONOBASIC SODIUM PHOSPHATE 1 TABLET: 852; 155; 130 TABLET ORAL at 09:39

## 2020-09-30 RX ADMIN — SODIUM CHLORIDE 100 MG: 9 INJECTION, SOLUTION INTRAVENOUS at 13:20

## 2020-09-30 RX ADMIN — DIBASIC SODIUM PHOSPHATE, MONOBASIC POTASSIUM PHOSPHATE AND MONOBASIC SODIUM PHOSPHATE 1 TABLET: 852; 155; 130 TABLET ORAL at 13:20

## 2020-09-30 RX ADMIN — Medication 10 ML: at 13:36

## 2020-09-30 RX ADMIN — PANTOPRAZOLE SODIUM 40 MG: 40 TABLET, DELAYED RELEASE ORAL at 06:47

## 2020-09-30 RX ADMIN — Medication 10 ML: at 06:48

## 2020-09-30 RX ADMIN — METOPROLOL TARTRATE 12.5 MG: 25 TABLET, FILM COATED ORAL at 13:20

## 2020-09-30 RX ADMIN — ENOXAPARIN SODIUM 40 MG: 40 INJECTION SUBCUTANEOUS at 09:39

## 2020-09-30 RX ADMIN — DIBASIC SODIUM PHOSPHATE, MONOBASIC POTASSIUM PHOSPHATE AND MONOBASIC SODIUM PHOSPHATE 1 TABLET: 852; 155; 130 TABLET ORAL at 18:50

## 2020-09-30 RX ADMIN — Medication 10 ML: at 23:01

## 2020-09-30 RX ADMIN — DIBASIC SODIUM PHOSPHATE, MONOBASIC POTASSIUM PHOSPHATE AND MONOBASIC SODIUM PHOSPHATE 1 TABLET: 852; 155; 130 TABLET ORAL at 23:01

## 2020-09-30 NOTE — PROGRESS NOTES
Bedside and Verbal shift change report given to  (oncoming nurse) by Yoselin Roe RN (offgoing nurse). Report included the following information SBAR, Kardex and MAR.

## 2020-09-30 NOTE — CONSULTS
Medical Consultation Report    Patient:  Macario Rangel  MRN:  [id number]  YOB: 1950  Age:  72 y.o. Date of consultation:  9/30/2020                                 Reason for consultation: Tachycardia                                   History of present illness  Macario Rangel is a 72 y.o. female who was admitted on 9/11/2020 for abdominal pain. A 72year old female patient with PMH of GERD, chaudhary's esophagus, osteoporosis was admitted for abdominal pain and diagnosed with Sigmoid colon cancer with uterus and ureteral involvement. She underwent  s/p Low anterior resection, mobilization of the splenic flexure, coloproctostomy, and creation of loop ileostomy on 9/17 along with Total abdominal hysterectomy and left salpingo-oophorectomy. She had Distal right ureterectomy and Right ureteral re-implant with right ureteral stent on 9/18. Colorectal surgery Dr. Nikko Downs primary team. Urology and Gyn oncology were involved. Hogan in place. Pt was treated with IV abx zosyn until 9/29 and OR cx showed Candida -  On IV Eraxis until 10/5/2020 per ID recommendation. Hospitalist team consulted for tachycardia. Today post day 13, pt overall feeling better. No significant abdominal pain. Tolerating diet. Denied any fever, sob, cough , chest pain or palpitations. No signs of infection at surgical site. Past Medical History:   Diagnosis Date    GERD (gastroesophageal reflux disease)     Ill-defined condition     Chaudhary's esophagus      Past Surgical History:   Procedure Laterality Date    COLONOSCOPY Left 9/14/2020    COLONOSCOPY performed by Kalen Laurent MD at [facility]    HX APPENDECTOMY  age 17    HX COLONOSCOPY  circa 2010    No neoplasms.  HX ENDOSCOPY  03/13/2017    Dr. Ibarra Andreia HX ENDOSCOPY  02/13/2020    Dr. Radha Rodrigues oopherectomy for treatment of benign mass.         Prior to Admission medications    Medication Sig Start Date End Date Taking? Authorizing Provider   denosumab (PROLIA) 60 mg/mL injection 60 mg by SubCUTAneous route. Provider, Historical   lansoprazole (PREVACID) 30 mg capsule Take 30 mg by mouth Daily (before breakfast). Provider, Historical   multivitamin (ONE A DAY) tablet Take 1 Tab by mouth daily. Provider, Historical   ascorbic acid, vitamin C, (VITAMIN C) 500 mg tablet Take 500 mg by mouth. Provider, Historical   cholecalciferol (VITAMIN D3) 1,000 unit cap Take 1,000 Units by mouth daily. Provider, Historical   Omega-3 Fatty Acids (FISH OIL) 500 mg cap Take  by mouth.     Provider, Historical     Current Facility-Administered Medications   Medication Dose Route Frequency    phosphorus (K PHOS NEUTRAL) 250 mg tablet 1 Tab  1 Tab Oral QID    anidulafungin (ERAXIS) 100 mg in 0.9% sodium chloride 130 mL IVPB  100 mg IntraVENous Q24H    acetaminophen (TYLENOL) tablet 1,000 mg  1,000 mg Oral Q6H PRN    pantoprazole (PROTONIX) tablet 40 mg  40 mg Oral ACB    enoxaparin (LOVENOX) injection 40 mg  40 mg SubCUTAneous Q24H    prochlorperazine (COMPAZINE) with saline injection 10 mg  10 mg IntraVENous Q6H PRN    metoprolol (LOPRESSOR) injection 2.5 mg  2.5 mg IntraVENous Q4H PRN    naloxone (NARCAN) injection 0.4 mg  0.4 mg IntraVENous PRN    ondansetron (ZOFRAN ODT) tablet 4 mg  4 mg Oral Q4H PRN    alteplase (CATHFLO) 1 mg in sterile water (preservative free) 1 mL injection  1 mg InterCATHeter PRN    bacitracin 500 unit/gram packet 1 Packet  1 Packet Topical PRN    sodium chloride (NS) flush 5-40 mL  5-40 mL IntraVENous Q8H    sodium chloride (NS) flush 5-40 mL  5-40 mL IntraVENous PRN     No Known Allergies   Family History   Problem Relation Age of Onset    Cancer Mother     Heart Disease Father     Diabetes Brother           Social history     Social History     Socioeconomic History    Marital status: SINGLE     Spouse name: Not on file    Number of children: Not on file    Years of education: Not on file    Highest education level: Not on file   Tobacco Use    Smoking status: Never Smoker    Smokeless tobacco: Never Used   Substance and Sexual Activity    Alcohol use: Yes     Alcohol/week: 1.0 standard drinks     Types: 1 Glasses of wine per week    Drug use: No         Review of systems  The patient denies any fever, chills, vision changes, difficulty swallowing, cough, congestion, chest pain, palpitations, rashes, bleeding, focal weakness, or dysuria. A comprehensive review of systems was negative except for that written in the History of Present Illness. The remainder of the review of systems was reviewed and is non-contributory. Physical Examination   Visit Vitals  /74 (BP 1 Location: Left arm, BP Patient Position: Sitting)   Pulse (!) 112   Temp 97.5 °F (36.4 °C)   Resp 20   Ht 5' 6\" (1.676 m)   Wt 50.2 kg (110 lb 10.7 oz)   SpO2 99%   Breastfeeding No   BMI 17.86 kg/m²       O2 Flow Rate (L/min): 2 l/min   O2 Device: Room air    GENERAL:  Alert, oriented, cooperative, no apparent distress   HEENT:  Normocephalic, atraumatic, non icteric sclerae, non pallor conjuctivae, EOMs intact, PERRLA. NECK: Supple, trachea midline, no adenopathy, no thyromegally or tenderness, no carotid bruit and no JVD. LUNGS:   Vesicular breath sounds bilaterally, no added sounds. HEART:   Sinus tachycardia. S1 and S2 well heard,  no murmur, click, rub or gallop. ABDOMEB:   Soft, non-tender. Normoactive bowel sounds. Suture site intact. ileostomy bag    EXTREMETIES:  Atraumatic, acyanotic, no edema   PULSES: 2+ and symmetric all extremities. SKIN:  No rashes or lesions   NEUROLOGY: Alert and oriented to PPT, CNII-XII intact. Motor and sensory exam grossly intact.              Data Review    EKG: Sinus tachycardia       24 Hour Results:  Recent Results (from the past 24 hour(s))   PHOSPHORUS    Collection Time: 09/30/20  5:10 AM   Result Value Ref Range    Phosphorus 2.8 2.6 - 4.7 MG/DL   METABOLIC PANEL, BASIC    Collection Time: 09/30/20  5:10 AM   Result Value Ref Range    Sodium 136 136 - 145 mmol/L    Potassium 3.7 3.5 - 5.1 mmol/L    Chloride 103 97 - 108 mmol/L    CO2 27 21 - 32 mmol/L    Anion gap 6 5 - 15 mmol/L    Glucose 82 65 - 100 mg/dL    BUN 11 6 - 20 MG/DL    Creatinine 0.42 (L) 0.55 - 1.02 MG/DL    BUN/Creatinine ratio 26 (H) 12 - 20      GFR est AA >60 >60 ml/min/1.73m2    GFR est non-AA >60 >60 ml/min/1.73m2    Calcium 8.4 (L) 8.5 - 10.1 MG/DL   CBC WITH AUTOMATED DIFF    Collection Time: 09/30/20  5:10 AM   Result Value Ref Range    WBC 8.7 3.6 - 11.0 K/uL    RBC 3.53 (L) 3.80 - 5.20 M/uL    HGB 8.8 (L) 11.5 - 16.0 g/dL    HCT 28.0 (L) 35.0 - 47.0 %    MCV 79.3 (L) 80.0 - 99.0 FL    MCH 24.9 (L) 26.0 - 34.0 PG    MCHC 31.4 30.0 - 36.5 g/dL    RDW ABNORMAL 11.5 - 14.5 %    PLATELET 205 (H) 170 - 400 K/uL    MPV 9.4 8.9 - 12.9 FL    NRBC 0.0 0  WBC    ABSOLUTE NRBC 0.00 0.00 - 0.01 K/uL    NEUTROPHILS 67 32 - 75 %    LYMPHOCYTES 19 12 - 49 %    MONOCYTES 6 5 - 13 %    EOSINOPHILS 5 0 - 7 %    BASOPHILS 1 0 - 1 %    IMMATURE GRANULOCYTES 2 (H) 0.0 - 0.5 %    ABS. NEUTROPHILS 5.8 1.8 - 8.0 K/UL    ABS. LYMPHOCYTES 1.7 0.8 - 3.5 K/UL    ABS. MONOCYTES 0.5 0.0 - 1.0 K/UL    ABS. EOSINOPHILS 0.4 0.0 - 0.4 K/UL    ABS. BASOPHILS 0.1 0.0 - 0.1 K/UL    ABS. IMM.  GRANS. 0.2 (H) 0.00 - 0.04 K/UL    DF SMEAR SCANNED      PLATELET COMMENTS Large Platelets      RBC COMMENTS ANISOCYTOSIS  1+       C REACTIVE PROTEIN, QT    Collection Time: 09/30/20  5:10 AM   Result Value Ref Range    C-Reactive protein 1.17 (H) 0.00 - 0.60 mg/dL   EKG, 12 LEAD, INITIAL    Collection Time: 09/30/20  4:55 PM   Result Value Ref Range    Ventricular Rate 101 BPM    Atrial Rate 101 BPM    P-R Interval 128 ms    QRS Duration 88 ms    Q-T Interval 342 ms    QTC Calculation (Bezet) 443 ms    Calculated P Axis 70 degrees    Calculated R Axis 73 degrees    Calculated T Axis 65 degrees    Diagnosis       Sinus tachycardia  Increased R/S ratio in V1, consider early transition or posterior infarct  No previous ECGs available       Recent Labs     09/30/20  0510 09/29/20  0408 09/28/20 0319   WBC 8.7 9.8 17.7*   HGB 8.8* 8.5* 8.9*   HCT 28.0* 27.8* 28.4*   * 612* 618*     Recent Labs     09/30/20  0510 09/29/20  0408 09/28/20 0319    135* 136   K 3.7 3.7 4.1    103 104   CO2 27 26 23   GLU 82 78 89   BUN 11 10 10   CREA 0.42* 0.37* 0.36*   CA 8.4* 7.7* 7.3*   MG  --  1.6  --    PHOS 2.8 2.2* 2.1*         Impression/Recomendations   Sinus tachycardia  - likely physiological response to infection and surgery  - HR in 's   - pt asymptomatic.  no cardiac history  - EKG showed sinus tachycardia  - Troponin and pro BNP ordered  - echo ordered  - if echo negative, ok to Dc to home   - would not recommend any rate controlling medications    Sigmoid colon cancer s/p surgery with ileostomy on 9/17  S/p ureteral stent with pepper   - colorectal surgery primary team  - c/w IV Eraxis until 10/5 per ID recommendations    Anemia - post op. Hb stable     GERD- ppi  DVT ppx: lovenox     Thank you very much for allowing us to participate in the care of this pleasant patient. The hospitalist service will continue to follow the patient's medical progress along with you. Please do not hesitate to page with any questions or to discuss the case.       Signed by:     Miko Umanzor MD     September 30, 2020 at 5:56 PM

## 2020-09-30 NOTE — ROUTINE PROCESS
Bedside and Verbal shift change report given to Sara Whitley (oncoming nurse) by Keith Nicholas RN (offgoing nurse). Report included the following information SBAR, Kardex, Intake/Output, MAR and Recent Results.

## 2020-09-30 NOTE — PROGRESS NOTES
OLIVIA- Home with [facility] and [facility] for IV anti-fungal.    Cm noted that pt will be going home on an IV antifungal. KATIAN sent a referral to [facility] for this IV infusion at home. KATINA met with Dr. Amy Patiño this afternoon. He said that this pt should be ready for d/c tomorrow. With his permission, KATINA obtained added  PT orders. CM informed [facility] of the added order.  Wiliam Fonseca

## 2020-09-30 NOTE — PROGRESS NOTES
Bedside shift change report given to Sarah Batres RN (oncoming nurse) by Keeley Carmichael RN (offgoing nurse). Report included the following information SBAR, Kardex, Procedure Summary, Intake/Output, MAR and Recent Results.

## 2020-09-30 NOTE — PROGRESS NOTES
[address] [facility] visit. Mrs. Tatum had not received communion today from the [place]. Prayer and communion offered. She says she is supposed to be discharged tomorrow.      RACH Menjivar, RN, ACSW, LCSW   Page:  [phone]

## 2020-09-30 NOTE — WOUND CARE
CWOCN Ostomy Progress Note:     POD # 13 postoperative visit. Type of Ostomy: loop ileostomy  Location: RLQ  Date of Surgery: 9-17-20     Surgeon: Dr. Tanya Mckeon:  Ozie Cushing removed, stoma and peristomal skin cleaned and assessed, new pouch prepared and applied. Patient changed: 9-30-20    Findings:  Current appliance: 2 piece Coloplast  Stoma size: 50  Stoma color: pink moist  Characteristics: prolaspsed  Peristomal skin: pink  Abdomen: soft  Output: liquid / slightly formed stool  Other:   Pt Concerns: received ordering numbers today. Teaching/Subjects covered today:  Encouraged pt to review handout given to patient/family and ask questions regarding material on next ostomy nurse visit. Still found in bag with pocketbook. 9/30- General anatomy and expectations of output  9/30- Normal & abnormal stoma characteristics & changes over time; discussed prolapse feature. 9/30- Normal & abnormal peristomal characteristics   9/30- When/how to clean stoma & peristomal skin  9/30- When/how to empty pouch  9/30- When/how to change appliance  9/30- When to notify nurse/physician  9/30- Dietary guidelines  9/30- Flatus management  9/30 Where/how to obtain supplies  9/30- Reviewed ADL's (bathing, mattress cover, car pillow to protect from seatbelt, etc)  9/30- Blockage s/s and electrolyte imbalance  9/30 Pharmacy notification re: meds    Pt demonstrated understanding of above material covered but still with questions. Recommendations:  1. Empty appliance when 1/3 full and PRN. Encourage patient/family to notify nurse and  assist w/ pouch emptying to promote self-care. Change appliance twice weekly and PRN for leaking ASAP. DO NOT REINFORCE LEAKS to avoid skin breakdown. Transition of Care:  Ostomy nurse to return and reinforce teaching. Patient has learned vital skill* but will likely need home health care for further teaching after discharge. Home Health consult placed. Discharge plan is to go home, Thursday or Friday. Plan to review and answer questions tomorrow, going over sequence. Discussed care/progress with RN. Supplies given to patient with ordering information.     Keysha Myers RN, BSN  Wound and Skin Care Team  office: [phone]  pager [phone]

## 2020-09-30 NOTE — PROGRESS NOTES
General Daily Progress Note    Admission Date: 9/11/2020  Hospital Day 20  Post-Op Day 13  Subjective:   Ileostomy appliance leaked overnight. No other copmplaints. In favor of going home tomorrow. Objective:     Patient Vitals for the past 24 hrs:   BP Temp Pulse Resp SpO2   09/30/20 0824 103/62 97.6 °F (36.4 °C) 99 22 100 %   09/30/20 0420 102/60 97.8 °F (36.6 °C) 99 14 96 %   09/30/20 0030 (!) 107/52 97.6 °F (36.4 °C) 98 20 96 %   09/29/20 2007 107/62 98 °F (36.7 °C) 96 24 96 %   09/29/20 1927 113/69 -- (!) 106 -- --   09/29/20 1733 115/79 -- (!) 117 -- --   09/29/20 1632 118/69 98.2 °F (36.8 °C) (!) 118 22 99 %   09/29/20 1151 108/72 97.5 °F (36.4 °C) (!) 118 21 98 %     09/30 0701 - 09/30 1900  In: -   Out: 175 [Urine:175]  09/28 1901 - 09/30 0700  In: -   [facility]: 3500 [facility]    Physical Examination:  General Appearance - No distress. Heart - Mild sinus tachycardia. Abdomen - Less distended than yesterday. Minimal tenderness. Ileostomy prolapsing but viable. Midline incision clean, dry, intact, and without associated erythema.  Drain site wound with scant serous drainage.  - Hogan catheter draining normal-appearing urine. Neurological - Alert and oriented.             Data Review   Recent Results (from the past 24 hour(s))   PHOSPHORUS    Collection Time: 09/30/20  5:10 AM   Result Value Ref Range    Phosphorus 2.8 2.6 - 4.7 MG/DL   METABOLIC PANEL, BASIC    Collection Time: 09/30/20  5:10 AM   Result Value Ref Range    Sodium 136 136 - 145 mmol/L    Potassium 3.7 3.5 - 5.1 mmol/L    Chloride 103 97 - 108 mmol/L    CO2 27 21 - 32 mmol/L    Anion gap 6 5 - 15 mmol/L    Glucose 82 65 - 100 mg/dL    BUN 11 6 - 20 MG/DL    Creatinine 0.42 (L) 0.55 - 1.02 MG/DL    BUN/Creatinine ratio 26 (H) 12 - 20      GFR est AA >60 >60 ml/min/1.73m2    GFR est non-AA >60 >60 ml/min/1.73m2    Calcium 8.4 (L) 8.5 - 10.1 MG/DL   CBC WITH AUTOMATED DIFF    Collection Time: 09/30/20  5:10 AM   Result Value Ref Range WBC 8.7 3.6 - 11.0 K/uL    RBC 3.53 (L) 3.80 - 5.20 M/uL    HGB 8.8 (L) 11.5 - 16.0 g/dL    HCT 28.0 (L) 35.0 - 47.0 %    MCV 79.3 (L) 80.0 - 99.0 FL    MCH 24.9 (L) 26.0 - 34.0 PG    MCHC 31.4 30.0 - 36.5 g/dL    RDW ABNORMAL 11.5 - 14.5 %    PLATELET 102 (H) 599 - 400 K/uL    MPV 9.4 8.9 - 12.9 FL    NRBC 0.0 0  WBC    ABSOLUTE NRBC 0.00 0.00 - 0.01 K/uL    NEUTROPHILS 67 32 - 75 %    LYMPHOCYTES 19 12 - 49 %    MONOCYTES 6 5 - 13 %    EOSINOPHILS 5 0 - 7 %    BASOPHILS 1 0 - 1 %    IMMATURE GRANULOCYTES 2 (H) 0.0 - 0.5 %    ABS. NEUTROPHILS 5.8 1.8 - 8.0 K/UL    ABS. LYMPHOCYTES 1.7 0.8 - 3.5 K/UL    ABS. MONOCYTES 0.5 0.0 - 1.0 K/UL    ABS. EOSINOPHILS 0.4 0.0 - 0.4 K/UL    ABS. BASOPHILS 0.1 0.0 - 0.1 K/UL    ABS. IMM. GRANS. 0.2 (H) 0.00 - 0.04 K/UL    DF SMEAR SCANNED      PLATELET COMMENTS Large Platelets      RBC COMMENTS ANISOCYTOSIS  1+       C REACTIVE PROTEIN, QT    Collection Time: 09/30/20  5:10 AM   Result Value Ref Range    C-Reactive protein 1.17 (H) 0.00 - 0.60 mg/dL     CT scan of Abdomen and Pelvis (9/22/2020):  No apparent abscess. Scant intraperitoneal gas consistent with POD#5.  Ileus versus distal small bowel obstruction (at the ileostomy).             Assessment:     Principal Problem:    Malignant neoplasm of sigmoid colon ([address] (9/14/2020)    Active Problems:    Hydronephrosis (9/11/2020)      Pelvic mass (9/11/2020)      Severe protein-calorie malnutrition ([address] (9/16/2020)      Anemia (9/16/2020)      Thrombocytopenia ([address] (9/19/2020)        13 Days Post-Op s/p Low anterior resection, mobilization of the splenic flexure, coloproctostomy, and creation of loop ileostomy.   [Total abdominal hysterectomy and left salpingo-oophorectomy performed by Joe Mcgraw MD]  [Cystoscopy and placement of left ureteral catheter performed by Alpa Richard MD]  [Distal right ureterectomy performed by Alpa Richard MD]  [Right ureteral re-implant with psoas hitch and placement of right ureteral stent performed by Liliane Dc MD]     Transferred to [facility] (stepdown) from recovery room. Transferred to remote telemetry on 9/24/2020.     Hemodynamically stable but persistently tachycardic. Prn metoprolol begun on 9/20/2020 to reduce heart rate, but not being given frequently. Started scheduled dose of metoprolol yesterday evening, but it will often need to be held secondary to blood pressure concerns. Hemoglobin low but acceptable. Thrombocytopenia resolved.       Urine output adequate. Creatinine within normal limits. Drain fluid creatinine was similar to serum level on 9/24/2020.  There was no evidence of a urine leak.  The VALENTINE drain was removed on 9/25/2020. Hogan catheter in place and should remain for 14 days post-op (per Dr. Willi Mclean). Arrangements have been made for cystoscopy and catheter removal to be performed as an outpatient at [facility] on 10/6/2020.     Yesterday the leukocytosis resolved. The WBC is within normal limits for the second consecutive day. No apparent wound infection. No pneumonia. No UTI. Blood and drain fluid culture results are final.  Blood was negative.  Drain fluid had yeast in the broth only. Zosyn was begun on 9/20/2020 and discontinued by ID on 9/29/2020. Oral Diflucan was begun on 9/26/2020 and replaced by Eraxis on 9/28/2020 by ID (Dr. Kurtis Cole).        Malnutrition was treated with TPN, which was discontinued yesterday. GI function has returned, and the patient now seems to be tolerating th elow fiber diet. Ileus versus obstruction, possibly at the level of the ileostomy, appears to have resolved. Ileostomy prolapse was first noticed in the evening on 9/21/2020, and it could be interfering with bowel function.     Hypophosphatemia finally corrected.     Receiving physical therapy.     Receiving ostomy education. Nearly independent with ostomy care. Hopefully dischargeable tomorrow.     Plan:   Continue low fiber diet. Continue Ensure Enlive. Consult Hospitalist for opinion about tachycardia and whether or not it should keep the patient in the hospital.    Continue Eraxis until 10/5/2020 per ID.     Continue Lovenox for additional DVT prophylaxis.     Hogan catheter to remain until removed as an outpatient on 10/6/2020.     Ambulate. Physical therapy. Ostomy education. Incentive spirometer.

## 2020-10-01 ENCOUNTER — APPOINTMENT (OUTPATIENT)
Dept: NON INVASIVE DIAGNOSTICS | Age: 70
DRG: 329 | End: 2020-10-01
Attending: INTERNAL MEDICINE
Payer: MEDICARE

## 2020-10-01 LAB
ATRIAL RATE: 101 BPM
BASOPHILS # BLD: 0.1 K/UL (ref 0–0.1)
BASOPHILS NFR BLD: 1 % (ref 0–1)
CALCULATED P AXIS, ECG09: 70 DEGREES
CALCULATED R AXIS, ECG10: 73 DEGREES
CALCULATED T AXIS, ECG11: 65 DEGREES
DIAGNOSIS, 93000: NORMAL
DIFFERENTIAL METHOD BLD: ABNORMAL
EOSINOPHIL # BLD: 0.5 K/UL (ref 0–0.4)
EOSINOPHIL NFR BLD: 5 % (ref 0–7)
ERYTHROCYTE [DISTWIDTH] IN BLOOD BY AUTOMATED COUNT: ABNORMAL % (ref 11.5–14.5)
HCT VFR BLD AUTO: 29.3 % (ref 35–47)
HGB BLD-MCNC: 9 G/DL (ref 11.5–16)
IMM GRANULOCYTES # BLD AUTO: 0.1 K/UL (ref 0–0.04)
IMM GRANULOCYTES NFR BLD AUTO: 1 % (ref 0–0.5)
LYMPHOCYTES # BLD: 1.6 K/UL (ref 0.8–3.5)
LYMPHOCYTES NFR BLD: 17 % (ref 12–49)
MCH RBC QN AUTO: 24.7 PG (ref 26–34)
MCHC RBC AUTO-ENTMCNC: 30.7 G/DL (ref 30–36.5)
MCV RBC AUTO: 80.3 FL (ref 80–99)
MONOCYTES # BLD: 0.6 K/UL (ref 0–1)
MONOCYTES NFR BLD: 6 % (ref 5–13)
NEUTS SEG # BLD: 6.8 K/UL (ref 1.8–8)
NEUTS SEG NFR BLD: 70 % (ref 32–75)
NRBC # BLD: 0 K/UL (ref 0–0.01)
NRBC BLD-RTO: 0 PER 100 WBC
P-R INTERVAL, ECG05: 128 MS
PLATELET # BLD AUTO: 646 K/UL (ref 150–400)
PMV BLD AUTO: 9 FL (ref 8.9–12.9)
Q-T INTERVAL, ECG07: 342 MS
QRS DURATION, ECG06: 88 MS
QTC CALCULATION (BEZET), ECG08: 443 MS
RBC # BLD AUTO: 3.65 M/UL (ref 3.8–5.2)
RBC MORPH BLD: ABNORMAL
RBC MORPH BLD: ABNORMAL
VENTRICULAR RATE, ECG03: 101 BPM
WBC # BLD AUTO: 9.7 K/UL (ref 3.6–11)

## 2020-10-01 PROCEDURE — 74011250636 HC RX REV CODE- 250/636: Performed by: INTERNAL MEDICINE

## 2020-10-01 PROCEDURE — 85025 COMPLETE CBC W/AUTO DIFF WBC: CPT

## 2020-10-01 PROCEDURE — 77030012865 HC BG URIN LEG MDII -A

## 2020-10-01 PROCEDURE — 74011000258 HC RX REV CODE- 258: Performed by: INTERNAL MEDICINE

## 2020-10-01 PROCEDURE — 36415 COLL VENOUS BLD VENIPUNCTURE: CPT

## 2020-10-01 PROCEDURE — 74011250636 HC RX REV CODE- 250/636: Performed by: COLON & RECTAL SURGERY

## 2020-10-01 PROCEDURE — 65270000029 HC RM PRIVATE

## 2020-10-01 PROCEDURE — 74011250637 HC RX REV CODE- 250/637: Performed by: COLON & RECTAL SURGERY

## 2020-10-01 PROCEDURE — 77030040831 HC BAG URINE DRNG MDII -A

## 2020-10-01 RX ADMIN — SODIUM CHLORIDE 100 MG: 9 INJECTION, SOLUTION INTRAVENOUS at 14:09

## 2020-10-01 RX ADMIN — Medication 10 ML: at 14:09

## 2020-10-01 RX ADMIN — ENOXAPARIN SODIUM 40 MG: 40 INJECTION SUBCUTANEOUS at 10:33

## 2020-10-01 RX ADMIN — Medication 10 ML: at 22:00

## 2020-10-01 RX ADMIN — Medication 10 ML: at 06:00

## 2020-10-01 RX ADMIN — PANTOPRAZOLE SODIUM 40 MG: 40 TABLET, DELAYED RELEASE ORAL at 06:55

## 2020-10-01 NOTE — PROGRESS NOTES
Nutrition Note    Brief Note:    Consult received to speak with pt about her diet for home, thank you for the consult. Met with pt this morning, and reviewed the low fiber diet, discussed good protein/vitamin/mineral options, all of pt's questions were answered at that time. Plan is for discharge home today. Thank you again for the consult.       Electronically signed by Arianna Ahumada RD, CSP on 10/1/2020 at 10:45 AM    Contact: [phone]

## 2020-10-01 NOTE — PROGRESS NOTES
Bedside shift change report given to ZAKIA Bonds (oncoming nurse) by Keiko Dos Santos RN (offgoing nurse). Report included the following information SBAR, Kardex, Procedure Summary, Intake/Output, MAR and Recent Results.

## 2020-10-01 NOTE — WOUND CARE
Ostomy Visit: in with Emile Starr this afternoon; she and WOC changed appliance yesterday - Ebbie Watts did the change; remains intact. Ostomy output becoming thicker. Stoma moist and pink/  She was to discharge home today but decided on going to SNF; she and I will work together tomorrow at 9:30 to allow her to change again for continued practice/skill prior to discharge.   Bari Palmer, [address] [place] [address] Drive  Office [phone]  Pager # [phone]

## 2020-10-01 NOTE — PROGRESS NOTES
General Daily Progress Note    Admission Date: 9/11/2020  Hospital Day 21  Post-Op Day 14  Subjective:   No complaints. Feels ready top go home. Objective:     Patient Vitals for the past 24 hrs:   BP Temp Pulse Resp SpO2 Weight   10/01/20 0752 111/74 97.4 °F (36.3 °C) (!) 102 18 100 % --   10/01/20 0408 106/69 98.4 °F (36.9 °C) (!) 103 20 100 % 55.2 kg (121 lb 11.1 oz)   09/30/20 2321 129/81 98.2 °F (36.8 °C) 98 18 99 % --   09/30/20 2052 113/67 97.6 °F (36.4 °C) (!) 107 18 100 % --   09/30/20 1633 115/74 97.5 °F (36.4 °C) (!) 112 20 99 % --   09/30/20 1200 106/70 97.7 °F (36.5 °C) (!) 117 20 99 % --     No intake/output data recorded. 09/29 1901 - 10/01 0700  In: 260 [I.V.:260]  Out: 3825 [Urine:1925]    Physical Examination:  General Appearance - No distress. Heart - Mild sinus tachycardia. Abdomen -  Softer. Ileostomy prolapsing but viable. Midline incision clean, dry, intact, and without associated erythema.  - Hogan catheter draining normal-appearing urine. Neurological - Alert and oriented.             Data Review   Recent Results (from the past 24 hour(s))   EKG, 12 LEAD, INITIAL    Collection Time: 09/30/20  4:55 PM   Result Value Ref Range    Ventricular Rate 101 BPM    Atrial Rate 101 BPM    P-R Interval 128 ms    QRS Duration 88 ms    Q-T Interval 342 ms    QTC Calculation (Bezet) 443 ms    Calculated P Axis 70 degrees    Calculated R Axis 73 degrees    Calculated T Axis 65 degrees    Diagnosis       Sinus tachycardia  Increased R/S ratio in V1, consider early transition or posterior infarct  No previous ECGs available  Confirmed by Leonel Boswell ([id number]) on 10/1/2020 6:51:29 AM     TROPONIN I    Collection Time: 09/30/20  6:58 PM   Result Value Ref Range    Troponin-I, Qt. <0.05 <0.05 ng/mL   NT-PRO BNP    Collection Time: 09/30/20  6:58 PM   Result Value Ref Range    NT pro-BNP 37 <125 PG/ML   CBC WITH AUTOMATED DIFF    Collection Time: 10/01/20  4:51 AM   Result Value Ref Range    WBC 9.7 3.6 - 11.0 K/uL    RBC 3.65 (L) 3.80 - 5.20 M/uL    HGB 9.0 (L) 11.5 - 16.0 g/dL    HCT 29.3 (L) 35.0 - 47.0 %    MCV 80.3 80.0 - 99.0 FL    MCH 24.7 (L) 26.0 - 34.0 PG    MCHC 30.7 30.0 - 36.5 g/dL    RDW ABNORMAL 11.5 - 14.5 %    PLATELET 120 (H) 526 - 400 K/uL    MPV 9.0 8.9 - 12.9 FL    NRBC 0.0 0  WBC    ABSOLUTE NRBC 0.00 0.00 - 0.01 K/uL    NEUTROPHILS 70 32 - 75 %    LYMPHOCYTES 17 12 - 49 %    MONOCYTES 6 5 - 13 %    EOSINOPHILS 5 0 - 7 %    BASOPHILS 1 0 - 1 %    IMMATURE GRANULOCYTES 1 (H) 0.0 - 0.5 %    ABS. NEUTROPHILS 6.8 1.8 - 8.0 K/UL    ABS. LYMPHOCYTES 1.6 0.8 - 3.5 K/UL    ABS. MONOCYTES 0.6 0.0 - 1.0 K/UL    ABS. EOSINOPHILS 0.5 (H) 0.0 - 0.4 K/UL    ABS. BASOPHILS 0.1 0.0 - 0.1 K/UL    ABS. IMM. GRANS. 0.1 (H) 0.00 - 0.04 K/UL    DF SMEAR SCANNED      RBC COMMENTS ANISOCYTOSIS  1+        RBC COMMENTS HYPOCHROMIA  1+         CT scan of Abdomen and Pelvis (9/22/2020):  No apparent abscess. Scant intraperitoneal gas consistent with POD#5.  Ileus versus distal small bowel obstruction (at the ileostomy). Assessment:     Principal Problem:    Malignant neoplasm of sigmoid colon ([address] (9/14/2020)    Active Problems:    Hydronephrosis (9/11/2020)      Pelvic mass (9/11/2020)      Severe protein-calorie malnutrition ([address] (9/16/2020)      Anemia (9/16/2020)      Thrombocytopenia ([address] (9/19/2020)        14 Days Post-Op s/p Low anterior resection, mobilization of the splenic flexure, coloproctostomy, and creation of loop ileostomy. [Total abdominal hysterectomy and left salpingo-oophorectomy performed by Moris Anne MD]  [Cystoscopy and placement of left ureteral catheter performed by Micaela Gonzalez MD]  [Distal right ureterectomy performed by David Au III, MD]  [Right ureteral re-implant with psoas hitch and placement of right ureteral stent performed by Eliazar Galvin MD]     Transferred to [id number] (stepdown) from recovery room.   Transferred to remote telemetry on 9/24/2020.     Hemodynamically stable but persistently tachycardic. Prn metoprolol begun on 9/20/2020 to reduce heart rate, but not being given frequently. Started scheduled dose of metoprolol on 9/29/2020. Hemoglobin low but acceptable. Thrombocytopenia resolved. Hospitalist was consulted yesterday regarding the tachycardia. Their assistance is appreciated. Echocardiogram has been ordered.         Urine output adequate. Creatinine within normal limits. Drain fluid creatinine was similar to serum level on 9/24/2020.  There was no evidence of a urine leak.  The VALENTINE drain was removed on 9/25/2020. Hogan catheter in place and should remain for 14 days post-op (per Dr. Chiquis Cash). Arrangements have been made for cystoscopy and catheter removal to be performed as an outpatient at [facility] on 10/6/2020.     On 9/29/2020, the leukocytosis resolved. Nasim Hari WBC is within normal limits for the third consecutive day. No apparent wound infection. No pneumonia. No UTI. Blood and drain fluid culture results are final.  Blood was negative.  Drain fluid had yeast in the broth only. Zosyn was begun on 9/20/2020 and discontinued by ID on 9/29/2020. Oral Diflucan was begun on 9/26/2020 and replaced by Eraxis on 9/28/2020 by ID (Dr. Emmit Mcburney). Malnutrition was treated with TPN, which was discontinued on 9/29/2020. GI function has returned, and the patient now seems to be tolerating th elow fiber diet. Ileus versus obstruction, possibly at the level of the ileostomy, appears to have resolved. Ileostomy prolapse was first noticed in the evening on 9/21/2020, and it could have been interfering with bowel function.     Receiving physical therapy. Nearly independent with ostomy care.       Plan:   Discharge to home this afternoon if echocardiogram is negative. Home health to administer Eraxis via PICC once per day through 10/5/2020. Home health to assist with ostomy care and education.   Home physical therapy. Follow up with [facility] on 10/6/2020 for cystogram and Hogan catheter removal.  Follow up with Dr. Daniel Newby (OPology) on 10/13/2020,  Follow up with me on 10/19/11392. Follow up with DR. Edna Anderson in approximately one month.

## 2020-10-01 NOTE — PROGRESS NOTES
0730: Bedside shift change report given to Karin Saeed RN (oncoming nurse) by Justin Luque RN (offgoing nurse). Report included the following information SBAR, Kardex, Intake/Output, MAR, Accordion, Recent Results and Cardiac Rhythm ST.     5186: Echo called and notified of need for echo in order to discharge. 1200: Bedside shift change report given to Ewelina Mandel RN (oncoming nurse) by Karin Saeed RN (offgoing nurse).  Report included the following information SBAR, Kardex and ED Summary, Cardiac Rhythm ST.

## 2020-10-01 NOTE — PROGRESS NOTES
Per Dr. Syed Mcgraw patient needs to have echo completed tomorrow (10/2) in order to be discharged. Will relay message to oncoming nurse.

## 2020-10-01 NOTE — PROGRESS NOTES
[facility] [facility]  Hospitalist Group                                                                                          Hospitalist Progress Note  Almas Llanos MD  Answering service: [phone] OR [phone] from in house phone        Date of Service:  10/1/2020  NAME:  Jerod Mosley  :  1950  MRN:  [id number]      Admission Summary:   History of present illness  Jerod Mosley is a 72 y.o. female who was admitted on 2020 for abdominal pain. A 72year old female patient with PMH of GERD, chaudhary's esophagus, osteoporosis was admitted for abdominal pain and diagnosed with Sigmoid colon cancer with uterus and ureteral involvement. She underwent  s/p Low anterior resection, mobilization of the splenic flexure, coloproctostomy, and creation of loop ileostomy on  along with Total abdominal hysterectomy and left salpingo-oophorectomy. She had Distal right ureterectomy and Right ureteral re-implant with right ureteral stent on . Colorectal surgery Dr. Jose Drake primary team. Urology and Gyn oncology were involved. Hogan in place. Pt was treated with IV abx zosyn until  and OR cx showed Candida -  On IV Eraxis until 10/5/2020 per ID recommendation. Hospitalist team consulted for tachycardia. Today post day 13, pt overall feeling better. No significant abdominal pain. Tolerating diet. Denied any fever, sob, cough , chest pain or palpitations. No signs of infection at surgical site.              Interval history / Subjective:       10/1/2020: up in room , only complaint is fatigue, no pain       Assessment & Plan:     Impression/Recomendations   Sinus tachycardia  - likely physiological response to infection and surgery  - HR in 's   - pt asymptomatic.  no cardiac history  - EKG showed sinus tachycardia  - Troponin and pro BNP ordered  - echo ordered  - if echo negative, ok to Dc to home   - would not recommend any rate controlling medications  - ECHO pending, expect low yield on study, and anticipate safe for discharge w f/u with pcp /primary surg/oncology      Sigmoid colon cancer s/p surgery with ileostomy on 9/17  S/p ureteral stent with pepper   - colorectal surgery primary team  - c/w IV Eraxis until 10/5 per ID recommendations     Anemia - post op. Hb stable      GERD- ppi  DVT ppx: [facility] Problems  Date Reviewed: 9/14/2020          Codes Class Noted POA    Thrombocytopenia ([facility] ICD-10-CM: D69.6  ICD-9-CM: 287.5  9/19/2020 No        Severe protein-calorie malnutrition (HCC) (Chronic) ICD-10-CM: I05  ICD-9-CM: 262  9/16/2020 Yes        Anemia (Chronic) ICD-10-CM: D64.9  ICD-9-CM: 285.9  9/16/2020 Yes        * (Principal) Malignant neoplasm of sigmoid colon (HCC) (Chronic) ICD-10-CM: C18.7  ICD-9-CM: 153.3  9/14/2020 Yes        Hydronephrosis ICD-10-CM: N13.30  ICD-9-CM: 033  9/11/2020 Unknown        Pelvic mass ICD-10-CM: R19.00  ICD-9-CM: 789.30  9/11/2020 Unknown                Review of Systems:   Pertinent items are noted in HPI. Vital Signs:    Last 24hrs VS reviewed since prior progress note. Most recent are:  Visit Vitals  /74 (BP 1 Location: Left arm, BP Patient Position: At rest)   Pulse (!) 102   Temp 97.4 °F (36.3 °C)   Resp 18   Ht 5' 6\" (1.676 m)   Wt 55.2 kg (121 lb 11.1 oz)   SpO2 100%   Breastfeeding No   BMI 19.64 kg/m²         Intake/Output Summary (Last 24 hours) at 10/1/2020 0845  Last data filed at 10/1/2020 0011  Gross per 24 hour   Intake 260 ml   Output 2925 ml   Net -2665 ml        Physical Examination:             Constitutional:  No acute distress, cooperative, pleasant    ENT:  Oral mucosa moist, oropharynx benign. Resp:  CTA bilaterally. No wheezing/rhonchi/rales. No accessory muscle use   CV:  Regular rhythm, normal rate, no murmurs, gallops, rubs    GI:  Soft, non distended, non tender.  normoactive bowel sounds, no hepatosplenomegaly     Musculoskeletal:  No edema, warm, 2+ pulses throughout    Neurologic:  Moves all extremities. AAOx3, CN II-XII reviewed     Psych:  Good insight, Not anxious nor agitated. Data Review:    Review and/or order of clinical lab test      Labs:     Recent Labs     10/01/20  0451 09/30/20  0510   WBC 9.7 8.7   HGB 9.0* 8.8*   HCT 29.3* 28.0*   * 735*     Recent Labs     09/30/20  0510 09/29/20  0408    135*   K 3.7 3.7    103   CO2 27 26   BUN 11 10   CREA 0.42* 0.37*   GLU 82 78   CA 8.4* 7.7*   MG  --  1.6   PHOS 2.8 2.2*     No results for input(s): ALT, AP, TBIL, TBILI, TP, ALB, GLOB, GGT, AML, LPSE in the last 72 hours. No lab exists for component: SGOT, GPT, AMYP, HLPSE  No results for input(s): INR, PTP, APTT, INREXT in the last 72 hours. No results for input(s): FE, TIBC, PSAT, FERR in the last 72 hours. No results found for: FOL, RBCF   No results for input(s): PH, PCO2, PO2 in the last 72 hours.   Recent Labs     09/30/20  1858   TROIQ <0.05     No results found for: CHOL, CHOLX, CHLST, CHOLV, HDL, HDLP, LDL, LDLC, DLDLP, TGLX, TRIGL, TRIGP, CHHD, CHHDX  Lab Results   Component Value Date/Time    Glucose (POC) 98 09/27/2020 11:39 PM    Glucose (POC) 113 (H) 09/27/2020 05:59 PM    Glucose (POC) 106 (H) 09/27/2020 11:33 AM    Glucose (POC) 111 (H) 09/27/2020 05:32 AM    Glucose (POC) 119 (H) 09/26/2020 11:07 PM     Lab Results   Component Value Date/Time    Color YELLOW/STRAW 09/20/2020 10:20 AM    Appearance CLOUDY (A) 09/20/2020 10:20 AM    Specific gravity 1.026 09/20/2020 10:20 AM    pH (UA) 5.5 09/20/2020 10:20 AM    Protein 100 (A) 09/20/2020 10:20 AM    Glucose Negative 09/20/2020 10:20 AM    Ketone Negative 09/20/2020 10:20 AM    Bilirubin Negative 09/20/2020 10:20 AM    Urobilinogen 0.2 09/20/2020 10:20 AM    Nitrites Negative 09/20/2020 10:20 AM    Leukocyte Esterase TRACE (A) 09/20/2020 10:20 AM    Epithelial cells FEW 09/20/2020 10:20 AM    Bacteria Negative 09/20/2020 10:20 AM    WBC 0-4 09/20/2020 10:20 AM    RBC >100 (H) 09/20/2020 10:20 AM Medications Reviewed:     Current Facility-Administered Medications   Medication Dose Route Frequency    anidulafungin (ERAXIS) 100 mg in 0.9% sodium chloride 130 mL IVPB  100 mg IntraVENous Q24H    acetaminophen (TYLENOL) tablet 1,000 mg  1,000 mg Oral Q6H PRN    pantoprazole (PROTONIX) tablet 40 mg  40 mg Oral ACB    enoxaparin (LOVENOX) injection 40 mg  40 mg SubCUTAneous Q24H    prochlorperazine (COMPAZINE) with saline injection 10 mg  10 mg IntraVENous Q6H PRN    metoprolol (LOPRESSOR) injection 2.5 mg  2.5 mg IntraVENous Q4H PRN    naloxone (NARCAN) injection 0.4 mg  0.4 mg IntraVENous PRN    ondansetron (ZOFRAN ODT) tablet 4 mg  4 mg Oral Q4H PRN    alteplase (CATHFLO) 1 mg in sterile water (preservative free) 1 mL injection  1 mg InterCATHeter PRN    bacitracin 500 unit/gram packet 1 Packet  1 Packet Topical PRN    sodium chloride (NS) flush 5-40 mL  5-40 mL IntraVENous Q8H    sodium chloride (NS) flush 5-40 mL  5-40 mL IntraVENous PRN     ______________________________________________________________________  EXPECTED LENGTH OF STAY: 10d 9h  ACTUAL LENGTH OF STAY:          20                 Manuela Bill MD

## 2020-10-01 NOTE — PROGRESS NOTES
Problem: Falls - Risk of  Goal: *Absence of Falls  Description: Document Natalia Sun Fall Risk and appropriate interventions in the flowsheet.   Outcome: Progressing Towards Goal  Note: Fall Risk Interventions:  Mobility Interventions: Assess mobility with egress test, Patient to call before getting OOB         Medication Interventions: Patient to call before getting OOB, Teach patient to arise slowly    Elimination Interventions: Call light in reach, Patient to call for help with toileting needs    History of Falls Interventions: Door open when patient unattended         Problem: Patient Education: Go to Patient Education Activity  Goal: Patient/Family Education  Outcome: Progressing Towards Goal     Problem: Pain  Goal: *Control of Pain  Outcome: Progressing Towards Goal     Problem: Discharge Planning  Goal: *Discharge to safe environment  Outcome: Progressing Towards Goal  Goal: *Knowledge of medication management  Outcome: Progressing Towards Goal  Goal: *Knowledge of discharge instructions  Outcome: Progressing Towards Goal     Problem: Patient Education: Go to Patient Education Activity  Goal: Patient/Family Education  Outcome: Progressing Towards Goal     Problem: General Medical Care Plan  Goal: *Vital signs within specified parameters  Outcome: Progressing Towards Goal  Goal: *Labs within defined limits  Outcome: Progressing Towards Goal  Goal: *Absence of infection signs and symptoms  Outcome: Progressing Towards Goal  Goal: *Optimal pain control at patient's stated goal  Outcome: Progressing Towards Goal  Goal: *Skin integrity maintained  Outcome: Progressing Towards Goal  Goal: *Fluid volume balance  Outcome: Progressing Towards Goal  Goal: *Optimize nutritional status  Outcome: Progressing Towards Goal  Goal: *Anxiety reduced or absent  Outcome: Progressing Towards Goal  Goal: *Progressive mobility and function (eg: ADL's)  Outcome: Progressing Towards Goal     Problem: Patient Education: Go to Patient Education Activity  Goal: Patient/Family Education  Outcome: Progressing Towards Goal     Problem: Nutrition Deficit  Goal: *Optimize nutritional status  Outcome: Progressing Towards Goal     Problem: Pressure Injury - Risk of  Goal: *Prevention of pressure injury  Description: Document Andry Scale and appropriate interventions in the flowsheet. Outcome: Progressing Towards Goal  Note: Pressure Injury Interventions:  Sensory Interventions: Keep linens dry and wrinkle-free    Moisture Interventions: Check for incontinence Q2 hours and as needed    Activity Interventions: Pressure redistribution bed/mattress(bed type), Increase time out of bed    Mobility Interventions: HOB 30 degrees or less    Nutrition Interventions: Document food/fluid/supplement intake    Friction and Shear Interventions: HOB 30 degrees or less                Problem: Patient Education: Go to Patient Education Activity  Goal: Patient/Family Education  Outcome: Progressing Towards Goal     Problem: Ostomy Care  Goal: *Patient pouching appliance will fit properly and maintain integrity at least three to five days  Description: Infection control procedures (eg, clean dressings, clean gloves, hand washing, precautions to isolate wound from contamination, sterile instruments used for wound debridement) should be implemented.   Outcome: Progressing Towards Goal  Goal: *Acceptance of change in body image  Outcome: Progressing Towards Goal     Problem: Patient Education: Go to Patient Education Activity  Goal: Patient/Family Education  Outcome: Progressing Towards Goal     Problem: Patient Education: Go to Patient Education Activity  Goal: Patient/Family Education  Outcome: Progressing Towards Goal

## 2020-10-01 NOTE — PROGRESS NOTES
OLIVIA:   1. Plan to discharge today. Ms. Reed Diaz lives alone. Referral for the Iv Anidulafungin will have to be paid for at 100% ($ 225.00) per day by the patient. Will try [facility] and see if it is covered. MD will need to fill out the infusion order form-Dr Martinez updated per [employer].

## 2020-10-01 NOTE — PROGRESS NOTES
Bedside shift change report given to Fei Robbins (oncoming nurse) by Sepideh Sykes (offgoing nurse). Report included the following information SBAR.

## 2020-10-01 NOTE — PROGRESS NOTES
Patient lives alone. She has an Ostomy, Hogan and will require IV infusion. she has selected to go to a SNF. [facility] Offered and document signed and placed on hard chart. Given a list of facilities. Referral sent to [place] [phone] per Devante ayala.    Colonel Shari KOEHLER -[phone]

## 2020-10-01 NOTE — PROGRESS NOTES
Problem: Pain  Goal: *Control of Pain  Outcome: Progressing Towards Goal     Problem: Ostomy Care  Goal: *Patient pouching appliance will fit properly and maintain integrity at least three to five days  Description: Infection control procedures (eg, clean dressings, clean gloves, hand washing, precautions to isolate wound from contamination, sterile instruments used for wound debridement) should be implemented.   Outcome: Progressing Towards Goal

## 2020-10-02 ENCOUNTER — APPOINTMENT (OUTPATIENT)
Dept: NON INVASIVE DIAGNOSTICS | Age: 70
DRG: 329 | End: 2020-10-02
Attending: INTERNAL MEDICINE
Payer: MEDICARE

## 2020-10-02 LAB
BACTERIA SPEC CULT: ABNORMAL
GRAM STN SPEC: ABNORMAL
GRAM STN SPEC: ABNORMAL
SERVICE CMNT-IMP: ABNORMAL

## 2020-10-02 PROCEDURE — C8929 TTE W OR WO FOL WCON,DOPPLER: HCPCS

## 2020-10-02 PROCEDURE — 74011000250 HC RX REV CODE- 250: Performed by: COLON & RECTAL SURGERY

## 2020-10-02 PROCEDURE — 74011250636 HC RX REV CODE- 250/636: Performed by: INTERNAL MEDICINE

## 2020-10-02 PROCEDURE — 74011250637 HC RX REV CODE- 250/637: Performed by: COLON & RECTAL SURGERY

## 2020-10-02 PROCEDURE — 74011250636 HC RX REV CODE- 250/636: Performed by: COLON & RECTAL SURGERY

## 2020-10-02 PROCEDURE — 65270000029 HC RM PRIVATE

## 2020-10-02 PROCEDURE — 87635 SARS-COV-2 COVID-19 AMP PRB: CPT

## 2020-10-02 PROCEDURE — 74011000258 HC RX REV CODE- 258: Performed by: INTERNAL MEDICINE

## 2020-10-02 RX ADMIN — Medication 10 ML: at 07:18

## 2020-10-02 RX ADMIN — SODIUM CHLORIDE 100 MG: 9 INJECTION, SOLUTION INTRAVENOUS at 12:05

## 2020-10-02 RX ADMIN — Medication 10 ML: at 22:01

## 2020-10-02 RX ADMIN — SODIUM CHLORIDE 1.5 ML: 9 INJECTION INTRAMUSCULAR; INTRAVENOUS; SUBCUTANEOUS at 12:00

## 2020-10-02 RX ADMIN — Medication 10 ML: at 14:41

## 2020-10-02 RX ADMIN — ENOXAPARIN SODIUM 40 MG: 40 INJECTION SUBCUTANEOUS at 11:36

## 2020-10-02 RX ADMIN — PANTOPRAZOLE SODIUM 40 MG: 40 TABLET, DELAYED RELEASE ORAL at 07:18

## 2020-10-02 NOTE — PROGRESS NOTES
General Daily Progress Note    Admission Date: 9/11/2020  Hospital Day 22  Post-Op Day 15  Subjective:   No new complaints. Objective:     Patient Vitals for the past 24 hrs:   BP Temp Pulse Resp SpO2 Weight   10/02/20 0704 108/67 97.7 °F (36.5 °C) (!) 102 20 96 % 49.5 kg (109 lb 2 oz)   10/02/20 0302 113/70 97.8 °F (36.6 °C) 100 24 95 % --   10/01/20 2346 112/64 97.9 °F (36.6 °C) (!) 110 22 96 % --   10/01/20 2008 103/69 97.8 °F (36.6 °C) (!) 114 18 95 % --   10/01/20 1614 106/71 97.7 °F (36.5 °C) (!) 113 18 97 % --   10/01/20 1230 104/73 97.8 °F (36.6 °C) (!) 119 20 98 % --   10/01/20 1156 122/72 98.9 °F (37.2 °C) (!) 116 18 96 % --     10/02 0701 - 10/02 1900  In: -   Out: 575 [Urine:575]  09/30 1901 - 10/02 0700  In: 260 [I.V.:260]  Out: 2600 [Urine:1125]    Physical Examination:  General Appearance - No distress. Heart - Mild sinus tachycardia.  - Hogan catheter draining normal-appearing urine. Neurological - Alert and oriented. Data Review No results found for this or any previous visit (from the past 24 hour(s)). CT scan of Abdomen and Pelvis (9/22/2020):  No apparent abscess. Scant intraperitoneal gas consistent with POD#5.  Ileus versus distal small bowel obstruction (at the ileostomy). Assessment:     Principal Problem:    Malignant neoplasm of sigmoid colon ([address] (9/14/2020)    Active Problems:    Hydronephrosis (9/11/2020)      Pelvic mass (9/11/2020)      Severe protein-calorie malnutrition ([address] (9/16/2020)      Anemia (9/16/2020)      Thrombocytopenia ([address] (9/19/2020)        15 Days Post-Op s/p Low anterior resection, mobilization of the splenic flexure, coloproctostomy, and creation of loop ileostomy.   [Total abdominal hysterectomy and left salpingo-oophorectomy performed by Jorje Chiu MD]  [Cystoscopy and placement of left ureteral catheter performed by Atul Vital MD]  [Distal right ureterectomy performed by Atul Vital MD]  Emery calzada re-implant with psoas hitch and placement of right ureteral stent performed by José Miguel Galvin MD]     Transferred to [address] (stepdown) from recovery room. Transferred to remote telemetry on 9/24/2020.     Hemodynamically stable but persistently tachycardic. Prn metoprolol begun on 9/20/2020 to reduce heart rate, but not being given frequently. Started scheduled dose of metoprolol on 9/29/2020. Hemoglobin low but acceptable. Thrombocytopenia resolved. Hospitalist was consulted on 9/30/2020 regarding the tachycardia. Their assistance is appreciated. Echocardiogram was ordered for 10/1/2020, but it did not get done.         Urine output adequate. Creatinine within normal limits. Drain fluid creatinine was similar to serum level on 9/24/2020.  There was no evidence of a urine leak.  The VALENTINE drain was removed on 9/25/2020. Hogan catheter in place and should remain for 14 days post-op (per Dr. Amilcar Reyes). Arrangements have been made for cystoscopy and catheter removal to be performed as an outpatient at [facility] on 10/6/2020.     On 9/29/2020, the leukocytosis resolved. No apparent wound infection. No pneumonia. No UTI. Blood and drain fluid culture results are final.  Blood was negative.  Drain fluid had yeast in the broth only. Zosyn was begun on 9/20/2020 and discontinued by ID on 9/29/2020. Oral Diflucan was begun on 9/26/2020 and replaced by Eraxis on 9/28/2020 by ID (Dr. Terry Zelaya).     Malnutrition was treated with TPN, which was discontinued on 9/29/2020. GI function has returned, and the patient now seems to be tolerating th elow fiber diet. Ileus versus obstruction, possibly at the level of the ileostomy, appears to have resolved. Ileostomy prolapse was first noticed in the evening on 9/21/2020, and it could have been interfering with bowel function.     Receiving physical therapy. Nearly independent with ostomy care.     The patient decided yesterday that she wants to go to SNF instead of directly home. Consequently, we now have to test her for COVID-19 again.       Plan:   Discharge to SNF ASAP if echocardiogram is negative and COVID-19 test is negative.     SNF to administer Eraxis via PICC once per day through 10/5/2020. SNF to assist with ostomy care and education. Physical therapy at SNF.     Follow up with [facility] on 10/6/2020 for cystogram and Hogan catheter removal.  Follow up with Dr. Geo Robertson (OPology) on 10/13/2020,  Follow up with me on 10/19/86891. Follow up with DR. Rhonda Plunkett in approximately one month.

## 2020-10-02 NOTE — PROGRESS NOTES
[facility]  Hospitalist Group                                                                                          Hospitalist Progress Note  Claudia Henson MD  Answering service: [phone] OR [phone] from in house phone        Date of Service:  10/2/2020  NAME:  Eugenia Alaniz  :  1950  MRN:  [id number]      Admission Summary:   History of present illness  Eugenia Alaniz is a 72 y.o. female who was admitted on 2020 for abdominal pain. A 72year old female patient with PMH of GERD, chaudhary's esophagus, osteoporosis was admitted for abdominal pain and diagnosed with Sigmoid colon cancer with uterus and ureteral involvement. She underwent  s/p Low anterior resection, mobilization of the splenic flexure, coloproctostomy, and creation of loop ileostomy on  along with Total abdominal hysterectomy and left salpingo-oophorectomy. She had Distal right ureterectomy and Right ureteral re-implant with right ureteral stent on . Colorectal surgery Dr. Noy Olguin primary team. Urology and Gyn oncology were involved. Hogan in place. Pt was treated with IV abx zosyn until  and OR cx showed Candida -  On IV Eraxis until 10/5/2020 per ID recommendation. Hospitalist team consulted for tachycardia. Today post day 13, pt overall feeling better. No significant abdominal pain. Tolerating diet. Denied any fever, sob, cough , chest pain or palpitations. No signs of infection at surgical site. Interval history / Subjective:       10/2/2020:     No murmur, heart rate improved 93 at time of am rounds. Pt eating and up in room to bathroom without difficulties, no c/o n/v/pain,    ECHO pending but given current history and exam, expect low yield. .      Assessment & Plan:     Impression/Recomendations   Sinus tachycardia  - likely physiological response to infection and surgery  - HR in 's   - pt asymptomatic.  no cardiac history  - EKG showed sinus tachycardia  - Troponin and pro BNP ordered  - echo ordered  - if echo negative, ok to Dc to home   - would not recommend any rate controlling medications  - ECHO pending, expect low yield on study, and anticipate safe for discharge w f/u with pcp /primary surg/oncology   10/2/2020 : No murmur, heart rate improved 93 at time of am rounds. Pt eating and up in room to bathroom without difficulties, no c/o n/v/pain,    ECHO pending but given current history and exam, expect low yield. .      Sigmoid colon cancer s/p surgery with ileostomy on 9/17  S/p ureteral stent with pepper   - colorectal surgery primary team  - c/w IV Eraxis until 10/5 per ID recommendations     Anemia - post op. Hb stable      GERD- ppi  DVT ppx: [facility] Problems  Date Reviewed: 9/14/2020          Codes Class Noted POA    Thrombocytopenia ([facility] [address] ICD-10-CM: D69.6  ICD-9-CM: 287.5  9/19/2020 No        Severe protein-calorie malnutrition (HCC) (Chronic) ICD-10-CM: L93  ICD-9-CM: 262  9/16/2020 Yes        Anemia (Chronic) ICD-10-CM: D64.9  ICD-9-CM: 285.9  9/16/2020 Yes        * (Principal) Malignant neoplasm of sigmoid colon (HCC) (Chronic) ICD-10-CM: C18.7  ICD-9-CM: 153.3  9/14/2020 Yes        Hydronephrosis ICD-10-CM: N13.30  ICD-9-CM: 856  9/11/2020 Unknown        Pelvic mass ICD-10-CM: R19.00  ICD-9-CM: 789.30  9/11/2020 Unknown                Review of Systems:   Pertinent items are noted in HPI. Vital Signs:    Last 24hrs VS reviewed since prior progress note.  Most recent are:  Visit Vitals  /67 (BP 1 Location: Left arm, BP Patient Position: At rest)   Pulse (!) 102   Temp 97.7 °F (36.5 °C)   Resp 20   Ht 5' 6\" (1.676 m)   Wt 49.5 kg (109 lb 2 oz)   SpO2 96%   Breastfeeding No   BMI 17.61 kg/m²         Intake/Output Summary (Last 24 hours) at 10/2/2020 0816  Last data filed at 10/2/2020 0707  Gross per 24 hour   Intake --   Output 1550 ml   Net -1550 ml        Physical Examination:             Constitutional:  No acute distress, cooperative, pleasant    ENT:  Oral mucosa moist, oropharynx benign. Resp:  CTA bilaterally. No wheezing/rhonchi/rales. No accessory muscle use   CV:  Regular rhythm, normal rate, no murmurs, gallops, rubs    GI:  Soft, non distended, non tender. normoactive bowel sounds, no hepatosplenomegaly     Musculoskeletal:  No edema, warm, 2+ pulses throughout    Neurologic:  Moves all extremities. AAOx3, CN II-XII reviewed     Psych:  Good insight, Not anxious nor agitated. Data Review:    Review and/or order of clinical lab test      Labs:     Recent Labs     10/01/20  0451 09/30/20  0510   WBC 9.7 8.7   HGB 9.0* 8.8*   HCT 29.3* 28.0*   * 735*     Recent Labs     09/30/20  0510      K 3.7      CO2 27   BUN 11   CREA 0.42*   GLU 82   CA 8.4*   PHOS 2.8     No results for input(s): ALT, AP, TBIL, TBILI, TP, ALB, GLOB, GGT, AML, LPSE in the last 72 hours. No lab exists for component: SGOT, GPT, AMYP, HLPSE  No results for input(s): INR, PTP, APTT, INREXT, INREXT in the last 72 hours. No results for input(s): FE, TIBC, PSAT, FERR in the last 72 hours. No results found for: FOL, RBCF   No results for input(s): PH, PCO2, PO2 in the last 72 hours.   Recent Labs     09/30/20  1858   TROIQ <0.05     No results found for: CHOL, CHOLX, CHLST, CHOLV, HDL, HDLP, LDL, LDLC, DLDLP, TGLX, TRIGL, TRIGP, CHHD, CHHDX  Lab Results   Component Value Date/Time    Glucose (POC) 98 09/27/2020 11:39 PM    Glucose (POC) 113 (H) 09/27/2020 05:59 PM    Glucose (POC) 106 (H) 09/27/2020 11:33 AM    Glucose (POC) 111 (H) 09/27/2020 05:32 AM    Glucose (POC) 119 (H) 09/26/2020 11:07 PM     Lab Results   Component Value Date/Time    Color YELLOW/STRAW 09/20/2020 10:20 AM    Appearance CLOUDY (A) 09/20/2020 10:20 AM    Specific gravity 1.026 09/20/2020 10:20 AM    pH (UA) 5.5 09/20/2020 10:20 AM    Protein 100 (A) 09/20/2020 10:20 AM    Glucose Negative 09/20/2020 10:20 AM    Ketone Negative 09/20/2020 10:20 AM    Bilirubin Negative 09/20/2020 10:20 AM    Urobilinogen 0.2 09/20/2020 10:20 AM    Nitrites Negative 09/20/2020 10:20 AM    Leukocyte Esterase TRACE (A) 09/20/2020 10:20 AM    Epithelial cells FEW 09/20/2020 10:20 AM    Bacteria Negative 09/20/2020 10:20 AM    WBC 0-4 09/20/2020 10:20 AM    RBC >100 (H) 09/20/2020 10:20 AM         Medications Reviewed:     Current Facility-Administered Medications   Medication Dose Route Frequency    anidulafungin (ERAXIS) 100 mg in 0.9% sodium chloride 130 mL IVPB  100 mg IntraVENous Q24H    acetaminophen (TYLENOL) tablet 1,000 mg  1,000 mg Oral Q6H PRN    pantoprazole (PROTONIX) tablet 40 mg  40 mg Oral ACB    enoxaparin (LOVENOX) injection 40 mg  40 mg SubCUTAneous Q24H    prochlorperazine (COMPAZINE) with saline injection 10 mg  10 mg IntraVENous Q6H PRN    metoprolol (LOPRESSOR) injection 2.5 mg  2.5 mg IntraVENous Q4H PRN    naloxone (NARCAN) injection 0.4 mg  0.4 mg IntraVENous PRN    ondansetron (ZOFRAN ODT) tablet 4 mg  4 mg Oral Q4H PRN    alteplase (CATHFLO) 1 mg in sterile water (preservative free) 1 mL injection  1 mg InterCATHeter PRN    bacitracin 500 unit/gram packet 1 Packet  1 Packet Topical PRN    sodium chloride (NS) flush 5-40 mL  5-40 mL IntraVENous Q8H    sodium chloride (NS) flush 5-40 mL  5-40 mL IntraVENous PRN     ______________________________________________________________________  EXPECTED LENGTH OF STAY: 10d 9h  ACTUAL LENGTH OF STAY:          21                 Cristina Christianson MD

## 2020-10-02 NOTE — PROGRESS NOTES
615 [facility] visit. Prayer and communion offered. Talked about some of her plans for discharge.     RACH Dias, RN, ACSW, LCSW   Page:  [phone]

## 2020-10-02 NOTE — WOUND CARE
Ostomy Consult: Follow Up Visit. Chart reviewed. Consulted for ostomy care/teaching for patient with diverting loop ileostomy secondary to invasive colon cancer. Spoke with patients nurse,  Princess Long at bedside. Patient is resting on a versacare bed with accumax mattress. Yg Smith is alert and oriented x 4; she is emptying her ileostomy appropriately and today we are changing together. Assessment:  RLQ ileostomy - moist red stoma, less protrusion and less edematous; darren under stoma (red rubber) without tension; surrounding skin is intact, no redness. Output is thick brown. 200 was emptied by patient. Midline incision is well approximated/healing without staples, LAUREN. Teaching/Treatment:  Patient measured with me; back down to 45 cm from 50 cm - she cut out appliance and placed cohesive seal around opening of wafer and placed to skin. Ostomy Recommendations:  Using one piece appliance with cohesive ring effective in achieving good seal; thickening and slowing down of output helps greatly as well. She has plenty of supplies for discharge and written information as well as our contact number for post rehab if needed. Continue to monitor nutrition, pain, and skin risk scale, and skin assessment. Plan: We will continue to follow.   Susan Rios RN,[facility]    Wound Healing Office [phone]  Pager [phone]

## 2020-10-02 NOTE — PROGRESS NOTES
OLIVIA:  1. Expected to discharge to SNF,    9:00 am Patient lives alone. Spoke with Eamon from [facility] and is interested but will need a Covalt test and they will have to investigate the cost of the IV anidulafungin. Dr Nikko Downs updated.

## 2020-10-03 LAB
BASOPHILS # BLD: 0.1 K/UL (ref 0–0.1)
BASOPHILS NFR BLD: 1 % (ref 0–1)
DIFFERENTIAL METHOD BLD: ABNORMAL
EOSINOPHIL # BLD: 0.7 K/UL (ref 0–0.4)
EOSINOPHIL NFR BLD: 9 % (ref 0–7)
ERYTHROCYTE [DISTWIDTH] IN BLOOD BY AUTOMATED COUNT: ABNORMAL % (ref 11.5–14.5)
HCT VFR BLD AUTO: 28.9 % (ref 35–47)
HEALTH STATUS, XMCV2T: NORMAL
HGB BLD-MCNC: 9.1 G/DL (ref 11.5–16)
IMM GRANULOCYTES # BLD AUTO: 0.2 K/UL (ref 0–0.04)
IMM GRANULOCYTES NFR BLD AUTO: 2 % (ref 0–0.5)
LYMPHOCYTES # BLD: 1.6 K/UL (ref 0.8–3.5)
LYMPHOCYTES NFR BLD: 20 % (ref 12–49)
MCH RBC QN AUTO: 25.2 PG (ref 26–34)
MCHC RBC AUTO-ENTMCNC: 31.5 G/DL (ref 30–36.5)
MCV RBC AUTO: 80.1 FL (ref 80–99)
MONOCYTES # BLD: 0.7 K/UL (ref 0–1)
MONOCYTES NFR BLD: 8 % (ref 5–13)
NEUTS SEG # BLD: 4.9 K/UL (ref 1.8–8)
NEUTS SEG NFR BLD: 60 % (ref 32–75)
NRBC # BLD: 0 K/UL (ref 0–0.01)
NRBC BLD-RTO: 0 PER 100 WBC
PLATELET # BLD AUTO: 601 K/UL (ref 150–400)
PMV BLD AUTO: 9 FL (ref 8.9–12.9)
RBC # BLD AUTO: 3.61 M/UL (ref 3.8–5.2)
RBC MORPH BLD: ABNORMAL
SARS-COV-2, COV2: NOT DETECTED
SOURCE, COVRS: NORMAL
SPECIMEN SOURCE, FCOV2M: NORMAL
SPECIMEN TYPE, XMCV1T: NORMAL
WBC # BLD AUTO: 8.2 K/UL (ref 3.6–11)

## 2020-10-03 PROCEDURE — 85025 COMPLETE CBC W/AUTO DIFF WBC: CPT

## 2020-10-03 PROCEDURE — 74011250636 HC RX REV CODE- 250/636: Performed by: INTERNAL MEDICINE

## 2020-10-03 PROCEDURE — 74011250636 HC RX REV CODE- 250/636: Performed by: COLON & RECTAL SURGERY

## 2020-10-03 PROCEDURE — 36415 COLL VENOUS BLD VENIPUNCTURE: CPT

## 2020-10-03 PROCEDURE — 74011250637 HC RX REV CODE- 250/637: Performed by: COLON & RECTAL SURGERY

## 2020-10-03 PROCEDURE — 74011000258 HC RX REV CODE- 258: Performed by: INTERNAL MEDICINE

## 2020-10-03 PROCEDURE — 65270000029 HC RM PRIVATE

## 2020-10-03 RX ADMIN — ENOXAPARIN SODIUM 40 MG: 40 INJECTION SUBCUTANEOUS at 10:37

## 2020-10-03 RX ADMIN — SODIUM CHLORIDE 100 MG: 9 INJECTION, SOLUTION INTRAVENOUS at 13:19

## 2020-10-03 RX ADMIN — Medication 10 ML: at 06:44

## 2020-10-03 RX ADMIN — PANTOPRAZOLE SODIUM 40 MG: 40 TABLET, DELAYED RELEASE ORAL at 06:44

## 2020-10-03 RX ADMIN — Medication 10 ML: at 13:19

## 2020-10-03 NOTE — PROGRESS NOTES
Bedside shift change report given to Efra Logan (oncoming nurse) by Clementina Fernandez (offgoing nurse).  Report included the following information SBAR, Kardex, ED Summary, OR Summary, Procedure Summary, Intake/Output, MAR, Accordion, Recent Results, Med Rec Status and Cardiac Rhythm ST.

## 2020-10-03 NOTE — ROUTINE PROCESS
Bedside shift change report given to Guillermo (oncoming nurse) by Sophie Ray (offgoing nurse). Report included the following information SBAR, Kardex, Intake/Output, MAR and Recent Results. [phone]

## 2020-10-03 NOTE — PROGRESS NOTES
Kyle Miller 855 with [facility], they can accept for admission for tomorrow. She will be at the facility tomorrow to review. If patient remains medically stable she could discharge AFTER her IV Antifungal.  Will place [facility] on WILL CALL. Discussed transportation, she has a friend that can transport however she was worried about the ostomy and pepper bag. Transition of Care Plan:        · RUR:  16 % GLOS:  10.4   LOS:  22  · Dx: Malignant Neoplasm of sigmoid colon s/p 16 days/low anterior resection, loop ileostomy, [place], cystoscopy, right ureteral re-implant   · Surgeon - Attending  Hospitalist Consulted   · SARS-COV-2 10/2/20 Negative/Not Detected  · Disposition:  SNF, [facility], left message with Julia/Admission  · Need to clarify the IV Anidulafungin, if available at SNF and cost  · Transportation - TBD, likely stretcher transport      Will await call from SNF to see if they can accept today.     Ady Lynch, RN, BSN, [facility]  ED Care Management  [phone]

## 2020-10-03 NOTE — PROGRESS NOTES
Problem: Falls - Risk of  Goal: *Absence of Falls  Description: Document Sosa Dumont Fall Risk and appropriate interventions in the flowsheet.   Outcome: Progressing Towards Goal  Note: Fall Risk Interventions:  Mobility Interventions: Assess mobility with egress test, Communicate number of staff needed for ambulation/transfer, OT consult for ADLs, Patient to call before getting OOB, PT Consult for mobility concerns, PT Consult for assist device competence, Strengthening exercises (ROM-active/passive), Utilize walker, cane, or other assistive device, Utilize gait belt for transfers/ambulation         Medication Interventions: Patient to call before getting OOB    Elimination Interventions: Call light in reach    History of Falls Interventions: Consult care management for discharge planning, Door open when patient unattended, Evaluate medications/consider consulting pharmacy, Investigate reason for fall, Room close to nurse's station, Utilize gait belt for transfer/ambulation, Assess for delayed presentation/identification of injury for 48 hrs (comment for end date), Vital signs minimum Q4HRs X 24 hrs (comment for end date)         Problem: Patient Education: Go to Patient Education Activity  Goal: Patient/Family Education  Outcome: Progressing Towards Goal

## 2020-10-03 NOTE — PROGRESS NOTES
Adult medicine service signs off    ECHO nl. Heart rate improved    Dispo per primary team.     Re consult as needed.      Gigi Bailey MD 10/3/2020

## 2020-10-03 NOTE — PROGRESS NOTES
General Daily Progress Note    Admission Date: 9/11/2020  Hospital Day 23  Post-Op Day 16  Subjective:   Nothing new. Objective:     Patient Vitals for the past 24 hrs:   BP Temp Pulse Resp SpO2 Height Weight   10/03/20 0835 112/79 97.5 °F (36.4 °C) (!) 110 19 100 % -- --   10/03/20 0349 (!) 109/59 97.8 °F (36.6 °C) 96 20 100 % -- --   10/02/20 2358 116/69 97.8 °F (36.6 °C) (!) 104 19 100 % -- --   10/02/20 2019 114/72 97.4 °F (36.3 °C) (!) 107 25 96 % -- --   10/02/20 1514 117/74 97.6 °F (36.4 °C) (!) 105 20 98 % -- --   10/02/20 1301 110/71 -- -- -- -- 5' 6\" (1.676 m) 49.5 kg (109 lb 2 oz)   10/02/20 1054 110/71 97.5 °F (36.4 °C) (!) 101 20 96 % -- --     No intake/output data recorded. 10/01 1901 - 10/03 0700  In: -   Out: 3075 [Urine:2125]      Physical Examination:  General Appearance - No distress. Heart - Mild sinus tachycardia. Abdomen - Soft. Mildly distended. Ileostomy viable and functioning. Incisions clean, dry, intact, and without erythema.  - Hogan catheter draining normal-appearing urine. Neurological - Alert and oriented.             Data Review   Recent Results (from the past 24 hour(s))   SARS-COV-2    Collection Time: 10/02/20  5:00 PM   Result Value Ref Range    Specimen source Nasopharyngeal      SARS-CoV-2 Not detected NOTD      Specimen source Nasopharyngeal      Specimen type NP Swab      Health status Symptomatic Testing     CBC WITH AUTOMATED DIFF    Collection Time: 10/03/20  3:46 AM   Result Value Ref Range    WBC 8.2 3.6 - 11.0 K/uL    RBC 3.61 (L) 3.80 - 5.20 M/uL    HGB 9.1 (L) 11.5 - 16.0 g/dL    HCT 28.9 (L) 35.0 - 47.0 %    MCV 80.1 80.0 - 99.0 FL    MCH 25.2 (L) 26.0 - 34.0 PG    MCHC 31.5 30.0 - 36.5 g/dL    RDW ABNORMAL 11.5 - 14.5 %    PLATELET 928 (H) 291 - 400 K/uL    MPV 9.0 8.9 - 12.9 FL    NRBC 0.0 0  WBC    ABSOLUTE NRBC 0.00 0.00 - 0.01 K/uL    NEUTROPHILS 60 32 - 75 %    LYMPHOCYTES 20 12 - 49 %    MONOCYTES 8 5 - 13 %    EOSINOPHILS 9 (H) 0 - 7 % BASOPHILS 1 0 - 1 %    IMMATURE GRANULOCYTES 2 (H) 0.0 - 0.5 %    ABS. NEUTROPHILS 4.9 1.8 - 8.0 K/UL    ABS. LYMPHOCYTES 1.6 0.8 - 3.5 K/UL    ABS. MONOCYTES 0.7 0.0 - 1.0 K/UL    ABS. EOSINOPHILS 0.7 (H) 0.0 - 0.4 K/UL    ABS. BASOPHILS 0.1 0.0 - 0.1 K/UL    ABS. IMM. GRANS. 0.2 (H) 0.00 - 0.04 K/UL    DF SMEAR SCANNED      RBC COMMENTS ANISOCYTOSIS  1+        RBC COMMENTS HYPOCHROMIA  1+        RBC COMMENTS TARGET CELLS  1+           CT scan of Abdomen and Pelvis (9/22/2020):  No apparent abscess. Scant intraperitoneal gas consistent with POD#5.  Ileus versus distal small bowel obstruction (at the ileostomy). Assessment:     Principal Problem:    Malignant neoplasm of sigmoid colon ([address] (9/14/2020)    Active Problems:    Hydronephrosis (9/11/2020)      Pelvic mass (9/11/2020)      Severe protein-calorie malnutrition ([address] (9/16/2020)      Anemia (9/16/2020)      Thrombocytopenia ([address] (9/19/2020)        16 Days Post-Op s/p Low anterior resection, mobilization of the splenic flexure, coloproctostomy, and creation of loop ileostomy. [Total abdominal hysterectomy and left salpingo-oophorectomy performed by Ana Paula Duvall MD]  [Cystoscopy and placement of left ureteral catheter performed by Gio Paz MD]  [Distal right ureterectomy performed by Deonna Aguilera III, MD]  [Right ureteral re-implant with psoas hitch and placement of right ureteral stent performed by Sigifredo Nieto MD]     Transferred to [address] [place] [address] (stepdown) from recovery room. Transferred to remote telemetry on 9/24/2020.     Hemoglobin low but acceptable. Thrombocytopenia resolved. Hemodynamically stable but frequently tachycardic. Prn metoprolol was begun on 9/20/2020 to reduce heart rate, but was not being given very often secondary to blood pressure based hold parameters. Started scheduled dose of metoprolol on 9/29/2020, but the same issue occurred.   Hospitalist was consulted on 9/30/2020 regarding the tachycardia. Pete Linda assistance is appreciated. Bri Mg was ordered for 10/1/2020, but it did not get done until yesterday. Dr. Ruther Eisenmenger has reviewed it and determined that the patient does not need any further evaluation fr treatment for the tachycardia. Urine output adequate. Last creatinine was within normal limits. Drain fluid creatinine was similar to serum level on 9/24/2020.  There was no evidence of a urine leak.  The VALENTINE drain was removed on 9/25/2020. Hogan catheter in place and should remain for 14 days post-op (per Dr. Sherif Anderson). Arrangements have been made for cystoscopy and catheter removal to be performed as an outpatient at [facility] on 10/6/2020 if the patient can get there; otherwise the cystogram could be done as an inpatient procedure.     On 9/29/2020, the leukocytosis resolved. Today the WBC is still within normal limits (8.2K/uL). No apparent wound infection. No pneumonia. No UTI. Blood and drain fluid culture results are final.  Blood was negative.  Drain fluid had yeast in the broth only. Zosyn was begun on 9/20/2020 and discontinued by ID on 9/29/2020. Oral Diflucan was begun on 9/26/2020 and replaced by Eraxis on 9/28/2020 by ID (Dr. Lin Kulkarni). The Eraxis is supposed to continue until 10/5/2020 and, as the skilled nursing facility drags its feet, it will probably end before she is transferred.     Malnutrition was treated with TPN, which was discontinued on 9/29/2020. GI function has returned, and the patient now seems to be tolerating th elow fiber diet. Ileus versus obstruction, possibly at the level of the ileostomy, appears to have resolved. Ileostomy prolapse was first noticed in the evening on 9/21/2020, and it could have been interfering with bowel function. It no longer seems to be doing that. The ostomy \"darren\" can be removed at any time. Nearly independent with ostomy care. Receiving physical therapy.     The patient decided on 101/2020 that she wants to go to SNF instead of directly home. As it turns out, her home environment is not safe. Consequently, we had to test her for COVID-19 again. The test was ordered yesterday morning and received in the lab yesterday evening. The results are pending. Plan:   Discontinue telemetry. Transfer to a non-telemetry unit. Discharge to SNF ASAP if COVID-19 test is negative.     SNF to administer Eraxis via PICC once per day through 10/5/2020 if she is discharge tomorrow. SNF to assist with ostomy care and education. Physical therapy at [facility].     Follow up with [facility] on 10/6/2020 for cystogram and Hogan catheter removal.  Follow up with Dr. Timbo Fulton (OPology) on 10/13/2020,  Follow up with me on 10/19/88103. Follow up with DR. Zaira Montenegro in approximately one month.

## 2020-10-03 NOTE — PROGRESS NOTES
Problem: Falls - Risk of  Goal: *Absence of Falls  Description: Document Chester Carr Fall Risk and appropriate interventions in the flowsheet.   Outcome: Progressing Towards Goal  Note: Fall Risk Interventions:  Mobility Interventions: Assess mobility with egress test, Communicate number of staff needed for ambulation/transfer, OT consult for ADLs, Patient to call before getting OOB, PT Consult for mobility concerns, PT Consult for assist device competence, Strengthening exercises (ROM-active/passive), Utilize walker, cane, or other assistive device, Utilize gait belt for transfers/ambulation         Medication Interventions: Assess postural VS orthostatic hypotension, Evaluate medications/consider consulting pharmacy, Patient to call before getting OOB, Teach patient to arise slowly, Utilize gait belt for transfers/ambulation    Elimination Interventions: Call light in reach, Patient to call for help with toileting needs, Stay With Me (per policy), Toilet paper/wipes in reach, Toileting schedule/hourly rounds    History of Falls Interventions: Consult care management for discharge planning, Door open when patient unattended, Evaluate medications/consider consulting pharmacy, Investigate reason for fall, Room close to nurse's station, Utilize gait belt for transfer/ambulation, Assess for delayed presentation/identification of injury for 48 hrs (comment for end date), Vital signs minimum Q4HRs X 24 hrs (comment for end date)         Problem: Patient Education: Go to Patient Education Activity  Goal: Patient/Family Education  Outcome: Progressing Towards Goal     Problem: Pain  Goal: *Control of Pain  Outcome: Progressing Towards Goal     Problem: Discharge Planning  Goal: *Discharge to safe environment  Outcome: Progressing Towards Goal  Goal: *Knowledge of medication management  Outcome: Progressing Towards Goal  Goal: *Knowledge of discharge instructions  Outcome: Progressing Towards Goal     Problem: Patient Education: Go to Patient Education Activity  Goal: Patient/Family Education  Outcome: Progressing Towards Goal     Problem: General Medical Care Plan  Goal: *Vital signs within specified parameters  Outcome: Progressing Towards Goal  Goal: *Labs within defined limits  Outcome: Progressing Towards Goal  Goal: *Absence of infection signs and symptoms  Outcome: Progressing Towards Goal  Goal: *Optimal pain control at patient's stated goal  Outcome: Progressing Towards Goal  Goal: *Skin integrity maintained  Outcome: Progressing Towards Goal  Goal: *Fluid volume balance  Outcome: Progressing Towards Goal  Goal: *Optimize nutritional status  Outcome: Progressing Towards Goal  Goal: *Anxiety reduced or absent  Outcome: Progressing Towards Goal  Goal: *Progressive mobility and function (eg: ADL's)  Outcome: Progressing Towards Goal     Problem: Patient Education: Go to Patient Education Activity  Goal: Patient/Family Education  Outcome: Progressing Towards Goal     Problem: Nutrition Deficit  Goal: *Optimize nutritional status  Outcome: Progressing Towards Goal     Problem: Pressure Injury - Risk of  Goal: *Prevention of pressure injury  Description: Document Anrdy Scale and appropriate interventions in the flowsheet. Outcome: Progressing Towards Goal  Note: Pressure Injury Interventions:  Sensory Interventions: Assess changes in LOC, Assess need for specialty bed, Avoid rigorous massage over bony prominences, Check visual cues for pain, Discuss PT/OT consult with provider, Float heels, Keep linens dry and wrinkle-free, Maintain/enhance activity level, Minimize linen layers, Monitor skin under medical devices, Pad between skin to skin, Pressure redistribution bed/mattress (bed type), Sit a 90-degree angle/use footstool if needed, Suspension boots, Turn and reposition approx.  every two hours (pillows and wedges if needed), Use 30-degree side-lying position    Moisture Interventions: Absorbent underpads, Apply protective barrier, creams and emollients, Assess need for specialty bed, Contain wound drainage, Internal/External urinary devices, Limit adult briefs, Maintain skin hydration (lotion/cream), Minimize layers, Moisture barrier, Internal/External fecal devices, Offer toileting Q_hr    Activity Interventions: Assess need for specialty bed, Increase time out of bed, Pressure redistribution bed/mattress(bed type), PT/OT evaluation    Mobility Interventions: Assess need for specialty bed, HOB 30 degrees or less, Pressure redistribution bed/mattress (bed type), PT/OT evaluation, Turn and reposition approx. every two hours(pillow and wedges)    Nutrition Interventions: Document food/fluid/supplement intake    Friction and Shear Interventions: Apply protective barrier, creams and emollients, Foam dressings/transparent film/skin sealants, HOB 30 degrees or less, Lift sheet                Problem: Patient Education: Go to Patient Education Activity  Goal: Patient/Family Education  Outcome: Progressing Towards Goal     Problem: Ostomy Care  Goal: *Patient pouching appliance will fit properly and maintain integrity at least three to five days  Description: Infection control procedures (eg, clean dressings, clean gloves, hand washing, precautions to isolate wound from contamination, sterile instruments used for wound debridement) should be implemented.   Outcome: Progressing Towards Goal  Goal: *Acceptance of change in body image  Outcome: Progressing Towards Goal     Problem: Patient Education: Go to Patient Education Activity  Goal: Patient/Family Education  Outcome: Progressing Towards Goal     Problem: Patient Education: Go to Patient Education Activity  Goal: Patient/Family Education  Outcome: Progressing Towards Goal

## 2020-10-04 LAB
AMPHOTERICIN B ISLT MIC: NORMAL
ANIDULAFUNGIN ISLT MIC: NORMAL
FLUCONAZOLE: NORMAL
FUNGUS ISLT: NORMAL
MICAFUNGIN ISLT MIC: NORMAL
PLEASE NOTE, MICA2T: NORMAL
PLEASE NOTE, MICA2T: NORMAL
SOURCE: NORMAL
SPECIMEN SOURCE: NORMAL

## 2020-10-04 PROCEDURE — 74011000258 HC RX REV CODE- 258: Performed by: COLON & RECTAL SURGERY

## 2020-10-04 PROCEDURE — 74011250636 HC RX REV CODE- 250/636: Performed by: COLON & RECTAL SURGERY

## 2020-10-04 PROCEDURE — 74011250637 HC RX REV CODE- 250/637: Performed by: COLON & RECTAL SURGERY

## 2020-10-04 PROCEDURE — 65270000029 HC RM PRIVATE

## 2020-10-04 RX ADMIN — Medication 10 ML: at 22:00

## 2020-10-04 RX ADMIN — SODIUM CHLORIDE 100 MG: 9 INJECTION, SOLUTION INTRAVENOUS at 12:49

## 2020-10-04 RX ADMIN — Medication 10 ML: at 07:05

## 2020-10-04 RX ADMIN — ENOXAPARIN SODIUM 40 MG: 40 INJECTION SUBCUTANEOUS at 12:49

## 2020-10-04 RX ADMIN — Medication 5 ML: at 14:30

## 2020-10-04 RX ADMIN — PANTOPRAZOLE SODIUM 40 MG: 40 TABLET, DELAYED RELEASE ORAL at 07:04

## 2020-10-04 NOTE — PROGRESS NOTES
General Daily Progress Note    Admission Date: 9/11/2020  Hospital Day 24  Post-Op Day 17  Subjective:   No complaints. Objective:     Patient Vitals for the past 24 hrs:   BP Temp Pulse Resp SpO2 Weight   10/04/20 0812 108/69 97.5 °F (36.4 °C) 94 18 100 % --   10/04/20 0304 -- -- -- -- -- 50.2 kg (110 lb 9.6 oz)   10/04/20 0300 110/63 -- 60 20 97 % --   10/03/20 2035 115/69 97.9 °F (36.6 °C) 100 20 100 % --   10/03/20 1511 117/68 98.4 °F (36.9 °C) 99 20 100 % --   10/03/20 1317 -- -- 100 -- -- --   10/03/20 1210 117/88 97.8 °F (36.6 °C) (!) 119 21 100 % --     10/04 0701 - 10/04 1900  In: -   Out: 700 [Urine:400]  10/02 1901 - 10/04 0700  In: -   Out: 3400 [Urine:1800]    Physical Examination:  General Appearance - No distress. Heart - Normal sinus rhythm. Abdomen - Soft. Non-distended. Ileostomy viable and functioning. Incisions clean, dry, intact, and without erythema.  - Hogan catheter draining normal-appearing urine. Neurological - Alert and oriented. Data Review No results found for this or any previous visit (from the past 24 hour(s)). CT scan of Abdomen and Pelvis (9/22/2020):  No apparent abscess. Scant intraperitoneal gas consistent with POD#5.  Ileus versus distal small bowel obstruction (at the ileostomy). Assessment:     Principal Problem:    Malignant neoplasm of sigmoid colon ([address] (9/14/2020)    Active Problems:    Hydronephrosis (9/11/2020)      Pelvic mass (9/11/2020)      Severe protein-calorie malnutrition ([address] (9/16/2020)      Anemia (9/16/2020)      Thrombocytopenia ([address] (9/19/2020)        17 Days Post-Op s/p Low anterior resection, mobilization of the splenic flexure, coloproctostomy, and creation of loop ileostomy.   [Total abdominal hysterectomy and left salpingo-oophorectomy performed by Juan F Portillo MD]  [Cystoscopy and placement of left ureteral catheter performed by Corinne Christian MD]  [Distal right ureterectomy performed by Merlin Sleet. Iván Alanis MD]  [Right ureteral re-implant with psoas hitch and placement of right ureteral stent performed by Marci Mcdonald MD]     [facility]. Transferred to remote telemetry on 9/24/2020.     Hemoglobin low but acceptable. Thrombocytopenia resolved. Hemodynamically stable but frequently tachycardic. Prn metoprolol was begun on 9/20/2020 to reduce heart rate, but was not being given very often secondary to blood pressure based hold parameters. Started scheduled dose of metoprolol on 9/29/2020, but the same issue occurred. Hospitalist was consulted on 9/30/2020 regarding the tachycardia.  Their assistance is appreciated.  Echocardiogram was ordered for 10/1/2020, but it did not get done until 10/2/2020. Dr. Kd Guerrero reviewed it and determined that the patient does not need any further evaluation fr treatment for the tachycardia.     Urine output adequate. Last creatinine was within normal limits. Drain fluid creatinine was similar to serum level on 9/24/2020.  There was no evidence of a urine leak.  The VALENTINE drain was removed on 9/25/2020. Hogan catheter in place and should remain for 14 days post-op (per Dr. Linden Guzman). Arrangements have been made for cystoscopy and catheter removal to be performed as an outpatient at [facility] on 10/6/2020 if the patient can get there.     On 9/29/2020, the leukocytosis resolved. No apparent wound infection. No pneumonia. No UTI. Blood and drain fluid culture results are final.  Blood was negative.  Drain fluid had yeast in the broth only. Zosyn was begun on 9/20/2020 and discontinued by ID on 9/29/2020. Oral Diflucan was begun on 9/26/2020 and replaced by Eraxis on 9/28/2020 by ID (Dr. Roxie Claros). The Eraxis was supposed to continue to complete a 7-day course, and that will conclude with today's 1:00 PM dose.     Malnutrition was treated with TPN, which was discontinued on 9/29/2020.   GI function has returned, and the patient now seems to be tolerating th elow fiber diet. Ileus versus obstruction, possibly at the level of the ileostomy, appears to have resolved. Ileostomy prolapse was first noticed in the evening on 9/21/2020, and it could have been interfering with bowel function. It no longer seems to be doing that. The ostomy \"darren\" can be removed at any time. Nearly independent with ostomy care.     Receiving physical therapy.     The patient decided on 101/2020 that she wants to go to SNF instead of directly home.  As it turns out, her home environment is not safe. Consequently, we had to test her for COVID-19 again. The test was ordered in the morning on 10/2/2020 and received in the lab that evening. The results were reported as negative on 10/3/2020. Plan:   Discontinue telemetry (as ordered on 10/3/2020).    Discharge to SNF today if she can be accepted after the final Eaxis dise has been given and the PICC has been removed. Otherwise, it will be tomorrow. SNF to assist with ostomy care and education. Physical therapy at SNF.     Follow up with [facility] on 10/6/2020 for cystogram and Hogan catheter removal.  Follow up with Dr. Corinda Councilman (Oncology) on 10/13/2020,  Follow up with me on 10/19/2020. Follow up with Dr. Angelica De Paz in approximately one month.

## 2020-10-04 NOTE — PROGRESS NOTES
Bedside shift change report given to Papa Ratliff RN (oncoming nurse) by Madi Arriaga RN (offgoing nurse). Report included the following information SBAR, Kardex, Intake/Output, MAR and Recent Results.

## 2020-10-04 NOTE — PROGRESS NOTES
Received call from Zeenat/ZAKIA, she has spoken with surgeon. She confirmed the last dose . Left voice message for Admissions/Julia     Received call back from [facility], they can accept if patient can be at facility by 1500. The front door to their facility is locked at 1600, they can accept early in AM.  Discharge for today/cancelled. Spoke with Zeenat/ZAKIA, she will relay to MD.    Marion Woods RN, BSN, [facility]  ED Care Management  [phone]            Transition of Care Plan:        · RUR:  16 % GLOS:  10.4   LOS:  23  · Surgeon - Attending  Hospitalist Consulted   · SARS-COV-2    10/2/20 Negative/Not Detected  · Disposition:  SNF, [facility], left message with Julia/Admission  · Last dose of  IV Anidulafungin 10/5, if MD agreeable discharge on 10/5 after dose  · Transportation - stretcher transport, WILL CALL [facility]      Received call from [facility].   She reviewed medicals, she would like to wait for completion of the anti-fungal.      Marion Woods, RN, BSN, [facility]  ED Care Management  [phone]

## 2020-10-04 NOTE — ROUTINE PROCESS
Bedside shift change report given to Isak Banks RN (oncoming nurse) by Lee Leiva (offgoing nurse). Report included the following information SBAR, Kardex, Procedure Summary, Intake/Output, MAR and Recent Results.

## 2020-10-05 VITALS
SYSTOLIC BLOOD PRESSURE: 105 MMHG | OXYGEN SATURATION: 100 % | WEIGHT: 115.74 LBS | TEMPERATURE: 97.5 F | DIASTOLIC BLOOD PRESSURE: 60 MMHG | RESPIRATION RATE: 20 BRPM | HEART RATE: 92 BPM | BODY MASS INDEX: 18.6 KG/M2 | HEIGHT: 66 IN

## 2020-10-05 PROCEDURE — 74011250637 HC RX REV CODE- 250/637: Performed by: COLON & RECTAL SURGERY

## 2020-10-05 PROCEDURE — 74011250636 HC RX REV CODE- 250/636: Performed by: COLON & RECTAL SURGERY

## 2020-10-05 RX ADMIN — ENOXAPARIN SODIUM 40 MG: 40 INJECTION SUBCUTANEOUS at 11:31

## 2020-10-05 RX ADMIN — Medication 10 ML: at 06:00

## 2020-10-05 RX ADMIN — PANTOPRAZOLE SODIUM 40 MG: 40 TABLET, DELAYED RELEASE ORAL at 06:52

## 2020-10-05 NOTE — DISCHARGE INSTRUCTIONS
Discharge Instructions      1. Do not lift any objects weighing more than 10 pounds for 4 weeks after the surgery date (9/17/2020). 2. Do not do any housework including vacuuming, scrubbing or yardwork for 4 weeks after the surgery date. 3. Do not drive for 4 weeks after the surgery date or while taking sedating medications. 4. You should walk frequently and you should go up and down stairs as desired. You may participate fully in physical therapy. 5. You may shower, but do not submerge your abdomen. Do not take tub baths, swim, or use hot tubs for the next 4 weeks. 6. You have no open wounds and need no dressings. 7. Continue the low fiber diet for the next 2 weeks. 8. Drink nutritional supplements (such as Ensure Enlive) 2 times per day until your diet and appetite are back to normal.     9. Continue to take Tylenol (up to 1000 mg every 6 hours) as needed for pain. 10. Follow up:   1. Dr. Juliana Monge ([address] [place] [address], [address]; [phone]) at 10:00 AM on Monday 10/19/2020.  2.  Dr. Jero Courtney (Gynecologic Oncology; [phone]) as detailed below. 3.  Dr. Aurelio Griffith (Oncology; [phone]) on 9/13/2020.  [facility] ([phone]) on 10/6/2020. 11. Please bring your ostomy supplies to the first office visit with Dr. Juliana Monge because to might be necessary to change it. 12. For questions regarding quality or quantity of ostomy output, refer to the handouts that you have been given. Follow up with Dr. Juan F Portillo in 5 weeks for postoperative check following hysterectomy. Please call for an appointment  Syed Ramirez  [address] [place] [address], [address], [address]  [phone]  · The following restrictions apply regarding your hysterectomy:  1. Nothing per vagina for 6-8 weeks. 2. You may experience vaginal spotting. Should the bleeding require more that 4 pads a day please call our office.

## 2020-10-05 NOTE — PROGRESS NOTES
Problem: Falls - Risk of  Goal: *Absence of Falls  Description: Document Edmonia Morning Fall Risk and appropriate interventions in the flowsheet.   Outcome: Progressing Towards Goal  Note: Fall Risk Interventions:  Mobility Interventions: Communicate number of staff needed for ambulation/transfer         Medication Interventions: Evaluate medications/consider consulting pharmacy, Patient to call before getting OOB, Teach patient to arise slowly    Elimination Interventions: Call light in reach, Patient to call for help with toileting needs    History of Falls Interventions: Room close to nurse's station         Problem: Patient Education: Go to Patient Education Activity  Goal: Patient/Family Education  Outcome: Progressing Towards Goal     Problem: Pain  Goal: *Control of Pain  Outcome: Progressing Towards Goal     Problem: Discharge Planning  Goal: *Discharge to safe environment  Outcome: Progressing Towards Goal  Goal: *Knowledge of medication management  Outcome: Progressing Towards Goal  Goal: *Knowledge of discharge instructions  Outcome: Progressing Towards Goal     Problem: Patient Education: Go to Patient Education Activity  Goal: Patient/Family Education  Outcome: Progressing Towards Goal     Problem: General Medical Care Plan  Goal: *Vital signs within specified parameters  Outcome: Progressing Towards Goal  Goal: *Labs within defined limits  Outcome: Progressing Towards Goal  Goal: *Absence of infection signs and symptoms  Outcome: Progressing Towards Goal  Goal: *Optimal pain control at patient's stated goal  Outcome: Progressing Towards Goal  Goal: *Skin integrity maintained  Outcome: Progressing Towards Goal  Goal: *Fluid volume balance  Outcome: Progressing Towards Goal  Goal: *Optimize nutritional status  Outcome: Progressing Towards Goal  Goal: *Anxiety reduced or absent  Outcome: Progressing Towards Goal  Goal: *Progressive mobility and function (eg: ADL's)  Outcome: Progressing Towards Goal     Problem: Patient Education: Go to Patient Education Activity  Goal: Patient/Family Education  Outcome: Progressing Towards Goal     Problem: Nutrition Deficit  Goal: *Optimize nutritional status  Outcome: Progressing Towards Goal     Problem: Pressure Injury - Risk of  Goal: *Prevention of pressure injury  Description: Document Andry Scale and appropriate interventions in the flowsheet. Outcome: Progressing Towards Goal  Note: Pressure Injury Interventions:  Sensory Interventions: Assess changes in LOC    Moisture Interventions: Absorbent underpads    Activity Interventions: Pressure redistribution bed/mattress(bed type)    Mobility Interventions: Pressure redistribution bed/mattress (bed type)    Nutrition Interventions: Document food/fluid/supplement intake    Friction and Shear Interventions: HOB 30 degrees or less                Problem: Patient Education: Go to Patient Education Activity  Goal: Patient/Family Education  Outcome: Progressing Towards Goal     Problem: Ostomy Care  Goal: *Patient pouching appliance will fit properly and maintain integrity at least three to five days  Description: Infection control procedures (eg, clean dressings, clean gloves, hand washing, precautions to isolate wound from contamination, sterile instruments used for wound debridement) should be implemented.   Outcome: Progressing Towards Goal  Goal: *Acceptance of change in body image  Outcome: Progressing Towards Goal     Problem: Patient Education: Go to Patient Education Activity  Goal: Patient/Family Education  Outcome: Progressing Towards Goal

## 2020-10-05 NOTE — DISCHARGE SUMMARY
Discharge Summary     Patient ID:    Forrest Markham  [id number]  89 y.o.  1950    Admission Date: 9/11/2020    Discharge Date: 10/5/2020    Admission Diagnoses:  1. Pelvic Mass  2. Leukocytosis  3. Hydronephrosis  4. Hyponatremia  5. Hypophosphatemia  6. Hypoalbuminemia  7. Anemia  8. Thrombocytosis      Chronic Diagnoses:    Patient Active Problem List   Diagnosis Code    Hydronephrosis N13.30    Pelvic mass R19.00    Malignant neoplasm of sigmoid colon (HCC) C18.7    Severe protein-calorie malnutrition ([facility] E43    Anemia D64.9    Thrombocytopenia ([facility] D69.6       Discharge Diagnoses:  1. 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 High-grade partial colonic obstruction secondary to the tumor (cured)  3. Hydronephrosis (treated)  4. Severe malnutrition  5. SIADH (resolved)  6. Hyponatremia secondary to SIADH (corrected)  7. Hypophosphatemia (corrected)  8. Hypokalemia (corrected)  9. Anemia secondary to chronic disease as well as intraoperative blood loss (stable)  10. Tachycardia  11. Thrombocytopenia (resolved)  12. GERD  13. Ileostomy prolapse (stable)  14. Persistent post-operative leukocytosis (resolved)  15.  Ascites culture positive for Candida glabrata (treated)       Hospital Problems as of 10/5/2020 Date Reviewed: 9/14/2020          Codes Class Noted - Resolved POA    Thrombocytopenia ([facility] ICD-10-CM: D69.6  ICD-9-CM: 287.5  9/19/2020 - Present No        Severe protein-calorie malnutrition (HCC) (Chronic) ICD-10-CM: E43  ICD-9-CM: 262  9/16/2020 - Present Yes        Anemia (Chronic) ICD-10-CM: D64.9  ICD-9-CM: 285.9  9/16/2020 - Present Yes        * (Principal) Malignant neoplasm of sigmoid colon ([facility] (Chronic) ICD-10-CM: C18.7  ICD-9-CM: 153.3  9/14/2020 - Present Yes        Hydronephrosis ICD-10-CM: N13.30  ICD-9-CM: 598  9/11/2020 - Present Unknown        Pelvic mass ICD-10-CM: R19.00  ICD-9-CM: 789.30  9/11/2020 - Present Unknown                Procedures Performed:  1. Cystoscopy and placement of a right ureteral stent was performed by Buster Donald III, MD on 9/12/2020.  2. A colonoscopy (to the proximal sigmoid) was performed by Nasir Qiu MD on 9/14/2020.  3. A right upper extremity PICC was placed on 9/15/2020.  4. Low anterior resection, mobilization of the splenic flexure, coloproctostomy, and creation of loop ileostomy was performed by Dr. Pilar Arenas on 9/17/2020.  5. Cystoscopy and placement of a temporary left ureteral catheter was performed by Buster Donald III, MD on 9/17/2020.  6. Total abdominal hysterectomy and left salpingo-oophorectomy was performed by Juan Garcia MD on 9/17/2020.  7. Distal right ureterectomy was performed by Buster Donald III, MD on 9/17/2020.  8. Right ureteral re-implantation with psoas hitch and placement of a right ureteral stent was performed by Amanda Mejia MD on 9/17/2020. Discharge Medications:   Current Discharge Medication List      CONTINUE these medications which have NOT CHANGED    Details   denosumab (PROLIA) 60 mg/mL injection 60 mg by SubCUTAneous route. Note:  Next dose will be due in December 2020. lansoprazole (PREVACID) 30 mg capsule Take 30 mg by mouth Daily (before breakfast). multivitamin (ONE A DAY) tablet Take 1 Tab by mouth daily. ascorbic acid, vitamin C, (VITAMIN C) 500 mg tablet Take 500 mg by mouth. cholecalciferol (VITAMIN D3) 1,000 unit cap Take 1,000 Units by mouth daily. Omega-3 Fatty Acids (FISH OIL) 500 mg cap Take  by mouth. Diet:  Low fiber diet until seen for follow-up. Activity:  No driving or lifting more than 10 lb. for 4 weeks after the date of the operation. Full participation in physical therapy is permitted. Wound Care:  None    Ostomy Care:  Routine    Hogan Catheter: To be removed by [facility] at follow-up on 10/6/2020 if appropriate.     Discharge Condition: Improved compared ot that upon admission. Disposition:  Skilled nursing facility (SNF). Follow-up Care:  1. [facility] on 10/6/2020 for cystogram and Hogan catheter removal.  2.  Dr. Gino Mckinley (Oncology) on 10/13/2020,  3. Dr. Jennifer Oconnell on 10/19/2020.  4.  Dr. Leonard Tran in approximately one month. Physician Consultants:   1. Marianna Dobbs MD (Gastroenterology)  2. Melina Ricardo MD (Gastroenterology)  3. Daniella Felipe MD (Nephrology)  4. Adelaida Bob MD (Nephrology)  5. Sneha Aguero MD (Gynecologic Oncology)  6. Mariela Gamino MD (Urology)  7. William Lee III, MD (Urology)  8. Liliane Dc MD (Urology)  9. Lily Posadas MD  (Medical Oncology)  10. Radha Meek DO (Infectious Diseases)  11. Tina Varela MD, Vonnie River MD, et al (Hospitalists)          Significant Diagnostic Studies:   Recent Labs     10/03/20  0346   WBC 8.2   HGB 9.1*   HCT 28.9*   *       Lab Results   Component Value Date/Time    Glucose (POC) 98 09/27/2020 11:39 PM    Glucose (POC) 113 (H) 09/27/2020 05:59 PM    Glucose (POC) 106 (H) 09/27/2020 11:33 AM    Glucose (POC) 111 (H) 09/27/2020 05:32 AM    Glucose (POC) 119 (H) 09/26/2020 11:07 PM       1. Chest CT (9/15/2020):  7.5 mm left lower lobe pulmonary nodule; small right pleural effusion; mild basilar atelectasis. 2. Chest radiographs (9/20/2020):  No pneumonia. 3. Abdominal radiographs (9/21/2020):  Small bowel distension. Ileus versus obstruction. 4. CT scan of the abdomen and pelvis (9/22/2020):  No apparent abscess. Scant intraperitoneal gas consistent with POD#5. Ileus versus distal small bowel obstruction (at the ileostomy). 5. Echocardiogram (10/2/2020)        HOSPITAL COURSE & TREATMENT RENDERED:   The patient is a 60-year-old female who has been experiencing abdominal pain for a period of weeks. This pain had been accompanied by unintentional weight loss and a change in her stools.   She was evaluated by Marianna Dobbs MD and scheduled for a colonoscopy to be performed on 09/15/2020. A CT scan of the abdomen and pelvis was also ordered, and that was performed as an outpatient test on 09/10/2020. The CT scan was interpreted as revealing a large pelvic mass, possibly arising from the sigmoid colon. After the scan, the patient experienced a marked increase in her pain, particularly in the right flank. She presented to the emergency room and was admitted to the hospital on 09/14/2020 for further evaluation and treatment. The pain was attributed to right-sided hydronephrosis secondary to compression of the right ureter by the pelvic mass. The hydronephrosis was treated on 09/12/2020 by placement of a right ureteral stent by Judy Friedman MD.  Subsequently, the patient had much less pain and the workup of the pelvic mass continued. She underwent a bowel preparation on 09/13/2020 and a colonoscopy was performed by Joselin Lauren MD on 09/14/2020. The colonoscopy revealed a large, fungating, circumferential and partially-obstructing tumor of the proximal sigmoid colon. It was not possible to advance the scope beyond the mass. Biopsies were obtained and were later reported to be adenocarcinoma most consistent with a colonic primary. The need for surgical resection, which would be complicated by apparent involvement of the uterus and also possibly the right ureter, was explained. Gynecologic Oncology and Urology consultants were asked to give their opinions and to participate in the surgical care of this patient, and they did so. The plan therefore became to perform an open resection of the sigmoid colon along with the uterus and left ovary and possibly a right ureteral segment. The risks were discussed in great detail, and the patient agreed to proceed. She was taken to the operating room on 9/17/2020, and there the above-listed procedures were performed without apparent complications.   The operative findings included a nearly-obstructing sigmoid colon mass with apparent invasion into another portion of the sigmoid, invasion into the uterus, and encasement of the right ureter. There was no evidence of distant metastatic disease. The colon proximal to the mass was dilated all the way to the ileocecal valve. Intraoperatively, the patient was transfused with 2 units of PRBCs. Post-operatively, the patient was transferred form the recovery room to the stepdown unit for close monitoring, including telemetry. TPN that had been started on 9/15/2020, and it would be continued until 9/29/2020 (POD#12) when the patients oral intake of nutrition was considered to be adequate. The patient was transferred from the stepdown unit to a remote telemetry unit on 9/24/2020. Pre-operatively, the patient had been treated with Ceftriaxone. Post-operatively, beginning on 9/20/2020, she was treated with Zosyn because of a persistent leukocytosis. She was also started on oral Diflucan on 9/26/2020. Multiple laboratory and imaging studies were performed to evaluate this leukocytosis, and on 9/28/2020 an Infectious Diseases consultation was obtained from Dr. Margarita Perez. Dr. Margarita Perez replaced the oral Diflucan with Eraxis on 9/28/202 and discontinued the Zosyn on 9/29/2020. The Hospitalists were re-consulted on 9/30/2020 regarding persistent tachycardia. They ordered and echocardiogram, which was performed on 10/2/2020. Dr. Chava Drake reviewed the results and determined that the patient did not need any further evaluation of or treatment for the tachycardia. The patient decided on 10/1/2020 that she needed to go to a skilled nursing facility (SNF) instead of directly home because her home environment is not safe. Consequently, she required repeat testing for COVID-19. The test was ordered in the morning on 10/2/2020 and the results were reported as negative on 10/3/2020.     Finally, on 10/4/2020 after receiving the seventh of seven planned doses of Eraxis, she was judged to be fit for discharge to the SNF but due to logistical issues, the facility could not receive her until today. The PICC was removed, and transportation was requested. Overall, her post-operative course was mostly prolonged as a consequence of her poor pre-operative condition. Her condition upon discharge was improved compared to that upon admission, and she appeared to have understood all discharge and follow-up instructions.         Signed:  Samreen Wise MD

## 2020-10-05 NOTE — WOUND CARE
Ostomy Consult: Follow Up Visit. Chart reviewed. Consulted for new loop ileostomy. To bedside with Christopher Lockhart NP; Yumiko Walton removed hector from stoma; some suturing at mucocutaneous junction at either side remains (not connected to hector). Patient is resting on a Versacare bed with accumax mattress. Assessment:  RLQ ileostomy - moist red stoma, protrudes well above skin level; dennis-stomal skin intact, no redness/rash. Hector removed as above by NP. Ostomy Care:  Jewel Councilman cut, placed cohesive ring around opening of wafer, prepped skin with no sting barrier and placed appliance; closed closure. She is independent at this point in her ostomy care and management. Supplies with her for discharge - one piece and cohesive seal.  Plan:  Hopeful to discharge to SNF today.   Kassie Banda, [address] Office [phone]  Pager [phone]

## 2020-10-05 NOTE — PROGRESS NOTES
1015 - Transition of Care  (OLIVIA) Plan:      RUR:  16 %       LOS: 24 days    GLOS: 10.4    Dx: Malignant neoplasm of sigmoid colon                    Notes:     --  SARS-COV-2 - 10/2/20 Negative/Not Detected  --  SNF, [facility]/Admission [phone]  --  Given last dose of  IV Antifungal yesterday at noon  --  Prolia NOT given in hospital (Last does June 2020 - Per patient - does not need due December per patient)  --  Accepting MD - Dr. Altamirano Ards  --  Transportation - stretcher transport at noon - [facility] - Transport form on chart. --  RN-RN report - [phone]    Disposition: [address], [address]    Transport:  [facility] - [phone] @ noon. Update to 6W Nurse - Manolo Clement RN. CM will continue to follow and assist with OLIVIA needs as they arise. Available on [employer]. Levi  MSN, [address] [facility], RN, CCM - [phone].

## 2020-10-06 ENCOUNTER — PATIENT OUTREACH (OUTPATIENT)
Dept: CASE MANAGEMENT | Age: 70
End: 2020-10-06

## 2020-10-10 ENCOUNTER — HOSPITAL ENCOUNTER (EMERGENCY)
Age: 70
Discharge: NURSING, REHAB OR LONG-TERM CARE | DRG: 329 | End: 2020-10-10
Attending: EMERGENCY MEDICINE | Admitting: EMERGENCY MEDICINE
Payer: MEDICARE

## 2020-10-10 ENCOUNTER — HOSPITAL ENCOUNTER (INPATIENT)
Age: 70
LOS: 5 days | Discharge: NURSING, REHAB OR LONG-TERM CARE | DRG: 329 | End: 2020-10-15
Attending: EMERGENCY MEDICINE | Admitting: COLON & RECTAL SURGERY
Payer: MEDICARE

## 2020-10-10 VITALS
BODY MASS INDEX: 18.48 KG/M2 | SYSTOLIC BLOOD PRESSURE: 137 MMHG | OXYGEN SATURATION: 99 % | TEMPERATURE: 98.1 F | RESPIRATION RATE: 15 BRPM | HEIGHT: 66 IN | HEART RATE: 103 BPM | DIASTOLIC BLOOD PRESSURE: 88 MMHG | WEIGHT: 115 LBS

## 2020-10-10 DIAGNOSIS — K94.19 ILEOSTOMY PROLAPSE (HCC): Primary | ICD-10-CM

## 2020-10-10 PROBLEM — R19.00 PELVIC MASS: Status: RESOLVED | Noted: 2020-09-11 | Resolved: 2020-10-10

## 2020-10-10 PROBLEM — D69.6 THROMBOCYTOPENIA (HCC): Status: RESOLVED | Noted: 2020-09-19 | Resolved: 2020-10-10

## 2020-10-10 LAB
ABO + RH BLD: NORMAL
ALBUMIN SERPL-MCNC: 3.2 G/DL (ref 3.5–5)
ALBUMIN/GLOB SERPL: 0.9 {RATIO} (ref 1.1–2.2)
ALP SERPL-CCNC: 178 U/L (ref 45–117)
ALT SERPL-CCNC: 47 U/L (ref 12–78)
ANION GAP SERPL CALC-SCNC: 6 MMOL/L (ref 5–15)
ANION GAP SERPL CALC-SCNC: 6 MMOL/L (ref 5–15)
AST SERPL-CCNC: 27 U/L (ref 15–37)
BASOPHILS # BLD: 0.1 K/UL (ref 0–0.1)
BASOPHILS NFR BLD: 1 % (ref 0–1)
BILIRUB SERPL-MCNC: 0.2 MG/DL (ref 0.2–1)
BLOOD GROUP ANTIBODIES SERPL: NORMAL
BUN SERPL-MCNC: 18 MG/DL (ref 6–20)
BUN SERPL-MCNC: 19 MG/DL (ref 6–20)
BUN/CREAT SERPL: 29 (ref 12–20)
BUN/CREAT SERPL: 31 (ref 12–20)
CALCIUM SERPL-MCNC: 9.2 MG/DL (ref 8.5–10.1)
CALCIUM SERPL-MCNC: 9.7 MG/DL (ref 8.5–10.1)
CHLORIDE SERPL-SCNC: 103 MMOL/L (ref 97–108)
CHLORIDE SERPL-SCNC: 105 MMOL/L (ref 97–108)
CO2 SERPL-SCNC: 28 MMOL/L (ref 21–32)
CO2 SERPL-SCNC: 28 MMOL/L (ref 21–32)
COMMENT, HOLDF: NORMAL
CREAT SERPL-MCNC: 0.59 MG/DL (ref 0.55–1.02)
CREAT SERPL-MCNC: 0.66 MG/DL (ref 0.55–1.02)
DIFFERENTIAL METHOD BLD: ABNORMAL
EOSINOPHIL # BLD: 0.7 K/UL (ref 0–0.4)
EOSINOPHIL NFR BLD: 10 % (ref 0–7)
ERYTHROCYTE [DISTWIDTH] IN BLOOD BY AUTOMATED COUNT: 24.6 % (ref 11.5–14.5)
GLOBULIN SER CALC-MCNC: 3.6 G/DL (ref 2–4)
GLUCOSE SERPL-MCNC: 111 MG/DL (ref 65–100)
GLUCOSE SERPL-MCNC: 116 MG/DL (ref 65–100)
HCT VFR BLD AUTO: 39.6 % (ref 35–47)
HGB BLD-MCNC: 11.9 G/DL (ref 11.5–16)
IMM GRANULOCYTES # BLD AUTO: 0.1 K/UL (ref 0–0.04)
IMM GRANULOCYTES NFR BLD AUTO: 1 % (ref 0–0.5)
LYMPHOCYTES # BLD: 1.4 K/UL (ref 0.8–3.5)
LYMPHOCYTES NFR BLD: 21 % (ref 12–49)
MAGNESIUM SERPL-MCNC: 1.8 MG/DL (ref 1.6–2.4)
MCH RBC QN AUTO: 24.7 PG (ref 26–34)
MCHC RBC AUTO-ENTMCNC: 30.1 G/DL (ref 30–36.5)
MCV RBC AUTO: 82.3 FL (ref 80–99)
MONOCYTES # BLD: 0.4 K/UL (ref 0–1)
MONOCYTES NFR BLD: 6 % (ref 5–13)
NEUTS SEG # BLD: 3.8 K/UL (ref 1.8–8)
NEUTS SEG NFR BLD: 61 % (ref 32–75)
NRBC # BLD: 0 K/UL (ref 0–0.01)
NRBC BLD-RTO: 0 PER 100 WBC
PHOSPHATE SERPL-MCNC: 3.3 MG/DL (ref 2.6–4.7)
PLATELET # BLD AUTO: 595 K/UL (ref 150–400)
PMV BLD AUTO: 9.3 FL (ref 8.9–12.9)
POTASSIUM SERPL-SCNC: 3.6 MMOL/L (ref 3.5–5.1)
POTASSIUM SERPL-SCNC: 3.8 MMOL/L (ref 3.5–5.1)
PROT SERPL-MCNC: 6.8 G/DL (ref 6.4–8.2)
RBC # BLD AUTO: 4.81 M/UL (ref 3.8–5.2)
RBC MORPH BLD: ABNORMAL
RBC MORPH BLD: ABNORMAL
SAMPLES BEING HELD,HOLD: NORMAL
SODIUM SERPL-SCNC: 137 MMOL/L (ref 136–145)
SODIUM SERPL-SCNC: 139 MMOL/L (ref 136–145)
SPECIMEN EXP DATE BLD: NORMAL
WBC # BLD AUTO: 6.5 K/UL (ref 3.6–11)

## 2020-10-10 PROCEDURE — 65270000029 HC RM PRIVATE

## 2020-10-10 PROCEDURE — 80053 COMPREHEN METABOLIC PANEL: CPT

## 2020-10-10 PROCEDURE — 83735 ASSAY OF MAGNESIUM: CPT

## 2020-10-10 PROCEDURE — 77030013076 HC PCH OST BAG COLO -A

## 2020-10-10 PROCEDURE — 74011250636 HC RX REV CODE- 250/636: Performed by: COLON & RECTAL SURGERY

## 2020-10-10 PROCEDURE — 99282 EMERGENCY DEPT VISIT SF MDM: CPT

## 2020-10-10 PROCEDURE — 84100 ASSAY OF PHOSPHORUS: CPT

## 2020-10-10 PROCEDURE — 99284 EMERGENCY DEPT VISIT MOD MDM: CPT

## 2020-10-10 PROCEDURE — 85025 COMPLETE CBC W/AUTO DIFF WBC: CPT

## 2020-10-10 PROCEDURE — 36415 COLL VENOUS BLD VENIPUNCTURE: CPT

## 2020-10-10 PROCEDURE — 77030012893

## 2020-10-10 PROCEDURE — 86900 BLOOD TYPING SEROLOGIC ABO: CPT

## 2020-10-10 RX ORDER — ONDANSETRON 2 MG/ML
4 INJECTION INTRAMUSCULAR; INTRAVENOUS
Status: DISCONTINUED | OUTPATIENT
Start: 2020-10-10 | End: 2020-10-15 | Stop reason: HOSPADM

## 2020-10-10 RX ORDER — SODIUM CHLORIDE 0.9 % (FLUSH) 0.9 %
5-40 SYRINGE (ML) INJECTION AS NEEDED
Status: DISCONTINUED | OUTPATIENT
Start: 2020-10-10 | End: 2020-10-15 | Stop reason: HOSPADM

## 2020-10-10 RX ORDER — SODIUM CHLORIDE, SODIUM LACTATE, POTASSIUM CHLORIDE, CALCIUM CHLORIDE 600; 310; 30; 20 MG/100ML; MG/100ML; MG/100ML; MG/100ML
100 INJECTION, SOLUTION INTRAVENOUS CONTINUOUS
Status: DISCONTINUED | OUTPATIENT
Start: 2020-10-10 | End: 2020-10-12

## 2020-10-10 RX ORDER — SODIUM CHLORIDE 0.9 % (FLUSH) 0.9 %
5-40 SYRINGE (ML) INJECTION EVERY 8 HOURS
Status: DISCONTINUED | OUTPATIENT
Start: 2020-10-10 | End: 2020-10-15 | Stop reason: HOSPADM

## 2020-10-10 RX ADMIN — Medication 10 ML: at 22:22

## 2020-10-10 RX ADMIN — SODIUM CHLORIDE, SODIUM LACTATE, POTASSIUM CHLORIDE, AND CALCIUM CHLORIDE 100 ML/HR: 600; 310; 30; 20 INJECTION, SOLUTION INTRAVENOUS at 22:23

## 2020-10-10 NOTE — ED PROVIDER NOTES
71-year-old female presents with protruding ileostomy stoma. Patient had surgery on September 17 Dr. Liz Rene for colon cancer. She was discharged with a ileostomy in place. Patient states about 30 minutes prior to arrival she was changing her ostomy bag and she noted that the stoma was protruding several inches into the bag. She denies any change in stool output. No abdominal pain. No fever, nausea, vomiting. No other complaints at this time. Past Medical History:   Diagnosis Date    GERD (gastroesophageal reflux disease)     Ill-defined condition     Spaulding's esophagus       Past Surgical History:   Procedure Laterality Date    COLONOSCOPY Left 9/14/2020    COLONOSCOPY performed by Sabina Harmon MD at [address] [place] [address]; incomplete secondary to partial obstruction.  HX APPENDECTOMY  age 17    HX COLONOSCOPY  circa 2010    No neoplasms.  HX ENDOSCOPY  03/13/2017    Dr. Ismael Ward HX ENDOSCOPY  02/13/2020    Dr. Lyndsay Spain oopherectomy for treatment of benign mass.  HX GYN  09/17/2020    Total abdominal hysterectomy and left salpingo-oophorectomy; Loida Beltre MD.    HX OTHER SURGICAL  09/17/2020    Low anterior resection, mobilization of the splenic flexure, coloproctostomy, and creation of loop ileostomy; Dr. Liz Rene.     HX UROLOGICAL  09/12/2020    Cystoscopy and placement of a right ureteral stent; José Luis Lind III, MD.    HX UROLOGICAL  09/17/2020    Cystoscopy and placement of a temporary left ureteral catheter; José Luis Lind III, MD.     UROLOGICAL  09/17/2020    Distal right ureterectomy; Lynn Soler MD.   Raysa Yeager UROLOGICAL  09/17/2020    Right ureteral re-implantation with psoas hitch and placement of a right ureteral stent; Taye Alcala MD.         Family History:   Problem Relation Age of Onset    Cancer Mother     Heart Disease Father     Diabetes Brother        Social History     Socioeconomic History    Marital status: SINGLE     Spouse name: Not on file    Number of children: Not on file    Years of education: Not on file    Highest education level: Not on file   Occupational History    Not on file   Social Needs    Financial resource strain: Not on file    Food insecurity     Worry: Not on file     Inability: Not on file    Transportation needs     Medical: Not on file     Non-medical: Not on file   Tobacco Use    Smoking status: Never Smoker    Smokeless tobacco: Never Used   Substance and Sexual Activity    Alcohol use: Yes     Alcohol/week: 1.0 standard drinks     Types: 1 Glasses of wine per week    Drug use: No    Sexual activity: Not on file   Lifestyle    Physical activity     Days per week: Not on file     Minutes per session: Not on file    Stress: Not on file   Relationships    Social connections     Talks on phone: Not on file     Gets together: Not on file     Attends Anabaptism service: Not on file     Active member of club or organization: Not on file     Attends meetings of clubs or organizations: Not on file     Relationship status: Not on file    Intimate partner violence     Fear of current or ex partner: Not on file     Emotionally abused: Not on file     Physically abused: Not on file     Forced sexual activity: Not on file   Other Topics Concern    Not on file   Social History Narrative    Not on file         ALLERGIES: Patient has no known allergies. Review of Systems   Constitutional: Negative for fever. HENT: Negative for facial swelling. Eyes: Negative for visual disturbance. Respiratory: Negative for chest tightness. Cardiovascular: Negative for chest pain. Gastrointestinal: Negative for abdominal pain. Genitourinary: Negative for difficulty urinating and dysuria. Musculoskeletal: Negative for arthralgias. Skin: Negative for rash. Neurological: Negative for headaches. Hematological: Negative for adenopathy.    Psychiatric/Behavioral: Negative for suicidal ideas.       Vitals:    10/10/20 1509   BP: 137/88   Pulse: (!) 103   Resp: 15   Temp: 98.1 °F (36.7 °C)   SpO2: 99%   Weight: 52.2 kg (115 lb)   Height: 5' 6\" (1.676 m)            Physical Exam  Vitals signs and nursing note reviewed. Constitutional:       General: She is not in acute distress. Appearance: She is well-developed. HENT:      Head: Normocephalic and atraumatic. Eyes:      General: No scleral icterus. Conjunctiva/sclera: Conjunctivae normal.      Pupils: Pupils are equal, round, and reactive to light. Neck:      Musculoskeletal: Normal range of motion and neck supple. Cardiovascular:      Rate and Rhythm: Normal rate. Heart sounds: No murmur. Pulmonary:      Effort: Pulmonary effort is normal. No respiratory distress. Abdominal:      General: There is no distension. Comments: Ileostomy bag in the right upper quadrant with approximately 8 inches of intestine prolapsed into the bag. Musculoskeletal: Normal range of motion. Skin:     General: Skin is warm and dry. Findings: No rash. Neurological:      Mental Status: She is alert and oriented to person, place, and time. MDM  Number of Diagnoses or Management Options  Ileostomy prolapse [facility]):   Diagnosis management comments: Assessment: Discussed with Dr. Eli Dickens.  He stated it was fine to attempt reducing the stoma. I was able to reduce the prolapse down to the remaining 1 to 2 inches which the patient states was the condition it was when she was discharged from the hospital.  She had no pain. Procedure was tolerated well. The new bag was placed. She stable for discharge home at this point. She can follow-up with Dr. Eli Dickens Monday morning per his instructions. I sent Dr. Eli Dickens photos of the reduced stoma prior to patient being discharged.          Procedures

## 2020-10-10 NOTE — DISCHARGE INSTRUCTIONS
Please call Dr. Michael Abernathy before sending patient to the ED if there are any problems with the ileostomy.

## 2020-10-10 NOTE — PROGRESS NOTES
Care Management - Received call from nursing to assist with arranging transportation back to [facility] for patient. Patient is able to walk and stated she could go in a private vehicle. Patient came via [facility] wheelchair Courtney Acosta. [facility] wheelchair is here. RN will call report to Kiana corey at [phone] to notify them patient is returning. RN will wheel patient to  set up Lyft via [employer] for 6:10 PM.     6:02 PM  is in a white [employer].      NICHELLE Abraham

## 2020-10-10 NOTE — ED TRIAGE NOTES
Pt arrives to ED via [address] in wheelchair from [facility] after ileostomy and hysterectomy on 9/17/20 d/t Colon CA. Pt reports \"I changed the ileostomy bag this morning and about 1/2 hour later, the ostomy protruded outside my stomach\". Stomal prolapse noted with liquid brown stool.

## 2020-10-10 NOTE — ROUTINE PROCESS
MD reviewed discharge instructions and options with patient and patient verbalized understanding. RN reviewed discharge instructions using teachback method. Pt ambulated to exit without difficulty and in no signs of acute distress escorted by staff, and a [facility]  will drive home. No complaints or needs expressed at this time. Patient was counseled on medications prescribed at discharge. VSS, verbalized relief from most intense pain. Patient to call Dr. Denisse Cline in the morning for appointment.

## 2020-10-11 ENCOUNTER — ANESTHESIA (OUTPATIENT)
Dept: SURGERY | Age: 70
DRG: 329 | End: 2020-10-11
Payer: MEDICARE

## 2020-10-11 ENCOUNTER — ANESTHESIA EVENT (OUTPATIENT)
Dept: SURGERY | Age: 70
DRG: 329 | End: 2020-10-11
Payer: MEDICARE

## 2020-10-11 PROCEDURE — 77030008684 HC TU ET CUF COVD -B: Performed by: NURSE ANESTHETIST, CERTIFIED REGISTERED

## 2020-10-11 PROCEDURE — 74011250636 HC RX REV CODE- 250/636: Performed by: NURSE ANESTHETIST, CERTIFIED REGISTERED

## 2020-10-11 PROCEDURE — 74011000258 HC RX REV CODE- 258: Performed by: COLON & RECTAL SURGERY

## 2020-10-11 PROCEDURE — 77030040361 HC SLV COMPR DVT MDII -B: Performed by: COLON & RECTAL SURGERY

## 2020-10-11 PROCEDURE — 76210000016 HC OR PH I REC 1 TO 1.5 HR: Performed by: COLON & RECTAL SURGERY

## 2020-10-11 PROCEDURE — 2709999900 HC NON-CHARGEABLE SUPPLY: Performed by: COLON & RECTAL SURGERY

## 2020-10-11 PROCEDURE — 77030005513 HC CATH URETH FOL11 MDII -B: Performed by: COLON & RECTAL SURGERY

## 2020-10-11 PROCEDURE — 0D1B0Z4 BYPASS ILEUM TO CUTANEOUS, OPEN APPROACH: ICD-10-PCS | Performed by: COLON & RECTAL SURGERY

## 2020-10-11 PROCEDURE — 74011000250 HC RX REV CODE- 250: Performed by: COLON & RECTAL SURGERY

## 2020-10-11 PROCEDURE — 76060000035 HC ANESTHESIA 2 TO 2.5 HR: Performed by: COLON & RECTAL SURGERY

## 2020-10-11 PROCEDURE — 88304 TISSUE EXAM BY PATHOLOGIST: CPT

## 2020-10-11 PROCEDURE — 74011250636 HC RX REV CODE- 250/636: Performed by: COLON & RECTAL SURGERY

## 2020-10-11 PROCEDURE — 76010000131 HC OR TIME 2 TO 2.5 HR: Performed by: COLON & RECTAL SURGERY

## 2020-10-11 PROCEDURE — 77030026438 HC STYL ET INTUB CARD -A: Performed by: NURSE ANESTHETIST, CERTIFIED REGISTERED

## 2020-10-11 PROCEDURE — 65270000029 HC RM PRIVATE

## 2020-10-11 PROCEDURE — 77030013076 HC PCH OST BAG COLO -A: Performed by: COLON & RECTAL SURGERY

## 2020-10-11 PROCEDURE — 77030010294 HC STPLR INT TI J&J -C: Performed by: COLON & RECTAL SURGERY

## 2020-10-11 PROCEDURE — 74011000250 HC RX REV CODE- 250: Performed by: NURSE ANESTHETIST, CERTIFIED REGISTERED

## 2020-10-11 PROCEDURE — 74011250637 HC RX REV CODE- 250/637: Performed by: COLON & RECTAL SURGERY

## 2020-10-11 PROCEDURE — 0DBB0ZZ EXCISION OF ILEUM, OPEN APPROACH: ICD-10-PCS | Performed by: COLON & RECTAL SURGERY

## 2020-10-11 PROCEDURE — 77030031139 HC SUT VCRL2 J&J -A: Performed by: COLON & RECTAL SURGERY

## 2020-10-11 PROCEDURE — 77030002996 HC SUT SLK J&J -A: Performed by: COLON & RECTAL SURGERY

## 2020-10-11 RX ORDER — FENTANYL CITRATE 50 UG/ML
50 INJECTION, SOLUTION INTRAMUSCULAR; INTRAVENOUS AS NEEDED
Status: DISCONTINUED | OUTPATIENT
Start: 2020-10-11 | End: 2020-10-11 | Stop reason: HOSPADM

## 2020-10-11 RX ORDER — ACETAMINOPHEN 325 MG/1
650 TABLET ORAL ONCE
Status: DISCONTINUED | OUTPATIENT
Start: 2020-10-11 | End: 2020-10-11 | Stop reason: HOSPADM

## 2020-10-11 RX ORDER — SODIUM CHLORIDE 0.9 % (FLUSH) 0.9 %
5-40 SYRINGE (ML) INJECTION AS NEEDED
Status: DISCONTINUED | OUTPATIENT
Start: 2020-10-11 | End: 2020-10-11 | Stop reason: HOSPADM

## 2020-10-11 RX ORDER — LIDOCAINE HYDROCHLORIDE 10 MG/ML
0.1 INJECTION, SOLUTION EPIDURAL; INFILTRATION; INTRACAUDAL; PERINEURAL AS NEEDED
Status: DISCONTINUED | OUTPATIENT
Start: 2020-10-11 | End: 2020-10-11 | Stop reason: HOSPADM

## 2020-10-11 RX ORDER — HYDROMORPHONE HYDROCHLORIDE 2 MG/ML
INJECTION, SOLUTION INTRAMUSCULAR; INTRAVENOUS; SUBCUTANEOUS AS NEEDED
Status: DISCONTINUED | OUTPATIENT
Start: 2020-10-11 | End: 2020-10-11 | Stop reason: HOSPADM

## 2020-10-11 RX ORDER — PROPOFOL 10 MG/ML
INJECTION, EMULSION INTRAVENOUS AS NEEDED
Status: DISCONTINUED | OUTPATIENT
Start: 2020-10-11 | End: 2020-10-11 | Stop reason: HOSPADM

## 2020-10-11 RX ORDER — SODIUM CHLORIDE 0.9 % (FLUSH) 0.9 %
5-40 SYRINGE (ML) INJECTION EVERY 8 HOURS
Status: DISCONTINUED | OUTPATIENT
Start: 2020-10-11 | End: 2020-10-11 | Stop reason: HOSPADM

## 2020-10-11 RX ORDER — FENTANYL CITRATE 50 UG/ML
INJECTION, SOLUTION INTRAMUSCULAR; INTRAVENOUS AS NEEDED
Status: DISCONTINUED | OUTPATIENT
Start: 2020-10-11 | End: 2020-10-11 | Stop reason: HOSPADM

## 2020-10-11 RX ORDER — MORPHINE SULFATE 10 MG/ML
2 INJECTION, SOLUTION INTRAMUSCULAR; INTRAVENOUS
Status: DISCONTINUED | OUTPATIENT
Start: 2020-10-11 | End: 2020-10-11 | Stop reason: HOSPADM

## 2020-10-11 RX ORDER — GLYCOPYRROLATE 0.2 MG/ML
INJECTION INTRAMUSCULAR; INTRAVENOUS AS NEEDED
Status: DISCONTINUED | OUTPATIENT
Start: 2020-10-11 | End: 2020-10-11 | Stop reason: HOSPADM

## 2020-10-11 RX ORDER — HYDROMORPHONE HYDROCHLORIDE 1 MG/ML
0.5 INJECTION, SOLUTION INTRAMUSCULAR; INTRAVENOUS; SUBCUTANEOUS
Status: DISCONTINUED | OUTPATIENT
Start: 2020-10-11 | End: 2020-10-11 | Stop reason: HOSPADM

## 2020-10-11 RX ORDER — PHENYLEPHRINE HCL IN 0.9% NACL 0.4MG/10ML
SYRINGE (ML) INTRAVENOUS AS NEEDED
Status: DISCONTINUED | OUTPATIENT
Start: 2020-10-11 | End: 2020-10-11 | Stop reason: HOSPADM

## 2020-10-11 RX ORDER — NEOSTIGMINE METHYLSULFATE 1 MG/ML
INJECTION, SOLUTION INTRAVENOUS AS NEEDED
Status: DISCONTINUED | OUTPATIENT
Start: 2020-10-11 | End: 2020-10-11 | Stop reason: HOSPADM

## 2020-10-11 RX ORDER — PANTOPRAZOLE SODIUM 40 MG/1
40 TABLET, DELAYED RELEASE ORAL DAILY
Status: DISCONTINUED | OUTPATIENT
Start: 2020-10-12 | End: 2020-10-15 | Stop reason: HOSPADM

## 2020-10-11 RX ORDER — SODIUM CHLORIDE, SODIUM LACTATE, POTASSIUM CHLORIDE, CALCIUM CHLORIDE 600; 310; 30; 20 MG/100ML; MG/100ML; MG/100ML; MG/100ML
100 INJECTION, SOLUTION INTRAVENOUS CONTINUOUS
Status: DISCONTINUED | OUTPATIENT
Start: 2020-10-11 | End: 2020-10-11 | Stop reason: HOSPADM

## 2020-10-11 RX ORDER — KETOROLAC TROMETHAMINE 30 MG/ML
15 INJECTION, SOLUTION INTRAMUSCULAR; INTRAVENOUS EVERY 6 HOURS
Status: COMPLETED | OUTPATIENT
Start: 2020-10-11 | End: 2020-10-12

## 2020-10-11 RX ORDER — SODIUM CHLORIDE 9 MG/ML
25 INJECTION, SOLUTION INTRAVENOUS CONTINUOUS
Status: DISCONTINUED | OUTPATIENT
Start: 2020-10-11 | End: 2020-10-11 | Stop reason: HOSPADM

## 2020-10-11 RX ORDER — BUPIVACAINE HYDROCHLORIDE AND EPINEPHRINE 5; 5 MG/ML; UG/ML
30 INJECTION, SOLUTION EPIDURAL; INTRACAUDAL; PERINEURAL ONCE
Status: COMPLETED | OUTPATIENT
Start: 2020-10-11 | End: 2020-10-11

## 2020-10-11 RX ORDER — MIDAZOLAM HYDROCHLORIDE 1 MG/ML
0.5 INJECTION, SOLUTION INTRAMUSCULAR; INTRAVENOUS
Status: DISCONTINUED | OUTPATIENT
Start: 2020-10-11 | End: 2020-10-11 | Stop reason: HOSPADM

## 2020-10-11 RX ORDER — DEXAMETHASONE SODIUM PHOSPHATE 4 MG/ML
INJECTION, SOLUTION INTRA-ARTICULAR; INTRALESIONAL; INTRAMUSCULAR; INTRAVENOUS; SOFT TISSUE AS NEEDED
Status: DISCONTINUED | OUTPATIENT
Start: 2020-10-11 | End: 2020-10-11 | Stop reason: HOSPADM

## 2020-10-11 RX ORDER — MIDAZOLAM HYDROCHLORIDE 1 MG/ML
INJECTION, SOLUTION INTRAMUSCULAR; INTRAVENOUS AS NEEDED
Status: DISCONTINUED | OUTPATIENT
Start: 2020-10-11 | End: 2020-10-11 | Stop reason: HOSPADM

## 2020-10-11 RX ORDER — ONDANSETRON 2 MG/ML
4 INJECTION INTRAMUSCULAR; INTRAVENOUS AS NEEDED
Status: DISCONTINUED | OUTPATIENT
Start: 2020-10-11 | End: 2020-10-11 | Stop reason: HOSPADM

## 2020-10-11 RX ORDER — ACETAMINOPHEN 500 MG
1000 TABLET ORAL EVERY 6 HOURS
Status: DISCONTINUED | OUTPATIENT
Start: 2020-10-11 | End: 2020-10-15 | Stop reason: HOSPADM

## 2020-10-11 RX ORDER — ROPIVACAINE HYDROCHLORIDE 5 MG/ML
30 INJECTION, SOLUTION EPIDURAL; INFILTRATION; PERINEURAL AS NEEDED
Status: DISCONTINUED | OUTPATIENT
Start: 2020-10-11 | End: 2020-10-11 | Stop reason: HOSPADM

## 2020-10-11 RX ORDER — ONDANSETRON 2 MG/ML
INJECTION INTRAMUSCULAR; INTRAVENOUS AS NEEDED
Status: DISCONTINUED | OUTPATIENT
Start: 2020-10-11 | End: 2020-10-11 | Stop reason: HOSPADM

## 2020-10-11 RX ORDER — SODIUM CHLORIDE, SODIUM LACTATE, POTASSIUM CHLORIDE, CALCIUM CHLORIDE 600; 310; 30; 20 MG/100ML; MG/100ML; MG/100ML; MG/100ML
INJECTION, SOLUTION INTRAVENOUS
Status: DISCONTINUED | OUTPATIENT
Start: 2020-10-11 | End: 2020-10-11 | Stop reason: HOSPADM

## 2020-10-11 RX ORDER — DIPHENHYDRAMINE HYDROCHLORIDE 50 MG/ML
12.5 INJECTION, SOLUTION INTRAMUSCULAR; INTRAVENOUS AS NEEDED
Status: DISCONTINUED | OUTPATIENT
Start: 2020-10-11 | End: 2020-10-11 | Stop reason: HOSPADM

## 2020-10-11 RX ORDER — ROCURONIUM BROMIDE 10 MG/ML
INJECTION, SOLUTION INTRAVENOUS AS NEEDED
Status: DISCONTINUED | OUTPATIENT
Start: 2020-10-11 | End: 2020-10-11 | Stop reason: HOSPADM

## 2020-10-11 RX ORDER — HYDROMORPHONE HYDROCHLORIDE 1 MG/ML
1 INJECTION, SOLUTION INTRAMUSCULAR; INTRAVENOUS; SUBCUTANEOUS
Status: DISCONTINUED | OUTPATIENT
Start: 2020-10-11 | End: 2020-10-15 | Stop reason: HOSPADM

## 2020-10-11 RX ORDER — LIDOCAINE HYDROCHLORIDE 20 MG/ML
INJECTION, SOLUTION EPIDURAL; INFILTRATION; INTRACAUDAL; PERINEURAL AS NEEDED
Status: DISCONTINUED | OUTPATIENT
Start: 2020-10-11 | End: 2020-10-11 | Stop reason: HOSPADM

## 2020-10-11 RX ORDER — MIDAZOLAM HYDROCHLORIDE 1 MG/ML
1 INJECTION, SOLUTION INTRAMUSCULAR; INTRAVENOUS AS NEEDED
Status: DISCONTINUED | OUTPATIENT
Start: 2020-10-11 | End: 2020-10-11 | Stop reason: HOSPADM

## 2020-10-11 RX ORDER — FENTANYL CITRATE 50 UG/ML
25 INJECTION, SOLUTION INTRAMUSCULAR; INTRAVENOUS
Status: DISCONTINUED | OUTPATIENT
Start: 2020-10-11 | End: 2020-10-11 | Stop reason: HOSPADM

## 2020-10-11 RX ORDER — SUCCINYLCHOLINE CHLORIDE 20 MG/ML
INJECTION INTRAMUSCULAR; INTRAVENOUS AS NEEDED
Status: DISCONTINUED | OUTPATIENT
Start: 2020-10-11 | End: 2020-10-11 | Stop reason: HOSPADM

## 2020-10-11 RX ADMIN — KETOROLAC TROMETHAMINE 15 MG: 30 INJECTION, SOLUTION INTRAMUSCULAR at 14:42

## 2020-10-11 RX ADMIN — SUCCINYLCHOLINE CHLORIDE 120 MG: 20 INJECTION, SOLUTION INTRAMUSCULAR; INTRAVENOUS at 10:34

## 2020-10-11 RX ADMIN — DEXAMETHASONE SODIUM PHOSPHATE 4 MG: 4 INJECTION, SOLUTION INTRAMUSCULAR; INTRAVENOUS at 10:51

## 2020-10-11 RX ADMIN — Medication 10 ML: at 07:10

## 2020-10-11 RX ADMIN — PHENYLEPHRINE HYDROCHLORIDE 60 MCG/MIN: 10 INJECTION INTRAVENOUS at 10:40

## 2020-10-11 RX ADMIN — ROCURONIUM BROMIDE 5 MG: 10 SOLUTION INTRAVENOUS at 10:34

## 2020-10-11 RX ADMIN — Medication 80 MCG: at 10:38

## 2020-10-11 RX ADMIN — SODIUM CHLORIDE, SODIUM LACTATE, POTASSIUM CHLORIDE, AND CALCIUM CHLORIDE 100 ML/HR: 600; 310; 30; 20 INJECTION, SOLUTION INTRAVENOUS at 22:07

## 2020-10-11 RX ADMIN — ACETAMINOPHEN 1000 MG: 500 TABLET ORAL at 19:10

## 2020-10-11 RX ADMIN — Medication 80 MCG: at 10:34

## 2020-10-11 RX ADMIN — ACETAMINOPHEN 1000 MG: 500 TABLET ORAL at 14:42

## 2020-10-11 RX ADMIN — SODIUM CHLORIDE, POTASSIUM CHLORIDE, SODIUM LACTATE AND CALCIUM CHLORIDE: 600; 310; 30; 20 INJECTION, SOLUTION INTRAVENOUS at 10:28

## 2020-10-11 RX ADMIN — MIDAZOLAM 2 MG: 1 INJECTION INTRAMUSCULAR; INTRAVENOUS at 10:26

## 2020-10-11 RX ADMIN — ROCURONIUM BROMIDE 20 MG: 10 SOLUTION INTRAVENOUS at 10:51

## 2020-10-11 RX ADMIN — ROCURONIUM BROMIDE 25 MG: 10 SOLUTION INTRAVENOUS at 10:43

## 2020-10-11 RX ADMIN — KETOROLAC TROMETHAMINE 15 MG: 30 INJECTION, SOLUTION INTRAMUSCULAR at 19:10

## 2020-10-11 RX ADMIN — Medication 80 MCG: at 10:42

## 2020-10-11 RX ADMIN — Medication 10 ML: at 22:00

## 2020-10-11 RX ADMIN — GLYCOPYRROLATE 0.4 MG: 0.2 INJECTION, SOLUTION INTRAMUSCULAR; INTRAVENOUS at 12:18

## 2020-10-11 RX ADMIN — HYDROMORPHONE HYDROCHLORIDE 0.4 MG: 2 INJECTION, SOLUTION INTRAMUSCULAR; INTRAVENOUS; SUBCUTANEOUS at 11:24

## 2020-10-11 RX ADMIN — FENTANYL CITRATE 25 MCG: 50 INJECTION, SOLUTION INTRAMUSCULAR; INTRAVENOUS at 10:34

## 2020-10-11 RX ADMIN — PROPOFOL 100 MG: 10 INJECTION, EMULSION INTRAVENOUS at 10:34

## 2020-10-11 RX ADMIN — ONDANSETRON HYDROCHLORIDE 4 MG: 2 INJECTION, SOLUTION INTRAMUSCULAR; INTRAVENOUS at 10:51

## 2020-10-11 RX ADMIN — Medication 3 MG: at 12:18

## 2020-10-11 RX ADMIN — CEFOTETAN DISODIUM 2 G: 2 INJECTION, POWDER, FOR SOLUTION INTRAMUSCULAR; INTRAVENOUS at 10:52

## 2020-10-11 RX ADMIN — Medication 10 ML: at 14:45

## 2020-10-11 RX ADMIN — FENTANYL CITRATE 50 MCG: 50 INJECTION, SOLUTION INTRAMUSCULAR; INTRAVENOUS at 10:54

## 2020-10-11 RX ADMIN — FENTANYL CITRATE 25 MCG: 50 INJECTION, SOLUTION INTRAMUSCULAR; INTRAVENOUS at 11:22

## 2020-10-11 RX ADMIN — LIDOCAINE HYDROCHLORIDE 60 MG: 20 INJECTION, SOLUTION EPIDURAL; INFILTRATION; INTRACAUDAL; PERINEURAL at 10:34

## 2020-10-11 NOTE — ANESTHESIA PREPROCEDURE EVALUATION
Relevant Problems   No relevant active problems       Anesthetic History   No history of anesthetic complications            Review of Systems / Medical History  Patient summary reviewed, nursing notes reviewed and pertinent labs reviewed    Pulmonary  Within defined limits                 Neuro/Psych   Within defined limits           Cardiovascular  Within defined limits                Exercise tolerance: >4 METS     GI/Hepatic/Renal  Within defined limits   GERD           Endo/Other  Within defined limits      Cancer     Other Findings              Physical Exam    Airway  Mallampati: II  TM Distance: > 6 cm  Neck ROM: normal range of motion   Mouth opening: Normal     Cardiovascular  Regular rate and rhythm,  S1 and S2 normal,  no murmur, click, rub, or gallop             Dental  No notable dental hx       Pulmonary  Breath sounds clear to auscultation               Abdominal  GI exam deferred       Other Findings            Anesthetic Plan    ASA: 2  Anesthesia type: general          Induction: Intravenous  Anesthetic plan and risks discussed with: Patient

## 2020-10-11 NOTE — ED NOTES
TRANSFER - OUT REPORT:    Verbal report given to Ben Strauss RN(name) on Aurelio Gilbert  being transferred to (unit) for routine progression of care       Report consisted of patients Situation, Background, Assessment and   Recommendations(SBAR). Information from the following report(s) SBAR and Recent Results was reviewed with the receiving nurse. Lines:       Opportunity for questions and clarification was provided.       Patient transported with:   [employer]

## 2020-10-11 NOTE — ED TRIAGE NOTES
Patient arrives via EMS from [facility] for a prolapsed ostomy. Patient was seen here earlier today for the same issue. The ostomy prolapse was corrected during that visit and patient was discharged back to [facility]. Patient stated that when she arrived back to [facility] her ostomy prolapsed again and she feels like it is larger than before.

## 2020-10-11 NOTE — PROGRESS NOTES
8:29 AM: Report given to PACU RN on patient prior to surgery. Patient resting quietly at this time no complaints of pain or discomfort. 10:00 AM: Patient off floor for procedure. 1:48 PM: Patient back on floor. Slightly drowsy but alert. No complaints of pain. Ileostomy is clean, dry, and intact with very minimal red drainage. 8:49 PM: Patient had no complaints of pain during shift. Pain controlled with scheduled tylenol and toradol. 250 clear yellow urine emptied from pepper. Ileostomy is still clean dry and intact. Still minimal output, but more pink-tinged.

## 2020-10-11 NOTE — ANESTHESIA POSTPROCEDURE EVALUATION
Procedure(s):  REVISION LOOP ILEOSTOMY CLOSURE SUPINE, LOCALLY AT OSTOMY SITE. general    Anesthesia Post Evaluation        Patient location during evaluation: PACU  Note status: Adequate. Level of consciousness: responsive to verbal stimuli and sleepy but conscious  Pain management: satisfactory to patient  Airway patency: patent  Anesthetic complications: no  Cardiovascular status: acceptable  Respiratory status: acceptable  Hydration status: acceptable  Comments: +Post-Anesthesia Evaluation and Assessment    Patient: Forrest Markham MRN: [id number]  SSN: [id number]   YOB: 1950  Age: 72 y.o. Sex: female          Cardiovascular Function/Vital Signs    /69 (BP 1 Location: Left arm, BP Patient Position: At rest)   Pulse 70   Temp 36.4 °C (97.5 °F)   Resp 14   SpO2 97%     Patient is status post Procedure(s):  REVISION LOOP ILEOSTOMY CLOSURE SUPINE, LOCALLY AT r [facility]. Nausea/Vomiting: Controlled. Postoperative hydration reviewed and adequate. Pain:  Pain Scale 1: Numeric (0 - 10) (10/11/20 1320)  Pain Intensity 1: 0 (10/11/20 1320)   Managed. Neurological Status:   Neuro (WDL): Within Defined Limits (10/11/20 1320)   At baseline. Mental Status and Level of Consciousness: Arousable. Pulmonary Status:   O2 Device: Room air (10/11/20 1320)   Adequate oxygenation and airway patent. Complications related to anesthesia: None    Post-anesthesia assessment completed. No concerns. I have evaluated the patient and the patient is stable and ready to be discharged from PACU . Signed By: Katrina Corbin MD    10/11/2020        INITIAL Post-op Vital signs:   Vitals Value Taken Time   /57 10/11/2020  1:30 PM   Temp 36.4 °C (97.6 °F) 10/11/2020  1:20 PM   Pulse 71 10/11/2020  1:31 PM   Resp 19 10/11/2020  1:31 PM   SpO2 96 % 10/11/2020  1:31 PM   Vitals shown include unvalidated device data.

## 2020-10-11 NOTE — BRIEF OP NOTE
Brief Postoperative Note    Patient: Mavis Ray  YOB: 1950  MRN: [id number]    Date of Procedure: 10/11/2020     Pre-Op Diagnosis:  Ileostomy prolapse  Post-Op Diagnosis: Ileostomy prolapse    Procedure:  Revision of loop ileostomy (resection and creation of divided loop ileostomy)  Surgeon:  Obed Jeronimo MD  Surgical Assistant:  Anne Adhikari SA  Anesthesia: General endotracheal  Specimens:   ID Type Source Tests Collected by Time Destination   1 : Ileostomy Fresh Ileum  Zofia Eric MD 10/11/2020 1209 Pathology   Drains:  None  Estimated Blood Loss:  25 mL  Crystalloid:  400 mL  Urine:  265 mL  Complications:  None apparent  Implants: None  Findings:  Loop ileostomy with prolapsing afferent limb. All bowel viable.     Electronically Signed by Obed Jeronimo MD

## 2020-10-11 NOTE — PROGRESS NOTES
Bedside and Verbal shift change report given to [employer], RN (oncoming nurse) by Tommy Gill RN (offgoing nurse). Report included the following information SBAR, Kardex, ED Summary, Intake/Output, MAR and Recent Results.

## 2020-10-11 NOTE — ED PROVIDER NOTES
Patient arrives from rehab via EMS with a prolapsed ileostomy. Seen her earlier this evening for the same. Prolapse was reduced in the emergency department patient was discharged back to rehab. Apparently the prolapse recurred after arrival back to her rehab. Dr. Breanna Dutta is aware and plans to admit the patient. He has been notified via [employer] serve of the patient's return. Past Medical History:   Diagnosis Date    GERD (gastroesophageal reflux disease)     Ill-defined condition     Spaulding's esophagus       Past Surgical History:   Procedure Laterality Date    COLONOSCOPY Left 9/14/2020    COLONOSCOPY performed by Alejo Tate MD at [address] [place] [address]; incomplete secondary to partial obstruction.  HX APPENDECTOMY  age 17    HX COLONOSCOPY  circa 2010    No neoplasms.  HX ENDOSCOPY  03/13/2017    Dr. Angela Foster HX ENDOSCOPY  02/13/2020    Dr. Claudia Zheng oopherectgosia for treatment of benign mass.   GYN  09/17/2020    Total abdominal hysterectomy and left salpingo-oophorectomy; Carole Morales MD.     OTHER SURGICAL  09/17/2020    Low anterior resection, mobilization of the splenic flexure, coloproctostomy, and creation of loop ileostomy; Dr. Marissa Galloway.      UROLOGICAL  09/12/2020    Cystoscopy and placement of a right ureteral stent; Papa Bentley III, MD.    HX UROLOGICAL  09/17/2020    Cystoscopy and placement of a temporary left ureteral catheter; Papa Bentley III, MD.     UROLOGICAL  09/17/2020    Distal right ureterectomy; Papa Bentley III, MD.     UROLOGICAL  09/17/2020    Right ureteral re-implantation with psoas hitch and placement of a right ureteral stent; Racquel Ulrich MD.         Family History:   Problem Relation Age of Onset    Cancer Mother     Heart Disease Father     Diabetes Brother        Social History     Socioeconomic History    Marital status: SINGLE     Spouse name: Not on file    Number of children: Not on file    Years of education: Not on file    Highest education level: Not on file   Occupational History    Not on file   Social Needs    Financial resource strain: Not on file    Food insecurity     Worry: Not on file     Inability: Not on file    Transportation needs     Medical: Not on file     Non-medical: Not on file   Tobacco Use    Smoking status: Never Smoker    Smokeless tobacco: Never Used   Substance and Sexual Activity    Alcohol use: Yes     Alcohol/week: 1.0 standard drinks     Types: 1 Glasses of wine per week    Drug use: No    Sexual activity: Not on file   Lifestyle    Physical activity     Days per week: Not on file     Minutes per session: Not on file    Stress: Not on file   Relationships    Social connections     Talks on phone: Not on file     Gets together: Not on file     Attends Christianity service: Not on file     Active member of club or organization: Not on file     Attends meetings of clubs or organizations: Not on file     Relationship status: Not on file    Intimate partner violence     Fear of current or ex partner: Not on file     Emotionally abused: Not on file     Physically abused: Not on file     Forced sexual activity: Not on file   Other Topics Concern    Not on file   Social History Narrative    Not on file         ALLERGIES: Patient has no known allergies. Review of Systems   Constitutional: Negative for fever. HENT: Negative for facial swelling. Eyes: Negative for visual disturbance. Respiratory: Negative for chest tightness. Cardiovascular: Negative for chest pain. Gastrointestinal: Negative for abdominal pain. Genitourinary: Negative for difficulty urinating and dysuria. Musculoskeletal: Negative for arthralgias. Skin: Negative for rash. Neurological: Negative for headaches. Hematological: Negative for adenopathy. Psychiatric/Behavioral: Negative for suicidal ideas. There were no vitals filed for this visit.          Physical Exam  Vitals signs and nursing note reviewed. Constitutional:       General: She is not in acute distress. Appearance: She is well-developed. HENT:      Head: Normocephalic and atraumatic. Eyes:      General: No scleral icterus. Conjunctiva/sclera: Conjunctivae normal.      Pupils: Pupils are equal, round, and reactive to light. Neck:      Musculoskeletal: Normal range of motion and neck supple. Cardiovascular:      Rate and Rhythm: Normal rate. Heart sounds: No murmur. Pulmonary:      Effort: Pulmonary effort is normal. No respiratory distress. Abdominal:      General: There is no distension. Comments: Ileostomy in the right upper quadrant with approximately 7 8 inches of prolapsed bowel. Appears red, pink and well-perfused. No pain or tenderness. Musculoskeletal: Normal range of motion. Skin:     General: Skin is warm and dry. Findings: No rash. Neurological:      Mental Status: She is alert and oriented to person, place, and time.           MDM  Number of Diagnoses or Management Options  Ileostomy prolapse [facility]):          Procedures

## 2020-10-11 NOTE — PERIOP NOTES
TRANSFER - OUT REPORT:    Verbal report given to [employer], RN(name) on Aurelio Gilbert  being transferred to 514(unit) for routine post - op       Report consisted of patients Situation, Background, Assessment and   Recommendations(SBAR). Time Pre op antibiotic given:1052  Anesthesia Stop time: 0965  Hogan Present on Transfer to floor:yes  Order for Hogan on Chart:yes      Information from the following report(s) SBAR, Procedure Summary, Intake/Output and MAR was reviewed with the receiving nurse. Opportunity for questions and clarification was provided. Is the patient on 02? NO        Is the patient on a monitor? NO    Is the nurse transporting with the patient? NO    Surgical Waiting Area notified of patient's transfer from PACU? NO      The following personal items collected during your admission accompanied patient upon transfer:   Dental Appliance: Dental Appliances: None  Vision: Visual Aid: Glasses, With patient  Hearing Aid:    Jewelry: Jewelry: None  Clothing: Clothing: Pants, Shirt  Other Valuables:  Other Valuables: Cell Phone, Purse()  Valuables sent to safe:      Glasses sent up with patient form the PACU

## 2020-10-11 NOTE — PROGRESS NOTES
TRANSFER - OUT REPORT:    Verbal report given to Angelique (name) on Jacqueline Stone  being transferred to [facility] (unit) for ordered procedure       Report consisted of patients Situation, Background, Assessment and   Recommendations(SBAR). Information from the following report(s) SBAR, Kardex, Intake/Output, MAR and Recent Results was reviewed with the receiving nurse. Lines:   Peripheral IV 10/10/20 Right Antecubital (Active)   Site Assessment Clean, dry, & intact 10/11/20 1319   Phlebitis Assessment 0 10/11/20 1319   Infiltration Assessment 0 10/11/20 1319   Dressing Status Clean, dry, & intact 10/11/20 1319   Dressing Type Transparent 10/11/20 1319   Hub Color/Line Status Infusing 10/11/20 1319   Action Taken Open ports on tubing capped 10/11/20 1319   Alcohol Cap Used Yes 10/11/20 1319        Opportunity for questions and clarification was provided.       Patient transported with:   [employer]

## 2020-10-11 NOTE — H&P
Colorectal Admission History and Physical Examination Note          NAME:  Bryson Garcia   :   1950   MRN:   [id number]     Admission Date: 10/10/2020    Chief Complaint:  Ileostomy prolapse. 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 The operation included a coloproctostomy and the creation of a loop ileostomy, and the patient had a prolonged hospital course afterwards. Among other issues, she experienced a non-strangulating prolapse of the ileostomy. She was discharged to a [facility] on Monday 10/5/2020 and followed up with [facility] on 10/6/2020 where a cystogram was performed and her Hogan catheter was removed. She has been voiding well since then, albeit with some associated discomfort. Today, at the SNF, her ileostomy was found to have prolapsed significantly more than before. The patient was sent to the ER, and there (after discussing it with me by phone) Dr. Brianna Darden was able to reduce it. She was then returned to the SNF with a plan to follow up with me at 9:00 AM on Monday 10/12/2020. Unfortunately, the prolapse recurred right away and the staff at the SNF was unable to reduce it. I therefore requested that they send her back here. Currently, she denies having any abdominal pain. The ileostomy has been functioning well, and she has been enjoying eating a low fiber diet.       PMH:  Past Medical History:   Diagnosis Date    [facility] [address]     GERD (gastroesophageal reflux disease)     Ileostomy in place ([facility] [address]     Ill-defined condition     Spaulding's esophagus       PSH:  Past Surgical History:   Procedure Laterality Date    COLONOSCOPY Left 2020    COLONOSCOPY performed by Andres Garcias MD at [facility] ENDOSCOPY; incomplete secondary to partial obstruction.  HX APPENDECTOMY  age 17    HX COLONOSCOPY  circa     No neoplasms.  HX ENDOSCOPY  2017    Dr. Lexy Vargas HX ENDOSCOPY  2020    Dr. Dory Montenegro oopherectomy for treatment of benign mass.   GYN  2020    Total abdominal hysterectomy and left salpingo-oophorectomy; Gilmar Smith MD.     OTHER SURGICAL  2020    Low anterior resection, mobilization of the splenic flexure, coloproctostomy, and creation of loop ileostomy; Dr. Rosemarie Bautista.   UROLOGICAL  2020    Cystoscopy and placement of a right ureteral stent; Jaime Florez III, MD.    HX UROLOGICAL  2020    Cystoscopy and placement of a temporary left ureteral catheter; Jaime Florez III, MD.     UROLOGICAL  2020    Distal right ureterectomy; Jaime Florez III, MD.     UROLOGICAL  2020    Right ureteral re-implantation with psoas hitch and placement of a right ureteral stent; Kamryn Vides MD.       Home Medications:  Prior to Admission Medications   Prescriptions Last Dose Informant Patient Reported? Taking? Omega-3 Fatty Acids (FISH OIL) 500 mg cap   Yes No   Sig: Take  by mouth. ascorbic acid, vitamin C, (VITAMIN C) 500 mg tablet   Yes No   Sig: Take 500 mg by mouth. cholecalciferol (VITAMIN D3) 1,000 unit cap   Yes No   Sig: Take 1,000 Units by mouth daily. denosumab (PROLIA) 60 mg/mL injection   Yes No   Si mg by SubCUTAneous route. lansoprazole (PREVACID) 30 mg capsule   Yes No   Sig: Take 30 mg by mouth Daily (before breakfast). multivitamin (ONE A DAY) tablet   Yes No   Sig: Take 1 Tab by mouth daily.       Facility-Administered Medications: None       Hospital Medications:  Current Facility-Administered Medications   Medication Dose Route Frequency    sodium chloride (NS) flush 5-40 mL  5-40 mL IntraVENous Q8H    sodium chloride (NS) flush 5-40 mL  5-40 mL IntraVENous PRN    lactated Ringers infusion  100 mL/hr IntraVENous CONTINUOUS    ondansetron (ZOFRAN) injection 4 mg  4 mg IntraVENous Q4H PRN       Allergies:  No Known Allergies    Family History:  Family History   Problem Relation Age of Onset    Cancer Mother     Heart Disease Father     Diabetes Brother        Social History:  Social History     Tobacco Use    Smoking status: Never Smoker    Smokeless tobacco: Never Used   Substance Use Topics    Alcohol use: Yes     Alcohol/week: 1.0 standard drinks     Types: 1 Glasses of wine per week       Review of Systems:    Symptom Y/N Comments  Symptom Y/N Comments   Fever/Chills N   Chest Pain N    Cough N   Abdominal Pain N    Sputum N   Joint Pain     SOB/ECHEVERRIA N   Pruritis/Rash     Nausea/vomit N   Tolerating PT/OT     Diarrhea N   Tolerating Diet Y    Constipation N   Other       Could NOT obtain due to:        Objective:     Patient Vitals for the past 8 hrs:   BP Temp Pulse Resp SpO2   10/10/20 2213 120/82 97.9 °F (36.6 °C) 100 18 100 %   10/10/20 2045 128/74 -- -- -- 100 %   10/10/20 2030 134/75 -- -- -- 100 %   10/10/20 2013 139/87 97.6 °F (36.4 °C) 100 16 100 %     No intake/output data recorded. No intake/output data recorded. PHYSICAL EXAMINATION:    GENERAL:  No distress. HEART:  Regular rate and rhythm with no murmurs, gallops, or rubs. ABDOMEN:  Soft. Non-distended. Minimally tender. Midline incision clean, dry, intact, and without erythema. The ileostomy is viable but prolapsed. NEURO:  Alert and oriented. Data Review     Recent Labs     10/10/20  2030   WBC 6.5   HGB 11.9   HCT 39.6   *     Recent Labs     10/10/20  2030     137   K 3.8  3.6     103   CO2 28  28   BUN 18  19   CREA 0.59  0.66   *  116*   PHOS 3.3   CA 9.2  9.7     Recent Labs     10/10/20  2030   *   TP 6.8   ALB 3.2*   GLOB 3.6     No results for input(s): INR, PTP, APTT, INREXT in the last 72 hours.                     Assessment and Plan: With some difficulty, I was able to reduce the prolapse. The ileum was edematous secondary to venous congestion, but it was viable. There was only a small amount of bleeding. I believe that the prolapse will continue to occur unless the ileostomy is either revised or eliminated. Unfortunately, although the patient's nutritional status is improved compared to what it had been just a short time ago, I do not think that it would be wise to restore the intestinal continuity this soon after the creation of the coloproctostomy. I have therefore proposed that an ileostomy revision be performed tomorrow. The operation was discussed with the patient, and she has agreed to proceed.   She has been added on to the schedule, and hopefully the operation will begin at approximately 9:30 AM.    Principal Problem:    Ileostomy prolapse ([address] (10/10/2020)

## 2020-10-11 NOTE — PROGRESS NOTES
TRANSFER - IN REPORT:    Verbal report received from Austinpe (name) on Aspen Peña  being received from [email or web address]) for routine post - op      Report consisted of patients Situation, Background, Assessment and   Recommendations(SBAR). Information from the following report(s) SBAR, Kardex, OR Summary, Procedure Summary, Intake/Output, MAR and Recent Results was reviewed with the receiving nurse. Opportunity for questions and clarification was provided. Assessment completed upon patients arrival to unit and care assumed. Ana Maria Wolfe  (RN)  2020 04:53:55

## 2020-10-12 LAB
ANION GAP SERPL CALC-SCNC: 5 MMOL/L (ref 5–15)
BASOPHILS # BLD: 0.1 K/UL (ref 0–0.1)
BASOPHILS NFR BLD: 1 % (ref 0–1)
BUN SERPL-MCNC: 13 MG/DL (ref 6–20)
BUN/CREAT SERPL: 21 (ref 12–20)
CALCIUM SERPL-MCNC: 8.2 MG/DL (ref 8.5–10.1)
CHLORIDE SERPL-SCNC: 106 MMOL/L (ref 97–108)
CO2 SERPL-SCNC: 27 MMOL/L (ref 21–32)
CREAT SERPL-MCNC: 0.62 MG/DL (ref 0.55–1.02)
DIFFERENTIAL METHOD BLD: ABNORMAL
EOSINOPHIL # BLD: 0.5 K/UL (ref 0–0.4)
EOSINOPHIL NFR BLD: 5 % (ref 0–7)
ERYTHROCYTE [DISTWIDTH] IN BLOOD BY AUTOMATED COUNT: 23.9 % (ref 11.5–14.5)
GLUCOSE SERPL-MCNC: 81 MG/DL (ref 65–100)
HCT VFR BLD AUTO: 34.2 % (ref 35–47)
HGB BLD-MCNC: 10.4 G/DL (ref 11.5–16)
IMM GRANULOCYTES # BLD AUTO: 0 K/UL (ref 0–0.04)
IMM GRANULOCYTES NFR BLD AUTO: 0 % (ref 0–0.5)
LYMPHOCYTES # BLD: 1.6 K/UL (ref 0.8–3.5)
LYMPHOCYTES NFR BLD: 16 % (ref 12–49)
MCH RBC QN AUTO: 25 PG (ref 26–34)
MCHC RBC AUTO-ENTMCNC: 30.4 G/DL (ref 30–36.5)
MCV RBC AUTO: 82.2 FL (ref 80–99)
MONOCYTES # BLD: 0.6 K/UL (ref 0–1)
MONOCYTES NFR BLD: 6 % (ref 5–13)
NEUTS SEG # BLD: 7.1 K/UL (ref 1.8–8)
NEUTS SEG NFR BLD: 72 % (ref 32–75)
NRBC # BLD: 0 K/UL (ref 0–0.01)
NRBC BLD-RTO: 0 PER 100 WBC
PLATELET # BLD AUTO: 466 K/UL (ref 150–400)
PLATELET COMMENTS,PCOM: ABNORMAL
PMV BLD AUTO: 9 FL (ref 8.9–12.9)
POTASSIUM SERPL-SCNC: 3.6 MMOL/L (ref 3.5–5.1)
RBC # BLD AUTO: 4.16 M/UL (ref 3.8–5.2)
RBC MORPH BLD: ABNORMAL
SODIUM SERPL-SCNC: 138 MMOL/L (ref 136–145)
WBC # BLD AUTO: 9.9 K/UL (ref 3.6–11)

## 2020-10-12 PROCEDURE — 85025 COMPLETE CBC W/AUTO DIFF WBC: CPT

## 2020-10-12 PROCEDURE — 74011250636 HC RX REV CODE- 250/636: Performed by: COLON & RECTAL SURGERY

## 2020-10-12 PROCEDURE — 36415 COLL VENOUS BLD VENIPUNCTURE: CPT

## 2020-10-12 PROCEDURE — 80048 BASIC METABOLIC PNL TOTAL CA: CPT

## 2020-10-12 PROCEDURE — 65270000029 HC RM PRIVATE

## 2020-10-12 PROCEDURE — 74011250637 HC RX REV CODE- 250/637: Performed by: COLON & RECTAL SURGERY

## 2020-10-12 PROCEDURE — 97161 PT EVAL LOW COMPLEX 20 MIN: CPT

## 2020-10-12 PROCEDURE — 97116 GAIT TRAINING THERAPY: CPT

## 2020-10-12 RX ORDER — SODIUM CHLORIDE, SODIUM LACTATE, POTASSIUM CHLORIDE, CALCIUM CHLORIDE 600; 310; 30; 20 MG/100ML; MG/100ML; MG/100ML; MG/100ML
25 INJECTION, SOLUTION INTRAVENOUS CONTINUOUS
Status: DISCONTINUED | OUTPATIENT
Start: 2020-10-12 | End: 2020-10-15 | Stop reason: HOSPADM

## 2020-10-12 RX ADMIN — Medication 10 ML: at 06:54

## 2020-10-12 RX ADMIN — PANTOPRAZOLE SODIUM 40 MG: 40 TABLET, DELAYED RELEASE ORAL at 09:15

## 2020-10-12 RX ADMIN — ACETAMINOPHEN 1000 MG: 500 TABLET ORAL at 08:41

## 2020-10-12 RX ADMIN — ACETAMINOPHEN 1000 MG: 500 TABLET ORAL at 15:27

## 2020-10-12 RX ADMIN — ACETAMINOPHEN 1000 MG: 500 TABLET ORAL at 20:32

## 2020-10-12 RX ADMIN — KETOROLAC TROMETHAMINE 15 MG: 30 INJECTION, SOLUTION INTRAMUSCULAR at 02:44

## 2020-10-12 RX ADMIN — KETOROLAC TROMETHAMINE 15 MG: 30 INJECTION, SOLUTION INTRAMUSCULAR at 06:53

## 2020-10-12 NOTE — WOUND CARE
New consult placed by RN: Ofilia Moritz for revised ileostomy from prolapse. Surgery 10-11-20 at noon. POD #1     Chart shows:  Admitted for ileostomy prolapse; history of loop oleostomy, hydronephrosis, Colon CA, malnutrition and anemia  WBC = 9.9  On = 10-12-20  Admitted from : rehab.     Assessment:   Patient is A&O x 3, communicative, continent and mobile. PT to work with patient. Needs assistance with movement: fresh post op. Bed: Versa Care Bed  Patient reports no pain at present, concerned about doing \" core abdominal muscle exercises at rehab and could this have caused prolapse. Patient left hospital with slight prolapse.     Bilateral heels, buttocks and sacral skin pink and blanching.     OR pouch intact with window. Liquid brown stool. To change pouch with patient tomorrow.     Recommendations:    Empty appliance when 1/3 full and PRN. Encourage patient/family to notify nurse and  assist w/ pouch emptying to promote self-care. Change appliance twice weekly and immediately with leaking to avoid skin breakdown. DO NOT REINFORCE LEAKS! Ostomy supplies located on [address] and ICU. Supplies left in room.     Minimize layers of linen/pads under patient to optimize support surface. Turn/reposition approximately every 2 hours and offload heels. Manage incontinence / promote continence; Hydra Guard Barrier Cream to buttocks and sacrum daily and as needed with incontinence care.   Discussed above plan with patient and RN Ofilia Moritz at bedside.     Transition of Care: Plan to follow weekly and as needed while admitted to hospital.   SEGUNDO Hensley 80 BSN RN  Wound Care Department  Office: [phone]  Pager: [phone]

## 2020-10-12 NOTE — PROGRESS NOTES
Visited Ms Arturo Ferrari in room 65 regarding a consult for AMD information. Ms Arturo Ferrari was lying quietly in bed and appeared in pretty good spirits. Provided patient with a blank AMD and a copy of the booklet, \"Your Right to Decide\". Reviewed each section of the AMD and its purpose with Ms Arturo Ferrari and answered her related questions to her stated satisfaction. Encouraged her to discuss her wishes with whomever she decided to appoint as her primary agent to insure they would be willing to carry out her wishes. Instructed her to have [phone] notified if she had questions &/or would like to complete an AMD.    Ms Arturo Ferrari denied having any other concerns at that time and accepted 's offer to keep her in prayer. : Rev. Kristel Bishop.  Lexie Salinas; [facility], to contact [phone] call: [phone]

## 2020-10-12 NOTE — PROGRESS NOTES
PHYSICAL THERAPY EVALUATION/DISCHARGE  Patient: Lynette Reid (59 y.o. female)  Date: 10/12/2020  Primary Diagnosis: Ileostomy prolapse ([facility] [address] [K94.19]  Procedure(s) (LRB):  REVISION LOOP ILEOSTOMY CLOSURE SUPINE, LOCALLY AT OSTOMY SITE (N/A) 1 Day Post-Op   Precautions:          ASSESSMENT  Based on the objective data described below, the patient is presently modified independent for ambulation and stairs. Pt reports she was at rehab, d/w CM. Pt appears to be at baseline with functional mobility. Pt did not require AD for ambulation and able to manage IV pole while navigating hallway. Pt able to negotiate stairs with reciprocal pattern and no LOB. Pt returned to room and transferred to bed to rest. Educated pt to continue ambulating with RN. Functional Outcome Measure: The patient scored 90/100 on the Barthel Index outcome measure which is indicative of patient is 10% dependent on others. Other factors to consider for discharge: fall risk, lives alone but has assistance      Further skilled acute physical therapy is not indicated at this time. PLAN :  Recommendation for discharge: (in order for the patient to meet his/her long term goals)  Physical therapy at least 2 days/week in the home     This discharge recommendation:  Has been made in collaboration with the attending provider and/or case management    IF patient discharges home will need the following DME: none       SUBJECTIVE:   Patient stated I just walked.     OBJECTIVE DATA SUMMARY:   HISTORY:    Past Medical History:   Diagnosis Date    Colon cancer ([facility] [address]     GERD (gastroesophageal reflux disease)     Ileostomy in place [facility])     Ill-defined condition     Spaulding's esophagus     Past Surgical History:   Procedure Laterality Date    COLONOSCOPY Left 9/14/2020    COLONOSCOPY performed by Sabina Harmon MD at [address] [place] [address]; incomplete secondary to partial obstruction.     HX APPENDECTOMY  age 17    HX COLONOSCOPY  circa 2010    No neoplasms. HX ENDOSCOPY  03/13/2017    Dr. Cindi Long ENDOSCOPY  02/13/2020    Dr. Cindi Long GYN      Left oopherectomy for treatment of benign mass. HX GYN  09/17/2020    Total abdominal hysterectomy and left salpingo-oophorectomy; Rajiv Portillo MD.    HX OTHER SURGICAL  09/17/2020    Low anterior resection, mobilization of the splenic flexure, coloproctostomy, and creation of loop ileostomy; Dr. Michael Abernathy. HX UROLOGICAL  09/12/2020    Cystoscopy and placement of a right ureteral stent; Jaden Fernandez III, MD.    HX UROLOGICAL  09/17/2020    Cystoscopy and placement of a temporary left ureteral catheter; Jaden Fernandez III, MD.    Verdia Sample UROLOGICAL  09/17/2020    Distal right ureterectomy; Jaden Fernandez III, MD.     UROLOGICAL  09/17/2020    Right ureteral re-implantation with psoas hitch and placement of a right ureteral stent; Eusebia Victor MD.       Prior level of function: independent, has friends in area to assist  Personal factors and/or comorbidities impacting plan of care: Therese Jackson Environment: Rehabilitation facility  # Steps to Enter: 4  Rails to Enter: Yes  Hand Rails : Bilateral  One/Two Story Residence: Two story  # of Interior Steps: 12  Interior Rails: Both  Living Alone: Yes  Support Systems: Family member(s)  Patient Expects to be Discharged to[de-identified] Rehabilitation facility  Current DME Used/Available at Home: None    EXAMINATION/PRESENTATION/DECISION MAKING:   Critical Behavior:              Hearing: Auditory  Auditory Impairment: None  Skin:  intact  Edema: none noted  Range Of Motion:  AROM: Within functional limits           PROM: Within functional limits           Strength:    Strength:  Within functional limits                    Tone & Sensation:   Tone: Normal              Sensation: Intact               Coordination:  Coordination: Within functional limits  Vision:      Functional Mobility:  Bed Mobility:     Supine to Sit: Modified independent  Sit to Supine: Modified independent  Scooting: Modified independent  Transfers:  Sit to Stand: Modified independent  Stand to Sit: Modified independent                       Balance:   Sitting: Intact  Standing: Intact  Ambulation/Gait Training:  Distance (ft): 120 Feet (ft)  Assistive Device: Gait belt  Ambulation - Level of Assistance: Modified independent        Gait Abnormalities: Decreased step clearance        Base of Support: Narrowed        Stairs:  Number of Stairs Trained: 2(x2)  Stairs - Level of Assistance: Modified independent   Rail Use: Right       Functional Measure:  Barthel Index:    Bathin  Bladder: 0  Bowels: 10  Groomin  Dressing: 10  Feeding: 10  Mobility: 15  Stairs: 10  Toilet Use: 10  Transfer (Bed to Chair and Back): 15  Total: 90/100       The Barthel ADL Index: Guidelines  1. The index should be used as a record of what a patient does, not as a record of what a patient could do. 2. The main aim is to establish degree of independence from any help, physical or verbal, however minor and for whatever reason. 3. The need for supervision renders the patient not independent. 4. A patient's performance should be established using the best available evidence. Asking the patient, friends/relatives and nurses are the usual sources, but direct observation and common sense are also important. However direct testing is not needed. 5. Usually the patient's performance over the preceding 24-48 hours is important, but occasionally longer periods will be relevant. 6. Middle categories imply that the patient supplies over 50 per cent of the effort. 7. Use of aids to be independent is allowed. Ruthy Keene., Barthel, D.W. (1499). Functional evaluation: the Barthel Index. [facility] [address] [facility] (14)2. HANY Sorto, Silvana Hodgkins., Arianna Augustin.[facility], [address] [place] [address] ().  Measuring the change indisability after inpatient rehabilitation; comparison of the responsiveness of the Barthel Index and Functional Wetzel Measure. Journal of Neurology, Neurosurgery, and Psychiatry, 66(4), 707-401. ARABELLA Lanza.DAYANA, KONSTANTIN Gonzalez, & Shelton Spivey M.A. (2004.) Assessment of post-stroke quality of life in cost-effectiveness studies: The usefulness of the Barthel Index and the EuroQoL-5D. Quality of Life Research, 15, 821-87            Physical Therapy Evaluation Charge Determination   History Examination Presentation Decision-Making   MEDIUM  Complexity : 1-2 comorbidities / personal factors will impact the outcome/ POC  LOW Complexity : 1-2 Standardized tests and measures addressing body structure, function, activity limitation and / or participation in recreation  LOW Complexity : Stable, uncomplicated  Other outcome measures barthel index  LOW       Based on the above components, the patient evaluation is determined to be of the following complexity level: LOW       Activity Tolerance:   Good  Please refer to the flowsheet for vital signs taken during this treatment. After treatment patient left in no apparent distress:   Supine in bed and Call bell within reach    COMMUNICATION/EDUCATION:   The patients plan of care was discussed with: Registered nurse and Case management. Fall prevention education was provided and the patient/caregiver indicated understanding. and Patient/family have participated as able in goal setting and plan of care.     Thank you for this referral.  Alesha Crawford, PT, DPT   Time Calculation: 17 mins

## 2020-10-12 NOTE — PROGRESS NOTES
CM attempted to meet with patient x2 for initial assessment. Patient had a visitor for a portion of the day and asked that I return, then had a friend on the phone. Will try again tomorrow. Of note, patient is from [facility] ([facility]      Desean NGUYEN, ACM

## 2020-10-12 NOTE — PROGRESS NOTES
Bedside and Verbal shift change report given to Cash Fry RN (oncoming nurse) by BLADIMIR Oneal RN (offgoing nurse). Report included the following information SBAR, Kardex, Intake/Output, MAR and Recent Results.

## 2020-10-12 NOTE — PROGRESS NOTES
General Daily Progress Note    Admission Date: 10/10/2020  Hospital Day 3  Post-Op Day 1  Subjective:   Pain control adequate. No nausea with clear liquid diet. Concerned about missing her Oncology appointment that is scheduled for tomorrow. Objective:     Patient Vitals for the past 24 hrs:   BP Temp Pulse Resp SpO2 Weight   10/12/20 0837 105/64 98.1 °F (36.7 °C) 85 16 97 % --   10/12/20 0759 -- -- -- -- -- 51.3 kg (113 lb 3.2 oz)   10/12/20 0000 117/66 97.9 °F (36.6 °C) 69 16 97 % --   10/11/20 1911 120/72 98.4 °F (36.9 °C) 78 16 99 % --   10/11/20 1812 102/63 98.1 °F (36.7 °C) 83 16 97 % --   10/11/20 1611 101/64 97.6 °F (36.4 °C) 79 16 98 % --   10/11/20 1454 110/73 -- -- -- -- --   10/11/20 1444 115/73 97.5 °F (36.4 °C) 88 16 98 % --   10/11/20 1347 109/69 97.5 °F (36.4 °C) 70 14 97 % --   10/11/20 1330 (!) 103/57 -- 61 15 100 % --   10/11/20 1320 -- 97.6 °F (36.4 °C) -- -- -- --   10/11/20 1315 (!) 100/54 -- (!) 57 13 98 % --   10/11/20 1300 109/61 -- 64 15 100 % --   10/11/20 1250 102/60 -- 63 14 100 % --   10/11/20 1245 109/62 97.7 °F (36.5 °C) 69 15 100 % --   10/11/20 1240 (!) 103/58 -- 69 16 100 % --   10/11/20 1239 (!) 107/58 97.7 °F (36.5 °C) 69 16 100 % --   10/11/20 1238 -- -- 69 15 100 % --   10/11/20 1236 (!) 107/58 -- -- -- -- --     10/12 0701 - 10/12 1900  In: -   Out: 50   10/10 1901 - 10/12 0700  In: 450 [I.V.:450]  Out: 1780 [Urine:1570]    Physical Examination:  General Appearance - No distress. Abdomen - Somewhat distended. Appropriately tender. Ileostomy edematous but viable.  - Hogan catheter draining eneida-appearing urine. Extremities - Pneumatic compression stockings in use.               Data Review   Recent Results (from the past 24 hour(s))   CBC WITH AUTOMATED DIFF    Collection Time: 10/12/20  5:40 AM   Result Value Ref Range    WBC 9.9 3.6 - 11.0 K/uL    RBC 4.16 3.80 - 5.20 M/uL    HGB 10.4 (L) 11.5 - 16.0 g/dL    HCT 34.2 (L) 35.0 - 47.0 %    MCV 82.2 80.0 - 99.0 FL    MCH 25.0 (L) 26.0 - 34.0 PG    MCHC 30.4 30.0 - 36.5 g/dL    RDW 23.9 (H) 11.5 - 14.5 %    PLATELET 485 (H) 496 - 400 K/uL    MPV 9.0 8.9 - 12.9 FL    NRBC 0.0 0  WBC    ABSOLUTE NRBC 0.00 0.00 - 0.01 K/uL    NEUTROPHILS 72 32 - 75 %    LYMPHOCYTES 16 12 - 49 %    MONOCYTES 6 5 - 13 %    EOSINOPHILS 5 0 - 7 %    BASOPHILS 1 0 - 1 %    IMMATURE GRANULOCYTES 0 0.0 - 0.5 %    ABS. NEUTROPHILS 7.1 1.8 - 8.0 K/UL    ABS. LYMPHOCYTES 1.6 0.8 - 3.5 K/UL    ABS. MONOCYTES 0.6 0.0 - 1.0 K/UL    ABS. EOSINOPHILS 0.5 (H) 0.0 - 0.4 K/UL    ABS. BASOPHILS 0.1 0.0 - 0.1 K/UL    ABS. IMM. GRANS. 0.0 0.00 - 0.04 K/UL    DF SMEAR SCANNED      PLATELET COMMENTS Large Platelets      RBC COMMENTS ANISOCYTOSIS  2+       METABOLIC PANEL, BASIC    Collection Time: 10/12/20  5:40 AM   Result Value Ref Range    Sodium 138 136 - 145 mmol/L    Potassium 3.6 3.5 - 5.1 mmol/L    Chloride 106 97 - 108 mmol/L    CO2 27 21 - 32 mmol/L    Anion gap 5 5 - 15 mmol/L    Glucose 81 65 - 100 mg/dL    BUN 13 6 - 20 MG/DL    Creatinine 0.62 0.55 - 1.02 MG/DL    BUN/Creatinine ratio 21 (H) 12 - 20      GFR est AA >60 >60 ml/min/1.73m2    GFR est non-AA >60 >60 ml/min/1.73m2    Calcium 8.2 (L) 8.5 - 10.1 MG/DL           Assessment:     Principal Problem:    Ileostomy prolapse ([facility]) (10/10/2020)        1 Day Post-Op revision of ileostomy for treatment of prolapse. Hemodynamically stable. No tachycardia or hypotension. Hemoglobin acceptable. Urine output adequate. No leukocytosis. Plan:   Begin full liquid diet with caution. Remove Hogan catheter. Ambulate. Physical therapy. Incentive spirometer. Consult Dr. Miryam Wheat (Oncology) with whom the patient was supposed to meet tomorrow afternoon for discussion/planning of adjuvant chemotherapy.

## 2020-10-12 NOTE — PROGRESS NOTES
Bedside shift change report given to Raquel Cardenas RN  (oncoming nurse) by Loral Dance, RN (offgoing nurse). Report included the following information SBAR, Kardex, Intake/Output, MAR and Recent Results.

## 2020-10-12 NOTE — PROGRESS NOTES
Dr Kalyani Sousa office called to notify pt that Dr Aleksander Adams would see pt here in the hospital tomorrow. Pt had called the office because she had previously scheduled to see Dr Romualdo Babinski tomorrow on 10/13/20. But is a pt in the hospital now. Bedside and Verbal shift change report given to [address]  (oncoming nurse) by Urmila Mckeon (offgoing nurse). Report included the following information SBAR and Kardex.

## 2020-10-12 NOTE — PROGRESS NOTES
Spiritual Care Assessment/Progress Note  [facility]      NAME: Loco Travis      MRN: [id number]  AGE: 72 y.o.  SEX: female  Protestant Affiliation: Anabaptist   Language: English     10/12/2020     Total Time (in minutes): 5     Spiritual Assessment begun in [place] [facility] through conversation with:         [x]Patient        [] Family    [] Friend(s)        Reason for Consult: [facility]     Spiritual beliefs: (Please include comment if needed)     [x] Identifies with a moise tradition:  Anabaptist       [x] Supported by a moise community: [place]          [] Claims no spiritual orientation:           [] Seeking spiritual identity:                [] Adheres to an individual form of spirituality:           [] Not able to assess:                           Identified resources for coping:      [x] Prayer                               [x] Music                  [] Guided Imagery     [x] Family/friends                 [] Pet visits     [] Devotional reading                         [] Unknown     [] Other:                                               Interventions offered during this visit: (See comments for more details)    Patient Interventions: Affirmation of moise, Communion (Anabaptist), Initial/Spiritual assessment, patient floor, Prayer (actual), Prayer (assurance of)           Plan of Care:     [x] Support spiritual and/or cultural needs    [] Support AMD and/or advance care planning process      [] Support grieving process   [] Coordinate Rites and/or Rituals    [] Coordination with community clergy   [] No spiritual needs identified at this time   [] Detailed Plan of Care below (See Comments)  [] Make referral to Music Therapy  [] Make referral to Pet Therapy     [] Make referral to Addiction services  [] Make referral to [facility]  [] Make referral to Spiritual Care Partner  [] No future visits requested        [] Follow up visits as needed     Comments: Mrs. Emile Hurtado was readmitted. She is a volunteer at [employer] and her friend brought her communion today. Prayer offered and she is planning on returning to [facility] when she is discharged. Will plan on visiting on Wednesday.     RACH Reeves, RN, ACSW, LCSW   Page:  [phone]

## 2020-10-13 ENCOUNTER — TELEPHONE (OUTPATIENT)
Dept: ONCOLOGY | Age: 70
End: 2020-10-13

## 2020-10-13 LAB
ANION GAP SERPL CALC-SCNC: 7 MMOL/L (ref 5–15)
BASOPHILS # BLD: 0.1 K/UL (ref 0–0.1)
BASOPHILS NFR BLD: 1 % (ref 0–1)
BUN SERPL-MCNC: 9 MG/DL (ref 6–20)
BUN/CREAT SERPL: 23 (ref 12–20)
CALCIUM SERPL-MCNC: 8.7 MG/DL (ref 8.5–10.1)
CHLORIDE SERPL-SCNC: 108 MMOL/L (ref 97–108)
CO2 SERPL-SCNC: 25 MMOL/L (ref 21–32)
CREAT SERPL-MCNC: 0.39 MG/DL (ref 0.55–1.02)
DIFFERENTIAL METHOD BLD: ABNORMAL
EOSINOPHIL # BLD: 1.3 K/UL (ref 0–0.4)
EOSINOPHIL NFR BLD: 16 % (ref 0–7)
ERYTHROCYTE [DISTWIDTH] IN BLOOD BY AUTOMATED COUNT: 23.7 % (ref 11.5–14.5)
GLUCOSE SERPL-MCNC: 90 MG/DL (ref 65–100)
HCT VFR BLD AUTO: 31.4 % (ref 35–47)
HGB BLD-MCNC: 9.5 G/DL (ref 11.5–16)
IMM GRANULOCYTES # BLD AUTO: 0 K/UL (ref 0–0.04)
IMM GRANULOCYTES NFR BLD AUTO: 0 % (ref 0–0.5)
LYMPHOCYTES # BLD: 1.3 K/UL (ref 0.8–3.5)
LYMPHOCYTES NFR BLD: 15 % (ref 12–49)
MAGNESIUM SERPL-MCNC: 1.9 MG/DL (ref 1.6–2.4)
MCH RBC QN AUTO: 24.8 PG (ref 26–34)
MCHC RBC AUTO-ENTMCNC: 30.3 G/DL (ref 30–36.5)
MCV RBC AUTO: 82 FL (ref 80–99)
MONOCYTES # BLD: 0.7 K/UL (ref 0–1)
MONOCYTES NFR BLD: 8 % (ref 5–13)
NEUTS SEG # BLD: 5 K/UL (ref 1.8–8)
NEUTS SEG NFR BLD: 60 % (ref 32–75)
NRBC # BLD: 0 K/UL (ref 0–0.01)
NRBC BLD-RTO: 0 PER 100 WBC
PHOSPHATE SERPL-MCNC: 2.3 MG/DL (ref 2.6–4.7)
PLATELET # BLD AUTO: 475 K/UL (ref 150–400)
PMV BLD AUTO: 9.1 FL (ref 8.9–12.9)
POTASSIUM SERPL-SCNC: 3.8 MMOL/L (ref 3.5–5.1)
RBC # BLD AUTO: 3.83 M/UL (ref 3.8–5.2)
RBC MORPH BLD: ABNORMAL
RBC MORPH BLD: ABNORMAL
SODIUM SERPL-SCNC: 140 MMOL/L (ref 136–145)
WBC # BLD AUTO: 8.4 K/UL (ref 3.6–11)

## 2020-10-13 PROCEDURE — 83735 ASSAY OF MAGNESIUM: CPT

## 2020-10-13 PROCEDURE — 74011250636 HC RX REV CODE- 250/636: Performed by: COLON & RECTAL SURGERY

## 2020-10-13 PROCEDURE — 84100 ASSAY OF PHOSPHORUS: CPT

## 2020-10-13 PROCEDURE — 65270000029 HC RM PRIVATE

## 2020-10-13 PROCEDURE — 36415 COLL VENOUS BLD VENIPUNCTURE: CPT

## 2020-10-13 PROCEDURE — 99223 1ST HOSP IP/OBS HIGH 75: CPT | Performed by: INTERNAL MEDICINE

## 2020-10-13 PROCEDURE — 85025 COMPLETE CBC W/AUTO DIFF WBC: CPT

## 2020-10-13 PROCEDURE — 74011250637 HC RX REV CODE- 250/637: Performed by: COLON & RECTAL SURGERY

## 2020-10-13 PROCEDURE — 87635 SARS-COV-2 COVID-19 AMP PRB: CPT

## 2020-10-13 PROCEDURE — 80048 BASIC METABOLIC PNL TOTAL CA: CPT

## 2020-10-13 RX ORDER — ACETAMINOPHEN 325 MG/1
650 TABLET ORAL AS NEEDED
Status: CANCELLED
Start: 2020-11-17

## 2020-10-13 RX ORDER — SODIUM CHLORIDE 0.9 % (FLUSH) 0.9 %
10 SYRINGE (ML) INJECTION AS NEEDED
Status: CANCELLED | OUTPATIENT
Start: 2020-11-17

## 2020-10-13 RX ORDER — ONDANSETRON 2 MG/ML
8 INJECTION INTRAMUSCULAR; INTRAVENOUS AS NEEDED
Status: CANCELLED | OUTPATIENT
Start: 2020-11-17

## 2020-10-13 RX ORDER — ALBUTEROL SULFATE 0.83 MG/ML
2.5 SOLUTION RESPIRATORY (INHALATION) AS NEEDED
Status: CANCELLED
Start: 2020-11-17

## 2020-10-13 RX ORDER — DIPHENHYDRAMINE HYDROCHLORIDE 50 MG/ML
50 INJECTION, SOLUTION INTRAMUSCULAR; INTRAVENOUS AS NEEDED
Status: CANCELLED
Start: 2020-11-17

## 2020-10-13 RX ORDER — HYDROCORTISONE SODIUM SUCCINATE 100 MG/2ML
100 INJECTION, POWDER, FOR SOLUTION INTRAMUSCULAR; INTRAVENOUS AS NEEDED
Status: CANCELLED | OUTPATIENT
Start: 2020-11-17

## 2020-10-13 RX ORDER — SODIUM CHLORIDE 0.9 % (FLUSH) 0.9 %
10 SYRINGE (ML) INJECTION AS NEEDED
Status: CANCELLED | OUTPATIENT
Start: 2020-11-19

## 2020-10-13 RX ORDER — HEPARIN 100 UNIT/ML
300-500 SYRINGE INTRAVENOUS AS NEEDED
Status: CANCELLED
Start: 2020-11-17

## 2020-10-13 RX ORDER — DEXTROSE MONOHYDRATE 50 MG/ML
25 INJECTION, SOLUTION INTRAVENOUS CONTINUOUS
Status: CANCELLED
Start: 2020-11-17

## 2020-10-13 RX ORDER — PALONOSETRON 0.05 MG/ML
0.25 INJECTION, SOLUTION INTRAVENOUS ONCE
Status: CANCELLED | OUTPATIENT
Start: 2020-11-17

## 2020-10-13 RX ORDER — FLUOROURACIL 50 MG/ML
400 INJECTION, SOLUTION INTRAVENOUS ONCE
Status: CANCELLED
Start: 2020-11-17 | End: 2020-11-17

## 2020-10-13 RX ORDER — DIPHENHYDRAMINE HYDROCHLORIDE 50 MG/ML
25 INJECTION, SOLUTION INTRAMUSCULAR; INTRAVENOUS AS NEEDED
Status: CANCELLED
Start: 2020-11-17

## 2020-10-13 RX ORDER — SODIUM CHLORIDE 9 MG/ML
10 INJECTION INTRAMUSCULAR; INTRAVENOUS; SUBCUTANEOUS AS NEEDED
Status: CANCELLED | OUTPATIENT
Start: 2020-11-19

## 2020-10-13 RX ORDER — SODIUM CHLORIDE 9 MG/ML
10 INJECTION INTRAMUSCULAR; INTRAVENOUS; SUBCUTANEOUS AS NEEDED
Status: CANCELLED | OUTPATIENT
Start: 2020-11-17

## 2020-10-13 RX ORDER — HEPARIN 100 UNIT/ML
300-500 SYRINGE INTRAVENOUS AS NEEDED
Status: CANCELLED
Start: 2020-11-19

## 2020-10-13 RX ORDER — EPINEPHRINE 1 MG/ML
0.3 INJECTION, SOLUTION, CONCENTRATE INTRAVENOUS AS NEEDED
Status: CANCELLED | OUTPATIENT
Start: 2020-11-17

## 2020-10-13 RX ADMIN — ACETAMINOPHEN 1000 MG: 500 TABLET ORAL at 08:51

## 2020-10-13 RX ADMIN — Medication 10 ML: at 22:00

## 2020-10-13 RX ADMIN — PANTOPRAZOLE SODIUM 40 MG: 40 TABLET, DELAYED RELEASE ORAL at 08:51

## 2020-10-13 RX ADMIN — DIBASIC SODIUM PHOSPHATE, MONOBASIC POTASSIUM PHOSPHATE AND MONOBASIC SODIUM PHOSPHATE 1 TABLET: 852; 155; 130 TABLET ORAL at 12:05

## 2020-10-13 RX ADMIN — ACETAMINOPHEN 1000 MG: 500 TABLET ORAL at 22:07

## 2020-10-13 RX ADMIN — ACETAMINOPHEN 1000 MG: 500 TABLET ORAL at 16:13

## 2020-10-13 RX ADMIN — DIBASIC SODIUM PHOSPHATE, MONOBASIC POTASSIUM PHOSPHATE AND MONOBASIC SODIUM PHOSPHATE 1 TABLET: 852; 155; 130 TABLET ORAL at 22:07

## 2020-10-13 RX ADMIN — Medication 10 ML: at 06:00

## 2020-10-13 RX ADMIN — SODIUM CHLORIDE, SODIUM LACTATE, POTASSIUM CHLORIDE, AND CALCIUM CHLORIDE 50 ML/HR: 600; 310; 30; 20 INJECTION, SOLUTION INTRAVENOUS at 08:52

## 2020-10-13 NOTE — PROGRESS NOTES
Bedside shift change report given to Jonathon Avelar RN (oncoming nurse) by Nathalie Bernard RN (offgoing nurse). Report included the following information SBAR, Kardex, Intake/Output, MAR and Recent Results.

## 2020-10-13 NOTE — WOUND CARE
CWOCN Ostomy Progress    Note: POD 2 /  ileostomy revision. Patient had all prior teaching completed just recently. Reviewed and changed pouch today. Location: [facility]  Date of Surgery: 10-11-20      Surgeon: Dr. Aditi Rhodes    Treatments:  Proctor Dwaine removed, stoma and peristomal skin cleaned and assessed, new pouch prepared and applied. Stoma is oval and template made. Used John Devonte on pouch. Applied 2 piece pouching system. Peristomal skin slightly pink, applied no sting prep. Powder not needed. Findings:  Current appliance:OR pouch. Stoma size: oval in shape made a template. Stoma color: pink and moist with mucus  Characteristics: nicely budded  Peristomal skin: pink/ no sting prep after cleaning  Abdomen: slightly distended. Output: 200cc liquid brown stool. Other: used 2 piece system / [employer].  Pt Concerns: doing proper exercises in rehab. Teaching/Subjects covered today: reviewed briefly. Patient has all hand outs. General anatomy and expectations of output  Normal & abnormal stoma characteristics & changes over time  Normal & abnormal peristomal characteristics   When/how to clean stoma & peristomal skin  When/how to empty pouch   Crusting technique  When/how to change appliance   When to notify nurse/physician   Dietary guidelines   Flatus management   Where/how to obtain supplies   Manipulated/practiced opening and closing end of pouch. Reviewed ADL's (bathing, mattress cover, car pillow to protect from seatbelt, etc)  Blockage s/s and electrolyte imbalance    Pt demonstrated understanding of above material covered by ostomy nurse. Little anxious about size of stoma. Stoma slightly oval and template made for patient. Recommendations:  1. Empty appliance when 1/3 full and PRN. Encourage patient/family to notify nurse and  assist w/ pouch emptying to promote self-care. Change appliance twice weekly and PRN for leaking ASAP. DO NOT REINFORCE LEAKS to avoid skin breakdown. Transition of Care:  Planned discharge back to [facility]  Discussed care/progress with Sarahi Cohen / RN  Supplies and ordering numbers to be given to patient once current pouch is assessed and no leaking. ( tomorrow).     Dony Fitzgerald RN, BSN  Wound, Ostomy and Continence Team  office: [phone]  pager [phone]

## 2020-10-13 NOTE — CONSULTS
[facility] at [address], [address]  Justin Elizabeth, [address]  W: [phone]  F: [phone]    Reason for Visit:   Zeferino Pyle is a 72 y.o. female who is seen in hospital consultation at the request of Dr. Jayson Franklin for evaluation of stage III colon cancer. Treatment History:   · 9/10/2020 CT A/P - large, irregular pelvic mass measuring approximately 9cm likely representing neoplasm arising from the sigmoid colon, small amount of pelvic free fluid, colonic bowel wall thickening. Borderline enlarged retroperitoneal lymph nodes. 6 mm right lower lobe lung nodule. Mass effect from pelvic mass causing moderate right and minimal left hydronephrosis. · 9/14/2020: Colonoscopy - A large circumferential, ulcerated fungating mass was noted in proximal sigmoid colon. Mass was obstructing the lumen and could not be traversed. Arvie Garcia was present. · 9/15/2020: CT Chest - 7.5mm left lower lobe pulmonary nodule, small right pleural effusion, mild right basilar atelectasis  · 9/17/2020: Surgical resection - low anterior resection, mobilization of the splenic flexure, coloproctostomy, creation of loop ileostomy, total abdominal hysterectomy and left salpingo-oophorectomy, distal right ureterectomy, right ureteral re-implant with psoas hitch and placement of right ureteral stent    History of Present Illness:   Patient is a 72 y.o. female who presented to the ED on 9/11/2020 with complaints of right flank/back pain and RLQ abdominal pain. She states she has had several months of gas pain and \"blockages. \" She reports a weight loss of 12 lbs in the last 3 months. CT abd/pelv 9/10/2020 showed large, irregular pelvic mass measuring approximately 9cm likely representing neoplasm arising from the sigmoid colon. She also had hydronephrosis from the mass effect and urethral stent placed. CEA 7.4 on 9/11and colonoscopy performed 9/14 with biopsy + adenocarcinoma.  CT Chest performed showing 7.5mm left lower lobe pulmonary nodule. Mass found to be invading into the uterus and right ureter. Surgical resection performed on 9/17/2020 including low anterior resection, mobilization of the splenic flexure, coloproctostomy, creation of loop ileostomy, total abdominal hysterectomy and left salpingo-oophorectomy, distal right ureterectomy, right ureteral re-implant with psoas hitch and placement of right ureteral stent. She was to see me in clinic today but was admitted 10/10/2020 with ileostomy prolapse. Had a revision of loop ileostomy 10/11/2020    She has been recovering well. Was in rehab and states was ambulating, had a good appetite, energy was coming back, was maintaining weight, has had no bleeding or fevers      Family Hx:  Mother with hx of pancreatic cancer      Past Medical History:   Diagnosis Date    [facility]     GERD (gastroesophageal reflux disease)     Ileostomy in place ([facility]     Ill-defined condition     Spaulding's esophagus      Past Surgical History:   Procedure Laterality Date    COLONOSCOPY Left 9/14/2020    COLONOSCOPY performed by Jair Caicedo MD at [address] [place] [address]; incomplete secondary to partial obstruction.  HX APPENDECTOMY  age 17    HX COLONOSCOPY  circa 2010    No neoplasms.  HX ENDOSCOPY  03/13/2017    Dr. Lutricia Skiff HX ENDOSCOPY  02/13/2020    Dr. Praveen Fordi oopherectomy for treatment of benign mass.  HX GYN  09/17/2020    Total abdominal hysterectomy and left salpingo-oophorectomy; Sahara Soto MD.    HX OTHER SURGICAL  09/17/2020    Low anterior resection, mobilization of the splenic flexure, coloproctostomy, and creation of loop ileostomy; Dr. Ninfa Anthony.     HX UROLOGICAL  09/12/2020    Cystoscopy and placement of a right ureteral stent; Gavin Rosales III, MD.    HX UROLOGICAL  09/17/2020    Cystoscopy and placement of a temporary left ureteral catheter; Gavin Rosales III, MD.    HX UROLOGICAL  09/17/2020 Distal right ureterectomy; Citlalli Wolff MD.   Chris Valeromery UROLOGICAL  09/17/2020    Right ureteral re-implantation with psoas hitch and placement of a right ureteral stent; Rachel Asencio MD.      Social History     Tobacco Use    Smoking status: Never Smoker    Smokeless tobacco: Never Used   Substance Use Topics    Alcohol use: Yes     Alcohol/week: 1.0 standard drinks     Types: 1 Glasses of wine per week      Family History   Problem Relation Age of Onset    Cancer Mother     Heart Disease Father     Diabetes Brother      Current Facility-Administered Medications   Medication Dose Route Frequency    phosphorus (K PHOS NEUTRAL) 250 mg tablet 1 Tab  1 Tab Oral QID    lactated Ringers infusion  25 mL/hr IntraVENous CONTINUOUS    pantoprazole (PROTONIX) tablet 40 mg  40 mg Oral DAILY    acetaminophen (TYLENOL) tablet 1,000 mg  1,000 mg Oral Q6H    HYDROmorphone (PF) (DILAUDID) injection 1 mg  1 mg IntraVENous Q4H PRN    sodium chloride (NS) flush 5-40 mL  5-40 mL IntraVENous Q8H    sodium chloride (NS) flush 5-40 mL  5-40 mL IntraVENous PRN    ondansetron (ZOFRAN) injection 4 mg  4 mg IntraVENous Q4H PRN      No Known Allergies     Review of Systems: A complete review of systems was obtained, negative except as described above. Physical Exam:     Visit Vitals  /80   Pulse 91   Temp 98.1 °F (36.7 °C)   Resp 16   Wt 113 lb 3.2 oz (51.3 kg)   SpO2 96%   BMI 18.27 kg/m²     ECOG PS: 1-2  General: No distress but appears frail  Eyes: PERRLA, anicteric sclerae  HENT: Atraumatic, OP clear  Neck: Supple  Lymphatic: No cervical, supraclavicular, or inguinal adenopathy  Respiratory:  normal respiratory effort  CV: Normal rate, no peripheral edema  GI: Soft, stoma noted-   MS: Digits without clubbing or cyanosis. Skin: No rashes, ecchymoses, or petechiae.    Psych: Alert, oriented, appropriate affect, normal judgment/insight    Results:     Lab Results   Component Value Date/Time    WBC 8.4 10/13/2020 05:10 AM    HGB 9.5 (L) 10/13/2020 05:10 AM    HCT 31.4 (L) 10/13/2020 05:10 AM    PLATELET 245 (H) 51/43/6004 05:10 AM    MCV 82.0 10/13/2020 05:10 AM    ABS. NEUTROPHILS 5.0 10/13/2020 05:10 AM    Hemoglobin (POC) 12.0 09/17/2020 06:35 PM     Lab Results   Component Value Date/Time    Sodium 140 10/13/2020 05:10 AM    Potassium 3.8 10/13/2020 05:10 AM    Chloride 108 10/13/2020 05:10 AM    CO2 25 10/13/2020 05:10 AM    Glucose 90 10/13/2020 05:10 AM    BUN 9 10/13/2020 05:10 AM    Creatinine 0.39 (L) 10/13/2020 05:10 AM    GFR est AA >60 10/13/2020 05:10 AM    GFR est non-AA >60 10/13/2020 05:10 AM    Calcium 8.7 10/13/2020 05:10 AM    Glucose (POC) 98 09/27/2020 11:39 PM    Creatinine (POC) 0.8 09/10/2020 02:32 PM     Lab Results   Component Value Date/Time    Bilirubin, total 0.2 10/10/2020 08:30 PM    ALT (SGPT) 47 10/10/2020 08:30 PM    Alk. phosphatase 178 (H) 10/10/2020 08:30 PM    Protein, total 6.8 10/10/2020 08:30 PM    Albumin 3.2 (L) 10/10/2020 08:30 PM    Globulin 3.6 10/10/2020 08:30 PM     CEA:  Recent Labs     09/11/20  1455   CEA 7.4       CT A/P 9/23/2020  IMPRESSION:  Large, irregular pelvic mass measuring approximately 9 cm likely representing  neoplasm arising from the sigmoid colon. Adnexal neoplasm is a possibility. This  does appear to be separate from the uterus. Small amount of pelvic free fluid. Associated colonic bowel wall thickening. There is focal gas in the upper  presacral region which is unclear if this is intraluminal or contained  extraluminal collection.     No definite distant metastatic disease. Borderline enlarged retroperitoneal  lymph nodes. 6 mm right lower lobe lung nodule. Mass effect from the pelvic mass  causing moderate right and minimal left hydronephrosis. CT Chest 9/15/2020  IMPRESSION:  1. 7.5 mm left lower lobe pulmonary nodule. 2. Small right pleural effusion. 3. Mild right basilar atelectasis. 9/14/2020   Addendum Diagnosis   1.  Sigmoid Mass, Biopsy:     Infiltrating adenocarcinoma, most consistent with colon (See comment)   Addendum Comment   Immunohistochemical stains reveal the following staining pattern:   CK7: Scattered cells with cytoplasmic membrane staining   CK20: Positive cytoplasmic staining in normal colonic mucosal epithelial cells and malignant epithelial cells   CDX-2: Diffuse strong nuclear decoration and all tumor cells and normal background colonic epithelial cells   PAX-8: Negative   Estrogen Receptor: Negative   Positive and negative controls stain appropriately. Due to radiologic findings suggesting the possibility of GYN involvement, ER and PAX-8 are performed, which lend support to the diagnosis of a colonic primary, over a tumor of müllerian origin. 9/17/2020   FINAL PATHOLOGIC DIAGNOSIS   1. Sigmoid colon and proximal rectum, uterus, left fallopian tube and ovary and right ureteral segment, low anterior resection:   Sigmoid colon and proximal rectum:  Invasive adenocarcinoma (see synoptic report and comment). Metastatic adenocarcinoma in one of sixteen lymph nodes (1/16). Uterus: Invasive adenocarcinoma extending from adhesed colon into uterine serosa. Endometrial lining shows mucosal atrophy. Left ovary: Benign ovary with serosal inclusion cysts. Left fallopian tube: Benign fallopian tube. Right ureteral segment: Benign segment of ureter densely adhesed to colon. Nodule near right uterine cornu:  Nodular portion of benign smooth muscle consistent with leiomyoma with parenchymal hemorrhage.    COLON AND RECTUM: Resection   SPECIMEN   Procedure: Low anterior resection   TUMOR   Tumor Site: Sigmoid colon   Histologic Type: Adenocarcinoma   Histologic Grade: G2: Moderately differentiated   Tumor Size: 8.0 cm   Tumor Extension: Tumor directly invades adjacent structures:   Posterior uterine serosa   Macroscopic Tumor Perforation: Tumor invades through serosa into surrounding soft tissue   Lymphovascular Invasion: Present   Perineural Invasion: Not identified   Treatment Effect: No known presurgical therapy   MARGINS   Margins: All margins are uninvolved by invasive carcinoma   Margins Examined: Proximal, Distal, Radial (circumferential) or   Mesenteric   Distance of Tumor from Radial (circumferential) Margin: See Comment   LYMPH NODES   Number of Lymph Nodes Involved: 1   Number of Lymph Nodes Examined: 16   Tumor Deposits: Not identified   PATHOLOGIC STAGE CLASSIFICATION (pTNM, AJCC 8th Edition)   Primary Tumor (pT): pT4b   Regional Lymph Nodes (pN): pN1a   2. Distal rectal margin:   Segment of benign large bowel. 3. Anastomotic doughnuts:   Two annular portions of benign large bowel. Comment   Tumor invades into the adherent soft tissue and to within 1.1 cm of the apparent right pelvic sidewall margin. Records reviewed and summarized above. Pathology report(s) reviewed above. Radiology report(s) reviewed above. Assessment:   1) Sigmoid Colon Adenocarcinoma - Stage IIIB- ERICKA  [id number]  CT abd/pelv 9/10/2020 showed large, irregular pelvic mass measuring approximately 9cm likely representing neoplasm arising from the sigmoid colon. CEA 7.4 on 9/11  Colonoscopy performed 9/14 and biopsy + adenocarcinoma  Mass found to be invading into the uterus and right ureter. Surgical resection on 9/17 with creation of loop ileostomy, total abdominal hysterectomy and left salpingo-oophorectomy, distal right ureterectomy    She had a revision of her ileostomy 10/11/2020    We are planning to start adjuvant FOLFOX for 6 months in 2-3 weeks  Will get Port while inpatient  We discussed the chemotherapy regimen, it's logistics, and potential toxicities in detail. Potential side effects include, but are not limited to, nausea, vomiting, diarrhea, taste changes, myelosuppression, infection, fatigue, allergic reactions, rash, edema, neuropathy, and rarely, death.  The patient asked several well thought out questions which I answered to the best of my ability and to their apparent satisfaction. The patient has given consent for chemotherapy. 2)Thrombocytosis  Reactive    3) Anemia  Due to #1  Iron sat of 4% and ferritin of 20 on 9/11  S/p 2 units PRBCs on 9/15 and Venofer 200mg x3 doses  Hgb has not really recovered - will arrange for Injectafer x1 OP    4) Psychosocial  Reassured her    Plan:     · IR PORT  · Approval for FOLFOX for 6 months- consented  · Plan to start in 2-3 weeks  · She will see me OP in 1 week      I will call her Kalyani Sos at [phone]     I appreciate the opportunity to participate in Ms. Marcus Lanes care.     Signed By: Trey Carranza MD

## 2020-10-13 NOTE — PROGRESS NOTES
General Daily Progress Note    Admission Date: 10/10/2020  Hospital Day 4  Post-Op Day 2  Subjective:   No significant pain. No nausea. Voiding well. Objective:     Patient Vitals for the past 24 hrs:   BP Temp Pulse Resp SpO2   10/13/20 0751 125/80 98.1 °F (36.7 °C) 91 16 96 %   10/13/20 0012 124/75 98 °F (36.7 °C) 90 16 96 %   10/12/20 2050 123/73 98 °F (36.7 °C) 89 16 97 %   10/12/20 1600 119/69 98.3 °F (36.8 °C) 92 16 97 %   10/12/20 1157 107/63 98.2 °F (36.8 °C) 99 16 100 %     10/13 0701 - 10/13 1900  In: 500 [P.O.:500]  Out: -   10/11 1901 - 10/13 0700  In: 6155 [P.O.:1780]  Out: 8629 [Urine:3480]    Physical Examination:  General Appearance - No distress. Abdomen - Soft. Appropriately tender. Ileostomy edematous but viable and functioning. Extremities - No edema. Data Review   Recent Results (from the past 24 hour(s))   CBC WITH AUTOMATED DIFF    Collection Time: 10/13/20  5:10 AM   Result Value Ref Range    WBC 8.4 3.6 - 11.0 K/uL    RBC 3.83 3.80 - 5.20 M/uL    HGB 9.5 (L) 11.5 - 16.0 g/dL    HCT 31.4 (L) 35.0 - 47.0 %    MCV 82.0 80.0 - 99.0 FL    MCH 24.8 (L) 26.0 - 34.0 PG    MCHC 30.3 30.0 - 36.5 g/dL    RDW 23.7 (H) 11.5 - 14.5 %    PLATELET 390 (H) 782 - 400 K/uL    MPV 9.1 8.9 - 12.9 FL    NRBC 0.0 0  WBC    ABSOLUTE NRBC 0.00 0.00 - 0.01 K/uL    NEUTROPHILS 60 32 - 75 %    LYMPHOCYTES 15 12 - 49 %    MONOCYTES 8 5 - 13 %    EOSINOPHILS 16 (H) 0 - 7 %    BASOPHILS 1 0 - 1 %    IMMATURE GRANULOCYTES 0 0.0 - 0.5 %    ABS. NEUTROPHILS 5.0 1.8 - 8.0 K/UL    ABS. LYMPHOCYTES 1.3 0.8 - 3.5 K/UL    ABS. MONOCYTES 0.7 0.0 - 1.0 K/UL    ABS. EOSINOPHILS 1.3 (H) 0.0 - 0.4 K/UL    ABS. BASOPHILS 0.1 0.0 - 0.1 K/UL    ABS. IMM.  GRANS. 0.0 0.00 - 0.04 K/UL    DF SMEAR SCANNED      RBC COMMENTS ANISOCYTOSIS  3+        RBC COMMENTS HYPOCHROMIA  1+       METABOLIC PANEL, BASIC    Collection Time: 10/13/20  5:10 AM   Result Value Ref Range    Sodium 140 136 - 145 mmol/L    Potassium 3.8 3.5 - 5.1 mmol/L    Chloride 108 97 - 108 mmol/L    CO2 25 21 - 32 mmol/L    Anion gap 7 5 - 15 mmol/L    Glucose 90 65 - 100 mg/dL    BUN 9 6 - 20 MG/DL    Creatinine 0.39 (L) 0.55 - 1.02 MG/DL    BUN/Creatinine ratio 23 (H) 12 - 20      GFR est AA >60 >60 ml/min/1.73m2    GFR est non-AA >60 >60 ml/min/1.73m2    Calcium 8.7 8.5 - 10.1 MG/DL   MAGNESIUM    Collection Time: 10/13/20  5:10 AM   Result Value Ref Range    Magnesium 1.9 1.6 - 2.4 mg/dL   PHOSPHORUS    Collection Time: 10/13/20  5:10 AM   Result Value Ref Range    Phosphorus 2.3 (L) 2.6 - 4.7 MG/DL           Assessment:     Principal Problem:    Ileostomy prolapse (HCC) (10/10/2020)        2 Days Post-Op s/p revision of ileostomy for treatment of prolapse.     Hemodynamically stable. No tachycardia or hypotension. Hemoglobin acceptable. Urine output adequate. No leukocytosis. Ostomy appliance needs to be changed. The opening might be a bit tight. GI function is returning. Mildly hypophosphatemic. Might be fit for discharge back to SNF as soon as tomorrow. Plan:   Begin low fiber diet. Continue nutritional supplements. Decrease IV rate. Replace phosphorus. COVID-19 testing today in anticipation of return to SNF. WOCN to re-evaluate. Oncology consultation today. Ambulate. Physical therapy. Incentive spirometer.

## 2020-10-13 NOTE — ACP (ADVANCE CARE PLANNING)
visit to complete AMD. Pt had started to fill out information.  explained document and answered questions. Pt named a her friend Martell Ahumada as her primary agent.  helped complete AMD.  entered information into chart and a copy in paper chart to be scanned.  returned original and copies to pt. Please contact [phone] [address] magdy for further support.  follow up as needed.      [address] Julianne Tello, MACE   [phone]

## 2020-10-13 NOTE — PROGRESS NOTES
visit for Advance Medical Directive (AMD) consult. Pt was in bed and welcomed visit. She had AMD paperwork in her room and shared that she would like to complete though did not have the address and now seemed like there were many things going on. She asked if  could follow up. Please contact [phone] [address] [facility] for further support.  follow up as needed.      [address] Julianne Tello, MACE   [phone]

## 2020-10-13 NOTE — PROGRESS NOTES
OLIVIA: Return to [facility]    Reason for Admission:    Ileostomy prolapse                  RUR Score:     18%             PCP: First and Last name:     Name of Practice:    Are you a current patient: Yes/No:    Approximate date of last visit:    Can you participate in a virtual visit if needed:     Do you (patient/family) have any concerns for transition/discharge? None voiced               Plan for utilizing home health:  n/a     Current Advanced Directive/Advance Care Plan:  Has documents on file            Transition of Care Plan:   CM met with patient to discuss discharge plan. Patient was at [facility] prior to admission, for only a week, and states she wants to return there. CM went over how well she did with therapy and how she may not get much more out of the therapy there. Patient insisted that she needed to return to continue to [facility] Referral sent to [facility] and message left with admissions to see if another covid test would be required; awaiting a response. 12:45pm: Response received from [facility] that they can accept patient back but she will need another covid test. Nursing informed and will administer. Care Management Interventions  PCP Verified by CM: Yes  Palliative Care Criteria Met (RRAT>21 & CHF Dx)?: No  MyChart Signup: No  Discharge Durable Medical Equipment: No  Health Maintenance Reviewed: Yes  Physical Therapy Consult: Yes  Occupational Therapy Consult: Yes  Speech Therapy Consult: No  Current Support Network: Own Home  Confirm Follow Up Transport: Family  The Patient and/or Patient Representative was Provided with a Choice of Provider and Agrees with the Discharge Plan?: Yes  Name of the Patient Representative Who was Provided with a Choice of Provider and Agrees with the Discharge Plan: Marilin Hooper  Discharge Location  Discharge Placement: Skilled nursing facility    SPranav Kendall [facility]  [place]

## 2020-10-13 NOTE — WOUND CARE
WOCN Ostomy Progress Note:     Follow up at the request of nursing to manage a leaking ileostomy pouch. Patient seen by VANDANA Lema RN today. See that note for ostomy assessment information. Treatments:  Pouch removed, stoma and peristomal skin cleaned and assessed, new pouch prepared and applied using ring and paste since the stoma is large. Recommendations:  1. Empty appliance when 1/3 full and PRN. Encourage patient/family to notify nurse and assist w/ pouch emptying to promote self-care. Change appliance twice weekly and PRN for leaking ASAP. DO NOT REINFORCE LEAKS to avoid skin breakdown. Transition of Care:  Patient to be followed routinely.     CESAR Mitchell RN [facility] Inpatient Wound [facility]  Available on Perfect Serve  Pager [phone]  Office [phone]

## 2020-10-13 NOTE — ACP (ADVANCE CARE PLANNING)
visit for Advance Medical Directive (AMD) consult. Pt was in bed and welcomed visit. She had AMD paperwork in her room and shared that she would like to complete though did not have the address and now seemed like there were many things going on. She asked if  could follow up. Please contact [phone] [address] [facility] for further support.  follow up as needed.      [address] BETY TelloDiv, MACE   [phone]

## 2020-10-13 NOTE — PROGRESS NOTES
visit to complete AMD. Pt had started to fill out information.  explained document and answered questions. Pt named a her friend Ulices Markham as her primary agent.  helped complete AMD.  entered information into chart and a copy in paper chart to be scanned.  returned original and copies to pt. Please contact [phone] [address] magdy for further support.   follow up as needed.      FOX Pimentel.Div, [facility]   [phone]

## 2020-10-13 NOTE — OP NOTES
[address]   OPERATIVE REPORT    Name:  Lionel Rowe  MR#:  [id number]  :  1950  ACCOUNT #:  [de-identified]    DATE OF SERVICE:  10/11/2020    PREOPERATIVE DIAGNOSIS:  Ileostomy prolapse. POSTOPERATIVE DIAGNOSIS:  Ileostomy prolapse. PROCEDURES PERFORMED:  Revision of loop ileostomy (resection and creation of divided loop ileostomy). SURGEON:  Kenna Gil MD    ASSISTANT:  Priscilla Mei SA    ANESTHESIA:  General endotracheal.    SPECIMENS REMOVED:  Ileostomy. TUBES AND DRAINS:  None. ESTIMATED BLOOD LOSS:  25 mL. IV FLUIDS:  Crystalloid 400 mL. URINE OUTPUT:  650 mL. COMPLICATIONS:  None apparent. IMPLANTS:  None. 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 The operation included a low anterior resection, coloproctostomy, and creation of a loop ileostomy, and the patient had a prolonged hospital course afterwards. Among other issues, she experienced a non-strangulated prolapse of the ileostomy. She was discharged to a skilled nursing facility on Monday 10/05/2020, and she followed up with [facility] on 10/06/2020 where a cystogram was performed and her Hogan catheter was removed. Since then, she has been voiding well and her ostomy had been functioning. On 10/10/2020, however, at the skilled nursing facility, her ileostomy was found to have prolapsed significantly more than before. The patient was sent to the emergency room, and there (after discussing it with me by phone) Dr. Trevin Jurado was able to reduce it. The patient was then returned to the skilled nursing facility with a plan to follow up with me in the morning on 10/12/2020.   Unfortunately, the prolapse recurred right away and the staff at the nursing home was unable to reduce it. I therefore requested that they send the patient back to the emergency room so I could examine her and admit her. Upon examination, the ileostomy was found to be viable, but prolapsed and beginning to demonstrate some signs of ischemia. With some difficulty, I was able to reduce the prolapse. Because I believed that the prolapse would continue to occur without surgical intervention, I proposed ileostomy revision. The operation was described to the patient, including its risks, and she agreed to proceed. OPERATIVE FINDINGS:  The loop ileostomy was viable, but there was significant prolapse of the afferent limb. DESCRIPTION OF PROCEDURE:  After informed consent was obtained, the patient was taken to the operating room where standard monitoring devices were attached and adequate general endotracheal anesthesia was induced. She was positioned supine on the operating table with the lower extremities fitted with pneumatic compression stockings. Using sterile technique, a Hogan catheter was placed in the bladder. The anesthetist administered 2 g of Cefotetan parenterally. The abdomen was prepped and draped in the usual sterile fashion, including prepping the ileostomy field. 0.5% bupivacaine with epinephrine was infiltrated subcutaneously around the ileostomy. The mucocutaneous junction was then disconnected using the scalpel and scissors. This included cutting Vicryl sutures that still remained. Dissection through the thin layer of subcutaneous adipose tissue was then performed using a combination of scissors and electrocautery. The Ileal loop was completely detached from the anterior abdominal wall and several centimeters of the ileum were delivered through the opening. Sharp dissection with scissors was then used to unfold the everted ostomy, and this permitted reduction of the prolapsed tissue.   It was decided that the best treatment would be to reconstruct the ostomy as a divided loop. The mesentery adjacent to the stoma was gradually divided using a combination of electrocautery and sharp division of structures between clamps. The sharply divided structures were ligated with 3-0 silk ties. The efferent and afferent limbs were both cleared of the mesentery close to the stoma itself, then the bowel using the MICHAELA stapling device. One limb was divided using a 75-mm blue cartridge and the other was divided using a 75-mm green cartridge because that was the only one available. The staple length did not really matter because both the staple lines would be excised before the completion of the procedure. The ileostomy was sent as a specimen. The two cut ends of the bowel were still adjacent to one another and attached by the mesentery, but they were completely divided. The efferent limb would be kept in the inferior position and the afferent limb in the superior position as had originally been constructed. The bowel contents had been evacuated using the suction and there was no gross contamination. The ostomy was reconstructed using 3-0 Vicryl sutures to tack the bowel wall to the abdominal wall fascia first and then to create a new mucocutaneous junction using tripartite 3-0 Vicryl sutures followed by simple 3-0 Vicryl sutures. The two limbs were sutured to one another with interrupted 3-0 Vicryl sutures. Final inspection revealed all portions of bowel to be viable and apparently well perfused. An ostomy appliance was cut to the appropriate size and put in place after some additional local anesthetic had been infiltrated. There were no apparent complications, and the patient appeared to have tolerated the procedure well. At its conclusion, she was extubated and transported in stable condition to the recovery room with the Hogan catheter still in place. All sponge, needle, and instrument counts were correct.         MARLEE ANNE Alfred Sorenson MD      [id number]  D:  10/13/2020 2:04  T:  10/13/2020 9:41  JOB #:  [id number]  CC:  MD Evelia Arauz MD

## 2020-10-14 ENCOUNTER — HOSPITAL ENCOUNTER (OUTPATIENT)
Dept: INTERVENTIONAL RADIOLOGY/VASCULAR | Age: 70
Discharge: HOME | DRG: 329 | End: 2020-10-14
Attending: REGISTERED NURSE
Payer: MEDICARE

## 2020-10-14 LAB
BASOPHILS # BLD: 0.1 K/UL (ref 0–0.1)
BASOPHILS NFR BLD: 1 % (ref 0–1)
DIFFERENTIAL METHOD BLD: ABNORMAL
EOSINOPHIL # BLD: 1.3 K/UL (ref 0–0.4)
EOSINOPHIL NFR BLD: 18 % (ref 0–7)
ERYTHROCYTE [DISTWIDTH] IN BLOOD BY AUTOMATED COUNT: 23.6 % (ref 11.5–14.5)
HCT VFR BLD AUTO: 35.3 % (ref 35–47)
HEALTH STATUS, XMCV2T: NORMAL
HGB BLD-MCNC: 10.9 G/DL (ref 11.5–16)
IMM GRANULOCYTES # BLD AUTO: 0 K/UL (ref 0–0.04)
IMM GRANULOCYTES NFR BLD AUTO: 0 % (ref 0–0.5)
LYMPHOCYTES # BLD: 1.6 K/UL (ref 0.8–3.5)
LYMPHOCYTES NFR BLD: 22 % (ref 12–49)
MCH RBC QN AUTO: 25.3 PG (ref 26–34)
MCHC RBC AUTO-ENTMCNC: 30.9 G/DL (ref 30–36.5)
MCV RBC AUTO: 82.1 FL (ref 80–99)
MONOCYTES # BLD: 0.4 K/UL (ref 0–1)
MONOCYTES NFR BLD: 6 % (ref 5–13)
NEUTS SEG # BLD: 4 K/UL (ref 1.8–8)
NEUTS SEG NFR BLD: 53 % (ref 32–75)
NRBC # BLD: 0 K/UL (ref 0–0.01)
NRBC BLD-RTO: 0 PER 100 WBC
PHOSPHATE SERPL-MCNC: 3.2 MG/DL (ref 2.6–4.7)
PLATELET # BLD AUTO: 522 K/UL (ref 150–400)
PMV BLD AUTO: 8.8 FL (ref 8.9–12.9)
RBC # BLD AUTO: 4.3 M/UL (ref 3.8–5.2)
RBC MORPH BLD: ABNORMAL
SARS-COV-2, COV2: NOT DETECTED
SPECIMEN SOURCE, FCOV2M: NORMAL
SPECIMEN TYPE, XMCV1T: NORMAL
WBC # BLD AUTO: 7.4 K/UL (ref 3.6–11)

## 2020-10-14 PROCEDURE — 36560 INSERT TUNNELED CV CATH: CPT

## 2020-10-14 PROCEDURE — C1788 PORT, INDWELLING, IMP: HCPCS

## 2020-10-14 PROCEDURE — 74011250636 HC RX REV CODE- 250/636: Performed by: STUDENT IN AN ORGANIZED HEALTH CARE EDUCATION/TRAINING PROGRAM

## 2020-10-14 PROCEDURE — 85025 COMPLETE CBC W/AUTO DIFF WBC: CPT

## 2020-10-14 PROCEDURE — 36415 COLL VENOUS BLD VENIPUNCTURE: CPT

## 2020-10-14 PROCEDURE — 77030011893 HC TY CUT DN TRIS -B

## 2020-10-14 PROCEDURE — 2709999900 HC NON-CHARGEABLE SUPPLY

## 2020-10-14 PROCEDURE — 74011000250 HC RX REV CODE- 250: Performed by: STUDENT IN AN ORGANIZED HEALTH CARE EDUCATION/TRAINING PROGRAM

## 2020-10-14 PROCEDURE — C1769 GUIDE WIRE: HCPCS

## 2020-10-14 PROCEDURE — 77030031139 HC SUT VCRL2 J&J -A

## 2020-10-14 PROCEDURE — 74011250636 HC RX REV CODE- 250/636: Performed by: COLON & RECTAL SURGERY

## 2020-10-14 PROCEDURE — 65270000029 HC RM PRIVATE

## 2020-10-14 PROCEDURE — 84100 ASSAY OF PHOSPHORUS: CPT

## 2020-10-14 PROCEDURE — C1894 INTRO/SHEATH, NON-LASER: HCPCS

## 2020-10-14 PROCEDURE — 74011250636 HC RX REV CODE- 250/636

## 2020-10-14 PROCEDURE — 77030010507 HC ADH SKN DERMBND J&J -B

## 2020-10-14 PROCEDURE — 74011250637 HC RX REV CODE- 250/637: Performed by: COLON & RECTAL SURGERY

## 2020-10-14 RX ORDER — LIDOCAINE HYDROCHLORIDE AND EPINEPHRINE 10; 10 MG/ML; UG/ML
1.5 INJECTION, SOLUTION INFILTRATION; PERINEURAL ONCE
Status: COMPLETED | OUTPATIENT
Start: 2020-10-14 | End: 2020-10-14

## 2020-10-14 RX ORDER — SODIUM CHLORIDE 9 MG/ML
25 INJECTION, SOLUTION INTRAVENOUS CONTINUOUS
Status: DISCONTINUED | OUTPATIENT
Start: 2020-10-14 | End: 2020-10-14 | Stop reason: HOSPADM

## 2020-10-14 RX ORDER — FLUMAZENIL 0.1 MG/ML
0.5 INJECTION INTRAVENOUS ONCE
Status: DISCONTINUED | OUTPATIENT
Start: 2020-10-14 | End: 2020-10-14 | Stop reason: HOSPADM

## 2020-10-14 RX ORDER — LIDOCAINE HYDROCHLORIDE 20 MG/ML
20 INJECTION, SOLUTION INFILTRATION; PERINEURAL ONCE
Status: COMPLETED | OUTPATIENT
Start: 2020-10-14 | End: 2020-10-14

## 2020-10-14 RX ORDER — HEPARIN 100 UNIT/ML
300 SYRINGE INTRAVENOUS AS NEEDED
Status: DISCONTINUED | OUTPATIENT
Start: 2020-10-14 | End: 2020-10-18 | Stop reason: HOSPADM

## 2020-10-14 RX ORDER — SODIUM CHLORIDE 0.9 % (FLUSH) 0.9 %
10 SYRINGE (ML) INJECTION AS NEEDED
Status: DISCONTINUED | OUTPATIENT
Start: 2020-10-14 | End: 2020-10-15 | Stop reason: HOSPADM

## 2020-10-14 RX ORDER — NALOXONE HYDROCHLORIDE 0.4 MG/ML
0.4 INJECTION, SOLUTION INTRAMUSCULAR; INTRAVENOUS; SUBCUTANEOUS AS NEEDED
Status: DISCONTINUED | OUTPATIENT
Start: 2020-10-14 | End: 2020-10-14 | Stop reason: HOSPADM

## 2020-10-14 RX ORDER — FENTANYL CITRATE 50 UG/ML
200 INJECTION, SOLUTION INTRAMUSCULAR; INTRAVENOUS
Status: DISCONTINUED | OUTPATIENT
Start: 2020-10-14 | End: 2020-10-14 | Stop reason: HOSPADM

## 2020-10-14 RX ORDER — HEPARIN 100 UNIT/ML
500 SYRINGE INTRAVENOUS AS NEEDED
Status: DISCONTINUED | OUTPATIENT
Start: 2020-10-14 | End: 2020-10-15 | Stop reason: HOSPADM

## 2020-10-14 RX ORDER — LIDOCAINE HYDROCHLORIDE AND EPINEPHRINE 10; 10 MG/ML; UG/ML
INJECTION, SOLUTION INFILTRATION; PERINEURAL
Status: DISPENSED
Start: 2020-10-14 | End: 2020-10-15

## 2020-10-14 RX ORDER — LIDOCAINE HYDROCHLORIDE 20 MG/ML
INJECTION, SOLUTION INFILTRATION; PERINEURAL
Status: DISPENSED
Start: 2020-10-14 | End: 2020-10-15

## 2020-10-14 RX ORDER — CEFAZOLIN SODIUM/WATER 2 G/20 ML
2 SYRINGE (ML) INTRAVENOUS
Status: COMPLETED | OUTPATIENT
Start: 2020-10-14 | End: 2020-10-14

## 2020-10-14 RX ORDER — HEPARIN 100 UNIT/ML
SYRINGE INTRAVENOUS
Status: COMPLETED
Start: 2020-10-14 | End: 2020-10-14

## 2020-10-14 RX ORDER — MIDAZOLAM HYDROCHLORIDE 1 MG/ML
5 INJECTION, SOLUTION INTRAMUSCULAR; INTRAVENOUS
Status: DISCONTINUED | OUTPATIENT
Start: 2020-10-14 | End: 2020-10-14 | Stop reason: HOSPADM

## 2020-10-14 RX ADMIN — LIDOCAINE HYDROCHLORIDE,EPINEPHRINE BITARTRATE 10 ML: 10; .01 INJECTION, SOLUTION INFILTRATION; PERINEURAL at 18:22

## 2020-10-14 RX ADMIN — Medication 10 ML: at 06:00

## 2020-10-14 RX ADMIN — MIDAZOLAM HYDROCHLORIDE 1 MG: 1 INJECTION, SOLUTION INTRAMUSCULAR; INTRAVENOUS at 18:17

## 2020-10-14 RX ADMIN — FENTANYL CITRATE 25 MCG: 50 INJECTION, SOLUTION INTRAMUSCULAR; INTRAVENOUS at 18:23

## 2020-10-14 RX ADMIN — SODIUM CHLORIDE 25 ML/HR: 900 INJECTION, SOLUTION INTRAVENOUS at 18:00

## 2020-10-14 RX ADMIN — ACETAMINOPHEN 1000 MG: 500 TABLET ORAL at 22:24

## 2020-10-14 RX ADMIN — SODIUM CHLORIDE, SODIUM LACTATE, POTASSIUM CHLORIDE, AND CALCIUM CHLORIDE 25 ML/HR: 600; 310; 30; 20 INJECTION, SOLUTION INTRAVENOUS at 22:24

## 2020-10-14 RX ADMIN — FENTANYL CITRATE 25 MCG: 50 INJECTION, SOLUTION INTRAMUSCULAR; INTRAVENOUS at 18:25

## 2020-10-14 RX ADMIN — HEPARIN 500 UNITS: 100 SYRINGE at 18:30

## 2020-10-14 RX ADMIN — LIDOCAINE HYDROCHLORIDE 20 ML: 20 INJECTION, SOLUTION INFILTRATION; PERINEURAL at 18:20

## 2020-10-14 RX ADMIN — FENTANYL CITRATE 50 MCG: 50 INJECTION, SOLUTION INTRAMUSCULAR; INTRAVENOUS at 18:10

## 2020-10-14 RX ADMIN — Medication 10 ML: at 22:00

## 2020-10-14 RX ADMIN — MIDAZOLAM HYDROCHLORIDE 1 MG: 1 INJECTION, SOLUTION INTRAMUSCULAR; INTRAVENOUS at 18:10

## 2020-10-14 RX ADMIN — CEFAZOLIN SODIUM 2 G: 300 INJECTION, POWDER, LYOPHILIZED, FOR SOLUTION INTRAVENOUS at 18:10

## 2020-10-14 NOTE — PROGRESS NOTES
[facility] visit. Mrs. Tatum is NPO for a procedure. Prayer for spiritual communion offered.     RACH Menjivar, RN, ACSW, LCSW   Page:  [phone]

## 2020-10-14 NOTE — PROGRESS NOTES
Bedside shift change report given to Angeli Collins RN (oncoming nurse) by Graham Steen RN (offgoing nurse). Report included the following information SBAR, Kardex, Intake/Output and MAR.

## 2020-10-14 NOTE — PROGRESS NOTES
9:30am:   OLIVIA: Discharge back to [facility] after port-a-cath placement    RUR: 18%    CM s/w physician regarding patient's disposition. She will need a port-a-cath placement prior to discharge which may make her discharge later in the evening. CM spoke with [facility] admissions and they prefer patient arrive by 6 pm, 7 at the very latest.    CM also inquired about prescriptions for controlled substances (I.e. do they require them to be electronically sent to their pharmacy or do they need a physical copy). [facility] confirmed they still need the physical copy, signed by the MD.   If there are any issues with this, [facility] uses [address] [place] [address] ([phone]). 12:00pm: CM followed up with patient to discuss transportation to the SNF, informed her that due to how well she ambulates, she would most likely have to pay out of pocket for part of her ambulance if that was the preferred method. We discussed a wheelchair van vs patient's friend/personal car. Patient stated she would like to wait to decide when she is informed of the time of her procedure. 3:45pm: KATINA followed up with bedside RN who confirmed that patient is still waiting for her port-a-cath. CM notified [facility] that patient would not be discharging today.      Norbert NGUYEN, ACM

## 2020-10-14 NOTE — WOUND CARE
CWOCN Ostomy Progress     Note: POD 3/  ileostomy revision. Patient had all prior teaching completed just recently. Reviewed and changed pouch yesterday: 10-13-20.     Location: [facility]  Date of Surgery: 10-11-20      Surgeon: Dr. Marilyn Orona     Treatments:  Present pouch intact with good seal. Patient had an issue with leaking yesterday afternoon. Pouch removed, skin cleaned and no sting prep applied due to pink skin. Applied 2 piece pouching system: used template to cut oval opening, used an juliet seal and small amount of paste to contain drainage in the pouch. 1 Coloplast strip used on edge of pouch from 6-12 o'clock. Findings:  Current appliance: intact. Stoma size: oval in shape made a template. Stoma color: pink and moist with mucus  Characteristics: nicely budded  Peristomal skin: pink/used no sting prep after cleaning  Abdomen: slightly distended. Output: just emptied by patient and passed along to nurse. Other: used 2 piece system / Coloplast.  Pt Concerns: doing proper exercises in rehab.        Teaching/Subjects covered today: reviewed briefly. Patient has all hand outs.     General anatomy and expectations of output  Normal & abnormal stoma characteristics & changes over time  Normal & abnormal peristomal characteristics   When/how to clean stoma & peristomal skin  When/how to empty pouch   Crusting technique  When/how to change appliance   When to notify nurse/physician   Dietary guidelines   Flatus management   Where/how to obtain supplies   Manipulated/practiced opening and closing end of pouch. Reviewed ADL's (bathing, mattress cover, car pillow to protect from seatbelt, etc)  Blockage s/s and electrolyte imbalance     Pt demonstrated understanding of above material covered by ostomy nurse.     Little anxious about size of stoma. Stoma slightly oval and template made for patient.        Recommendations:  1. Empty appliance when 1/3 full and PRN.  Encourage patient/family to notify nurse and  assist w/ pouch emptying to promote self-care. Change appliance twice weekly and PRN for leaking ASAP. DO NOT REINFORCE LEAKS to avoid skin breakdown.     Transition of Care:     Dr. Jennifer Oconnell planning to discharge back to [facility] today after PICC placement. Discussed care/progress with Keyana Perez / RN  Supplies and ordering numbers to be given to patient once current pouch is assessed and no leaking.  Patient has ordering numbers and order number for coloplast strips.     Jolly Jacome RN, BSN  Wound, Ostomy and Continence Team  office: [phone]  pager [phone]

## 2020-10-14 NOTE — PROCEDURES
Interventional Radiology  Procedure Note        10/14/2020 6:36 PM    Patient: Blake Garcia     Informed consent obtained    Diagnosis: Colon cancer    Procedure(s): Port placement    Specimens removed:  none    Complications: None    Primary Physician: Corinna Anand MD    Recomendations: N/A    Discharge Disposition: stable; return to floor    Full dictated report to follow    Corinna Anand MD  Interventional Radiology  [facility].  6:36 PM, 10/14/2020

## 2020-10-14 NOTE — PROGRESS NOTES
Colorectal Surgery Note    The patient is stable, and I plan to discharge her back to SNF after the Port-a-Cath placement today.

## 2020-10-14 NOTE — PROGRESS NOTES
TRANSFER - OUT REPORT:    Verbal report given to lee Erickson RN on Zeferino Pyle  being transferred to  for routine progression of care       Report consisted of patients Situation, Background, Assessment and   Recommendations(SBAR). Information from the following report(s) SBAR, Procedure Summary and MAR was reviewed with the receiving nurse. Lines:   Peripheral IV 10/10/20 Right Antecubital (Active)   Site Assessment Clean, dry, & intact 10/14/20 1000   Phlebitis Assessment 0 10/14/20 1000   Infiltration Assessment 0 10/14/20 1000   Dressing Status Clean, dry, & intact 10/14/20 1000   Dressing Type Transparent 10/14/20 1000   Hub Color/Line Status Pink; Infusing 10/14/20 1000   Action Taken Open ports on tubing capped 10/14/20 0200   Alcohol Cap Used Yes 10/14/20 0200        Opportunity for questions and clarification was provided. Patient transported with:   transport on stretcher back to unit; right subclavian incision dry and intact; no hematoma; pt has information about portacath with her.

## 2020-10-14 NOTE — H&P
Radiology History and Physical    Patient: Mindy Lou 72 y.o. female       Chief Complaint: No chief complaint on file. History of Present Illness: 72year old woman with sigmoid colon cancer. Presents for port placement. History:    Past Medical History:   Diagnosis Date    [facility] ([facility]     GERD (gastroesophageal reflux disease)     [facility] place [facility]     Ill-defined condition     Spaulding's esophagus     Family History   Problem Relation Age of Onset    Cancer Mother     Heart Disease Father     Diabetes Brother      Social History     Socioeconomic History    Marital status: SINGLE     Spouse name: Not on file    Number of children: Not on file    Years of education: Not on file    Highest education level: Not on file   Occupational History    Not on file   Social Needs    Financial resource strain: Not on file    Food insecurity     Worry: Not on file     Inability: Not on file    Transportation needs     Medical: Not on file     Non-medical: Not on file   Tobacco Use    Smoking status: Never Smoker    Smokeless tobacco: Never Used   Substance and Sexual Activity    Alcohol use:  Yes     Alcohol/week: 1.0 standard drinks     Types: 1 Glasses of wine per week    Drug use: No    Sexual activity: Not on file   Lifestyle    Physical activity     Days per week: Not on file     Minutes per session: Not on file    Stress: Not on file   Relationships    Social connections     Talks on phone: Not on file     Gets together: Not on file     Attends [employer] service: Not on file     Active member of club or organization: Not on file     Attends meetings of clubs or organizations: Not on file     Relationship status: Not on file    Intimate partner violence     Fear of current or ex partner: Not on file     Emotionally abused: Not on file     Physically abused: Not on file     Forced sexual activity: Not on file   Other Topics Concern    Not on file   Social History Narrative  Not on file       Allergies: No Known Allergies    Current Medications:  Current Facility-Administered Medications   Medication Dose Route Frequency    lidocaine (XYLOCAINE) 20 mg/mL (2 %) injection 400 mg  20 mL SubCUTAneous ONCE    lidocaine-EPINEPHrine (XYLOCAINE) 1 %-1:100,000 injection 15 mg  1.5 mL IntraDERMal ONCE    heparin (porcine) pf 300 Units  300 Units InterCATHeter PRN     No current outpatient medications on file. Facility-Administered Medications Ordered in Other Encounters   Medication Dose Route Frequency    fentaNYL citrate (PF) injection 200 mcg  200 mcg IntraVENous Multiple    midazolam (PF) (VERSED) injection 5 mg  5 mg IntraVENous Multiple    0.9% sodium chloride infusion  25 mL/hr IntraVENous CONTINUOUS    ceFAZolin (ANCEF) 2 g/20 mL in sterile water IV syringe  2 g IntraVENous ONCE PRN    flumazeniL (ROMAZICON) 0.1 mg/mL injection 0.5 mg  0.5 mg IntraVENous ONCE    naloxone (NARCAN) injection 0.4 mg  0.4 mg IntraVENous PRN    heparin (porcine) pf 100 unit/mL        lidocaine-EPINEPHrine (XYLOCAINE) 1 %-1:100,000 injection        lidocaine (XYLOCAINE) 20 mg/mL (2 %) injection        lactated Ringers infusion  25 mL/hr IntraVENous CONTINUOUS    pantoprazole (PROTONIX) tablet 40 mg  40 mg Oral DAILY    acetaminophen (TYLENOL) tablet 1,000 mg  1,000 mg Oral Q6H    HYDROmorphone (PF) (DILAUDID) injection 1 mg  1 mg IntraVENous Q4H PRN    sodium chloride (NS) flush 5-40 mL  5-40 mL IntraVENous Q8H    sodium chloride (NS) flush 5-40 mL  5-40 mL IntraVENous PRN    ondansetron (ZOFRAN) injection 4 mg  4 mg IntraVENous Q4H PRN        Physical Exam:  There were no vitals taken for this visit.   GENERAL: alert, cooperative, no distress, appears stated age,   LUNG: Nonlabored respiration on room air  HEART: regular rate and rhythm, R radial & R DP pulse 2/2  EXT: No edema BLEs  ABD: Nontender, nondistended    Alerts:    Hospital Problems  Date Reviewed: 10/10/2020    None Laboratory:      Recent Labs     10/14/20  0540 10/13/20  0510   HGB 10.9* 9.5*   HCT 35.3 31.4*   WBC 7.4 8.4   * 475*   BUN  --  9   CREA  --  0.39*   K  --  3.8         Plan of Care/Planned Procedure:  Risks, benefits, and alternatives reviewed with patient and she agrees to proceed with the procedure. Port placement    Deemed appropriate for moderate sedation with versed and fentanyl.     Phil Marin MD  Interventional Radiology  [facility]  6:15 PM, 10/14/2020

## 2020-10-14 NOTE — PROGRESS NOTES
Physician Progress Note      Tasneem Bedolla  CSN #:                  [id number]  :                       1950  ADMIT DATE:       10/10/2020 8:11 PM  [address] [place] DATE:  RESPONDING  PROVIDER #:        Michael Casarez MD          QUERY TEXT:    Patient admitted with BMI 18.27%. If possible, please document in progress notes and discharge summary if you are evaluating and /or treating any of the following: The medical record reflects the following:  Risk Factors: colon cancer    Clinical Indicators:  BMI 18.27%    Treatment: Ensure Enlive with meals; Regular low fiber diet    Thank you,  Michell Reid, RN, BSN  Clinical Documentation  Options provided:  -- Underweight  -- BMI not clinically significant  -- Other - I will add my own diagnosis  -- Disagree - Not applicable / Not valid  -- Disagree - Clinically unable to determine / Unknown  -- Refer to Clinical Documentation Reviewer    PROVIDER RESPONSE TEXT:    This patient is underweight with BMI 18.27%.     Query created by: Pinky Calle on 10/14/2020 1:20 PM      Electronically signed by:  Michael Casarez MD 10/14/2020 4:04 PM

## 2020-10-15 VITALS
WEIGHT: 110.7 LBS | HEART RATE: 110 BPM | TEMPERATURE: 97.5 F | SYSTOLIC BLOOD PRESSURE: 132 MMHG | DIASTOLIC BLOOD PRESSURE: 84 MMHG | BODY MASS INDEX: 17.87 KG/M2 | OXYGEN SATURATION: 97 % | RESPIRATION RATE: 16 BRPM

## 2020-10-15 PROCEDURE — 74011250637 HC RX REV CODE- 250/637: Performed by: COLON & RECTAL SURGERY

## 2020-10-15 PROCEDURE — 02H633Z INSERTION OF INFUSION DEVICE INTO RIGHT ATRIUM, PERCUTANEOUS APPROACH: ICD-10-PCS | Performed by: STUDENT IN AN ORGANIZED HEALTH CARE EDUCATION/TRAINING PROGRAM

## 2020-10-15 PROCEDURE — 0JH63XZ INSERTION OF TUNNELED VASCULAR ACCESS DEVICE INTO CHEST SUBCUTANEOUS TISSUE AND FASCIA, PERCUTANEOUS APPROACH: ICD-10-PCS | Performed by: STUDENT IN AN ORGANIZED HEALTH CARE EDUCATION/TRAINING PROGRAM

## 2020-10-15 RX ADMIN — ACETAMINOPHEN 1000 MG: 500 TABLET ORAL at 03:44

## 2020-10-15 RX ADMIN — PANTOPRAZOLE SODIUM 40 MG: 40 TABLET, DELAYED RELEASE ORAL at 09:16

## 2020-10-15 NOTE — PROGRESS NOTES
TRANSITION OF CARE PLAN   RUR 19% MODERATE RISK    DISPOSITION--Return to [facility] today   TRANSPORT--Friend at bedside     Chart reviewed. Patient is being discharged back to [facility] today. Call placed and spoke with Danae Churchill, Admissions [phone] to confirm discharge plan. Facility is in agreement for pt returning back today. Met with patient at bedside to confirm discharge plan. Patient is agreeable. No further concerns noted. Notified RN. Call report to [phone]    CM available in case needs arise.      NICHELLE Mathias,CRM

## 2020-10-15 NOTE — DISCHARGE INSTRUCTIONS
[address] Implanted Port Discharge Instructions      General Instructions:   A port is like an implanted IV. They are usually ordered for patients who will be getting chemotherapy, but can also be used as an IV for long term antibiotics, large amounts of fluids, and/or blood products. Your blood can be drawn from your port for labs also. Those patients who do not have good veins find the ports convenient as they can get the IV they need with one stick. The port can be used long term, and the care is easy. The device is under the skin, and once the skin heals, care is minimal. All that is required is the nurse who accesses the port will need to flush it with heparinized saline after each use. Ports are usually placed in the chest wall, usually on the right side. But they can be place in the arms and in the abdomen. Home Care Instructions: If your port is in your arm, do not allow blood pressure or other IVs to be place in that arm. Do not allow bra straps or any clothing to rub the skin over the port. Do not bathe or swim until the skin has healed and if the port is accessed. Once it is healed, after about 7 days, and when the port is not accessed, it is okay to bathe and swim. Restrict yourself to light activity for the first 5 days after getting the port put in, after that, resume normal activity slowly. You may resume your normal diet and medications. When showering or washing hair, place clean gauze over incision and then clear dressing as barrier against wetness. After showering, remove dressing and leave incision open to air. Follow-Up Instructions: Please see your oncologist, or whatever physician ordered the port as he/she has requested of you. Call If: You should call your Physician and/or the Radiology Nurse if you notice redness, pus, swelling, or pain from the area of your incision. Call if you should develop a fever.  The nurses who access your port will know to call your doctor if the port does not seem to be working properly. You need to tell the nurses who use the port if you should have any pain or swelling at the site during an infusion. To Reach Us: Side effects of sedation medications and other medications used today have been reviewed. Notify us of nausea, itching, hives, dizziness, or anything else out of the ordinary. Should you experience any of these significant changes, please call [phone] between the hours of 7:30 am and 10 pm or [phone] after hours. After hours, ask the  to page the [address] Technologist, and describe the problem to the technologist.          Patient Signature:  Date: 10/14/2020  Discharging Nurse: Bailee Bahena RN         Other Discharge Instructions      1. Do not lift any objects weighing more than 10 pounds for 4 weeks after the date of the most recent abdominal operation. 2. Do not do any housework including vacuuming, scrubbing or yardwork for 4 weeks after the date of the most recent abdominal operation. 3. Do not drive for 2 weeks after the surgery date or while taking sedating medications. 4. You should walk frequently and you should go up and down stairs as desired. You should participate in physical therapy. 5. You may shower, but do not submerge your abdomen. Do not take tub baths, swim, or use hot tubs for the next 4 weeks. 6. You should continue the low fiber diet for the next 2 weeks. 7. Drink nutritional supplements 2 times per day until your diet and appetite are back to normal.     8. Take Tylenol (up to 1000 mg every 6 hours as needed) until you do not need pain medication any more. You may take other pain medication if prescribed by other physicians in addition to the Tylenol (but not in place of it) if you have already had as much Tylenol as you are allowed to have and your pain is not controlled well enough.     9. Follow up:  Please arrive at Dr. Jaime Potts office at 9:45 AM on Monday 10/19/2020 fro a 10:00 AM appointment. 10. Please bring your ostomy supplies to the follow-up appointment in case the appliance needs to be changed.

## 2020-10-15 NOTE — DISCHARGE SUMMARY
Discharge Summary     Patient ID:    Nate Hollis  [id number]  16 y.o.  1950    Admission Date: 10/10/2020    Discharge Date: 10/15/2020    Admission Diagnoses:  1. Ileostomy prolapse  2. Underweight      Chronic Diagnoses:    Patient Active Problem List   Diagnosis Code    Hydronephrosis N13.30    Malignant neoplasm of sigmoid colon ([facility] C18.7    Severe protein-calorie malnutrition ([facility] E43    Anemia D64.9    [facility] prol[facility] ([facility] K94.19       Discharge Diagnoses:  1. Ileostomy prolapse (corrected via revision of ileostomy)  2. Hypophosphatemia (corrected)  3. Underweight      Hospital Problems as of 10/15/2020 Date Reviewed: 10/10/2020          Codes Class Noted - Resolved POA    * (Principal) Ileostomy prolapse ([facility] ICD-10-CM: K94.19  ICD-9-CM: 569.69  10/10/2020 - Present Yes              Procedures Performed:  1. Revision of ileostomy was performed by Dr. Kimberlyn Peoples on 10/11/2020.  2.  Right chest Port-a-Cath placement was performed by Interventional Radiology on 10/14/2020. Discharge Medications:   Current Discharge Medication List      CONTINUE these medications which have NOT CHANGED    Details   denosumab (PROLIA) 60 mg/mL injection 60 mg by SubCUTAneous route. lansoprazole (PREVACID) 30 mg capsule Take 30 mg by mouth Daily (before breakfast). multivitamin (ONE A DAY) tablet Take 1 Tab by mouth daily. ascorbic acid, vitamin C, (VITAMIN C) 500 mg tablet Take 500 mg by mouth. cholecalciferol (VITAMIN D3) 1,000 unit cap Take 1,000 Units by mouth daily. Omega-3 Fatty Acids (FISH OIL) 500 mg cap Take  by mouth. Diet: Low fiber diet for 2 weeks. Activity: No driving for two weeks. No  Lifting more than 10 lb. for 4 weeks. Wound Care:  None    Ostomy Care:  Routine    Discharge Condition:  Improved compared to that upon admission. Disposition:  [facility] ([address] [place] [address])    Follow-up Care:  1.  Dr. Kimberlyn Peoples at 10-:00 Am on 10/19/2020  2. Dr. Gino Mckinley as scheduled. in 1-2 weeks      CONSULTATIONS:  Lily Posadas MD (Oncology)    Significant Diagnostic Studies:   Recent Labs     10/14/20  0540 10/13/20  0510   WBC 7.4 8.4   HGB 10.9* 9.5*   HCT 35.3 31.4*   * 475*     Recent Labs     10/14/20  0540 10/13/20  0510   NA  --  140   K  --  3.8   CL  --  108   CO2  --  25   BUN  --  9   CREA  --  0.39*   GLU  --  90   CA  --  8.7   MG  --  1.9   PHOS 3.2 2.3*       Lab Results   Component Value Date/Time    Glucose (POC) 98 09/27/2020 11:39 PM    Glucose (POC) 113 (H) 09/27/2020 05:59 PM    Glucose (POC) 106 (H) 09/27/2020 11:33 AM    Glucose (POC) 111 (H) 09/27/2020 05:32 AM    Glucose (POC) 119 (H) 09/26/2020 11:07 PM         HOSPITAL COURSE & TREATMENT RENDERED:     The patient is a 67-year-old female upon whom I (along with Lilia Estrada, and Rossy) performed a complex multivisceral resection on 09/10/2020 for treatment of what proved to have been a 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 The operation included a low anterior resection, coloproctostomy, and creation of a loop ileostomy, and the patient had a prolonged hospital course afterwards. Among other issues, she experienced a non-strangulated prolapse of the ileostomy. She was discharged to a skilled nursing facility on Monday 10/05/2020, and she followed up with [facility] on 10/06/2020 where a cystogram was performed and her Hogan catheter was removed. Since then, she has been voiding well and her ostomy had been functioning. On 10/10/2020, however, at the skilled nursing facility, her ileostomy was found to have prolapsed significantly more than before. The patient was sent to the emergency room, and there (after discussing it with me by phone) Dr. Emile Boyce was able to reduce it. The patient was then returned to the skilled nursing facility with a plan to follow up with me in the morning on 10/12/2020. Unfortunately, the prolapse recurred right away and the staff at the nursing home was unable to reduce it. I therefore requested that they send the patient back to the emergency room so I could examine her and admit her. Upon examination, the ileostomy was found to be viable, but prolapsed and beginning to demonstrate some signs of ischemia. With some difficulty, I was able to reduce the prolapse. Because I believed that the prolapse would continue to occur without surgical intervention, I proposed ileostomy revision. The operation was described to the patient, including its risks, and she agreed to proceed. She was taken to the operating room in the morning on 10/11/2020, and there the ileostomy revision was performed without apparent complications. Afterwards, she was transferred back to the floor where her post-operative course was essentially unremarkable. While awaiting sufficient return of gastrointestinal function, an Oncology consultation was obtained from Dr. Geo Robertson. Dr. Geo Robertson requested the placement of a Port-a-Cath for administration of adjuvant chemotherapy, and that procedure was performed late in the day on 10/14/2020. On 10/15/2020, the patient was judged to be fit for discharge back to the skilled nursing facility. Her condition upon discharge was improved compared to that upon admission, and she appeared to have understood all discharge and follow-up instructions.       Signed:  Michael Rowe MD

## 2020-10-15 NOTE — PROGRESS NOTES
General Daily Progress Note    Admission Date: 10/10/2020  Hospital Day 6  Post-Op Day 4  Subjective:   Pain control adequate. Tolerating diet. Ambulating well. Objective:     Patient Vitals for the past 24 hrs:   BP Temp Pulse Resp SpO2 Weight   10/14/20 2359 114/73 97.6 °F (36.4 °C) (!) 103 18 96 % --   10/14/20 2000 109/69 97.9 °F (36.6 °C) 97 18 100 % --   10/14/20 1934 127/77 -- 92 17 98 % --   10/14/20 1919 128/71 -- 90 16 98 % --   10/14/20 1904 130/70 -- 94 22 99 % --   10/14/20 1851 126/64 -- 96 20 99 % --   10/14/20 1836 128/68 -- 96 21 99 % --   10/14/20 1835 113/64 -- 97 17 100 % --   10/14/20 1830 121/67 -- 98 17 100 % --   10/14/20 1825 129/77 -- 92 18 100 % --   10/14/20 1820 117/77 -- 92 18 100 % --   10/14/20 1815 129/77 -- 94 18 100 % --   10/14/20 1810 129/80 -- 96 18 100 % --   10/14/20 1805 138/88 -- 96 21 100 % --   10/14/20 1559 109/69 98.2 °F (36.8 °C) 97 16 97 % --   10/14/20 1500 -- -- -- -- -- 50.2 kg (110 lb 11.2 oz)   10/14/20 0850 123/74 98.3 °F (36.8 °C) 92 16 96 % --     No intake/output data recorded. 10/13 1901 - 10/15 0700  In: 200 [I.V.:200]  Out: 2250 [Urine:1700]    Physical Examination:  General Appearance - No distress. Chest - Port-a-Cath in place in right superior chest..  Abdomen - Soft.  Appropriately tender.   Ileostomy edematous but viable and functioning. Extremities - No edema. Data Review No results found for this or any previous visit (from the past 24 hour(s)). Assessment:     Principal Problem:    Ileostomy prolapse ([address] (10/10/2020)        4 Days Post-Op s/p revision of ileostomy for treatment of prolapse. Port-a-Cath placed on 10/14/2020.     Hemodynamically stable. Urine output adequate. No evidence of infection or other complication. GI function has returned. The patient appears to be fit for discharge back to SNF. Plan:   Discharge back to SNF. Follow up with me on 10/19/2020. Follow up with Oncology as planned.

## 2020-10-15 NOTE — PROGRESS NOTES
Bedside shift change report given to Flakita Pappas (oncoming nurse) by Blanka Woods RN (offgoing nurse). Report included the following information SBAR, Kardex, ED Summary, OR Summary, Procedure Summary, Intake/Output, MAR, Accordion, Recent Results and Med Rec Status.

## 2020-10-16 ENCOUNTER — PATIENT OUTREACH (OUTPATIENT)
Dept: CASE MANAGEMENT | Age: 70
End: 2020-10-16

## 2020-10-16 NOTE — PROGRESS NOTES
Community Care Team medical review documentation for patient in [facility]     Patient was discharged from [facility] on 10/15/20 to [address] [facility] ([facility]). Information included in this progress note has been provided to SNF. Discharge Diagnosis: Ileostomy prolapse; Hypophosphatemia; Underweight  PCP : Lashanda Howard MD  ACP:  ACP on File    Medication Changes at Discharge:   Start: none  Change: none  Stop: none  Diet: low fier diet for 2 weeks    Follow up Appointment Recommendations:   Future Appointments   Date Time Provider Yray Gomez   10/22/2020  2:00 PM Ayla Magallanes  [address] BS AMB   1 Follow up with Libertad Diaz MD (Colon and Rectal Surgery) on 10/19/2020; Appointment scheduled for 10:00A   2   Follow up with PCP 3-5 days after discharge from [facility]  PTA Functional Status: [address]: The patient lived alone and has friends to provide assistance at d/c as needed  Prior to admission pt had been at [facility] for rehab.(re-admission to hospital)  [facility] will follow up weekly with [facility] until discharge. Medications were not reconciled and general patient assessment was not completed during this [facility] outreach.       Cachorro Duke RN

## 2020-10-19 LAB
BACTERIA SPEC CULT: NORMAL
SERVICE CMNT-IMP: NORMAL

## 2020-10-20 ENCOUNTER — PATIENT OUTREACH (OUTPATIENT)
Dept: CASE MANAGEMENT | Age: 70
End: 2020-10-20

## 2020-10-20 NOTE — PROGRESS NOTES
Community Care Team Documentation for Patient in [facility]  Discharge Note    Patient has been discharged from [facility] ([facility]). See previous [facility] Team notes. PCP : Jorje Ward MD    Spoke with SNF team. PT/OT update: Currently  patient is independent with everything Discharge:  Planned for pt to retrun to home today alone with [address] Team will sign off at this time. Medications were not reconciled and general patient assessment was not completed during this skilled nursing facility outreach.      Sam Garnett RN

## 2020-10-20 NOTE — PROGRESS NOTES
Community Care Team Documentation for Patient in [facility] Subsequent Follow up Patient remains at [facility] ([facility]). See previous [facility] Team notes. Spoke with SNF team.  PT/OT update: Currently  patient is independent with everything Discharge:  Planned for pt to retrun to home today alone with [facility]OKESH HOUS[facility] Medications were not reconciled and general patient assessment was not completed during this skilled nursing facility outreach.   
 
Isidro Munguia RN

## 2020-10-22 ENCOUNTER — DOCUMENTATION ONLY (OUTPATIENT)
Dept: ONCOLOGY | Age: 70
End: 2020-10-22

## 2020-10-22 ENCOUNTER — OFFICE VISIT (OUTPATIENT)
Dept: ONCOLOGY | Age: 70
End: 2020-10-22
Payer: MEDICARE

## 2020-10-22 VITALS
DIASTOLIC BLOOD PRESSURE: 85 MMHG | HEART RATE: 114 BPM | TEMPERATURE: 97.5 F | WEIGHT: 115.9 LBS | SYSTOLIC BLOOD PRESSURE: 129 MMHG | BODY MASS INDEX: 18.63 KG/M2 | HEIGHT: 66 IN | OXYGEN SATURATION: 99 %

## 2020-10-22 DIAGNOSIS — C18.7 MALIGNANT NEOPLASM OF SIGMOID COLON (HCC): Primary | ICD-10-CM

## 2020-10-22 DIAGNOSIS — D50.9 IRON DEFICIENCY ANEMIA, UNSPECIFIED IRON DEFICIENCY ANEMIA TYPE: ICD-10-CM

## 2020-10-22 PROCEDURE — G0463 HOSPITAL OUTPT CLINIC VISIT: HCPCS | Performed by: INTERNAL MEDICINE

## 2020-10-22 PROCEDURE — G8420 CALC BMI NORM PARAMETERS: HCPCS | Performed by: INTERNAL MEDICINE

## 2020-10-22 PROCEDURE — 1101F PT FALLS ASSESS-DOCD LE1/YR: CPT | Performed by: INTERNAL MEDICINE

## 2020-10-22 PROCEDURE — G8510 SCR DEP NEG, NO PLAN REQD: HCPCS | Performed by: INTERNAL MEDICINE

## 2020-10-22 PROCEDURE — G9711 PT HX TOT COL OR COLON CA: HCPCS | Performed by: INTERNAL MEDICINE

## 2020-10-22 PROCEDURE — 99215 OFFICE O/P EST HI 40 MIN: CPT | Performed by: INTERNAL MEDICINE

## 2020-10-22 PROCEDURE — G8536 NO DOC ELDER MAL SCRN: HCPCS | Performed by: INTERNAL MEDICINE

## 2020-10-22 PROCEDURE — 1111F DSCHRG MED/CURRENT MED MERGE: CPT | Performed by: INTERNAL MEDICINE

## 2020-10-22 PROCEDURE — 1090F PRES/ABSN URINE INCON ASSESS: CPT | Performed by: INTERNAL MEDICINE

## 2020-10-22 PROCEDURE — G8400 PT W/DXA NO RESULTS DOC: HCPCS | Performed by: INTERNAL MEDICINE

## 2020-10-22 PROCEDURE — G8427 DOCREV CUR MEDS BY ELIG CLIN: HCPCS | Performed by: INTERNAL MEDICINE

## 2020-10-22 NOTE — PROGRESS NOTES
Pharmacy Note- Chemotherapy Education    Ellen Stanton is a  72 y. o.female  diagnosed with colon cancer here today for  chemotherapy counseling and consent. Ms. Giuseppe Lara is being treated with mFOLFOX. Provided education on fluorouracil, leucovorin, oxaliplatin and premedications - fosaprepitant, palonosetron and dexamethasone. Side effects of chemotherapy reviewed included s/s infection, anemia, appetite changes, thromboyctopenia, fatigue, hair loss/alopecia, bone pain, skin and nail changes, diarrhea/constipation and nerve sensitivities (cold sensitivity and peripheral neuropathy). Patient given ways to manage these side effects and when to contact office. [employer] Handout of medications provided to patient. Ms. Giuseppe Lara verbalized understanding of the information presented and all of the patient's questions were answered.     Kathie Nix, PharmD, BCPS, BCOP    CLINICAL PHARMACY CONSULT: MED RECONCILIATION/REVIEW ADDENDUM    For Pharmacy Admin Tracking Only    PHSO: PHSO Patient?: No  Total # of Interventions Recommended: Count: 1    Total Interventions Accepted: 1  Time Spent (min): 30

## 2020-10-22 NOTE — PROGRESS NOTES
[facility] [place] at [address] [place] [address] Faby Ramirezcent, [address] [place] [address] Road, [address] [place] [address]  W: [phone]  F: [phone]    Reason for Visit:   Pancho Gregory is a 72 y.o. female who is seen for stage III colon cancer. Treatment History:   · 9/10/2020 CT A/P - large, irregular pelvic mass measuring approximately 9cm likely representing neoplasm arising from the sigmoid colon, small amount of pelvic free fluid, colonic bowel wall thickening. Borderline enlarged retroperitoneal lymph nodes. 6 mm right lower lobe lung nodule. Mass effect from pelvic mass causing moderate right and minimal left hydronephrosis. · 9/14/2020: Colonoscopy - A large circumferential, ulcerated fungating mass was noted in proximal sigmoid colon. Mass was obstructing the lumen and could not be traversed. Gayland Tyler was present. · 9/15/2020: CT Chest - 7.5mm left lower lobe pulmonary nodule, small right pleural effusion, mild right basilar atelectasis  · 9/17/2020: Surgical resection - low anterior resection, mobilization of the splenic flexure, coloproctostomy, creation of loop ileostomy, total abdominal hysterectomy and left salpingo-oophorectomy, distal right ureterectomy, right ureteral re-implant with psoas hitch and placement of right ureteral stent    History of Present Illness:   Patient is a 72 y.o. female seen for colon cancer    She presented to the ED on 9/11/2020 with complaints of right flank/back pain and RLQ abdominal pain. She states she has had several months of gas pain and \"blockages. \" She reports a weight loss of 12 lbs in the last 3 months. CT abd/pelv 9/10/2020 showed large, irregular pelvic mass measuring approximately 9cm likely representing neoplasm arising from the sigmoid colon. She also had hydronephrosis from the mass effect and urethral stent placed. CEA 7.4 on 9/11and colonoscopy performed 9/14 with biopsy + adenocarcinoma. CT Chest performed showing 7.5mm left lower lobe pulmonary nodule.  Mass found to be invading into the uterus and right ureter. Surgical resection performed on 9/17/2020 including low anterior resection, mobilization of the splenic flexure, coloproctostomy, creation of loop ileostomy, total abdominal hysterectomy and left salpingo-oophorectomy, distal right ureterectomy, right ureteral re-implant with psoas hitch and placement of right ureteral stent. She was to see me in clinic but was admitted 10/10/2020 with ileostomy prolapse. Had a revision of loop ileostomy 10/11/2020. Now seen for follow up    She is in [facility] and walking on her own, she has been trying to lift weights. She has been recovering well. Has a good appetite, has no neuropathy, pain or bleeding  Still not gained weight  Mild forgetfulness per her friend Demond Cho with niece    Family Hx:  Mother with hx of pancreatic cancer      Past Medical History:   Diagnosis Date    Colon cancer ([address]     GERD (gastroesophageal reflux disease)     Ileostomy in place ([address]     Ill-defined condition     Spaulding's esophagus      Past Surgical History:   Procedure Laterality Date    COLONOSCOPY Left 9/14/2020    COLONOSCOPY performed by Flakita Esqueda MD at [address] [place] [address]; incomplete secondary to partial obstruction.  HX APPENDECTOMY  age 17    HX COLONOSCOPY  circa 2010    No neoplasms.  HX ENDOSCOPY  03/13/2017    Dr. Desi Stout HX ENDOSCOPY  02/13/2020    Dr. Daniela Blair oopherectomy for treatment of benign mass.  HX GYN  09/17/2020    Total abdominal hysterectomy and left salpingo-oophorectomy; Mika Ramirez MD.    HX OTHER SURGICAL  09/17/2020    Low anterior resection, mobilization of the splenic flexure, coloproctostomy, and creation of loop ileostomy; Dr. Murray Courser.     HX UROLOGICAL  09/12/2020    Cystoscopy and placement of a right ureteral stent; Cl Snider III, MD.    HX UROLOGICAL  09/17/2020    Cystoscopy and placement of a temporary left ureteral catheter; Arabella Ascencio. Jessa Batista MD.   Aniket Harris UROLOGICAL  09/17/2020    Distal right ureterectomy; Carolina Cash MD.   Aniket Harris UROLOGICAL  09/17/2020    Right ureteral re-implantation with psoas hitch and placement of a right ureteral stent; Cuate Montero MD.    IR INSERT TUNL CVAD W PORT LESS THAN 5 YR  10/14/2020      Social History     Tobacco Use    Smoking status: Never Smoker    Smokeless tobacco: Never Used   Substance Use Topics    Alcohol use: Yes     Alcohol/week: 1.0 standard drinks     Types: 1 Glasses of wine per week      Family History   Problem Relation Age of Onset    Cancer Mother     Heart Disease Father     Diabetes Brother      Current Outpatient Medications   Medication Sig    denosumab (PROLIA) 60 mg/mL injection 60 mg by SubCUTAneous route.  lansoprazole (PREVACID) 30 mg capsule Take 30 mg by mouth Daily (before breakfast).  multivitamin (ONE A DAY) tablet Take 1 Tab by mouth daily.  ascorbic acid, vitamin C, (VITAMIN C) 500 mg tablet Take 500 mg by mouth.  cholecalciferol (VITAMIN D3) 1,000 unit cap Take 1,000 Units by mouth daily.  Omega-3 Fatty Acids (FISH OIL) 500 mg cap Take  by mouth. No current facility-administered medications for this visit. No Known Allergies     Review of Systems: A complete review of systems was obtained, negative except as described above. Physical Exam:     Visit Vitals  /85   Pulse (!) 114   Temp 97.5 °F (36.4 °C)   Ht 5' 6\" (1.676 m)   Wt 115 lb 14.4 oz (52.6 kg)   SpO2 99%   BMI 18.71 kg/m²     ECOG PS: 1-2  General: No distress but appears frail  Eyes: PERRLA, anicteric sclerae  HENT: Atraumatic, OP clear  Neck: Supple  Lymphatic: No cervical, supraclavicular, or inguinal adenopathy  Respiratory:  normal respiratory effort  CV: Normal rate, no peripheral edema  GI: Soft, stoma noted-   MS: Digits without clubbing or cyanosis. Skin: No rashes, ecchymoses, or petechiae.    Psych: Alert, oriented, appropriate affect, normal judgment/insight    Results:     Lab Results   Component Value Date/Time    WBC 7.4 10/14/2020 05:40 AM    HGB 10.9 (L) 10/14/2020 05:40 AM    HCT 35.3 10/14/2020 05:40 AM    PLATELET 595 (H) 22/34/6994 05:40 AM    MCV 82.1 10/14/2020 05:40 AM    ABS. NEUTROPHILS 4.0 10/14/2020 05:40 AM    Hemoglobin (POC) 12.0 09/17/2020 06:35 PM     Lab Results   Component Value Date/Time    Sodium 140 10/13/2020 05:10 AM    Potassium 3.8 10/13/2020 05:10 AM    Chloride 108 10/13/2020 05:10 AM    CO2 25 10/13/2020 05:10 AM    Glucose 90 10/13/2020 05:10 AM    BUN 9 10/13/2020 05:10 AM    Creatinine 0.39 (L) 10/13/2020 05:10 AM    GFR est AA >60 10/13/2020 05:10 AM    GFR est non-AA >60 10/13/2020 05:10 AM    Calcium 8.7 10/13/2020 05:10 AM    Glucose (POC) 98 09/27/2020 11:39 PM    Creatinine (POC) 0.8 09/10/2020 02:32 PM     Lab Results   Component Value Date/Time    Bilirubin, total 0.2 10/10/2020 08:30 PM    ALT (SGPT) 47 10/10/2020 08:30 PM    Alk. phosphatase 178 (H) 10/10/2020 08:30 PM    Protein, total 6.8 10/10/2020 08:30 PM    Albumin 3.2 (L) 10/10/2020 08:30 PM    Globulin 3.6 10/10/2020 08:30 PM     CEA:  Recent Labs     09/11/20  1455   CEA 7.4       CT A/P 9/23/2020  IMPRESSION:  Large, irregular pelvic mass measuring approximately 9 cm likely representing  neoplasm arising from the sigmoid colon. Adnexal neoplasm is a possibility. This  does appear to be separate from the uterus. Small amount of pelvic free fluid. Associated colonic bowel wall thickening. There is focal gas in the upper  presacral region which is unclear if this is intraluminal or contained  extraluminal collection.     No definite distant metastatic disease. Borderline enlarged retroperitoneal  lymph nodes. 6 mm right lower lobe lung nodule. Mass effect from the pelvic mass  causing moderate right and minimal left hydronephrosis. CT Chest 9/15/2020  IMPRESSION:  1. 7.5 mm left lower lobe pulmonary nodule. 2. Small right pleural effusion.   3. Mild right basilar atelectasis. 9/14/2020   Addendum Diagnosis   1. Sigmoid Mass, Biopsy:     Infiltrating adenocarcinoma, most consistent with colon (See comment)   Addendum Comment   Immunohistochemical stains reveal the following staining pattern:   CK7: Scattered cells with cytoplasmic membrane staining   CK20: Positive cytoplasmic staining in normal colonic mucosal epithelial cells and malignant epithelial cells   CDX-2: Diffuse strong nuclear decoration and all tumor cells and normal background colonic epithelial cells   PAX-8: Negative   Estrogen Receptor: Negative   Positive and negative controls stain appropriately. Due to radiologic findings suggesting the possibility of GYN involvement, ER and PAX-8 are performed, which lend support to the diagnosis of a colonic primary, over a tumor of müllerian origin. 9/17/2020   FINAL PATHOLOGIC DIAGNOSIS   1. Sigmoid colon and proximal rectum, uterus, left fallopian tube and ovary and right ureteral segment, low anterior resection:   Sigmoid colon and proximal rectum:  Invasive adenocarcinoma (see synoptic report and comment). Metastatic adenocarcinoma in one of sixteen lymph nodes (1/16). Uterus: Invasive adenocarcinoma extending from adhesed colon into uterine serosa. Endometrial lining shows mucosal atrophy. Left ovary: Benign ovary with serosal inclusion cysts. Left fallopian tube: Benign fallopian tube. Right ureteral segment: Benign segment of ureter densely adhesed to colon. Nodule near right uterine cornu:  Nodular portion of benign smooth muscle consistent with leiomyoma with parenchymal hemorrhage.    COLON AND RECTUM: Resection   SPECIMEN   Procedure: Low anterior resection   TUMOR   Tumor Site: Sigmoid colon   Histologic Type: Adenocarcinoma   Histologic Grade: G2: Moderately differentiated   Tumor Size: 8.0 cm   Tumor Extension: Tumor directly invades adjacent structures:   Posterior uterine serosa   Macroscopic Tumor Perforation: Tumor invades through serosa into surrounding soft tissue   Lymphovascular Invasion: Present   Perineural Invasion: Not identified   Treatment Effect: No known presurgical therapy   MARGINS   Margins: All margins are uninvolved by invasive carcinoma   Margins Examined: Proximal, Distal, Radial (circumferential) or   Mesenteric   Distance of Tumor from Radial (circumferential) Margin: See Comment   LYMPH NODES   Number of Lymph Nodes Involved: 1   Number of Lymph Nodes Examined: 16   Tumor Deposits: Not identified   PATHOLOGIC STAGE CLASSIFICATION (pTNM, AJCC 8th Edition)   Primary Tumor (pT): pT4b   Regional Lymph Nodes (pN): pN1a   2. Distal rectal margin:   Segment of benign large bowel. 3. Anastomotic doughnuts:   Two annular portions of benign large bowel. Comment   Tumor invades into the adherent soft tissue and to within 1.1 cm of the apparent right pelvic sidewall margin. Records reviewed and summarized above. Pathology report(s) reviewed above. Radiology report(s) reviewed above. Assessment:   1) Sigmoid Colon Adenocarcinoma - Stage IIIB- ERICKA  [id number]  CT abd/pelv 9/10/2020 showed large, irregular pelvic mass measuring approximately 9cm likely representing neoplasm arising from the sigmoid colon. CEA 7.4 on 9/11  Colonoscopy performed 9/14 and biopsy + adenocarcinoma  Mass found to be invading into the uterus and right ureter. Surgical resection on 9/17 with creation of loop ileostomy, total abdominal hysterectomy and left salpingo-oophorectomy, distal right ureterectomy    She had a revision of her ileostomy 10/11/2020    We are planning to start adjuvant FOLFOX for 6 months in 2 weeks    We discussed the chemotherapy regimen, it's logistics, and potential toxicities in detail. Potential side effects include, but are not limited to, nausea, vomiting, diarrhea, taste changes, myelosuppression, infection, fatigue, allergic reactions, rash, edema, neuropathy, and rarely, death.  The patient asked several well thought out questions which I answered to the best of my ability and to their apparent satisfaction. The patient has given consent for chemotherapy. 2)Thrombocytosis  Reactive    3) Anemia  Due to #1  Iron sat of 4% and ferritin of 20 on 9/11  S/p 2 units PRBCs on 9/15 and Venofer 200mg x3 doses  Hgb has not really recovered - will arrange for Injectafer x1 OP    4) Psychosocial  Reassured her    Prognosis: Good     This is our best current assessment. Cancers respond differently to treatment. Overall prognosis depends on many factors including other conditions, cancer stage, side effects, and other unforeseen events. Goal of therapy: Curative     Expected response to treatment:  Good: Anticipate prolonged, long-term control of cancer    Treatment benefits and harms:  We discussed potential short term side effects to include:GI upset, increased infection risk, anemia, alopecia, increased risk of bleeding and fatigue     Long term side effects of treatment:  neuropathy    Quality of life: Quality of life concerns have been addressed. Treatment as outlined is expected to have moderate impact on patients quality of life. Plan:       · Approval for mFOLFOX every 2 weeks for 6 months- consented  · Cbc, cmp every cycle, cea every other  · Zofran prn, dexamethasone days 2 and 3  · Injectafer with cycle 1  · Hydration as needed  · Plan to start in 2 weeks once out of rehab  · Scans in 3 months    RTC 2 weeks   Priti Becerra at [phone] [facility]    I appreciate the opportunity to participate in Ms. Puente Parents care.     Signed By: Alden Lopez MD

## 2020-10-22 NOTE — PROGRESS NOTES
Solo Ricardo is a 72 y.o. female   Chief Complaint   Patient presents with   [facility] Patient     1. Have you been to the ER, urgent care clinic since your last visit? Hospitalized since your last visit? Yes 10/20 ileostomy stoma     2. Have you seen or consulted any other health care providers outside of the [address] [place] [address] since your last visit? Include any pap smears or colon screening.   No

## 2020-10-22 NOTE — PROGRESS NOTES
Oncology Navigator  Psychosocial Assessment    Reason for Assessment:    []Depression  []Anxiety  []Caregiver [place]  []Maladaptive Coping with Serious Illness   [] Social Work Referral [x] Initial Assessment  [] Other     Sources of Information:    [x]Patient  [x]Family  []Staff  []Medical Record    Advance Care Planning:  Advance Care Planning 10/11/2020   Patient's Healthcare Decision Maker is: -   Confirm Advance Directive None   Patient has an AMD on file.       Mental Status:    [x]Alert  []Lethargic  []Unresponsive   [] Unable to assess   Oriented to:  [x]Person  [x]Place  [x]Time  [x]Situation      Barriers to Learning:    []Language  []Developmental  []Cognitive  []Altered Mental Status  []Visual/Hearing Impairment  []Unable to Read/Write  []Motivational   [x]No Barriers Identified  []Other:    Relationship Status:  [x]Single  []  []Significant Other/Life Partner  []  []  []      Living Circumstances:  [x]Lives Alone  []Family/Significant Other in Household  []Roommates  []Children in the Home  []Paid Caregivers  []Assisted Living Facility/Group Home  []Skilled [address]  []Homeless  []Incarcerated  []Environmental/Care Concerns  []other:    Employment Status:  []Employed Full-time []Employed Part-time []Homemaker [] Disabled  [x] Retired []Other:    Support System:    [x]Strong  []Fair  []Limited     Financial/Legal Concerns:    []Uninsured  []Limited Income/Resources  []Non-Citizen  [x]No Concerns Identified  []Financial POA:    []Other:    Anglican/Spiritual/Existential:  []Strong Sense of Spirituality  []Involved in [facility]  []Request  Visit  []Expressing Sigrid Lin  [x]No Concerns Identified    Coping with Illness:         Patient: Family/Caregiver:   Understanding and Acceptance of Illness/Prognosis  [x] [x]   Strong Sense of Resilience [x] [x]   Self Reflection [x] [x]   Engaged Support System [x] [x]   Does not Readily Discuss Illness [] []   Denial of Terminal Status [] []   Anger [] []   Depression [] []   Anxiety/Fear [] []   Bargaining [] []   Recent Diagnosis/Prognosis [] []   Difficulties with Body Image [] []   Loss of Identity [] []   Excessive Substance Use [] []   Mental Health History [] []   Enmeshed Relationships [] []   History of Loss [] []   Anticipatory Grief [] []   Concern for Complicated Grief [] []   Suicidal Ideation or Plan [] []   Unable to assess [] []            Narrative:   Met with the patient during her initial office visit today, along with her niece, Christiano Segal. The patient is being seen for Stage III Colon Cancer. The patient lives alone in a two story townhouse. The patient is currently at [facility], receiving PT/OT services. She does not use any DME to ambulate. The patient has an ileostomy. Her niece, Christiano Segal, is in town from [place], attempting to clean out and prepare the patient's home for her return. The patient has a nephew and sister-in-law in [place]. Her brother ([de-identified] father) is . The patient does not have any family locally. The patient has friends locally who are supportive. Her friend, Rock Ojeda, serves as her MPOA. The patient is retired, having worked as as a . 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 Answered the patient's questions, and questions from her niece, regarding preparing for returning home independently. Addressed their concerns about the patient's potential need for assistance while receiving treatment. Completed a Meals on Wheels application with the patient, who hopes to return home to live independently within the next few weeks. Also provided a price list of DME for [address] [place] [address] Calvin Price, and a list of medical alert system companies.   Provided a list of personal care aid agencies as well as home health agencies that provide skilled care in the event that she needs these services in the future. Provided a list of transportation resources. Patient states that she has lost 18 lbs recently, and she struggles with appetite. Sent a referral to colleague Rhoda Camp, Registered Dietitian for follow up purposes. Provided this 's contact information as well as information regarding the complementary services provided by the [facility], explaining that the center is currently closed due to COVID-19 restrictions. Explained that meditation and music therapy are both being offered virtually. Assessment/Action:   1. Introduced self and role of this  in the [facility] [facility] [address] 2.  Informed the patient of the [facility] and available resources there. 3.  Continue to meet with the patient when she returns to the clinic for ongoing assessment of the patients adjustment to her diagnosis and treatment. 4.  Ongoing psychosocial support as desired by patient.       Plan/Referral:   Transportation referral  Complementary therapies referral  Home Health referral  Nutrition referral (Dietitian referral and Meals on Wheels)   [address] (adult or child) referral  Nadia Koenig LCSW

## 2020-10-23 ENCOUNTER — TELEPHONE (OUTPATIENT)
Dept: ONCOLOGY | Age: 70
End: 2020-10-23

## 2020-10-23 DIAGNOSIS — C18.7 MALIGNANT NEOPLASM OF SIGMOID COLON (HCC): Primary | ICD-10-CM

## 2020-10-23 RX ORDER — SODIUM CHLORIDE 9 MG/ML
10 INJECTION INTRAMUSCULAR; INTRAVENOUS; SUBCUTANEOUS AS NEEDED
Status: CANCELLED | OUTPATIENT
Start: 2020-11-19

## 2020-10-23 RX ORDER — SODIUM CHLORIDE 0.9 % (FLUSH) 0.9 %
10 SYRINGE (ML) INJECTION AS NEEDED
Status: CANCELLED | OUTPATIENT
Start: 2020-11-19

## 2020-10-23 RX ORDER — HEPARIN 100 UNIT/ML
300-500 SYRINGE INTRAVENOUS AS NEEDED
Status: CANCELLED
Start: 2020-11-19

## 2020-10-23 RX ORDER — ONDANSETRON 2 MG/ML
8 INJECTION INTRAMUSCULAR; INTRAVENOUS AS NEEDED
Status: CANCELLED | OUTPATIENT
Start: 2020-11-19

## 2020-10-23 RX ORDER — HYDROCORTISONE SODIUM SUCCINATE 100 MG/2ML
100 INJECTION, POWDER, FOR SOLUTION INTRAMUSCULAR; INTRAVENOUS AS NEEDED
Status: CANCELLED | OUTPATIENT
Start: 2020-11-19

## 2020-10-23 RX ORDER — ACETAMINOPHEN 325 MG/1
650 TABLET ORAL AS NEEDED
Status: CANCELLED
Start: 2020-11-19

## 2020-10-23 RX ORDER — PROCHLORPERAZINE MALEATE 5 MG
5 TABLET ORAL
Qty: 30 TAB | Refills: 1 | Status: SHIPPED | OUTPATIENT
Start: 2020-10-23 | End: 2020-11-17 | Stop reason: SDUPTHER

## 2020-10-23 RX ORDER — DIPHENHYDRAMINE HYDROCHLORIDE 50 MG/ML
25 INJECTION, SOLUTION INTRAMUSCULAR; INTRAVENOUS AS NEEDED
Status: CANCELLED
Start: 2020-11-19

## 2020-10-23 RX ORDER — EPINEPHRINE 1 MG/ML
0.3 INJECTION, SOLUTION, CONCENTRATE INTRAVENOUS AS NEEDED
Status: CANCELLED | OUTPATIENT
Start: 2020-11-19

## 2020-10-23 RX ORDER — DIPHENHYDRAMINE HYDROCHLORIDE 50 MG/ML
50 INJECTION, SOLUTION INTRAMUSCULAR; INTRAVENOUS AS NEEDED
Status: CANCELLED
Start: 2020-11-19

## 2020-10-23 RX ORDER — ONDANSETRON 8 MG/1
8 TABLET, ORALLY DISINTEGRATING ORAL
Qty: 30 TAB | Refills: 3 | Status: SHIPPED | OUTPATIENT
Start: 2020-10-23 | End: 2020-11-16 | Stop reason: SDUPTHER

## 2020-10-23 RX ORDER — LIDOCAINE AND PRILOCAINE 25; 25 MG/G; MG/G
CREAM TOPICAL AS NEEDED
Qty: 30 G | Refills: 2 | Status: SHIPPED | OUTPATIENT
Start: 2020-10-23 | End: 2020-11-16 | Stop reason: SDUPTHER

## 2020-10-23 RX ORDER — ALBUTEROL SULFATE 0.83 MG/ML
2.5 SOLUTION RESPIRATORY (INHALATION) AS NEEDED
Status: CANCELLED
Start: 2020-11-19

## 2020-10-23 RX ORDER — DEXAMETHASONE 4 MG/1
TABLET ORAL
Qty: 48 TAB | Refills: 0 | Status: SHIPPED | OUTPATIENT
Start: 2020-10-23 | End: 2020-11-16 | Stop reason: SDUPTHER

## 2020-10-23 NOTE — TELEPHONE ENCOUNTER
Called patient to make aware of upcoming [facility] appointment. Patient stated she is moving her lab appointment at [facility] to Monday and they are going to be taking a lot of blood. Patient would like to know if this is going to effect her starting treatment on Tuesday. Please return call to discuss [phone].   kim

## 2020-10-23 NOTE — PROGRESS NOTES
[facility] [place] at [address] [place]: [phone]  F: [phone]    Medical Nutrition Therapy      Nutrition Encounter:  Supportive visit with patient and niece to introduce self and discussed role of oncology dietitian in providing supportive nutrition care. Explained that RD is available to be a resource for managing symptoms, minimizing weight changes and maintaining optimal nutrition status during and after cancer treatment. Patient with colon cancer s/p creation of loop ileostomy with revision of ileostomy on 10/11/20. She is currently at [facility] and working with their dietitian. She is drinking 1 Ensure per day. Suggested to increase to 2-3 per day. Results:   Colon Cancer plan for chemo in near future. Wt Readings from Last 5 Encounters:   10/22/20 115 lb 14.4 oz (52.6 kg)   10/14/20 110 lb 11.2 oz (50.2 kg)   10/10/20 115 lb (52.2 kg)   10/05/20 115 lb 11.9 oz (52.5 kg)   02/13/20 138 lb (62.6 kg)     Estimated Nutrition Needs:   Calorie Range: 1578-1841kcal/day     Protein Range: 52-62g/day     Fluid Needs: 1800ml     Assessment:   Involuntary weight loss related to disease as evidence by a weight loss of 16% x 8 months. Plan:   · Continue to work with Rehab dietitian on diet and weight gain  · Eating small amounts several times a day verses large meals. · Add protein and calories to favorite foods  (Ex. adding non-fat dry milk powder, butters, oils, whole milk, etc)  · Keep nutrient-dense foods close at hand and snack frequently   · Discussed consumption of nutrition supplements per day at least 3 per day. · Ideas to make more calorically dense without increasing volume. I appreciate the opportunity to participate in Ms. Castaneda Zena carrington.     Signed By: Polo Pereira, [address], [address]     Contact: [phone]

## 2020-10-25 ENCOUNTER — HOSPITAL ENCOUNTER (INPATIENT)
Age: 70
LOS: 5 days | Discharge: HOME | DRG: 329 | End: 2020-10-30
Attending: EMERGENCY MEDICINE | Admitting: COLON & RECTAL SURGERY
Payer: MEDICARE

## 2020-10-25 ENCOUNTER — ANESTHESIA (OUTPATIENT)
Dept: SURGERY | Age: 70
DRG: 329 | End: 2020-10-25
Payer: MEDICARE

## 2020-10-25 ENCOUNTER — ANESTHESIA EVENT (OUTPATIENT)
Dept: SURGERY | Age: 70
DRG: 329 | End: 2020-10-25
Payer: MEDICARE

## 2020-10-25 DIAGNOSIS — K94.19 ILEOSTOMY PROLAPSE (HCC): Primary | ICD-10-CM

## 2020-10-25 PROBLEM — K55.059 ACUTE INTESTINAL ISCHEMIA (HCC): Status: ACTIVE | Noted: 2020-10-25

## 2020-10-25 LAB
ALBUMIN SERPL-MCNC: 3.3 G/DL (ref 3.5–5)
ALBUMIN/GLOB SERPL: 0.8 {RATIO} (ref 1.1–2.2)
ALP SERPL-CCNC: 132 U/L (ref 45–117)
ALT SERPL-CCNC: 36 U/L (ref 12–78)
ANION GAP SERPL CALC-SCNC: 8 MMOL/L (ref 5–15)
AST SERPL-CCNC: 22 U/L (ref 15–37)
BASOPHILS # BLD: 0.1 K/UL (ref 0–0.1)
BASOPHILS NFR BLD: 1 % (ref 0–1)
BILIRUB SERPL-MCNC: 0.4 MG/DL (ref 0.2–1)
BUN SERPL-MCNC: 18 MG/DL (ref 6–20)
BUN/CREAT SERPL: 28 (ref 12–20)
CALCIUM SERPL-MCNC: 9.1 MG/DL (ref 8.5–10.1)
CHLORIDE SERPL-SCNC: 104 MMOL/L (ref 97–108)
CO2 SERPL-SCNC: 26 MMOL/L (ref 21–32)
CREAT SERPL-MCNC: 0.64 MG/DL (ref 0.55–1.02)
DIFFERENTIAL METHOD BLD: ABNORMAL
EOSINOPHIL # BLD: 0.3 K/UL (ref 0–0.4)
EOSINOPHIL NFR BLD: 4 % (ref 0–7)
ERYTHROCYTE [DISTWIDTH] IN BLOOD BY AUTOMATED COUNT: 20.5 % (ref 11.5–14.5)
GLOBULIN SER CALC-MCNC: 3.9 G/DL (ref 2–4)
GLUCOSE SERPL-MCNC: 97 MG/DL (ref 65–100)
HCT VFR BLD AUTO: 36.1 % (ref 35–47)
HGB BLD-MCNC: 11 G/DL (ref 11.5–16)
IMM GRANULOCYTES # BLD AUTO: 0.1 K/UL (ref 0–0.04)
IMM GRANULOCYTES NFR BLD AUTO: 1 % (ref 0–0.5)
LACTATE SERPL-SCNC: 1.9 MMOL/L (ref 0.4–2)
LYMPHOCYTES # BLD: 1.2 K/UL (ref 0.8–3.5)
LYMPHOCYTES NFR BLD: 14 % (ref 12–49)
MCH RBC QN AUTO: 25.2 PG (ref 26–34)
MCHC RBC AUTO-ENTMCNC: 30.5 G/DL (ref 30–36.5)
MCV RBC AUTO: 82.8 FL (ref 80–99)
MONOCYTES # BLD: 0.4 K/UL (ref 0–1)
MONOCYTES NFR BLD: 5 % (ref 5–13)
NEUTS SEG # BLD: 6.6 K/UL (ref 1.8–8)
NEUTS SEG NFR BLD: 75 % (ref 32–75)
NRBC # BLD: 0 K/UL (ref 0–0.01)
NRBC BLD-RTO: 0 PER 100 WBC
PLATELET # BLD AUTO: 433 K/UL (ref 150–400)
PMV BLD AUTO: 8.6 FL (ref 8.9–12.9)
POTASSIUM SERPL-SCNC: 4 MMOL/L (ref 3.5–5.1)
PROT SERPL-MCNC: 7.2 G/DL (ref 6.4–8.2)
RBC # BLD AUTO: 4.36 M/UL (ref 3.8–5.2)
RBC MORPH BLD: ABNORMAL
SODIUM SERPL-SCNC: 138 MMOL/L (ref 136–145)
WBC # BLD AUTO: 8.7 K/UL (ref 3.6–11)

## 2020-10-25 PROCEDURE — 77030019702 HC WRP THER MENM -C: Performed by: COLON & RECTAL SURGERY

## 2020-10-25 PROCEDURE — 77030019908 HC STETH ESOPH SIMS -A: Performed by: ANESTHESIOLOGY

## 2020-10-25 PROCEDURE — 74011000250 HC RX REV CODE- 250: Performed by: COLON & RECTAL SURGERY

## 2020-10-25 PROCEDURE — 77030018836 HC SOL IRR NACL ICUM -A

## 2020-10-25 PROCEDURE — 76060000034 HC ANESTHESIA 1.5 TO 2 HR: Performed by: COLON & RECTAL SURGERY

## 2020-10-25 PROCEDURE — 76010000153 HC OR TIME 1.5 TO 2 HR: Performed by: COLON & RECTAL SURGERY

## 2020-10-25 PROCEDURE — 74011250636 HC RX REV CODE- 250/636: Performed by: ANESTHESIOLOGY

## 2020-10-25 PROCEDURE — 74011000258 HC RX REV CODE- 258: Performed by: COLON & RECTAL SURGERY

## 2020-10-25 PROCEDURE — 85025 COMPLETE CBC W/AUTO DIFF WBC: CPT

## 2020-10-25 PROCEDURE — 0DBB8ZZ EXCISION OF ILEUM, VIA NATURAL OR ARTIFICIAL OPENING ENDOSCOPIC: ICD-10-PCS | Performed by: COLON & RECTAL SURGERY

## 2020-10-25 PROCEDURE — 77030005513 HC CATH URETH FOL11 MDII -B: Performed by: COLON & RECTAL SURGERY

## 2020-10-25 PROCEDURE — 77030031139 HC SUT VCRL2 J&J -A: Performed by: COLON & RECTAL SURGERY

## 2020-10-25 PROCEDURE — 77030013076 HC PCH OST BAG COLO -A: Performed by: COLON & RECTAL SURGERY

## 2020-10-25 PROCEDURE — 83605 ASSAY OF LACTIC ACID: CPT

## 2020-10-25 PROCEDURE — 2709999900 HC NON-CHARGEABLE SUPPLY: Performed by: COLON & RECTAL SURGERY

## 2020-10-25 PROCEDURE — 77030002996 HC SUT SLK J&J -A: Performed by: COLON & RECTAL SURGERY

## 2020-10-25 PROCEDURE — 80053 COMPREHEN METABOLIC PANEL: CPT

## 2020-10-25 PROCEDURE — 87635 SARS-COV-2 COVID-19 AMP PRB: CPT

## 2020-10-25 PROCEDURE — 74011250636 HC RX REV CODE- 250/636: Performed by: COLON & RECTAL SURGERY

## 2020-10-25 PROCEDURE — 74011250637 HC RX REV CODE- 250/637: Performed by: COLON & RECTAL SURGERY

## 2020-10-25 PROCEDURE — 74011000250 HC RX REV CODE- 250: Performed by: NURSE ANESTHETIST, CERTIFIED REGISTERED

## 2020-10-25 PROCEDURE — 94760 N-INVAS EAR/PLS OXIMETRY 1: CPT

## 2020-10-25 PROCEDURE — 76210000016 HC OR PH I REC 1 TO 1.5 HR: Performed by: COLON & RECTAL SURGERY

## 2020-10-25 PROCEDURE — 77030008684 HC TU ET CUF COVD -B: Performed by: ANESTHESIOLOGY

## 2020-10-25 PROCEDURE — 74011250636 HC RX REV CODE- 250/636: Performed by: NURSE ANESTHETIST, CERTIFIED REGISTERED

## 2020-10-25 PROCEDURE — 99284 EMERGENCY DEPT VISIT MOD MDM: CPT

## 2020-10-25 PROCEDURE — 77030008771 HC TU NG SALEM SUMP -A: Performed by: ANESTHESIOLOGY

## 2020-10-25 PROCEDURE — 65270000029 HC RM PRIVATE

## 2020-10-25 PROCEDURE — 77030040361 HC SLV COMPR DVT MDII -B: Performed by: COLON & RECTAL SURGERY

## 2020-10-25 PROCEDURE — 36415 COLL VENOUS BLD VENIPUNCTURE: CPT

## 2020-10-25 RX ORDER — MIDAZOLAM HYDROCHLORIDE 1 MG/ML
0.5 INJECTION, SOLUTION INTRAMUSCULAR; INTRAVENOUS
Status: DISCONTINUED | OUTPATIENT
Start: 2020-10-25 | End: 2020-10-25 | Stop reason: HOSPADM

## 2020-10-25 RX ORDER — MIDAZOLAM HYDROCHLORIDE 1 MG/ML
1 INJECTION, SOLUTION INTRAMUSCULAR; INTRAVENOUS AS NEEDED
Status: DISCONTINUED | OUTPATIENT
Start: 2020-10-25 | End: 2020-10-25 | Stop reason: HOSPADM

## 2020-10-25 RX ORDER — SODIUM CHLORIDE 9 MG/ML
25 INJECTION, SOLUTION INTRAVENOUS CONTINUOUS
Status: DISCONTINUED | OUTPATIENT
Start: 2020-10-25 | End: 2020-10-25 | Stop reason: HOSPADM

## 2020-10-25 RX ORDER — HYDROCODONE BITARTRATE AND ACETAMINOPHEN 5; 325 MG/1; MG/1
1 TABLET ORAL AS NEEDED
Status: DISCONTINUED | OUTPATIENT
Start: 2020-10-25 | End: 2020-10-25 | Stop reason: HOSPADM

## 2020-10-25 RX ORDER — LIDOCAINE HYDROCHLORIDE 10 MG/ML
0.1 INJECTION, SOLUTION EPIDURAL; INFILTRATION; INTRACAUDAL; PERINEURAL AS NEEDED
Status: DISCONTINUED | OUTPATIENT
Start: 2020-10-25 | End: 2020-10-25 | Stop reason: HOSPADM

## 2020-10-25 RX ORDER — ACETAMINOPHEN 325 MG/1
650 TABLET ORAL ONCE
Status: DISCONTINUED | OUTPATIENT
Start: 2020-10-25 | End: 2020-10-25 | Stop reason: HOSPADM

## 2020-10-25 RX ORDER — MORPHINE SULFATE 10 MG/ML
2 INJECTION, SOLUTION INTRAMUSCULAR; INTRAVENOUS
Status: DISCONTINUED | OUTPATIENT
Start: 2020-10-25 | End: 2020-10-25 | Stop reason: HOSPADM

## 2020-10-25 RX ORDER — EPHEDRINE SULFATE/0.9% NACL/PF 50 MG/5 ML
SYRINGE (ML) INTRAVENOUS AS NEEDED
Status: DISCONTINUED | OUTPATIENT
Start: 2020-10-25 | End: 2020-10-25 | Stop reason: HOSPADM

## 2020-10-25 RX ORDER — PHENYLEPHRINE HCL IN 0.9% NACL 0.4MG/10ML
SYRINGE (ML) INTRAVENOUS
Status: DISCONTINUED | OUTPATIENT
Start: 2020-10-25 | End: 2020-10-25

## 2020-10-25 RX ORDER — SUCCINYLCHOLINE CHLORIDE 20 MG/ML
INJECTION INTRAMUSCULAR; INTRAVENOUS AS NEEDED
Status: DISCONTINUED | OUTPATIENT
Start: 2020-10-25 | End: 2020-10-25 | Stop reason: HOSPADM

## 2020-10-25 RX ORDER — LIDOCAINE HYDROCHLORIDE 20 MG/ML
INJECTION, SOLUTION EPIDURAL; INFILTRATION; INTRACAUDAL; PERINEURAL AS NEEDED
Status: DISCONTINUED | OUTPATIENT
Start: 2020-10-25 | End: 2020-10-25 | Stop reason: HOSPADM

## 2020-10-25 RX ORDER — BUPIVACAINE HYDROCHLORIDE AND EPINEPHRINE 5; 5 MG/ML; UG/ML
30 INJECTION, SOLUTION EPIDURAL; INTRACAUDAL; PERINEURAL ONCE
Status: COMPLETED | OUTPATIENT
Start: 2020-10-25 | End: 2020-10-25

## 2020-10-25 RX ORDER — PANTOPRAZOLE SODIUM 40 MG/1
40 TABLET, DELAYED RELEASE ORAL
Status: DISCONTINUED | OUTPATIENT
Start: 2020-10-26 | End: 2020-10-30 | Stop reason: HOSPADM

## 2020-10-25 RX ORDER — DEXTROSE, SODIUM CHLORIDE, AND POTASSIUM CHLORIDE 5; .45; .15 G/100ML; G/100ML; G/100ML
50 INJECTION INTRAVENOUS CONTINUOUS
Status: DISCONTINUED | OUTPATIENT
Start: 2020-10-25 | End: 2020-10-28

## 2020-10-25 RX ORDER — ACETAMINOPHEN 500 MG
1000 TABLET ORAL
Status: DISCONTINUED | OUTPATIENT
Start: 2020-10-25 | End: 2020-10-30 | Stop reason: HOSPADM

## 2020-10-25 RX ORDER — SODIUM CHLORIDE 0.9 % (FLUSH) 0.9 %
5-40 SYRINGE (ML) INJECTION EVERY 8 HOURS
Status: DISCONTINUED | OUTPATIENT
Start: 2020-10-25 | End: 2020-10-27

## 2020-10-25 RX ORDER — ROCURONIUM BROMIDE 10 MG/ML
INJECTION, SOLUTION INTRAVENOUS AS NEEDED
Status: DISCONTINUED | OUTPATIENT
Start: 2020-10-25 | End: 2020-10-25 | Stop reason: HOSPADM

## 2020-10-25 RX ORDER — HYDROMORPHONE HYDROCHLORIDE 2 MG/ML
INJECTION, SOLUTION INTRAMUSCULAR; INTRAVENOUS; SUBCUTANEOUS AS NEEDED
Status: DISCONTINUED | OUTPATIENT
Start: 2020-10-25 | End: 2020-10-25 | Stop reason: HOSPADM

## 2020-10-25 RX ORDER — ROPIVACAINE HYDROCHLORIDE 5 MG/ML
30 INJECTION, SOLUTION EPIDURAL; INFILTRATION; PERINEURAL AS NEEDED
Status: DISCONTINUED | OUTPATIENT
Start: 2020-10-25 | End: 2020-10-25 | Stop reason: HOSPADM

## 2020-10-25 RX ORDER — GLYCOPYRROLATE 0.2 MG/ML
0.2 INJECTION INTRAMUSCULAR; INTRAVENOUS
Status: DISCONTINUED | OUTPATIENT
Start: 2020-10-25 | End: 2020-10-25 | Stop reason: HOSPADM

## 2020-10-25 RX ORDER — DEXMEDETOMIDINE HYDROCHLORIDE 100 UG/ML
INJECTION, SOLUTION INTRAVENOUS AS NEEDED
Status: DISCONTINUED | OUTPATIENT
Start: 2020-10-25 | End: 2020-10-25 | Stop reason: HOSPADM

## 2020-10-25 RX ORDER — NALOXONE HYDROCHLORIDE 0.4 MG/ML
0.4 INJECTION, SOLUTION INTRAMUSCULAR; INTRAVENOUS; SUBCUTANEOUS AS NEEDED
Status: DISCONTINUED | OUTPATIENT
Start: 2020-10-25 | End: 2020-10-30 | Stop reason: HOSPADM

## 2020-10-25 RX ORDER — ONDANSETRON 2 MG/ML
4 INJECTION INTRAMUSCULAR; INTRAVENOUS AS NEEDED
Status: DISCONTINUED | OUTPATIENT
Start: 2020-10-25 | End: 2020-10-25 | Stop reason: HOSPADM

## 2020-10-25 RX ORDER — PHENYLEPHRINE HCL IN 0.9% NACL 0.4MG/10ML
SYRINGE (ML) INTRAVENOUS AS NEEDED
Status: DISCONTINUED | OUTPATIENT
Start: 2020-10-25 | End: 2020-10-25 | Stop reason: HOSPADM

## 2020-10-25 RX ORDER — FENTANYL CITRATE 50 UG/ML
50 INJECTION, SOLUTION INTRAMUSCULAR; INTRAVENOUS AS NEEDED
Status: DISCONTINUED | OUTPATIENT
Start: 2020-10-25 | End: 2020-10-25 | Stop reason: HOSPADM

## 2020-10-25 RX ORDER — DIPHENHYDRAMINE HYDROCHLORIDE 50 MG/ML
12.5 INJECTION, SOLUTION INTRAMUSCULAR; INTRAVENOUS AS NEEDED
Status: DISCONTINUED | OUTPATIENT
Start: 2020-10-25 | End: 2020-10-25 | Stop reason: HOSPADM

## 2020-10-25 RX ORDER — SODIUM CHLORIDE 0.9 % (FLUSH) 0.9 %
5-40 SYRINGE (ML) INJECTION AS NEEDED
Status: DISCONTINUED | OUTPATIENT
Start: 2020-10-25 | End: 2020-10-27

## 2020-10-25 RX ORDER — DEXAMETHASONE SODIUM PHOSPHATE 4 MG/ML
INJECTION, SOLUTION INTRA-ARTICULAR; INTRALESIONAL; INTRAMUSCULAR; INTRAVENOUS; SOFT TISSUE AS NEEDED
Status: DISCONTINUED | OUTPATIENT
Start: 2020-10-25 | End: 2020-10-25 | Stop reason: HOSPADM

## 2020-10-25 RX ORDER — SODIUM CHLORIDE, SODIUM LACTATE, POTASSIUM CHLORIDE, CALCIUM CHLORIDE 600; 310; 30; 20 MG/100ML; MG/100ML; MG/100ML; MG/100ML
INJECTION, SOLUTION INTRAVENOUS
Status: DISCONTINUED | OUTPATIENT
Start: 2020-10-25 | End: 2020-10-25 | Stop reason: HOSPADM

## 2020-10-25 RX ORDER — HYDROMORPHONE HYDROCHLORIDE 1 MG/ML
0.2 INJECTION, SOLUTION INTRAMUSCULAR; INTRAVENOUS; SUBCUTANEOUS
Status: DISCONTINUED | OUTPATIENT
Start: 2020-10-25 | End: 2020-10-25 | Stop reason: HOSPADM

## 2020-10-25 RX ORDER — ALBUTEROL SULFATE 0.83 MG/ML
2.5 SOLUTION RESPIRATORY (INHALATION) AS NEEDED
Status: DISCONTINUED | OUTPATIENT
Start: 2020-10-25 | End: 2020-10-25 | Stop reason: HOSPADM

## 2020-10-25 RX ORDER — HYDROMORPHONE HYDROCHLORIDE 1 MG/ML
0.2 INJECTION, SOLUTION INTRAMUSCULAR; INTRAVENOUS; SUBCUTANEOUS
Status: DISCONTINUED | OUTPATIENT
Start: 2020-10-25 | End: 2020-10-30 | Stop reason: HOSPADM

## 2020-10-25 RX ORDER — SODIUM CHLORIDE, SODIUM LACTATE, POTASSIUM CHLORIDE, CALCIUM CHLORIDE 600; 310; 30; 20 MG/100ML; MG/100ML; MG/100ML; MG/100ML
1000 INJECTION, SOLUTION INTRAVENOUS CONTINUOUS
Status: DISCONTINUED | OUTPATIENT
Start: 2020-10-25 | End: 2020-10-25 | Stop reason: HOSPADM

## 2020-10-25 RX ORDER — PROPOFOL 10 MG/ML
INJECTION, EMULSION INTRAVENOUS AS NEEDED
Status: DISCONTINUED | OUTPATIENT
Start: 2020-10-25 | End: 2020-10-25 | Stop reason: HOSPADM

## 2020-10-25 RX ORDER — FENTANYL CITRATE 50 UG/ML
INJECTION, SOLUTION INTRAMUSCULAR; INTRAVENOUS AS NEEDED
Status: DISCONTINUED | OUTPATIENT
Start: 2020-10-25 | End: 2020-10-25 | Stop reason: HOSPADM

## 2020-10-25 RX ORDER — ONDANSETRON 2 MG/ML
INJECTION INTRAMUSCULAR; INTRAVENOUS AS NEEDED
Status: DISCONTINUED | OUTPATIENT
Start: 2020-10-25 | End: 2020-10-25 | Stop reason: HOSPADM

## 2020-10-25 RX ORDER — FENTANYL CITRATE 50 UG/ML
25 INJECTION, SOLUTION INTRAMUSCULAR; INTRAVENOUS
Status: DISCONTINUED | OUTPATIENT
Start: 2020-10-25 | End: 2020-10-25 | Stop reason: HOSPADM

## 2020-10-25 RX ORDER — ONDANSETRON 4 MG/1
4 TABLET, ORALLY DISINTEGRATING ORAL
Status: DISCONTINUED | OUTPATIENT
Start: 2020-10-25 | End: 2020-10-30 | Stop reason: HOSPADM

## 2020-10-25 RX ADMIN — CEFOTETAN DISODIUM 2 G: 2 INJECTION, POWDER, FOR SOLUTION INTRAMUSCULAR; INTRAVENOUS at 17:36

## 2020-10-25 RX ADMIN — Medication 5 MG: at 19:08

## 2020-10-25 RX ADMIN — ONDANSETRON 4 MG: 4 TABLET, ORALLY DISINTEGRATING ORAL at 15:34

## 2020-10-25 RX ADMIN — LIDOCAINE HYDROCHLORIDE 60 MG: 20 INJECTION, SOLUTION EPIDURAL; INFILTRATION; INTRACAUDAL; PERINEURAL at 17:23

## 2020-10-25 RX ADMIN — DEXMEDETOMIDINE HYDROCHLORIDE 4 MCG: 100 INJECTION, SOLUTION, CONCENTRATE INTRAVENOUS at 18:46

## 2020-10-25 RX ADMIN — Medication 10 ML: at 22:05

## 2020-10-25 RX ADMIN — ONDANSETRON HYDROCHLORIDE 4 MG: 2 INJECTION, SOLUTION INTRAMUSCULAR; INTRAVENOUS at 18:40

## 2020-10-25 RX ADMIN — PHENYLEPHRINE HYDROCHLORIDE 30 MCG/MIN: 10 INJECTION INTRAVENOUS at 17:32

## 2020-10-25 RX ADMIN — SODIUM CHLORIDE, POTASSIUM CHLORIDE, SODIUM LACTATE AND CALCIUM CHLORIDE: 600; 310; 30; 20 INJECTION, SOLUTION INTRAVENOUS at 18:50

## 2020-10-25 RX ADMIN — FENTANYL CITRATE 50 MCG: 50 INJECTION, SOLUTION INTRAMUSCULAR; INTRAVENOUS at 18:15

## 2020-10-25 RX ADMIN — PHENYLEPHRINE HYDROCHLORIDE 55 MCG/MIN: 10 INJECTION INTRAVENOUS at 17:50

## 2020-10-25 RX ADMIN — Medication 120 MCG: at 17:48

## 2020-10-25 RX ADMIN — Medication 120 MCG: at 17:30

## 2020-10-25 RX ADMIN — SODIUM CHLORIDE, POTASSIUM CHLORIDE, SODIUM LACTATE AND CALCIUM CHLORIDE: 600; 310; 30; 20 INJECTION, SOLUTION INTRAVENOUS at 17:19

## 2020-10-25 RX ADMIN — PROPOFOL 120 MG: 10 INJECTION, EMULSION INTRAVENOUS at 17:23

## 2020-10-25 RX ADMIN — POTASSIUM CHLORIDE, DEXTROSE MONOHYDRATE AND SODIUM CHLORIDE 75 ML/HR: 150; 5; 450 INJECTION, SOLUTION INTRAVENOUS at 15:10

## 2020-10-25 RX ADMIN — DEXAMETHASONE SODIUM PHOSPHATE 4 MG: 4 INJECTION, SOLUTION INTRAMUSCULAR; INTRAVENOUS at 18:40

## 2020-10-25 RX ADMIN — PHENYLEPHRINE HYDROCHLORIDE 45 MCG/MIN: 10 INJECTION INTRAVENOUS at 17:39

## 2020-10-25 RX ADMIN — Medication 15 MG: at 17:51

## 2020-10-25 RX ADMIN — FENTANYL CITRATE 100 MCG: 50 INJECTION, SOLUTION INTRAMUSCULAR; INTRAVENOUS at 17:23

## 2020-10-25 RX ADMIN — SODIUM CHLORIDE, SODIUM LACTATE, POTASSIUM CHLORIDE, AND CALCIUM CHLORIDE 500 ML: 600; 310; 30; 20 INJECTION, SOLUTION INTRAVENOUS at 20:00

## 2020-10-25 RX ADMIN — SUCCINYLCHOLINE CHLORIDE 100 MG: 20 INJECTION, SOLUTION INTRAMUSCULAR; INTRAVENOUS at 17:24

## 2020-10-25 RX ADMIN — ROCURONIUM BROMIDE 5 MG: 10 SOLUTION INTRAVENOUS at 17:23

## 2020-10-25 RX ADMIN — ROCURONIUM BROMIDE 30 MG: 10 SOLUTION INTRAVENOUS at 17:35

## 2020-10-25 RX ADMIN — HYDROMORPHONE HYDROCHLORIDE 0.5 MG: 2 INJECTION, SOLUTION INTRAMUSCULAR; INTRAVENOUS; SUBCUTANEOUS at 18:05

## 2020-10-25 RX ADMIN — Medication 80 MCG: at 17:44

## 2020-10-25 RX ADMIN — FENTANYL CITRATE 50 MCG: 50 INJECTION, SOLUTION INTRAMUSCULAR; INTRAVENOUS at 18:02

## 2020-10-25 RX ADMIN — DEXMEDETOMIDINE HYDROCHLORIDE 4 MCG: 100 INJECTION, SOLUTION, CONCENTRATE INTRAVENOUS at 18:50

## 2020-10-25 NOTE — H&P
Colorectal Surgery Pre-Operative History and Physical Examination Note          NAME:  Onel Koroma   :   1950   MRN:   [id number]     Operation Date: 10/25/2020    Chief Complaint:  Recurrent ileostomy prolapse. History of Present Illness: The patient is a 62-year-old female who is well known to me. 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 The operation (which was performed along with Lilia Buck, and Rossy) included, among other things, a low anterior resection, coloproctostomy, and creation of a loop ileostomy. The patient had a prolonged hospital course afterwards. Among other issues, she experienced a non-strangulated prolapse of the ileostomy. She was discharged to a skilled nursing facility (SNF) on Monday 10/05/2020, and she followed up with [facility] on 10/06/2020 where a cystogram was performed and her Hogan catheter was removed. On 10/10/2020, at the SNF, her ileostomy was found to have prolapsed significantly more than before. The patient was sent to the emergency room, and there (after discussing it with me by phone) Dr. Miguel Phillips was able to reduce it. The patient was then returned to the skilled nursing facility with a plan to follow up with me in the morning on 10/12/2020. Unfortunately, the prolapse recurred right away and the staff at the nursing home was unable to reduce it. I therefore requested that they send the patient back to the emergency room so I could examine her and admit her. Upon examination, the ileostomy was found to be viable, but prolapsed and beginning to demonstrate some signs of ischemia. With some difficulty, I was able to reduce the prolapse. Because I believed that the prolapse would continue to occur without surgical intervention, I proposed ileostomy revision.   The operation was described to the patient, including its risks, and she agreed to proceed. She was taken to the operating room in the morning on 10/11/2020, and there the ileostomy revision was performed without apparent complications. Afterwards, she was transferred back to the floor where her post-operative course was essentially unremarkable. While awaiting sufficient return of gastrointestinal function, an Oncology consultation was obtained from Dr. Flores Stoddard. Dr. Flores Stoddard requested the placement of a Port-a-Cath for administration of adjuvant chemotherapy, and that procedure was performed late in the day on 10/14/2020. On 10/15/2020, the patient was discharged back to the SNF. I saw her for routine follow-up on 10/19/2020, and on that day she appeared to be doing well. She continued to do well until this morning at approximately 10:30 AM when the prolapse recurred. This time it was worse than before and she could not reduce it. The SNF staff evaluated her and called me, and I asked them to send her to the Er so that I could evaluate her. Currently, she has pain in the prolapsed intestine but not in the abdomen itself. Her last oral intake was at 8:30 AM today, and she has had two episodes of small volume emesis since arriving at the hospital.      PMH:  Past Medical History:   Diagnosis Date    Colon cancer ([address]     GERD (gastroesophageal reflux disease)     Ileostomy in place ([address]     Ileostomy prolapse ([address]     Ill-defined condition     Spaulding's esophagus       PSH:  Past Surgical History:   Procedure Laterality Date    COLONOSCOPY Left 9/14/2020    COLONOSCOPY performed by Venus Pal MD at [address] [place] [address]; incomplete secondary to partial obstruction.  HX APPENDECTOMY  age 17    HX COLONOSCOPY  circa 2010    No neoplasms.  HX ENDOSCOPY  03/13/2017    Dr. Carranza Makenzie HX ENDOSCOPY  02/13/2020    Dr. Sorensen Gave oopherectomy for treatment of benign mass.     HX GYN  09/17/2020    Total abdominal hysterectomy and left salpingo-oophorectomy; Jose Cano MD.     OTHER SURGICAL  2020    Low anterior resection, mobilization of the splenic flexure, coloproctostomy, and creation of loop ileostomy; Dr. Chloé Bro.   OTHER SURGICAL  10/11/2020    Revision of loop ileostomy (resection and creation of divided loop ileostomy); Dr. Chloé Bro.   UROLOGICAL  2020    Cystoscopy and placement of a right ureteral stent; Wily Friedman III, MD.     UROLOGICAL  2020    Cystoscopy and placement of a temporary left ureteral catheter; Wily Friedman III, MD.     UROLOGICAL  2020    Distal right ureterectomy; Wily Friedman III, MD.     UROLOGICAL  2020    Right ureteral re-implantation with psoas hitch and placement of a right ureteral stent; Radames Martinez MD.    IR INSERT TUNL CVAD W PORT LESS THAN 5 YR  10/14/2020    Right chest Port-a-Cath placement. Home Medications:  Prior to Admission Medications   Prescriptions Last Dose Informant Patient Reported? Taking? Omega-3 Fatty Acids (FISH OIL) 500 mg cap   Yes No   Sig: Take  by mouth. ascorbic acid, vitamin C, (VITAMIN C) 500 mg tablet   Yes No   Sig: Take 500 mg by mouth. cholecalciferol (VITAMIN D3) 1,000 unit cap   Yes No   Sig: Take 1,000 Units by mouth daily. denosumab (PROLIA) 60 mg/mL injection   Yes No   Si mg by SubCUTAneous route. dexAMETHasone (DECADRON) 4 mg tablet   No No   Sig: Take 2 tabs (8mg) once daily on days 2 & 3 of chemotherapy only   lansoprazole (PREVACID) 30 mg capsule   Yes No   Sig: Take 30 mg by mouth Daily (before breakfast). lidocaine-prilocaine (EMLA) topical cream   No No   Sig: Apply  to affected area as needed for Pain. Apply over port a cath site 30-60 minutes prior to port access   multivitamin (ONE A DAY) tablet   Yes No   Sig: Take 1 Tab by mouth daily.    ondansetron (ZOFRAN ODT) 8 mg disintegrating tablet   No No   Sig: Take 1 Tab by mouth every eight (8) hours as needed for Nausea or Vomiting. Can start 72 hours after chemotherapy infusion   prochlorperazine (Compazine) 5 mg tablet   No No   Sig: Take 1 Tab by mouth every six (6) hours as needed for Nausea or Vomiting for up to 30 days. Facility-Administered Medications: None       Hospital Medications:  Current Facility-Administered Medications   Medication Dose Route Frequency    sodium chloride (NS) flush 5-40 mL  5-40 mL IntraVENous Q8H    sodium chloride (NS) flush 5-40 mL  5-40 mL IntraVENous PRN    dextrose 5% - 0.45% NaCl with KCl 20 mEq/L infusion  75 mL/hr IntraVENous CONTINUOUS    naloxone (NARCAN) injection 0.4 mg  0.4 mg IntraVENous PRN    ondansetron (ZOFRAN ODT) tablet 4 mg  4 mg Oral Q4H PRN    [START ON 10/26/2020] pantoprazole (PROTONIX) tablet 40 mg  40 mg Oral ACB       Allergies:  No Known Allergies    Family History:  Family History   Problem Relation Age of Onset    Cancer Mother     Heart Disease Father     Diabetes Brother        Social History:  Social History     Tobacco Use    Smoking status: Never Smoker    Smokeless tobacco: Never Used   Substance Use Topics    Alcohol use: Yes     Alcohol/week: 1.0 standard drinks     Types: 1 Glasses of wine per week       Review of Systems:    Symptom Y/N Comments  Symptom Y/N Comments   Fever/Chills N   Chest Pain N    Cough N   Abdominal Pain N    Sputum N   Joint Pain     SOB/ECHEVERRIA N   Pruritis/Rash     Nausea/vomit Y   Tolerating PT/OT     Diarrhea N   Tolerating Diet Y    Constipation N   Other  Ostomy hurts. Could NOT obtain due to:        Objective:     Patient Vitals for the past 8 hrs:   BP Temp Pulse Resp SpO2   10/25/20 1432 (!) 142/97 97.6 °F (36.4 °C) (!) 116 18 100 %   10/25/20 1259 134/87 97.9 °F (36.6 °C) (!) 107 16 100 %     No intake/output data recorded. No intake/output data recorded. PHYSICAL EXAMINATION:    GENERAL:  No distress. HEENT:  Anicteric.   LUNGS:  Clear to auscultation bilaterally. HEART:  Mildly tachycardic. Regular rhythm with no murmurs, gallops, or rubs. ABDOMEN:  Soft. Non-distended. Non-tender. The afferent limb of the ileostomy is prolapsed approximately 20 cm and the mucosa is sloughing. Manipulation of the bowel causes pain, and an attempts there is an odor characteristic of ischemia/necrosis. The budded portion of the efferent limb is edematous but viable. The midline scar is well healed. EXTREMITIES:  No edema. No calf tenderness. NEURO:  Alert and oriented. Data Review   10/25/2020, 1:22 PM:       WBC 8.7 K/uL; Hgb 11.0 g/dL;  K/uL  10/25/2020, 1:42 PM:        Lactic Acid 1.93 mmol/L                    Assessment and Plan:      The ileum is prolapsed, incarcerated, and strangulated. Urgent surgical intervention is therefore required. Although it has been 6 weeks and 3 days since the creation of the coloproctostomy and I would like to treat the current problem by closing the ileostomy (using viable bowel of course), without being able to adequately evaluate the integrity of the coloproctostomy I do not think that that should be done. Instead, the plan will be to revise the ostomy now, study the coloproctostomy via a contrast study tomorrow, and then close the stoma on the following day (Tuesday 10/27/2020). The patient understands the risks and she has agreed to proceed.       Principal Problem:    Ileostomy prolapse ([address] (10/10/2020)    Active Problems:    Acute intestinal ischemia ([address] (10/25/2020)

## 2020-10-25 NOTE — ANESTHESIA PREPROCEDURE EVALUATION
Relevant Problems   No relevant active problems       Anesthetic History   No history of anesthetic complications            Review of Systems / Medical History  Patient summary reviewed, nursing notes reviewed and pertinent labs reviewed    Pulmonary  Within defined limits                 Neuro/Psych   Within defined limits           Cardiovascular  Within defined limits                Exercise tolerance: >4 METS     GI/Hepatic/Renal  Within defined limits   GERD          Comments: Colon CA with prolapsed and strangulated ileostomy for revision Endo/Other        Anemia     Other Findings              Physical Exam    Airway  Mallampati: II  TM Distance: > 6 cm  Neck ROM: normal range of motion   Mouth opening: Normal     Cardiovascular  Regular rate and rhythm,  S1 and S2 normal,  no murmur, click, rub, or gallop             Dental  No notable dental hx       Pulmonary  Breath sounds clear to auscultation               Abdominal  GI exam deferred       Other Findings            Anesthetic Plan    ASA: 3  Anesthesia type: general          Induction: Intravenous  Anesthetic plan and risks discussed with: Patient

## 2020-10-25 NOTE — PERIOP NOTES
Received preop report in SBAR format for patient. Due to rule out status, patient will roll to OR 19 directly from room.

## 2020-10-25 NOTE — ROUTINE PROCESS
TRANSFER - OUT REPORT:    Verbal report given to ZAKIA Escalera (name) on Pancho   being transferred to 512 (unit) for routine progression of care       Report consisted of patients Situation, Background, Assessment and   Recommendations(SBAR). Information from the following report(s) SBAR, Kardex and ED Summary was reviewed with the receiving nurse. Lines:   Peripheral IV 10/25/20 Left Forearm (Active)   Site Assessment Clean, dry, & intact 10/25/20 1301   Phlebitis Assessment 0 10/25/20 1301   Infiltration Assessment 0 10/25/20 1301   Dressing Status Clean, dry, & intact 10/25/20 1301        Opportunity for questions and clarification was provided.       Patient transported with:   [facility]

## 2020-10-25 NOTE — ROUTINE PROCESS
Patient: Ellen Stanton MRN: [id number]  SSN: [id number]   YOB: 1950  Age: 72 y.o. Sex: female     Patient is status post Procedure(s):  revision ileostomy. egd scope.     Surgeon(s) and Role:     * Alysa Álvarez MD - Primary    Local/Dose/Irrigation:  7 mL 0.5% marcaine plain                  Peripheral IV 10/25/20 Left Forearm (Active)   Site Assessment Clean, dry, & intact 10/25/20 1301   Phlebitis Assessment 0 10/25/20 1301   Infiltration Assessment 0 10/25/20 1301   Dressing Status Clean, dry, & intact 10/25/20 1301                           Dressing/Packing:       Splint/Cast:  ]    Other:

## 2020-10-25 NOTE — ROUTINE PROCESS
TRANSFER - OUT REPORT:    Verbal report given to Sinai(name) on Bryson Garcia  being transferred to (unit) for change in patient condition(COVID test)       Report consisted of patients Situation, Background, Assessment and   Recommendations(SBAR). Information from the following report(s) SBAR, Kardex, [facility] and Recent Results was reviewed with the receiving nurse. Lines:   Peripheral IV 10/25/20 Left Forearm (Active)   Site Assessment Clean, dry, & intact 10/25/20 1530   Phlebitis Assessment 0 10/25/20 1530   Infiltration Assessment 0 10/25/20 1530   Dressing Status Clean, dry, & intact 10/25/20 1530   Dressing Type Transparent 10/25/20 1530   Hub Color/Line Status Pink; Infusing;Flushed 10/25/20 1530   Action Taken Open ports on tubing capped 10/25/20 1530   Alcohol Cap Used Yes 10/25/20 1530        Opportunity for questions and clarification was provided.       Patient transported with:   [employer]

## 2020-10-25 NOTE — ED TRIAGE NOTES
Pt presents via EMS from rehab facility c/o prolapsed ileostomy. Pt reports she was going to change her ostomy bag around 1000 and noticed it was prolapsed again.

## 2020-10-25 NOTE — BRIEF OP NOTE
Brief Postoperative Note    Patient: Shalonda Espinosa  YOB: 1950  MRN: [id number]    Date of Procedure: 10/25/2020     Pre-Op Diagnosis:  Ileostomy prolapse with incarceration and strangulation. Post-Op Diagnosis:  Ileostomy prolapse with incarceration. Procedures:  Flexible enteroscopy via the ileostomy. Ileostomy revision. Surgeon:  Brayan Huitron MD  Surgical Assistant:  Olesya Christian  Student and Caroline Stone SA  Anesthesia: General endotracheal  Specimens:  None  Drains:  None  Estimated Blood Loss:  < 10 mL  Crystalloid:  1000 mL  Urine:  358 mL  Complications:  None apparent  Implants:  None  Findings:  40 cm of ileostomy prolapse (20 cm intussuscepted into 20 cm); non-reducible and mildly ischemic but viable. Normal ileal mucosa visualized with the EGD scope; very little intraluminal content. All bowel viable after the intussusception was reduced.     Electronically Signed by Brayan Huitron MD

## 2020-10-25 NOTE — ED PROVIDER NOTES
Mr. Reed Diaz is a 78-year-old female who presents to the ER with a prolapse at her ileostomy. She had a large surgery recently on her abdomen and had an ileostomy. She had prolapse several weeks ago that required surgical intervention. He said that the prolapse recurred again this morning. She says she is having some mild discomfort there. Denies any nausea or vomiting. No fevers or chills. No chest pain. No pain in the rest of her abdomen. She is denied any recent changes with her urine or bowel movements. She denies any other complaints. Past Medical History:   Diagnosis Date    [facility]     GERD (gastroesophageal reflux disease)     Ileostomy in place ([facility]     Ill-defined condition     Spaulding's esophagus       Past Surgical History:   Procedure Laterality Date    COLONOSCOPY Left 9/14/2020    COLONOSCOPY performed by Sharon Roque MD at [address] [place] [address]; incomplete secondary to partial obstruction.  HX APPENDECTOMY  age 17    HX COLONOSCOPY  circa 2010    No neoplasms.  HX ENDOSCOPY  03/13/2017    Dr. Medina Sever HX ENDOSCOPY  02/13/2020    Dr. Garcia Console oopherectomy for treatment of benign mass.  HX GYN  09/17/2020    Total abdominal hysterectomy and left salpingo-oophorectomy; Christy Prieto MD.    HX OTHER SURGICAL  09/17/2020    Low anterior resection, mobilization of the splenic flexure, coloproctostomy, and creation of loop ileostomy; Dr. Enmanuel Ny.     HX UROLOGICAL  09/12/2020    Cystoscopy and placement of a right ureteral stent; Minor Carvajal III, MD.    HX UROLOGICAL  09/17/2020    Cystoscopy and placement of a temporary left ureteral catheter; Minor Carvajal III, MD.    HX UROLOGICAL  09/17/2020    Distal right ureterectomy; Minor Carvajal III, MD.    HX UROLOGICAL  09/17/2020    Right ureteral re-implantation with psoas hitch and placement of a right ureteral stent; Marci Mcdonald MD.    IR INSERT TUNL CVAD W PORT LESS THAN 5 YR  10/14/2020         Family History:   Problem Relation Age of Onset    Cancer Mother     Heart Disease Father     Diabetes Brother        Social History     Socioeconomic History    Marital status: SINGLE     Spouse name: Not on file    Number of children: Not on file    Years of education: Not on file    Highest education level: Not on file   Occupational History    Not on file   Social Needs    Financial resource strain: Not on file    Food insecurity     Worry: Not on file     Inability: Not on file    Transportation needs     Medical: Not on file     Non-medical: Not on file   Tobacco Use    Smoking status: Never Smoker    Smokeless tobacco: Never Used   Substance and Sexual Activity    Alcohol use: Yes     Alcohol/week: 1.0 standard drinks     Types: 1 Glasses of wine per week    Drug use: No    Sexual activity: Not on file   Lifestyle    Physical activity     Days per week: Not on file     Minutes per session: Not on file    Stress: Not on file   Relationships    Social connections     Talks on phone: Not on file     Gets together: Not on file     Attends [employer] service: Not on file     Active member of club or organization: Not on file     Attends meetings of clubs or organizations: Not on file     Relationship status: Not on file    Intimate partner violence     Fear of current or ex partner: Not on file     Emotionally abused: Not on file     Physically abused: Not on file     Forced sexual activity: Not on file   Other Topics Concern    Not on file   Social History Narrative    Not on file         ALLERGIES: Patient has no known allergies. Review of Systems   Constitutional: Negative for chills and fever. HENT: Negative for rhinorrhea and sore throat. Respiratory: Negative for cough and shortness of breath. Cardiovascular: Negative for chest pain. Gastrointestinal: Negative for abdominal pain, diarrhea, nausea and vomiting.         Prolapsed ileostomy Genitourinary: Negative for dysuria and urgency. Musculoskeletal: Negative for arthralgias and back pain. Skin: Negative for rash. Neurological: Negative for dizziness, weakness and light-headedness. There were no vitals filed for this visit. Physical Exam     Vital signs reviewed. Nursing notes reviewed. Const:  No acute distress, well developed, well nourished  Head:  Atraumatic, normocephalic  Eyes:  PERRL, conjunctiva normal, no scleral icterus  Neck:  Supple, trachea midline  Cardiovascular:  RRR, no murmurs, no gallops, no rubs  Resp:  No resp distress, no increased work of breathing, no wheezes, no rhonchi, no rales,  Abd:  Soft, prolapsed ileostomy into her ileostomy bag, mild tenderness at her ileostomy site, non-distended, no rebound, no guarding, no CVA tenderness  MSK:  No pedal edema, normal ROM  Neuro:  Alert and awake, no cranial nerve defect  Skin:  Warm, dry, intact  Psych: normal mood and affect, behavior is normal, judgement and thought content is normal          MDM  Number of Diagnoses or Management Options     Amount and/or Complexity of Data Reviewed  Clinical lab tests: ordered and reviewed  Review and summarize past medical records: yes  Discuss the patient with other providers: yes    Patient Progress  Patient progress: stable         Ms. Fadi Choudhury is a 70yo female who presents to the ER with recurrent ileostomy prolapse. Pt. Sent to the ER by Dr. Marilyn Orona, who will admit the patient.         Procedures

## 2020-10-25 NOTE — ANESTHESIA PREPROCEDURE EVALUATION
Relevant Problems   No relevant active problems     Relevant Problems   No relevant active problems       Anesthetic History   No history of anesthetic complications            Review of Systems / Medical History  Patient summary reviewed, nursing notes reviewed and pertinent labs reviewed    Pulmonary  Within defined limits                 Neuro/Psych   Within defined limits           Cardiovascular  Within defined limits                Exercise tolerance: >4 METS     GI/Hepatic/Renal  Within defined limits   GERD           Endo/Other  Within defined limits      Cancer     Other Findings              Physical Exam    Airway  Mallampati: II  TM Distance: > 6 cm  Neck ROM: normal range of motion   Mouth opening: Normal     Cardiovascular  Regular rate and rhythm,  S1 and S2 normal,  no murmur, click, rub, or gallop             Dental  No notable dental hx       Pulmonary  Breath sounds clear to auscultation               Abdominal  GI exam deferred       Other Findings            Anesthetic Plan    ASA: 2  Anesthesia type: general          Induction: Intravenous  Anesthetic plan and risks discussed with: Patient              Anesthesia Evaluation         Anesthesia Plan

## 2020-10-26 ENCOUNTER — APPOINTMENT (OUTPATIENT)
Dept: GENERAL RADIOLOGY | Age: 70
DRG: 329 | End: 2020-10-26
Attending: COLON & RECTAL SURGERY
Payer: MEDICARE

## 2020-10-26 LAB
ALBUMIN SERPL-MCNC: 2.6 G/DL (ref 3.5–5)
ALBUMIN/GLOB SERPL: 0.7 {RATIO} (ref 1.1–2.2)
ALP SERPL-CCNC: 105 U/L (ref 45–117)
ALT SERPL-CCNC: 27 U/L (ref 12–78)
ANION GAP SERPL CALC-SCNC: 8 MMOL/L (ref 5–15)
AST SERPL-CCNC: 15 U/L (ref 15–37)
BASOPHILS # BLD: 0 K/UL (ref 0–0.1)
BASOPHILS NFR BLD: 0 % (ref 0–1)
BILIRUB SERPL-MCNC: 0.4 MG/DL (ref 0.2–1)
BUN SERPL-MCNC: 16 MG/DL (ref 6–20)
BUN/CREAT SERPL: 18 (ref 12–20)
CALCIUM SERPL-MCNC: 8.2 MG/DL (ref 8.5–10.1)
CHLORIDE SERPL-SCNC: 106 MMOL/L (ref 97–108)
CO2 SERPL-SCNC: 24 MMOL/L (ref 21–32)
CREAT SERPL-MCNC: 0.87 MG/DL (ref 0.55–1.02)
DIFFERENTIAL METHOD BLD: ABNORMAL
EOSINOPHIL # BLD: 0 K/UL (ref 0–0.4)
EOSINOPHIL NFR BLD: 0 % (ref 0–7)
ERYTHROCYTE [DISTWIDTH] IN BLOOD BY AUTOMATED COUNT: 20.3 % (ref 11.5–14.5)
GLOBULIN SER CALC-MCNC: 3.5 G/DL (ref 2–4)
GLUCOSE SERPL-MCNC: 161 MG/DL (ref 65–100)
HCT VFR BLD AUTO: 38.3 % (ref 35–47)
HEALTH STATUS, XMCV2T: NORMAL
HGB BLD-MCNC: 11.9 G/DL (ref 11.5–16)
IMM GRANULOCYTES # BLD AUTO: 0.2 K/UL (ref 0–0.04)
IMM GRANULOCYTES NFR BLD AUTO: 1 % (ref 0–0.5)
LYMPHOCYTES # BLD: 0.5 K/UL (ref 0.8–3.5)
LYMPHOCYTES NFR BLD: 3 % (ref 12–49)
MAGNESIUM SERPL-MCNC: 1.9 MG/DL (ref 1.6–2.4)
MCH RBC QN AUTO: 25.2 PG (ref 26–34)
MCHC RBC AUTO-ENTMCNC: 31.1 G/DL (ref 30–36.5)
MCV RBC AUTO: 81.1 FL (ref 80–99)
MONOCYTES # BLD: 0.5 K/UL (ref 0–1)
MONOCYTES NFR BLD: 3 % (ref 5–13)
NEUTS SEG # BLD: 16.4 K/UL (ref 1.8–8)
NEUTS SEG NFR BLD: 93 % (ref 32–75)
NRBC # BLD: 0 K/UL (ref 0–0.01)
NRBC BLD-RTO: 0 PER 100 WBC
PHOSPHATE SERPL-MCNC: 4 MG/DL (ref 2.6–4.7)
PLATELET # BLD AUTO: 441 K/UL (ref 150–400)
PMV BLD AUTO: 8.7 FL (ref 8.9–12.9)
POTASSIUM SERPL-SCNC: 4.3 MMOL/L (ref 3.5–5.1)
PROT SERPL-MCNC: 6.1 G/DL (ref 6.4–8.2)
RBC # BLD AUTO: 4.72 M/UL (ref 3.8–5.2)
RBC MORPH BLD: ABNORMAL
SARS-COV-2, COV2: NOT DETECTED
SODIUM SERPL-SCNC: 138 MMOL/L (ref 136–145)
SOURCE, COVRS: NORMAL
SPECIMEN SOURCE, FCOV2M: NORMAL
SPECIMEN TYPE, XMCV1T: NORMAL
WBC # BLD AUTO: 17.6 K/UL (ref 3.6–11)

## 2020-10-26 PROCEDURE — 74011000636 HC RX REV CODE- 636: Performed by: RADIOLOGY

## 2020-10-26 PROCEDURE — 65270000029 HC RM PRIVATE

## 2020-10-26 PROCEDURE — 74011250637 HC RX REV CODE- 250/637: Performed by: COLON & RECTAL SURGERY

## 2020-10-26 PROCEDURE — 36415 COLL VENOUS BLD VENIPUNCTURE: CPT

## 2020-10-26 PROCEDURE — 84100 ASSAY OF PHOSPHORUS: CPT

## 2020-10-26 PROCEDURE — 74270 X-RAY XM COLON 1CNTRST STD: CPT

## 2020-10-26 PROCEDURE — 83735 ASSAY OF MAGNESIUM: CPT

## 2020-10-26 PROCEDURE — 80053 COMPREHEN METABOLIC PANEL: CPT

## 2020-10-26 PROCEDURE — 74011250636 HC RX REV CODE- 250/636: Performed by: COLON & RECTAL SURGERY

## 2020-10-26 PROCEDURE — 85025 COMPLETE CBC W/AUTO DIFF WBC: CPT

## 2020-10-26 RX ADMIN — PANTOPRAZOLE SODIUM 40 MG: 40 TABLET, DELAYED RELEASE ORAL at 07:04

## 2020-10-26 RX ADMIN — DIATRIZOATE MEGLUMINE AND DIATRIZOATE SODIUM 720 ML: 660; 100 LIQUID ORAL; RECTAL at 17:24

## 2020-10-26 RX ADMIN — POTASSIUM CHLORIDE, DEXTROSE MONOHYDRATE AND SODIUM CHLORIDE 75 ML/HR: 150; 5; 450 INJECTION, SOLUTION INTRAVENOUS at 14:08

## 2020-10-26 RX ADMIN — Medication 10 ML: at 05:36

## 2020-10-26 RX ADMIN — Medication 10 ML: at 14:10

## 2020-10-26 NOTE — ROUTINE PROCESS
Bedside shift change report given to Arelis Olson (oncoming nurse) by Marium Savage RN (offgoing nurse). Report included the following information SBAR, MAR and Recent Results.

## 2020-10-26 NOTE — PROGRESS NOTES
General Daily Progress Note    Admission Date: 10/25/2020  Hospital Day 2  Post-Op Day 1  Subjective:   Pain mild. No nausea. Objective:     Patient Vitals for the past 24 hrs:   BP Temp Pulse Resp SpO2 Weight   10/26/20 1348 121/75 97.8 °F (36.6 °C) (!) 104 15 97 % --   10/26/20 0827 107/70 97.4 °F (36.3 °C) (!) 109 16 96 % --   10/26/20 0407 114/71 98 °F (36.7 °C) (!) 113 16 96 % 57.2 kg (126 lb)   10/25/20 2202 103/65 97.3 °F (36.3 °C) (!) 109 16 97 % --   10/25/20 2050 105/66 97.4 °F (36.3 °C) (!) 110 14 95 % --   10/25/20 2021 -- -- -- -- 96 % --   10/25/20 2019 108/65 -- -- -- 95 % --   10/25/20 2016 -- -- (!) 103 -- 96 % --   10/25/20 2015 -- -- (!) 102 22 -- --   10/25/20 2014 -- -- -- -- 96 % --   10/25/20 2011 -- -- -- -- 99 % --   10/25/20 2010 -- -- -- 18 -- --   10/25/20 2009 109/65 -- (!) 101 -- 100 % --   10/25/20 2008 -- -- 99 -- 100 % --   10/25/20 1959 107/64 -- (!) 105 -- 100 % --   10/25/20 1954 104/61 -- 100 22 100 % --   10/25/20 1948 104/63 -- 100 -- 100 % --   10/25/20 1943 105/64 -- 100 -- 100 % --   10/25/20 1938 107/60 -- 97 20 100 % --   10/25/20 1933 108/68 -- 99 -- 100 % --   10/25/20 1928 104/61 -- (!) 102 22 100 % --   10/25/20 1925 -- -- (!) 101 -- 100 % --   10/25/20 1923 101/60 -- 97 12 100 % --   10/25/20 1922 -- -- 99 18 100 % --   10/25/20 1921 -- -- 99 18 100 % --   10/25/20 1920 -- -- 98 19 100 % --   10/25/20 1915 (!) 94/57 -- 97 30 100 % --   10/25/20 1914 (!) 94/57 -- 91 12 100 % --   10/25/20 1910 (!) 89/49 -- -- 30 100 % --   10/25/20 1901 (!) 103/50 -- 99 29 100 % --     10/26 0701 - 10/26 1900  In: -   Out: 900 [Urine:900]  10/24 1901 - 10/26 0700  In: 1600 [I.V.:1600]  Out: 400 [Urine:385]    Physical Examination:  General Appearance - No distress. Abdomen - Soft. Appropriately tender. Ileostomy edematous but viable.  - Hogan catheter drainage normal-appearing urine. Extremities - Pneumatic compression stockings in use.               Data Review   Recent Results (from the past 24 hour(s))   CBC WITH AUTOMATED DIFF    Collection Time: 10/26/20  1:59 AM   Result Value Ref Range    WBC 17.6 (H) 3.6 - 11.0 K/uL    RBC 4.72 3.80 - 5.20 M/uL    HGB 11.9 11.5 - 16.0 g/dL    HCT 38.3 35.0 - 47.0 %    MCV 81.1 80.0 - 99.0 FL    MCH 25.2 (L) 26.0 - 34.0 PG    MCHC 31.1 30.0 - 36.5 g/dL    RDW 20.3 (H) 11.5 - 14.5 %    PLATELET 143 (H) 589 - 400 K/uL    MPV 8.7 (L) 8.9 - 12.9 FL    NRBC 0.0 0  WBC    ABSOLUTE NRBC 0.00 0.00 - 0.01 K/uL    NEUTROPHILS 93 (H) 32 - 75 %    LYMPHOCYTES 3 (L) 12 - 49 %    MONOCYTES 3 (L) 5 - 13 %    EOSINOPHILS 0 0 - 7 %    BASOPHILS 0 0 - 1 %    IMMATURE GRANULOCYTES 1 (H) 0.0 - 0.5 %    ABS. NEUTROPHILS 16.4 (H) 1.8 - 8.0 K/UL    ABS. LYMPHOCYTES 0.5 (L) 0.8 - 3.5 K/UL    ABS. MONOCYTES 0.5 0.0 - 1.0 K/UL    ABS. EOSINOPHILS 0.0 0.0 - 0.4 K/UL    ABS. BASOPHILS 0.0 0.0 - 0.1 K/UL    ABS. IMM. GRANS. 0.2 (H) 0.00 - 0.04 K/UL    DF SMEAR SCANNED      RBC COMMENTS ANISOCYTOSIS  2+       METABOLIC PANEL, COMPREHENSIVE    Collection Time: 10/26/20  1:59 AM   Result Value Ref Range    Sodium 138 136 - 145 mmol/L    Potassium 4.3 3.5 - 5.1 mmol/L    Chloride 106 97 - 108 mmol/L    CO2 24 21 - 32 mmol/L    Anion gap 8 5 - 15 mmol/L    Glucose 161 (H) 65 - 100 mg/dL    BUN 16 6 - 20 MG/DL    Creatinine 0.87 0.55 - 1.02 MG/DL    BUN/Creatinine ratio 18 12 - 20      GFR est AA >60 >60 ml/min/1.73m2    GFR est non-AA >60 >60 ml/min/1.73m2    Calcium 8.2 (L) 8.5 - 10.1 MG/DL    Bilirubin, total 0.4 0.2 - 1.0 MG/DL    ALT (SGPT) 27 12 - 78 U/L    AST (SGOT) 15 15 - 37 U/L    Alk.  phosphatase 105 45 - 117 U/L    Protein, total 6.1 (L) 6.4 - 8.2 g/dL    Albumin 2.6 (L) 3.5 - 5.0 g/dL    Globulin 3.5 2.0 - 4.0 g/dL    A-G Ratio 0.7 (L) 1.1 - 2.2     PHOSPHORUS    Collection Time: 10/26/20  1:59 AM   Result Value Ref Range    Phosphorus 4.0 2.6 - 4.7 MG/DL   MAGNESIUM    Collection Time: 10/26/20  1:59 AM   Result Value Ref Range    Magnesium 1.9 1.6 - 2.4 mg/dL           Assessment:     Principal Problem:    Ileostomy prolapse ([facility] [address] (10/10/2020)    Active Problems:    Acute intestinal ischemia ([facility] [address] (10/25/2020)        1 Day Post-Op s/p ileostomy revision. Hemodynamically stable. Hemoglobin acceptable. Urine output adequate. Creatinine within normal limits. Water-soluble contrast enema done this afternoon reveals no coloproctostomy leak or stricture. Plan:   Continue NPO except sips of clear liquids, ice chips, and medications. Continue bedrest.    Ileostomy closure tomorrow afternoon. Discussed with patient, and she has agreed to proceed.

## 2020-10-26 NOTE — ACP (ADVANCE CARE PLANNING)
Advance Care Planning     Advance Care Planning Activator (Inpatient)  Conversation Note      Date of ACP Conversation: 10/26/20     Conversation Conducted with:   Patient with Decision Making Capacity    ACP Activator: [address] makes decisions on behalf of the incapacitated patient: Decision Maker is asked to consider and make decisions based on patient values, known preferences, or best interests. Health Care Decision Maker:    Current Designated Health Care Decision Maker:   Primary Decision Maker: Carlos Aviles - [phone]    Please see Dr. Omayra Tay note on 9/11/20.      Deepali Doyle, [facility]

## 2020-10-26 NOTE — ANESTHESIA POSTPROCEDURE EVALUATION
Post-Anesthesia Evaluation and Assessment    Patient: Macario Rangel MRN: [id number]  SSN: [id number]    YOB: 1950  Age: 72 y.o. Sex: female      I have evaluated the patient and they are stable and ready for discharge from the PACU. Cardiovascular Function/Vital Signs  Visit Vitals  /65   Pulse (!) 101   Temp 36.4 °C (97.6 °F)   Resp 12   SpO2 99%       Patient is status post General anesthesia for Procedure(s):  revision ileostomy. egd scope. Nausea/Vomiting: None    Postoperative hydration reviewed and adequate. Pain:  Pain Scale 1: Adult Nonverbal Pain Scale (10/25/20 1901)  Pain Intensity 1: 0 (10/25/20 1901)   Managed    Neurological Status:   Neuro (WDL): Within Defined Limits (10/25/20 1927)  Neuro  Neurologic State: Alert (10/25/20 1927)  Cognition: Follows commands (10/25/20 1927)  Speech: Clear (10/25/20 1927)  LUE Motor Response: Purposeful (10/25/20 1927)  LLE Motor Response: Purposeful (10/25/20 1927)  RUE Motor Response: Purposeful (10/25/20 1927)  RLE Motor Response: Purposeful (10/25/20 1927)   At baseline    Mental Status, Level of Consciousness: Alert and  oriented to person, place, and time    Pulmonary Status:   O2 Device: Room air (10/25/20 2008)   Adequate oxygenation and airway patent    Complications related to anesthesia: None    Post-anesthesia assessment completed. No concerns    Signed By: Ronald Pelayo MD     October 25, 2020              Procedure(s):  revision ileostomy. egd scope. general    <BSHSIANPOST>    INITIAL Post-op Vital signs:   Vitals Value Taken Time   /62 10/25/2020  8:14 PM   Temp     Pulse 106 10/25/2020  8:18 PM   Resp 12 10/25/2020  7:23 PM   SpO2 95 % 10/25/2020  8:18 PM   Vitals shown include unvalidated device data.

## 2020-10-26 NOTE — PROGRESS NOTES
OLIVIA:  1. Surgery pending. 2. Possible return to [facility] vs home. 3. BLS transport. Care Management Interventions  PCP Verified by CM:  Yes  Palliative Care Criteria Met (RRAT>21 & CHF Dx)?: No  Mode of Transport at Discharge: BLS  MyChart Signup: No  Discharge Durable Medical Equipment: No  Health Maintenance Reviewed: Yes  Physical Therapy Consult: No  Occupational Therapy Consult: No  Speech Therapy Consult: No  Current Support Network: Lives Alone, Family Lives Nearby  Confirm Follow Up Transport: Family  The Patient and/or Patient Representative was Provided with a Choice of Provider and Agrees with the Discharge Plan?: Yes  [employer] Information Provided?: No  Discharge Location  Discharge Placement: Unable to determine at this time    Readmission Assessment  Number of days since last admission?: 1-7 days  Previous disposition: SNF  Who is being interviewed?: Patient  What was the patient's/caregiver's perception as to why they think they needed to return back to the hospital?: Other (Comment)  Did you visit your Primary Care Physician after you left the hospital, before you returned this time?: No(In rehab)  Why weren't you able to visit your PCP?: Other (Comment)  Did you see a specialist, such as Cardiac, Pulmonary, Orthopedic Physician, etc. after you left the hospital?: No  Who advised the patient to return to the hospital?: Physician, Skilled Unit  Does the patient report anything that got in the way of taking their medications?: No  In our efforts to provide the best possible care to you and others like you, can you think of anything that we could have done to help you after you left the hospital the first time, so that you might not have needed to return so soon?: Other (Comment)      Reason for Readmission:     illeostomy prolapse          RUR Score/Risk Level:     26%  Level 2    PCP: First and Last name:  Dr. Gannon Signs    Name of Practice:    Are you a current patient: Yes/No:  Yes    Approximate date of last visit:    Can you participate in a virtual visit with your PCP:     Is a Care Conference indicated:   No indication at this time. Did you attend your follow up appointment (s): If not, why not:  Patient was in rehab. Resources/supports as identified by patient/family:          Top Challenges facing patient (as identified by patient/family and CM): Finances/Medication cost?       Transportation        Support system or lack thereof? Her friend, Bayron Tinoco and Bryan Banks   Living arrangements? Lives alone, but admitted from [facility]. Self-care/ADLs/Cognition? Independent with ADLs and IADLs   Current Advanced Directive/Advance Care Plan:  AMD on file. Plan for utilizing home health:   TBD, patient will have surgery either home health or snf. Transition of Care Plan:    Based on readmission, the patient's previous Plan of Care   has been evaluated and/or modified. The current Transition of Care Plan is:           CM contacted patient via telephone to explain role and offer support. Patient is alert and oriented x4, and confirmed demographics. IF patient will need any rehab services, she would like to go back to [facility]. Prior to admission, ambulating independently at home. There are no other concerns at this time. CM to follow.     Ghassan Zabala [facility]

## 2020-10-26 NOTE — PROGRESS NOTES
Problem: Pressure Injury - Risk of  Goal: *Prevention of pressure injury  Description: Document Andry Scale and appropriate interventions in the flowsheet.   Outcome: Progressing Towards Goal  Note: Pressure Injury Interventions:       Moisture Interventions: Minimize layers    Activity Interventions: Pressure redistribution bed/mattress(bed type)    Mobility Interventions: Pressure redistribution bed/mattress (bed type)    Nutrition Interventions: Document food/fluid/supplement intake                     Problem: Patient Education: Go to Patient Education Activity  Goal: Patient/Family Education  Outcome: Progressing Towards Goal

## 2020-10-26 NOTE — PERIOP NOTES
TRANSFER - OUT REPORT:    Verbal report given to ZAKIA Gongora on Zeferino Pyle  being transferred to  for routine post - op       Report consisted of patients Situation, Background, Assessment and   Recommendations(SBAR). Time Pre op antibiotic given: in mar  Anesthesia Stop time: 1901  Hogan Present on Transfer to floor:yes  Order for Hogan on Chart yes  Discharge Prescriptions with Chart:none    Information from the following report(s) OR Summary, Procedure Summary, Intake/Output, MAR, Accordion, Recent Results, Med Rec Status and Cardiac Rhythm nsr was reviewed with the receiving nurse. Opportunity for questions and clarification was provided. Is the patient on 02? NO           Is the patient on a monitor? NO    Is the nurse transporting with the patient? YES    Surgical Waiting Area notified of patient's transfer from PACU? YES, daughter Rory Corbin      The following personal items collected during your admission accompanied patient upon transfer:   Dental Appliance: Dental Appliances: None  Vision: Visual Aid: Glasses  Hearing Aid:    Jewelry:    Clothing:    Other Valuables:    Valuables sent to safe:      Patient glasses at bedside in bag in room 206.

## 2020-10-26 NOTE — PROGRESS NOTES
10/26/20 0407 Vital Signs Temp 98 °F (36.7 °C) Temp Source Oral  
Pulse (Heart Rate) (!) 113 Heart Rate Source Monitor Resp Rate 16  
O2 Sat (%) 96 % Level of Consciousness Alert /71 MAP (Calculated) 85 BP 1 Method Automatic  
BP 1 Location Right arm BP Patient Position At rest  
MEWS Score 3 Oxygen Therapy O2 Device Nasal cannula O2 Flow Rate (L/min) 1 l/min Height/Weight Weight 57.2 kg (126 lb) Weight Source Bed Pt has history off tachcardia. No Sob, no pain, chest or otherwise. Will continue to monitor.

## 2020-10-27 ENCOUNTER — ANESTHESIA (OUTPATIENT)
Dept: SURGERY | Age: 70
DRG: 329 | End: 2020-10-27
Payer: MEDICARE

## 2020-10-27 ENCOUNTER — ANESTHESIA EVENT (OUTPATIENT)
Dept: SURGERY | Age: 70
DRG: 329 | End: 2020-10-27
Payer: MEDICARE

## 2020-10-27 LAB
ANION GAP SERPL CALC-SCNC: 5 MMOL/L (ref 5–15)
ANION GAP SERPL CALC-SCNC: 5 MMOL/L (ref 5–15)
BASOPHILS # BLD: 0 K/UL (ref 0–0.1)
BASOPHILS NFR BLD: 0 % (ref 0–1)
BUN SERPL-MCNC: 7 MG/DL (ref 6–20)
BUN SERPL-MCNC: 9 MG/DL (ref 6–20)
BUN/CREAT SERPL: 16 (ref 12–20)
BUN/CREAT SERPL: 9 (ref 12–20)
CALCIUM SERPL-MCNC: 7.8 MG/DL (ref 8.5–10.1)
CALCIUM SERPL-MCNC: 8 MG/DL (ref 8.5–10.1)
CHLORIDE SERPL-SCNC: 104 MMOL/L (ref 97–108)
CHLORIDE SERPL-SCNC: 107 MMOL/L (ref 97–108)
CO2 SERPL-SCNC: 25 MMOL/L (ref 21–32)
CO2 SERPL-SCNC: 25 MMOL/L (ref 21–32)
CREAT SERPL-MCNC: 0.58 MG/DL (ref 0.55–1.02)
CREAT SERPL-MCNC: 0.78 MG/DL (ref 0.55–1.02)
DIFFERENTIAL METHOD BLD: ABNORMAL
EOSINOPHIL # BLD: 0.5 K/UL (ref 0–0.4)
EOSINOPHIL NFR BLD: 4 % (ref 0–7)
ERYTHROCYTE [DISTWIDTH] IN BLOOD BY AUTOMATED COUNT: 20 % (ref 11.5–14.5)
GLUCOSE SERPL-MCNC: 113 MG/DL (ref 65–100)
GLUCOSE SERPL-MCNC: 124 MG/DL (ref 65–100)
HCT VFR BLD AUTO: 34.4 % (ref 35–47)
HGB BLD-MCNC: 10.8 G/DL (ref 11.5–16)
IMM GRANULOCYTES # BLD AUTO: 0 K/UL (ref 0–0.04)
IMM GRANULOCYTES NFR BLD AUTO: 0 % (ref 0–0.5)
LYMPHOCYTES # BLD: 1 K/UL (ref 0.8–3.5)
LYMPHOCYTES NFR BLD: 9 % (ref 12–49)
MCH RBC QN AUTO: 25.3 PG (ref 26–34)
MCHC RBC AUTO-ENTMCNC: 31.4 G/DL (ref 30–36.5)
MCV RBC AUTO: 80.6 FL (ref 80–99)
MONOCYTES # BLD: 0.9 K/UL (ref 0–1)
MONOCYTES NFR BLD: 8 % (ref 5–13)
NEUTS SEG # BLD: 8.9 K/UL (ref 1.8–8)
NEUTS SEG NFR BLD: 79 % (ref 32–75)
NRBC # BLD: 0 K/UL (ref 0–0.01)
NRBC BLD-RTO: 0 PER 100 WBC
PLATELET # BLD AUTO: 357 K/UL (ref 150–400)
PMV BLD AUTO: 8.7 FL (ref 8.9–12.9)
POTASSIUM SERPL-SCNC: 3.6 MMOL/L (ref 3.5–5.1)
POTASSIUM SERPL-SCNC: 3.9 MMOL/L (ref 3.5–5.1)
RBC # BLD AUTO: 4.27 M/UL (ref 3.8–5.2)
SODIUM SERPL-SCNC: 134 MMOL/L (ref 136–145)
SODIUM SERPL-SCNC: 137 MMOL/L (ref 136–145)
WBC # BLD AUTO: 11.3 K/UL (ref 3.6–11)

## 2020-10-27 PROCEDURE — 77030019702 HC WRP THER MENM -C: Performed by: COLON & RECTAL SURGERY

## 2020-10-27 PROCEDURE — 77030040361 HC SLV COMPR DVT MDII -B: Performed by: COLON & RECTAL SURGERY

## 2020-10-27 PROCEDURE — 74011000250 HC RX REV CODE- 250: Performed by: NURSE ANESTHETIST, CERTIFIED REGISTERED

## 2020-10-27 PROCEDURE — 74011250636 HC RX REV CODE- 250/636: Performed by: COLON & RECTAL SURGERY

## 2020-10-27 PROCEDURE — 77030002996 HC SUT SLK J&J -A: Performed by: COLON & RECTAL SURGERY

## 2020-10-27 PROCEDURE — 74011000250 HC RX REV CODE- 250: Performed by: COLON & RECTAL SURGERY

## 2020-10-27 PROCEDURE — 74011250636 HC RX REV CODE- 250/636: Performed by: NURSE ANESTHETIST, CERTIFIED REGISTERED

## 2020-10-27 PROCEDURE — P9045 ALBUMIN (HUMAN), 5%, 250 ML: HCPCS | Performed by: NURSE ANESTHETIST, CERTIFIED REGISTERED

## 2020-10-27 PROCEDURE — 76010000131 HC OR TIME 2 TO 2.5 HR: Performed by: COLON & RECTAL SURGERY

## 2020-10-27 PROCEDURE — 77030013079 HC BLNKT BAIR HGGR 3M -A: Performed by: ANESTHESIOLOGY

## 2020-10-27 PROCEDURE — 80048 BASIC METABOLIC PNL TOTAL CA: CPT

## 2020-10-27 PROCEDURE — 77030008684 HC TU ET CUF COVD -B: Performed by: ANESTHESIOLOGY

## 2020-10-27 PROCEDURE — 36415 COLL VENOUS BLD VENIPUNCTURE: CPT

## 2020-10-27 PROCEDURE — 94760 N-INVAS EAR/PLS OXIMETRY 1: CPT

## 2020-10-27 PROCEDURE — 0DBB0ZZ EXCISION OF ILEUM, OPEN APPROACH: ICD-10-PCS | Performed by: COLON & RECTAL SURGERY

## 2020-10-27 PROCEDURE — 77030031139 HC SUT VCRL2 J&J -A: Performed by: COLON & RECTAL SURGERY

## 2020-10-27 PROCEDURE — 77030008462 HC STPLR SKN PROX J&J -A: Performed by: COLON & RECTAL SURGERY

## 2020-10-27 PROCEDURE — 74011000258 HC RX REV CODE- 258: Performed by: COLON & RECTAL SURGERY

## 2020-10-27 PROCEDURE — 65410000002 HC RM PRIVATE OB

## 2020-10-27 PROCEDURE — 77030009979 HC RELD STPLR TCR J&J -C: Performed by: COLON & RECTAL SURGERY

## 2020-10-27 PROCEDURE — 85025 COMPLETE CBC W/AUTO DIFF WBC: CPT

## 2020-10-27 PROCEDURE — 74011250636 HC RX REV CODE- 250/636: Performed by: ANESTHESIOLOGY

## 2020-10-27 PROCEDURE — 77030019908 HC STETH ESOPH SIMS -A: Performed by: ANESTHESIOLOGY

## 2020-10-27 PROCEDURE — 76060000035 HC ANESTHESIA 2 TO 2.5 HR: Performed by: COLON & RECTAL SURGERY

## 2020-10-27 PROCEDURE — 88304 TISSUE EXAM BY PATHOLOGIST: CPT

## 2020-10-27 PROCEDURE — 76210000016 HC OR PH I REC 1 TO 1.5 HR: Performed by: COLON & RECTAL SURGERY

## 2020-10-27 PROCEDURE — 2709999900 HC NON-CHARGEABLE SUPPLY: Performed by: COLON & RECTAL SURGERY

## 2020-10-27 PROCEDURE — 74011250637 HC RX REV CODE- 250/637: Performed by: ANESTHESIOLOGY

## 2020-10-27 PROCEDURE — 77030040922 HC BLNKT HYPOTHRM STRY -A

## 2020-10-27 PROCEDURE — 77030036731 HC STPLR ENDOSC J&J -F: Performed by: COLON & RECTAL SURGERY

## 2020-10-27 PROCEDURE — 77030026438 HC STYL ET INTUB CARD -A: Performed by: ANESTHESIOLOGY

## 2020-10-27 RX ORDER — LIDOCAINE HYDROCHLORIDE 20 MG/ML
INJECTION, SOLUTION EPIDURAL; INFILTRATION; INTRACAUDAL; PERINEURAL AS NEEDED
Status: DISCONTINUED | OUTPATIENT
Start: 2020-10-27 | End: 2020-10-27 | Stop reason: HOSPADM

## 2020-10-27 RX ORDER — SODIUM CHLORIDE 9 MG/ML
50 INJECTION, SOLUTION INTRAVENOUS CONTINUOUS
Status: DISCONTINUED | OUTPATIENT
Start: 2020-10-27 | End: 2020-10-27 | Stop reason: HOSPADM

## 2020-10-27 RX ORDER — HYDROMORPHONE HYDROCHLORIDE 1 MG/ML
0.2 INJECTION, SOLUTION INTRAMUSCULAR; INTRAVENOUS; SUBCUTANEOUS
Status: DISCONTINUED | OUTPATIENT
Start: 2020-10-27 | End: 2020-10-27 | Stop reason: HOSPADM

## 2020-10-27 RX ORDER — PHENYLEPHRINE HCL IN 0.9% NACL 0.4MG/10ML
SYRINGE (ML) INTRAVENOUS AS NEEDED
Status: DISCONTINUED | OUTPATIENT
Start: 2020-10-27 | End: 2020-10-27 | Stop reason: HOSPADM

## 2020-10-27 RX ORDER — MIDAZOLAM HYDROCHLORIDE 1 MG/ML
0.5 INJECTION, SOLUTION INTRAMUSCULAR; INTRAVENOUS
Status: DISCONTINUED | OUTPATIENT
Start: 2020-10-27 | End: 2020-10-27 | Stop reason: HOSPADM

## 2020-10-27 RX ORDER — FENTANYL CITRATE 50 UG/ML
INJECTION, SOLUTION INTRAMUSCULAR; INTRAVENOUS AS NEEDED
Status: DISCONTINUED | OUTPATIENT
Start: 2020-10-27 | End: 2020-10-27 | Stop reason: HOSPADM

## 2020-10-27 RX ORDER — BUPIVACAINE HYDROCHLORIDE AND EPINEPHRINE 5; 5 MG/ML; UG/ML
30 INJECTION, SOLUTION EPIDURAL; INTRACAUDAL; PERINEURAL ONCE
Status: DISCONTINUED | OUTPATIENT
Start: 2020-10-27 | End: 2020-10-27 | Stop reason: HOSPADM

## 2020-10-27 RX ORDER — SODIUM CHLORIDE 0.9 % (FLUSH) 0.9 %
5-40 SYRINGE (ML) INJECTION AS NEEDED
Status: DISCONTINUED | OUTPATIENT
Start: 2020-10-27 | End: 2020-10-27 | Stop reason: HOSPADM

## 2020-10-27 RX ORDER — ONDANSETRON 2 MG/ML
INJECTION INTRAMUSCULAR; INTRAVENOUS AS NEEDED
Status: DISCONTINUED | OUTPATIENT
Start: 2020-10-27 | End: 2020-10-27 | Stop reason: HOSPADM

## 2020-10-27 RX ORDER — DIPHENHYDRAMINE HYDROCHLORIDE 50 MG/ML
12.5 INJECTION, SOLUTION INTRAMUSCULAR; INTRAVENOUS AS NEEDED
Status: DISCONTINUED | OUTPATIENT
Start: 2020-10-27 | End: 2020-10-27 | Stop reason: HOSPADM

## 2020-10-27 RX ORDER — PROPOFOL 10 MG/ML
INJECTION, EMULSION INTRAVENOUS AS NEEDED
Status: DISCONTINUED | OUTPATIENT
Start: 2020-10-27 | End: 2020-10-27 | Stop reason: HOSPADM

## 2020-10-27 RX ORDER — MIDAZOLAM HYDROCHLORIDE 1 MG/ML
1 INJECTION, SOLUTION INTRAMUSCULAR; INTRAVENOUS AS NEEDED
Status: DISCONTINUED | OUTPATIENT
Start: 2020-10-27 | End: 2020-10-27 | Stop reason: HOSPADM

## 2020-10-27 RX ORDER — ROCURONIUM BROMIDE 10 MG/ML
INJECTION, SOLUTION INTRAVENOUS AS NEEDED
Status: DISCONTINUED | OUTPATIENT
Start: 2020-10-27 | End: 2020-10-27 | Stop reason: HOSPADM

## 2020-10-27 RX ORDER — MORPHINE SULFATE 10 MG/ML
2 INJECTION, SOLUTION INTRAMUSCULAR; INTRAVENOUS
Status: DISCONTINUED | OUTPATIENT
Start: 2020-10-27 | End: 2020-10-27 | Stop reason: HOSPADM

## 2020-10-27 RX ORDER — ACETAMINOPHEN 325 MG/1
650 TABLET ORAL ONCE
Status: COMPLETED | OUTPATIENT
Start: 2020-10-27 | End: 2020-10-27

## 2020-10-27 RX ORDER — SODIUM CHLORIDE 9 MG/ML
1000 INJECTION, SOLUTION INTRAVENOUS CONTINUOUS
Status: DISCONTINUED | OUTPATIENT
Start: 2020-10-27 | End: 2020-10-27 | Stop reason: HOSPADM

## 2020-10-27 RX ORDER — SODIUM CHLORIDE, SODIUM LACTATE, POTASSIUM CHLORIDE, CALCIUM CHLORIDE 600; 310; 30; 20 MG/100ML; MG/100ML; MG/100ML; MG/100ML
125 INJECTION, SOLUTION INTRAVENOUS CONTINUOUS
Status: DISCONTINUED | OUTPATIENT
Start: 2020-10-27 | End: 2020-10-27 | Stop reason: HOSPADM

## 2020-10-27 RX ORDER — SODIUM CHLORIDE 0.9 % (FLUSH) 0.9 %
5-40 SYRINGE (ML) INJECTION EVERY 8 HOURS
Status: DISCONTINUED | OUTPATIENT
Start: 2020-10-27 | End: 2020-10-27 | Stop reason: HOSPADM

## 2020-10-27 RX ORDER — SODIUM CHLORIDE, SODIUM LACTATE, POTASSIUM CHLORIDE, CALCIUM CHLORIDE 600; 310; 30; 20 MG/100ML; MG/100ML; MG/100ML; MG/100ML
INJECTION, SOLUTION INTRAVENOUS
Status: DISCONTINUED | OUTPATIENT
Start: 2020-10-27 | End: 2020-10-27 | Stop reason: HOSPADM

## 2020-10-27 RX ORDER — EPHEDRINE SULFATE/0.9% NACL/PF 50 MG/5 ML
5 SYRINGE (ML) INTRAVENOUS AS NEEDED
Status: DISCONTINUED | OUTPATIENT
Start: 2020-10-27 | End: 2020-10-27 | Stop reason: HOSPADM

## 2020-10-27 RX ORDER — DEXAMETHASONE SODIUM PHOSPHATE 4 MG/ML
INJECTION, SOLUTION INTRA-ARTICULAR; INTRALESIONAL; INTRAMUSCULAR; INTRAVENOUS; SOFT TISSUE AS NEEDED
Status: DISCONTINUED | OUTPATIENT
Start: 2020-10-27 | End: 2020-10-27 | Stop reason: HOSPADM

## 2020-10-27 RX ORDER — OXYCODONE AND ACETAMINOPHEN 5; 325 MG/1; MG/1
1 TABLET ORAL AS NEEDED
Status: DISCONTINUED | OUTPATIENT
Start: 2020-10-27 | End: 2020-10-27 | Stop reason: HOSPADM

## 2020-10-27 RX ORDER — BUPIVACAINE HYDROCHLORIDE AND EPINEPHRINE 5; 5 MG/ML; UG/ML
INJECTION, SOLUTION EPIDURAL; INTRACAUDAL; PERINEURAL AS NEEDED
Status: DISCONTINUED | OUTPATIENT
Start: 2020-10-27 | End: 2020-10-27 | Stop reason: HOSPADM

## 2020-10-27 RX ORDER — FENTANYL CITRATE 50 UG/ML
25 INJECTION, SOLUTION INTRAMUSCULAR; INTRAVENOUS
Status: DISCONTINUED | OUTPATIENT
Start: 2020-10-27 | End: 2020-10-27 | Stop reason: HOSPADM

## 2020-10-27 RX ORDER — LIDOCAINE HYDROCHLORIDE 10 MG/ML
0.1 INJECTION, SOLUTION EPIDURAL; INFILTRATION; INTRACAUDAL; PERINEURAL AS NEEDED
Status: DISCONTINUED | OUTPATIENT
Start: 2020-10-27 | End: 2020-10-27 | Stop reason: HOSPADM

## 2020-10-27 RX ORDER — ALBUMIN HUMAN 50 G/1000ML
SOLUTION INTRAVENOUS AS NEEDED
Status: DISCONTINUED | OUTPATIENT
Start: 2020-10-27 | End: 2020-10-27 | Stop reason: HOSPADM

## 2020-10-27 RX ORDER — FENTANYL CITRATE 50 UG/ML
50 INJECTION, SOLUTION INTRAMUSCULAR; INTRAVENOUS AS NEEDED
Status: DISCONTINUED | OUTPATIENT
Start: 2020-10-27 | End: 2020-10-27 | Stop reason: HOSPADM

## 2020-10-27 RX ORDER — ONDANSETRON 2 MG/ML
4 INJECTION INTRAMUSCULAR; INTRAVENOUS AS NEEDED
Status: DISCONTINUED | OUTPATIENT
Start: 2020-10-27 | End: 2020-10-27 | Stop reason: HOSPADM

## 2020-10-27 RX ORDER — OXYCODONE HYDROCHLORIDE 5 MG/1
2.5 TABLET ORAL
Status: DISCONTINUED | OUTPATIENT
Start: 2020-10-27 | End: 2020-10-30 | Stop reason: HOSPADM

## 2020-10-27 RX ADMIN — ACETAMINOPHEN 650 MG: 325 TABLET ORAL at 14:07

## 2020-10-27 RX ADMIN — PROPOFOL 50 MG: 10 INJECTION, EMULSION INTRAVENOUS at 16:31

## 2020-10-27 RX ADMIN — ALBUMIN (HUMAN) 250 ML: 12.5 INJECTION, SOLUTION INTRAVENOUS at 16:06

## 2020-10-27 RX ADMIN — Medication 80 MCG: at 15:30

## 2020-10-27 RX ADMIN — PROPOFOL 100 MG: 10 INJECTION, EMULSION INTRAVENOUS at 15:24

## 2020-10-27 RX ADMIN — ONDANSETRON HYDROCHLORIDE 4 MG: 2 INJECTION, SOLUTION INTRAMUSCULAR; INTRAVENOUS at 16:58

## 2020-10-27 RX ADMIN — SODIUM CHLORIDE, POTASSIUM CHLORIDE, SODIUM LACTATE AND CALCIUM CHLORIDE: 600; 310; 30; 20 INJECTION, SOLUTION INTRAVENOUS at 14:50

## 2020-10-27 RX ADMIN — ROCURONIUM BROMIDE 10 MG: 10 SOLUTION INTRAVENOUS at 15:34

## 2020-10-27 RX ADMIN — LIDOCAINE HYDROCHLORIDE 60 MG: 20 INJECTION, SOLUTION EPIDURAL; INFILTRATION; INTRACAUDAL; PERINEURAL at 15:24

## 2020-10-27 RX ADMIN — MIDAZOLAM 2 MG: 1 INJECTION INTRAMUSCULAR; INTRAVENOUS at 15:12

## 2020-10-27 RX ADMIN — CEFOTETAN DISODIUM 2 G: 2 INJECTION, POWDER, FOR SOLUTION INTRAMUSCULAR; INTRAVENOUS at 15:34

## 2020-10-27 RX ADMIN — DEXAMETHASONE SODIUM PHOSPHATE 4 MG: 4 INJECTION, SOLUTION INTRAMUSCULAR; INTRAVENOUS at 15:46

## 2020-10-27 RX ADMIN — Medication 10 ML: at 07:29

## 2020-10-27 RX ADMIN — PHENYLEPHRINE HYDROCHLORIDE 25 MCG/MIN: 10 INJECTION INTRAVENOUS at 15:36

## 2020-10-27 RX ADMIN — ROCURONIUM BROMIDE 10 MG: 10 SOLUTION INTRAVENOUS at 16:32

## 2020-10-27 RX ADMIN — ROCURONIUM BROMIDE 25 MG: 10 SOLUTION INTRAVENOUS at 15:24

## 2020-10-27 RX ADMIN — POTASSIUM CHLORIDE, DEXTROSE MONOHYDRATE AND SODIUM CHLORIDE 75 ML/HR: 150; 5; 450 INJECTION, SOLUTION INTRAVENOUS at 07:30

## 2020-10-27 RX ADMIN — SUGAMMADEX 200 MG: 100 INJECTION, SOLUTION INTRAVENOUS at 16:58

## 2020-10-27 RX ADMIN — Medication 80 MCG: at 15:23

## 2020-10-27 RX ADMIN — MEPERIDINE HYDROCHLORIDE 25 MG: 50 INJECTION INTRAMUSCULAR; INTRAVENOUS; SUBCUTANEOUS at 16:36

## 2020-10-27 RX ADMIN — SODIUM CHLORIDE, SODIUM LACTATE, POTASSIUM CHLORIDE, AND CALCIUM CHLORIDE 125 ML/HR: 600; 310; 30; 20 INJECTION, SOLUTION INTRAVENOUS at 14:05

## 2020-10-27 RX ADMIN — Medication 10 ML: at 01:00

## 2020-10-27 RX ADMIN — FENTANYL CITRATE 100 MCG: 50 INJECTION, SOLUTION INTRAMUSCULAR; INTRAVENOUS at 15:48

## 2020-10-27 NOTE — PROGRESS NOTES
Problem: Pressure Injury - Risk of  Goal: *Prevention of pressure injury  Description: Document Andry Scale and appropriate interventions in the flowsheet. Outcome: Progressing Towards Goal  Note: Pressure Injury Interventions:       Moisture Interventions: Apply protective barrier, creams and emollients    Activity Interventions: Pressure redistribution bed/mattress(bed type)    Mobility Interventions: HOB 30 degrees or less    Nutrition Interventions: Document food/fluid/supplement intake    Friction and Shear Interventions: HOB 30 degrees or less, Lift sheet, Apply protective barrier, creams and emollients                Problem: Falls - Risk of  Goal: *Absence of Falls  Description: Document Yazmin Fall Risk and appropriate interventions in the flowsheet.   Outcome: Progressing Towards Goal  Note: Fall Risk Interventions:            Medication Interventions: Evaluate medications/consider consulting pharmacy    Elimination Interventions: Call light in reach

## 2020-10-27 NOTE — PROGRESS NOTES
Bedside shift change report given to Freddie Shoemaker (oncoming nurse) by Aliya Hodges RN** (offgoing nurse). Report included the following information SBAR, MAR and Recent Results.

## 2020-10-27 NOTE — ANESTHESIA POSTPROCEDURE EVALUATION
Procedure(s):  CLOSURE LOOP ILEOSTOMY (URGENT). general    Anesthesia Post Evaluation      Multimodal analgesia: multimodal analgesia used between 6 hours prior to anesthesia start to PACU discharge  Patient location during evaluation: PACU  Patient participation: complete - patient participated  Level of consciousness: awake  Pain score: 2  Pain management: adequate  Airway patency: patent  Anesthetic complications: no  Cardiovascular status: acceptable  Respiratory status: acceptable  Hydration status: acceptable  Comments: I have evaluated the patient and meets criteria for discharge from PACU. Maegan Rodrigues MD  Post anesthesia nausea and vomiting:  controlled      INITIAL Post-op Vital signs:   Vitals Value Taken Time   /72 10/27/2020  5:45 PM   Temp 37.3 °C (99.1 °F) 10/27/2020  5:20 PM   Pulse 90 10/27/2020  5:51 PM   Resp 15 10/27/2020  5:51 PM   SpO2 95 % 10/27/2020  5:51 PM   Vitals shown include unvalidated device data.

## 2020-10-27 NOTE — BRIEF OP NOTE
Brief Postoperative Note    Patient: Ellen Stanton  YOB: 1950  MRN: [id number]    Date of Procedure: 10/27/2020     Pre-Op Diagnosis: Temporary ileostomy  Post-Op Diagnosis:  Temporary ileostomy    Procedure:  Ileostomy closure with resection and anastomosis  Surgeon:  Nikita Lopez MD  Surgical Assistant:  Dharmesh Rhodes SA  Anesthesia: General endotracheal  Specimens:   ID Type Source Tests Collected by Time Destination   Ileostomy Fresh Ileum  Alysa Álvarez MD 10/27/2020 1609 Pathology   Drains:  None  Estimated Blood Loss:  < 50 mL  Crystalloid:  600 mL  Albumin:  250 mL  Urine:  602 mL  Complications:  None apparent  Implants:  None  Findings:  Edematous ileostomy    Electronically Signed by Nikita Lopez MD

## 2020-10-27 NOTE — OP NOTES
[address] [facility]  OPERATIVE REPORT    Name:  Afsaneh Kelly  MR#:  [id number]  :  1950  ACCOUNT #:  [de-identified]    DATE OF SERVICE:  10/25/2020    PREOPERATIVE DIAGNOSIS:  Ileostomy prolapse with incarceration and strangulation. POSTOPERATIVE DIAGNOSIS:  Ileostomy prolapse with incarceration. PROCEDURES PERFORMED:  Flexible enteroscopy via the ileostomy and the ileostomy revision. SURGEON:  Sarika Mercer MD    ASSISTANTS:  Rita Herman SA student and Latasha Luu SA    ANESTHESIA:  General endotracheal.    SPECIMENS REMOVED:  None. TUBES AND DRAINS:  None. ESTIMATED BLOOD LOSS:  Less than 10 mL. IV FLUIDS:  Crystalloid 1000 mL. URINE OUTPUT:  350 mL. COMPLICATIONS:  None apparent. IMPLANTS:  None. INDICATIONS FOR PROCEDURE:  The patient is a 67-year-old female, who underwent a complex multi-visceral resection for treatment of a nearly obstructing sigmoid colon cancer on 09/10/2020. The operation included the creation of a coloproctostomy and a temporary loop ileostomy. After a difficult postoperative hospital course and stay in a skilled nursing facility, she required semi-urgent reoperation for treatment of prolapse of the ileostomy. That procedure consisted of an ileostomy revision performed on 10/11/2020. Following that operation, she was returned to the skilled nursing facility, where she seemed to be doing well. I saw her for routine followup on 10/19/2020, and on that day, she also seemed to be doing well. In the morning on 10/25/2020, she was found to have significant recurrence of the prolapse with evidence of ischemia. She was evaluated in the emergency room, and I found her to have an incarcerated and partially strangulated prolapse of approximately 20 cm of the ileum. Urgent surgical intervention was therefore indicated.   Because the ileostomy was intended to be temporary and because only 6 weeks had gone by since the creation of the coloproctostomy and the anastomosis had not yet been studied, it was not thought that a definitive fixation procedure for closure of the ostomy would be an appropriate option, instead ileostomy revision was once again recommended. The patient understood the risks and agreed to proceed. OPERATIVE FINDINGS:  The ileostomy was prolapsed for approximately 40 cm (20 cm intussuscepted into 20 cm). The intussusception/prolapse was nonreducible and the bowel was mildly ischemic but viable. Examination with the EGD scope revealed normal ileal mucosa within the intussusceptum. There was very little intraluminal content, including in the subfascial portion. All of the bowel appeared to be viable later in the case after the intussusception was reduced. DESCRIPTION OF PROCEDURE:  After informed consent was obtained, the patient was taken to the operating room where standard monitoring devices were attached and adequate general endotracheal anesthesia was induced. She was then placed supine on the operating table and a Hogan catheter was placed using standard sterile technique. The enteroscopy was performed prior to prepping the abdomen and the findings were as noted above. The abdomen and perineum were then prepped and draped in usual sterile fashion. The lower extremities had been fitted with pneumatic compression stockings. An orogastric tube was inserted. The ileostomy of course was prepped into the field. A scalpel and scissors were used to divide the mucocutaneous junction of the afferent limb of the divided loop ileostomy. The efferent limb was left in place but  from the afferent limb. With the mucocutaneous junction disconnected, the bowel immediately adjacent to that was partially divided (spatulated) using the electrocautery. This relieved some of the tension and allowed for the intussusceptum to be gradually reduced.   The bowel was covered with a warm moist laparotomy pad for a period of minutes and then reexamined. Although it was edematous, it appeared to be viable. It was decided therefore to return the bowel to the abdominal cavity and to reconstruct the mucocutaneous junction as a temporary measure before more definitive treatment could be performed later on. The seromuscular sutures were placed between the bowel and the fascia using 3-0 Vicryl. Additional 3-0 Vicryl sutures were placed in a tripartite and a simple fashion to reconstruct the mucocutaneous junction. The junction between the afferent and efferent limbs was also reconstructed with full thickness 3-0 Vicryl sutures. At the conclusion of the reconstruction, the ileum appeared to be viable although edematous. An ostomy appliance was obtained. 0.5% bupivacaine with epinephrine was infiltrated around the stoma to provide some postoperative analgesia. The ostomy appliance was put in place. The orogastric tube was removed and the Hogan catheter was left in place. There were no apparent complications, and the patient appeared to have tolerated the procedure well. At its conclusion, she was extubated and transported in stable condition to recovery room. All sponge, needle and instrument counts were correct.           Elliot Figueroa MD      [id number]  D:  10/27/2020 7:13  T:  10/27/2020 9:04  JOB #:  [id number]  CC:  Roshan Andrea MD

## 2020-10-27 NOTE — ANESTHESIA PREPROCEDURE EVALUATION
Relevant Problems   No relevant active problems       Anesthetic History   No history of anesthetic complications            Review of Systems / Medical History  Patient summary reviewed, nursing notes reviewed and pertinent labs reviewed    Pulmonary  Within defined limits                 Neuro/Psych   Within defined limits           Cardiovascular  Within defined limits                     GI/Hepatic/Renal  Within defined limits   GERD           Endo/Other  Within defined limits      Cancer     Other Findings              Physical Exam    Airway  Mallampati: II  TM Distance: > 6 cm  Neck ROM: normal range of motion   Mouth opening: Normal     Cardiovascular  Regular rate and rhythm,  S1 and S2 normal,  no murmur, click, rub, or gallop             Dental  No notable dental hx       Pulmonary  Breath sounds clear to auscultation               Abdominal  GI exam deferred       Other Findings            Anesthetic Plan    ASA: 3  Anesthesia type: general          Induction: Intravenous  Anesthetic plan and risks discussed with: Patient

## 2020-10-27 NOTE — PROGRESS NOTES
Bedside and Verbal shift change report given to Binu (oncoming nurse) by Joaquina Rizzo (offgoing nurse). Report included the following information SBAR, Kardex and MAR.

## 2020-10-27 NOTE — PROGRESS NOTES
TRANSFER - OUT REPORT:    Verbal report given to Nicole Valdes RN(name) on Aurelio Gilbert  being transferred to [facility] for ordered procedure       Report consisted of patients Situation, Background, Assessment and   Recommendations(SBAR). Information from the following report(s) SBAR, Kardex, OR Summary, Procedure Summary, Intake/Output, MAR, Accordion, Recent Results and Med Rec Status was reviewed with the receiving nurse. Lines:   Peripheral IV 10/25/20 Left Forearm (Active)   Site Assessment Clean, dry, & intact 10/26/20 2343   Phlebitis Assessment 0 10/26/20 2343   Infiltration Assessment 0 10/26/20 2343   Dressing Status Clean, dry, & intact 10/26/20 2343   Dressing Type Transparent 10/26/20 2343   Hub Color/Line Status Pink 10/26/20 2343   Action Taken Open ports on tubing capped 10/26/20 2343   Alcohol Cap Used Yes 10/26/20 2343       Peripheral IV 10/24/20 (Active)   Site Assessment Clean, dry, & intact 10/26/20 2343   Phlebitis Assessment 0 10/26/20 2343   Infiltration Assessment 0 10/26/20 2343   Dressing Status Clean, dry, & intact 10/26/20 2343   Dressing Type Transparent 10/26/20 2343   Hub Color/Line Status Pink 10/26/20 2343   Alcohol Cap Used Yes 10/26/20 1408        Opportunity for questions and clarification was provided.       Patient transported with:   [employer]

## 2020-10-27 NOTE — PROGRESS NOTES
TRANSFER - OUT REPORT:    Verbal report given to Bridgette Brown RN(name) on Aurelio Gilbert  being transferred to  [facility](unit) for routine progression of care       Report consisted of patients Situation, Background, Assessment and   Recommendations(SBAR). Information from the following report(s) SBAR, MAR and Recent Results was reviewed with the receiving nurse. Lines:   Peripheral IV 10/25/20 Left Forearm (Active)   Site Assessment Clean, dry, & intact 10/26/20 2343   Phlebitis Assessment 0 10/26/20 2343   Infiltration Assessment 0 10/26/20 2343   Dressing Status Clean, dry, & intact 10/26/20 2343   Dressing Type Transparent 10/26/20 2343   Hub Color/Line Status Pink 10/26/20 2343   Action Taken Open ports on tubing capped 10/26/20 2343   Alcohol Cap Used Yes 10/26/20 2343       Peripheral IV 10/24/20 (Active)   Site Assessment Clean, dry, & intact 10/26/20 2343   Phlebitis Assessment 0 10/26/20 2343   Infiltration Assessment 0 10/26/20 2343   Dressing Status Clean, dry, & intact 10/26/20 2343   Dressing Type Transparent 10/26/20 2343   Hub Color/Line Status Pink 10/26/20 2343   Alcohol Cap Used Yes 10/26/20 1408        Opportunity for questions and clarification was provided.       Patient transported with:   Registered Nurse

## 2020-10-27 NOTE — PERIOP NOTES
TRANSFER - OUT REPORT:    Verbal report given to [facility] RN(name) on Aurelio Gilbert  being transferred to [facility](unit) for routine post - op       Report consisted of patients Situation, Background, Assessment and   Recommendations(SBAR). Time Pre op antibiotic given:1530  Anesthesia Stop time: 3722   Hogan Present on Transfer to floor:yes  Order for Hogan on Chart:yes  Discharge Prescriptions with Chart:no    Information from the following report(s) SBAR, OR Summary, Procedure Summary, Intake/Output, MAR, Recent Results, Med Rec Status and Cardiac Rhythm nsr was reviewed with the receiving nurse. Opportunity for questions and clarification was provided. Is the patient on 02? NO       L/Min        Other ra    Is the patient on a monitor? NO    Is the nurse transporting with the patient? no    Surgical Waiting Area notified of patient's transfer from PACU? YES      The following personal items collected during your admission accompanied patient upon transfer:   Dental Appliance: Dental Appliances: None  Vision: Visual Aid: Glasses  Hearing Aid:    Jewelry:    Clothing: Clothing: None(no belongings to preop)  Other Valuables:  Other Valuables: None  Valuables sent to safe:

## 2020-10-28 LAB
ANION GAP SERPL CALC-SCNC: 6 MMOL/L (ref 5–15)
BASOPHILS # BLD: 0 K/UL (ref 0–0.1)
BASOPHILS NFR BLD: 0 % (ref 0–1)
BUN SERPL-MCNC: 5 MG/DL (ref 6–20)
BUN/CREAT SERPL: 12 (ref 12–20)
CALCIUM SERPL-MCNC: 7.8 MG/DL (ref 8.5–10.1)
CHLORIDE SERPL-SCNC: 107 MMOL/L (ref 97–108)
CO2 SERPL-SCNC: 23 MMOL/L (ref 21–32)
CREAT SERPL-MCNC: 0.41 MG/DL (ref 0.55–1.02)
DIFFERENTIAL METHOD BLD: ABNORMAL
EOSINOPHIL # BLD: 0.1 K/UL (ref 0–0.4)
EOSINOPHIL NFR BLD: 1 % (ref 0–7)
ERYTHROCYTE [DISTWIDTH] IN BLOOD BY AUTOMATED COUNT: 19.2 % (ref 11.5–14.5)
GLUCOSE SERPL-MCNC: 102 MG/DL (ref 65–100)
HCT VFR BLD AUTO: 33.6 % (ref 35–47)
HGB BLD-MCNC: 10.4 G/DL (ref 11.5–16)
IMM GRANULOCYTES # BLD AUTO: 0.1 K/UL (ref 0–0.04)
IMM GRANULOCYTES NFR BLD AUTO: 1 % (ref 0–0.5)
LYMPHOCYTES # BLD: 1.3 K/UL (ref 0.8–3.5)
LYMPHOCYTES NFR BLD: 12 % (ref 12–49)
MAGNESIUM SERPL-MCNC: 2.1 MG/DL (ref 1.6–2.4)
MCH RBC QN AUTO: 25.3 PG (ref 26–34)
MCHC RBC AUTO-ENTMCNC: 31 G/DL (ref 30–36.5)
MCV RBC AUTO: 81.8 FL (ref 80–99)
MONOCYTES # BLD: 0.8 K/UL (ref 0–1)
MONOCYTES NFR BLD: 8 % (ref 5–13)
NEUTS SEG # BLD: 7.9 K/UL (ref 1.8–8)
NEUTS SEG NFR BLD: 78 % (ref 32–75)
NRBC # BLD: 0 K/UL (ref 0–0.01)
NRBC BLD-RTO: 0 PER 100 WBC
PHOSPHATE SERPL-MCNC: 1.8 MG/DL (ref 2.6–4.7)
PLATELET # BLD AUTO: 382 K/UL (ref 150–400)
PMV BLD AUTO: 9.1 FL (ref 8.9–12.9)
POTASSIUM SERPL-SCNC: 3.9 MMOL/L (ref 3.5–5.1)
RBC # BLD AUTO: 4.11 M/UL (ref 3.8–5.2)
SODIUM SERPL-SCNC: 136 MMOL/L (ref 136–145)
WBC # BLD AUTO: 10.1 K/UL (ref 3.6–11)

## 2020-10-28 PROCEDURE — 85025 COMPLETE CBC W/AUTO DIFF WBC: CPT

## 2020-10-28 PROCEDURE — 74011250637 HC RX REV CODE- 250/637: Performed by: COLON & RECTAL SURGERY

## 2020-10-28 PROCEDURE — 74011250636 HC RX REV CODE- 250/636: Performed by: COLON & RECTAL SURGERY

## 2020-10-28 PROCEDURE — 97161 PT EVAL LOW COMPLEX 20 MIN: CPT

## 2020-10-28 PROCEDURE — 84100 ASSAY OF PHOSPHORUS: CPT

## 2020-10-28 PROCEDURE — 97530 THERAPEUTIC ACTIVITIES: CPT

## 2020-10-28 PROCEDURE — 36415 COLL VENOUS BLD VENIPUNCTURE: CPT

## 2020-10-28 PROCEDURE — 80048 BASIC METABOLIC PNL TOTAL CA: CPT

## 2020-10-28 PROCEDURE — 65410000002 HC RM PRIVATE OB

## 2020-10-28 PROCEDURE — 83735 ASSAY OF MAGNESIUM: CPT

## 2020-10-28 PROCEDURE — 74011000250 HC RX REV CODE- 250: Performed by: COLON & RECTAL SURGERY

## 2020-10-28 RX ORDER — DEXTROSE, SODIUM CHLORIDE, AND POTASSIUM CHLORIDE 5; .45; .15 G/100ML; G/100ML; G/100ML
25 INJECTION INTRAVENOUS CONTINUOUS
Status: DISCONTINUED | OUTPATIENT
Start: 2020-10-28 | End: 2020-10-29

## 2020-10-28 RX ADMIN — ACETAMINOPHEN 1000 MG: 500 TABLET ORAL at 11:08

## 2020-10-28 RX ADMIN — ACETAMINOPHEN 1000 MG: 500 TABLET ORAL at 05:56

## 2020-10-28 RX ADMIN — PANTOPRAZOLE SODIUM 40 MG: 40 TABLET, DELAYED RELEASE ORAL at 07:05

## 2020-10-28 RX ADMIN — SODIUM CHLORIDE: 900 INJECTION, SOLUTION INTRAVENOUS at 11:02

## 2020-10-28 RX ADMIN — POTASSIUM CHLORIDE, DEXTROSE MONOHYDRATE AND SODIUM CHLORIDE 25 ML/HR: 150; 5; 450 INJECTION, SOLUTION INTRAVENOUS at 20:58

## 2020-10-28 RX ADMIN — POTASSIUM CHLORIDE, DEXTROSE MONOHYDRATE AND SODIUM CHLORIDE 50 ML/HR: 150; 5; 450 INJECTION, SOLUTION INTRAVENOUS at 18:41

## 2020-10-28 RX ADMIN — ACETAMINOPHEN 1000 MG: 500 TABLET ORAL at 18:42

## 2020-10-28 NOTE — PROGRESS NOTES
Bedside shift change report given to Mor Meneses and Ambrosio Hughes RN (oncoming nurse) by Freddy Bellamy RN (offgoing nurse). Report included the following information SBAR, Kardex, Procedure Summary, Intake/Output and MAR.

## 2020-10-28 NOTE — PROGRESS NOTES
Problem: Mobility Impaired (Adult and Pediatric)  Goal: *Acute Goals and Plan of Care (Insert Text)  Description: FUNCTIONAL STATUS PRIOR TO ADMISSION: Patient was independent and active without use of DME.    HOME SUPPORT PRIOR TO ADMISSION: The patient lived alone with friends local to provide assistance. Patient was received from SNF with one day left in approved stay. Physical Therapy Goals  Initiated 10/28/2020  1. Patient will move from supine to sit and sit to supine , scoot up and down, and roll side to side in bed with modified independence within 7 day(s). 2.  Patient will transfer from bed to chair and chair to bed with modified independence using the least restrictive device within 7 day(s). 3.  Patient will perform sit to stand with modified independence within 7 day(s). 4.  Patient will ambulate with modified independence for 150 feet with the least restrictive device within 7 day(s). 5.  Patient will ascend/descend 12 stairs with 1 handrail(s) with supervision within 7 day(s). Outcome: Progressing Towards Goal   PHYSICAL THERAPY EVALUATION  Patient: Mindy Lou (89 y.o. female)  Date: 10/28/2020  Primary Diagnosis: [facility] [K94.19]  Procedure(s) (LRB):  CLOSURE LOOP ILEOSTOMY (URGENT) (N/A) 1 Day Post-Op   Precautions: fall       ASSESSMENT  Based on the objective data described below, the patient presents POD#1 with increased pain in RLQ of abdomin which is limiting functional mobility and independence. Patient was received from SNF and reports she has one day left in stay. However, with recent surgery and level of pain, patient may need to prolong SNF stay depending on pain management and progression with PT while in hospital.      Current Level of Function Impacting Discharge (mobility/balance): Min assistance for bed mobility, CGA for transfers and ambulation while holding/managing the IV pole, impaired standing balance    Functional Outcome Measure:   The patient scored 18/28 on the Tinetti outcome measure which is indicative of High fall risk. Other factors to consider for discharge: patient was received from SNF with 1 day left in stay, patient usually lives alone with friends local to help as needed, patient is below baseline function at this time due to pain, patient has stairs to enter home     Patient will benefit from skilled therapy intervention to address the above noted impairments. PLAN :  Recommendations and Planned Interventions: bed mobility training, transfer training, gait training, therapeutic exercises, patient and family training/education, and therapeutic activities      Frequency/Duration: Patient will be followed by physical therapy:  5 times a week to address goals. Recommendation for discharge: (in order for the patient to meet his/her long term goals)  To be determined: SNF vs home with HHPT    This discharge recommendation:  A follow-up discussion with the attending provider and/or case management is planned    IF patient discharges home will need the following DME: to be determined (TBD), depending on how much she is using the IV pole for stability         SUBJECTIVE:   Patient stated ow, ow, ow my side it hurts.  referring to RLQ of abdomin with any mbobilization    OBJECTIVE DATA SUMMARY:   HISTORY:    Past Medical History:   Diagnosis Date    [facility]     GERD (gastroesophageal reflux disease)     Ileostomy in place ([facility]     [facility] prolapse ([facility]     Ill-defined condition     Spaulding's esophagus     Past Surgical History:   Procedure Laterality Date    COLONOSCOPY Left 9/14/2020    COLONOSCOPY performed by Kira Shelby MD at [address] [place] [address]; incomplete secondary to partial obstruction.  HX APPENDECTOMY  age 17    HX COLONOSCOPY  circa 2010    No neoplasms.     HX ENDOSCOPY  03/13/2017    Dr. Ayah Sun HX ENDOSCOPY  02/13/2020    Dr. Fawad Samuels oopherectomy for treatment of benign mass.   GYN  09/17/2020    Total abdominal hysterectomy and left salpingo-oophorectomy; Erika Suggs MD.     OTHER SURGICAL  09/17/2020    Low anterior resection, mobilization of the splenic flexure, coloproctostomy, and creation of loop ileostomy; Dr. Jayson Franklin.   OTHER SURGICAL  10/11/2020    Revision of loop ileostomy (resection and creation of divided loop ileostomy); Dr. Jayson Franklin.   UROLOGICAL  09/12/2020    Cystoscopy and placement of a right ureteral stent; Lucia Caro III, MD.     UROLOGICAL  09/17/2020    Cystoscopy and placement of a temporary left ureteral catheter; Lucia Caro III, MD.     UROLOGICAL  09/17/2020    Distal right ureterectomy; Lucia Caro III, MD.     UROLOGICAL  09/17/2020    Right ureteral re-implantation with psoas hitch and placement of a right ureteral stent; Livier Olivares MD.    IR INSERT TUNL CVAD W PORT LESS THAN 5 YR  10/14/2020    Right chest Port-a-Cath placement. Personal factors and/or comorbidities impacting plan of care:     Home Situation: patient came from SNF with 1 day left in stay,  previous patient lived alone in a private residence with stairs to enter home  Living Alone: Yes  Support Systems: Friends \ neighbors    EXAMINATION/PRESENTATION/DECISION MAKING:   Critical Behavior:  Neurologic State: Alert  Orientation Level: Oriented X4  Cognition: Appropriate decision making, Follows commands, Appropriate for age attention/concentration, Appropriate safety awareness     Hearing:   Auditory  Auditory Impairment: None    Range Of Motion:  AROM: Generally decreased, functional(limited by pain)     Strength:    Strength: Generally decreased, functional(limited by pain)     Tone & Sensation:   Tone: Normal  Sensation: Intact      Coordination:  Coordination: Within functional limits    Functional Mobility:  Bed Mobility:  Rolling: Minimum assistance(painful)  Supine to Sit: Minimum assistance(painful)  Sit to Supine: Supervision  Scooting: Stand-by assistance    Transfers:  Sit to Stand: Contact guard assistance(painful)  Stand to Sit: Contact guard assistance(painful)    Balance:   Sitting: Intact  Standing: Impaired; With support(pain with standing)  Standing - Static: Fair;Constant support  Standing - Dynamic : Fair;Constant support    Ambulation/Gait Training:  Distance (ft): 120 Feet (ft)  Assistive Device: Gait belt(managed/held IV pole)  Ambulation - Level of Assistance: Contact guard assistance  Gait Abnormalities: Decreased step clearance;Trunk sway increased; Altered arm swing  Base of Support: Narrowed  Speed/Leila: Slow;Pace decreased (<100 feet/min)  Step Length: Left shortened;Right shortened    Therapeutic Exercises:   PT encouraged patient to get up with nursing to use the bathroom and walk the halls if she is feeling better later in the day. Functional Measure:  Tinetti test:    Sitting Balance: 1  Arises: 1(painful)  Attempts to Rise: 1(multiple attempts due to pain)  Immediate Standing Balance: 1(uses IV pole)  Standing Balance: 1(uses IV pole)  Nudged: 0(Not tested due to pain)  Eyes Closed: 1  Turn 360 Degrees - Continuous/Discontinuous: 1  Turn 360 Degrees - Steady/Unsteady: 1  Sitting Down: 1(painful)  Balance Score: 9 Balance total score  Indication of Gait: 0(due to pain)  R Step Length/Height: 1  L Step Length/Height: 1  R Foot Clearance: 1  L Foot Clearance: 1  Step Symmetry: 1  Step Continuity: 1  Path: 1(uses IV pole)  Trunk: 1(uses IV pole)  Walking Time: 1  Gait Score: 9 Gait total score  Total Score: 18/28 Overall total score         Tinetti Tool Score Risk of Falls  <19 = High Fall Risk  19-24 = Moderate Fall Risk  25-28 = Low Fall Risk  Hamiltonetti ME. Performance-Oriented Assessment of Mobility Problems in Elderly Patients. [address]; [id number].  (Scoring Description: PT Bulletin Feb. 10, 1993)    Older adults: Karthik Wilburn et al, 2009; n = [address] [employer] elderly evaluated with ABC, AGUILA, ADL, and IADL)  · Mean AGUILA score for males aged 69-68 years = 26.21(3.40)  · Mean AGUILA score for females age 69-68 years = 25.16(4.30)  · Mean AGUILA score for males over 80 years = 23.29(6.02)  · Mean AGUILA score for females over [de-identified] years = 17.20(8.32)            Physical Therapy Evaluation Charge Determination   History Examination Presentation Decision-Making   HIGH Complexity :3+ comorbidities / personal factors will impact the outcome/ POC  MEDIUM Complexity : 3 Standardized tests and measures addressing body structure, function, activity limitation and / or participation in recreation  LOW Complexity : Stable, uncomplicated  Other outcome measures tinetti  HIGH       Based on the above components, the patient evaluation is determined to be of the following complexity level: LOW     Pain Rating:  Patient reports 5/10 patient that increases with bed mobility and transfers    Activity Tolerance:   Fair, patient's pain limits her activity at this time    After treatment patient left in no apparent distress:   Supine in bed, Call bell within reach, and Side rails x 3    COMMUNICATION/EDUCATION:   The patients plan of care was discussed with: Registered nurse. Fall prevention education was provided and the patient/caregiver indicated understanding., Patient/family have participated as able in goal setting and plan of care. , and Patient/family agree to work toward stated goals and plan of care. Regarding student involvement in patient care:  A student participated in this treatment session. Per CMS Medicare statements and APTA guidelines I certify that the following was true:  1. I was present and directly observed the entire session. 2. I made all skilled judgments and clinical decisions regarding care. 3. I am the practitioner responsible for assessment, treatment, and documentation.     Thank you for this referral.  Verena Ormond, SPT

## 2020-10-28 NOTE — PROGRESS NOTES
Comprehensive Nutrition Assessment    Type and Reason for Visit: Initial, Positive nutrition screen    Nutrition Recommendations/Plan:    Continue to advance diet as able. Continue to provide oral supplements TID    Nutrition Assessment:      72year old s/p POD #1 ileostomy closure with resection and anastomosis d/t prolapse of ileostomy. Pt with newly diagnosed colon cancer with loop ileostomy 9/2020. At that time pt with 18# or 13% wt loss over a 6 month time frame reflecting severe wt change. After discharge from [facility], pt went to [facility]. Niece contacted outpt oncology RDN for further support. Pt is currently on clear liquid diet and receiving Ensure Clear until diet can be advanced. Malnutrition Assessment:  Malnutrition Status:  Severe malnutrition    Context:  Chronic illness     Findings of the 6 clinical characteristics of malnutrition:   Energy Intake:  7 - 75% or less est energy requirements for 1 month or longer  Weight Loss:  1.00 - 10% over 6 months     Body Fat Loss:  7 - Severe body fat loss, Triceps, Orbital, Fat overlying ribs   Muscle Mass Loss:  7 - Severe muscle mass loss,    Fluid Accumulation:  Unable to assess,     Strength:  Not performed         Estimated Daily Nutrient Needs:  Energy (kcal): 7560-2506 kcal/day (30-35 kcal/kg); Weight Used for Energy Requirements: Current  Protein (g): 75-80 gm/day ( 1.3-1.4 gm/kg); Weight Used for Protein Requirements: Current (post-op)  Fluid (ml/day): 1 ml/kcal; Weight Used for Fluid Requirements: Current       Current Nutrition Therapies:  DIET CLEAR LIQUID  DIET NUTRITIONAL SUPPLEMENTS Breakfast, Lunch;  Ensure Clear    Anthropometric Measures:  · Height:  5' 6\" (167.6 cm)  · Current Body Wt:    57. 5 kg    · Usual Body Wt:  61.7 kg (136 lb)     · Ideal Body Wt:  130 lbs:      · BMI Category:  Underweight (BMI less than 22) age over 72       Nutrition Diagnosis:   · Inadequate oral intake related to inadequate protein-energy intake as evidenced by weight loss      Nutrition Interventions:   Food and/or Nutrient Delivery: Continue current diet, Modify current diet, Continue oral nutrition supplement  Nutrition Education and Counseling: No recommendations at this time  Coordination of Nutrition Care: Continue to monitor while inpatient    Goals:  Consume 50% meals and 2 supplements daily x 5-7 days. Nutrition Monitoring and Evaluation:   Behavioral-Environmental Outcomes: None identified  Food/Nutrient Intake Outcomes: Food and nutrient intake, Supplement intake  Physical Signs/Symptoms Outcomes: Biochemical data, GI status, Hemodynamic status, Weight    Discharge Planning:     Too soon to determine     Electronically signed by Josue Burt RD on 10/28/2020 at 5:46 PM    Contact: Gilberto

## 2020-10-28 NOTE — PROGRESS NOTES
General Daily Progress Note    Admission Date: 10/25/2020  Hospital Day 4  Post-Op Day 3  Post-Op Day 1  Subjective:   Pain well controlled with acetaminophen. No nausea. Tolerating clear liquid diet. No dyspnea. Voiding well. Has had two loose bowel movements. Was incontinent with each. Ambulating. Objective:     Patient Vitals for the past 24 hrs:   BP Temp Pulse Resp SpO2 Height   10/28/20 1741 -- -- -- -- -- 5' 6\" (1.676 m)   10/28/20 1648 126/74 98.4 °F (36.9 °C) 96 16 98 % --   10/28/20 1223 121/76 98.9 °F (37.2 °C) 100 16 97 % --   10/28/20 0828 118/70 98.1 °F (36.7 °C) 89 16 97 % --   10/28/20 0440 138/80 97.7 °F (36.5 °C) 95 16 96 % --   10/28/20 0031 130/77 97.9 °F (36.6 °C) 98 17 97 % --   10/27/20 2212 128/78 97.4 °F (36.3 °C) 89 17 97 % --   10/27/20 2110 122/78 97.4 °F (36.3 °C) 92 17 96 % --   10/27/20 2008 113/70 98.1 °F (36.7 °C) 90 17 96 % --     No intake/output data recorded. 10/27 0701 - 10/28 1900  In: 4846.3 [P.O.:340; I.V.:4506.3]  Out: 3490 [Urine:3330]      Physical Examination:  General Appearance - No distress. Abdomen - Soft. Somewhat distended. Appropriately tender. Dressing clean, dry, and intact. Extremities - Pneumatic compression stockings in use. Data Review   Recent Results (from the past 24 hour(s))   CBC WITH AUTOMATED DIFF    Collection Time: 10/28/20  5:47 AM   Result Value Ref Range    WBC 10.1 3.6 - 11.0 K/uL    RBC 4.11 3.80 - 5.20 M/uL    HGB 10.4 (L) 11.5 - 16.0 g/dL    HCT 33.6 (L) 35.0 - 47.0 %    MCV 81.8 80.0 - 99.0 FL    MCH 25.3 (L) 26.0 - 34.0 PG    MCHC 31.0 30.0 - 36.5 g/dL    RDW 19.2 (H) 11.5 - 14.5 %    PLATELET 621 877 - 260 K/uL    MPV 9.1 8.9 - 12.9 FL    NRBC 0.0 0  WBC    ABSOLUTE NRBC 0.00 0.00 - 0.01 K/uL    NEUTROPHILS 78 (H) 32 - 75 %    LYMPHOCYTES 12 12 - 49 %    MONOCYTES 8 5 - 13 %    EOSINOPHILS 1 0 - 7 %    BASOPHILS 0 0 - 1 %    IMMATURE GRANULOCYTES 1 (H) 0.0 - 0.5 %    ABS.  NEUTROPHILS 7.9 1.8 - 8.0 K/UL ABS. LYMPHOCYTES 1.3 0.8 - 3.5 K/UL    ABS. MONOCYTES 0.8 0.0 - 1.0 K/UL    ABS. EOSINOPHILS 0.1 0.0 - 0.4 K/UL    ABS. BASOPHILS 0.0 0.0 - 0.1 K/UL    ABS. IMM. GRANS. 0.1 (H) 0.00 - 0.04 K/UL    DF AUTOMATED     METABOLIC PANEL, BASIC    Collection Time: 10/28/20  5:47 AM   Result Value Ref Range    Sodium 136 136 - 145 mmol/L    Potassium 3.9 3.5 - 5.1 mmol/L    Chloride 107 97 - 108 mmol/L    CO2 23 21 - 32 mmol/L    Anion gap 6 5 - 15 mmol/L    Glucose 102 (H) 65 - 100 mg/dL    BUN 5 (L) 6 - 20 MG/DL    Creatinine 0.41 (L) 0.55 - 1.02 MG/DL    BUN/Creatinine ratio 12 12 - 20      GFR est AA >60 >60 ml/min/1.73m2    GFR est non-AA >60 >60 ml/min/1.73m2    Calcium 7.8 (L) 8.5 - 10.1 MG/DL   MAGNESIUM    Collection Time: 10/28/20  5:47 AM   Result Value Ref Range    Magnesium 2.1 1.6 - 2.4 mg/dL   PHOSPHORUS    Collection Time: 10/28/20  5:47 AM   Result Value Ref Range    Phosphorus 1.8 (L) 2.6 - 4.7 MG/DL           Assessment:     Principal Problem:    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       1 Day Post-Op s/p ileostomy closure with resection and anastomosis. Hemodynamically stable. No evidence of complication so far. Hypophosphatemia treated. Gastrointestinal function beginning to return. Plan:   Begin full liquid diet tomorrow morning. Decrease IV rate. Ambulate. Incentive spirometer.

## 2020-10-29 LAB
ANION GAP SERPL CALC-SCNC: 7 MMOL/L (ref 5–15)
BASOPHILS # BLD: 0.1 K/UL (ref 0–0.1)
BASOPHILS NFR BLD: 1 % (ref 0–1)
BUN SERPL-MCNC: 2 MG/DL (ref 6–20)
BUN/CREAT SERPL: 4 (ref 12–20)
CALCIUM SERPL-MCNC: 7.9 MG/DL (ref 8.5–10.1)
CHLORIDE SERPL-SCNC: 108 MMOL/L (ref 97–108)
CO2 SERPL-SCNC: 24 MMOL/L (ref 21–32)
CREAT SERPL-MCNC: 0.45 MG/DL (ref 0.55–1.02)
DIFFERENTIAL METHOD BLD: ABNORMAL
EOSINOPHIL # BLD: 0.7 K/UL (ref 0–0.4)
EOSINOPHIL NFR BLD: 7 % (ref 0–7)
ERYTHROCYTE [DISTWIDTH] IN BLOOD BY AUTOMATED COUNT: 18.8 % (ref 11.5–14.5)
GLUCOSE SERPL-MCNC: 100 MG/DL (ref 65–100)
HCT VFR BLD AUTO: 34.5 % (ref 35–47)
HGB BLD-MCNC: 10.8 G/DL (ref 11.5–16)
IMM GRANULOCYTES # BLD AUTO: 0 K/UL (ref 0–0.04)
IMM GRANULOCYTES NFR BLD AUTO: 0 % (ref 0–0.5)
LYMPHOCYTES # BLD: 1.9 K/UL (ref 0.8–3.5)
LYMPHOCYTES NFR BLD: 19 % (ref 12–49)
MCH RBC QN AUTO: 25.5 PG (ref 26–34)
MCHC RBC AUTO-ENTMCNC: 31.3 G/DL (ref 30–36.5)
MCV RBC AUTO: 81.4 FL (ref 80–99)
MONOCYTES # BLD: 0.7 K/UL (ref 0–1)
MONOCYTES NFR BLD: 7 % (ref 5–13)
NEUTS SEG # BLD: 6.7 K/UL (ref 1.8–8)
NEUTS SEG NFR BLD: 66 % (ref 32–75)
NRBC # BLD: 0 K/UL (ref 0–0.01)
NRBC BLD-RTO: 0 PER 100 WBC
PHOSPHATE SERPL-MCNC: 1.6 MG/DL (ref 2.6–4.7)
PLATELET # BLD AUTO: 459 K/UL (ref 150–400)
PMV BLD AUTO: 8.8 FL (ref 8.9–12.9)
POTASSIUM SERPL-SCNC: 3.6 MMOL/L (ref 3.5–5.1)
RBC # BLD AUTO: 4.24 M/UL (ref 3.8–5.2)
SODIUM SERPL-SCNC: 139 MMOL/L (ref 136–145)
WBC # BLD AUTO: 10 K/UL (ref 3.6–11)

## 2020-10-29 PROCEDURE — 74011000250 HC RX REV CODE- 250: Performed by: COLON & RECTAL SURGERY

## 2020-10-29 PROCEDURE — 97116 GAIT TRAINING THERAPY: CPT

## 2020-10-29 PROCEDURE — 74011250637 HC RX REV CODE- 250/637: Performed by: COLON & RECTAL SURGERY

## 2020-10-29 PROCEDURE — 85025 COMPLETE CBC W/AUTO DIFF WBC: CPT

## 2020-10-29 PROCEDURE — 65270000032 HC RM SEMIPRIVATE

## 2020-10-29 PROCEDURE — 80048 BASIC METABOLIC PNL TOTAL CA: CPT

## 2020-10-29 PROCEDURE — 87635 SARS-COV-2 COVID-19 AMP PRB: CPT

## 2020-10-29 PROCEDURE — 74011250636 HC RX REV CODE- 250/636: Performed by: COLON & RECTAL SURGERY

## 2020-10-29 PROCEDURE — 84100 ASSAY OF PHOSPHORUS: CPT

## 2020-10-29 PROCEDURE — 36415 COLL VENOUS BLD VENIPUNCTURE: CPT

## 2020-10-29 RX ADMIN — ACETAMINOPHEN 1000 MG: 500 TABLET ORAL at 12:17

## 2020-10-29 RX ADMIN — ACETAMINOPHEN 1000 MG: 500 TABLET ORAL at 18:23

## 2020-10-29 RX ADMIN — PANTOPRAZOLE SODIUM 40 MG: 40 TABLET, DELAYED RELEASE ORAL at 07:39

## 2020-10-29 RX ADMIN — ACETAMINOPHEN 1000 MG: 500 TABLET ORAL at 05:26

## 2020-10-29 RX ADMIN — DIBASIC SODIUM PHOSPHATE, MONOBASIC POTASSIUM PHOSPHATE AND MONOBASIC SODIUM PHOSPHATE 1 TABLET: 852; 155; 130 TABLET ORAL at 18:23

## 2020-10-29 RX ADMIN — DIBASIC SODIUM PHOSPHATE, MONOBASIC POTASSIUM PHOSPHATE AND MONOBASIC SODIUM PHOSPHATE 1 TABLET: 852; 155; 130 TABLET ORAL at 10:07

## 2020-10-29 RX ADMIN — POTASSIUM PHOSPHATE, MONOBASIC AND POTASSIUM PHOSPHATE, DIBASIC: 224; 236 INJECTION, SOLUTION, CONCENTRATE INTRAVENOUS at 10:07

## 2020-10-29 NOTE — ROUTINE PROCESS
Bedside shift change report given to [address] [place] [address] Road (oncoming nurse) by Mary Beth Lancaster RN (offgoing nurse). Report included the following information SBAR, Kardex, Intake/Output, MAR and Recent Results.

## 2020-10-29 NOTE — PROGRESS NOTES
Problem: Mobility Impaired (Adult and Pediatric)  Goal: *Acute Goals and Plan of Care (Insert Text)  Description: FUNCTIONAL STATUS PRIOR TO ADMISSION: Patient was independent and active without use of DME.    HOME SUPPORT PRIOR TO ADMISSION: The patient lived alone with friends local to provide assistance. Patient was received from SNF with one day left in approved stay. Physical Therapy Goals  Initiated 10/28/2020  1. Patient will move from supine to sit and sit to supine , scoot up and down, and roll side to side in bed with modified independence within 7 day(s). 2.  Patient will transfer from bed to chair and chair to bed with modified independence using the least restrictive device within 7 day(s). 3.  Patient will perform sit to stand with modified independence within 7 day(s). 4.  Patient will ambulate with modified independence for 150 feet with the least restrictive device within 7 day(s). 5.  Patient will ascend/descend 12 stairs with 1 handrail(s) with supervision within 7 day(s). Outcome: Progressing Towards Goal   PHYSICAL THERAPY TREATMENT  Patient: Aspen Peña (42 y.o. female)  Date: 10/29/2020  Diagnosis: [facility] [K94.19]   Ileostomy prolapse (HCC)  Procedure(s) (LRB):  CLOSURE LOOP ILEOSTOMY (URGENT) (N/A) 2 Days Post-Op  Precautions:    Chart, physical therapy assessment, plan of care and goals were reviewed. ASSESSMENT  Patient continues with skilled PT services and is progressing towards goals. Pt reports minimal pain this session; notes that she is taking Tylenol. Pt demo increasing activity tolerance, increasing indep with transfers and amb this session. Pt tolerated amb to bathroom and on unit with initial use of IV pole, then no device/ no UE support; no balance issues or significant gait deviations noted. Pt able to complete functional dynamic standing activities in bathroom without UE support supervision.      Pt appropriate for return home with friend at d/c. Recommend follow up [place] PT.    Current Level of Function Impacting Discharge (mobility/balance): transfer and amb without device supervision    Other factors to consider for discharge: pt reports pending plan for follow up chemotherapy         PLAN :  Patient continues to benefit from skilled intervention to address the above impairments. Continue treatment per established plan of care. to address goals. Recommendation for discharge: (in order for the patient to meet his/her long term goals)  Physical therapy at least 2 days/week in the home     This discharge recommendation:  Has been made in collaboration with the attending provider and/or case management    IF patient discharges home will need the following DME: none       SUBJECTIVE:   Patient stated I'm going to my friend Rinku rollins whether I go back to rehab or not.     OBJECTIVE DATA SUMMARY:   Critical Behavior:  Neurologic State: Alert  Orientation Level: Oriented X4  Cognition: Follows commands     Functional Mobility Training:  Bed Mobility:   not observed                 Transfers:  Sit to Stand: Supervision  Stand to Sit: Modified independent                             Balance:  Sitting: Intact  Standing: Intact  Ambulation/Gait Training:  Distance (ft): 300 Feet (ft)  Assistive Device: (initial use of IV pole, then no device or UE support)  Ambulation - Level of Assistance: Supervision                       Speed/Leila: Pace decreased (<100 feet/min)     Pain Rating:  Min c/o pain    Activity Tolerance:   Good  Please refer to the flowsheet for vital signs taken during this treatment. After treatment patient left in no apparent distress:   Call bell within reach and sitting EOB    COMMUNICATION/COLLABORATION:   The patients plan of care was discussed with: Registered nurse.      Nathalie Summers, PT   Time Calculation: 19 mins

## 2020-10-29 NOTE — PROGRESS NOTES
General Daily Progress Note    Admission Date: 10/25/2020  Hospital Day 5  Post-Op Day 4  Post-Op Day 2  Subjective:   Pain well controlled. No nausea. Tolerated full liquid diet this morning. Had a small bowel movement during the night without fecal incontinence. Objective:     Patient Vitals for the past 24 hrs:   BP Temp Pulse Resp SpO2 Height   10/29/20 0106 121/75 97.5 °F (36.4 °C) 91 16 98 % --   10/28/20 2032 121/73 98.3 °F (36.8 °C) 92 16 96 % --   10/28/20 1741 -- -- -- -- -- 5' 6\" (1.676 m)   10/28/20 1648 126/74 98.4 °F (36.9 °C) 96 16 98 % --   10/28/20 1223 121/76 98.9 °F (37.2 °C) 100 16 97 % --     No intake/output data recorded. 10/27 1901 - 10/29 0700  In: 3846.3 [P.O.:340; I.V.:3506.3]  Out: 3050 [Urine:3050]      Physical Examination:  General Appearance - No distress. Abdomen - Soft.  Minimally distended. Appropriately tender.  Dressing clean, dry, and intact. Extremities - Pneumatic compression stockings in use. Data Review   Recent Results (from the past 24 hour(s))   CBC WITH AUTOMATED DIFF    Collection Time: 10/29/20  5:12 AM   Result Value Ref Range    WBC 10.0 3.6 - 11.0 K/uL    RBC 4.24 3.80 - 5.20 M/uL    HGB 10.8 (L) 11.5 - 16.0 g/dL    HCT 34.5 (L) 35.0 - 47.0 %    MCV 81.4 80.0 - 99.0 FL    MCH 25.5 (L) 26.0 - 34.0 PG    MCHC 31.3 30.0 - 36.5 g/dL    RDW 18.8 (H) 11.5 - 14.5 %    PLATELET 531 (H) 400 - 400 K/uL    MPV 8.8 (L) 8.9 - 12.9 FL    NRBC 0.0 0  WBC    ABSOLUTE NRBC 0.00 0.00 - 0.01 K/uL    NEUTROPHILS 66 32 - 75 %    LYMPHOCYTES 19 12 - 49 %    MONOCYTES 7 5 - 13 %    EOSINOPHILS 7 0 - 7 %    BASOPHILS 1 0 - 1 %    IMMATURE GRANULOCYTES 0 0.0 - 0.5 %    ABS. NEUTROPHILS 6.7 1.8 - 8.0 K/UL    ABS. LYMPHOCYTES 1.9 0.8 - 3.5 K/UL    ABS. MONOCYTES 0.7 0.0 - 1.0 K/UL    ABS. EOSINOPHILS 0.7 (H) 0.0 - 0.4 K/UL    ABS. BASOPHILS 0.1 0.0 - 0.1 K/UL    ABS. IMM.  GRANS. 0.0 0.00 - 0.04 K/UL    DF AUTOMATED     METABOLIC PANEL, BASIC    Collection Time: 10/29/20  5:12 AM   Result Value Ref Range    Sodium 139 136 - 145 mmol/L    Potassium 3.6 3.5 - 5.1 mmol/L    Chloride 108 97 - 108 mmol/L    CO2 24 21 - 32 mmol/L    Anion gap 7 5 - 15 mmol/L    Glucose 100 65 - 100 mg/dL    BUN 2 (L) 6 - 20 MG/DL    Creatinine 0.45 (L) 0.55 - 1.02 MG/DL    BUN/Creatinine ratio 4 (L) 12 - 20      GFR est AA >60 >60 ml/min/1.73m2    GFR est non-AA >60 >60 ml/min/1.73m2    Calcium 7.9 (L) 8.5 - 10.1 MG/DL   PHOSPHORUS    Collection Time: 10/29/20  5:12 AM   Result Value Ref Range    Phosphorus 1.6 (L) 2.6 - 4.7 MG/DL           Assessment:     Principal Problem:    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        2 Day Post-Op s/p ileostomy closure with resection and anastomosis.     Hemodynamically stable. No evidence of complication. Still hypophosphatemic. Gastrointestinal function is returning. Plan:   Begin low fiber diet with lunch. Replace phosphorus. Ambulate. Incentive spirometer. COVID-19 test for return to SNF. Probable discharge tomorrow.

## 2020-10-29 NOTE — PROGRESS NOTES
OLIVIA: Home to friend's house, with home health VS. Return to [facility]    Reason for Admission:   Ileostomy prolapse               RUR Score:    29%     PCP: First and Last name: Dr. Eladia Peterson   Name of Practice: [facility], [address]     Are you a current patient: Yes/No: Yes   Approximate date of last visit: Unknown   Can you do a virtual visit with your PCP:  No             Resources/supports as identified by patient/family:  Patient stated that her niece, Marcellus Babinski ([phone]) would be her transport upon discharge. Patient stated that she would be staying with her friend, Charles La ([phone]) during her recovery. Top Challenges facing patient (as identified by patient/family and CM): Finances/Medication cost?    [place] Medicare - Primary; [facility] - Secondary                 Transportation? Niece, [employer] system or lack thereof? Living arrangements? Patient lived alone prior to her stay at [facility]. Upon discharge, patient reports she will stay at friend Yani's house. Self-care/ADLs/Cognition? A&Ox 4          Current Advanced Directive/Advance Care Plan: On file                          Plan for utilizing home health:  PT recommending home health, CM to assist with set up                 Transition of Care Plan:    CM met with patient at bedside to introduce self and role. Patient reported that she did not want to go back to [facility], and that she was able to go home with friend, Arelis Carroll. CM verified transportation with patient, niece to  patient and collect clothes from [facility], and transport her to [facility]. CM called Radhaespinoza Carroll with patient to confirm plan, no answer, patient left . 11:28am: CM called Yani, went straight to , left  and call back #.      12:27pm: CM verified with patient that friend, Arelis Carroll had not called patient back. 2:12pm: CM called Arelis Carroll, went to .  KATINA called and left  for patient's niece, Tino Mayfield    3:08pm: CM spoke with physician regarding possible change of discharge location from [place] to friend's house. Physician in agreement if discharge plan is confirmed with friend Claudia Salas. KATINA called emergency contact on file, Elvira Whitfield. No answer.       LIZA Lopez, MSW Supervisee

## 2020-10-29 NOTE — PROGRESS NOTES
[facility] TRANSFER - OUT REPORT:    Verbal report given to ZAKIA Young(name) on Aurelio Gilbert  being transferred to [facility](unit) for routine progression of care       Report consisted of patients Situation, Background, Assessment and   Recommendations(SBAR). Information from the following report(s) SBAR, Kardex, Procedure Summary, Intake/Output, MAR, Accordion, Recent Results and Med Rec Status was reviewed with the receiving nurse. Lines:   Peripheral IV 10/24/20 (Active)   Site Assessment Clean, dry, & intact 10/28/20 0309   Phlebitis Assessment 0 10/28/20 0309   Infiltration Assessment 0 10/28/20 0309   Dressing Status Clean, dry, & intact 10/28/20 0309   Dressing Type Transparent;Tape 10/28/20 0309   Hub Color/Line Status Pink 10/28/20 0309   Action Taken Open ports on tubing capped 10/28/20 0309   Alcohol Cap Used Yes 10/28/20 0309       Peripheral IV 10/28/20 Right Wrist (Active)   Site Assessment Clean, dry, & intact 10/28/20 2058   Phlebitis Assessment 0 10/28/20 2058   Infiltration Assessment 0 10/28/20 2058   Dressing Status Clean, dry, & intact 10/28/20 2058   Dressing Type Tape;Transparent 10/28/20 2058   Hub Color/Line Status Blue; Infusing 10/28/20 2058   Action Taken Open ports on tubing capped 10/28/20 2058   Alcohol Cap Used Yes 10/28/20 2058        Opportunity for questions and clarification was provided.       Patient transported with:   Registered Nurse

## 2020-10-29 NOTE — PROGRESS NOTES
Rounded on [facility] patients and provided Anointing of the Sick and Communion  at request of patient.     Aristeo Wang

## 2020-10-29 NOTE — OP NOTES
[address]   OPERATIVE REPORT    Name:  Daryle Darby  MR#:  [id number]  :  1950  ACCOUNT #:  [de-identified]  DATE OF SERVICE:  10/27/2020      PREOPERATIVE DIAGNOSIS:  Temporary ileostomy. POSTOPERATIVE DIAGNOSIS:  Temporary ileostomy. PROCEDURE PERFORMED:  Ileostomy closure with resection and anastomosis. SURGEON:  Amina Otero MD    ASSISTANT:  Laura Cobos SA    ANESTHESIA:  General endotracheal.    SPECIMENS REMOVED:  Ileostomy. TUBES AND DRAINS:  None. ESTIMATED BLOOD LOSS:  Less than 50 mL. IV FLUIDS:  Crystalloid 600 mL; albumin 250 mL. URINE OUTPUT:  150 mL. COMPLICATIONS:  None apparent. IMPLANTS:  None. INDICATIONS FOR PROCEDURE:  The patient is a 79-year-old female who underwent a complex multivisceral resection for treatment of a nearly obstructing sigmoid colon cancer on 09/10/2020. The operation included the creation of coloproctostomy and a temporary loop ileostomy. After a difficult postoperative hospital course and a period of time in a skilled nursing facility, she required semi-urgent reoperation for treatment of prolapse of the ileostomy. That procedure consisted of an ileostomy revision performed on 10/11/2020. Following that operation, she was returned to the skilled nursing facility where she seemed to be doing well. At routine follow-up on 10/19/2020, there was no evidence of a problem. In the morning on 10/25/2020, however, she was found to have significant recurrence of the prolapse with evidence of ischemia. She was evaluated in the emergency room and my examination revealed an incarcerated and partially strangulated prolapse of approximately 20 cm of the ileum. Urgent surgical intervention was therefore indicated   .   Because the ileostomy was intended to be temporary and because only 6 weeks had gone by since the creation of the coloproctostomy and the anastomosis had not yet been studied, it was not thought that a definitive fixation procedure for closure of the ostomy would be an appropriate option. Instead ileostomy revision was once again recommended with an overall plan of addressing the acute problem and the ischemia, then hopefully studying the anastomosis and then closing the ileostomy altogether. The ileostomy revision was successfully performed on 10/25/2020. On 10/26/2020, a Gastrografin enema was performed and it was interpreted as revealing no leak from or stricture of the coloproctostomy. It was therefore decided that the closure of the ileostomy could be performed on the following day. The risks were discussed and the patient agreed to proceed. OPERATIVE FINDINGS:  The ileostomy was edematous, but viable. DESCRIPTION OF THE PROCEDURE:  After informed consent was obtained, the patient was taken to the operating room where standard monitoring devices were attached and adequate general endotracheal anesthesia was induced. A Hogan catheter was already in the bladder. The lower extremities were fitted with pneumatic compression stockings. An orogastric tube was inserted. 2 g of cefotetan were administered parenterally. The ostomy appliance was removed and the afferent and efferent limbs of the stoma were closed using a 2-0 silk pursestring suture. The abdomen was then prepped and draped in usual sterile fashion, including prepping the ostomy into the field. The mucocutaneous junction was incised using a scalpel. The suture material was divided and adhesions to the anterior abdominal wall were also divided using a sharp technique and limited application of the electrocautery. It was not difficult to free the stoma since it had only been revised two days earlier. The afferent limb was much more mobile than the efferent, which was located just a short distance proximal to the cecum. Still, it was possible to create sufficient mobility and to inspect the bowel.   The segments to be used did not appear to be ischemic. The afferent limb was somewhat dilated, but still appeared to be healthy. Once these portions of the bowel had been sufficiently mobilized from the abdominal wall, the mesentery adjacent to the two limbs of the stoma was divided segmentally between clamps. The sharply divided structures were ligated with 2-0 silk ties and 2-0 silk sutures as appropriate for the size. The MICHAELA 75 stapling device was then brought onto the field. Using a blue cartridge, both portions of the bowel were individually divided. The specimen, which consisted of the ileostomy itself, was passed off the field for pathology. The two portions of bowel were placed on traction. Each was opened using the electrocautery and retained contents were evacuated with suction. The MICHAELA 75 stapling device with a blue cartridge was brought onto the field again and used to create a side-to-side (functional end-to-end ileoileal anastomosis). Care was taken to avoid the incorporation of any extraneous tissues into the anastomosis, and inspection of the staple line through the opening in the bowel revealed good hemostasis. To complete the anastomosis and close the open portion, the MICHAELA 100 stapling device with a blue cartridge was brought onto the field. It was placed across the bowel and fired transversely. Multiple 3-0 silk sutures were then placed seromuscularly to selectively reinforce the anastomosis including at the crotch and at the ends of the transverse staple line as well as in some other locations. Next, the small rent in the mesentery was closed with interrupted 3-0 Vicryl sutures. The bowel was inspected and it appeared to be viable. The anastomosis was intact, widely patent, and apparently adequately perfused. The fascial opening in the abdominal wall was enlarged slightly and the bowel was returned, with some difficulty, to the abdominal cavity.   The closure of the abdominal wall defect was performed using interrupted #1 PDS sutures followed by irrigation of the wound and placement of 2-0 Vicryl sutures to reapproximate some of the subcutaneous adipose tissue. The wound was irrigated once again and then the skin was loosely closed with staples and two Telfa carmen were inserted into gaps between the staples. A dry dressing was applied. There were no apparent complications, and the patient appeared to have tolerated the procedure well. At its conclusion, she was extubated and transported in stable condition to the recovery room. All sponge, needle and instrument counts were correct.           Tenzin Aldana MD      [id number]  D:  10/28/2020 23:37  T:  10/29/2020 9:00  JOB #:  [id number]  CC:  MD Sergio Gonzalez MD

## 2020-10-29 NOTE — PROGRESS NOTES
.Bedside and Verbal shift change report given to Leilani Haque RN (oncoming nurse) by Brianna Gonzalez RN (offgoing nurse). Report included the following information SBAR, Kardex, Procedure Summary, Intake/Output, MAR, Accordion and Recent Results.

## 2020-10-29 NOTE — PROGRESS NOTES
Problem: Pressure Injury - Risk of  Goal: *Prevention of pressure injury  Description: Document Andry Scale and appropriate interventions in the flowsheet. Outcome: Progressing Towards Goal  Note: Pressure Injury Interventions:       Moisture Interventions: Apply protective barrier, creams and emollients, Limit adult briefs    Activity Interventions: Increase time out of bed, PT/OT evaluation    Mobility Interventions: Float heels    Nutrition Interventions: Document food/fluid/supplement intake, Offer support with meals,snacks and hydration    Friction and Shear Interventions: HOB 30 degrees or less                Problem: Falls - Risk of  Goal: *Absence of Falls  Description: Document Yazmin Fall Risk and appropriate interventions in the flowsheet.   Outcome: Progressing Towards Goal  Note: Fall Risk Interventions:  Mobility Interventions: Communicate number of staff needed for ambulation/transfer, Patient to call before getting OOB, PT Consult for mobility concerns, PT Consult for assist device competence, Strengthening exercises (ROM-active/passive), Utilize walker, cane, or other assistive device         Medication Interventions: Patient to call before getting OOB, Teach patient to arise slowly    Elimination Interventions: Call light in reach, Toilet paper/wipes in reach, Toileting schedule/hourly rounds              Problem: Patient Education: Go to Patient Education Activity  Goal: Patient/Family Education  Outcome: Progressing Towards Goal

## 2020-10-30 VITALS
DIASTOLIC BLOOD PRESSURE: 92 MMHG | SYSTOLIC BLOOD PRESSURE: 134 MMHG | WEIGHT: 126.8 LBS | TEMPERATURE: 98.6 F | RESPIRATION RATE: 16 BRPM | HEIGHT: 66 IN | OXYGEN SATURATION: 98 % | BODY MASS INDEX: 20.38 KG/M2 | HEART RATE: 108 BPM

## 2020-10-30 LAB
ANION GAP SERPL CALC-SCNC: 8 MMOL/L (ref 5–15)
BASOPHILS # BLD: 0.1 K/UL (ref 0–0.1)
BASOPHILS NFR BLD: 1 % (ref 0–1)
BUN SERPL-MCNC: 8 MG/DL (ref 6–20)
BUN/CREAT SERPL: 24 (ref 12–20)
CALCIUM SERPL-MCNC: 8.4 MG/DL (ref 8.5–10.1)
CHLORIDE SERPL-SCNC: 108 MMOL/L (ref 97–108)
CO2 SERPL-SCNC: 23 MMOL/L (ref 21–32)
CREAT SERPL-MCNC: 0.33 MG/DL (ref 0.55–1.02)
DIFFERENTIAL METHOD BLD: ABNORMAL
EOSINOPHIL # BLD: 0.6 K/UL (ref 0–0.4)
EOSINOPHIL NFR BLD: 9 % (ref 0–7)
ERYTHROCYTE [DISTWIDTH] IN BLOOD BY AUTOMATED COUNT: 18.7 % (ref 11.5–14.5)
GLUCOSE SERPL-MCNC: 94 MG/DL (ref 65–100)
HCT VFR BLD AUTO: 31.2 % (ref 35–47)
HEALTH STATUS, XMCV2T: NORMAL
HGB BLD-MCNC: 10 G/DL (ref 11.5–16)
IMM GRANULOCYTES # BLD AUTO: 0 K/UL (ref 0–0.04)
IMM GRANULOCYTES NFR BLD AUTO: 0 % (ref 0–0.5)
LYMPHOCYTES # BLD: 1.5 K/UL (ref 0.8–3.5)
LYMPHOCYTES NFR BLD: 22 % (ref 12–49)
MCH RBC QN AUTO: 25.8 PG (ref 26–34)
MCHC RBC AUTO-ENTMCNC: 32.1 G/DL (ref 30–36.5)
MCV RBC AUTO: 80.6 FL (ref 80–99)
MONOCYTES # BLD: 0.5 K/UL (ref 0–1)
MONOCYTES NFR BLD: 7 % (ref 5–13)
NEUTS SEG # BLD: 4.3 K/UL (ref 1.8–8)
NEUTS SEG NFR BLD: 61 % (ref 32–75)
NRBC # BLD: 0 K/UL (ref 0–0.01)
NRBC BLD-RTO: 0 PER 100 WBC
PHOSPHATE SERPL-MCNC: 2.4 MG/DL (ref 2.6–4.7)
PLATELET # BLD AUTO: 422 K/UL (ref 150–400)
PMV BLD AUTO: 9.1 FL (ref 8.9–12.9)
POTASSIUM SERPL-SCNC: 3.3 MMOL/L (ref 3.5–5.1)
RBC # BLD AUTO: 3.87 M/UL (ref 3.8–5.2)
SARS-COV-2, COV2: NOT DETECTED
SODIUM SERPL-SCNC: 139 MMOL/L (ref 136–145)
SOURCE, COVRS: NORMAL
SPECIMEN SOURCE, FCOV2M: NORMAL
SPECIMEN TYPE, XMCV1T: NORMAL
WBC # BLD AUTO: 7.1 K/UL (ref 3.6–11)

## 2020-10-30 PROCEDURE — 80048 BASIC METABOLIC PNL TOTAL CA: CPT

## 2020-10-30 PROCEDURE — 36415 COLL VENOUS BLD VENIPUNCTURE: CPT

## 2020-10-30 PROCEDURE — 85025 COMPLETE CBC W/AUTO DIFF WBC: CPT

## 2020-10-30 PROCEDURE — 74011250637 HC RX REV CODE- 250/637: Performed by: COLON & RECTAL SURGERY

## 2020-10-30 PROCEDURE — 84100 ASSAY OF PHOSPHORUS: CPT

## 2020-10-30 PROCEDURE — 97116 GAIT TRAINING THERAPY: CPT

## 2020-10-30 RX ADMIN — PANTOPRAZOLE SODIUM 40 MG: 40 TABLET, DELAYED RELEASE ORAL at 07:30

## 2020-10-30 NOTE — DISCHARGE INSTRUCTIONS
Discharge Instructions      1. Do not lift any objects weighing more than 10 pounds for 4 weeks after the most recent surgery date (10/27/2020). 2. Do not do any housework including vacuuming, scrubbing or yardwork for 4 weeks after the surgery date. 3. Do not drive for 2 weeks after the surgery date or while taking sedating medications. 4. You should walk frequently and you should go up and down stairs as desired. 5. You may shower, but do not submerge your abdomen. Do not take tub baths, swim, or use hot tubs for the next 4 weeks. Until the wound has been dry and closed for at least 48 hours, you should keep it covered with a clean dry pad. Change the pad daily and as needed. You may shower wiyh the pad in  Place and then change it right away afterwards. 6. Continue the low fiber diet for the next 2 weeks. (See handbook for additional details). 7. Drink nutritional supplements 2 times per day until your diet and appetite are back to normal.     8. It can be normal to have multiple bowel movements each day. Contact your surgeons office for any concerns. 9. Take acetaminophen (up to 1000 mg every 6 hours) as needed for pain. 10. Follow up: Please call Dr. Mic Marsh  between 9:00 AM and noon on Monday 11/2/2020 to schedule an appointment for approximately 2 weeks form now. Call Dr. Ramirez office to schedule the beginning of your chemotherapy. 11. If you experience fever (temperature greater than 100.5), chills, vomiting, redness around an incision, or infected-looking drainage from an incision, please contact your surgeons office.

## 2020-10-30 NOTE — PROGRESS NOTES
Bedside shift change report given to Juno El RN (oncoming nurse) by Kim Santos RN (offgoing nurse). Report included the following information SBAR, Kardex, Intake/Output, MAR and Recent Results.

## 2020-10-30 NOTE — PROGRESS NOTES
General Daily Progress Note    Admission Date: 10/25/2020  Hospital Day 6  Post-Op Day 5  Post-Op Day 3  Subjective:   Good pain control. Tolerating low fiber diet. Multiple loose stools. Has had some fecal incontinence. Feels well enough to be discharged. Objective:     Patient Vitals for the past 24 hrs:   BP Temp Pulse Resp SpO2   10/30/20 0757 (!) 134/92 98.6 °F (37 °C) (!) 108 16 98 %   10/29/20 1902 133/77 97.7 °F (36.5 °C) (!) 108 16 96 %   10/29/20 1533 107/67 97.3 °F (36.3 °C) 94 16 98 %   10/29/20 1202 119/75 97.5 °F (36.4 °C) 97 16 100 %     No intake/output data recorded. 10/28 1901 - 10/30 0700  In: 480 [P.O.:480]  Out: 550 [Urine:550]      Physical Examination:  General Appearance - No distress. Abdomen - Soft.  Minimally distended. Appropriately tender.  Incision clean and without associated erythema. Kd removed yesterday. Small amount of serous drainage as expected. Extremities - Pneumatic compression stockings in use. Data Review   Recent Results (from the past 24 hour(s))   SARS-COV-2    Collection Time: 10/29/20 12:59 PM   Result Value Ref Range    Specimen source Nasopharyngeal      SARS-CoV-2 Not detected NOTD      Specimen source Nasopharyngeal      Specimen type NP Swab      Health status Symptomatic Testing     CBC WITH AUTOMATED DIFF    Collection Time: 10/30/20  4:41 AM   Result Value Ref Range    WBC 7.1 3.6 - 11.0 K/uL    RBC 3.87 3.80 - 5.20 M/uL    HGB 10.0 (L) 11.5 - 16.0 g/dL    HCT 31.2 (L) 35.0 - 47.0 %    MCV 80.6 80.0 - 99.0 FL    MCH 25.8 (L) 26.0 - 34.0 PG    MCHC 32.1 30.0 - 36.5 g/dL    RDW 18.7 (H) 11.5 - 14.5 %    PLATELET 490 (H) 518 - 400 K/uL    MPV 9.1 8.9 - 12.9 FL    NRBC 0.0 0  WBC    ABSOLUTE NRBC 0.00 0.00 - 0.01 K/uL    NEUTROPHILS 61 32 - 75 %    LYMPHOCYTES 22 12 - 49 %    MONOCYTES 7 5 - 13 %    EOSINOPHILS 9 (H) 0 - 7 %    BASOPHILS 1 0 - 1 %    IMMATURE GRANULOCYTES 0 0.0 - 0.5 %    ABS. NEUTROPHILS 4.3 1.8 - 8.0 K/UL    ABS. LYMPHOCYTES 1.5 0.8 - 3.5 K/UL    ABS. MONOCYTES 0.5 0.0 - 1.0 K/UL    ABS. EOSINOPHILS 0.6 (H) 0.0 - 0.4 K/UL    ABS. BASOPHILS 0.1 0.0 - 0.1 K/UL    ABS. IMM. GRANS. 0.0 0.00 - 0.04 K/UL    DF AUTOMATED     METABOLIC PANEL, BASIC    Collection Time: 10/30/20  4:41 AM   Result Value Ref Range    Sodium 139 136 - 145 mmol/L    Potassium 3.3 (L) 3.5 - 5.1 mmol/L    Chloride 108 97 - 108 mmol/L    CO2 23 21 - 32 mmol/L    Anion gap 8 5 - 15 mmol/L    Glucose 94 65 - 100 mg/dL    BUN 8 6 - 20 MG/DL    Creatinine 0.33 (L) 0.55 - 1.02 MG/DL    BUN/Creatinine ratio 24 (H) 12 - 20      GFR est AA >60 >60 ml/min/1.73m2    GFR est non-AA >60 >60 ml/min/1.73m2    Calcium 8.4 (L) 8.5 - 10.1 MG/DL   PHOSPHORUS    Collection Time: 10/30/20  4:41 AM   Result Value Ref Range    Phosphorus 2.4 (L) 2.6 - 4.7 MG/DL           Assessment:     Principal Problem:    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 3 Days Post-Op s/p ileostomy closure with resection and anastomosis.     Hemodynamically stable. No evidence of complication. Hypophosphatemia almost corrected. Gastrointestinal function is returning. Loose stools are physiologic. COVID-19 test is negative. The patient is fit for discharge th either SNF or a private home. The logistics are being worked out.         Plan:   Discharge to home or SNF whenever the arrangements can be made. Follow up with me in approximately 2 weeks. Follow up with Dr. Pierre Martinez for adjuvant chemotherapy.

## 2020-10-30 NOTE — DISCHARGE SUMMARY
Discharge Summary     Patient ID:    Herchel Gowers  [id number]  76 y.o.  1950    Admission Date: 10/25/2020    Discharge Date: 10/30/2020    Admission Diagnoses:  1. Recurrent ileostomy prolapse with ischemia  2. Severe protein calorie malnutrition    Chronic Diagnoses:    Patient Active Problem List   Diagnosis Code    Hydronephrosis N13.30    [facility] sig[facility]oid [facility] C18.7    Severe protein-calorie malnutrition ([facility] E43    Anemia D64.9    Ileostomy prolapse ([facility] K94.19    Acute intestinal ischemia ([facility] K55.059       Discharge Diagnoses:  1. Recurrent ileostomy prolapse with ischemia (corrected surgically)  2. Severe protein calorie malnutrition  3. Hypophosphatemia (corrected)  4. Acute blood loss anemia      Hospital Problems as of 10/30/2020 Date Reviewed: 10/25/2020          Codes Class Noted - Resolved POA    Acute intestinal ischemia ([facility] ICD-10-CM: K55.059  ICD-9-CM: 557.0  10/25/2020 - Present Yes        * (Principal) Ileostomy prolapse ([facility] ICD-10-CM: K94.19  ICD-9-CM: 569.69  10/10/2020 - Present Yes              Procedures Performed:  1. Flexible enteroscopy via the ileostomy and ileostomy revision was performed by Dr. Marilyn Orona on 10/25/2020.  2.  Ileostomy closure with resection and anastomosis was performed by Dr. Marilyn Orona on 10/27/2020. Discharge Medications:   Current Discharge Medication List      CONTINUE these medications which have NOT CHANGED    Details   dexAMETHasone (DECADRON) 4 mg tablet Take 2 tabs (8mg) once daily on days 2 & 3 of chemotherapy only  Qty: 48 Tab, Refills: 0    Associated Diagnoses: Malignant neoplasm of sigmoid colon (HCC)      ondansetron (ZOFRAN ODT) 8 mg disintegrating tablet Take 1 Tab by mouth every eight (8) hours as needed for Nausea or Vomiting.  Can start 72 hours after chemotherapy infusion  Qty: 30 Tab, Refills: 3    Associated Diagnoses: Malignant neoplasm of sigmoid colon (HCC)      prochlorperazine (Compazine) 5 mg tablet Take 1 Tab by mouth every six (6) hours as needed for Nausea or Vomiting for up to 30 days. Qty: 30 Tab, Refills: 1    Associated Diagnoses: Malignant neoplasm of sigmoid colon ([address]      lidocaine-prilocaine (EMLA) topical cream Apply  to affected area as needed for Pain. Apply over port a cath site 30-60 minutes prior to port access  Qty: 30 g, Refills: 2    Associated Diagnoses: Malignant neoplasm of sigmoid colon (HCC)      denosumab (PROLIA) 60 mg/mL injection 60 mg by SubCUTAneous route. lansoprazole (PREVACID) 30 mg capsule Take 30 mg by mouth Daily (before breakfast). multivitamin (ONE A DAY) tablet Take 1 Tab by mouth daily. ascorbic acid, vitamin C, (VITAMIN C) 500 mg tablet Take 500 mg by mouth. cholecalciferol (VITAMIN D3) 1,000 unit cap Take 1,000 Units by mouth daily. Omega-3 Fatty Acids (FISH OIL) 500 mg cap Take  by mouth. Diet: Low fiber diet for two weeks. Activity: No driving for 2 weeks after the date of the last operation. No lifting more than 10 lb. for 4 weeks. Wound Care:  None. Discharge Condition:  Improved. Disposition: To private home with friend. Follow-up Care:  1. Dr. Michael Abernathy in 2 weeks.   2. Dr. Britt Dimas (Oncology)      Significant Diagnostic Studies:   Recent Labs     10/30/20  0441 10/29/20  0512   WBC 7.1 10.0   HGB 10.0* 10.8*   HCT 31.2* 34.5*   * 459*     Recent Labs     10/30/20  0441 10/29/20  0512 10/28/20  0547    139 136   K 3.3* 3.6 3.9    108 107   CO2 23 24 23   BUN 8 2* 5*   CREA 0.33* 0.45* 0.41*   GLU 94 100 102*   CA 8.4* 7.9* 7.8*   MG  --   --  2.1   PHOS 2.4* 1.6* 1.8*       Lab Results   Component Value Date/Time    Glucose (POC) 98 09/27/2020 11:39 PM    Glucose (POC) 113 (H) 09/27/2020 05:59 PM    Glucose (POC) 106 (H) 09/27/2020 11:33 AM    Glucose (POC) 111 (H) 09/27/2020 05:32 AM    Glucose (POC) 119 (H) 09/26/2020 11:07 PM         HOSPITAL COURSE & TREATMENT RENDERED:     The patient is a 55-year-old female who underwent a complex multivisceral resection for treatment of a nearly obstructing sigmoid colon cancer on 09/10/2020. The operation included the creation of coloproctostomy and a temporary loop ileostomy. After a difficult postoperative hospital course and a period of time in a skilled nursing facility, she required semi-urgent reoperation for treatment of prolapse of the ileostomy. That procedure consisted of an ileostomy revision performed on 10/11/2020. Following that operation, she was returned to the skilled nursing facility where she seemed to be doing well. At routine follow-up on 10/19/2020, there was no evidence of a problem. In the morning on 10/25/2020, however, she was found to have significant recurrence of the prolapse with evidence of ischemia. She was evaluated in the emergency room and my examination revealed an incarcerated and partially strangulated prolapse of approximately 20 cm of the ileum. Urgent surgical intervention was therefore indicated   . Because the ileostomy was intended to be temporary and because only 6 weeks had gone by since the creation of the coloproctostomy and the anastomosis had not yet been studied, it was not thought that either a definitive fixation procedure or closure of the ostomy would be an appropriate option. Instead ileostomy revision was once again recommended with an overall plan of addressing the acute problem and the ischemia, then hopefully studying the anastomosis and then closing the ileostomy altogether. The ileostomy revision was successfully performed on 10/25/2020. On 10/26/2020, a Gastrografin enema was performed and it was interpreted as revealing no leak from or stricture of the coloproctostomy. It was therefore decided that the closure of the ileostomy could be performed on the following day. The risks were discussed and the patient agreed to proceed.     She was taken to the operating room on 10/27/2020, and there the ileostomy closure was performed without apparent complications. Her hospital course after the second operation was essentially uneventful, and by 10/30/2020 she was judged to be fit for discharge.         Signed:  Roshan Andrea MD

## 2020-10-30 NOTE — PROGRESS NOTES
Problem: Mobility Impaired (Adult and Pediatric)  Goal: *Acute Goals and Plan of Care (Insert Text)  Description: FUNCTIONAL STATUS PRIOR TO ADMISSION: Patient was independent and active without use of DME.    HOME SUPPORT PRIOR TO ADMISSION: The patient lived alone with friends local to provide assistance. Patient was received from SNF with one day left in approved stay. Physical Therapy Goals  Initiated 10/28/2020  1. Patient will move from supine to sit and sit to supine , scoot up and down, and roll side to side in bed with modified independence within 7 day(s). 2.  Patient will transfer from bed to chair and chair to bed with modified independence using the least restrictive device within 7 day(s). 3.  Patient will perform sit to stand with modified independence within 7 day(s). 4.  Patient will ambulate with modified independence for 150 feet with the least restrictive device within 7 day(s). 5.  Patient will ascend/descend 12 stairs with 1 handrail(s) with supervision within 7 day(s). Outcome: Progressing Towards Goal   PHYSICAL THERAPY TREATMENT  Patient: Shalonda Espinosa (3 y.o. female)  Date: 10/30/2020  Diagnosis: Ileostomy prolapse ([facility] [K94.19]   Ileostomy prolapse (HCC)  Procedure(s) (LRB):  CLOSURE LOOP ILEOSTOMY (URGENT) (N/A) 3 Days Post-Op  Precautions:    Chart, physical therapy assessment, plan of care and goals were reviewed. ASSESSMENT  Patient continues with skilled PT services and is progressing towards goals. Pt ambulating in room and on unit indep, no balance issues for safety concerns. Completed stair training x 10 steps with single HR using step-to pattern SBA. Pt appropriate for d/c home with friend and follow up [facility] PT when cleared medically.     Current Level of Function Impacting Discharge (mobility/balance): bed mob indep, transfer indep, amb without device supervision, stair negotiation with single HR SBA    Other factors to consider for discharge: plan for follow up chemo         PLAN :  Patient continues to benefit from skilled intervention to address the above impairments. Continue treatment per established plan of care. to address goals. Recommendation for discharge: (in order for the patient to meet his/her long term goals)  Physical therapy at least 2 days/week in the home     This discharge recommendation:  Has been made in collaboration with the attending provider and/or case management    IF patient discharges home will need the following DME: none       SUBJECTIVE:   Patient stated i've been walking to the bathroom and it's going well.     OBJECTIVE DATA SUMMARY:   Critical Behavior:  Neurologic State: Alert  Orientation Level: Oriented X4  Cognition: Follows commands     Functional Mobility Training:  Bed Mobility:     Supine to Sit: Independent              Transfers:  Sit to Stand: Modified independent  Stand to Sit: Modified independent                             Balance:  Sitting: Intact  Standing: Intact  Standing - Static: Good  Standing - Dynamic : Good(intermittent UE support)  Ambulation/Gait Training:  Distance (ft): 300 Feet (ft)     Ambulation - Level of Assistance: Independent     Gait Description (WDL): Within defined limits           Stairs:  Number of Stairs Trained: 10  Stairs - Level of Assistance: Stand-by assistance   Rail Use: (single HR)      Pain Rating:  Pt c/o soreness at incision when laughing; discussed bracing abdomen for assist with pain control and to prevent strain at incision    Activity Tolerance:   Good  Please refer to the flowsheet for vital signs taken during this treatment. After treatment patient left in no apparent distress:   Call bell within reach and sitting EOB    COMMUNICATION/COLLABORATION:   The patients plan of care was discussed with: Registered nurse and Case management.      Kamilah Kinsey, PT   Time Calculation: 12 mins

## 2020-10-30 NOTE — PROGRESS NOTES
Filomena Moore rounded on [employer] patients and provided Holy Communion at request of patient. Rev.  Félix Valencia MDiv, [facility], [address]   paging service: [phone])

## 2020-10-30 NOTE — PROGRESS NOTES
1145 Patient educated with discharge instructions and Rx. Patient verbalized understanding with adequate teach back. Patient signed copy of the discharge instructions that were then placed on the hard chart.

## 2020-10-30 NOTE — PROGRESS NOTES
OLIVIA: [facility] [id number] with [facility] home [facility]    RUR: 29% (High risks)    CM spoke with patient's friend Usha Christine to verify discharge plan. Usha Christine stated that patient was able to stay with her upon recovery; was open to having home health at her house. CM confirmed patient's low fiber diet with friend. CM consult noted. Transition of Care Plan: Home to friend Rinku rollins ([facility] [id number] with [facility] home [facility]; patient to be transported by deana Oseguera upon discharge. The Plan for Transition of Care is related to the following treatment goals: home health    The Patient was provided with a choice of provider and agrees  with the discharge plan. Yes [x] No []    A Freedom of choice list was provided with basic dialogue that supports the patient's individualized plan of care/goals and shares the quality data associated with the providers. Yes [x] No []    -CM sent referrals via [employer] to [facility] and [facility]; referral also sent to [facility] health. [facility] accepted. Follow up information placed in the AVS.    -CM called [facility] to inform them that patient is not returning. Jodi Sheikh, to transport patient from hospital to  belongings at [facility], then to friend's house. Medicare pt has received, reviewed, and signed 2nd IM letter informing them of their right to appeal the discharge. Signed copied has been placed on pt bedside chart. No further CM needs.        LIZA No, MSW Supervisee

## 2020-10-31 NOTE — PROGRESS NOTES
Physician Progress Note      Karolina Alberto  CSN #:                  [id number]  :                       1950  ADMIT DATE:       10/25/2020 12:49 PM  [address] [place] DATE:        10/30/2020 12:01 PM  RESPONDING  PROVIDER #:        Tristan Anderson MD          QUERY TEXT:          Dear Attending physician,    Pt admitted with and has severe malnutrition documented per dietary on 10/28/20. Please further specify type of malnutrition with documentation in the medical record. The medical record reflects the following:  Risk Factors: New dx. Colon cancer s/p loop ileostomy 2020  Clinical Indicators: 10/28-Per dietary- Inadequate oral intake related to inadequate protein-energy intake as evidenced by weight loss, Underweight (BMI less than 22) age over 72  Malnutrition Status:  Severe malnutrition  Context:  Chronic illness  Findings of the 6 clinical characteristics of malnutrition:  Energy Intake:  7 - 75% or less est energy requirements for 1 month or longer  Weight Loss:  1.00 - 10% over 6 months  Body Fat Loss:  7 - Severe body fat loss, Triceps, Orbital, Fat overlying ribs  Muscle Mass Loss:  7 - Severe muscle mass loss,  Fluid Accumulation:  Unable to assess,   Strength:  Not performed  Treatment: Oral supplements, dietary consult, monitor intake    Thank you,  Octavio Bill RN, BSN  Clinical documentation Improvement  [phone]  Options provided:  -- Severe Malnutrition  -- Severe Protein calorie malnutrition  -- Other - I will add my own diagnosis  -- Disagree - Not applicable / Not valid  -- Disagree - Clinically unable to determine / Unknown  -- Refer to Clinical Documentation Reviewer    PROVIDER RESPONSE TEXT:    This patient meets criteria for severe protein calorie malnutrition.     Query created by: Ismael Valentino on 10/30/2020 12:45 PM      Electronically signed by:  Tristan Anderson MD 10/31/2020 11:14 AM

## 2020-11-03 ENCOUNTER — HOSPITAL ENCOUNTER (OUTPATIENT)
Dept: INFUSION THERAPY | Age: 70
End: 2020-11-03

## 2020-11-05 ENCOUNTER — APPOINTMENT (OUTPATIENT)
Dept: INFUSION THERAPY | Age: 70
End: 2020-11-05

## 2020-11-09 RX ORDER — MIDAZOLAM HYDROCHLORIDE 1 MG/ML
INJECTION, SOLUTION INTRAMUSCULAR; INTRAVENOUS AS NEEDED
Status: DISCONTINUED | OUTPATIENT
Start: 2020-10-27 | End: 2020-11-09 | Stop reason: HOSPADM

## 2020-11-09 NOTE — ADDENDUM NOTE
Addendum  created 11/09/20 1403 by Eric Levine CRNA    Intraprocedure Meds edited, Orders acknowledged in Narrator

## 2020-11-16 DIAGNOSIS — C18.7 MALIGNANT NEOPLASM OF SIGMOID COLON (HCC): ICD-10-CM

## 2020-11-16 RX ORDER — ONDANSETRON 8 MG/1
8 TABLET, ORALLY DISINTEGRATING ORAL
Qty: 30 TAB | Refills: 3 | Status: SHIPPED
Start: 2020-11-16 | End: 2021-07-28

## 2020-11-16 RX ORDER — LIDOCAINE AND PRILOCAINE 25; 25 MG/G; MG/G
CREAM TOPICAL AS NEEDED
Qty: 30 G | Refills: 2 | Status: SHIPPED | OUTPATIENT
Start: 2020-11-16 | End: 2021-11-30

## 2020-11-16 RX ORDER — DEXAMETHASONE 4 MG/1
TABLET ORAL
Qty: 48 TAB | Refills: 0 | Status: SHIPPED | OUTPATIENT
Start: 2020-11-16 | End: 2021-11-30

## 2020-11-17 ENCOUNTER — HOSPITAL ENCOUNTER (OUTPATIENT)
Dept: INFUSION THERAPY | Age: 70
Discharge: HOME | End: 2020-11-17
Payer: MEDICARE

## 2020-11-17 ENCOUNTER — APPOINTMENT (OUTPATIENT)
Dept: INFUSION THERAPY | Age: 70
End: 2020-11-17

## 2020-11-17 ENCOUNTER — DOCUMENTATION ONLY (OUTPATIENT)
Dept: ONCOLOGY | Age: 70
End: 2020-11-17

## 2020-11-17 ENCOUNTER — OFFICE VISIT (OUTPATIENT)
Dept: ONCOLOGY | Age: 70
End: 2020-11-17
Payer: MEDICARE

## 2020-11-17 VITALS
BODY MASS INDEX: 18.77 KG/M2 | DIASTOLIC BLOOD PRESSURE: 77 MMHG | RESPIRATION RATE: 18 BRPM | HEART RATE: 105 BPM | HEIGHT: 66 IN | SYSTOLIC BLOOD PRESSURE: 128 MMHG | TEMPERATURE: 97.5 F | WEIGHT: 116.8 LBS

## 2020-11-17 VITALS
DIASTOLIC BLOOD PRESSURE: 84 MMHG | WEIGHT: 116.7 LBS | HEART RATE: 107 BPM | BODY MASS INDEX: 18.75 KG/M2 | TEMPERATURE: 97.1 F | HEIGHT: 66 IN | OXYGEN SATURATION: 98 % | SYSTOLIC BLOOD PRESSURE: 130 MMHG

## 2020-11-17 DIAGNOSIS — K94.19 ILEOSTOMY PROLAPSE (HCC): Primary | ICD-10-CM

## 2020-11-17 DIAGNOSIS — C18.7 MALIGNANT NEOPLASM OF SIGMOID COLON (HCC): Primary | ICD-10-CM

## 2020-11-17 DIAGNOSIS — C18.7 MALIGNANT NEOPLASM OF SIGMOID COLON (HCC): ICD-10-CM

## 2020-11-17 LAB
ALBUMIN SERPL-MCNC: 3.6 G/DL (ref 3.5–5)
ALBUMIN/GLOB SERPL: 1 {RATIO} (ref 1.1–2.2)
ALP SERPL-CCNC: 102 U/L (ref 45–117)
ALT SERPL-CCNC: 82 U/L (ref 12–78)
ANION GAP SERPL CALC-SCNC: 9 MMOL/L (ref 5–15)
AST SERPL-CCNC: 47 U/L (ref 15–37)
BASOPHILS # BLD: 0.1 K/UL (ref 0–0.1)
BASOPHILS NFR BLD: 1 % (ref 0–1)
BILIRUB SERPL-MCNC: 0.4 MG/DL (ref 0.2–1)
BUN SERPL-MCNC: 17 MG/DL (ref 6–20)
BUN/CREAT SERPL: 18 (ref 12–20)
CALCIUM SERPL-MCNC: 8.9 MG/DL (ref 8.5–10.1)
CEA SERPL-MCNC: 1 NG/ML
CHLORIDE SERPL-SCNC: 101 MMOL/L (ref 97–108)
CO2 SERPL-SCNC: 24 MMOL/L (ref 21–32)
CREAT SERPL-MCNC: 0.95 MG/DL (ref 0.55–1.02)
DIFFERENTIAL METHOD BLD: ABNORMAL
EOSINOPHIL # BLD: 0.3 K/UL (ref 0–0.4)
EOSINOPHIL NFR BLD: 5 % (ref 0–7)
ERYTHROCYTE [DISTWIDTH] IN BLOOD BY AUTOMATED COUNT: 17 % (ref 11.5–14.5)
GLOBULIN SER CALC-MCNC: 3.7 G/DL (ref 2–4)
GLUCOSE SERPL-MCNC: 129 MG/DL (ref 65–100)
HCT VFR BLD AUTO: 36.1 % (ref 35–47)
HGB BLD-MCNC: 11.3 G/DL (ref 11.5–16)
IMM GRANULOCYTES # BLD AUTO: 0 K/UL (ref 0–0.04)
IMM GRANULOCYTES NFR BLD AUTO: 0 % (ref 0–0.5)
LYMPHOCYTES # BLD: 1.5 K/UL (ref 0.8–3.5)
LYMPHOCYTES NFR BLD: 27 % (ref 12–49)
MCH RBC QN AUTO: 25.1 PG (ref 26–34)
MCHC RBC AUTO-ENTMCNC: 31.3 G/DL (ref 30–36.5)
MCV RBC AUTO: 80.2 FL (ref 80–99)
MONOCYTES # BLD: 0.5 K/UL (ref 0–1)
MONOCYTES NFR BLD: 9 % (ref 5–13)
NEUTS SEG # BLD: 3.2 K/UL (ref 1.8–8)
NEUTS SEG NFR BLD: 58 % (ref 32–75)
NRBC # BLD: 0 K/UL (ref 0–0.01)
NRBC BLD-RTO: 0 PER 100 WBC
PLATELET # BLD AUTO: 468 K/UL (ref 150–400)
PMV BLD AUTO: 8.8 FL (ref 8.9–12.9)
POTASSIUM SERPL-SCNC: 3.9 MMOL/L (ref 3.5–5.1)
PROT SERPL-MCNC: 7.3 G/DL (ref 6.4–8.2)
RBC # BLD AUTO: 4.5 M/UL (ref 3.8–5.2)
SODIUM SERPL-SCNC: 134 MMOL/L (ref 136–145)
WBC # BLD AUTO: 5.6 K/UL (ref 3.6–11)

## 2020-11-17 PROCEDURE — G8536 NO DOC ELDER MAL SCRN: HCPCS | Performed by: INTERNAL MEDICINE

## 2020-11-17 PROCEDURE — G0463 HOSPITAL OUTPT CLINIC VISIT: HCPCS | Performed by: REGISTERED NURSE

## 2020-11-17 PROCEDURE — 74011250636 HC RX REV CODE- 250/636: Performed by: REGISTERED NURSE

## 2020-11-17 PROCEDURE — 96417 CHEMO IV INFUS EACH ADDL SEQ: CPT

## 2020-11-17 PROCEDURE — 74011000258 HC RX REV CODE- 258: Performed by: REGISTERED NURSE

## 2020-11-17 PROCEDURE — 96413 CHEMO IV INFUSION 1 HR: CPT

## 2020-11-17 PROCEDURE — 80053 COMPREHEN METABOLIC PANEL: CPT

## 2020-11-17 PROCEDURE — 77030012965 HC NDL HUBR BBMI -A

## 2020-11-17 PROCEDURE — G8420 CALC BMI NORM PARAMETERS: HCPCS | Performed by: INTERNAL MEDICINE

## 2020-11-17 PROCEDURE — 96416 CHEMO PROLONG INFUSE W/PUMP: CPT

## 2020-11-17 PROCEDURE — 82378 CARCINOEMBRYONIC ANTIGEN: CPT

## 2020-11-17 PROCEDURE — 99214 OFFICE O/P EST MOD 30 MIN: CPT | Performed by: INTERNAL MEDICINE

## 2020-11-17 PROCEDURE — G9711 PT HX TOT COL OR COLON CA: HCPCS | Performed by: INTERNAL MEDICINE

## 2020-11-17 PROCEDURE — 96375 TX/PRO/DX INJ NEW DRUG ADDON: CPT

## 2020-11-17 PROCEDURE — 1101F PT FALLS ASSESS-DOCD LE1/YR: CPT | Performed by: INTERNAL MEDICINE

## 2020-11-17 PROCEDURE — 85025 COMPLETE CBC W/AUTO DIFF WBC: CPT

## 2020-11-17 PROCEDURE — 36415 COLL VENOUS BLD VENIPUNCTURE: CPT

## 2020-11-17 PROCEDURE — 1090F PRES/ABSN URINE INCON ASSESS: CPT | Performed by: INTERNAL MEDICINE

## 2020-11-17 PROCEDURE — G8427 DOCREV CUR MEDS BY ELIG CLIN: HCPCS | Performed by: INTERNAL MEDICINE

## 2020-11-17 PROCEDURE — G8400 PT W/DXA NO RESULTS DOC: HCPCS | Performed by: INTERNAL MEDICINE

## 2020-11-17 PROCEDURE — 1111F DSCHRG MED/CURRENT MED MERGE: CPT | Performed by: INTERNAL MEDICINE

## 2020-11-17 PROCEDURE — G8510 SCR DEP NEG, NO PLAN REQD: HCPCS | Performed by: INTERNAL MEDICINE

## 2020-11-17 RX ORDER — SODIUM CHLORIDE 0.9 % (FLUSH) 0.9 %
10 SYRINGE (ML) INJECTION AS NEEDED
Status: DISPENSED | OUTPATIENT
Start: 2020-11-17 | End: 2020-11-17

## 2020-11-17 RX ORDER — PROCHLORPERAZINE MALEATE 5 MG
5 TABLET ORAL
Qty: 30 TAB | Refills: 1 | Status: SHIPPED | OUTPATIENT
Start: 2020-11-17 | End: 2020-12-17

## 2020-11-17 RX ORDER — PALONOSETRON 0.05 MG/ML
0.25 INJECTION, SOLUTION INTRAVENOUS ONCE
Status: COMPLETED | OUTPATIENT
Start: 2020-11-17 | End: 2020-11-17

## 2020-11-17 RX ORDER — DEXTROSE MONOHYDRATE 50 MG/ML
25 INJECTION, SOLUTION INTRAVENOUS CONTINUOUS
Status: DISPENSED | OUTPATIENT
Start: 2020-11-17 | End: 2020-11-17

## 2020-11-17 RX ORDER — HEPARIN 100 UNIT/ML
300-500 SYRINGE INTRAVENOUS AS NEEDED
Status: ACTIVE | OUTPATIENT
Start: 2020-11-17 | End: 2020-11-17

## 2020-11-17 RX ORDER — SODIUM CHLORIDE 9 MG/ML
10 INJECTION INTRAMUSCULAR; INTRAVENOUS; SUBCUTANEOUS AS NEEDED
Status: ACTIVE | OUTPATIENT
Start: 2020-11-17 | End: 2020-11-17

## 2020-11-17 RX ADMIN — DEXAMETHASONE SODIUM PHOSPHATE 12 MG: 4 INJECTION, SOLUTION INTRA-ARTICULAR; INTRALESIONAL; INTRAMUSCULAR; INTRAVENOUS; SOFT TISSUE at 10:50

## 2020-11-17 RX ADMIN — DEXTROSE MONOHYDRATE 25 ML/HR: 5 INJECTION, SOLUTION INTRAVENOUS at 10:29

## 2020-11-17 RX ADMIN — FLUOROURACIL 3720 MG: 50 INJECTION, SOLUTION INTRAVENOUS at 13:35

## 2020-11-17 RX ADMIN — PALONOSETRON 0.25 MG: 0.05 INJECTION, SOLUTION INTRAVENOUS at 10:33

## 2020-11-17 RX ADMIN — DEXTROSE MONOHYDRATE 132 MG: 50 INJECTION, SOLUTION INTRAVENOUS at 11:20

## 2020-11-17 NOTE — PROGRESS NOTES
[facility] Progress Note    Date: 2020    Name: Barbara Jasso    MRN: [id number]         : 1950    Ms. Tatum Arrived ambulatory and in no distress for C1D1 of Folfox Regimen. Assessment was completed, no acute issues at this time, no new complaints voiced. Port chest wall port accessed without difficulty, labs drawn & sent for processing. Medication information discussed with patient and all questions addressed. Chemotherapy Flowsheet 2020   Cycle C1   Date 2020   Drug / Regimen Folfox       Patient proceed to appointment with med/onc    Ms. Tatum's vitals were reviewed. Visit Vitals  /77   Pulse (!) 105   Temp 97.5 °F (36.4 °C)   Resp 18   Ht 5' 6\" (1.676 m)   Wt 53 kg (116 lb 12.8 oz)   Breastfeeding No   BMI 18.85 kg/m²       Lab results were obtained and reviewed. Recent Results (from the past 12 hour(s))   CBC WITH AUTOMATED DIFF    Collection Time: 20  8:39 AM   Result Value Ref Range    WBC 5.6 3.6 - 11.0 K/uL    RBC 4.50 3.80 - 5.20 M/uL    HGB 11.3 (L) 11.5 - 16.0 g/dL    HCT 36.1 35.0 - 47.0 %    MCV 80.2 80.0 - 99.0 FL    MCH 25.1 (L) 26.0 - 34.0 PG    MCHC 31.3 30.0 - 36.5 g/dL    RDW 17.0 (H) 11.5 - 14.5 %    PLATELET 964 (H) 954 - 400 K/uL    MPV 8.8 (L) 8.9 - 12.9 FL    NRBC 0.0 0  WBC    ABSOLUTE NRBC 0.00 0.00 - 0.01 K/uL    NEUTROPHILS 58 32 - 75 %    LYMPHOCYTES 27 12 - 49 %    MONOCYTES 9 5 - 13 %    EOSINOPHILS 5 0 - 7 %    BASOPHILS 1 0 - 1 %    IMMATURE GRANULOCYTES 0 0.0 - 0.5 %    ABS. NEUTROPHILS 3.2 1.8 - 8.0 K/UL    ABS. LYMPHOCYTES 1.5 0.8 - 3.5 K/UL    ABS. MONOCYTES 0.5 0.0 - 1.0 K/UL    ABS. EOSINOPHILS 0.3 0.0 - 0.4 K/UL    ABS. BASOPHILS 0.1 0.0 - 0.1 K/UL    ABS. IMM.  GRANS. 0.0 0.00 - 0.04 K/UL    DF AUTOMATED     METABOLIC PANEL, COMPREHENSIVE    Collection Time: 20  8:39 AM   Result Value Ref Range    Sodium 134 (L) 136 - 145 mmol/L    Potassium 3.9 3.5 - 5.1 mmol/L    Chloride 101 97 - 108 mmol/L    CO2 24 21 - 32 mmol/L    Anion gap 9 5 - 15 mmol/L    Glucose 129 (H) 65 - 100 mg/dL    BUN 17 6 - 20 MG/DL    Creatinine 0.95 0.55 - 1.02 MG/DL    BUN/Creatinine ratio 18 12 - 20      GFR est AA >60 >60 ml/min/1.73m2    GFR est non-AA 58 (L) >60 ml/min/1.73m2    Calcium 8.9 8.5 - 10.1 MG/DL    Bilirubin, total 0.4 0.2 - 1.0 MG/DL    ALT (SGPT) 82 (H) 12 - 78 U/L    AST (SGOT) 47 (H) 15 - 37 U/L    Alk. phosphatase 102 45 - 117 U/L    Protein, total 7.3 6.4 - 8.2 g/dL    Albumin 3.6 3.5 - 5.0 g/dL    Globulin 3.7 2.0 - 4.0 g/dL    A-G Ratio 1.0 (L) 1.1 - 2.2     CEA    Collection Time: 11/17/20  8:39 AM   Result Value Ref Range    CEA 1.0 ng/mL       Medications:  Medications Administered     dexamethasone (DECADRON) 12 mg in 0.9% sodium chloride 50 mL, overfill volume 5 mL IVPB     Admin Date  11/17/2020 Action  Given Dose  12 mg Rate  232 mL/hr Route  IntraVENous Administered By  Tere Saini RN          dextrose 5% infusion     Admin Date  11/17/2020 Action  New Bag Dose  25 mL/hr Rate  25 mL/hr Route  IntraVENous Administered By  Tere Saini RN          fluorouraciL (ADRUCIL) 3,720 mg in 0.9% sodium chloride 100 mL CADD Cassette     Admin Date  11/17/2020 Action  New Bag Dose  3720 mg Rate  2.2 mL/hr Route  IntraVENous Administered By  Tere Saini RN          oxaliplatin (ELOXATIN) 132 mg in dextrose 5% 250 mL, overfill volume 25 mL chemo infusion     Admin Date  11/17/2020 Action  New Bag Dose  132 mg Rate  150.7 mL/hr Route  IntraVENous Administered By  Tere Saini RN          palonosetron HCl (ALOXI) injection 0.25 mg     Admin Date  11/17/2020 Action  Given Dose  0.25 mg Route  IntraVENous Administered By  Tere Saini RN                  Ms. Beba Buchanan tolerated treatment well and was discharged from [address] in stable condition at 1345. Port de-accessed, flushed & heparinized per protocol.  She is to return on   Future Appointments   Date Time Provider Port [place]   11/19/2020  2:00 PM H2 [facility]   12/1/2020  8:00 AM B2 [facility] [facility]   12/3/2020  2:00 PM H2 [facility]   12/17/2020  8:30 AM F1 [facility] [place] [facility]   12/17/2020  2:00 PM H2 [facility] [facility]   12/29/2020  8:00 AM B2 [facility] [place] [facility]   12/31/2020  2:00 PM H2 [facility] [place] [facility] [address], RN  November 17, 2020

## 2020-11-17 NOTE — PROGRESS NOTES
Otoniel Chavez is a 72 y.o. female  Chief Complaint   Patient presents with    Follow-up    Colon Cancer     1. Have you been to the ER, urgent care clinic since your last visit? Hospitalized since your last visit? No    2. Have you seen or consulted any other health care providers outside of the [address] [place] [address] since your last visit? Include any pap smears or colon screening.  No

## 2020-11-17 NOTE — PROGRESS NOTES
Pharmacy Note- Chemotherapy Education     Ana Rosa Palacios is a  72 y. o.female  diagnosed with colon cancer here today for Cycle 1, Day 1 chemotherapy counselin. Ms. Crys Zuniga is being treated with mFOLFOX. Provided education on fluorouracil, leucovorin, oxaliplatin and premedications -  palonosetron and dexamethasone. Provided Ms. Tatum with calendar and handout reviewing home anti-nausea regimen. Updated Cycle 1 [place] plan to remove fluorouracil bolus and leucovorin from Cycle 1 due to recent surgery at request of Dr. Jay Waldrop. Previously reviewed Ms. Tatum's herbal supplements - recommended against Perfect Purples due to mangosteen which can potentially interfere with her oxaliplatin. All other herbals were ok to continue.      Reviewed side effects of chemotherapy to include s/s infection, anemia, appetite changes, thromboyctopenia, fatigue, hair loss/alopecia, bone pain, skin and nail changes, diarrhea/constipation and nerve sensitivities (cold sensitivity and peripheral neuropathy).     Patient given ways to manage these side effects and when to contact office.      Ms. Tatum verbalized understanding of the information presented and all of the patient's questions were answered.     Beatrice Hyman, PharmD, BCPS, BCOP    CLINICAL PHARMACY CONSULT: MED RECONCILIATION/REVIEW ADDENDUM    For Pharmacy Admin Tracking Only    PHSO: PHSO Patient?: No  Total # of Interventions Recommended: Count: 2  - Discontinued Medication #: 1 Discontinue Reason(s):  Interaction    Total Interventions Accepted: 2  Time Spent (min): 20

## 2020-11-17 NOTE — PROGRESS NOTES
[facility] [place] [address] [address], [address]  W: [phone]  F: [phone]    Reason for Visit:   Huong Littlejohn is a 72 y.o. female who is seen for stage III colon cancer. Treatment History:   · 9/10/2020 CT A/P - large, irregular pelvic mass measuring approximately 9cm likely representing neoplasm arising from the sigmoid colon, small amount of pelvic free fluid, colonic bowel wall thickening. Borderline enlarged retroperitoneal lymph nodes. 6 mm right lower lobe lung nodule. Mass effect from pelvic mass causing moderate right and minimal left hydronephrosis. · 9/14/2020: Colonoscopy - A large circumferential, ulcerated fungating mass was noted in proximal sigmoid colon. Mass was obstructing the lumen and could not be traversed. Deuce Hernandez was present. · 9/15/2020: CT Chest - 7.5mm left lower lobe pulmonary nodule, small right pleural effusion, mild right basilar atelectasis  · 9/17/2020: Surgical resection - low anterior resection, mobilization of the splenic flexure, coloproctostomy, creation of loop ileostomy, total abdominal hysterectomy and left salpingo-oophorectomy, distal right ureterectomy, right ureteral re-implant with psoas hitch and placement of right ureteral stent    History of Present Illness:   Patient is a 72 y.o. female seen for colon cancer    She presented to the ED on 9/11/2020 with complaints of right flank/back pain and RLQ abdominal pain. She states she has had several months of gas pain and \"blockages. \" She reports a weight loss of 12 lbs in the last 3 months. CT abd/pelv 9/10/2020 showed large, irregular pelvic mass measuring approximately 9cm likely representing neoplasm arising from the sigmoid colon. She also had hydronephrosis from the mass effect and urethral stent placed. CEA 7.4 on 9/11and colonoscopy performed 9/14 with biopsy + adenocarcinoma. CT Chest performed showing 7.5mm left lower lobe pulmonary nodule.  Mass found to be invading into the uterus and right ureter. Surgical resection performed on 9/17/2020 including low anterior resection, mobilization of the splenic flexure, coloproctostomy, creation of loop ileostomy, total abdominal hysterectomy and left salpingo-oophorectomy, distal right ureterectomy, right ureteral re-implant with psoas hitch and placement of right ureteral stent. She was to see me in clinic but was admitted 10/10/2020 with ileostomy prolapse. Had a revision of loop ileostomy 10/11/2020. Then needed ileostomy closure and anastomosis 10/27/2020 and comes for chemotherapy. She is home now, eating well, had staples taken out yesterday  She has noted minimal soreness around the incision. BMs are erratic, has no nausea, eating well and has gained 2 lb  Mild forgetfulness per her friend Jermaine Villarreal      Family Hx:  Mother with hx of pancreatic cancer      Past Medical History:   Diagnosis Date    Colon cancer ([address]     GERD (gastroesophageal reflux disease)     Ileostomy in place ([address]     [facility]     Ill-defined condition     Spaulding's esophagus      Past Surgical History:   Procedure Laterality Date    COLONOSCOPY Left 9/14/2020    COLONOSCOPY performed by Ritu Blum MD at [address] [place] [address]; incomplete secondary to partial obstruction.  HX APPENDECTOMY  age 17    HX COLONOSCOPY  circa 2010    No neoplasms.  HX ENDOSCOPY  03/13/2017    Dr. Griselda Guadeloupe HX ENDOSCOPY  02/13/2020    Dr. Borja Levo oopherectomy for treatment of benign mass.  HX GYN  09/17/2020    Total abdominal hysterectomy and left salpingo-oophorectomy; Summer Medrano MD.    HX OTHER SURGICAL  09/17/2020    Low anterior resection, mobilization of the splenic flexure, coloproctostomy, and creation of loop ileostomy; Dr. Ambar Bell.  HX OTHER SURGICAL  10/11/2020    Revision of loop ileostomy (resection and creation of divided loop ileostomy); Dr. Ambar Bell.     HX UROLOGICAL 09/12/2020    Cystoscopy and placement of a right ureteral stent; José Luis Lind III, MD.     UROLOGICAL  09/17/2020    Cystoscopy and placement of a temporary left ureteral catheter; José Luis Lind III, MD.     UROLOGICAL  09/17/2020    Distal right ureterectomy; José Luis Lind III, MD.     UROLOGICAL  09/17/2020    Right ureteral re-implantation with psoas hitch and placement of a right ureteral stent; Taye Alcala MD.    IR INSERT TUNL CVAD W PORT LESS THAN 5 YR  10/14/2020    Right chest Port-a-Cath placement. Social History     Tobacco Use    Smoking status: Never Smoker    Smokeless tobacco: Never Used   Substance Use Topics    Alcohol use: Yes     Alcohol/week: 1.0 standard drinks     Types: 1 Glasses of wine per week      Family History   Problem Relation Age of Onset    Cancer Mother     Heart Disease Father     Diabetes Brother      Current Outpatient Medications   Medication Sig    lidocaine-prilocaine (EMLA) topical cream Apply  to affected area as needed for Pain. Apply over port a cath site 30-60 minutes prior to port access    dexAMETHasone (DECADRON) 4 mg tablet Take 2 tabs (8mg) once daily on days 2 & 3 of chemotherapy only    ondansetron (ZOFRAN ODT) 8 mg disintegrating tablet Take 1 Tab by mouth every eight (8) hours as needed for Nausea or Vomiting. Can start 72 hours after chemotherapy infusion    prochlorperazine (Compazine) 5 mg tablet Take 1 Tab by mouth every six (6) hours as needed for Nausea or Vomiting for up to 30 days.  denosumab (PROLIA) 60 mg/mL injection 60 mg by SubCUTAneous route.  lansoprazole (PREVACID) 30 mg capsule Take 30 mg by mouth Daily (before breakfast).  multivitamin (ONE A DAY) tablet Take 1 Tab by mouth daily.  ascorbic acid, vitamin C, (VITAMIN C) 500 mg tablet Take 500 mg by mouth.  cholecalciferol (VITAMIN D3) 1,000 unit cap Take 1,000 Units by mouth daily.     Omega-3 Fatty Acids (FISH OIL) 500 mg cap Take  by mouth.     No current facility-administered medications for this visit. No Known Allergies     Review of Systems: A complete review of systems was obtained, negative except as described above. Physical Exam:     Visit Vitals  /84   Pulse (!) 107   Temp 97.1 °F (36.2 °C)   Ht 5' 6\" (1.676 m)   Wt 116 lb 11.2 oz (52.9 kg)   SpO2 98%   BMI 18.84 kg/m²     ECOG PS: 1-2  General: No distress but appears frail  Eyes: PERRLA, anicteric sclerae  HENT: Atraumatic, OP clear  GI: Soft, incisions healing well  MS: Digits without clubbing or cyanosis. Skin: No rashes, ecchymoses, or petechiae. Psych: Alert, oriented, appropriate affect, normal judgment/insight    Results:     Lab Results   Component Value Date/Time    WBC 5.6 11/17/2020 08:39 AM    HGB 11.3 (L) 11/17/2020 08:39 AM    HCT 36.1 11/17/2020 08:39 AM    PLATELET 438 (H) 92/35/4596 08:39 AM    MCV 80.2 11/17/2020 08:39 AM    ABS. NEUTROPHILS 3.2 11/17/2020 08:39 AM    Hemoglobin (POC) 12.0 09/17/2020 06:35 PM     Lab Results   Component Value Date/Time    Sodium 139 10/30/2020 04:41 AM    Potassium 3.3 (L) 10/30/2020 04:41 AM    Chloride 108 10/30/2020 04:41 AM    CO2 23 10/30/2020 04:41 AM    Glucose 94 10/30/2020 04:41 AM    BUN 8 10/30/2020 04:41 AM    Creatinine 0.33 (L) 10/30/2020 04:41 AM    GFR est AA >60 10/30/2020 04:41 AM    GFR est non-AA >60 10/30/2020 04:41 AM    Calcium 8.4 (L) 10/30/2020 04:41 AM    Glucose (POC) 98 09/27/2020 11:39 PM    Creatinine (POC) 0.8 09/10/2020 02:32 PM     Lab Results   Component Value Date/Time    Bilirubin, total 0.4 10/26/2020 01:59 AM    ALT (SGPT) 27 10/26/2020 01:59 AM    Alk.  phosphatase 105 10/26/2020 01:59 AM    Protein, total 6.1 (L) 10/26/2020 01:59 AM    Albumin 2.6 (L) 10/26/2020 01:59 AM    Globulin 3.5 10/26/2020 01:59 AM     CEA:  Recent Labs     09/11/20  1455   CEA 7.4       CT A/P 9/23/2020  IMPRESSION:  Large, irregular pelvic mass measuring approximately 9 cm likely representing  neoplasm arising from the sigmoid colon. Adnexal neoplasm is a possibility. This  does appear to be separate from the uterus. Small amount of pelvic free fluid. Associated colonic bowel wall thickening. There is focal gas in the upper  presacral region which is unclear if this is intraluminal or contained  extraluminal collection.     No definite distant metastatic disease. Borderline enlarged retroperitoneal  lymph nodes. 6 mm right lower lobe lung nodule. Mass effect from the pelvic mass  causing moderate right and minimal left hydronephrosis. CT Chest 9/15/2020  IMPRESSION:  1. 7.5 mm left lower lobe pulmonary nodule. 2. Small right pleural effusion. 3. Mild right basilar atelectasis. 9/14/2020   Addendum Diagnosis   1. Sigmoid Mass, Biopsy:     Infiltrating adenocarcinoma, most consistent with colon (See comment)   Addendum Comment   Immunohistochemical stains reveal the following staining pattern:   CK7: Scattered cells with cytoplasmic membrane staining   CK20: Positive cytoplasmic staining in normal colonic mucosal epithelial cells and malignant epithelial cells   CDX-2: Diffuse strong nuclear decoration and all tumor cells and normal background colonic epithelial cells   PAX-8: Negative   Estrogen Receptor: Negative   Positive and negative controls stain appropriately. Due to radiologic findings suggesting the possibility of GYN involvement, ER and PAX-8 are performed, which lend support to the diagnosis of a colonic primary, over a tumor of müllerian origin. 9/17/2020   FINAL PATHOLOGIC DIAGNOSIS   1. Sigmoid colon and proximal rectum, uterus, left fallopian tube and ovary and right ureteral segment, low anterior resection:   Sigmoid colon and proximal rectum:  Invasive adenocarcinoma (see synoptic report and comment). Metastatic adenocarcinoma in one of sixteen lymph nodes (1/16). Uterus: Invasive adenocarcinoma extending from adhesed colon into uterine serosa.   Endometrial lining shows mucosal atrophy. Left ovary: Benign ovary with serosal inclusion cysts. Left fallopian tube: Benign fallopian tube. Right ureteral segment: Benign segment of ureter densely adhesed to colon. Nodule near right uterine cornu:  Nodular portion of benign smooth muscle consistent with leiomyoma with parenchymal hemorrhage. COLON AND RECTUM: Resection   SPECIMEN   Procedure: Low anterior resection   TUMOR   Tumor Site: Sigmoid colon   Histologic Type: Adenocarcinoma   Histologic Grade: G2: Moderately differentiated   Tumor Size: 8.0 cm   Tumor Extension: Tumor directly invades adjacent structures:   Posterior uterine serosa   Macroscopic Tumor Perforation: Tumor invades through serosa into surrounding soft tissue   Lymphovascular Invasion: Present   Perineural Invasion: Not identified   Treatment Effect: No known presurgical therapy   MARGINS   Margins: All margins are uninvolved by invasive carcinoma   Margins Examined: Proximal, Distal, Radial (circumferential) or   Mesenteric   Distance of Tumor from Radial (circumferential) Margin: See Comment   LYMPH NODES   Number of Lymph Nodes Involved: 1   Number of Lymph Nodes Examined: 16   Tumor Deposits: Not identified   PATHOLOGIC STAGE CLASSIFICATION (pTNM, AJCC 8th Edition)   Primary Tumor (pT): pT4b   Regional Lymph Nodes (pN): pN1a   2. Distal rectal margin:   Segment of benign large bowel. 3. Anastomotic doughnuts:   Two annular portions of benign large bowel. Comment   Tumor invades into the adherent soft tissue and to within 1.1 cm of the apparent right pelvic sidewall margin. Records reviewed and summarized above. Pathology report(s) reviewed above. Radiology report(s) reviewed above. Assessment:   1) Sigmoid Colon Adenocarcinoma - Stage IIIB- ERICKA  [id number]  CT abd/pelv 9/10/2020 showed large, irregular pelvic mass measuring approximately 9cm likely representing neoplasm arising from the sigmoid colon.   CEA 7.4 on 9/11  Colonoscopy performed 9/14 and biopsy + adenocarcinoma  Mass found to be invading into the uterus and right ureter. Surgical resection on 9/17 with creation of loop ileostomy, total abdominal hysterectomy and left salpingo-oophorectomy, distal right ureterectomy  She had a revision of her ileostomy 10/11/2020  Then needed stoma closure and anastomosis on 10/27/2020    Will proceed with planned adjuvant FOLFOX x 6 months with cycle 1 today  Given frailty will omit bolus for cycle 1 but will re introduce if she tolerates this well    2)Thrombocytosis  Reactive    3) Anemia  Due to #1  Iron sat of 4% and ferritin of 20 on 9/11  S/p 2 units PRBCs on 9/15 and Venofer 200mg x3 doses  Anemia has improved    4) Elevated transaminases  Monitor- if increases further will obtain an USG of abdomen      Plan:       · Proceed with cycle 1 of mFOLFOX every 2 weeks for 6 months- omit bolus 5FU today  · Cbc, cmp every cycle, cea every other  · Zofran prn, dexamethasone days 2 and 3  · RTC every cycle  · D/Q Dr. Lynette Boswell at [phone] - POA    I appreciate the opportunity to participate in MsPranav Karri Munson charissa.     Signed By: Lilly Ramos MD

## 2020-11-19 ENCOUNTER — HOSPITAL ENCOUNTER (OUTPATIENT)
Dept: INFUSION THERAPY | Age: 70
Discharge: HOME | End: 2020-11-19
Payer: MEDICARE

## 2020-11-19 ENCOUNTER — APPOINTMENT (OUTPATIENT)
Dept: INFUSION THERAPY | Age: 70
End: 2020-11-19

## 2020-11-19 VITALS
RESPIRATION RATE: 18 BRPM | TEMPERATURE: 97.1 F | HEART RATE: 98 BPM | DIASTOLIC BLOOD PRESSURE: 73 MMHG | SYSTOLIC BLOOD PRESSURE: 120 MMHG

## 2020-11-19 DIAGNOSIS — C18.7 MALIGNANT NEOPLASM OF SIGMOID COLON (HCC): Primary | ICD-10-CM

## 2020-11-19 PROCEDURE — 96523 IRRIG DRUG DELIVERY DEVICE: CPT

## 2020-11-19 PROCEDURE — 74011250636 HC RX REV CODE- 250/636: Performed by: REGISTERED NURSE

## 2020-11-19 PROCEDURE — 96365 THER/PROPH/DIAG IV INF INIT: CPT

## 2020-11-19 PROCEDURE — 96375 TX/PRO/DX INJ NEW DRUG ADDON: CPT

## 2020-11-19 RX ORDER — HEPARIN 100 UNIT/ML
300-500 SYRINGE INTRAVENOUS AS NEEDED
Status: DISCONTINUED | OUTPATIENT
Start: 2020-11-19 | End: 2020-11-20 | Stop reason: HOSPADM

## 2020-11-19 RX ORDER — SODIUM CHLORIDE 0.9 % (FLUSH) 0.9 %
10 SYRINGE (ML) INJECTION AS NEEDED
Status: DISCONTINUED | OUTPATIENT
Start: 2020-11-19 | End: 2020-11-20 | Stop reason: HOSPADM

## 2020-11-19 RX ORDER — SODIUM CHLORIDE 9 MG/ML
10 INJECTION INTRAMUSCULAR; INTRAVENOUS; SUBCUTANEOUS AS NEEDED
Status: DISCONTINUED | OUTPATIENT
Start: 2020-11-19 | End: 2020-11-20 | Stop reason: HOSPADM

## 2020-11-19 RX ADMIN — SODIUM CHLORIDE 750 MG: 900 INJECTION, SOLUTION INTRAVENOUS at 14:19

## 2020-11-19 RX ADMIN — HEPARIN 500 UNITS: 100 SYRINGE at 14:21

## 2020-11-19 RX ADMIN — SODIUM CHLORIDE 500 ML: 900 INJECTION, SOLUTION INTRAVENOUS at 14:19

## 2020-11-19 RX ADMIN — Medication 10 ML: at 14:21

## 2020-11-19 NOTE — PROGRESS NOTES
[facility] Short Consult Note:    Pt arrived at [facility] ambulatory and in no distress for pump removal and injectafer. Patient tolerated pump well but does complain of constipation. Port flushed and connected to NS KVO. Visit Vitals  /73   Pulse 98   Temp 97.1 °F (36.2 °C)   Resp 18   Breastfeeding No     Medications Administered     ferric carboxymaltose (INJECTAFER) 750 mg in 0.9% sodium chloride 250 mL, overfill volume 25 mL IVPB     Admin Date  11/19/2020 Action  Given Dose  750 mg Rate  870 mL/hr Route  IntraVENous Administered By  Elpidio Rajan RN          heparin (porcine) pf 300-500 Units     Admin Date  11/19/2020 Action  Given Dose  500 Units Route  InterCATHeter Administered By  Elpidio Rajan RN          sodium chloride (NS) flush 10 mL     Admin Date  11/19/2020 Action  Given Dose  10 mL Route  IntraVENous Administered By  Elpidio Rajan RN          sodium chloride 0.9 % bolus infusion 500 mL     Admin Date  11/19/2020 Action  New Bag Dose  500 mL Route  IntraVENous Administered By  Elpidio Rajan RN                All chemo contents infused. Port flushed per policy and needle removed, 2x2 and paper tape placed. Tolerated treatment well, no adverse reaction noted. D/Cd from [facility] ambulatory and in no distress accompanied by self.   Next appt   Future Appointments   Date Time Provider Yary Gomez   12/1/2020  8:00 AM B2 [facility] [address] [place] [address] H   12/3/2020  2:00 PM H2 [facility]   12/17/2020  8:30 AM F1 [facility] [place] [facility]   12/17/2020  2:00 PM H2 [facility]   12/29/2020  8:00 AM B2 [facility] [address] [place] [address] H   12/31/2020  2:00 PM H2 [facility]   1/12/2021  8:00 AM B2 [facility] [place] [facility]   1/14/2021  2:00 PM H2 [facility]

## 2020-11-30 RX ORDER — SODIUM CHLORIDE 0.9 % (FLUSH) 0.9 %
10 SYRINGE (ML) INJECTION AS NEEDED
Status: CANCELLED | OUTPATIENT
Start: 2020-12-17

## 2020-11-30 RX ORDER — PALONOSETRON 0.05 MG/ML
0.25 INJECTION, SOLUTION INTRAVENOUS ONCE
Status: CANCELLED | OUTPATIENT
Start: 2020-12-01

## 2020-11-30 RX ORDER — FLUOROURACIL 50 MG/ML
400 INJECTION, SOLUTION INTRAVENOUS ONCE
Status: CANCELLED
Start: 2020-12-15 | End: 2020-12-15

## 2020-11-30 RX ORDER — DIPHENHYDRAMINE HYDROCHLORIDE 50 MG/ML
50 INJECTION, SOLUTION INTRAMUSCULAR; INTRAVENOUS AS NEEDED
Status: CANCELLED
Start: 2020-12-15

## 2020-11-30 RX ORDER — HEPARIN 100 UNIT/ML
300-500 SYRINGE INTRAVENOUS AS NEEDED
Status: CANCELLED
Start: 2020-12-17

## 2020-11-30 RX ORDER — SODIUM CHLORIDE 0.9 % (FLUSH) 0.9 %
10 SYRINGE (ML) INJECTION AS NEEDED
Status: CANCELLED | OUTPATIENT
Start: 2020-12-15

## 2020-11-30 RX ORDER — SODIUM CHLORIDE 9 MG/ML
10 INJECTION INTRAMUSCULAR; INTRAVENOUS; SUBCUTANEOUS AS NEEDED
Status: CANCELLED | OUTPATIENT
Start: 2020-12-15

## 2020-11-30 RX ORDER — DIPHENHYDRAMINE HYDROCHLORIDE 50 MG/ML
25 INJECTION, SOLUTION INTRAMUSCULAR; INTRAVENOUS AS NEEDED
Status: CANCELLED
Start: 2020-12-15

## 2020-11-30 RX ORDER — HEPARIN 100 UNIT/ML
300-500 SYRINGE INTRAVENOUS AS NEEDED
Status: CANCELLED
Start: 2020-12-03

## 2020-11-30 RX ORDER — ACETAMINOPHEN 325 MG/1
650 TABLET ORAL AS NEEDED
Status: CANCELLED
Start: 2020-12-15

## 2020-11-30 RX ORDER — ACETAMINOPHEN 325 MG/1
650 TABLET ORAL AS NEEDED
Status: CANCELLED
Start: 2020-12-01

## 2020-11-30 RX ORDER — ALBUTEROL SULFATE 0.83 MG/ML
2.5 SOLUTION RESPIRATORY (INHALATION) AS NEEDED
Status: CANCELLED
Start: 2020-12-15

## 2020-11-30 RX ORDER — SODIUM CHLORIDE 0.9 % (FLUSH) 0.9 %
10 SYRINGE (ML) INJECTION AS NEEDED
Status: CANCELLED | OUTPATIENT
Start: 2020-12-01

## 2020-11-30 RX ORDER — PALONOSETRON 0.05 MG/ML
0.25 INJECTION, SOLUTION INTRAVENOUS ONCE
Status: CANCELLED | OUTPATIENT
Start: 2020-12-15

## 2020-11-30 RX ORDER — DIPHENHYDRAMINE HYDROCHLORIDE 50 MG/ML
25 INJECTION, SOLUTION INTRAMUSCULAR; INTRAVENOUS AS NEEDED
Status: CANCELLED
Start: 2020-12-01

## 2020-11-30 RX ORDER — DEXTROSE MONOHYDRATE 50 MG/ML
25 INJECTION, SOLUTION INTRAVENOUS CONTINUOUS
Status: CANCELLED
Start: 2020-12-15

## 2020-11-30 RX ORDER — SODIUM CHLORIDE 9 MG/ML
10 INJECTION INTRAMUSCULAR; INTRAVENOUS; SUBCUTANEOUS AS NEEDED
Status: CANCELLED | OUTPATIENT
Start: 2020-12-17

## 2020-11-30 RX ORDER — ONDANSETRON 2 MG/ML
8 INJECTION INTRAMUSCULAR; INTRAVENOUS AS NEEDED
Status: CANCELLED | OUTPATIENT
Start: 2020-12-15

## 2020-11-30 RX ORDER — ALBUTEROL SULFATE 0.83 MG/ML
2.5 SOLUTION RESPIRATORY (INHALATION) AS NEEDED
Status: CANCELLED
Start: 2020-12-01

## 2020-11-30 RX ORDER — FLUOROURACIL 50 MG/ML
400 INJECTION, SOLUTION INTRAVENOUS ONCE
Status: CANCELLED
Start: 2020-12-01 | End: 2020-12-01

## 2020-11-30 RX ORDER — HYDROCORTISONE SODIUM SUCCINATE 100 MG/2ML
100 INJECTION, POWDER, FOR SOLUTION INTRAMUSCULAR; INTRAVENOUS AS NEEDED
Status: CANCELLED | OUTPATIENT
Start: 2020-12-15

## 2020-11-30 RX ORDER — DEXTROSE MONOHYDRATE 50 MG/ML
25 INJECTION, SOLUTION INTRAVENOUS CONTINUOUS
Status: CANCELLED
Start: 2020-12-01

## 2020-11-30 RX ORDER — SODIUM CHLORIDE 9 MG/ML
10 INJECTION INTRAMUSCULAR; INTRAVENOUS; SUBCUTANEOUS AS NEEDED
Status: CANCELLED | OUTPATIENT
Start: 2020-12-01

## 2020-11-30 RX ORDER — SODIUM CHLORIDE 9 MG/ML
10 INJECTION INTRAMUSCULAR; INTRAVENOUS; SUBCUTANEOUS AS NEEDED
Status: CANCELLED | OUTPATIENT
Start: 2020-12-03

## 2020-11-30 RX ORDER — EPINEPHRINE 1 MG/ML
0.3 INJECTION, SOLUTION, CONCENTRATE INTRAVENOUS AS NEEDED
Status: CANCELLED | OUTPATIENT
Start: 2020-12-01

## 2020-11-30 RX ORDER — HEPARIN 100 UNIT/ML
300-500 SYRINGE INTRAVENOUS AS NEEDED
Status: CANCELLED
Start: 2020-12-01

## 2020-11-30 RX ORDER — DIPHENHYDRAMINE HYDROCHLORIDE 50 MG/ML
50 INJECTION, SOLUTION INTRAMUSCULAR; INTRAVENOUS AS NEEDED
Status: CANCELLED
Start: 2020-12-01

## 2020-11-30 RX ORDER — HYDROCORTISONE SODIUM SUCCINATE 100 MG/2ML
100 INJECTION, POWDER, FOR SOLUTION INTRAMUSCULAR; INTRAVENOUS AS NEEDED
Status: CANCELLED | OUTPATIENT
Start: 2020-12-01

## 2020-11-30 RX ORDER — ONDANSETRON 2 MG/ML
8 INJECTION INTRAMUSCULAR; INTRAVENOUS AS NEEDED
Status: CANCELLED | OUTPATIENT
Start: 2020-12-01

## 2020-11-30 RX ORDER — SODIUM CHLORIDE 0.9 % (FLUSH) 0.9 %
10 SYRINGE (ML) INJECTION AS NEEDED
Status: CANCELLED | OUTPATIENT
Start: 2020-12-03

## 2020-11-30 RX ORDER — HEPARIN 100 UNIT/ML
300-500 SYRINGE INTRAVENOUS AS NEEDED
Status: CANCELLED
Start: 2020-12-15

## 2020-11-30 RX ORDER — EPINEPHRINE 1 MG/ML
0.3 INJECTION, SOLUTION, CONCENTRATE INTRAVENOUS AS NEEDED
Status: CANCELLED | OUTPATIENT
Start: 2020-12-15

## 2020-12-01 ENCOUNTER — HOSPITAL ENCOUNTER (OUTPATIENT)
Dept: INFUSION THERAPY | Age: 70
Discharge: HOME | End: 2020-12-01
Payer: MEDICARE

## 2020-12-01 ENCOUNTER — OFFICE VISIT (OUTPATIENT)
Dept: ONCOLOGY | Age: 70
End: 2020-12-01
Payer: MEDICARE

## 2020-12-01 VITALS
HEIGHT: 66 IN | SYSTOLIC BLOOD PRESSURE: 123 MMHG | DIASTOLIC BLOOD PRESSURE: 74 MMHG | RESPIRATION RATE: 16 BRPM | TEMPERATURE: 96.9 F | WEIGHT: 118.6 LBS | HEART RATE: 90 BPM | BODY MASS INDEX: 19.06 KG/M2

## 2020-12-01 VITALS
HEART RATE: 98 BPM | SYSTOLIC BLOOD PRESSURE: 129 MMHG | DIASTOLIC BLOOD PRESSURE: 87 MMHG | WEIGHT: 119.1 LBS | HEIGHT: 66 IN | TEMPERATURE: 96.9 F | BODY MASS INDEX: 19.14 KG/M2 | OXYGEN SATURATION: 98 %

## 2020-12-01 DIAGNOSIS — C18.7 MALIGNANT NEOPLASM OF SIGMOID COLON (HCC): Primary | ICD-10-CM

## 2020-12-01 DIAGNOSIS — Z95.828 PORT-A-CATH IN PLACE: ICD-10-CM

## 2020-12-01 DIAGNOSIS — Z51.11 ENCOUNTER FOR ANTINEOPLASTIC CHEMOTHERAPY: ICD-10-CM

## 2020-12-01 LAB
ALBUMIN SERPL-MCNC: 3.6 G/DL (ref 3.5–5)
ALBUMIN/GLOB SERPL: 1.1 {RATIO} (ref 1.1–2.2)
ALP SERPL-CCNC: 181 U/L (ref 45–117)
ALT SERPL-CCNC: 92 U/L (ref 12–78)
ANION GAP SERPL CALC-SCNC: 7 MMOL/L (ref 5–15)
AST SERPL-CCNC: 52 U/L (ref 15–37)
BASOPHILS # BLD: 0 K/UL (ref 0–0.1)
BASOPHILS NFR BLD: 1 % (ref 0–1)
BILIRUB SERPL-MCNC: 0.4 MG/DL (ref 0.2–1)
BUN SERPL-MCNC: 14 MG/DL (ref 6–20)
BUN/CREAT SERPL: 21 (ref 12–20)
CALCIUM SERPL-MCNC: 8.4 MG/DL (ref 8.5–10.1)
CHLORIDE SERPL-SCNC: 109 MMOL/L (ref 97–108)
CO2 SERPL-SCNC: 24 MMOL/L (ref 21–32)
CREAT SERPL-MCNC: 0.66 MG/DL (ref 0.55–1.02)
DIFFERENTIAL METHOD BLD: ABNORMAL
EOSINOPHIL # BLD: 0.2 K/UL (ref 0–0.4)
EOSINOPHIL NFR BLD: 5 % (ref 0–7)
ERYTHROCYTE [DISTWIDTH] IN BLOOD BY AUTOMATED COUNT: 18.6 % (ref 11.5–14.5)
GLOBULIN SER CALC-MCNC: 3.4 G/DL (ref 2–4)
GLUCOSE SERPL-MCNC: 141 MG/DL (ref 65–100)
HCT VFR BLD AUTO: 38.3 % (ref 35–47)
HGB BLD-MCNC: 11.8 G/DL (ref 11.5–16)
IMM GRANULOCYTES # BLD AUTO: 0 K/UL (ref 0–0.04)
IMM GRANULOCYTES NFR BLD AUTO: 0 % (ref 0–0.5)
LYMPHOCYTES # BLD: 1.2 K/UL (ref 0.8–3.5)
LYMPHOCYTES NFR BLD: 24 % (ref 12–49)
MCH RBC QN AUTO: 25.4 PG (ref 26–34)
MCHC RBC AUTO-ENTMCNC: 30.8 G/DL (ref 30–36.5)
MCV RBC AUTO: 82.5 FL (ref 80–99)
MONOCYTES # BLD: 0.5 K/UL (ref 0–1)
MONOCYTES NFR BLD: 10 % (ref 5–13)
NEUTS SEG # BLD: 2.9 K/UL (ref 1.8–8)
NEUTS SEG NFR BLD: 60 % (ref 32–75)
NRBC # BLD: 0 K/UL (ref 0–0.01)
NRBC BLD-RTO: 0 PER 100 WBC
PLATELET # BLD AUTO: 252 K/UL (ref 150–400)
PMV BLD AUTO: 9 FL (ref 8.9–12.9)
POTASSIUM SERPL-SCNC: 3.7 MMOL/L (ref 3.5–5.1)
PROT SERPL-MCNC: 7 G/DL (ref 6.4–8.2)
RBC # BLD AUTO: 4.64 M/UL (ref 3.8–5.2)
SODIUM SERPL-SCNC: 140 MMOL/L (ref 136–145)
WBC # BLD AUTO: 4.8 K/UL (ref 3.6–11)

## 2020-12-01 PROCEDURE — G8420 CALC BMI NORM PARAMETERS: HCPCS | Performed by: INTERNAL MEDICINE

## 2020-12-01 PROCEDURE — 74011250636 HC RX REV CODE- 250/636: Performed by: REGISTERED NURSE

## 2020-12-01 PROCEDURE — 96413 CHEMO IV INFUSION 1 HR: CPT

## 2020-12-01 PROCEDURE — 77030012965 HC NDL HUBR BBMI -A

## 2020-12-01 PROCEDURE — 1090F PRES/ABSN URINE INCON ASSESS: CPT | Performed by: INTERNAL MEDICINE

## 2020-12-01 PROCEDURE — 96368 THER/DIAG CONCURRENT INF: CPT

## 2020-12-01 PROCEDURE — 99214 OFFICE O/P EST MOD 30 MIN: CPT | Performed by: INTERNAL MEDICINE

## 2020-12-01 PROCEDURE — 36415 COLL VENOUS BLD VENIPUNCTURE: CPT

## 2020-12-01 PROCEDURE — 85025 COMPLETE CBC W/AUTO DIFF WBC: CPT

## 2020-12-01 PROCEDURE — 96416 CHEMO PROLONG INFUSE W/PUMP: CPT

## 2020-12-01 PROCEDURE — 1101F PT FALLS ASSESS-DOCD LE1/YR: CPT | Performed by: INTERNAL MEDICINE

## 2020-12-01 PROCEDURE — G0463 HOSPITAL OUTPT CLINIC VISIT: HCPCS | Performed by: REGISTERED NURSE

## 2020-12-01 PROCEDURE — 74011000258 HC RX REV CODE- 258: Performed by: REGISTERED NURSE

## 2020-12-01 PROCEDURE — G9711 PT HX TOT COL OR COLON CA: HCPCS | Performed by: INTERNAL MEDICINE

## 2020-12-01 PROCEDURE — 96375 TX/PRO/DX INJ NEW DRUG ADDON: CPT

## 2020-12-01 PROCEDURE — G8400 PT W/DXA NO RESULTS DOC: HCPCS | Performed by: INTERNAL MEDICINE

## 2020-12-01 PROCEDURE — 96411 CHEMO IV PUSH ADDL DRUG: CPT

## 2020-12-01 PROCEDURE — G8427 DOCREV CUR MEDS BY ELIG CLIN: HCPCS | Performed by: INTERNAL MEDICINE

## 2020-12-01 PROCEDURE — G8510 SCR DEP NEG, NO PLAN REQD: HCPCS | Performed by: INTERNAL MEDICINE

## 2020-12-01 PROCEDURE — 80053 COMPREHEN METABOLIC PANEL: CPT

## 2020-12-01 PROCEDURE — G8536 NO DOC ELDER MAL SCRN: HCPCS | Performed by: INTERNAL MEDICINE

## 2020-12-01 RX ORDER — PALONOSETRON 0.05 MG/ML
0.25 INJECTION, SOLUTION INTRAVENOUS ONCE
Status: COMPLETED | OUTPATIENT
Start: 2020-12-01 | End: 2020-12-01

## 2020-12-01 RX ORDER — SODIUM CHLORIDE 0.9 % (FLUSH) 0.9 %
10 SYRINGE (ML) INJECTION AS NEEDED
Status: DISPENSED | OUTPATIENT
Start: 2020-12-01 | End: 2020-12-01

## 2020-12-01 RX ORDER — DEXTROSE MONOHYDRATE 50 MG/ML
25 INJECTION, SOLUTION INTRAVENOUS CONTINUOUS
Status: DISPENSED | OUTPATIENT
Start: 2020-12-01 | End: 2020-12-01

## 2020-12-01 RX ORDER — FLUOROURACIL 50 MG/ML
400 INJECTION, SOLUTION INTRAVENOUS ONCE
Status: COMPLETED | OUTPATIENT
Start: 2020-12-01 | End: 2020-12-01

## 2020-12-01 RX ADMIN — FLUOROURACIL 620 MG: 50 INJECTION, SOLUTION INTRAVENOUS at 12:55

## 2020-12-01 RX ADMIN — DEXTROSE MONOHYDRATE 25 ML/HR: 5 INJECTION, SOLUTION INTRAVENOUS at 09:52

## 2020-12-01 RX ADMIN — PALONOSETRON 0.25 MG: 0.05 INJECTION, SOLUTION INTRAVENOUS at 09:52

## 2020-12-01 RX ADMIN — DEXAMETHASONE SODIUM PHOSPHATE 12 MG: 4 INJECTION, SOLUTION INTRA-ARTICULAR; INTRALESIONAL; INTRAMUSCULAR; INTRAVENOUS; SOFT TISSUE at 09:59

## 2020-12-01 RX ADMIN — DEXTROSE MONOHYDRATE 620 MG: 50 INJECTION, SOLUTION INTRAVENOUS at 10:34

## 2020-12-01 RX ADMIN — Medication 10 ML: at 13:19

## 2020-12-01 RX ADMIN — OXALIPLATIN 132 MG: 5 INJECTION, SOLUTION, CONCENTRATE INTRAVENOUS at 10:34

## 2020-12-01 RX ADMIN — FLUOROURACIL 3720 MG: 50 INJECTION, SOLUTION INTRAVENOUS at 13:20

## 2020-12-01 RX ADMIN — Medication 10 ML: at 07:50

## 2020-12-01 NOTE — PROGRESS NOTES
[facility] Chemotherapy Progress Note    0745 Patient arrived to [facility] ambulatory for Folfox (C2) in stable condition. Assessment completed, no new concerns voiced. R chest wall port accessed without difficulty, port with good blood return, labs collected and sent for processing. Port flushed, Curos cap applied. Patient went to medical oncology appointment. Patient denies SOB, fever, cough, and/or general not feeling well. Patient denies recent exposure to someone who has tested positive for COVID-19. Patient denies contact with anyone who has a pending COVID test.     6092 Patient returned from [facility]. Labs within parameters for treatment. See [facility] for lab results. Medication ordered from pharmacy.      Patient Vitals for the past 12 hrs:   Temp Pulse Resp BP   12/01/20 1325 -- 90 -- 123/74   12/01/20 0747 96.9 °F (36.1 °C) 98 16 129/87     Chemotherapy Flowsheet 12/1/2020   Cycle C2   Date 12/1/2020   Drug / Regimen Folfox   Pre Meds given   Notes given     Medications Administered     dexamethasone (DECADRON) 12 mg in 0.9% sodium chloride 50 mL, overfill volume 5 mL IVPB     Admin Date  12/01/2020 Action  Given Dose  12 mg Rate  232 mL/hr Route  IntraVENous Administered By  Gillian Peña RN          dextrose 5% infusion     Admin Date  12/01/2020 Action  New Bag Dose  25 mL/hr Rate  25 mL/hr Route  IntraVENous Administered By  Gillian Peña RN          fluorouraciL (ADRUCIL) 3,720 mg in 0.9% sodium chloride 100 mL CADD Cassette     Admin Date  12/01/2020 Action  New Bag Dose  3720 mg Rate  2.2 mL/hr Route  IntraVENous Administered By  Gillian Peña RN          fluorouraciL (ADRUCIL) chemo syringe 620 mg     Admin Date  12/01/2020 Action  Given Dose  620 mg Rate  248 mL/hr Route  IntraVENous Administered By  Gillian Peña, ZAKIA          leucovorin (WELLCOVORIN) 620 mg in dextrose 5% 250 mL, overfill volume 25 mL IVPB     Admin Date  12/01/2020 Action  New Bag Dose  620 mg Rate  153 mL/hr Route  IntraVENous Administered By  Donell Tierney RN          oxaliplatin (ELOXATIN) 132 mg in dextrose 5% 250 mL, overfill volume 25 mL chemo infusion     Admin Date  12/01/2020 Action  New Bag Dose  132 mg Rate  150.7 mL/hr Route  IntraVENous Administered By  Donell Tierney RN          palonosetron HCl (ALOXI) injection 0.25 mg     Admin Date  12/01/2020 Action  Given Dose  0.25 mg Route  IntraVENous Administered By  Donell Tierney RN          sodium chloride (NS) flush 10 mL     Admin Date  12/01/2020 Action  Given Dose  10 mL Route  IntraVENous Administered By  Donell Tierney RN           Admin Date  12/01/2020 Action  Given Dose  10 mL Route  IntraVENous Administered By  Donell Tierney RN              8593 Patient tolerated treatment well. Port maintained positive blood return throughout entire infusion. Port connected to 5FU CADD at 2.2ml/hr. D/c ambulatory home in no distress.  Patient is aware of next apt on 12/3/2020 at 2:00pm.    Future Appointments   Date Time Provider Yary Gomez   12/3/2020  2:00 PM H2 [facility]   12/15/2020  8:30 AM B2 [facility] [address] [place]   12/17/2020  2:00 PM H2 [facility]   12/29/2020  8:00 AM B2 [facility] H   12/31/2020  2:00 PM H2 [facility]   1/12/2021  8:00 AM B2 [facility] [place] [facility]   1/14/2021  2:00 PM H2 [facility]   1/26/2021  8:00 AM B2 [facility] [place] [facility]   1/28/2021  3:00 PM H2 [facility]

## 2020-12-01 NOTE — PROGRESS NOTES
[facility] [place] [address] [address], [address]  W: [phone]  F: [phone]    Reason for Visit:   Marilin Hooper is a 72 y.o. female who is seen for stage III colon cancer. Treatment History:   · 9/10/2020 CT A/P - large, irregular pelvic mass measuring approximately 9cm likely representing neoplasm arising from the sigmoid colon, small amount of pelvic free fluid, colonic bowel wall thickening. Borderline enlarged retroperitoneal lymph nodes. 6 mm right lower lobe lung nodule. Mass effect from pelvic mass causing moderate right and minimal left hydronephrosis. · 9/14/2020: Colonoscopy - A large circumferential, ulcerated fungating mass was noted in proximal sigmoid colon. Mass was obstructing the lumen and could not be traversed. Cate Rode was present. · 9/15/2020: CT Chest - 7.5mm left lower lobe pulmonary nodule, small right pleural effusion, mild right basilar atelectasis  · 9/17/2020: Surgical resection - low anterior resection, mobilization of the splenic flexure, coloproctostomy, creation of loop ileostomy, total abdominal hysterectomy and left salpingo-oophorectomy, distal right ureterectomy, right ureteral re-implant with psoas hitch and placement of right ureteral stent  · 11/17/20: cycle 1 FOLFOX    History of Present Illness:   Patient is a 72 y.o. female seen for colon cancer    She presented to the ED on 9/11/2020 with complaints of right flank/back pain and RLQ abdominal pain. She states she has had several months of gas pain and \"blockages. \" She reports a weight loss of 12 lbs in the last 3 months. CT abd/pelv 9/10/2020 showed large, irregular pelvic mass measuring approximately 9cm likely representing neoplasm arising from the sigmoid colon. She also had hydronephrosis from the mass effect and urethral stent placed. CEA 7.4 on 9/11and colonoscopy performed 9/14 with biopsy + adenocarcinoma.  CT Chest performed showing 7.5mm left lower lobe pulmonary nodule. Mass found to be invading into the uterus and right ureter. Surgical resection performed on 9/17/2020 including low anterior resection, mobilization of the splenic flexure, coloproctostomy, creation of loop ileostomy, total abdominal hysterectomy and left salpingo-oophorectomy, distal right ureterectomy, right ureteral re-implant with psoas hitch and placement of right ureteral stent. She was to see me in clinic but was admitted 10/10/2020 with ileostomy prolapse. Had a revision of loop ileostomy 10/11/2020. Then needed ileostomy closure and anastomosis 10/27/2020. Today she presents for cycle 2 of FOLFOX. Cycle 1 was given without 5FU bolus. She feels well today. She has gained 3 lbs since last visit. She eats small frequent meals. She had one episode of nausea when she was brushing her teeth however this did not recur. She denies diarrhea. Had mild constipation for 1 day that resolved with juice. Her incision has healed. She denies numbness/tingling in extremities. Denies LE swelling. Denies SOB, CP, cough, fevers/chills, or bleeding. No other complaints today. Family Hx:  Mother with hx of pancreatic cancer    Past Medical History:   Diagnosis Date    Colon cancer ([address]     GERD (gastroesophageal reflux disease)     Ileostomy in place ([address]     Ileostomy prolapse ([address]     Ill-defined condition     Spaulding's esophagus      Past Surgical History:   Procedure Laterality Date    COLONOSCOPY Left 9/14/2020    COLONOSCOPY performed by Shadia Ya MD at [address] [place] [address]; incomplete secondary to partial obstruction.  HX APPENDECTOMY  age 17    HX COLONOSCOPY  circa 2010    No neoplasms.  HX ENDOSCOPY  03/13/2017    Dr. Twila Robertson HX ENDOSCOPY  02/13/2020    Dr. Eusebio Tai oopherectomy for treatment of benign mass.     HX GYN  09/17/2020    Total abdominal hysterectomy and left salpingo-oophorectomy; Yas Elliott MD.    HX OTHER SURGICAL  09/17/2020 Low anterior resection, mobilization of the splenic flexure, coloproctostomy, and creation of loop ileostomy; Dr. Aurelio Perkins.   OTHER SURGICAL  10/11/2020    Revision of loop ileostomy (resection and creation of divided loop ileostomy); Dr. Aurelio Perkins.   UROLOGICAL  09/12/2020    Cystoscopy and placement of a right ureteral stent; Edwin Mcdonough III, MD.     UROLOGICAL  09/17/2020    Cystoscopy and placement of a temporary left ureteral catheter; Edwin Mcdonough III, MD.     UROLOGICAL  09/17/2020    Distal right ureterectomy; Edwin Mcdonough III, MD.     UROLOGICAL  09/17/2020    Right ureteral re-implantation with psoas hitch and placement of a right ureteral stent; Ana Buchanan MD.    IR INSERT TUNL CVAD W PORT LESS THAN 5 YR  10/14/2020    Right chest Port-a-Cath placement. Social History     Tobacco Use    Smoking status: Never Smoker    Smokeless tobacco: Never Used   Substance Use Topics    Alcohol use: Yes     Alcohol/week: 1.0 standard drinks     Types: 1 Glasses of wine per week      Family History   Problem Relation Age of Onset    Cancer Mother     Heart Disease Father     Diabetes Brother      Current Outpatient Medications   Medication Sig    prochlorperazine (Compazine) 5 mg tablet Take 1 Tab by mouth every six (6) hours as needed for Nausea or Vomiting for up to 30 days.  lidocaine-prilocaine (EMLA) topical cream Apply  to affected area as needed for Pain. Apply over port a cath site 30-60 minutes prior to port access    dexAMETHasone (DECADRON) 4 mg tablet Take 2 tabs (8mg) once daily on days 2 & 3 of chemotherapy only    ondansetron (ZOFRAN ODT) 8 mg disintegrating tablet Take 1 Tab by mouth every eight (8) hours as needed for Nausea or Vomiting. Can start 72 hours after chemotherapy infusion    denosumab (PROLIA) 60 mg/mL injection 60 mg by SubCUTAneous route.  lansoprazole (PREVACID) 30 mg capsule Take 30 mg by mouth Daily (before breakfast).  multivitamin (ONE A DAY) tablet Take 1 Tab by mouth daily.  ascorbic acid, vitamin C, (VITAMIN C) 500 mg tablet Take 500 mg by mouth.  cholecalciferol (VITAMIN D3) 1,000 unit cap Take 1,000 Units by mouth daily.  Omega-3 Fatty Acids (FISH OIL) 500 mg cap Take  by mouth. No current facility-administered medications for this visit. No Known Allergies     Review of Systems: A complete review of systems was obtained, negative except as described above. Physical Exam:     Visit Vitals  /87   Pulse 98   Temp 96.9 °F (36.1 °C)   Ht 5' 6\" (1.676 m)   Wt 119 lb 1.6 oz (54 kg)   SpO2 98%   BMI 19.22 kg/m²     ECOG PS: 1  General: No distress but appears frail  Eyes: PERRLA, anicteric sclerae  HENT: Atraumatic, OP clear  GI: Soft, incisions healing well  MS: Digits without clubbing or cyanosis. Skin: No rashes, ecchymoses, or petechiae. Psych: Alert, oriented, appropriate affect, normal judgment/insight    Results:     Lab Results   Component Value Date/Time    WBC 5.6 11/17/2020 08:39 AM    HGB 11.3 (L) 11/17/2020 08:39 AM    HCT 36.1 11/17/2020 08:39 AM    PLATELET 423 (H) 29/92/6800 08:39 AM    MCV 80.2 11/17/2020 08:39 AM    ABS. NEUTROPHILS 3.2 11/17/2020 08:39 AM    Hemoglobin (POC) 12.0 09/17/2020 06:35 PM     Lab Results   Component Value Date/Time    Sodium 134 (L) 11/17/2020 08:39 AM    Potassium 3.9 11/17/2020 08:39 AM    Chloride 101 11/17/2020 08:39 AM    CO2 24 11/17/2020 08:39 AM    Glucose 129 (H) 11/17/2020 08:39 AM    BUN 17 11/17/2020 08:39 AM    Creatinine 0.95 11/17/2020 08:39 AM    GFR est AA >60 11/17/2020 08:39 AM    GFR est non-AA 58 (L) 11/17/2020 08:39 AM    Calcium 8.9 11/17/2020 08:39 AM    Glucose (POC) 98 09/27/2020 11:39 PM    Creatinine (POC) 0.8 09/10/2020 02:32 PM     Lab Results   Component Value Date/Time    Bilirubin, total 0.4 11/17/2020 08:39 AM    ALT (SGPT) 82 (H) 11/17/2020 08:39 AM    Alk.  phosphatase 102 11/17/2020 08:39 AM    Protein, total 7.3 11/17/2020 08:39 AM    Albumin 3.6 11/17/2020 08:39 AM    Globulin 3.7 11/17/2020 08:39 AM     CEA:  Recent Labs     11/17/20  0839 09/11/20  1455   CEA 1.0 7.4       CT A/P 9/23/2020  IMPRESSION:  Large, irregular pelvic mass measuring approximately 9 cm likely representing  neoplasm arising from the sigmoid colon. Adnexal neoplasm is a possibility. This  does appear to be separate from the uterus. Small amount of pelvic free fluid. Associated colonic bowel wall thickening. There is focal gas in the upper  presacral region which is unclear if this is intraluminal or contained  extraluminal collection.     No definite distant metastatic disease. Borderline enlarged retroperitoneal  lymph nodes. 6 mm right lower lobe lung nodule. Mass effect from the pelvic mass  causing moderate right and minimal left hydronephrosis. CT Chest 9/15/2020  IMPRESSION:  1. 7.5 mm left lower lobe pulmonary nodule. 2. Small right pleural effusion. 3. Mild right basilar atelectasis. 9/14/2020   Addendum Diagnosis   1. Sigmoid Mass, Biopsy:     Infiltrating adenocarcinoma, most consistent with colon (See comment)   Addendum Comment   Immunohistochemical stains reveal the following staining pattern:   CK7: Scattered cells with cytoplasmic membrane staining   CK20: Positive cytoplasmic staining in normal colonic mucosal epithelial cells and malignant epithelial cells   CDX-2: Diffuse strong nuclear decoration and all tumor cells and normal background colonic epithelial cells   PAX-8: Negative   Estrogen Receptor: Negative   Positive and negative controls stain appropriately. Due to radiologic findings suggesting the possibility of GYN involvement, ER and PAX-8 are performed, which lend support to the diagnosis of a colonic primary, over a tumor of müllerian origin. 9/17/2020   FINAL PATHOLOGIC DIAGNOSIS   1.  Sigmoid colon and proximal rectum, uterus, left fallopian tube and ovary and right ureteral segment, low anterior resection:   Sigmoid colon and proximal rectum:  Invasive adenocarcinoma (see synoptic report and comment). Metastatic adenocarcinoma in one of sixteen lymph nodes (1/16). Uterus: Invasive adenocarcinoma extending from adhesed colon into uterine serosa. Endometrial lining shows mucosal atrophy. Left ovary: Benign ovary with serosal inclusion cysts. Left fallopian tube: Benign fallopian tube. Right ureteral segment: Benign segment of ureter densely adhesed to colon. Nodule near right uterine cornu:  Nodular portion of benign smooth muscle consistent with leiomyoma with parenchymal hemorrhage. COLON AND RECTUM: Resection   SPECIMEN   Procedure: Low anterior resection   TUMOR   Tumor Site: Sigmoid colon   Histologic Type: Adenocarcinoma   Histologic Grade: G2: Moderately differentiated   Tumor Size: 8.0 cm   Tumor Extension: Tumor directly invades adjacent structures:   Posterior uterine serosa   Macroscopic Tumor Perforation: Tumor invades through serosa into surrounding soft tissue   Lymphovascular Invasion: Present   Perineural Invasion: Not identified   Treatment Effect: No known presurgical therapy   MARGINS   Margins: All margins are uninvolved by invasive carcinoma   Margins Examined: Proximal, Distal, Radial (circumferential) or   Mesenteric   Distance of Tumor from Radial (circumferential) Margin: See Comment   LYMPH NODES   Number of Lymph Nodes Involved: 1   Number of Lymph Nodes Examined: 16   Tumor Deposits: Not identified   PATHOLOGIC STAGE CLASSIFICATION (pTNM, AJCC 8th Edition)   Primary Tumor (pT): pT4b   Regional Lymph Nodes (pN): pN1a   2. Distal rectal margin:   Segment of benign large bowel. 3. Anastomotic doughnuts:   Two annular portions of benign large bowel. Comment   Tumor invades into the adherent soft tissue and to within 1.1 cm of the apparent right pelvic sidewall margin. Records reviewed and summarized above. Pathology report(s) reviewed above.   Radiology report(s) reviewed above. Assessment:   1) Sigmoid Colon Adenocarcinoma - Stage IIIB- ERICKA  [id number]  CT abd/pelv 9/10/2020 showed large, irregular pelvic mass measuring approximately 9cm likely representing neoplasm arising from the sigmoid colon. CEA 7.4 on   Colonoscopy performed  and biopsy + adenocarcinoma  Mass found to be invading into the uterus and right ureter. Surgical resection on  with creation of loop ileostomy, total abdominal hysterectomy and left salpingo-oophorectomy, distal right ureterectomy  She had a revision of her ileostomy 10/11/2020  Then needed stoma closure and anastomosis on 10/27/2020    Today is cycle 2 of adjuvant FOLFOX x 6 months   Cycle 1 was given without 5FU bolus and she tolerated this well   Will introduce 5FU bolus today at full dose  Cbc stable    2)Thrombocytosis  Resolved     3) Anemia  Due to #1  Iron sat of 4% and ferritin of 20 on   S/p 2 units PRBCs on 9/15 and Venofer 200mg x3 doses  Anemia has improved    4) Elevated transaminases  Monitor- if increases further will obtain an USG of abdomen    Plan:     · Proceed with cycle 2 of mFOLFOX with 5FU bolus (5FU CADD 2400mg/m2, 5FU bolus 400mg/m2, leucovorin 400mg/m2, oxaliplatin 85mg/m2) every 2 weeks for 6 months  · Cbc, cmp every cycle, cea every other  · Zofran prn, dexamethasone days 2 and 3  · Follow with surgery as scheduled     RTC in 2 weeks for cycle 3      Friend Ivett at [phone] - [facility]    I appreciate the opportunity to participate in MsPranav Javier Negreteliam carrington. Signed By: Tammie Young MD    I performed a history and physical examination of the patient and discussed his management with the NPP.  I reviewed the NPP note and agree with the documented findings and plan of care

## 2020-12-01 NOTE — PROGRESS NOTES
Otoniel Chavez is a 72 y.o. female  Chief Complaint   Patient presents with    Follow-up    Colon Cancer   1. Have you been to the ER, urgent care clinic since your last visit? Hospitalized since your last visit? No    2. Have you seen or consulted any other health care providers outside of the [address] [place] [address] since your last visit? Include any pap smears or colon screening.  no

## 2020-12-03 ENCOUNTER — HOSPITAL ENCOUNTER (OUTPATIENT)
Dept: INFUSION THERAPY | Age: 70
Discharge: HOME | End: 2020-12-03
Payer: MEDICARE

## 2020-12-03 VITALS
RESPIRATION RATE: 16 BRPM | SYSTOLIC BLOOD PRESSURE: 116 MMHG | DIASTOLIC BLOOD PRESSURE: 76 MMHG | HEART RATE: 105 BPM | TEMPERATURE: 98.6 F

## 2020-12-03 DIAGNOSIS — C18.7 MALIGNANT NEOPLASM OF SIGMOID COLON (HCC): Primary | ICD-10-CM

## 2020-12-03 PROCEDURE — 74011250636 HC RX REV CODE- 250/636: Performed by: REGISTERED NURSE

## 2020-12-03 PROCEDURE — 96523 IRRIG DRUG DELIVERY DEVICE: CPT

## 2020-12-03 RX ORDER — SODIUM CHLORIDE 9 MG/ML
10 INJECTION INTRAMUSCULAR; INTRAVENOUS; SUBCUTANEOUS AS NEEDED
Status: DISCONTINUED | OUTPATIENT
Start: 2020-12-03 | End: 2020-12-04 | Stop reason: HOSPADM

## 2020-12-03 RX ORDER — HEPARIN 100 UNIT/ML
300-500 SYRINGE INTRAVENOUS AS NEEDED
Status: DISCONTINUED | OUTPATIENT
Start: 2020-12-03 | End: 2020-12-04 | Stop reason: HOSPADM

## 2020-12-03 RX ORDER — SODIUM CHLORIDE 0.9 % (FLUSH) 0.9 %
10 SYRINGE (ML) INJECTION AS NEEDED
Status: DISCONTINUED | OUTPATIENT
Start: 2020-12-03 | End: 2020-12-04 | Stop reason: HOSPADM

## 2020-12-03 RX ADMIN — Medication 10 ML: at 14:15

## 2020-12-03 RX ADMIN — HEPARIN 500 UNITS: 100 SYRINGE at 14:15

## 2020-12-03 NOTE — PROGRESS NOTES
[facility]fusion [facility] Short Visit Progress Note    8585 Pt admit to [facility] for 5FU pump disconnect ambulatory in stable condition. Assessment completed. No new concerns voiced. Patient Vitals for the past 12 hrs:   Temp Pulse Resp BP   12/03/20 1412 98.6 °F (37 °C) (!) 105 16 116/76       5FU pump displaying 0.0 mL left to infuse upon arrival. 5FU pump disconnected per protocol. Port flushed with positive blood return, heparinized and de-accessed per protocol. Medications:  NS flushes  Heparin     1415 Pt tolerated treatment well. D/c home ambulatory in no distress. Pt aware of next appointment scheduled for 12/15/20.

## 2020-12-15 ENCOUNTER — OFFICE VISIT (OUTPATIENT)
Dept: ONCOLOGY | Age: 70
End: 2020-12-15
Payer: MEDICARE

## 2020-12-15 ENCOUNTER — HOSPITAL ENCOUNTER (OUTPATIENT)
Dept: INFUSION THERAPY | Age: 70
Discharge: HOME | End: 2020-12-15
Payer: MEDICARE

## 2020-12-15 VITALS
OXYGEN SATURATION: 98 % | SYSTOLIC BLOOD PRESSURE: 132 MMHG | DIASTOLIC BLOOD PRESSURE: 85 MMHG | HEIGHT: 66 IN | RESPIRATION RATE: 18 BRPM | BODY MASS INDEX: 19.77 KG/M2 | WEIGHT: 123 LBS | HEART RATE: 98 BPM | TEMPERATURE: 96.3 F

## 2020-12-15 VITALS
WEIGHT: 123 LBS | BODY MASS INDEX: 19.77 KG/M2 | RESPIRATION RATE: 18 BRPM | SYSTOLIC BLOOD PRESSURE: 129 MMHG | DIASTOLIC BLOOD PRESSURE: 93 MMHG | TEMPERATURE: 97.3 F | HEART RATE: 99 BPM | HEIGHT: 66 IN

## 2020-12-15 DIAGNOSIS — C18.7 MALIGNANT NEOPLASM OF SIGMOID COLON (HCC): Primary | ICD-10-CM

## 2020-12-15 DIAGNOSIS — Z95.828 PORT-A-CATH IN PLACE: ICD-10-CM

## 2020-12-15 DIAGNOSIS — G62.0 CHEMOTHERAPY-INDUCED NEUROPATHY (HCC): ICD-10-CM

## 2020-12-15 DIAGNOSIS — Z51.11 ENCOUNTER FOR ANTINEOPLASTIC CHEMOTHERAPY: ICD-10-CM

## 2020-12-15 DIAGNOSIS — T45.1X5A CHEMOTHERAPY-INDUCED NEUROPATHY (HCC): ICD-10-CM

## 2020-12-15 LAB
ALBUMIN SERPL-MCNC: 3.4 G/DL (ref 3.5–5)
ALBUMIN/GLOB SERPL: 0.9 {RATIO} (ref 1.1–2.2)
ALP SERPL-CCNC: 176 U/L (ref 45–117)
ALT SERPL-CCNC: 71 U/L (ref 12–78)
ANION GAP SERPL CALC-SCNC: 7 MMOL/L (ref 5–15)
AST SERPL-CCNC: 33 U/L (ref 15–37)
BASOPHILS # BLD: 0 K/UL (ref 0–0.1)
BASOPHILS NFR BLD: 1 % (ref 0–1)
BILIRUB SERPL-MCNC: 0.6 MG/DL (ref 0.2–1)
BUN SERPL-MCNC: 14 MG/DL (ref 6–20)
BUN/CREAT SERPL: 18 (ref 12–20)
CALCIUM SERPL-MCNC: 9.3 MG/DL (ref 8.5–10.1)
CHLORIDE SERPL-SCNC: 106 MMOL/L (ref 97–108)
CO2 SERPL-SCNC: 26 MMOL/L (ref 21–32)
CREAT SERPL-MCNC: 0.76 MG/DL (ref 0.55–1.02)
DIFFERENTIAL METHOD BLD: ABNORMAL
EOSINOPHIL # BLD: 0.1 K/UL (ref 0–0.4)
EOSINOPHIL NFR BLD: 4 % (ref 0–7)
ERYTHROCYTE [DISTWIDTH] IN BLOOD BY AUTOMATED COUNT: 20.2 % (ref 11.5–14.5)
GLOBULIN SER CALC-MCNC: 3.7 G/DL (ref 2–4)
GLUCOSE SERPL-MCNC: 137 MG/DL (ref 65–100)
HCT VFR BLD AUTO: 40 % (ref 35–47)
HGB BLD-MCNC: 12.6 G/DL (ref 11.5–16)
IMM GRANULOCYTES # BLD AUTO: 0 K/UL (ref 0–0.04)
IMM GRANULOCYTES NFR BLD AUTO: 1 % (ref 0–0.5)
LYMPHOCYTES # BLD: 1 K/UL (ref 0.8–3.5)
LYMPHOCYTES NFR BLD: 28 % (ref 12–49)
MCH RBC QN AUTO: 26.6 PG (ref 26–34)
MCHC RBC AUTO-ENTMCNC: 31.5 G/DL (ref 30–36.5)
MCV RBC AUTO: 84.6 FL (ref 80–99)
MONOCYTES # BLD: 0.5 K/UL (ref 0–1)
MONOCYTES NFR BLD: 14 % (ref 5–13)
NEUTS SEG # BLD: 2 K/UL (ref 1.8–8)
NEUTS SEG NFR BLD: 52 % (ref 32–75)
NRBC # BLD: 0 K/UL (ref 0–0.01)
NRBC BLD-RTO: 0 PER 100 WBC
PLATELET # BLD AUTO: 133 K/UL (ref 150–400)
PMV BLD AUTO: 9.1 FL (ref 8.9–12.9)
POTASSIUM SERPL-SCNC: 3.4 MMOL/L (ref 3.5–5.1)
PROT SERPL-MCNC: 7.1 G/DL (ref 6.4–8.2)
RBC # BLD AUTO: 4.73 M/UL (ref 3.8–5.2)
RBC MORPH BLD: ABNORMAL
SODIUM SERPL-SCNC: 139 MMOL/L (ref 136–145)
WBC # BLD AUTO: 3.6 K/UL (ref 3.6–11)

## 2020-12-15 PROCEDURE — G8420 CALC BMI NORM PARAMETERS: HCPCS | Performed by: INTERNAL MEDICINE

## 2020-12-15 PROCEDURE — 77030012965 HC NDL HUBR BBMI -A

## 2020-12-15 PROCEDURE — 99214 OFFICE O/P EST MOD 30 MIN: CPT | Performed by: INTERNAL MEDICINE

## 2020-12-15 PROCEDURE — 74011250637 HC RX REV CODE- 250/637: Performed by: REGISTERED NURSE

## 2020-12-15 PROCEDURE — 96368 THER/DIAG CONCURRENT INF: CPT

## 2020-12-15 PROCEDURE — 96413 CHEMO IV INFUSION 1 HR: CPT

## 2020-12-15 PROCEDURE — 96375 TX/PRO/DX INJ NEW DRUG ADDON: CPT

## 2020-12-15 PROCEDURE — G8510 SCR DEP NEG, NO PLAN REQD: HCPCS | Performed by: INTERNAL MEDICINE

## 2020-12-15 PROCEDURE — G8400 PT W/DXA NO RESULTS DOC: HCPCS | Performed by: INTERNAL MEDICINE

## 2020-12-15 PROCEDURE — G9711 PT HX TOT COL OR COLON CA: HCPCS | Performed by: INTERNAL MEDICINE

## 2020-12-15 PROCEDURE — 82378 CARCINOEMBRYONIC ANTIGEN: CPT

## 2020-12-15 PROCEDURE — 96411 CHEMO IV PUSH ADDL DRUG: CPT

## 2020-12-15 PROCEDURE — 1090F PRES/ABSN URINE INCON ASSESS: CPT | Performed by: INTERNAL MEDICINE

## 2020-12-15 PROCEDURE — 74011000258 HC RX REV CODE- 258: Performed by: REGISTERED NURSE

## 2020-12-15 PROCEDURE — G8427 DOCREV CUR MEDS BY ELIG CLIN: HCPCS | Performed by: INTERNAL MEDICINE

## 2020-12-15 PROCEDURE — 1101F PT FALLS ASSESS-DOCD LE1/YR: CPT | Performed by: INTERNAL MEDICINE

## 2020-12-15 PROCEDURE — G0463 HOSPITAL OUTPT CLINIC VISIT: HCPCS | Performed by: REGISTERED NURSE

## 2020-12-15 PROCEDURE — 85025 COMPLETE CBC W/AUTO DIFF WBC: CPT

## 2020-12-15 PROCEDURE — 74011250636 HC RX REV CODE- 250/636: Performed by: REGISTERED NURSE

## 2020-12-15 PROCEDURE — G8536 NO DOC ELDER MAL SCRN: HCPCS | Performed by: INTERNAL MEDICINE

## 2020-12-15 PROCEDURE — 96416 CHEMO PROLONG INFUSE W/PUMP: CPT

## 2020-12-15 PROCEDURE — 36415 COLL VENOUS BLD VENIPUNCTURE: CPT

## 2020-12-15 PROCEDURE — 80053 COMPREHEN METABOLIC PANEL: CPT

## 2020-12-15 RX ORDER — SODIUM CHLORIDE 0.9 % (FLUSH) 0.9 %
10 SYRINGE (ML) INJECTION AS NEEDED
Status: DISPENSED | OUTPATIENT
Start: 2020-12-15 | End: 2020-12-15

## 2020-12-15 RX ORDER — POTASSIUM CHLORIDE 750 MG/1
20 TABLET, FILM COATED, EXTENDED RELEASE ORAL ONCE
Status: COMPLETED | OUTPATIENT
Start: 2020-12-15 | End: 2020-12-15

## 2020-12-15 RX ORDER — PALONOSETRON 0.05 MG/ML
0.25 INJECTION, SOLUTION INTRAVENOUS ONCE
Status: COMPLETED | OUTPATIENT
Start: 2020-12-15 | End: 2020-12-15

## 2020-12-15 RX ORDER — DEXTROSE MONOHYDRATE 50 MG/ML
25 INJECTION, SOLUTION INTRAVENOUS CONTINUOUS
Status: DISPENSED | OUTPATIENT
Start: 2020-12-15 | End: 2020-12-15

## 2020-12-15 RX ORDER — FLUOROURACIL 50 MG/ML
400 INJECTION, SOLUTION INTRAVENOUS ONCE
Status: COMPLETED | OUTPATIENT
Start: 2020-12-15 | End: 2020-12-15

## 2020-12-15 RX ADMIN — PALONOSETRON 0.25 MG: 0.05 INJECTION, SOLUTION INTRAVENOUS at 10:33

## 2020-12-15 RX ADMIN — FLUOROURACIL 620 MG: 50 INJECTION, SOLUTION INTRAVENOUS at 13:34

## 2020-12-15 RX ADMIN — POTASSIUM CHLORIDE 20 MEQ: 750 TABLET, EXTENDED RELEASE ORAL at 10:32

## 2020-12-15 RX ADMIN — FLUOROURACIL 3720 MG: 50 INJECTION, SOLUTION INTRAVENOUS at 13:40

## 2020-12-15 RX ADMIN — DEXAMETHASONE SODIUM PHOSPHATE 12 MG: 4 INJECTION, SOLUTION INTRA-ARTICULAR; INTRALESIONAL; INTRAMUSCULAR; INTRAVENOUS; SOFT TISSUE at 10:36

## 2020-12-15 RX ADMIN — DEXTROSE MONOHYDRATE 25 ML/HR: 5 INJECTION, SOLUTION INTRAVENOUS at 10:31

## 2020-12-15 RX ADMIN — OXALIPLATIN 132 MG: 5 INJECTION, SOLUTION, CONCENTRATE INTRAVENOUS at 11:05

## 2020-12-15 RX ADMIN — DEXTROSE MONOHYDRATE 620 MG: 50 INJECTION, SOLUTION INTRAVENOUS at 11:05

## 2020-12-15 RX ADMIN — Medication 10 ML: at 08:20

## 2020-12-15 NOTE — PROGRESS NOTES
Pt is here for routine follow up, she reports feeling well, no complaints. Eating and drinking well, no N/V/D      Visit Vitals  /85   Pulse 98   Temp (!) 96.3 °F (35.7 °C)   Resp 18   Ht 5' 6\" (1.676 m)   Wt 123 lb (55.8 kg)   SpO2 98%   BMI 19.85 kg/m²       Pain Scale: 0 - No pain/10  Pain Location:       1. Have you been to the ER, urgent care clinic since your last visit? Hospitalized since your last visit? No    2. Have you seen or consulted any other health care providers outside of the [address] [place] [address] since your last visit? Include any pap smears or colon screening.   No    Health Maintenance reviewed

## 2020-12-15 NOTE — PROGRESS NOTES
[facility] Chemotherapy Progress Note    Patient denies SOB, fever, cough, and/or general not feeling well. Patient denies recent exposure to someone who has tested positive for COVID-19. Patient denies contact with anyone who has a pending COVID test.     0800 Patient admit to [facility] ambulatory for Folfox (C3) in stable condition. Assessment completed, no new concerns voiced. R chest wall port accessed with 1 inch price needle, port with positive blood return, labs collected and sent for processing. Port secured, Curos cap applied to end clave. Patient went to medical oncology appointment. [id number] Patient returned to [facility]. Labs within parameters for treatment. Medications ordered from pharmacy. Port connected to D5.     Patient Vitals for the past 12 hrs:   Temp Pulse Resp BP   12/15/20 1345 -- 99 -- (!) 129/93   12/15/20 0800 97.3 °F (36.3 °C) 99 18 (!) 142/89     Chemotherapy Flowsheet 12/15/2020   Cycle C3   Date 12/15/2020   Drug / Regimen Folfox   Pre Meds given   Notes given     Medications Administered     dexamethasone (DECADRON) 12 mg in 0.9% sodium chloride 50 mL, overfill volume 5 mL IVPB     Admin Date  12/15/2020 Action  Given Dose  12 mg Rate  232 mL/hr Route  IntraVENous Administered By  Ronney Aase, RN          dextrose 5% infusion     Admin Date  12/15/2020 Action  New Bag Dose  25 mL/hr Rate  25 mL/hr Route  IntraVENous Administered By  Ronney Aase, RN          fluorouraciL (ADRUCIL) 3,720 mg in 0.9% sodium chloride 100 mL CADD Cassette     Admin Date  12/15/2020 Action  New Bag Dose  3720 mg Rate  2.2 mL/hr Route  IntraVENous Administered By  Ronney Aase, RN          fluorouraciL (ADRUCIL) chemo syringe 620 mg     Admin Date  12/15/2020 Action  Given Dose  620 mg Rate  248 mL/hr Route  IntraVENous Administered By  Ronney Aase, RN          leucovorin (WELLCOVORIN) 620 mg in dextrose 5% 250 mL, overfill volume 25 mL IVPB     Admin Date  12/15/2020 Action  New Bag Dose  620 mg Rate  153 mL/hr Route  IntraVENous Administered By  Sedrick Fields RN          oxaliplatin (ELOXATIN) 132 mg in dextrose 5% 250 mL, overfill volume 25 mL chemo infusion     Admin Date  12/15/2020 Action  New Bag Dose  132 mg Rate  150.7 mL/hr Route  IntraVENous Administered By  Sedrick Fields RN          palonosetron HCl (ALOXI) injection 0.25 mg     Admin Date  12/15/2020 Action  Given Dose  0.25 mg Route  IntraVENous Administered By  Sedrick Fields RN          potassium chloride SR (KLOR-CON 10) tablet 20 mEq     Admin Date  12/15/2020 Action  Given Dose  20 mEq Route  Oral Administered By  Sedrick Fields RN          sodium chloride (NS) flush 10 mL     Admin Date  12/15/2020 Action  Given Dose  10 mL Route  IntraVENous Administered By  Sedrick Fields RN              5699 Patient tolerated treatment well. Port maintained blood return throughout entire infusion. Port flushed and connected to 5FU pump at 2.2ml/hr. D/c ambulatory home in no distress.  Patient is aware of next appointment on 12/17/2020 at 2:00pm.     Future Appointments   Date Time Provider Yary Gomez   12/17/2020  2:00 PM H2 [facility]   12/29/2020  8:00 AM B2 [facility] [place] [facility]   12/31/2020  2:00 PM H2 [facility]   1/12/2021  8:00 AM B2 [facility] [place] [facility]   1/12/2021  8:15 AM Courtney Larsen  [address] [facility] AMB   1/14/2021  2:00 PM H2 [facility]   1/26/2021  8:00 AM B2 [facility] [place] [facility]   1/28/2021  3:00 PM H2 [facility]   2/9/2021  8:00 AM B2 [facility] [place] [facility]   2/11/2021  3:00 PM H2 [facility]

## 2020-12-15 NOTE — PROGRESS NOTES
[facility] [place] [address] [address], [address]  W: [phone]  F: [phone]    Reason for Visit:   Kay Powers is a 72 y.o. female who is seen for stage III colon cancer. Treatment History:   · 9/10/2020 CT A/P - large, irregular pelvic mass measuring approximately 9cm likely representing neoplasm arising from the sigmoid colon, small amount of pelvic free fluid, colonic bowel wall thickening. Borderline enlarged retroperitoneal lymph nodes. 6 mm right lower lobe lung nodule. Mass effect from pelvic mass causing moderate right and minimal left hydronephrosis. · 9/14/2020: Colonoscopy - A large circumferential, ulcerated fungating mass was noted in proximal sigmoid colon. Mass was obstructing the lumen and could not be traversed. Glennette Custard was present. · 9/15/2020: CT Chest - 7.5mm left lower lobe pulmonary nodule, small right pleural effusion, mild right basilar atelectasis  · 9/17/2020: Surgical resection - low anterior resection, mobilization of the splenic flexure, coloproctostomy, creation of loop ileostomy, total abdominal hysterectomy and left salpingo-oophorectomy, distal right ureterectomy, right ureteral re-implant with psoas hitch and placement of right ureteral stent  · 11/17/20: cycle 1 FOLFOX    History of Present Illness:   Patient is a 72 y.o. female seen for colon cancer    She presented to the ED on 9/11/2020 with complaints of right flank/back pain and RLQ abdominal pain. She states she has had several months of gas pain and \"blockages. \" She reports a weight loss of 12 lbs in the last 3 months. CT abd/pelv 9/10/2020 showed large, irregular pelvic mass measuring approximately 9cm likely representing neoplasm arising from the sigmoid colon. She also had hydronephrosis from the mass effect and urethral stent placed. CEA 7.4 on 9/11and colonoscopy performed 9/14 with biopsy + adenocarcinoma.  CT Chest performed showing 7.5mm left lower lobe pulmonary nodule. Mass found to be invading into the uterus and right ureter. Surgical resection performed on 9/17/2020 including low anterior resection, mobilization of the splenic flexure, coloproctostomy, creation of loop ileostomy, total abdominal hysterectomy and left salpingo-oophorectomy, distal right ureterectomy, right ureteral re-implant with psoas hitch and placement of right ureteral stent. She was to see me in clinic but was admitted 10/10/2020 with ileostomy prolapse. Had a revision of loop ileostomy 10/11/2020. Then needed ileostomy closure and anastomosis 10/27/2020. Today she presents for cycle 3 of FOLFOX. Cycle 1 was given without 5FU bolus and cycle 2 was given with 5FU bolus. She had some tingling in her legs that lasted for a couple of days after cycle 2 but has since subsided. She denies mouth sores. Denies nausea/vomiting or diarrhea/constipation. No bleeding. Her incision has healed. Denies leg swelling. Denies SOB, CP, cough, fevers/chills, or bleeding. No other complaints today. Family Hx:  Mother with hx of pancreatic cancer    Past Medical History:   Diagnosis Date    [facility] ([facility]     GERD (gastroesophageal reflux disease)     Ileostomy in place ([facility]     Ileostomy prolapse ([facility]     Ill-defined condition     Spaulding's esophagus      Past Surgical History:   Procedure Laterality Date    COLONOSCOPY Left 9/14/2020    COLONOSCOPY performed by Anil Liao MD at [address] [place] [address]; incomplete secondary to partial obstruction.  HX APPENDECTOMY  age 17    HX COLONOSCOPY  circa 2010    No neoplasms.  HX ENDOSCOPY  03/13/2017    Dr. Jazmyn Hood HX ENDOSCOPY  02/13/2020    Dr. Mendez Burn oopherectomy for treatment of benign mass.     HX GYN  09/17/2020    Total abdominal hysterectomy and left salpingo-oophorectomy; Jorje Chiu MD.    HX OTHER SURGICAL  09/17/2020    Low anterior resection, mobilization of the splenic flexure, coloproctostomy, and creation of loop ileostomy; Dr. Jase Cuellar.   OTHER SURGICAL  10/11/2020    Revision of loop ileostomy (resection and creation of divided loop ileostomy); Dr. Jase Cuellar.   UROLOGICAL  09/12/2020    Cystoscopy and placement of a right ureteral stent; Felicia Eric III, MD.     UROLOGICAL  09/17/2020    Cystoscopy and placement of a temporary left ureteral catheter; Felicia Eric III, MD.     UROLOGICAL  09/17/2020    Distal right ureterectomy; Felicia Eric III, MD.     UROLOGICAL  09/17/2020    Right ureteral re-implantation with psoas hitch and placement of a right ureteral stent; Sunni Woods MD.    IR INSERT TUNL CVAD W PORT LESS THAN 5 YR  10/14/2020    Right chest Port-a-Cath placement. Social History     Tobacco Use    Smoking status: Never Smoker    Smokeless tobacco: Never Used   Substance Use Topics    Alcohol use: Yes     Alcohol/week: 1.0 standard drinks     Types: 1 Glasses of wine per week      Family History   Problem Relation Age of Onset    Cancer Mother     Heart Disease Father     Diabetes Brother      Current Outpatient Medications   Medication Sig    prochlorperazine (Compazine) 5 mg tablet Take 1 Tab by mouth every six (6) hours as needed for Nausea or Vomiting for up to 30 days.  lidocaine-prilocaine (EMLA) topical cream Apply  to affected area as needed for Pain. Apply over port a cath site 30-60 minutes prior to port access    dexAMETHasone (DECADRON) 4 mg tablet Take 2 tabs (8mg) once daily on days 2 & 3 of chemotherapy only    ondansetron (ZOFRAN ODT) 8 mg disintegrating tablet Take 1 Tab by mouth every eight (8) hours as needed for Nausea or Vomiting. Can start 72 hours after chemotherapy infusion    denosumab (PROLIA) 60 mg/mL injection 60 mg by SubCUTAneous route.  lansoprazole (PREVACID) 30 mg capsule Take 30 mg by mouth Daily (before breakfast).  multivitamin (ONE A DAY) tablet Take 1 Tab by mouth daily.     ascorbic acid, vitamin C, (VITAMIN C) 500 mg tablet Take 500 mg by mouth.  cholecalciferol (VITAMIN D3) 1,000 unit cap Take 1,000 Units by mouth daily.  Omega-3 Fatty Acids (FISH OIL) 500 mg cap Take  by mouth. No current facility-administered medications for this visit. Facility-Administered Medications Ordered in Other Visits   Medication Dose Route Frequency    sodium chloride (NS) flush 10 mL  10 mL IntraVENous PRN      No Known Allergies     Review of Systems: A complete review of systems was obtained, negative except as described above. Physical Exam:     Visit Vitals  /85   Pulse 98   Temp (!) 96.3 °F (35.7 °C)   Resp 18   Ht 5' 6\" (1.676 m)   Wt 123 lb (55.8 kg)   SpO2 98%   BMI 19.85 kg/m²     ECOG PS: 1  General: No distress but appears frail  Eyes: PERRLA, anicteric sclerae  HENT: Atraumatic, OP clear  GI: Soft, incisions healing well  MS: Digits without clubbing or cyanosis. Skin: No rashes, ecchymoses, or petechiae. Psych: Alert, oriented, appropriate affect, normal judgment/insight    Results:     Lab Results   Component Value Date/Time    WBC 4.8 12/01/2020 07:58 AM    HGB 11.8 12/01/2020 07:58 AM    HCT 38.3 12/01/2020 07:58 AM    PLATELET 550 98/83/9567 07:58 AM    MCV 82.5 12/01/2020 07:58 AM    ABS.  NEUTROPHILS 2.9 12/01/2020 07:58 AM    Hemoglobin (POC) 12.0 09/17/2020 06:35 PM     Lab Results   Component Value Date/Time    Sodium 140 12/01/2020 07:58 AM    Potassium 3.7 12/01/2020 07:58 AM    Chloride 109 (H) 12/01/2020 07:58 AM    CO2 24 12/01/2020 07:58 AM    Glucose 141 (H) 12/01/2020 07:58 AM    BUN 14 12/01/2020 07:58 AM    Creatinine 0.66 12/01/2020 07:58 AM    GFR est AA >60 12/01/2020 07:58 AM    GFR est non-AA >60 12/01/2020 07:58 AM    Calcium 8.4 (L) 12/01/2020 07:58 AM    Glucose (POC) 98 09/27/2020 11:39 PM    Creatinine (POC) 0.8 09/10/2020 02:32 PM     Lab Results   Component Value Date/Time    Bilirubin, total 0.4 12/01/2020 07:58 AM    ALT (SGPT) 92 (H) 12/01/2020 07:58 AM    Alk. phosphatase 181 (H) 12/01/2020 07:58 AM    Protein, total 7.0 12/01/2020 07:58 AM    Albumin 3.6 12/01/2020 07:58 AM    Globulin 3.4 12/01/2020 07:58 AM     CEA:  Recent Labs     11/17/20  0839 09/11/20  1455   CEA 1.0 7.4       CT A/P 9/23/2020  IMPRESSION:  Large, irregular pelvic mass measuring approximately 9 cm likely representing  neoplasm arising from the sigmoid colon. Adnexal neoplasm is a possibility. This  does appear to be separate from the uterus. Small amount of pelvic free fluid. Associated colonic bowel wall thickening. There is focal gas in the upper  presacral region which is unclear if this is intraluminal or contained  extraluminal collection.     No definite distant metastatic disease. Borderline enlarged retroperitoneal  lymph nodes. 6 mm right lower lobe lung nodule. Mass effect from the pelvic mass  causing moderate right and minimal left hydronephrosis. CT Chest 9/15/2020  IMPRESSION:  1. 7.5 mm left lower lobe pulmonary nodule. 2. Small right pleural effusion. 3. Mild right basilar atelectasis. 9/14/2020   Addendum Diagnosis   1. Sigmoid Mass, Biopsy:     Infiltrating adenocarcinoma, most consistent with colon (See comment)   Addendum Comment   Immunohistochemical stains reveal the following staining pattern:   CK7: Scattered cells with cytoplasmic membrane staining   CK20: Positive cytoplasmic staining in normal colonic mucosal epithelial cells and malignant epithelial cells   CDX-2: Diffuse strong nuclear decoration and all tumor cells and normal background colonic epithelial cells   PAX-8: Negative   Estrogen Receptor: Negative   Positive and negative controls stain appropriately. Due to radiologic findings suggesting the possibility of GYN involvement, ER and PAX-8 are performed, which lend support to the diagnosis of a colonic primary, over a tumor of müllerian origin. 9/17/2020   FINAL PATHOLOGIC DIAGNOSIS   1.  Sigmoid colon and proximal rectum, uterus, left fallopian tube and ovary and right ureteral segment, low anterior resection:   Sigmoid colon and proximal rectum:  Invasive adenocarcinoma (see synoptic report and comment). Metastatic adenocarcinoma in one of sixteen lymph nodes (1/16). Uterus: Invasive adenocarcinoma extending from adhesed colon into uterine serosa. Endometrial lining shows mucosal atrophy. Left ovary: Benign ovary with serosal inclusion cysts. Left fallopian tube: Benign fallopian tube. Right ureteral segment: Benign segment of ureter densely adhesed to colon. Nodule near right uterine cornu:  Nodular portion of benign smooth muscle consistent with leiomyoma with parenchymal hemorrhage. COLON AND RECTUM: Resection   SPECIMEN   Procedure: Low anterior resection   TUMOR   Tumor Site: Sigmoid colon   Histologic Type: Adenocarcinoma   Histologic Grade: G2: Moderately differentiated   Tumor Size: 8.0 cm   Tumor Extension: Tumor directly invades adjacent structures:   Posterior uterine serosa   Macroscopic Tumor Perforation: Tumor invades through serosa into surrounding soft tissue   Lymphovascular Invasion: Present   Perineural Invasion: Not identified   Treatment Effect: No known presurgical therapy   MARGINS   Margins: All margins are uninvolved by invasive carcinoma   Margins Examined: Proximal, Distal, Radial (circumferential) or   Mesenteric   Distance of Tumor from Radial (circumferential) Margin: See Comment   LYMPH NODES   Number of Lymph Nodes Involved: 1   Number of Lymph Nodes Examined: 16   Tumor Deposits: Not identified   PATHOLOGIC STAGE CLASSIFICATION (pTNM, AJCC 8th Edition)   Primary Tumor (pT): pT4b   Regional Lymph Nodes (pN): pN1a   2. Distal rectal margin:   Segment of benign large bowel. 3. Anastomotic doughnuts:   Two annular portions of benign large bowel. Comment   Tumor invades into the adherent soft tissue and to within 1.1 cm of the apparent right pelvic sidewall margin.      Records reviewed and summarized above. Pathology report(s) reviewed above. Radiology report(s) reviewed above. Assessment:   1) Sigmoid Colon Adenocarcinoma - Stage IIIB- ERICKA  [id number]  CT abd/pelv 9/10/2020 showed large, irregular pelvic mass measuring approximately 9cm likely representing neoplasm arising from the sigmoid colon. CEA 7.4 on 9/11  Colonoscopy performed 9/14 and biopsy + adenocarcinoma  Mass found to be invading into the uterus and right ureter. Surgical resection on 9/17 with creation of loop ileostomy, total abdominal hysterectomy and left salpingo-oophorectomy, distal right ureterectomy  She had a revision of her ileostomy 10/11/2020  Then needed stoma closure and anastomosis on 10/27/2020    Today is cycle 3 of adjuvant FOLFOX x 6 months   5FU bolus was introduced with cycle 2 and she tolerated this well-labs pending    2)Thrombocytosis  Resolved     3) Anemia  Due to #1  Iron sat of 4% and ferritin of 20 on 9/11  S/p 2 units PRBCs on 9/15 and Venofer 200mg x3 doses  Anemia has improved    4) Elevated transaminases  Monitor- if increases further will obtain an USG of abdomen  CMP pending    5) Neuropathy  Grade 1 and has since resolved  Monitor     Plan:     · Proceed with cycle 3 of mFOLFOX (5FU CADD 2400mg/m2, 5FU bolus 400mg/m2, leucovorin 400mg/m2, oxaliplatin 85mg/m2) every 2 weeks for 6 months  · Cbc, cmp every cycle, cea every other  · Zofran prn, dexamethasone days 2 and 3  · Follow with surgery as scheduled     RTC in 2 weeks for cycle 4     Friend Ivett at [phone] - poa    I appreciate the opportunity to participate in Ms. Juli carrington. Signed By: Jerod Bo MD    I performed a history and physical examination of the patient and discussed his management with the NPP.  I reviewed the NPP note and agree with the documented findings and plan of care

## 2020-12-16 LAB — CEA SERPL-MCNC: 1.5 NG/ML

## 2020-12-17 ENCOUNTER — HOSPITAL ENCOUNTER (OUTPATIENT)
Dept: INFUSION THERAPY | Age: 70
Discharge: HOME | End: 2020-12-17
Payer: MEDICARE

## 2020-12-17 VITALS
HEART RATE: 86 BPM | SYSTOLIC BLOOD PRESSURE: 126 MMHG | RESPIRATION RATE: 18 BRPM | DIASTOLIC BLOOD PRESSURE: 73 MMHG | TEMPERATURE: 98 F

## 2020-12-17 DIAGNOSIS — C18.7 MALIGNANT NEOPLASM OF SIGMOID COLON (HCC): Primary | ICD-10-CM

## 2020-12-17 PROCEDURE — 74011250636 HC RX REV CODE- 250/636: Performed by: REGISTERED NURSE

## 2020-12-17 PROCEDURE — 96523 IRRIG DRUG DELIVERY DEVICE: CPT

## 2020-12-17 RX ORDER — SODIUM CHLORIDE 9 MG/ML
10 INJECTION INTRAMUSCULAR; INTRAVENOUS; SUBCUTANEOUS AS NEEDED
Status: DISCONTINUED | OUTPATIENT
Start: 2020-12-17 | End: 2020-12-18 | Stop reason: HOSPADM

## 2020-12-17 RX ORDER — HEPARIN 100 UNIT/ML
300-500 SYRINGE INTRAVENOUS AS NEEDED
Status: DISCONTINUED | OUTPATIENT
Start: 2020-12-17 | End: 2020-12-18 | Stop reason: HOSPADM

## 2020-12-17 RX ORDER — SODIUM CHLORIDE 0.9 % (FLUSH) 0.9 %
10 SYRINGE (ML) INJECTION AS NEEDED
Status: DISCONTINUED | OUTPATIENT
Start: 2020-12-17 | End: 2020-12-18 | Stop reason: HOSPADM

## 2020-12-17 RX ADMIN — HEPARIN 500 UNITS: 100 SYRINGE at 14:15

## 2020-12-17 RX ADMIN — Medication 10 ML: at 14:15

## 2020-12-17 NOTE — PROGRESS NOTES
[facility] - Chemotherapy Progress Note    7294 Pt admit to [facility] for pump disconnect ambulatory in stable condition. Assessment completed. No new concerns voiced. 5FU CADD completed and removed; port flushed with positive blood return; heparinized and de-accessed. Patient denies SOB, fever, cough, general not feeling well. Patient denies recent exposure to someone who has tested positive for COVID-19. Patient  denies having contact with anyone who has a pending COVID test.     Visit Vitals  /73   Pulse 86   Temp 98 °F (36.7 °C)   Resp 18       Medications Administered     heparin (porcine) pf 300-500 Units     Admin Date  12/17/2020 Action  Given Dose  500 Units Route  InterCATHeter Administered By  Dilshad Escobedo, ZAKIA          sodium chloride (NS) flush 10 mL     Admin Date  12/17/2020 Action  Given Dose  10 mL Route  IntraVENous Administered By  Dilshad Escobedo, RN                  3210 Pt tolerated treatment well. D/c home ambulatory in no distress. Pt aware of next [facility] appointment scheduled for 12/29/2020.

## 2020-12-22 RX ORDER — SODIUM CHLORIDE 0.9 % (FLUSH) 0.9 %
10 SYRINGE (ML) INJECTION AS NEEDED
Status: CANCELLED | OUTPATIENT
Start: 2021-03-12

## 2020-12-22 RX ORDER — PALONOSETRON 0.05 MG/ML
0.25 INJECTION, SOLUTION INTRAVENOUS ONCE
Status: CANCELLED | OUTPATIENT
Start: 2021-01-12 | End: 2021-01-12

## 2020-12-22 RX ORDER — ONDANSETRON 2 MG/ML
8 INJECTION INTRAMUSCULAR; INTRAVENOUS AS NEEDED
Status: CANCELLED | OUTPATIENT
Start: 2021-02-10

## 2020-12-22 RX ORDER — SODIUM CHLORIDE 9 MG/ML
10 INJECTION INTRAMUSCULAR; INTRAVENOUS; SUBCUTANEOUS AS NEEDED
Status: CANCELLED | OUTPATIENT
Start: 2021-03-26

## 2020-12-22 RX ORDER — DIPHENHYDRAMINE HYDROCHLORIDE 50 MG/ML
25 INJECTION, SOLUTION INTRAMUSCULAR; INTRAVENOUS AS NEEDED
Status: CANCELLED
Start: 2021-02-24

## 2020-12-22 RX ORDER — ALBUTEROL SULFATE 0.83 MG/ML
2.5 SOLUTION RESPIRATORY (INHALATION) AS NEEDED
Status: CANCELLED
Start: 2021-02-24

## 2020-12-22 RX ORDER — SODIUM CHLORIDE 9 MG/ML
10 INJECTION INTRAMUSCULAR; INTRAVENOUS; SUBCUTANEOUS AS NEEDED
Status: CANCELLED | OUTPATIENT
Start: 2021-03-10

## 2020-12-22 RX ORDER — FLUOROURACIL 50 MG/ML
400 INJECTION, SOLUTION INTRAVENOUS ONCE
Status: CANCELLED
Start: 2021-03-10 | End: 2021-03-10

## 2020-12-22 RX ORDER — HEPARIN 100 UNIT/ML
300-500 SYRINGE INTRAVENOUS AS NEEDED
Status: CANCELLED
Start: 2021-01-31

## 2020-12-22 RX ORDER — HEPARIN 100 UNIT/ML
300-500 SYRINGE INTRAVENOUS AS NEEDED
Status: CANCELLED
Start: 2021-03-12

## 2020-12-22 RX ORDER — DIPHENHYDRAMINE HYDROCHLORIDE 50 MG/ML
25 INJECTION, SOLUTION INTRAMUSCULAR; INTRAVENOUS AS NEEDED
Status: CANCELLED
Start: 2021-02-10

## 2020-12-22 RX ORDER — HYDROCORTISONE SODIUM SUCCINATE 100 MG/2ML
100 INJECTION, POWDER, FOR SOLUTION INTRAMUSCULAR; INTRAVENOUS AS NEEDED
Status: CANCELLED | OUTPATIENT
Start: 2021-02-10

## 2020-12-22 RX ORDER — EPINEPHRINE 1 MG/ML
0.3 INJECTION, SOLUTION, CONCENTRATE INTRAVENOUS AS NEEDED
Status: CANCELLED | OUTPATIENT
Start: 2021-03-10

## 2020-12-22 RX ORDER — DIPHENHYDRAMINE HYDROCHLORIDE 50 MG/ML
25 INJECTION, SOLUTION INTRAMUSCULAR; INTRAVENOUS AS NEEDED
Status: CANCELLED
Start: 2021-01-12

## 2020-12-22 RX ORDER — HEPARIN 100 UNIT/ML
300-500 SYRINGE INTRAVENOUS AS NEEDED
Status: CANCELLED
Start: 2021-01-12

## 2020-12-22 RX ORDER — ALBUTEROL SULFATE 0.83 MG/ML
2.5 SOLUTION RESPIRATORY (INHALATION) AS NEEDED
Status: CANCELLED
Start: 2021-01-12

## 2020-12-22 RX ORDER — HEPARIN 100 UNIT/ML
300-500 SYRINGE INTRAVENOUS AS NEEDED
Status: CANCELLED
Start: 2020-12-29

## 2020-12-22 RX ORDER — SODIUM CHLORIDE 9 MG/ML
10 INJECTION INTRAMUSCULAR; INTRAVENOUS; SUBCUTANEOUS AS NEEDED
Status: CANCELLED | OUTPATIENT
Start: 2021-03-24

## 2020-12-22 RX ORDER — ALBUTEROL SULFATE 0.83 MG/ML
2.5 SOLUTION RESPIRATORY (INHALATION) AS NEEDED
Status: CANCELLED
Start: 2021-02-10

## 2020-12-22 RX ORDER — PALONOSETRON 0.05 MG/ML
0.25 INJECTION, SOLUTION INTRAVENOUS ONCE
Status: CANCELLED | OUTPATIENT
Start: 2021-02-24 | End: 2021-02-09

## 2020-12-22 RX ORDER — SODIUM CHLORIDE 0.9 % (FLUSH) 0.9 %
10 SYRINGE (ML) INJECTION AS NEEDED
Status: CANCELLED | OUTPATIENT
Start: 2020-12-31

## 2020-12-22 RX ORDER — HYDROCORTISONE SODIUM SUCCINATE 100 MG/2ML
100 INJECTION, POWDER, FOR SOLUTION INTRAMUSCULAR; INTRAVENOUS AS NEEDED
Status: CANCELLED | OUTPATIENT
Start: 2020-12-29

## 2020-12-22 RX ORDER — ACETAMINOPHEN 325 MG/1
650 TABLET ORAL AS NEEDED
Status: CANCELLED
Start: 2021-01-12

## 2020-12-22 RX ORDER — HEPARIN 100 UNIT/ML
300-500 SYRINGE INTRAVENOUS AS NEEDED
Status: CANCELLED
Start: 2021-03-10

## 2020-12-22 RX ORDER — PALONOSETRON 0.05 MG/ML
0.25 INJECTION, SOLUTION INTRAVENOUS ONCE
Status: CANCELLED | OUTPATIENT
Start: 2020-12-29 | End: 2020-12-29

## 2020-12-22 RX ORDER — DIPHENHYDRAMINE HYDROCHLORIDE 50 MG/ML
25 INJECTION, SOLUTION INTRAMUSCULAR; INTRAVENOUS AS NEEDED
Status: CANCELLED
Start: 2020-12-29

## 2020-12-22 RX ORDER — HEPARIN 100 UNIT/ML
300-500 SYRINGE INTRAVENOUS AS NEEDED
Status: CANCELLED
Start: 2021-03-26

## 2020-12-22 RX ORDER — DIPHENHYDRAMINE HYDROCHLORIDE 50 MG/ML
50 INJECTION, SOLUTION INTRAMUSCULAR; INTRAVENOUS AS NEEDED
Status: CANCELLED
Start: 2021-02-24

## 2020-12-22 RX ORDER — DEXTROSE MONOHYDRATE 50 MG/ML
25 INJECTION, SOLUTION INTRAVENOUS CONTINUOUS
Status: CANCELLED
Start: 2021-03-10

## 2020-12-22 RX ORDER — SODIUM CHLORIDE 9 MG/ML
10 INJECTION INTRAMUSCULAR; INTRAVENOUS; SUBCUTANEOUS AS NEEDED
Status: CANCELLED | OUTPATIENT
Start: 2021-01-12

## 2020-12-22 RX ORDER — SODIUM CHLORIDE 9 MG/ML
10 INJECTION INTRAMUSCULAR; INTRAVENOUS; SUBCUTANEOUS AS NEEDED
Status: CANCELLED | OUTPATIENT
Start: 2021-02-12

## 2020-12-22 RX ORDER — SODIUM CHLORIDE 9 MG/ML
10 INJECTION INTRAMUSCULAR; INTRAVENOUS; SUBCUTANEOUS AS NEEDED
Status: CANCELLED | OUTPATIENT
Start: 2021-03-12

## 2020-12-22 RX ORDER — EPINEPHRINE 1 MG/ML
0.3 INJECTION, SOLUTION, CONCENTRATE INTRAVENOUS AS NEEDED
Status: CANCELLED | OUTPATIENT
Start: 2020-12-29

## 2020-12-22 RX ORDER — DIPHENHYDRAMINE HYDROCHLORIDE 50 MG/ML
25 INJECTION, SOLUTION INTRAMUSCULAR; INTRAVENOUS AS NEEDED
Status: CANCELLED
Start: 2021-03-10

## 2020-12-22 RX ORDER — DEXTROSE MONOHYDRATE 50 MG/ML
25 INJECTION, SOLUTION INTRAVENOUS CONTINUOUS
Status: CANCELLED
Start: 2021-01-12

## 2020-12-22 RX ORDER — HYDROCORTISONE SODIUM SUCCINATE 100 MG/2ML
100 INJECTION, POWDER, FOR SOLUTION INTRAMUSCULAR; INTRAVENOUS AS NEEDED
Status: CANCELLED | OUTPATIENT
Start: 2021-03-24

## 2020-12-22 RX ORDER — HEPARIN 100 UNIT/ML
300-500 SYRINGE INTRAVENOUS AS NEEDED
Status: CANCELLED
Start: 2021-02-10

## 2020-12-22 RX ORDER — SODIUM CHLORIDE 9 MG/ML
10 INJECTION INTRAMUSCULAR; INTRAVENOUS; SUBCUTANEOUS AS NEEDED
Status: CANCELLED | OUTPATIENT
Start: 2021-02-26

## 2020-12-22 RX ORDER — SODIUM CHLORIDE 0.9 % (FLUSH) 0.9 %
10 SYRINGE (ML) INJECTION AS NEEDED
Status: CANCELLED | OUTPATIENT
Start: 2020-12-29

## 2020-12-22 RX ORDER — SODIUM CHLORIDE 0.9 % (FLUSH) 0.9 %
10 SYRINGE (ML) INJECTION AS NEEDED
Status: CANCELLED | OUTPATIENT
Start: 2021-02-24

## 2020-12-22 RX ORDER — DEXTROSE MONOHYDRATE 50 MG/ML
25 INJECTION, SOLUTION INTRAVENOUS CONTINUOUS
Status: CANCELLED
Start: 2021-03-24

## 2020-12-22 RX ORDER — ONDANSETRON 2 MG/ML
8 INJECTION INTRAMUSCULAR; INTRAVENOUS AS NEEDED
Status: CANCELLED | OUTPATIENT
Start: 2020-12-29

## 2020-12-22 RX ORDER — HEPARIN 100 UNIT/ML
300-500 SYRINGE INTRAVENOUS AS NEEDED
Status: CANCELLED
Start: 2020-12-31

## 2020-12-22 RX ORDER — SODIUM CHLORIDE 9 MG/ML
10 INJECTION INTRAMUSCULAR; INTRAVENOUS; SUBCUTANEOUS AS NEEDED
Status: CANCELLED | OUTPATIENT
Start: 2021-02-10

## 2020-12-22 RX ORDER — DEXTROSE MONOHYDRATE 50 MG/ML
25 INJECTION, SOLUTION INTRAVENOUS CONTINUOUS
Status: CANCELLED
Start: 2020-12-29

## 2020-12-22 RX ORDER — FLUOROURACIL 50 MG/ML
400 INJECTION, SOLUTION INTRAVENOUS ONCE
Status: CANCELLED
Start: 2021-03-24 | End: 2021-03-24

## 2020-12-22 RX ORDER — DEXTROSE MONOHYDRATE 50 MG/ML
25 INJECTION, SOLUTION INTRAVENOUS CONTINUOUS
Status: CANCELLED
Start: 2021-02-10

## 2020-12-22 RX ORDER — EPINEPHRINE 1 MG/ML
0.3 INJECTION, SOLUTION, CONCENTRATE INTRAVENOUS AS NEEDED
Status: CANCELLED | OUTPATIENT
Start: 2021-02-24

## 2020-12-22 RX ORDER — ACETAMINOPHEN 325 MG/1
650 TABLET ORAL AS NEEDED
Status: CANCELLED
Start: 2020-12-29

## 2020-12-22 RX ORDER — ALBUTEROL SULFATE 0.83 MG/ML
2.5 SOLUTION RESPIRATORY (INHALATION) AS NEEDED
Status: CANCELLED
Start: 2021-03-24

## 2020-12-22 RX ORDER — PALONOSETRON 0.05 MG/ML
0.25 INJECTION, SOLUTION INTRAVENOUS ONCE
Status: CANCELLED | OUTPATIENT
Start: 2021-03-10 | End: 2021-02-23

## 2020-12-22 RX ORDER — ACETAMINOPHEN 325 MG/1
650 TABLET ORAL AS NEEDED
Status: CANCELLED
Start: 2021-03-24

## 2020-12-22 RX ORDER — ONDANSETRON 2 MG/ML
8 INJECTION INTRAMUSCULAR; INTRAVENOUS AS NEEDED
Status: CANCELLED | OUTPATIENT
Start: 2021-01-12

## 2020-12-22 RX ORDER — HYDROCORTISONE SODIUM SUCCINATE 100 MG/2ML
100 INJECTION, POWDER, FOR SOLUTION INTRAMUSCULAR; INTRAVENOUS AS NEEDED
Status: CANCELLED | OUTPATIENT
Start: 2021-02-24

## 2020-12-22 RX ORDER — EPINEPHRINE 1 MG/ML
0.3 INJECTION, SOLUTION, CONCENTRATE INTRAVENOUS AS NEEDED
Status: CANCELLED | OUTPATIENT
Start: 2021-01-12

## 2020-12-22 RX ORDER — FLUOROURACIL 50 MG/ML
400 INJECTION, SOLUTION INTRAVENOUS ONCE
Status: CANCELLED
Start: 2021-02-24 | End: 2021-02-24

## 2020-12-22 RX ORDER — EPINEPHRINE 1 MG/ML
0.3 INJECTION, SOLUTION, CONCENTRATE INTRAVENOUS AS NEEDED
Status: CANCELLED | OUTPATIENT
Start: 2021-03-24

## 2020-12-22 RX ORDER — SODIUM CHLORIDE 0.9 % (FLUSH) 0.9 %
10 SYRINGE (ML) INJECTION AS NEEDED
Status: CANCELLED | OUTPATIENT
Start: 2021-01-12

## 2020-12-22 RX ORDER — ONDANSETRON 2 MG/ML
8 INJECTION INTRAMUSCULAR; INTRAVENOUS AS NEEDED
Status: CANCELLED | OUTPATIENT
Start: 2021-03-10

## 2020-12-22 RX ORDER — SODIUM CHLORIDE 0.9 % (FLUSH) 0.9 %
10 SYRINGE (ML) INJECTION AS NEEDED
Status: CANCELLED | OUTPATIENT
Start: 2021-03-26

## 2020-12-22 RX ORDER — ALBUTEROL SULFATE 0.83 MG/ML
2.5 SOLUTION RESPIRATORY (INHALATION) AS NEEDED
Status: CANCELLED
Start: 2021-03-10

## 2020-12-22 RX ORDER — SODIUM CHLORIDE 0.9 % (FLUSH) 0.9 %
10 SYRINGE (ML) INJECTION AS NEEDED
Status: CANCELLED | OUTPATIENT
Start: 2021-02-10

## 2020-12-22 RX ORDER — SODIUM CHLORIDE 0.9 % (FLUSH) 0.9 %
10 SYRINGE (ML) INJECTION AS NEEDED
Status: CANCELLED | OUTPATIENT
Start: 2021-02-12

## 2020-12-22 RX ORDER — ALBUTEROL SULFATE 0.83 MG/ML
2.5 SOLUTION RESPIRATORY (INHALATION) AS NEEDED
Status: CANCELLED
Start: 2020-12-29

## 2020-12-22 RX ORDER — DIPHENHYDRAMINE HYDROCHLORIDE 50 MG/ML
50 INJECTION, SOLUTION INTRAMUSCULAR; INTRAVENOUS AS NEEDED
Status: CANCELLED
Start: 2021-02-10

## 2020-12-22 RX ORDER — SODIUM CHLORIDE 0.9 % (FLUSH) 0.9 %
10 SYRINGE (ML) INJECTION AS NEEDED
Status: CANCELLED | OUTPATIENT
Start: 2021-01-31

## 2020-12-22 RX ORDER — ACETAMINOPHEN 325 MG/1
650 TABLET ORAL AS NEEDED
Status: CANCELLED
Start: 2021-03-10

## 2020-12-22 RX ORDER — DIPHENHYDRAMINE HYDROCHLORIDE 50 MG/ML
50 INJECTION, SOLUTION INTRAMUSCULAR; INTRAVENOUS AS NEEDED
Status: CANCELLED
Start: 2020-12-29

## 2020-12-22 RX ORDER — DIPHENHYDRAMINE HYDROCHLORIDE 50 MG/ML
50 INJECTION, SOLUTION INTRAMUSCULAR; INTRAVENOUS AS NEEDED
Status: CANCELLED
Start: 2021-03-24

## 2020-12-22 RX ORDER — EPINEPHRINE 1 MG/ML
0.3 INJECTION, SOLUTION, CONCENTRATE INTRAVENOUS AS NEEDED
Status: CANCELLED | OUTPATIENT
Start: 2021-02-10

## 2020-12-22 RX ORDER — HEPARIN 100 UNIT/ML
300-500 SYRINGE INTRAVENOUS AS NEEDED
Status: CANCELLED
Start: 2021-03-24

## 2020-12-22 RX ORDER — DIPHENHYDRAMINE HYDROCHLORIDE 50 MG/ML
50 INJECTION, SOLUTION INTRAMUSCULAR; INTRAVENOUS AS NEEDED
Status: CANCELLED
Start: 2021-01-12

## 2020-12-22 RX ORDER — DEXTROSE MONOHYDRATE 50 MG/ML
25 INJECTION, SOLUTION INTRAVENOUS CONTINUOUS
Status: CANCELLED
Start: 2021-02-24

## 2020-12-22 RX ORDER — FLUOROURACIL 50 MG/ML
400 INJECTION, SOLUTION INTRAVENOUS ONCE
Status: CANCELLED
Start: 2021-01-12 | End: 2021-01-12

## 2020-12-22 RX ORDER — PALONOSETRON 0.05 MG/ML
0.25 INJECTION, SOLUTION INTRAVENOUS ONCE
Status: CANCELLED | OUTPATIENT
Start: 2021-03-24 | End: 2021-03-09

## 2020-12-22 RX ORDER — HEPARIN 100 UNIT/ML
300-500 SYRINGE INTRAVENOUS AS NEEDED
Status: CANCELLED
Start: 2021-02-12

## 2020-12-22 RX ORDER — SODIUM CHLORIDE 0.9 % (FLUSH) 0.9 %
10 SYRINGE (ML) INJECTION AS NEEDED
Status: CANCELLED | OUTPATIENT
Start: 2021-03-10

## 2020-12-22 RX ORDER — ONDANSETRON 2 MG/ML
8 INJECTION INTRAMUSCULAR; INTRAVENOUS AS NEEDED
Status: CANCELLED | OUTPATIENT
Start: 2021-03-24

## 2020-12-22 RX ORDER — PALONOSETRON 0.05 MG/ML
0.25 INJECTION, SOLUTION INTRAVENOUS ONCE
Status: CANCELLED | OUTPATIENT
Start: 2021-02-10 | End: 2021-01-26

## 2020-12-22 RX ORDER — DIPHENHYDRAMINE HYDROCHLORIDE 50 MG/ML
25 INJECTION, SOLUTION INTRAMUSCULAR; INTRAVENOUS AS NEEDED
Status: CANCELLED
Start: 2021-03-24

## 2020-12-22 RX ORDER — ACETAMINOPHEN 325 MG/1
650 TABLET ORAL AS NEEDED
Status: CANCELLED
Start: 2021-02-24

## 2020-12-22 RX ORDER — SODIUM CHLORIDE 0.9 % (FLUSH) 0.9 %
10 SYRINGE (ML) INJECTION AS NEEDED
Status: CANCELLED | OUTPATIENT
Start: 2021-02-26

## 2020-12-22 RX ORDER — HEPARIN 100 UNIT/ML
300-500 SYRINGE INTRAVENOUS AS NEEDED
Status: CANCELLED
Start: 2021-02-26

## 2020-12-22 RX ORDER — HYDROCORTISONE SODIUM SUCCINATE 100 MG/2ML
100 INJECTION, POWDER, FOR SOLUTION INTRAMUSCULAR; INTRAVENOUS AS NEEDED
Status: CANCELLED | OUTPATIENT
Start: 2021-03-10

## 2020-12-22 RX ORDER — SODIUM CHLORIDE 9 MG/ML
10 INJECTION INTRAMUSCULAR; INTRAVENOUS; SUBCUTANEOUS AS NEEDED
Status: CANCELLED | OUTPATIENT
Start: 2021-01-31

## 2020-12-22 RX ORDER — SODIUM CHLORIDE 9 MG/ML
10 INJECTION INTRAMUSCULAR; INTRAVENOUS; SUBCUTANEOUS AS NEEDED
Status: CANCELLED | OUTPATIENT
Start: 2020-12-31

## 2020-12-22 RX ORDER — HEPARIN 100 UNIT/ML
300-500 SYRINGE INTRAVENOUS AS NEEDED
Status: CANCELLED
Start: 2021-02-24

## 2020-12-22 RX ORDER — HYDROCORTISONE SODIUM SUCCINATE 100 MG/2ML
100 INJECTION, POWDER, FOR SOLUTION INTRAMUSCULAR; INTRAVENOUS AS NEEDED
Status: CANCELLED | OUTPATIENT
Start: 2021-01-12

## 2020-12-22 RX ORDER — FLUOROURACIL 50 MG/ML
400 INJECTION, SOLUTION INTRAVENOUS ONCE
Status: CANCELLED
Start: 2021-02-10 | End: 2021-02-10

## 2020-12-22 RX ORDER — SODIUM CHLORIDE 9 MG/ML
10 INJECTION INTRAMUSCULAR; INTRAVENOUS; SUBCUTANEOUS AS NEEDED
Status: CANCELLED | OUTPATIENT
Start: 2021-02-24

## 2020-12-22 RX ORDER — SODIUM CHLORIDE 9 MG/ML
10 INJECTION INTRAMUSCULAR; INTRAVENOUS; SUBCUTANEOUS AS NEEDED
Status: CANCELLED | OUTPATIENT
Start: 2020-12-29

## 2020-12-22 RX ORDER — ONDANSETRON 2 MG/ML
8 INJECTION INTRAMUSCULAR; INTRAVENOUS AS NEEDED
Status: CANCELLED | OUTPATIENT
Start: 2021-02-24

## 2020-12-22 RX ORDER — SODIUM CHLORIDE 0.9 % (FLUSH) 0.9 %
10 SYRINGE (ML) INJECTION AS NEEDED
Status: CANCELLED | OUTPATIENT
Start: 2021-03-24

## 2020-12-22 RX ORDER — ACETAMINOPHEN 325 MG/1
650 TABLET ORAL AS NEEDED
Status: CANCELLED
Start: 2021-02-10

## 2020-12-22 RX ORDER — FLUOROURACIL 50 MG/ML
400 INJECTION, SOLUTION INTRAVENOUS ONCE
Status: CANCELLED
Start: 2020-12-29 | End: 2020-12-29

## 2020-12-22 RX ORDER — DIPHENHYDRAMINE HYDROCHLORIDE 50 MG/ML
50 INJECTION, SOLUTION INTRAMUSCULAR; INTRAVENOUS AS NEEDED
Status: CANCELLED
Start: 2021-03-10

## 2020-12-29 ENCOUNTER — HOSPITAL ENCOUNTER (OUTPATIENT)
Dept: INFUSION THERAPY | Age: 70
Discharge: HOME | End: 2020-12-29
Payer: MEDICARE

## 2020-12-29 VITALS
SYSTOLIC BLOOD PRESSURE: 137 MMHG | DIASTOLIC BLOOD PRESSURE: 82 MMHG | TEMPERATURE: 97 F | RESPIRATION RATE: 18 BRPM | WEIGHT: 123.2 LBS | HEART RATE: 82 BPM | BODY MASS INDEX: 19.8 KG/M2 | HEIGHT: 66 IN

## 2020-12-29 DIAGNOSIS — C18.7 MALIGNANT NEOPLASM OF SIGMOID COLON (HCC): Primary | ICD-10-CM

## 2020-12-29 LAB
ALBUMIN SERPL-MCNC: 3.4 G/DL (ref 3.5–5)
ALBUMIN/GLOB SERPL: 0.9 {RATIO} (ref 1.1–2.2)
ALP SERPL-CCNC: 136 U/L (ref 45–117)
ALT SERPL-CCNC: 51 U/L (ref 12–78)
ANION GAP SERPL CALC-SCNC: 7 MMOL/L (ref 5–15)
AST SERPL-CCNC: 37 U/L (ref 15–37)
BASOPHILS # BLD: 0 K/UL (ref 0–0.1)
BASOPHILS NFR BLD: 1 % (ref 0–1)
BILIRUB SERPL-MCNC: 1.1 MG/DL (ref 0.2–1)
BUN SERPL-MCNC: 12 MG/DL (ref 6–20)
BUN/CREAT SERPL: 19 (ref 12–20)
CALCIUM SERPL-MCNC: 9.6 MG/DL (ref 8.5–10.1)
CHLORIDE SERPL-SCNC: 106 MMOL/L (ref 97–108)
CO2 SERPL-SCNC: 28 MMOL/L (ref 21–32)
CREAT SERPL-MCNC: 0.62 MG/DL (ref 0.55–1.02)
DIFFERENTIAL METHOD BLD: ABNORMAL
EOSINOPHIL # BLD: 0.2 K/UL (ref 0–0.4)
EOSINOPHIL NFR BLD: 4 % (ref 0–7)
ERYTHROCYTE [DISTWIDTH] IN BLOOD BY AUTOMATED COUNT: 20.7 % (ref 11.5–14.5)
GLOBULIN SER CALC-MCNC: 3.7 G/DL (ref 2–4)
GLUCOSE SERPL-MCNC: 89 MG/DL (ref 65–100)
HCT VFR BLD AUTO: 40.2 % (ref 35–47)
HGB BLD-MCNC: 12.7 G/DL (ref 11.5–16)
IMM GRANULOCYTES # BLD AUTO: 0 K/UL (ref 0–0.04)
IMM GRANULOCYTES NFR BLD AUTO: 0 % (ref 0–0.5)
LYMPHOCYTES # BLD: 1.2 K/UL (ref 0.8–3.5)
LYMPHOCYTES NFR BLD: 29 % (ref 12–49)
MCH RBC QN AUTO: 26.3 PG (ref 26–34)
MCHC RBC AUTO-ENTMCNC: 31.6 G/DL (ref 30–36.5)
MCV RBC AUTO: 83.4 FL (ref 80–99)
MONOCYTES # BLD: 0.6 K/UL (ref 0–1)
MONOCYTES NFR BLD: 14 % (ref 5–13)
NEUTS SEG # BLD: 2 K/UL (ref 1.8–8)
NEUTS SEG NFR BLD: 52 % (ref 32–75)
NRBC # BLD: 0 K/UL (ref 0–0.01)
NRBC BLD-RTO: 0 PER 100 WBC
PLATELET # BLD AUTO: 114 K/UL (ref 150–400)
PMV BLD AUTO: 9.3 FL (ref 8.9–12.9)
POTASSIUM SERPL-SCNC: 3.5 MMOL/L (ref 3.5–5.1)
PROT SERPL-MCNC: 7.1 G/DL (ref 6.4–8.2)
RBC # BLD AUTO: 4.82 M/UL (ref 3.8–5.2)
RBC MORPH BLD: ABNORMAL
SODIUM SERPL-SCNC: 141 MMOL/L (ref 136–145)
WBC # BLD AUTO: 4 K/UL (ref 3.6–11)

## 2020-12-29 PROCEDURE — 96368 THER/DIAG CONCURRENT INF: CPT

## 2020-12-29 PROCEDURE — 96413 CHEMO IV INFUSION 1 HR: CPT

## 2020-12-29 PROCEDURE — 96415 CHEMO IV INFUSION ADDL HR: CPT

## 2020-12-29 PROCEDURE — 74011000258 HC RX REV CODE- 258: Performed by: REGISTERED NURSE

## 2020-12-29 PROCEDURE — 74011250636 HC RX REV CODE- 250/636: Performed by: REGISTERED NURSE

## 2020-12-29 PROCEDURE — 85025 COMPLETE CBC W/AUTO DIFF WBC: CPT

## 2020-12-29 PROCEDURE — 96375 TX/PRO/DX INJ NEW DRUG ADDON: CPT

## 2020-12-29 PROCEDURE — 77030012965 HC NDL HUBR BBMI -A

## 2020-12-29 PROCEDURE — 36415 COLL VENOUS BLD VENIPUNCTURE: CPT

## 2020-12-29 PROCEDURE — 80053 COMPREHEN METABOLIC PANEL: CPT

## 2020-12-29 PROCEDURE — 96416 CHEMO PROLONG INFUSE W/PUMP: CPT

## 2020-12-29 PROCEDURE — 96411 CHEMO IV PUSH ADDL DRUG: CPT

## 2020-12-29 RX ORDER — DEXTROSE MONOHYDRATE 50 MG/ML
25 INJECTION, SOLUTION INTRAVENOUS CONTINUOUS
Status: DISPENSED | OUTPATIENT
Start: 2020-12-29 | End: 2020-12-29

## 2020-12-29 RX ORDER — FLUOROURACIL 50 MG/ML
400 INJECTION, SOLUTION INTRAVENOUS ONCE
Status: COMPLETED | OUTPATIENT
Start: 2020-12-29 | End: 2020-12-29

## 2020-12-29 RX ORDER — PALONOSETRON 0.05 MG/ML
0.25 INJECTION, SOLUTION INTRAVENOUS ONCE
Status: COMPLETED | OUTPATIENT
Start: 2020-12-29 | End: 2020-12-29

## 2020-12-29 RX ADMIN — DEXAMETHASONE SODIUM PHOSPHATE 12 MG: 4 INJECTION, SOLUTION INTRA-ARTICULAR; INTRALESIONAL; INTRAMUSCULAR; INTRAVENOUS; SOFT TISSUE at 09:42

## 2020-12-29 RX ADMIN — FLUOROURACIL 620 MG: 50 INJECTION, SOLUTION INTRAVENOUS at 12:51

## 2020-12-29 RX ADMIN — DEXTROSE MONOHYDRATE 25 ML/HR: 5 INJECTION, SOLUTION INTRAVENOUS at 09:26

## 2020-12-29 RX ADMIN — FLUOROURACIL 3720 MG: 50 INJECTION, SOLUTION INTRAVENOUS at 12:56

## 2020-12-29 RX ADMIN — PALONOSETRON 0.25 MG: 0.05 INJECTION, SOLUTION INTRAVENOUS at 09:33

## 2020-12-29 RX ADMIN — LEUCOVORIN CALCIUM 620 MG: 350 INJECTION, POWDER, LYOPHILIZED, FOR SOLUTION INTRAMUSCULAR; INTRAVENOUS at 10:24

## 2020-12-29 RX ADMIN — OXALIPLATIN 132 MG: 5 INJECTION, SOLUTION, CONCENTRATE INTRAVENOUS at 10:25

## 2020-12-29 NOTE — PROGRESS NOTES
[facility] at [address], [id number] [place] [address] Road, [address] [place] [address]  W: [phone]  F: [phone]    Reason for Visit:   Tiffani Negron is a 72 y.o. female who is seen for stage III colon cancer. Treatment History:   · 9/10/2020 CT A/P - large, irregular pelvic mass measuring approximately 9cm likely representing neoplasm arising from the sigmoid colon, small amount of pelvic free fluid, colonic bowel wall thickening. Borderline enlarged retroperitoneal lymph nodes. 6 mm right lower lobe lung nodule. Mass effect from pelvic mass causing moderate right and minimal left hydronephrosis. · 9/14/2020: Colonoscopy - A large circumferential, ulcerated fungating mass was noted in proximal sigmoid colon. Mass was obstructing the lumen and could not be traversed. Doyce Brad was present. · 9/15/2020: CT Chest - 7.5mm left lower lobe pulmonary nodule, small right pleural effusion, mild right basilar atelectasis  · 9/17/2020: Surgical resection - low anterior resection, mobilization of the splenic flexure, coloproctostomy, creation of loop ileostomy, total abdominal hysterectomy and left salpingo-oophorectomy, distal right ureterectomy, right ureteral re-implant with psoas hitch and placement of right ureteral stent  · 11/17/20: cycle 1 FOLFOX    History of Present Illness:   Patient is a 72 y.o. female seen for colon cancer    She presented to the ED on 9/11/2020 with complaints of right flank/back pain and RLQ abdominal pain. She states she has had several months of gas pain and \"blockages. \" She reports a weight loss of 12 lbs in the last 3 months. CT abd/pelv 9/10/2020 showed large, irregular pelvic mass measuring approximately 9cm likely representing neoplasm arising from the sigmoid colon. She also had hydronephrosis from the mass effect and urethral stent placed. CEA 7.4 on 9/11and colonoscopy performed 9/14 with biopsy + adenocarcinoma.  CT Chest performed showing 7.5mm left lower lobe pulmonary nodule. Mass found to be invading into the uterus and right ureter. Surgical resection performed on 9/17/2020 including low anterior resection, mobilization of the splenic flexure, coloproctostomy, creation of loop ileostomy, total abdominal hysterectomy and left salpingo-oophorectomy, distal right ureterectomy, right ureteral re-implant with psoas hitch and placement of right ureteral stent. She was to see me in clinic but was admitted 10/10/2020 with ileostomy prolapse. Had a revision of loop ileostomy 10/11/2020. Then needed ileostomy closure and anastomosis 10/27/2020. Today she presents for cycle 4 of FOLFOX. Cycle 1 was given without 5FU bolus and cycle 2 & 3 were given with 5FU bolus. She reports intermittent tingling in her legs that is not painful. She is not tripping. She denies nausea/vomiting. She had some loose stool after eating spinach last night. Otherwise, BMs are normal. She denies mouth sores. No bleeding but does report easy bruising. Denies leg swelling. Denies SOB, CP, cough, fevers/chills, or bleeding. No other complaints today. Family Hx:  Mother with hx of pancreatic cancer    Past Medical History:   Diagnosis Date    Colon cancer ([address]     GERD (gastroesophageal reflux disease)     Ileostomy in place ([address]     Ileostomy prolapse ([address]     Ill-defined condition     Spaulding's esophagus      Past Surgical History:   Procedure Laterality Date    COLONOSCOPY Left 9/14/2020    COLONOSCOPY performed by Maynor Alvarado MD at [facility]; incomplete secondary to partial obstruction.  HX APPENDECTOMY  age 17    HX COLONOSCOPY  circa 2010    No neoplasms.  HX ENDOSCOPY  03/13/2017    Dr. Milad Saez HX ENDOSCOPY  02/13/2020    Dr. Jeannie Stokes oopherectomy for treatment of benign mass.     HX GYN  09/17/2020    Total abdominal hysterectomy and left salpingo-oophorectomy; Deepthi Bautista MD.    HX OTHER SURGICAL  09/17/2020    Low anterior resection, mobilization of the splenic flexure, coloproctostomy, and creation of loop ileostomy; Dr. Duncan Ybarra.   OTHER SURGICAL  10/11/2020    Revision of loop ileostomy (resection and creation of divided loop ileostomy); Dr. Duncan Ybarra.   UROLOGICAL  09/12/2020    Cystoscopy and placement of a right ureteral stent; Zak Pepe III, MD.     UROLOGICAL  09/17/2020    Cystoscopy and placement of a temporary left ureteral catheter; Zak Pepe III, MD.     UROLOGICAL  09/17/2020    Distal right ureterectomy; Zak Pepe III, MD.     UROLOGICAL  09/17/2020    Right ureteral re-implantation with psoas hitch and placement of a right ureteral stent; Nadia Garcia MD.    IR INSERT TUNL CVAD W PORT LESS THAN 5 YR  10/14/2020    Right chest Port-a-Cath placement. Social History     Tobacco Use    Smoking status: Never Smoker    Smokeless tobacco: Never Used   Substance Use Topics    Alcohol use: Yes     Alcohol/week: 1.0 standard drinks     Types: 1 Glasses of wine per week      Family History   Problem Relation Age of Onset    Cancer Mother     Heart Disease Father     Diabetes Brother      Current Outpatient Medications   Medication Sig    lidocaine-prilocaine (EMLA) topical cream Apply  to affected area as needed for Pain. Apply over port a cath site 30-60 minutes prior to port access    dexAMETHasone (DECADRON) 4 mg tablet Take 2 tabs (8mg) once daily on days 2 & 3 of chemotherapy only    ondansetron (ZOFRAN ODT) 8 mg disintegrating tablet Take 1 Tab by mouth every eight (8) hours as needed for Nausea or Vomiting. Can start 72 hours after chemotherapy infusion    denosumab (PROLIA) 60 mg/mL injection 60 mg by SubCUTAneous route.  lansoprazole (PREVACID) 30 mg capsule Take 30 mg by mouth Daily (before breakfast).  multivitamin (ONE A DAY) tablet Take 1 Tab by mouth daily.  ascorbic acid, vitamin C, (VITAMIN C) 500 mg tablet Take 500 mg by mouth.     cholecalciferol (VITAMIN D3) 1,000 unit cap Take 1,000 Units by mouth daily.  Omega-3 Fatty Acids (FISH OIL) 500 mg cap Take  by mouth. Current Facility-Administered Medications   Medication Dose Route Frequency    dextrose 5% infusion  25 mL/hr IntraVENous CONTINUOUS    leucovorin (WELLCOVORIN) 620 mg in dextrose 5% 250 mL, overfill volume 25 mL IVPB  400 mg/m2 (Treatment Plan Recorded) IntraVENous ONCE    oxaliplatin (ELOXATIN) 132 mg in dextrose 5% 250 mL, overfill volume 25 mL chemo infusion  85 mg/m2 (Treatment Plan Recorded) IntraVENous ONCE    fluorouraciL (ADRUCIL) chemo syringe 620 mg  400 mg/m2 (Treatment Plan Recorded) IntraVENous ONCE    fluorouraciL (ADRUCIL) 3,720 mg in 0.9% sodium chloride 100 mL CADD Cassette  2,400 mg/m2 (Treatment Plan Recorded) IntraVENous ONCE      No Known Allergies     Review of Systems: A complete review of systems was obtained, negative except as described above. Physical Exam:     Visit Vitals  BP (!) 145/94   Pulse 94   Temp 97 °F (36.1 °C)   Resp 18   Ht 5' 6\" (1.676 m)   Wt 123 lb 3.2 oz (55.9 kg)   Breastfeeding No   BMI 19.89 kg/m²     ECOG PS: 1  General: No distress but appears frail  Eyes: PERRLA, anicteric sclerae  HENT: Atraumatic, OP clear  GI: Soft, incisions healing well  MS: Digits without clubbing or cyanosis. Skin: No rashes, ecchymoses, or petechiae. Psych: Alert, oriented, appropriate affect, normal judgment/insight    Results:     Lab Results   Component Value Date/Time    WBC 4.0 12/29/2020 07:59 AM    HGB 12.7 12/29/2020 07:59 AM    HCT 40.2 12/29/2020 07:59 AM    PLATELET 028 (L) 73/89/4581 07:59 AM    MCV 83.4 12/29/2020 07:59 AM    ABS.  NEUTROPHILS 2.0 12/29/2020 07:59 AM    Hemoglobin (POC) 12.0 09/17/2020 06:35 PM     Lab Results   Component Value Date/Time    Sodium 141 12/29/2020 07:59 AM    Potassium 3.5 12/29/2020 07:59 AM    Chloride 106 12/29/2020 07:59 AM    CO2 28 12/29/2020 07:59 AM    Glucose 89 12/29/2020 07:59 AM    BUN 12 12/29/2020 07:59 AM    Creatinine 0.62 12/29/2020 07:59 AM    GFR est AA >60 12/29/2020 07:59 AM    GFR est non-AA >60 12/29/2020 07:59 AM    Calcium 9.6 12/29/2020 07:59 AM    Glucose (POC) 98 09/27/2020 11:39 PM    Creatinine (POC) 0.8 09/10/2020 02:32 PM     Lab Results   Component Value Date/Time    Bilirubin, total 1.1 (H) 12/29/2020 07:59 AM    ALT (SGPT) 51 12/29/2020 07:59 AM    Alk. phosphatase 136 (H) 12/29/2020 07:59 AM    Protein, total 7.1 12/29/2020 07:59 AM    Albumin 3.4 (L) 12/29/2020 07:59 AM    Globulin 3.7 12/29/2020 07:59 AM     CEA:  Recent Labs     12/15/20  0806 11/17/20  0839 09/11/20  1455   CEA 1.5 1.0 7.4       CT A/P 9/23/2020  IMPRESSION:  Large, irregular pelvic mass measuring approximately 9 cm likely representing  neoplasm arising from the sigmoid colon. Adnexal neoplasm is a possibility. This  does appear to be separate from the uterus. Small amount of pelvic free fluid. Associated colonic bowel wall thickening. There is focal gas in the upper  presacral region which is unclear if this is intraluminal or contained  extraluminal collection.     No definite distant metastatic disease. Borderline enlarged retroperitoneal  lymph nodes. 6 mm right lower lobe lung nodule. Mass effect from the pelvic mass  causing moderate right and minimal left hydronephrosis. CT Chest 9/15/2020  IMPRESSION:  1. 7.5 mm left lower lobe pulmonary nodule. 2. Small right pleural effusion. 3. Mild right basilar atelectasis. 9/14/2020   Addendum Diagnosis   1.  Sigmoid Mass, Biopsy:     Infiltrating adenocarcinoma, most consistent with colon (See comment)   Addendum Comment   Immunohistochemical stains reveal the following staining pattern:   CK7: Scattered cells with cytoplasmic membrane staining   CK20: Positive cytoplasmic staining in normal colonic mucosal epithelial cells and malignant epithelial cells   CDX-2: Diffuse strong nuclear decoration and all tumor cells and normal background colonic epithelial cells   PAX-8: Negative   Estrogen Receptor: Negative   Positive and negative controls stain appropriately. Due to radiologic findings suggesting the possibility of GYN involvement, ER and PAX-8 are performed, which lend support to the diagnosis of a colonic primary, over a tumor of müllerian origin. 9/17/2020   FINAL PATHOLOGIC DIAGNOSIS   1. Sigmoid colon and proximal rectum, uterus, left fallopian tube and ovary and right ureteral segment, low anterior resection:   Sigmoid colon and proximal rectum:  Invasive adenocarcinoma (see synoptic report and comment). Metastatic adenocarcinoma in one of sixteen lymph nodes (1/16). Uterus: Invasive adenocarcinoma extending from adhesed colon into uterine serosa. Endometrial lining shows mucosal atrophy. Left ovary: Benign ovary with serosal inclusion cysts. Left fallopian tube: Benign fallopian tube. Right ureteral segment: Benign segment of ureter densely adhesed to colon. Nodule near right uterine cornu:  Nodular portion of benign smooth muscle consistent with leiomyoma with parenchymal hemorrhage. COLON AND RECTUM: Resection   SPECIMEN   Procedure: Low anterior resection   TUMOR   Tumor Site: Sigmoid colon   Histologic Type: Adenocarcinoma   Histologic Grade: G2: Moderately differentiated   Tumor Size: 8.0 cm   Tumor Extension: Tumor directly invades adjacent structures:   Posterior uterine serosa   Macroscopic Tumor Perforation: Tumor invades through serosa into surrounding soft tissue   Lymphovascular Invasion: Present   Perineural Invasion: Not identified   Treatment Effect: No known presurgical therapy   MARGINS   Margins:  All margins are uninvolved by invasive carcinoma   Margins Examined: Proximal, Distal, Radial (circumferential) or   Mesenteric   Distance of Tumor from Radial (circumferential) Margin: See Comment   LYMPH NODES   Number of Lymph Nodes Involved: 1   Number of Lymph Nodes Examined: 16   Tumor Deposits: Not identified PATHOLOGIC STAGE CLASSIFICATION (pTNM, AJCC 8th Edition)   Primary Tumor (pT): pT4b   Regional Lymph Nodes (pN): pN1a   2. Distal rectal margin:   Segment of benign large bowel. 3. Anastomotic doughnuts:   Two annular portions of benign large bowel. Comment   Tumor invades into the adherent soft tissue and to within 1.1 cm of the apparent right pelvic sidewall margin. Records reviewed and summarized above. Pathology report(s) reviewed above. Radiology report(s) reviewed above. Assessment:   1) Sigmoid Colon Adenocarcinoma - Stage IIIB- ERICKA  [id number]  CT abd/pelv 9/10/2020 showed large, irregular pelvic mass measuring approximately 9cm likely representing neoplasm arising from the sigmoid colon. CEA 7.4 on 9/11  Colonoscopy performed 9/14 and biopsy + adenocarcinoma  Mass found to be invading into the uterus and right ureter.    Surgical resection on 9/17 with creation of loop ileostomy, total abdominal hysterectomy and left salpingo-oophorectomy, distal right ureterectomy  She had a revision of her ileostomy 10/11/2020  Then needed stoma closure and anastomosis on 10/27/2020    Today is cycle 4 of adjuvant FOLFOX x 6 months   5FU bolus was introduced with cycle 2 and she tolerated this well with grade 1 neuropathy and grade 1 thrombocytopenia     2) Anemia  Due to #1  Iron sat of 4% and ferritin of 20 on 9/11  S/p 2 units PRBCs on 9/15 and Venofer 200mg x3 doses  Anemia has resolved     4) Elevated transaminases  Monitor- if increases further will obtain an USG of abdomen  CMP pending    5) Neuropathy  Grade 1   Monitor     Plan:     · Proceed with cycle 4 of mFOLFOX (5FU CADD 2400mg/m2, 5FU bolus 400mg/m2, leucovorin 400mg/m2, oxaliplatin 85mg/m2) every 2 weeks for 6 months  · Cbc, cmp every cycle, cea every other  · Zofran prn, dexamethasone days 2 and 3  · Follow with surgery as scheduled     RTC in 2 weeks for cycle 5     Fanny Root at [phone] [facility]    I appreciate the opportunity to participate in Ms. Araya Ledger care. Signed By: Patricia Leung NP    I performed a history and physical examination of the patient and discussed his management with the NPP.  I reviewed the NPP note and agree with the documented findings and plan of care

## 2020-12-29 NOTE — PROGRESS NOTES
hospitals Chemo Progress Note    Date: 2020    Name: Verenice Bennett    MRN: [id number]         : 1950    0800 Ms. Tatum Arrived to [facility] for  FOLFOX C4 D1 ambulatory in stable condition. Assessment was completed, no acute issues at this time, no new complaints voiced. Port accessed with positive blood return. Labs drawn and sent for processing. Chemotherapy Flowsheet 2020   Cycle C4,D1   Date 2020   Drug / Regimen Folfox   Pre Meds given   Notes given         Patient Denies SOB, fever, cough, general not feeling well. Patient Denies recent exposure to someone who has tested positive for COVID-19. Patient Denies having contact with anyone who has a pending COVID test.      Ms. Lashanda Roca vitals were reviewed. Patient Vitals for the past 12 hrs:   Temp Pulse Resp BP   20 1257 -- 82 -- 137/82   20 0756 97 °F (36.1 °C) 94 18 (!) 145/94         Lab results were obtained and reviewed. Recent Results (from the past 12 hour(s))   CBC WITH AUTOMATED DIFF    Collection Time: 20  7:59 AM   Result Value Ref Range    WBC 4.0 3.6 - 11.0 K/uL    RBC 4.82 3.80 - 5.20 M/uL    HGB 12.7 11.5 - 16.0 g/dL    HCT 40.2 35.0 - 47.0 %    MCV 83.4 80.0 - 99.0 FL    MCH 26.3 26.0 - 34.0 PG    MCHC 31.6 30.0 - 36.5 g/dL    RDW 20.7 (H) 11.5 - 14.5 %    PLATELET 507 (L) 289 - 400 K/uL    MPV 9.3 8.9 - 12.9 FL    NRBC 0.0 0  WBC    ABSOLUTE NRBC 0.00 0.00 - 0.01 K/uL    NEUTROPHILS 52 32 - 75 %    LYMPHOCYTES 29 12 - 49 %    MONOCYTES 14 (H) 5 - 13 %    EOSINOPHILS 4 0 - 7 %    BASOPHILS 1 0 - 1 %    IMMATURE GRANULOCYTES 0 0.0 - 0.5 %    ABS. NEUTROPHILS 2.0 1.8 - 8.0 K/UL    ABS. LYMPHOCYTES 1.2 0.8 - 3.5 K/UL    ABS. MONOCYTES 0.6 0.0 - 1.0 K/UL    ABS. EOSINOPHILS 0.2 0.0 - 0.4 K/UL    ABS. BASOPHILS 0.0 0.0 - 0.1 K/UL    ABS. IMM.  GRANS. 0.0 0.00 - 0.04 K/UL    DF SMEAR SCANNED      RBC COMMENTS ANISOCYTOSIS  2+       METABOLIC PANEL, COMPREHENSIVE    Collection Time: 20 7:59 AM   Result Value Ref Range    Sodium 141 136 - 145 mmol/L    Potassium 3.5 3.5 - 5.1 mmol/L    Chloride 106 97 - 108 mmol/L    CO2 28 21 - 32 mmol/L    Anion gap 7 5 - 15 mmol/L    Glucose 89 65 - 100 mg/dL    BUN 12 6 - 20 MG/DL    Creatinine 0.62 0.55 - 1.02 MG/DL    BUN/Creatinine ratio 19 12 - 20      GFR est AA >60 >60 ml/min/1.73m2    GFR est non-AA >60 >60 ml/min/1.73m2    Calcium 9.6 8.5 - 10.1 MG/DL    Bilirubin, total 1.1 (H) 0.2 - 1.0 MG/DL    ALT (SGPT) 51 12 - 78 U/L    AST (SGOT) 37 15 - 37 U/L    Alk. phosphatase 136 (H) 45 - 117 U/L    Protein, total 7.1 6.4 - 8.2 g/dL    Albumin 3.4 (L) 3.5 - 5.0 g/dL    Globulin 3.7 2.0 - 4.0 g/dL    A-G Ratio 0.9 (L) 1.1 - 2.2         Pre-medications  were administered as ordered and chemotherapy was initiated.   Medications Administered     dexamethasone (DECADRON) 12 mg in 0.9% sodium chloride 50 mL, overfill volume 5 mL IVPB     Admin Date  12/29/2020 Action  Given Dose  12 mg Rate  232 mL/hr Route  IntraVENous Administered By  Toña Treviño RN          dextrose 5% infusion     Admin Date  12/29/2020 Action  New Bag Dose  25 mL/hr Rate  25 mL/hr Route  IntraVENous Administered By  Toña Treviño RN          fluorouraciL (ADRUCIL) 3,720 mg in 0.9% sodium chloride 100 mL CADD Cassette     Admin Date  12/29/2020 Action  New Bag Dose  3,720 mg Rate  2.2 mL/hr Route  IntraVENous Administered By  Toña Treviño RN          fluorouraciL (ADRUCIL) chemo syringe 620 mg     Admin Date  12/29/2020 Action  Given Dose  620 mg Rate  248 mL/hr Route  IntraVENous Administered By  Toña Treviño RN          leucovorin (WELLCOVORIN) 620 mg in dextrose 5% 250 mL, overfill volume 25 mL IVPB     Admin Date  12/29/2020 Action  New Bag Dose  620 mg Rate  153 mL/hr Route  IntraVENous Administered By  Toña Treviño RN          oxaliplatin (ELOXATIN) 132 mg in dextrose 5% 250 mL, overfill volume 25 mL chemo infusion     Admin Date  12/29/2020 Action  New Bag Dose  132 mg Rate  150.7 mL/hr Route  IntraVENous Administered By  Leana Hernandez RN          palonosetron HCl (ALOXI) injection 0.25 mg     Admin Date  12/29/2020 Action  Given Dose  0.25 mg Route  IntraVENous Administered By  Leana Hernandez RN                  6082 Patient tolerated treatment well. Port maintained positive blood return throughout treatment.  Portflushed, CADD pump attached and patient discharged from [address] [place] [address]   Date Time Provider Yary Jacoboi   12/31/2020 11:30  [facility]   1/12/2021  8:00 AM B2 [facility] [address] [facility]   1/12/2021  8:15 AM [facility],  [address] BS AMB   1/14/2021  2:00 PM H2 [facility]   1/26/2021  8:00 AM B2 [facility] [place] [facility]   1/28/2021  3:00 PM H2 [facility]   2/9/2021  8:00 AM B2 [facility] [place] [facility]   2/11/2021  3:00 PM H2 [facility]         Craig Paul RN  December 29, 2020

## 2020-12-31 ENCOUNTER — HOSPITAL ENCOUNTER (OUTPATIENT)
Dept: INFUSION THERAPY | Age: 70
Discharge: HOME | End: 2020-12-31
Payer: MEDICARE

## 2020-12-31 VITALS
DIASTOLIC BLOOD PRESSURE: 87 MMHG | TEMPERATURE: 98 F | SYSTOLIC BLOOD PRESSURE: 141 MMHG | HEART RATE: 93 BPM | RESPIRATION RATE: 18 BRPM

## 2020-12-31 DIAGNOSIS — C18.7 MALIGNANT NEOPLASM OF SIGMOID COLON (HCC): Primary | ICD-10-CM

## 2020-12-31 PROCEDURE — 74011250636 HC RX REV CODE- 250/636: Performed by: REGISTERED NURSE

## 2020-12-31 PROCEDURE — 96523 IRRIG DRUG DELIVERY DEVICE: CPT

## 2020-12-31 RX ORDER — SODIUM CHLORIDE 0.9 % (FLUSH) 0.9 %
10 SYRINGE (ML) INJECTION AS NEEDED
Status: DISPENSED | OUTPATIENT
Start: 2020-12-31 | End: 2020-12-31

## 2020-12-31 RX ORDER — HEPARIN 100 UNIT/ML
300-500 SYRINGE INTRAVENOUS AS NEEDED
Status: ACTIVE | OUTPATIENT
Start: 2020-12-31 | End: 2020-12-31

## 2020-12-31 RX ORDER — SODIUM CHLORIDE 9 MG/ML
10 INJECTION INTRAMUSCULAR; INTRAVENOUS; SUBCUTANEOUS AS NEEDED
Status: ACTIVE | OUTPATIENT
Start: 2020-12-31 | End: 2020-12-31

## 2020-12-31 RX ADMIN — HEPARIN 500 UNITS: 100 SYRINGE at 11:15

## 2020-12-31 RX ADMIN — Medication 10 ML: at 11:15

## 2020-12-31 NOTE — PROGRESS NOTES
hospitals Short Visit Note:    1110:  Pt arrived ambulatory and in no distress for Port flush and pump removal  and tolerated well. Patient reports various times of numbness and tingling during the 46 hours of home infusion, but all has resolved. D/Cd from hospitals ambulatory and in no distress.  Next visit 1-.Port flushed per protocol, bandaid dressing applied to port site.  Patient denies all Covid contacts, denies cough, sob and general feeling of being un well    Visit Vitals  BP (!) 141/87   Pulse 93   Temp 98 °F (36.7 °C)   Resp 18     Medications Administered     heparin (porcine) pf 300-500 Units     Admin Date  12/31/2020 Action  Given Dose  500 Units Route  InterCATHeter Administered By  Lisy Faust, RN          sodium chloride (NS) flush 10 mL     Admin Date  12/31/2020 Action  Given Dose  10 mL Route  IntraVENous Administered By  Lisy Faust, RN

## 2021-01-12 ENCOUNTER — HOSPITAL ENCOUNTER (OUTPATIENT)
Dept: INFUSION THERAPY | Age: 71
Discharge: HOME | End: 2021-01-12
Payer: MEDICARE

## 2021-01-12 ENCOUNTER — OFFICE VISIT (OUTPATIENT)
Dept: ONCOLOGY | Age: 71
End: 2021-01-12
Payer: MEDICARE

## 2021-01-12 VITALS
WEIGHT: 126.8 LBS | RESPIRATION RATE: 16 BRPM | HEART RATE: 94 BPM | OXYGEN SATURATION: 100 % | BODY MASS INDEX: 20.38 KG/M2 | HEIGHT: 66 IN | SYSTOLIC BLOOD PRESSURE: 134 MMHG | TEMPERATURE: 97.3 F | DIASTOLIC BLOOD PRESSURE: 86 MMHG

## 2021-01-12 VITALS
SYSTOLIC BLOOD PRESSURE: 134 MMHG | TEMPERATURE: 97.3 F | HEIGHT: 66 IN | OXYGEN SATURATION: 100 % | WEIGHT: 126.2 LBS | DIASTOLIC BLOOD PRESSURE: 86 MMHG | HEART RATE: 94 BPM | BODY MASS INDEX: 20.28 KG/M2

## 2021-01-12 DIAGNOSIS — C18.7 MALIGNANT NEOPLASM OF SIGMOID COLON (HCC): ICD-10-CM

## 2021-01-12 DIAGNOSIS — D70.2 OTHER DRUG-INDUCED NEUTROPENIA (HCC): ICD-10-CM

## 2021-01-12 DIAGNOSIS — G62.0 CHEMOTHERAPY-INDUCED NEUROPATHY (HCC): ICD-10-CM

## 2021-01-12 DIAGNOSIS — C18.7 MALIGNANT NEOPLASM OF SIGMOID COLON (HCC): Primary | ICD-10-CM

## 2021-01-12 DIAGNOSIS — Z95.828 PORT-A-CATH IN PLACE: ICD-10-CM

## 2021-01-12 DIAGNOSIS — Z51.11 ENCOUNTER FOR ANTINEOPLASTIC CHEMOTHERAPY: Primary | ICD-10-CM

## 2021-01-12 DIAGNOSIS — T45.1X5A CHEMOTHERAPY-INDUCED NEUROPATHY (HCC): ICD-10-CM

## 2021-01-12 LAB
ALBUMIN SERPL-MCNC: 3.4 G/DL (ref 3.5–5)
ALBUMIN/GLOB SERPL: 1 {RATIO} (ref 1.1–2.2)
ALP SERPL-CCNC: 152 U/L (ref 45–117)
ALT SERPL-CCNC: 62 U/L (ref 12–78)
ANION GAP SERPL CALC-SCNC: 4 MMOL/L (ref 5–15)
AST SERPL-CCNC: 53 U/L (ref 15–37)
BASOPHILS # BLD: 0.1 K/UL (ref 0–0.1)
BASOPHILS NFR BLD: 2 % (ref 0–1)
BILIRUB SERPL-MCNC: 1 MG/DL (ref 0.2–1)
BUN SERPL-MCNC: 11 MG/DL (ref 6–20)
BUN/CREAT SERPL: 14 (ref 12–20)
CALCIUM SERPL-MCNC: 9.4 MG/DL (ref 8.5–10.1)
CEA SERPL-MCNC: 1.8 NG/ML
CHLORIDE SERPL-SCNC: 107 MMOL/L (ref 97–108)
CO2 SERPL-SCNC: 25 MMOL/L (ref 21–32)
CREAT SERPL-MCNC: 0.76 MG/DL (ref 0.55–1.02)
DIFFERENTIAL METHOD BLD: ABNORMAL
EOSINOPHIL # BLD: 0.2 K/UL (ref 0–0.4)
EOSINOPHIL NFR BLD: 7 % (ref 0–7)
ERYTHROCYTE [DISTWIDTH] IN BLOOD BY AUTOMATED COUNT: 20.7 % (ref 11.5–14.5)
GLOBULIN SER CALC-MCNC: 3.4 G/DL (ref 2–4)
GLUCOSE SERPL-MCNC: 124 MG/DL (ref 65–100)
HCT VFR BLD AUTO: 38.8 % (ref 35–47)
HGB BLD-MCNC: 12.3 G/DL (ref 11.5–16)
IMM GRANULOCYTES # BLD AUTO: 0 K/UL (ref 0–0.04)
IMM GRANULOCYTES NFR BLD AUTO: 0 % (ref 0–0.5)
LYMPHOCYTES # BLD: 0.9 K/UL (ref 0.8–3.5)
LYMPHOCYTES NFR BLD: 35 % (ref 12–49)
MCH RBC QN AUTO: 26.8 PG (ref 26–34)
MCHC RBC AUTO-ENTMCNC: 31.7 G/DL (ref 30–36.5)
MCV RBC AUTO: 84.5 FL (ref 80–99)
MONOCYTES # BLD: 0.6 K/UL (ref 0–1)
MONOCYTES NFR BLD: 23 % (ref 5–13)
NEUTS SEG # BLD: 0.8 K/UL (ref 1.8–8)
NEUTS SEG NFR BLD: 33 % (ref 32–75)
NRBC # BLD: 0 K/UL (ref 0–0.01)
NRBC BLD-RTO: 0 PER 100 WBC
PATH REV BLD -IMP: ABNORMAL
PLATELET # BLD AUTO: 103 K/UL (ref 150–400)
PMV BLD AUTO: 9.3 FL (ref 8.9–12.9)
POTASSIUM SERPL-SCNC: 3.6 MMOL/L (ref 3.5–5.1)
PROT SERPL-MCNC: 6.8 G/DL (ref 6.4–8.2)
RBC # BLD AUTO: 4.59 M/UL (ref 3.8–5.2)
RBC MORPH BLD: ABNORMAL
SODIUM SERPL-SCNC: 136 MMOL/L (ref 136–145)
WBC # BLD AUTO: 2.6 K/UL (ref 3.6–11)
WBC MORPH BLD: ABNORMAL

## 2021-01-12 PROCEDURE — G0463 HOSPITAL OUTPT CLINIC VISIT: HCPCS | Performed by: REGISTERED NURSE

## 2021-01-12 PROCEDURE — G8536 NO DOC ELDER MAL SCRN: HCPCS | Performed by: INTERNAL MEDICINE

## 2021-01-12 PROCEDURE — 36591 DRAW BLOOD OFF VENOUS DEVICE: CPT

## 2021-01-12 PROCEDURE — G8420 CALC BMI NORM PARAMETERS: HCPCS | Performed by: INTERNAL MEDICINE

## 2021-01-12 PROCEDURE — G8427 DOCREV CUR MEDS BY ELIG CLIN: HCPCS | Performed by: INTERNAL MEDICINE

## 2021-01-12 PROCEDURE — 82378 CARCINOEMBRYONIC ANTIGEN: CPT

## 2021-01-12 PROCEDURE — 1101F PT FALLS ASSESS-DOCD LE1/YR: CPT | Performed by: INTERNAL MEDICINE

## 2021-01-12 PROCEDURE — G8510 SCR DEP NEG, NO PLAN REQD: HCPCS | Performed by: INTERNAL MEDICINE

## 2021-01-12 PROCEDURE — G8400 PT W/DXA NO RESULTS DOC: HCPCS | Performed by: INTERNAL MEDICINE

## 2021-01-12 PROCEDURE — 80053 COMPREHEN METABOLIC PANEL: CPT

## 2021-01-12 PROCEDURE — 1090F PRES/ABSN URINE INCON ASSESS: CPT | Performed by: INTERNAL MEDICINE

## 2021-01-12 PROCEDURE — G9711 PT HX TOT COL OR COLON CA: HCPCS | Performed by: INTERNAL MEDICINE

## 2021-01-12 PROCEDURE — 99215 OFFICE O/P EST HI 40 MIN: CPT | Performed by: INTERNAL MEDICINE

## 2021-01-12 PROCEDURE — 85025 COMPLETE CBC W/AUTO DIFF WBC: CPT

## 2021-01-12 PROCEDURE — 77030012965 HC NDL HUBR BBMI -A

## 2021-01-12 RX ORDER — DIPHENHYDRAMINE HYDROCHLORIDE 50 MG/ML
50 INJECTION, SOLUTION INTRAMUSCULAR; INTRAVENOUS AS NEEDED
Status: CANCELLED
Start: 2021-01-19

## 2021-01-12 RX ORDER — SODIUM CHLORIDE 0.9 % (FLUSH) 0.9 %
10 SYRINGE (ML) INJECTION AS NEEDED
Status: CANCELLED | OUTPATIENT
Start: 2021-01-19

## 2021-01-12 RX ORDER — EPINEPHRINE 1 MG/ML
0.3 INJECTION, SOLUTION, CONCENTRATE INTRAVENOUS AS NEEDED
Status: CANCELLED | OUTPATIENT
Start: 2021-01-19

## 2021-01-12 RX ORDER — ACETAMINOPHEN 325 MG/1
650 TABLET ORAL AS NEEDED
Status: CANCELLED
Start: 2021-01-19

## 2021-01-12 RX ORDER — SODIUM CHLORIDE 0.9 % (FLUSH) 0.9 %
10 SYRINGE (ML) INJECTION AS NEEDED
Status: DISPENSED | OUTPATIENT
Start: 2021-01-12 | End: 2021-01-12

## 2021-01-12 RX ORDER — DIPHENHYDRAMINE HYDROCHLORIDE 50 MG/ML
25 INJECTION, SOLUTION INTRAMUSCULAR; INTRAVENOUS AS NEEDED
Status: CANCELLED
Start: 2021-01-19

## 2021-01-12 RX ORDER — DEXTROSE MONOHYDRATE 50 MG/ML
25 INJECTION, SOLUTION INTRAVENOUS CONTINUOUS
Status: CANCELLED
Start: 2021-01-19

## 2021-01-12 RX ORDER — SODIUM CHLORIDE 9 MG/ML
10 INJECTION INTRAMUSCULAR; INTRAVENOUS; SUBCUTANEOUS AS NEEDED
Status: CANCELLED | OUTPATIENT
Start: 2021-01-19

## 2021-01-12 RX ORDER — FLUOROURACIL 50 MG/ML
400 INJECTION, SOLUTION INTRAVENOUS ONCE
Status: CANCELLED
Start: 2021-01-19 | End: 2021-01-19

## 2021-01-12 RX ORDER — PALONOSETRON 0.05 MG/ML
0.25 INJECTION, SOLUTION INTRAVENOUS ONCE
Status: CANCELLED | OUTPATIENT
Start: 2021-01-19 | End: 2021-01-19

## 2021-01-12 RX ORDER — HYDROCORTISONE SODIUM SUCCINATE 100 MG/2ML
100 INJECTION, POWDER, FOR SOLUTION INTRAMUSCULAR; INTRAVENOUS AS NEEDED
Status: CANCELLED | OUTPATIENT
Start: 2021-01-19

## 2021-01-12 RX ORDER — HEPARIN 100 UNIT/ML
300-500 SYRINGE INTRAVENOUS AS NEEDED
Status: CANCELLED
Start: 2021-01-19

## 2021-01-12 RX ORDER — ALBUTEROL SULFATE 0.83 MG/ML
2.5 SOLUTION RESPIRATORY (INHALATION) AS NEEDED
Status: CANCELLED
Start: 2021-01-19

## 2021-01-12 RX ORDER — ONDANSETRON 2 MG/ML
8 INJECTION INTRAMUSCULAR; INTRAVENOUS AS NEEDED
Status: CANCELLED | OUTPATIENT
Start: 2021-01-19

## 2021-01-12 RX ADMIN — Medication 10 ML: at 08:00

## 2021-01-12 RX ADMIN — Medication 10 ML: at 10:54

## 2021-01-12 NOTE — PROGRESS NOTES
[facility] Chemotherapy Progress Note    403 Patient admit to [facility] for Folfox (C5) ambulatory in stable condition. Patient denies SOB, fever, cough, and/or general not feeling well. Patient denies recent exposure to someone who has tested positive for COVID-19. Patient denies contact with anyone who has a pending COVID test. Assessment completed, no new concerns voiced. Patient denies pain. R chest wall port accessed without difficulty. Port with positive blood return. Labs collected and sent for processing. Port flushed, curos cap applied to end clave. 0830 Patient left for medical oncology appointment. 1040 Patient returned to [facility]. Notified Dr. Светлана Rangel office that patients [address] = 0.8. HOLDING chemotherapy today. Delay one week. Patient voiced understanding. Visit Vitals  /86 (BP 1 Location: Left arm, BP Patient Position: Sitting)   Pulse 94   Temp 97.3 °F (36.3 °C)   Resp 16   Ht 5' 6\" (1.676 m)   Wt 57.5 kg (126 lb 12.8 oz)   SpO2 100%   BMI 20.47 kg/m²     Medications Administered     sodium chloride (NS) flush 10 mL     Admin Date  01/12/2021 Action  Given Dose  10 mL Route  IntraVENous Administered By  Romayne Rands, RN           Admin Date  01/12/2021 Action  Given Dose  10 mL Route  IntraVENous Administered By  Romayne Rands, RN              Chemotherapy Flowsheet 1/12/2021   Cycle C5   Date 1/12/2021   Drug / Regimen Folfox   Pre Meds -   Notes Held     1055 Patient voiced understanding of today's plan of care. No questions or concerns. Port flushed, heparinized and de-accessed per protocol. Patient is aware of appointment on 1/19/21.

## 2021-01-12 NOTE — PROGRESS NOTES
[facility] [place] [address] [address], [address]  W: [phone]  F: [phone]    Reason for Visit:   Bro Judge is a 72 y.o. female who is seen for stage III colon cancer. Treatment History:   · 9/10/2020 CT A/P - large, irregular pelvic mass measuring approximately 9cm likely representing neoplasm arising from the sigmoid colon, small amount of pelvic free fluid, colonic bowel wall thickening. Borderline enlarged retroperitoneal lymph nodes. 6 mm right lower lobe lung nodule. Mass effect from pelvic mass causing moderate right and minimal left hydronephrosis. · 9/14/2020: Colonoscopy - A large circumferential, ulcerated fungating mass was noted in proximal sigmoid colon. Mass was obstructing the lumen and could not be traversed. Euell Lansing was present. · 9/15/2020: CT Chest - 7.5mm left lower lobe pulmonary nodule, small right pleural effusion, mild right basilar atelectasis  · 9/17/2020: Surgical resection - low anterior resection, mobilization of the splenic flexure, coloproctostomy, creation of loop ileostomy, total abdominal hysterectomy and left salpingo-oophorectomy, distal right ureterectomy, right ureteral re-implant with psoas hitch and placement of right ureteral stent  · 11/17/20: cycle 1 FOLFOX    History of Present Illness:   Patient is a 72 y.o. female seen for colon cancer    She presented to the ED on 9/11/2020 with complaints of right flank/back pain and RLQ abdominal pain. She states she has had several months of gas pain and \"blockages. \" She reports a weight loss of 12 lbs in the last 3 months. CT abd/pelv 9/10/2020 showed large, irregular pelvic mass measuring approximately 9cm likely representing neoplasm arising from the sigmoid colon. She also had hydronephrosis from the mass effect and urethral stent placed. CEA 7.4 on 9/11and colonoscopy performed 9/14 with biopsy + adenocarcinoma.  CT Chest performed showing 7.5mm left lower lobe pulmonary nodule. Mass found to be invading into the uterus and right ureter. Surgical resection performed on 9/17/2020 including low anterior resection, mobilization of the splenic flexure, coloproctostomy, creation of loop ileostomy, total abdominal hysterectomy and left salpingo-oophorectomy, distal right ureterectomy, right ureteral re-implant with psoas hitch and placement of right ureteral stent. She was to see me in clinic but was admitted 10/10/2020 with ileostomy prolapse. Had a revision of loop ileostomy 10/11/2020. Then needed ileostomy closure and anastomosis 10/27/2020. Today she presents for cycle 5 of FOLFOX. Cycle 1 was given without 5FU bolus and cycle 2 & 3 were given with 5FU bolus. She feels very well today. She reports intermittent tingling in her legs that is not painful. Only when exposed to cold air. She is not tripping. She denies nausea/vomiting. Denies diarrhea/constipation. She denies mouth sores. No bleeding but does report easy bruising. Denies leg swelling. Denies SOB, CP, cough, fevers/chills, or bleeding. No other complaints today. Family Hx:  Mother with hx of pancreatic cancer    Past Medical History:   Diagnosis Date    [facility] ([facility]     GERD (gastroesophageal reflux disease)     Ileostomy in place ([address]     Ileostomy prolapse ([facility]     Ill-defined condition     Spaulding's esophagus      Past Surgical History:   Procedure Laterality Date    COLONOSCOPY Left 9/14/2020    COLONOSCOPY performed by Cecilia Love MD at [address] [place] [address]; incomplete secondary to partial obstruction.  HX APPENDECTOMY  age 17    HX COLONOSCOPY  circa 2010    No neoplasms.  HX ENDOSCOPY  03/13/2017    Dr. Goss Erp HX ENDOSCOPY  02/13/2020    Dr. Obed Mata oopherectomy for treatment of benign mass.     HX GYN  09/17/2020    Total abdominal hysterectomy and left salpingo-oophorectomy; Mya Washington MD.    HX OTHER SURGICAL  09/17/2020    Low anterior resection, mobilization of the splenic flexure, coloproctostomy, and creation of loop ileostomy; Dr. David Arcos.   OTHER SURGICAL  10/11/2020    Revision of loop ileostomy (resection and creation of divided loop ileostomy); Dr. David Arcos.   UROLOGICAL  09/12/2020    Cystoscopy and placement of a right ureteral stent; Roxanne Ta III, MD.     UROLOGICAL  09/17/2020    Cystoscopy and placement of a temporary left ureteral catheter; Roxanne Ta III, MD.     UROLOGICAL  09/17/2020    Distal right ureterectomy; Roxanne Ta III, MD.     UROLOGICAL  09/17/2020    Right ureteral re-implantation with psoas hitch and placement of a right ureteral stent; Valerie Colorado MD.    IR INSERT TUNL CVAD W PORT LESS THAN 5 YR  10/14/2020    Right chest Port-a-Cath placement. Social History     Tobacco Use    Smoking status: Never Smoker    Smokeless tobacco: Never Used   Substance Use Topics    Alcohol use: Yes     Alcohol/week: 1.0 standard drinks     Types: 1 Glasses of wine per week      Family History   Problem Relation Age of Onset    Cancer Mother     Heart Disease Father     Diabetes Brother      Current Outpatient Medications   Medication Sig    lidocaine-prilocaine (EMLA) topical cream Apply  to affected area as needed for Pain. Apply over port a cath site 30-60 minutes prior to port access    dexAMETHasone (DECADRON) 4 mg tablet Take 2 tabs (8mg) once daily on days 2 & 3 of chemotherapy only    ondansetron (ZOFRAN ODT) 8 mg disintegrating tablet Take 1 Tab by mouth every eight (8) hours as needed for Nausea or Vomiting. Can start 72 hours after chemotherapy infusion    denosumab (PROLIA) 60 mg/mL injection 60 mg by SubCUTAneous route.  lansoprazole (PREVACID) 30 mg capsule Take 30 mg by mouth Daily (before breakfast).  multivitamin (ONE A DAY) tablet Take 1 Tab by mouth daily.  ascorbic acid, vitamin C, (VITAMIN C) 500 mg tablet Take 500 mg by mouth.     cholecalciferol (VITAMIN D3) 1,000 unit cap Take 1,000 Units by mouth daily.  Omega-3 Fatty Acids (FISH OIL) 500 mg cap Take  by mouth. No current facility-administered medications for this visit. Facility-Administered Medications Ordered in Other Visits   Medication Dose Route Frequency    sodium chloride (NS) flush 10 mL  10 mL IntraVENous PRN      No Known Allergies     Review of Systems: A complete review of systems was obtained, negative except as described above. Physical Exam:     Visit Vitals  /86   Pulse 94   Temp 97.3 °F (36.3 °C)   Ht 5' 6\" (1.676 m)   Wt 126 lb 3.2 oz (57.2 kg)   SpO2 100%   BMI 20.37 kg/m²     ECOG PS: 1  General: No distress but appears frail  Eyes: PERRLA, anicteric sclerae  HENT: Atraumatic  GI: Soft, incisions healing well  MS: Digits without clubbing or cyanosis. Skin: No rashes, ecchymoses, or petechiae. Psych: Alert, oriented, appropriate affect, normal judgment/insight    Results:     Lab Results   Component Value Date/Time    WBC 2.6 (L) 01/12/2021 08:06 AM    HGB 12.3 01/12/2021 08:06 AM    HCT 38.8 01/12/2021 08:06 AM    PLATELET 660 (L) 30/53/6913 08:06 AM    MCV 84.5 01/12/2021 08:06 AM    ABS. NEUTROPHILS PENDING 01/12/2021 08:06 AM    Hemoglobin (POC) 12.0 09/17/2020 06:35 PM     Lab Results   Component Value Date/Time    Sodium 141 12/29/2020 07:59 AM    Potassium 3.5 12/29/2020 07:59 AM    Chloride 106 12/29/2020 07:59 AM    CO2 28 12/29/2020 07:59 AM    Glucose 89 12/29/2020 07:59 AM    BUN 12 12/29/2020 07:59 AM    Creatinine 0.62 12/29/2020 07:59 AM    GFR est AA >60 12/29/2020 07:59 AM    GFR est non-AA >60 12/29/2020 07:59 AM    Calcium 9.6 12/29/2020 07:59 AM    Glucose (POC) 98 09/27/2020 11:39 PM    Creatinine (POC) 0.8 09/10/2020 02:32 PM     Lab Results   Component Value Date/Time    Bilirubin, total 1.1 (H) 12/29/2020 07:59 AM    ALT (SGPT) 51 12/29/2020 07:59 AM    Alk.  phosphatase 136 (H) 12/29/2020 07:59 AM    Protein, total 7.1 12/29/2020 07:59 AM    Albumin 3.4 (L) 12/29/2020 07:59 AM    Globulin 3.7 12/29/2020 07:59 AM     CEA:  Recent Labs     12/15/20  0806 11/17/20  0839 09/11/20  1455   CEA 1.5 1.0 7.4       CT A/P 9/23/2020  IMPRESSION:  Large, irregular pelvic mass measuring approximately 9 cm likely representing  neoplasm arising from the sigmoid colon. Adnexal neoplasm is a possibility. This  does appear to be separate from the uterus. Small amount of pelvic free fluid. Associated colonic bowel wall thickening. There is focal gas in the upper  presacral region which is unclear if this is intraluminal or contained  extraluminal collection.     No definite distant metastatic disease. Borderline enlarged retroperitoneal  lymph nodes. 6 mm right lower lobe lung nodule. Mass effect from the pelvic mass  causing moderate right and minimal left hydronephrosis. CT Chest 9/15/2020  IMPRESSION:  1. 7.5 mm left lower lobe pulmonary nodule. 2. Small right pleural effusion. 3. Mild right basilar atelectasis. 9/14/2020   Addendum Diagnosis   1. Sigmoid Mass, Biopsy:     Infiltrating adenocarcinoma, most consistent with colon (See comment)   Addendum Comment   Immunohistochemical stains reveal the following staining pattern:   CK7: Scattered cells with cytoplasmic membrane staining   CK20: Positive cytoplasmic staining in normal colonic mucosal epithelial cells and malignant epithelial cells   CDX-2: Diffuse strong nuclear decoration and all tumor cells and normal background colonic epithelial cells   PAX-8: Negative   Estrogen Receptor: Negative   Positive and negative controls stain appropriately. Due to radiologic findings suggesting the possibility of GYN involvement, ER and PAX-8 are performed, which lend support to the diagnosis of a colonic primary, over a tumor of müllerian origin. 9/17/2020   FINAL PATHOLOGIC DIAGNOSIS   1.  Sigmoid colon and proximal rectum, uterus, left fallopian tube and ovary and right ureteral segment, low anterior resection:   Sigmoid colon and proximal rectum:  Invasive adenocarcinoma (see synoptic report and comment). Metastatic adenocarcinoma in one of sixteen lymph nodes (1/16). Uterus: Invasive adenocarcinoma extending from adhesed colon into uterine serosa. Endometrial lining shows mucosal atrophy. Left ovary: Benign ovary with serosal inclusion cysts. Left fallopian tube: Benign fallopian tube. Right ureteral segment: Benign segment of ureter densely adhesed to colon. Nodule near right uterine cornu:  Nodular portion of benign smooth muscle consistent with leiomyoma with parenchymal hemorrhage. COLON AND RECTUM: Resection   SPECIMEN   Procedure: Low anterior resection   TUMOR   Tumor Site: Sigmoid colon   Histologic Type: Adenocarcinoma   Histologic Grade: G2: Moderately differentiated   Tumor Size: 8.0 cm   Tumor Extension: Tumor directly invades adjacent structures:   Posterior uterine serosa   Macroscopic Tumor Perforation: Tumor invades through serosa into surrounding soft tissue   Lymphovascular Invasion: Present   Perineural Invasion: Not identified   Treatment Effect: No known presurgical therapy   MARGINS   Margins: All margins are uninvolved by invasive carcinoma   Margins Examined: Proximal, Distal, Radial (circumferential) or   Mesenteric   Distance of Tumor from Radial (circumferential) Margin: See Comment   LYMPH NODES   Number of Lymph Nodes Involved: 1   Number of Lymph Nodes Examined: 16   Tumor Deposits: Not identified   PATHOLOGIC STAGE CLASSIFICATION (pTNM, AJCC 8th Edition)   Primary Tumor (pT): pT4b   Regional Lymph Nodes (pN): pN1a   2. Distal rectal margin:   Segment of benign large bowel. 3. Anastomotic doughnuts:   Two annular portions of benign large bowel. Comment   Tumor invades into the adherent soft tissue and to within 1.1 cm of the apparent right pelvic sidewall margin. Records reviewed and summarized above.   Pathology report(s) reviewed above. Radiology report(s) reviewed above. Assessment:   1) Sigmoid Colon Adenocarcinoma - Stage IIIB- ERICKA  [id number]  CT abd/pelv 9/10/2020 showed large, irregular pelvic mass measuring approximately 9cm likely representing neoplasm arising from the sigmoid colon. CEA 7.4 on 9/11  Colonoscopy performed 9/14 and biopsy + adenocarcinoma  Mass found to be invading into the uterus and right ureter. Surgical resection on 9/17 with creation of loop ileostomy, total abdominal hysterectomy and left salpingo-oophorectomy, distal right ureterectomy  She had a revision of her ileostomy 10/11/2020  Then needed stoma closure and anastomosis on 10/27/2020    Today is cycle 5 of adjuvant FOLFOX x 6 months   5FU bolus was introduced with cycle 2 and she tolerated this well with grade 1 neuropathy and grade 1 thrombocytopenia  However, today she has grade 3 neutropenia. See below for management. 2) Anemia  Due to #1  Iron sat of 4% and ferritin of 20 on 9/11  S/p 2 units PRBCs on 9/15 and Venofer 200mg x3 doses  Anemia has resolved     4) Elevated transaminases  Monitor- if increases further will obtain an USG of abdomen  CMP pending    5) Neuropathy  Grade 1   Monitor     6) Neutropenia  Grade 3 today  Hold treatment today 1 week and add neulasta for high risk of grade IV neutropenia    7) Management of high risk medications like chemotherapy  Labs reviewed and note worsening neutropenia to grade 3 today  See below for management    Plan:     · Hold cycle 5 of mFOLFOX (5FU CADD 2400mg/m2, 5FU bolus 400mg/m2, leucovorin 400mg/m2, oxaliplatin 85mg/m2) every 2 weeks for 6 months & re-schedule 1 week   · Add neulasta given high risk for grade IV neutropenia   · Cbc, cmp every cycle, cea every other  · Zofran prn, dexamethasone days 2 and 3  · Follow with surgery as scheduled     RTC in 3 weeks      Nishanita List at [phone] [facility]    I appreciate the opportunity to participate in Ms. Leeroy Kelsey charissa.     Signed By: Meghan Lamar MD    I performed a history and physical examination of the patient and discussed his management with the NPP.  I reviewed the NPP note and agree with the documented findings and plan of care

## 2021-01-12 NOTE — PROGRESS NOTES
Nadege Moffett is a 72 y.o. female  Chief Complaint   Patient presents with    Follow-up    Colon Cancer     1. Have you been to the ER, urgent care clinic since your last visit? Hospitalized since your last visit? No    2. Have you seen or consulted any other health care providers outside of the [address] [place] [address] since your last visit? Include any pap smears or colon screening. No

## 2021-01-12 NOTE — Clinical Note
Bianca her chemo was delayed today  Re-schedule per beacon dates  Also needs OV 2/2 now instead   Thanks

## 2021-01-14 ENCOUNTER — HOSPITAL ENCOUNTER (OUTPATIENT)
Dept: INFUSION THERAPY | Age: 71
End: 2021-01-14

## 2021-01-19 ENCOUNTER — HOSPITAL ENCOUNTER (OUTPATIENT)
Dept: INFUSION THERAPY | Age: 71
Discharge: HOME | End: 2021-01-19
Payer: MEDICARE

## 2021-01-19 VITALS
HEIGHT: 66 IN | HEART RATE: 93 BPM | TEMPERATURE: 97.7 F | BODY MASS INDEX: 20.37 KG/M2 | RESPIRATION RATE: 16 BRPM | SYSTOLIC BLOOD PRESSURE: 120 MMHG | DIASTOLIC BLOOD PRESSURE: 79 MMHG

## 2021-01-19 DIAGNOSIS — C18.7 MALIGNANT NEOPLASM OF SIGMOID COLON (HCC): Primary | ICD-10-CM

## 2021-01-19 LAB
ALBUMIN SERPL-MCNC: 3.6 G/DL (ref 3.5–5)
ALBUMIN/GLOB SERPL: 1.1 {RATIO} (ref 1.1–2.2)
ALP SERPL-CCNC: 168 U/L (ref 45–117)
ALT SERPL-CCNC: 55 U/L (ref 12–78)
ANION GAP SERPL CALC-SCNC: 8 MMOL/L (ref 5–15)
AST SERPL-CCNC: 51 U/L (ref 15–37)
BASOPHILS # BLD: 0 K/UL (ref 0–0.1)
BASOPHILS NFR BLD: 2 % (ref 0–1)
BILIRUB SERPL-MCNC: 0.9 MG/DL (ref 0.2–1)
BUN SERPL-MCNC: 13 MG/DL (ref 6–20)
BUN/CREAT SERPL: 21 (ref 12–20)
CALCIUM SERPL-MCNC: 9.7 MG/DL (ref 8.5–10.1)
CHLORIDE SERPL-SCNC: 108 MMOL/L (ref 97–108)
CO2 SERPL-SCNC: 24 MMOL/L (ref 21–32)
CREAT SERPL-MCNC: 0.63 MG/DL (ref 0.55–1.02)
DIFFERENTIAL METHOD BLD: ABNORMAL
EOSINOPHIL # BLD: 0.1 K/UL (ref 0–0.4)
EOSINOPHIL NFR BLD: 6 % (ref 0–7)
ERYTHROCYTE [DISTWIDTH] IN BLOOD BY AUTOMATED COUNT: 20.5 % (ref 11.5–14.5)
GLOBULIN SER CALC-MCNC: 3.3 G/DL (ref 2–4)
GLUCOSE SERPL-MCNC: 110 MG/DL (ref 65–100)
HCT VFR BLD AUTO: 39.4 % (ref 35–47)
HGB BLD-MCNC: 12.4 G/DL (ref 11.5–16)
IMM GRANULOCYTES # BLD AUTO: 0 K/UL (ref 0–0.04)
IMM GRANULOCYTES NFR BLD AUTO: 1 % (ref 0–0.5)
LYMPHOCYTES # BLD: 0.7 K/UL (ref 0.8–3.5)
LYMPHOCYTES NFR BLD: 34 % (ref 12–49)
MCH RBC QN AUTO: 26.6 PG (ref 26–34)
MCHC RBC AUTO-ENTMCNC: 31.5 G/DL (ref 30–36.5)
MCV RBC AUTO: 84.4 FL (ref 80–99)
MONOCYTES # BLD: 0.5 K/UL (ref 0–1)
MONOCYTES NFR BLD: 24 % (ref 5–13)
NEUTS SEG # BLD: 0.9 K/UL (ref 1.8–8)
NEUTS SEG NFR BLD: 33 % (ref 32–75)
NRBC # BLD: 0 K/UL (ref 0–0.01)
NRBC BLD-RTO: 0 PER 100 WBC
PLATELET # BLD AUTO: 146 K/UL (ref 150–400)
PMV BLD AUTO: 10.2 FL (ref 8.9–12.9)
POTASSIUM SERPL-SCNC: 3.7 MMOL/L (ref 3.5–5.1)
PROT SERPL-MCNC: 6.9 G/DL (ref 6.4–8.2)
RBC # BLD AUTO: 4.67 M/UL (ref 3.8–5.2)
RBC MORPH BLD: ABNORMAL
SODIUM SERPL-SCNC: 140 MMOL/L (ref 136–145)
WBC # BLD AUTO: 2.2 K/UL (ref 3.6–11)

## 2021-01-19 PROCEDURE — 74011250636 HC RX REV CODE- 250/636: Performed by: REGISTERED NURSE

## 2021-01-19 PROCEDURE — 85025 COMPLETE CBC W/AUTO DIFF WBC: CPT

## 2021-01-19 PROCEDURE — 36591 DRAW BLOOD OFF VENOUS DEVICE: CPT

## 2021-01-19 PROCEDURE — 80053 COMPREHEN METABOLIC PANEL: CPT

## 2021-01-19 PROCEDURE — 77030012965 HC NDL HUBR BBMI -A

## 2021-01-19 RX ORDER — SODIUM CHLORIDE 9 MG/ML
10 INJECTION INTRAMUSCULAR; INTRAVENOUS; SUBCUTANEOUS AS NEEDED
Status: ACTIVE | OUTPATIENT
Start: 2021-01-19 | End: 2021-01-19

## 2021-01-19 RX ORDER — HEPARIN 100 UNIT/ML
300-500 SYRINGE INTRAVENOUS AS NEEDED
Status: ACTIVE | OUTPATIENT
Start: 2021-01-19 | End: 2021-01-19

## 2021-01-19 RX ORDER — SODIUM CHLORIDE 0.9 % (FLUSH) 0.9 %
10 SYRINGE (ML) INJECTION AS NEEDED
Status: DISPENSED | OUTPATIENT
Start: 2021-01-19 | End: 2021-01-19

## 2021-01-19 RX ADMIN — SODIUM CHLORIDE 10 ML: 9 INJECTION INTRAMUSCULAR; INTRAVENOUS; SUBCUTANEOUS at 08:31

## 2021-01-19 RX ADMIN — HEPARIN 500 UNITS: 100 SYRINGE at 10:19

## 2021-01-19 RX ADMIN — Medication 10 ML: at 08:31

## 2021-01-19 NOTE — PROGRESS NOTES
[facility] - Chemotherapy Progress Note    9090 Pt admit to [facility] for Folfox/C5 ambulatory in stable condition. Assessment completed. Pt reports bilateral BLE ankle/feet swelling, elevation helps a little per pt. No new concerns voiced. Port accessed with positive blood return. Labs drawn per order and sent. Line flushed, clamped, Curos Cap applied to end clave. Awaiting lab results. Patient denies SOB, fever, cough, general not feeling well. Patient denies recent exposure to someone who has tested positive for COVID-19. Patient  denies having contact with anyone who has a pending COVID test.     1000 Labs resulted, ANC, 0.9; notified Isadora Brown NP; orders to hold today's treatment and delay x1 week; discussed w/ pt, verbalized understanding; Port flushed w/ positive blood return, heparinized and de-accessed      Visit Vitals  /79   Pulse 93   Temp 97.7 °F (36.5 °C)   Resp 16   Ht 5' 6\" (1.676 m)   Breastfeeding No   BMI 20.37 kg/m²       Medications Administered     0.9% sodium chloride injection 10 mL     Admin Date  01/19/2021 Action  Given Dose  10 mL Route  IntraVENous Administered By  Sosa Yarbrough RN          heparin (porcine) pf 300-500 Units     Admin Date  01/19/2021 Action  Given Dose  500 Units Route  InterCATHeter Administered By  Sosa Yarbrough RN          sodium chloride (NS) flush 10 mL     Admin Date  01/19/2021 Action  Given Dose  10 mL Route  IntraVENous Administered By  Sosa Yarbrough, RN                    1110 Pt tolerated treatment well. D/c home ambulatory in no distress. Pt aware that she will be contacted regarding further [facility] appointments.      Recent Results (from the past 12 hour(s))   CBC WITH AUTOMATED DIFF    Collection Time: 01/19/21  8:16 AM   Result Value Ref Range    WBC 2.2 (L) 3.6 - 11.0 K/uL    RBC 4.67 3.80 - 5.20 M/uL    HGB 12.4 11.5 - 16.0 g/dL    HCT 39.4 35.0 - 47.0 %    MCV 84.4 80.0 - 99.0 FL    MCH 26.6 26.0 - 34.0 PG    MCHC 31.5 30.0 - 36.5 g/dL    RDW 20.5 (H) 11.5 - 14.5 %    PLATELET 478 (L) 138 - 400 K/uL    MPV 10.2 8.9 - 12.9 FL    NRBC 0.0 0  WBC    ABSOLUTE NRBC 0.00 0.00 - 0.01 K/uL    NEUTROPHILS 33 32 - 75 %    LYMPHOCYTES 34 12 - 49 %    MONOCYTES 24 (H) 5 - 13 %    EOSINOPHILS 6 0 - 7 %    BASOPHILS 2 (H) 0 - 1 %    IMMATURE GRANULOCYTES 1 (H) 0.0 - 0.5 %    ABS. NEUTROPHILS 0.9 (L) 1.8 - 8.0 K/UL    ABS. LYMPHOCYTES 0.7 (L) 0.8 - 3.5 K/UL    ABS. MONOCYTES 0.5 0.0 - 1.0 K/UL    ABS. EOSINOPHILS 0.1 0.0 - 0.4 K/UL    ABS. BASOPHILS 0.0 0.0 - 0.1 K/UL    ABS. IMM. GRANS. 0.0 0.00 - 0.04 K/UL    DF SMEAR SCANNED      RBC COMMENTS ANISOCYTOSIS  1+       METABOLIC PANEL, COMPREHENSIVE    Collection Time: 01/19/21  8:16 AM   Result Value Ref Range    Sodium 140 136 - 145 mmol/L    Potassium 3.7 3.5 - 5.1 mmol/L    Chloride 108 97 - 108 mmol/L    CO2 24 21 - 32 mmol/L    Anion gap 8 5 - 15 mmol/L    Glucose 110 (H) 65 - 100 mg/dL    BUN 13 6 - 20 MG/DL    Creatinine 0.63 0.55 - 1.02 MG/DL    BUN/Creatinine ratio 21 (H) 12 - 20      GFR est AA >60 >60 ml/min/1.73m2    GFR est non-AA >60 >60 ml/min/1.73m2    Calcium 9.7 8.5 - 10.1 MG/DL    Bilirubin, total 0.9 0.2 - 1.0 MG/DL    ALT (SGPT) 55 12 - 78 U/L    AST (SGOT) 51 (H) 15 - 37 U/L    Alk.  phosphatase 168 (H) 45 - 117 U/L    Protein, total 6.9 6.4 - 8.2 g/dL    Albumin 3.6 3.5 - 5.0 g/dL    Globulin 3.3 2.0 - 4.0 g/dL    A-G Ratio 1.1 1.1 - 2.2

## 2021-01-21 ENCOUNTER — HOSPITAL ENCOUNTER (OUTPATIENT)
Dept: INFUSION THERAPY | Age: 71
End: 2021-01-21

## 2021-01-26 ENCOUNTER — APPOINTMENT (OUTPATIENT)
Dept: INFUSION THERAPY | Age: 71
End: 2021-01-26

## 2021-01-26 RX ORDER — HEPARIN 100 UNIT/ML
300-500 SYRINGE INTRAVENOUS AS NEEDED
Status: CANCELLED | OUTPATIENT
Start: 2021-01-29

## 2021-01-26 RX ORDER — HYDROCORTISONE SODIUM SUCCINATE 100 MG/2ML
100 INJECTION, POWDER, FOR SOLUTION INTRAMUSCULAR; INTRAVENOUS AS NEEDED
Status: CANCELLED | OUTPATIENT
Start: 2021-01-29

## 2021-01-26 RX ORDER — DEXTROSE MONOHYDRATE 50 MG/ML
25 INJECTION, SOLUTION INTRAVENOUS CONTINUOUS
Status: CANCELLED
Start: 2021-01-29

## 2021-01-26 RX ORDER — EPINEPHRINE 1 MG/ML
0.3 INJECTION, SOLUTION, CONCENTRATE INTRAVENOUS AS NEEDED
Status: CANCELLED | OUTPATIENT
Start: 2021-01-29

## 2021-01-26 RX ORDER — ACETAMINOPHEN 325 MG/1
650 TABLET ORAL AS NEEDED
Status: CANCELLED
Start: 2021-01-29

## 2021-01-26 RX ORDER — FLUOROURACIL 50 MG/ML
400 INJECTION, SOLUTION INTRAVENOUS ONCE
Status: CANCELLED
Start: 2021-01-29 | End: 2021-01-29

## 2021-01-26 RX ORDER — DIPHENHYDRAMINE HYDROCHLORIDE 50 MG/ML
50 INJECTION, SOLUTION INTRAMUSCULAR; INTRAVENOUS AS NEEDED
Status: CANCELLED
Start: 2021-01-29

## 2021-01-26 RX ORDER — ALBUTEROL SULFATE 0.83 MG/ML
2.5 SOLUTION RESPIRATORY (INHALATION) AS NEEDED
Status: CANCELLED
Start: 2021-01-29

## 2021-01-26 RX ORDER — ONDANSETRON 2 MG/ML
8 INJECTION INTRAMUSCULAR; INTRAVENOUS AS NEEDED
Status: CANCELLED | OUTPATIENT
Start: 2021-01-29

## 2021-01-26 RX ORDER — SODIUM CHLORIDE 0.9 % (FLUSH) 0.9 %
10 SYRINGE (ML) INJECTION AS NEEDED
Status: CANCELLED | OUTPATIENT
Start: 2021-01-29

## 2021-01-26 RX ORDER — SODIUM CHLORIDE 9 MG/ML
10 INJECTION INTRAMUSCULAR; INTRAVENOUS; SUBCUTANEOUS AS NEEDED
Status: CANCELLED | OUTPATIENT
Start: 2021-01-29

## 2021-01-26 RX ORDER — DIPHENHYDRAMINE HYDROCHLORIDE 50 MG/ML
25 INJECTION, SOLUTION INTRAMUSCULAR; INTRAVENOUS AS NEEDED
Status: CANCELLED
Start: 2021-01-29

## 2021-01-26 RX ORDER — PALONOSETRON 0.05 MG/ML
0.25 INJECTION, SOLUTION INTRAVENOUS ONCE
Status: CANCELLED | OUTPATIENT
Start: 2021-01-29 | End: 2021-01-29

## 2021-01-28 ENCOUNTER — APPOINTMENT (OUTPATIENT)
Dept: INFUSION THERAPY | Age: 71
End: 2021-01-28

## 2021-01-29 ENCOUNTER — HOSPITAL ENCOUNTER (OUTPATIENT)
Dept: INFUSION THERAPY | Age: 71
Discharge: HOME | End: 2021-01-29
Payer: MEDICARE

## 2021-01-29 VITALS
HEART RATE: 87 BPM | WEIGHT: 124.4 LBS | TEMPERATURE: 96.8 F | SYSTOLIC BLOOD PRESSURE: 132 MMHG | BODY MASS INDEX: 19.99 KG/M2 | RESPIRATION RATE: 18 BRPM | HEIGHT: 66 IN | DIASTOLIC BLOOD PRESSURE: 85 MMHG

## 2021-01-29 DIAGNOSIS — C18.7 MALIGNANT NEOPLASM OF SIGMOID COLON (HCC): Primary | ICD-10-CM

## 2021-01-29 LAB
ALBUMIN SERPL-MCNC: 3.8 G/DL (ref 3.5–5)
ALBUMIN/GLOB SERPL: 1 {RATIO} (ref 1.1–2.2)
ALP SERPL-CCNC: 193 U/L (ref 45–117)
ALT SERPL-CCNC: 57 U/L (ref 12–78)
ANION GAP SERPL CALC-SCNC: 11 MMOL/L (ref 5–15)
AST SERPL-CCNC: 46 U/L (ref 15–37)
BASOPHILS # BLD: 0.1 K/UL (ref 0–0.1)
BASOPHILS NFR BLD: 2 % (ref 0–1)
BILIRUB SERPL-MCNC: 0.9 MG/DL (ref 0.2–1)
BUN SERPL-MCNC: 23 MG/DL (ref 6–20)
BUN/CREAT SERPL: 29 (ref 12–20)
CALCIUM SERPL-MCNC: 9.8 MG/DL (ref 8.5–10.1)
CEA SERPL-MCNC: 1.7 NG/ML
CHLORIDE SERPL-SCNC: 101 MMOL/L (ref 97–108)
CO2 SERPL-SCNC: 26 MMOL/L (ref 21–32)
CREAT SERPL-MCNC: 0.79 MG/DL (ref 0.55–1.02)
DIFFERENTIAL METHOD BLD: ABNORMAL
EOSINOPHIL # BLD: 0.2 K/UL (ref 0–0.4)
EOSINOPHIL NFR BLD: 5 % (ref 0–7)
ERYTHROCYTE [DISTWIDTH] IN BLOOD BY AUTOMATED COUNT: 18.7 % (ref 11.5–14.5)
GLOBULIN SER CALC-MCNC: 4 G/DL (ref 2–4)
GLUCOSE SERPL-MCNC: 111 MG/DL (ref 65–100)
HCT VFR BLD AUTO: 42.6 % (ref 35–47)
HGB BLD-MCNC: 13.4 G/DL (ref 11.5–16)
IMM GRANULOCYTES # BLD AUTO: 0 K/UL (ref 0–0.04)
IMM GRANULOCYTES NFR BLD AUTO: 0 % (ref 0–0.5)
LYMPHOCYTES # BLD: 0.9 K/UL (ref 0.8–3.5)
LYMPHOCYTES NFR BLD: 28 % (ref 12–49)
MCH RBC QN AUTO: 27.1 PG (ref 26–34)
MCHC RBC AUTO-ENTMCNC: 31.5 G/DL (ref 30–36.5)
MCV RBC AUTO: 86.2 FL (ref 80–99)
MONOCYTES # BLD: 0.5 K/UL (ref 0–1)
MONOCYTES NFR BLD: 16 % (ref 5–13)
NEUTS SEG # BLD: 1.5 K/UL (ref 1.8–8)
NEUTS SEG NFR BLD: 49 % (ref 32–75)
NRBC # BLD: 0 K/UL (ref 0–0.01)
NRBC BLD-RTO: 0 PER 100 WBC
PLATELET # BLD AUTO: 175 K/UL (ref 150–400)
PMV BLD AUTO: 9.7 FL (ref 8.9–12.9)
POTASSIUM SERPL-SCNC: 3.6 MMOL/L (ref 3.5–5.1)
PROT SERPL-MCNC: 7.8 G/DL (ref 6.4–8.2)
RBC # BLD AUTO: 4.94 M/UL (ref 3.8–5.2)
SODIUM SERPL-SCNC: 138 MMOL/L (ref 136–145)
WBC # BLD AUTO: 3.1 K/UL (ref 3.6–11)

## 2021-01-29 PROCEDURE — 96415 CHEMO IV INFUSION ADDL HR: CPT

## 2021-01-29 PROCEDURE — 74011000258 HC RX REV CODE- 258: Performed by: REGISTERED NURSE

## 2021-01-29 PROCEDURE — 82378 CARCINOEMBRYONIC ANTIGEN: CPT

## 2021-01-29 PROCEDURE — 36415 COLL VENOUS BLD VENIPUNCTURE: CPT

## 2021-01-29 PROCEDURE — 80053 COMPREHEN METABOLIC PANEL: CPT

## 2021-01-29 PROCEDURE — 96368 THER/DIAG CONCURRENT INF: CPT

## 2021-01-29 PROCEDURE — 85025 COMPLETE CBC W/AUTO DIFF WBC: CPT

## 2021-01-29 PROCEDURE — 74011250636 HC RX REV CODE- 250/636: Performed by: REGISTERED NURSE

## 2021-01-29 PROCEDURE — 96413 CHEMO IV INFUSION 1 HR: CPT

## 2021-01-29 PROCEDURE — 96416 CHEMO PROLONG INFUSE W/PUMP: CPT

## 2021-01-29 PROCEDURE — 96417 CHEMO IV INFUS EACH ADDL SEQ: CPT

## 2021-01-29 PROCEDURE — 96375 TX/PRO/DX INJ NEW DRUG ADDON: CPT

## 2021-01-29 PROCEDURE — 77030012965 HC NDL HUBR BBMI -A

## 2021-01-29 RX ORDER — SODIUM CHLORIDE 9 MG/ML
10 INJECTION INTRAMUSCULAR; INTRAVENOUS; SUBCUTANEOUS AS NEEDED
Status: ACTIVE | OUTPATIENT
Start: 2021-01-29 | End: 2021-01-29

## 2021-01-29 RX ORDER — DEXTROSE MONOHYDRATE 50 MG/ML
25 INJECTION, SOLUTION INTRAVENOUS CONTINUOUS
Status: DISPENSED | OUTPATIENT
Start: 2021-01-29 | End: 2021-01-29

## 2021-01-29 RX ORDER — HEPARIN 100 UNIT/ML
300-500 SYRINGE INTRAVENOUS AS NEEDED
Status: ACTIVE | OUTPATIENT
Start: 2021-01-29 | End: 2021-01-29

## 2021-01-29 RX ORDER — FLUOROURACIL 50 MG/ML
400 INJECTION, SOLUTION INTRAVENOUS ONCE
Status: COMPLETED | OUTPATIENT
Start: 2021-01-29 | End: 2021-01-29

## 2021-01-29 RX ORDER — PALONOSETRON 0.05 MG/ML
0.25 INJECTION, SOLUTION INTRAVENOUS ONCE
Status: COMPLETED | OUTPATIENT
Start: 2021-01-29 | End: 2021-01-29

## 2021-01-29 RX ORDER — SODIUM CHLORIDE 0.9 % (FLUSH) 0.9 %
10 SYRINGE (ML) INJECTION AS NEEDED
Status: DISPENSED | OUTPATIENT
Start: 2021-01-29 | End: 2021-01-29

## 2021-01-29 RX ADMIN — OXALIPLATIN 132 MG: 5 INJECTION, SOLUTION, CONCENTRATE INTRAVENOUS at 10:18

## 2021-01-29 RX ADMIN — DEXTROSE MONOHYDRATE 25 ML/HR: 5 INJECTION, SOLUTION INTRAVENOUS at 09:22

## 2021-01-29 RX ADMIN — FLUOROURACIL 3720 MG: 50 INJECTION, SOLUTION INTRAVENOUS at 12:32

## 2021-01-29 RX ADMIN — LEUCOVORIN CALCIUM 620 MG: 350 INJECTION, POWDER, LYOPHILIZED, FOR SOLUTION INTRAMUSCULAR; INTRAVENOUS at 10:18

## 2021-01-29 RX ADMIN — FLUOROURACIL 620 MG: 50 INJECTION, SOLUTION INTRAVENOUS at 12:28

## 2021-01-29 RX ADMIN — DEXAMETHASONE SODIUM PHOSPHATE 12 MG: 4 INJECTION, SOLUTION INTRA-ARTICULAR; INTRALESIONAL; INTRAMUSCULAR; INTRAVENOUS; SOFT TISSUE at 09:41

## 2021-01-29 RX ADMIN — PALONOSETRON 0.25 MG: 0.05 INJECTION, SOLUTION INTRAVENOUS at 09:24

## 2021-01-29 NOTE — PROGRESS NOTES
[facility] Chemo Progress Note    Date: 2021    Name: Nadege Moffett    MRN: [id number]         : 1950    0800 Ms. Tatum Arrived to [facility] for  FOLFOX C5 D1 ambulatory in stable condition. Assessment was completed, no acute issues at this time, no new complaints voiced. Port accessed with positive blood return. Labs drawn and sent for processing. Chemotherapy Flowsheet 2021   Cycle C5   Date 2021   Drug / Regimen Folfox   Pre Meds -   Notes given         Patient Denies SOB, fever, cough, general not feeling well. Patient Denies recent exposure to someone who has tested positive for COVID-19. Patient Denies having contact with anyone who has a pending COVID test.      Ms. Arun Braswell vitals were reviewed. Patient Vitals for the past 12 hrs:   Temp Pulse Resp BP   21 1235 -- 87 -- 132/85   21 0758 96.8 °F (36 °C) 82 18 116/74         Lab results were obtained and reviewed. Recent Results (from the past 12 hour(s))   CBC WITH AUTOMATED DIFF    Collection Time: 21  8:07 AM   Result Value Ref Range    WBC 3.1 (L) 3.6 - 11.0 K/uL    RBC 4.94 3.80 - 5.20 M/uL    HGB 13.4 11.5 - 16.0 g/dL    HCT 42.6 35.0 - 47.0 %    MCV 86.2 80.0 - 99.0 FL    MCH 27.1 26.0 - 34.0 PG    MCHC 31.5 30.0 - 36.5 g/dL    RDW 18.7 (H) 11.5 - 14.5 %    PLATELET 094 723 - 033 K/uL    MPV 9.7 8.9 - 12.9 FL    NRBC 0.0 0  WBC    ABSOLUTE NRBC 0.00 0.00 - 0.01 K/uL    NEUTROPHILS 49 32 - 75 %    LYMPHOCYTES 28 12 - 49 %    MONOCYTES 16 (H) 5 - 13 %    EOSINOPHILS 5 0 - 7 %    BASOPHILS 2 (H) 0 - 1 %    IMMATURE GRANULOCYTES 0 0.0 - 0.5 %    ABS. NEUTROPHILS 1.5 (L) 1.8 - 8.0 K/UL    ABS. LYMPHOCYTES 0.9 0.8 - 3.5 K/UL    ABS. MONOCYTES 0.5 0.0 - 1.0 K/UL    ABS. EOSINOPHILS 0.2 0.0 - 0.4 K/UL    ABS. BASOPHILS 0.1 0.0 - 0.1 K/UL    ABS. IMM.  GRANS. 0.0 0.00 - 0.04 K/UL    DF AUTOMATED     METABOLIC PANEL, COMPREHENSIVE    Collection Time: 21  8:07 AM   Result Value Ref Range    Sodium 138 136 - 145 mmol/L    Potassium 3.6 3.5 - 5.1 mmol/L    Chloride 101 97 - 108 mmol/L    CO2 26 21 - 32 mmol/L    Anion gap 11 5 - 15 mmol/L    Glucose 111 (H) 65 - 100 mg/dL    BUN 23 (H) 6 - 20 MG/DL    Creatinine 0.79 0.55 - 1.02 MG/DL    BUN/Creatinine ratio 29 (H) 12 - 20      GFR est AA >60 >60 ml/min/1.73m2    GFR est non-AA >60 >60 ml/min/1.73m2    Calcium 9.8 8.5 - 10.1 MG/DL    Bilirubin, total 0.9 0.2 - 1.0 MG/DL    ALT (SGPT) 57 12 - 78 U/L    AST (SGOT) 46 (H) 15 - 37 U/L    Alk. phosphatase 193 (H) 45 - 117 U/L    Protein, total 7.8 6.4 - 8.2 g/dL    Albumin 3.8 3.5 - 5.0 g/dL    Globulin 4.0 2.0 - 4.0 g/dL    A-G Ratio 1.0 (L) 1.1 - 2.2     CEA    Collection Time: 01/29/21  8:07 AM   Result Value Ref Range    CEA 1.7 ng/mL       Pre-medications  were administered as ordered and chemotherapy was initiated.   Medications Administered     dexamethasone (DECADRON) 12 mg in 0.9% sodium chloride 50 mL, overfill volume 5 mL IVPB     Admin Date  01/29/2021 Action  Given Dose  12 mg Rate  232 mL/hr Route  IntraVENous Administered By  Becca Gonzalez RN          dextrose 5% infusion     Admin Date  01/29/2021 Action  New Bag Dose  25 mL/hr Rate  25 mL/hr Route  IntraVENous Administered By  Becca Gonzalez RN          fluorouraciL (ADRUCIL) 3,720 mg in 0.9% sodium chloride 100 mL CADD Cassette     Admin Date  01/29/2021 Action  New Bag Dose  3,720 mg Rate  2.2 mL/hr Route  IntraVENous Administered By  Becca Gonzalez RN          fluorouraciL (ADRUCIL) chemo syringe 620 mg     Admin Date  01/29/2021 Action  Given Dose  620 mg Rate  248 mL/hr Route  IntraVENous Administered By  Becca Gonzalez RN          leucovorin (WELLCOVORIN) 620 mg in dextrose 5% 250 mL, overfill volume 25 mL IVPB     Admin Date  01/29/2021 Action  New Bag Dose  620 mg Rate  153 mL/hr Route  IntraVENous Administered By  Becca Gonzalez RN          oxaliplatin (ELOXATIN) 132 mg in dextrose 5% 250 mL, overfill volume 25 mL chemo infusion     Admin Date  01/29/2021 Action  New Bag Dose  132 mg Rate  150.7 mL/hr Route  IntraVENous Administered By  Angelica Prieto RN          palonosetron HCl (ALOXI) injection 0.25 mg     Admin Date  01/29/2021 Action  Given Dose  0.25 mg Route  IntraVENous Administered By  Angelica Prieto RN                4855 Patient tolerated treatment well. Port maintained positive blood return throughout treatment.  Portflushed, CADD pump attached and patient discharged from [address] [place] [address]   Date Time Provider Yary Gomez   1/31/2021 11:30 AM [address] [facility] H   2/10/2021 10:00 AM D3 WALKER [address]   2/12/2021  4:30 PM H1 WALKER FASTRACK [facility] H   2/24/2021  9:00 AM D3 WALKER LONG [address] [place] [address]   2/26/2021  4:30 PM [facility]   3/10/2021  9:00 AM D3 WALKER LONG [address] [place] [address]   3/12/2021  4:30 PM H1 WALKER FASTRACK [facility] [address], RN  January 29, 2021

## 2021-01-31 ENCOUNTER — HOSPITAL ENCOUNTER (OUTPATIENT)
Dept: INFUSION THERAPY | Age: 71
Discharge: HOME | End: 2021-01-31
Payer: MEDICARE

## 2021-01-31 VITALS
DIASTOLIC BLOOD PRESSURE: 92 MMHG | TEMPERATURE: 97.1 F | SYSTOLIC BLOOD PRESSURE: 143 MMHG | HEART RATE: 96 BPM | RESPIRATION RATE: 16 BRPM

## 2021-01-31 DIAGNOSIS — C18.7 MALIGNANT NEOPLASM OF SIGMOID COLON (HCC): Primary | ICD-10-CM

## 2021-01-31 PROCEDURE — 74011250636 HC RX REV CODE- 250/636: Performed by: REGISTERED NURSE

## 2021-01-31 PROCEDURE — 96523 IRRIG DRUG DELIVERY DEVICE: CPT

## 2021-01-31 PROCEDURE — 96377 APPLICATON ON-BODY INJECTOR: CPT

## 2021-01-31 RX ORDER — SODIUM CHLORIDE 9 MG/ML
10 INJECTION INTRAMUSCULAR; INTRAVENOUS; SUBCUTANEOUS AS NEEDED
Status: ACTIVE | OUTPATIENT
Start: 2021-01-31 | End: 2021-01-31

## 2021-01-31 RX ORDER — SODIUM CHLORIDE 0.9 % (FLUSH) 0.9 %
10 SYRINGE (ML) INJECTION AS NEEDED
Status: DISPENSED | OUTPATIENT
Start: 2021-01-31 | End: 2021-01-31

## 2021-01-31 RX ORDER — HEPARIN 100 UNIT/ML
300-500 SYRINGE INTRAVENOUS AS NEEDED
Status: ACTIVE | OUTPATIENT
Start: 2021-01-31 | End: 2021-01-31

## 2021-01-31 RX ADMIN — PEGFILGRASTIM 6 MG: KIT SUBCUTANEOUS at 10:35

## 2021-01-31 RX ADMIN — HEPARIN 500 UNITS: 100 SYRINGE at 10:38

## 2021-01-31 RX ADMIN — Medication 10 ML: at 10:38

## 2021-01-31 NOTE — PROGRESS NOTES
[facility] - Chemotherapy Progress Note    5753 Pt admit to [facility] for 5FU pump removal/Neulasta OBI ambulatory in stable condition. Assessment completed. Pt reports restless leg that started on Friday. No new concerns voiced. 5FU pump completed and removed; port flushed with positive blood return, heparinized and de-accessed. Patient denies SOB, fever, cough, general not feeling well. Patient denies recent exposure to someone who has tested positive for COVID-19. Patient  denies having contact with anyone who has a pending COVID test.     Visit Vitals  BP (!) 143/92   Pulse 96   Temp 97.1 °F (36.2 °C)   Resp 16     Medications Administered     heparin (porcine) pf 300-500 Units     Admin Date  01/31/2021 Action  Given Dose  500 Units Route  InterCATHeter Administered By  Dena Birmingham RN          pegfilgrastim (NEULASTA) wearable SQ injector 6 mg     Admin Date  01/31/2021 Action  Given Dose  6 mg Route  SubCUTAneous Administered By  Dena Birmingham RN          sodium chloride (NS) flush 10 mL     Admin Date  01/31/2021 Action  Given Dose  10 mL Route  IntraVENous Administered By  Dena Birmingham RN              (SC L arm)    1040 Pt tolerated treatment well. Discussed when to remove OBI device; verbalized understanding. Aware to call [facility] with any questions or concerns. D/c home ambulatory in no distress. Pt aware of next [facility] appointment scheduled for 02/10/2021.

## 2021-02-02 ENCOUNTER — APPOINTMENT (OUTPATIENT)
Dept: INFUSION THERAPY | Age: 71
End: 2021-02-02

## 2021-02-02 NOTE — PROGRESS NOTES
Continue plan TRANSFER - OUT REPORT:    Verbal report given to Tomas RN(name) on Bryson Garcia  being transferred to [facility] for routine progression of care       Report consisted of patients Situation, Background, Assessment and   Recommendations(SBAR). Information from the following report(s) SBAR, Procedure Summary, Intake/Output, MAR, Accordion, Recent Results and Med Rec Status was reviewed with the receiving nurse. Opportunity for questions and clarification was provided.

## 2021-02-04 ENCOUNTER — APPOINTMENT (OUTPATIENT)
Dept: INFUSION THERAPY | Age: 71
End: 2021-02-04

## 2021-02-09 ENCOUNTER — APPOINTMENT (OUTPATIENT)
Dept: INFUSION THERAPY | Age: 71
End: 2021-02-09

## 2021-02-10 ENCOUNTER — OFFICE VISIT (OUTPATIENT)
Dept: ONCOLOGY | Age: 71
End: 2021-02-10
Payer: MEDICARE

## 2021-02-10 ENCOUNTER — HOSPITAL ENCOUNTER (OUTPATIENT)
Dept: INFUSION THERAPY | Age: 71
Discharge: HOME | End: 2021-02-10
Payer: MEDICARE

## 2021-02-10 VITALS
HEART RATE: 91 BPM | RESPIRATION RATE: 16 BRPM | HEIGHT: 66 IN | WEIGHT: 124 LBS | SYSTOLIC BLOOD PRESSURE: 133 MMHG | DIASTOLIC BLOOD PRESSURE: 78 MMHG | BODY MASS INDEX: 19.93 KG/M2 | TEMPERATURE: 96.9 F

## 2021-02-10 VITALS
HEART RATE: 97 BPM | TEMPERATURE: 96.4 F | WEIGHT: 124.7 LBS | OXYGEN SATURATION: 98 % | BODY MASS INDEX: 20.13 KG/M2 | SYSTOLIC BLOOD PRESSURE: 131 MMHG | DIASTOLIC BLOOD PRESSURE: 81 MMHG

## 2021-02-10 DIAGNOSIS — D69.6 THROMBOCYTOPENIA (HCC): ICD-10-CM

## 2021-02-10 DIAGNOSIS — Z51.11 ENCOUNTER FOR ANTINEOPLASTIC CHEMOTHERAPY: Primary | ICD-10-CM

## 2021-02-10 DIAGNOSIS — T45.1X5A CHEMOTHERAPY-INDUCED NEUROPATHY (HCC): ICD-10-CM

## 2021-02-10 DIAGNOSIS — C18.7 MALIGNANT NEOPLASM OF SIGMOID COLON (HCC): Primary | ICD-10-CM

## 2021-02-10 DIAGNOSIS — C18.7 MALIGNANT NEOPLASM OF SIGMOID COLON (HCC): ICD-10-CM

## 2021-02-10 DIAGNOSIS — D70.2 OTHER DRUG-INDUCED NEUTROPENIA (HCC): ICD-10-CM

## 2021-02-10 DIAGNOSIS — G62.0 CHEMOTHERAPY-INDUCED NEUROPATHY (HCC): ICD-10-CM

## 2021-02-10 DIAGNOSIS — Z95.828 PORT-A-CATH IN PLACE: ICD-10-CM

## 2021-02-10 LAB
ALBUMIN SERPL-MCNC: 3.6 G/DL (ref 3.5–5)
ALBUMIN/GLOB SERPL: 1.1 {RATIO} (ref 1.1–2.2)
ALP SERPL-CCNC: 224 U/L (ref 45–117)
ALT SERPL-CCNC: 58 U/L (ref 12–78)
ANION GAP SERPL CALC-SCNC: 6 MMOL/L (ref 5–15)
AST SERPL-CCNC: 44 U/L (ref 15–37)
BASOPHILS # BLD: 0.1 K/UL (ref 0–0.1)
BASOPHILS NFR BLD: 1 % (ref 0–1)
BILIRUB SERPL-MCNC: 0.9 MG/DL (ref 0.2–1)
BUN SERPL-MCNC: 12 MG/DL (ref 6–20)
BUN/CREAT SERPL: 17 (ref 12–20)
CALCIUM SERPL-MCNC: 9.8 MG/DL (ref 8.5–10.1)
CHLORIDE SERPL-SCNC: 106 MMOL/L (ref 97–108)
CO2 SERPL-SCNC: 25 MMOL/L (ref 21–32)
CREAT SERPL-MCNC: 0.72 MG/DL (ref 0.55–1.02)
DIFFERENTIAL METHOD BLD: ABNORMAL
EOSINOPHIL # BLD: 0.1 K/UL (ref 0–0.4)
EOSINOPHIL NFR BLD: 2 % (ref 0–7)
ERYTHROCYTE [DISTWIDTH] IN BLOOD BY AUTOMATED COUNT: 19.8 % (ref 11.5–14.5)
GLOBULIN SER CALC-MCNC: 3.3 G/DL (ref 2–4)
GLUCOSE SERPL-MCNC: 109 MG/DL (ref 65–100)
HCT VFR BLD AUTO: 41 % (ref 35–47)
HGB BLD-MCNC: 13 G/DL (ref 11.5–16)
IMM GRANULOCYTES # BLD AUTO: 0.1 K/UL (ref 0–0.04)
IMM GRANULOCYTES NFR BLD AUTO: 2 % (ref 0–0.5)
LYMPHOCYTES # BLD: 1.3 K/UL (ref 0.8–3.5)
LYMPHOCYTES NFR BLD: 19 % (ref 12–49)
MCH RBC QN AUTO: 27.1 PG (ref 26–34)
MCHC RBC AUTO-ENTMCNC: 31.7 G/DL (ref 30–36.5)
MCV RBC AUTO: 85.6 FL (ref 80–99)
MONOCYTES # BLD: 0.6 K/UL (ref 0–1)
MONOCYTES NFR BLD: 9 % (ref 5–13)
NEUTS SEG # BLD: 4.6 K/UL (ref 1.8–8)
NEUTS SEG NFR BLD: 67 % (ref 32–75)
NRBC # BLD: 0 K/UL (ref 0–0.01)
NRBC BLD-RTO: 0 PER 100 WBC
PLATELET # BLD AUTO: 132 K/UL (ref 150–400)
PMV BLD AUTO: 10.4 FL (ref 8.9–12.9)
POTASSIUM SERPL-SCNC: 3.4 MMOL/L (ref 3.5–5.1)
PROT SERPL-MCNC: 6.9 G/DL (ref 6.4–8.2)
RBC # BLD AUTO: 4.79 M/UL (ref 3.8–5.2)
RBC MORPH BLD: ABNORMAL
SODIUM SERPL-SCNC: 137 MMOL/L (ref 136–145)
WBC # BLD AUTO: 6.8 K/UL (ref 3.6–11)

## 2021-02-10 PROCEDURE — G8427 DOCREV CUR MEDS BY ELIG CLIN: HCPCS | Performed by: INTERNAL MEDICINE

## 2021-02-10 PROCEDURE — G8510 SCR DEP NEG, NO PLAN REQD: HCPCS | Performed by: INTERNAL MEDICINE

## 2021-02-10 PROCEDURE — 96368 THER/DIAG CONCURRENT INF: CPT

## 2021-02-10 PROCEDURE — 74011250637 HC RX REV CODE- 250/637: Performed by: REGISTERED NURSE

## 2021-02-10 PROCEDURE — 74011000258 HC RX REV CODE- 258: Performed by: REGISTERED NURSE

## 2021-02-10 PROCEDURE — 74011250636 HC RX REV CODE- 250/636: Performed by: REGISTERED NURSE

## 2021-02-10 PROCEDURE — G8536 NO DOC ELDER MAL SCRN: HCPCS | Performed by: INTERNAL MEDICINE

## 2021-02-10 PROCEDURE — 1090F PRES/ABSN URINE INCON ASSESS: CPT | Performed by: INTERNAL MEDICINE

## 2021-02-10 PROCEDURE — 96413 CHEMO IV INFUSION 1 HR: CPT

## 2021-02-10 PROCEDURE — 96415 CHEMO IV INFUSION ADDL HR: CPT

## 2021-02-10 PROCEDURE — 96375 TX/PRO/DX INJ NEW DRUG ADDON: CPT

## 2021-02-10 PROCEDURE — G0463 HOSPITAL OUTPT CLINIC VISIT: HCPCS | Performed by: REGISTERED NURSE

## 2021-02-10 PROCEDURE — 1101F PT FALLS ASSESS-DOCD LE1/YR: CPT | Performed by: INTERNAL MEDICINE

## 2021-02-10 PROCEDURE — 99214 OFFICE O/P EST MOD 30 MIN: CPT | Performed by: INTERNAL MEDICINE

## 2021-02-10 PROCEDURE — 36415 COLL VENOUS BLD VENIPUNCTURE: CPT

## 2021-02-10 PROCEDURE — 77030012965 HC NDL HUBR BBMI -A

## 2021-02-10 PROCEDURE — G8420 CALC BMI NORM PARAMETERS: HCPCS | Performed by: INTERNAL MEDICINE

## 2021-02-10 PROCEDURE — 80053 COMPREHEN METABOLIC PANEL: CPT

## 2021-02-10 PROCEDURE — G8400 PT W/DXA NO RESULTS DOC: HCPCS | Performed by: INTERNAL MEDICINE

## 2021-02-10 PROCEDURE — 85025 COMPLETE CBC W/AUTO DIFF WBC: CPT

## 2021-02-10 PROCEDURE — 96416 CHEMO PROLONG INFUSE W/PUMP: CPT

## 2021-02-10 PROCEDURE — G9711 PT HX TOT COL OR COLON CA: HCPCS | Performed by: INTERNAL MEDICINE

## 2021-02-10 RX ORDER — DEXTROSE MONOHYDRATE 50 MG/ML
25 INJECTION, SOLUTION INTRAVENOUS CONTINUOUS
Status: DISPENSED | OUTPATIENT
Start: 2021-02-10 | End: 2021-02-10

## 2021-02-10 RX ORDER — FLUOROURACIL 50 MG/ML
400 INJECTION, SOLUTION INTRAVENOUS ONCE
Status: COMPLETED | OUTPATIENT
Start: 2021-02-10 | End: 2021-02-10

## 2021-02-10 RX ORDER — PALONOSETRON 0.05 MG/ML
0.25 INJECTION, SOLUTION INTRAVENOUS ONCE
Status: COMPLETED | OUTPATIENT
Start: 2021-02-10 | End: 2021-02-10

## 2021-02-10 RX ORDER — POTASSIUM CHLORIDE 750 MG/1
20 TABLET, FILM COATED, EXTENDED RELEASE ORAL
Status: COMPLETED | OUTPATIENT
Start: 2021-02-10 | End: 2021-02-10

## 2021-02-10 RX ADMIN — POTASSIUM CHLORIDE 20 MEQ: 750 TABLET, FILM COATED, EXTENDED RELEASE ORAL at 12:09

## 2021-02-10 RX ADMIN — FLUOROURACIL 3720 MG: 50 INJECTION, SOLUTION INTRAVENOUS at 15:35

## 2021-02-10 RX ADMIN — LEUCOVORIN CALCIUM 620 MG: 350 INJECTION, POWDER, LYOPHILIZED, FOR SUSPENSION INTRAMUSCULAR; INTRAVENOUS at 13:03

## 2021-02-10 RX ADMIN — OXALIPLATIN 132 MG: 5 INJECTION, SOLUTION INTRAVENOUS at 13:04

## 2021-02-10 RX ADMIN — PALONOSETRON 0.25 MG: 0.05 INJECTION, SOLUTION INTRAVENOUS at 12:43

## 2021-02-10 RX ADMIN — DEXAMETHASONE SODIUM PHOSPHATE 12 MG: 4 INJECTION, SOLUTION INTRA-ARTICULAR; INTRALESIONAL; INTRAMUSCULAR; INTRAVENOUS; SOFT TISSUE at 12:44

## 2021-02-10 RX ADMIN — FLUOROURACIL 620 MG: 50 INJECTION, SOLUTION INTRAVENOUS at 15:25

## 2021-02-10 RX ADMIN — DEXTROSE MONOHYDRATE 25 ML/HR: 5 INJECTION, SOLUTION INTRAVENOUS at 13:00

## 2021-02-10 NOTE — PROGRESS NOTES
Bro Judge is a 72 y.o. female  Chief Complaint   Patient presents with    Chemotherapy     stage III colon cancer. 1. Have you been to the ER, urgent care clinic since your last visit? Hospitalized since your last visit? No.    2. Have you seen or consulted any other health care providers outside of the [address] [place] [address] since your last visit? Include any pap smears or colon screening.  No.

## 2021-02-10 NOTE — PROGRESS NOTES
[facility] Chemotherapy Progress Note    Date: February 10, 2021    Name: Cesar Zuniga    MRN: [id number]         : 1950      0950 Pt admit to [facility] for Folfox ambulatory in stable condition. Assessment completed. No new concerns voiced. Port with positive blood return. Patient denies SOB, fever, cough, general not feeling well. Patient denies recent exposure to someone who has tested positive for COVID-19. Patient denies having contact with anyone who has a pending COVID test.           Chemotherapy Flowsheet 2/10/2021   Cycle C6   Date 2/10/2021   Drug / Regimen Folfox   Pre Meds given   Notes given         Ms. Tatum's vitals were reviewed. Patient Vitals for the past 12 hrs:   Temp Pulse Resp BP   02/10/21 1532 -- 91 -- 133/78   02/10/21 0950 96.9 °F (36.1 °C) 88 16 138/77         Lab results were obtained and reviewed. Recent Results (from the past 12 hour(s))   CBC WITH AUTOMATED DIFF    Collection Time: 02/10/21  9:57 AM   Result Value Ref Range    WBC 6.8 3.6 - 11.0 K/uL    RBC 4.79 3.80 - 5.20 M/uL    HGB 13.0 11.5 - 16.0 g/dL    HCT 41.0 35.0 - 47.0 %    MCV 85.6 80.0 - 99.0 FL    MCH 27.1 26.0 - 34.0 PG    MCHC 31.7 30.0 - 36.5 g/dL    RDW 19.8 (H) 11.5 - 14.5 %    PLATELET 149 (L) 644 - 400 K/uL    MPV 10.4 8.9 - 12.9 FL    NRBC 0.0 0  WBC    ABSOLUTE NRBC 0.00 0.00 - 0.01 K/uL    NEUTROPHILS 67 32 - 75 %    LYMPHOCYTES 19 12 - 49 %    MONOCYTES 9 5 - 13 %    EOSINOPHILS 2 0 - 7 %    BASOPHILS 1 0 - 1 %    IMMATURE GRANULOCYTES 2 (H) 0.0 - 0.5 %    ABS. NEUTROPHILS 4.6 1.8 - 8.0 K/UL    ABS. LYMPHOCYTES 1.3 0.8 - 3.5 K/UL    ABS. MONOCYTES 0.6 0.0 - 1.0 K/UL    ABS. EOSINOPHILS 0.1 0.0 - 0.4 K/UL    ABS. BASOPHILS 0.1 0.0 - 0.1 K/UL    ABS. IMM.  GRANS. 0.1 (H) 0.00 - 0.04 K/UL    DF SMEAR SCANNED      RBC COMMENTS ANISOCYTOSIS  1+       METABOLIC PANEL, COMPREHENSIVE    Collection Time: 02/10/21  9:57 AM   Result Value Ref Range    Sodium 137 136 - 145 mmol/L    Potassium 3.4 (L) 3.5 - 5.1 mmol/L    Chloride 106 97 - 108 mmol/L    CO2 25 21 - 32 mmol/L    Anion gap 6 5 - 15 mmol/L    Glucose 109 (H) 65 - 100 mg/dL    BUN 12 6 - 20 MG/DL    Creatinine 0.72 0.55 - 1.02 MG/DL    BUN/Creatinine ratio 17 12 - 20      GFR est AA >60 >60 ml/min/1.73m2    GFR est non-AA >60 >60 ml/min/1.73m2    Calcium 9.8 8.5 - 10.1 MG/DL    Bilirubin, total 0.9 0.2 - 1.0 MG/DL    ALT (SGPT) 58 12 - 78 U/L    AST (SGOT) 44 (H) 15 - 37 U/L    Alk. phosphatase 224 (H) 45 - 117 U/L    Protein, total 6.9 6.4 - 8.2 g/dL    Albumin 3.6 3.5 - 5.0 g/dL    Globulin 3.3 2.0 - 4.0 g/dL    A-G Ratio 1.1 1.1 - 2.2         Pre-medications  were administered as ordered and chemotherapy was initiated.   Medications Administered     dexamethasone (DECADRON) 12 mg in 0.9% sodium chloride 50 mL, overfill volume 5 mL IVPB     Admin Date  02/10/2021 Action  Given Dose  12 mg Rate  232 mL/hr Route  IntraVENous Administered By  David Jose RN          dextrose 5% infusion     Admin Date  02/10/2021 Action  New Bag Dose  25 mL/hr Rate  25 mL/hr Route  IntraVENous Administered By  David Jose RN          fluorouraciL (ADRUCIL) 3,720 mg in 0.9% sodium chloride 100 mL CADD Cassette     Admin Date  02/10/2021 Action  New Bag Dose  3,720 mg Rate  2.2 mL/hr Route  IntraVENous Administered By  David Jose RN          fluorouraciL (ADRUCIL) chemo syringe 620 mg     Admin Date  02/10/2021 Action  Given Dose  620 mg Rate  248 mL/hr Route  IntraVENous Administered By  David Jose RN          leucovorin (WELLCOVORIN) 620 mg in dextrose 5% 250 mL, overfill volume 25 mL IVPB     Admin Date  02/10/2021 Action  New Bag Dose  620 mg Rate  153 mL/hr Route  IntraVENous Administered By  David Jose RN          oxaliplatin (ELOXATIN) 132 mg in dextrose 5% 250 mL, overfill volume 25 mL chemo infusion     Admin Date  02/10/2021 Action  New Bag Dose  132 mg Rate  150.7 mL/hr Route  IntraVENous Administered By  David Jose RN          palonosetron HCl (ALOXI) injection 0.25 mg     Admin Date  02/10/2021 Action  Given Dose  0.25 mg Route  IntraVENous Administered By  Jennifer Angela, ZAKIA          potassium chloride SR (KLOR-CON 10) tablet 20 mEq     Admin Date  02/10/2021 Action  Given Dose  20 mEq Route  Oral Administered By  Jennifer Angela, RN                1540 Pt tolerated treatment well. Port maintained positive blood return throughout treatment. Flushed connected to CADD pump per protocol. D/c home ambulatory in no distress.      Future Appointments   Date Time Provider Yary Gomez   2/12/2021  4:30 PM H1 WALKER [facility] H   2/24/2021  9:00 AM D3 [address]   2/24/2021  9:45 AM Darlene Valdivia  [address] BS AMB   2/26/2021  4:30 PM H1 [facility] [place] [facility] H   3/10/2021  9:00 AM D3 [address] H   3/10/2021  9:15 AM Darlene Valdivia  [address] BS AMB   3/12/2021  4:30 PM H1 WALKER [facility] [facility] Dino Lane RN  February 10, 2021

## 2021-02-10 NOTE — PROGRESS NOTES
[facility] at [address] [place] [address] Santiago [address], [address]  W: [phone]  F: [phone]    Reason for Visit:   Nancy Soria is a 72 y.o. female who is seen for stage III colon cancer. Treatment History:   · 9/10/2020 CT A/P - large, irregular pelvic mass measuring approximately 9cm likely representing neoplasm arising from the sigmoid colon, small amount of pelvic free fluid, colonic bowel wall thickening. Borderline enlarged retroperitoneal lymph nodes. 6 mm right lower lobe lung nodule. Mass effect from pelvic mass causing moderate right and minimal left hydronephrosis. · 9/14/2020: Colonoscopy - A large circumferential, ulcerated fungating mass was noted in proximal sigmoid colon. Mass was obstructing the lumen and could not be traversed. Erich [place] was present. · 9/15/2020: CT Chest - 7.5mm left lower lobe pulmonary nodule, small right pleural effusion, mild right basilar atelectasis  · 9/17/2020: Surgical resection - low anterior resection, mobilization of the splenic flexure, coloproctostomy, creation of loop ileostomy, total abdominal hysterectomy and left salpingo-oophorectomy, distal right ureterectomy, right ureteral re-implant with psoas hitch and placement of right ureteral stent  · 11/17/20: cycle 1 FOLFOX    History of Present Illness:   Patient is a 72 y.o. female seen for colon cancer    She presented to the ED on 9/11/2020 with complaints of right flank/back pain and RLQ abdominal pain. She states she has had several months of gas pain and \"blockages. \" She reports a weight loss of 12 lbs in the last 3 months. CT abd/pelv 9/10/2020 showed large, irregular pelvic mass measuring approximately 9cm likely representing neoplasm arising from the sigmoid colon. She also had hydronephrosis from the mass effect and urethral stent placed. CEA 7.4 on 9/11and colonoscopy performed 9/14 with biopsy + adenocarcinoma.  CT Chest performed showing 7.5mm left lower lobe pulmonary nodule. Mass found to be invading into the uterus and right ureter. Surgical resection performed on 9/17/2020 including low anterior resection, mobilization of the splenic flexure, coloproctostomy, creation of loop ileostomy, total abdominal hysterectomy and left salpingo-oophorectomy, distal right ureterectomy, right ureteral re-implant with psoas hitch and placement of right ureteral stent. She was to see me in clinic but was admitted 10/10/2020 with ileostomy prolapse. Had a revision of loop ileostomy 10/11/2020. Then needed ileostomy closure and anastomosis 10/27/2020. Today she presents for cycle 6 of FOLFOX. She overall feels well and tolerating treatment well. She has some numbness / tingling in extremities but this resolves after 4 days. She denies nausea/vomiting or diarrhea. Has occasional constipation and juice resolves this. She denies SOB, CP, cough, fevers/chills, bleeding. No other complaints today. Family Hx:  Mother with hx of pancreatic cancer    Past Medical History:   Diagnosis Date    [facility]     GERD (gastroesophageal reflux disease)     Ileostomy in place ([facility]     Ileostomy prolapse ([facility]     Ill-defined condition     Spaulding's esophagus      Past Surgical History:   Procedure Laterality Date    COLONOSCOPY Left 9/14/2020    COLONOSCOPY performed by Letha Thompson MD at [address] [place] [address]; incomplete secondary to partial obstruction.  HX APPENDECTOMY  age 17    HX COLONOSCOPY  circa 2010    No neoplasms.  HX ENDOSCOPY  03/13/2017    Dr. Rachel Guerrero HX ENDOSCOPY  02/13/2020    Dr. Martir Munguia oopherectomy for treatment of benign mass.  HX GYN  09/17/2020    Total abdominal hysterectomy and left salpingo-oophorectomy; Federico Castanon MD.    HX OTHER SURGICAL  09/17/2020    Low anterior resection, mobilization of the splenic flexure, coloproctostomy, and creation of loop ileostomy; Dr. Bhavesh Glover.     HX OTHER SURGICAL  10/11/2020 Revision of loop ileostomy (resection and creation of divided loop ileostomy); Dr. Chandler Tang.   UROLOGICAL  09/12/2020    Cystoscopy and placement of a right ureteral stent; Hima Whittington III, MD.     UROLOGICAL  09/17/2020    Cystoscopy and placement of a temporary left ureteral catheter; Hima Whittington III, MD.     UROLOGICAL  09/17/2020    Distal right ureterectomy; Hima Whittington III, MD.     UROLOGICAL  09/17/2020    Right ureteral re-implantation with psoas hitch and placement of a right ureteral stent; Ann Horton MD.    IR INSERT TUNL CVAD W PORT LESS THAN 5 YR  10/14/2020    Right chest Port-a-Cath placement. Social History     Tobacco Use    Smoking status: Never Smoker    Smokeless tobacco: Never Used   Substance Use Topics    Alcohol use: Yes     Alcohol/week: 1.0 standard drinks     Types: 1 Glasses of wine per week      Family History   Problem Relation Age of Onset    Cancer Mother     Heart Disease Father     Diabetes Brother      Current Outpatient Medications   Medication Sig    lidocaine-prilocaine (EMLA) topical cream Apply  to affected area as needed for Pain. Apply over port a cath site 30-60 minutes prior to port access    dexAMETHasone (DECADRON) 4 mg tablet Take 2 tabs (8mg) once daily on days 2 & 3 of chemotherapy only    ondansetron (ZOFRAN ODT) 8 mg disintegrating tablet Take 1 Tab by mouth every eight (8) hours as needed for Nausea or Vomiting. Can start 72 hours after chemotherapy infusion    denosumab (PROLIA) 60 mg/mL injection 60 mg by SubCUTAneous route.  lansoprazole (PREVACID) 30 mg capsule Take 30 mg by mouth Daily (before breakfast).  multivitamin (ONE A DAY) tablet Take 1 Tab by mouth daily.  ascorbic acid, vitamin C, (VITAMIN C) 500 mg tablet Take 500 mg by mouth.  cholecalciferol (VITAMIN D3) 1,000 unit cap Take 1,000 Units by mouth daily.  Omega-3 Fatty Acids (FISH OIL) 500 mg cap Take  by mouth.      No current facility-administered medications for this visit. No Known Allergies     Review of Systems: A complete review of systems was obtained, negative except as described above. Physical Exam:     Visit Vitals  /81 (BP 1 Location: Left upper arm, BP Patient Position: Sitting)   Pulse 97   Temp (!) 96.4 °F (35.8 °C) (Temporal)   Wt 124 lb 11.2 oz (56.6 kg)   SpO2 98%   BMI 20.13 kg/m²     ECOG PS: 1  General: No distress but appears frail  Eyes: PERRLA, anicteric sclerae  HENT: Atraumatic  GI: Soft, incisions healing well  MS: Digits without clubbing or cyanosis. Skin: No rashes, ecchymoses, or petechiae. Psych: Alert, oriented, appropriate affect, normal judgment/insight    Results:     Lab Results   Component Value Date/Time    WBC 3.1 (L) 01/29/2021 08:07 AM    HGB 13.4 01/29/2021 08:07 AM    HCT 42.6 01/29/2021 08:07 AM    PLATELET 650 98/88/5353 08:07 AM    MCV 86.2 01/29/2021 08:07 AM    ABS. NEUTROPHILS 1.5 (L) 01/29/2021 08:07 AM    Hemoglobin (POC) 12.0 09/17/2020 06:35 PM     Lab Results   Component Value Date/Time    Sodium 138 01/29/2021 08:07 AM    Potassium 3.6 01/29/2021 08:07 AM    Chloride 101 01/29/2021 08:07 AM    CO2 26 01/29/2021 08:07 AM    Glucose 111 (H) 01/29/2021 08:07 AM    BUN 23 (H) 01/29/2021 08:07 AM    Creatinine 0.79 01/29/2021 08:07 AM    GFR est AA >60 01/29/2021 08:07 AM    GFR est non-AA >60 01/29/2021 08:07 AM    Calcium 9.8 01/29/2021 08:07 AM    Glucose (POC) 98 09/27/2020 11:39 PM    Creatinine (POC) 0.8 09/10/2020 02:32 PM     Lab Results   Component Value Date/Time    Bilirubin, total 0.9 01/29/2021 08:07 AM    ALT (SGPT) 57 01/29/2021 08:07 AM    Alk.  phosphatase 193 (H) 01/29/2021 08:07 AM    Protein, total 7.8 01/29/2021 08:07 AM    Albumin 3.8 01/29/2021 08:07 AM    Globulin 4.0 01/29/2021 08:07 AM     CEA:  Recent Labs     01/29/21  0807 01/12/21  0806 12/15/20  0806 11/17/20  0839 09/11/20  1455   CEA 1.7 1.8 1.5 1.0 7.4       CT A/P 9/23/2020  IMPRESSION:  Large, irregular pelvic mass measuring approximately 9 cm likely representing  neoplasm arising from the sigmoid colon. Adnexal neoplasm is a possibility. This  does appear to be separate from the uterus. Small amount of pelvic free fluid. Associated colonic bowel wall thickening. There is focal gas in the upper  presacral region which is unclear if this is intraluminal or contained  extraluminal collection.     No definite distant metastatic disease. Borderline enlarged retroperitoneal  lymph nodes. 6 mm right lower lobe lung nodule. Mass effect from the pelvic mass  causing moderate right and minimal left hydronephrosis. CT Chest 9/15/2020  IMPRESSION:  1. 7.5 mm left lower lobe pulmonary nodule. 2. Small right pleural effusion. 3. Mild right basilar atelectasis. 9/14/2020   Addendum Diagnosis   1. Sigmoid Mass, Biopsy:     Infiltrating adenocarcinoma, most consistent with colon (See comment)   Addendum Comment   Immunohistochemical stains reveal the following staining pattern:   CK7: Scattered cells with cytoplasmic membrane staining   CK20: Positive cytoplasmic staining in normal colonic mucosal epithelial cells and malignant epithelial cells   CDX-2: Diffuse strong nuclear decoration and all tumor cells and normal background colonic epithelial cells   PAX-8: Negative   Estrogen Receptor: Negative   Positive and negative controls stain appropriately. Due to radiologic findings suggesting the possibility of GYN involvement, ER and PAX-8 are performed, which lend support to the diagnosis of a colonic primary, over a tumor of müllerian origin. 9/17/2020   FINAL PATHOLOGIC DIAGNOSIS   1. Sigmoid colon and proximal rectum, uterus, left fallopian tube and ovary and right ureteral segment, low anterior resection:   Sigmoid colon and proximal rectum:  Invasive adenocarcinoma (see synoptic report and comment). Metastatic adenocarcinoma in one of sixteen lymph nodes (1/16). Uterus: Invasive adenocarcinoma extending from adhesed colon into uterine serosa. Endometrial lining shows mucosal atrophy. Left ovary: Benign ovary with serosal inclusion cysts. Left fallopian tube: Benign fallopian tube. Right ureteral segment: Benign segment of ureter densely adhesed to colon. Nodule near right uterine cornu:  Nodular portion of benign smooth muscle consistent with leiomyoma with parenchymal hemorrhage. COLON AND RECTUM: Resection   SPECIMEN   Procedure: Low anterior resection   TUMOR   Tumor Site: Sigmoid colon   Histologic Type: Adenocarcinoma   Histologic Grade: G2: Moderately differentiated   Tumor Size: 8.0 cm   Tumor Extension: Tumor directly invades adjacent structures:   Posterior uterine serosa   Macroscopic Tumor Perforation: Tumor invades through serosa into surrounding soft tissue   Lymphovascular Invasion: Present   Perineural Invasion: Not identified   Treatment Effect: No known presurgical therapy   MARGINS   Margins: All margins are uninvolved by invasive carcinoma   Margins Examined: Proximal, Distal, Radial (circumferential) or   Mesenteric   Distance of Tumor from Radial (circumferential) Margin: See Comment   LYMPH NODES   Number of Lymph Nodes Involved: 1   Number of Lymph Nodes Examined: 16   Tumor Deposits: Not identified   PATHOLOGIC STAGE CLASSIFICATION (pTNM, AJCC 8th Edition)   Primary Tumor (pT): pT4b   Regional Lymph Nodes (pN): pN1a   2. Distal rectal margin:   Segment of benign large bowel. 3. Anastomotic doughnuts:   Two annular portions of benign large bowel. Comment   Tumor invades into the adherent soft tissue and to within 1.1 cm of the apparent right pelvic sidewall margin. Records reviewed and summarized above. Pathology report(s) reviewed above. Radiology report(s) reviewed above.     Assessment:   1) Sigmoid Colon Adenocarcinoma - Stage IIIB- ERICKA  [id number]  CT abd/pelv 9/10/2020 showed large, irregular pelvic mass measuring approximately 9cm likely representing neoplasm arising from the sigmoid colon. CEA 7.4 on 9/11  Colonoscopy performed 9/14 and biopsy + adenocarcinoma  Mass found to be invading into the uterus and right ureter. Surgical resection on 9/17 with creation of loop ileostomy, total abdominal hysterectomy and left salpingo-oophorectomy, distal right ureterectomy  She had a revision of her ileostomy 10/11/2020  Then needed stoma closure and anastomosis on 10/27/2020    Today is cycle 6 of adjuvant FOLFOX x 6 months   5FU bolus was introduced with cycle 2 and she tolerated this well with grade 1 neuropathy and grade 1 thrombocytopenia  She developed grade 3 neutropenia after cycle 4 which resulted in a 2 week delay. Neulasta was added. 2) Anemia  Due to #1  Iron sat of 4% and ferritin of 20 on 9/11  S/p 2 units PRBCs on 9/15 and Venofer 200mg x3 doses  Anemia has resolved     4) Elevated transaminases  Monitor- if increases further will obtain an USG of abdomen  CMP pending    5) Neuropathy  Grade 1   Monitor     6) Neutropenia  Grade 3 after cycle 4   Added neulasta  Labs are pending    7) Management of high risk medications like chemotherapy  Previous grade 3 neutropenia & added neulasta   Otherwise tolerating well with grade 1 constipation & grade 1 neuropathy  & grade 1 thrombocytopenia     Plan:     · Proceed today with cycle 6 of mFOLFOX (5FU CADD 2400mg/m2, 5FU bolus 400mg/m2, leucovorin 400mg/m2, oxaliplatin 85mg/m2) every 2 weeks for 6 months   · Neulasta given high risk for grade IV neutropenia   · Cbc, cmp every cycle, cea every other  · Zofran prn, dexamethasone days 2 and 3  · Follow with surgery as scheduled   · CT CAP in 2 weeks     RTC in 2 weeks      Bradly Cooper at [phone] [facility]    I appreciate the opportunity to participate in Ms. Cece carrington. Signed By: Elinor Treviño MD    I performed a history and physical examination of the patient and discussed his management with the NPP.  I reviewed the NPP note and agree with the documented findings and plan of care.  Evelyne Whitehead is tolerating chemotherapy, has grade 1 thrombocytopenia, scans prior to next visit

## 2021-02-11 ENCOUNTER — APPOINTMENT (OUTPATIENT)
Dept: INFUSION THERAPY | Age: 71
End: 2021-02-11

## 2021-02-12 ENCOUNTER — HOSPITAL ENCOUNTER (OUTPATIENT)
Dept: INFUSION THERAPY | Age: 71
Discharge: HOME | End: 2021-02-12
Payer: MEDICARE

## 2021-02-12 VITALS
HEART RATE: 72 BPM | RESPIRATION RATE: 18 BRPM | TEMPERATURE: 97.1 F | DIASTOLIC BLOOD PRESSURE: 75 MMHG | SYSTOLIC BLOOD PRESSURE: 137 MMHG

## 2021-02-12 DIAGNOSIS — C18.7 MALIGNANT NEOPLASM OF SIGMOID COLON (HCC): Primary | ICD-10-CM

## 2021-02-12 PROCEDURE — 96523 IRRIG DRUG DELIVERY DEVICE: CPT

## 2021-02-12 PROCEDURE — 96377 APPLICATON ON-BODY INJECTOR: CPT

## 2021-02-12 PROCEDURE — 74011250636 HC RX REV CODE- 250/636: Performed by: REGISTERED NURSE

## 2021-02-12 RX ORDER — SODIUM CHLORIDE 9 MG/ML
10 INJECTION INTRAMUSCULAR; INTRAVENOUS; SUBCUTANEOUS AS NEEDED
Status: DISCONTINUED | OUTPATIENT
Start: 2021-02-12 | End: 2021-02-13 | Stop reason: HOSPADM

## 2021-02-12 RX ORDER — SODIUM CHLORIDE 0.9 % (FLUSH) 0.9 %
10 SYRINGE (ML) INJECTION AS NEEDED
Status: DISCONTINUED | OUTPATIENT
Start: 2021-02-12 | End: 2021-02-13 | Stop reason: HOSPADM

## 2021-02-12 RX ORDER — HEPARIN 100 UNIT/ML
300-500 SYRINGE INTRAVENOUS AS NEEDED
Status: DISCONTINUED | OUTPATIENT
Start: 2021-02-12 | End: 2021-02-13 | Stop reason: HOSPADM

## 2021-02-12 RX ADMIN — Medication 10 ML: at 15:10

## 2021-02-12 RX ADMIN — HEPARIN 500 UNITS: 100 SYRINGE at 15:10

## 2021-02-12 RX ADMIN — SODIUM CHLORIDE 10 ML: 9 INJECTION INTRAMUSCULAR; INTRAVENOUS; SUBCUTANEOUS at 15:11

## 2021-02-12 RX ADMIN — PEGFILGRASTIM 6 MG: KIT SUBCUTANEOUS at 15:05

## 2021-02-12 NOTE — PROGRESS NOTES
[facility] Short Consult Note:    Pt arrived at [facility] ambulatory and in no distress for pump removal.  No new complaints voiced. Visit Vitals  /75   Pulse 72   Temp 97.1 °F (36.2 °C)   Resp 18       All chemo contents infused. Port flushed per policy and needle removed, 2x2 and paper tape placed. Tolerated treatment well, no adverse reaction noted. D/Cd from [facility] ambulatory and in no distress accompanied by self.   Next appt   Future Appointments   Date Time Provider Yary Patricia   2/17/2021  1:30 PM [facility] [place] [facility] ER [facility] H   2/24/2021  9:00 AM D3 [place] [facility]   2/24/2021  9:45 AM Osman Leader,  [address] BS AMB   2/26/2021  4:30 PM H1 [facility] [facility]   3/10/2021  9:00 AM D3 [facility] [facility]   3/10/2021  9:15 AM Osman Brandon,  [address] BS AMB   3/12/2021  4:30 PM H1 [facility]   3/24/2021  9:00 AM D3 [place] [facility]   3/26/2021  4:30 PM H1 [facility]

## 2021-02-16 ENCOUNTER — APPOINTMENT (OUTPATIENT)
Dept: INFUSION THERAPY | Age: 71
End: 2021-02-16

## 2021-02-17 ENCOUNTER — APPOINTMENT (OUTPATIENT)
Dept: CT IMAGING | Age: 71
End: 2021-02-17
Attending: REGISTERED NURSE
Payer: MEDICARE

## 2021-02-17 ENCOUNTER — HOSPITAL ENCOUNTER (OUTPATIENT)
Dept: CT IMAGING | Age: 71
Discharge: HOME | End: 2021-02-17
Attending: REGISTERED NURSE
Payer: MEDICARE

## 2021-02-17 DIAGNOSIS — C18.7 MALIGNANT NEOPLASM OF SIGMOID COLON (HCC): ICD-10-CM

## 2021-02-17 PROCEDURE — 74011000636 HC RX REV CODE- 636: Performed by: RADIOLOGY

## 2021-02-17 PROCEDURE — 74177 CT ABD & PELVIS W/CONTRAST: CPT

## 2021-02-17 PROCEDURE — 71260 CT THORAX DX C+: CPT

## 2021-02-17 RX ADMIN — IOPAMIDOL 100 ML: 755 INJECTION, SOLUTION INTRAVENOUS at 12:55

## 2021-02-17 RX ADMIN — IOHEXOL 50 ML: 240 INJECTION, SOLUTION INTRATHECAL; INTRAVASCULAR; INTRAVENOUS; ORAL at 12:55

## 2021-02-18 ENCOUNTER — APPOINTMENT (OUTPATIENT)
Dept: INFUSION THERAPY | Age: 71
End: 2021-02-18

## 2021-02-23 ENCOUNTER — APPOINTMENT (OUTPATIENT)
Dept: INFUSION THERAPY | Age: 71
End: 2021-02-23

## 2021-02-24 ENCOUNTER — HOSPITAL ENCOUNTER (OUTPATIENT)
Dept: INFUSION THERAPY | Age: 71
Discharge: HOME | End: 2021-02-24
Payer: MEDICARE

## 2021-02-24 ENCOUNTER — OFFICE VISIT (OUTPATIENT)
Dept: ONCOLOGY | Age: 71
End: 2021-02-24
Payer: MEDICARE

## 2021-02-24 VITALS
WEIGHT: 125 LBS | HEIGHT: 66 IN | HEART RATE: 88 BPM | TEMPERATURE: 96.7 F | BODY MASS INDEX: 20.09 KG/M2 | RESPIRATION RATE: 18 BRPM | DIASTOLIC BLOOD PRESSURE: 78 MMHG | SYSTOLIC BLOOD PRESSURE: 126 MMHG

## 2021-02-24 VITALS
OXYGEN SATURATION: 98 % | TEMPERATURE: 96.3 F | WEIGHT: 125.1 LBS | DIASTOLIC BLOOD PRESSURE: 88 MMHG | SYSTOLIC BLOOD PRESSURE: 129 MMHG | BODY MASS INDEX: 20.1 KG/M2 | HEIGHT: 66 IN | HEART RATE: 102 BPM

## 2021-02-24 DIAGNOSIS — D69.6 THROMBOCYTOPENIA (HCC): ICD-10-CM

## 2021-02-24 DIAGNOSIS — Z95.828 PORT-A-CATH IN PLACE: ICD-10-CM

## 2021-02-24 DIAGNOSIS — Z51.11 ENCOUNTER FOR ANTINEOPLASTIC CHEMOTHERAPY: Primary | ICD-10-CM

## 2021-02-24 DIAGNOSIS — G62.0 CHEMOTHERAPY-INDUCED NEUROPATHY (HCC): ICD-10-CM

## 2021-02-24 DIAGNOSIS — C18.7 MALIGNANT NEOPLASM OF SIGMOID COLON (HCC): ICD-10-CM

## 2021-02-24 DIAGNOSIS — T45.1X5A CHEMOTHERAPY-INDUCED NEUROPATHY (HCC): ICD-10-CM

## 2021-02-24 DIAGNOSIS — C18.7 MALIGNANT NEOPLASM OF SIGMOID COLON (HCC): Primary | ICD-10-CM

## 2021-02-24 LAB
ALBUMIN SERPL-MCNC: 3.5 G/DL (ref 3.5–5)
ALBUMIN/GLOB SERPL: 1.1 {RATIO} (ref 1.1–2.2)
ALP SERPL-CCNC: 212 U/L (ref 45–117)
ALT SERPL-CCNC: 47 U/L (ref 12–78)
ANION GAP SERPL CALC-SCNC: 6 MMOL/L (ref 5–15)
AST SERPL-CCNC: 39 U/L (ref 15–37)
BASOPHILS # BLD: 0.1 K/UL (ref 0–0.1)
BASOPHILS NFR BLD: 1 % (ref 0–1)
BILIRUB SERPL-MCNC: 1.1 MG/DL (ref 0.2–1)
BUN SERPL-MCNC: 8 MG/DL (ref 6–20)
BUN/CREAT SERPL: 11 (ref 12–20)
CALCIUM SERPL-MCNC: 9.2 MG/DL (ref 8.5–10.1)
CHLORIDE SERPL-SCNC: 106 MMOL/L (ref 97–108)
CO2 SERPL-SCNC: 27 MMOL/L (ref 21–32)
CREAT SERPL-MCNC: 0.71 MG/DL (ref 0.55–1.02)
DIFFERENTIAL METHOD BLD: ABNORMAL
EOSINOPHIL # BLD: 0.3 K/UL (ref 0–0.4)
EOSINOPHIL NFR BLD: 3 % (ref 0–7)
ERYTHROCYTE [DISTWIDTH] IN BLOOD BY AUTOMATED COUNT: 18.7 % (ref 11.5–14.5)
GLOBULIN SER CALC-MCNC: 3.3 G/DL (ref 2–4)
GLUCOSE SERPL-MCNC: 104 MG/DL (ref 65–100)
HCT VFR BLD AUTO: 40.3 % (ref 35–47)
HGB BLD-MCNC: 13 G/DL (ref 11.5–16)
IMM GRANULOCYTES # BLD AUTO: 0.1 K/UL (ref 0–0.04)
IMM GRANULOCYTES NFR BLD AUTO: 1 % (ref 0–0.5)
LYMPHOCYTES # BLD: 1 K/UL (ref 0.8–3.5)
LYMPHOCYTES NFR BLD: 12 % (ref 12–49)
MCH RBC QN AUTO: 27.5 PG (ref 26–34)
MCHC RBC AUTO-ENTMCNC: 32.3 G/DL (ref 30–36.5)
MCV RBC AUTO: 85.4 FL (ref 80–99)
MONOCYTES # BLD: 0.7 K/UL (ref 0–1)
MONOCYTES NFR BLD: 9 % (ref 5–13)
NEUTS SEG # BLD: 6.2 K/UL (ref 1.8–8)
NEUTS SEG NFR BLD: 74 % (ref 32–75)
NRBC # BLD: 0 K/UL (ref 0–0.01)
NRBC BLD-RTO: 0 PER 100 WBC
PLATELET # BLD AUTO: 149 K/UL (ref 150–400)
PMV BLD AUTO: 10 FL (ref 8.9–12.9)
POTASSIUM SERPL-SCNC: 3.2 MMOL/L (ref 3.5–5.1)
PROT SERPL-MCNC: 6.8 G/DL (ref 6.4–8.2)
RBC # BLD AUTO: 4.72 M/UL (ref 3.8–5.2)
SODIUM SERPL-SCNC: 139 MMOL/L (ref 136–145)
WBC # BLD AUTO: 8.3 K/UL (ref 3.6–11)

## 2021-02-24 PROCEDURE — 36415 COLL VENOUS BLD VENIPUNCTURE: CPT

## 2021-02-24 PROCEDURE — 96415 CHEMO IV INFUSION ADDL HR: CPT

## 2021-02-24 PROCEDURE — 96411 CHEMO IV PUSH ADDL DRUG: CPT

## 2021-02-24 PROCEDURE — 74011000258 HC RX REV CODE- 258: Performed by: REGISTERED NURSE

## 2021-02-24 PROCEDURE — 74011250637 HC RX REV CODE- 250/637: Performed by: REGISTERED NURSE

## 2021-02-24 PROCEDURE — 96375 TX/PRO/DX INJ NEW DRUG ADDON: CPT

## 2021-02-24 PROCEDURE — 96416 CHEMO PROLONG INFUSE W/PUMP: CPT

## 2021-02-24 PROCEDURE — G0463 HOSPITAL OUTPT CLINIC VISIT: HCPCS | Performed by: REGISTERED NURSE

## 2021-02-24 PROCEDURE — 77030012965 HC NDL HUBR BBMI -A

## 2021-02-24 PROCEDURE — 80053 COMPREHEN METABOLIC PANEL: CPT

## 2021-02-24 PROCEDURE — 1101F PT FALLS ASSESS-DOCD LE1/YR: CPT | Performed by: INTERNAL MEDICINE

## 2021-02-24 PROCEDURE — 85025 COMPLETE CBC W/AUTO DIFF WBC: CPT

## 2021-02-24 PROCEDURE — 96366 THER/PROPH/DIAG IV INF ADDON: CPT

## 2021-02-24 PROCEDURE — 74011250636 HC RX REV CODE- 250/636: Performed by: REGISTERED NURSE

## 2021-02-24 PROCEDURE — G8427 DOCREV CUR MEDS BY ELIG CLIN: HCPCS | Performed by: INTERNAL MEDICINE

## 2021-02-24 PROCEDURE — 99215 OFFICE O/P EST HI 40 MIN: CPT | Performed by: INTERNAL MEDICINE

## 2021-02-24 PROCEDURE — 1090F PRES/ABSN URINE INCON ASSESS: CPT | Performed by: INTERNAL MEDICINE

## 2021-02-24 PROCEDURE — G9711 PT HX TOT COL OR COLON CA: HCPCS | Performed by: INTERNAL MEDICINE

## 2021-02-24 PROCEDURE — G8420 CALC BMI NORM PARAMETERS: HCPCS | Performed by: INTERNAL MEDICINE

## 2021-02-24 PROCEDURE — G8536 NO DOC ELDER MAL SCRN: HCPCS | Performed by: INTERNAL MEDICINE

## 2021-02-24 PROCEDURE — G8400 PT W/DXA NO RESULTS DOC: HCPCS | Performed by: INTERNAL MEDICINE

## 2021-02-24 PROCEDURE — 96368 THER/DIAG CONCURRENT INF: CPT

## 2021-02-24 PROCEDURE — G8510 SCR DEP NEG, NO PLAN REQD: HCPCS | Performed by: INTERNAL MEDICINE

## 2021-02-24 PROCEDURE — 96413 CHEMO IV INFUSION 1 HR: CPT

## 2021-02-24 RX ORDER — SODIUM CHLORIDE 0.9 % (FLUSH) 0.9 %
10 SYRINGE (ML) INJECTION AS NEEDED
Status: DISPENSED | OUTPATIENT
Start: 2021-02-24 | End: 2021-02-24

## 2021-02-24 RX ORDER — POTASSIUM CHLORIDE 750 MG/1
40 TABLET, FILM COATED, EXTENDED RELEASE ORAL
Status: COMPLETED | OUTPATIENT
Start: 2021-02-24 | End: 2021-02-24

## 2021-02-24 RX ORDER — HEPARIN 100 UNIT/ML
300-500 SYRINGE INTRAVENOUS AS NEEDED
Status: ACTIVE | OUTPATIENT
Start: 2021-02-24 | End: 2021-02-24

## 2021-02-24 RX ORDER — FLUOROURACIL 50 MG/ML
400 INJECTION, SOLUTION INTRAVENOUS ONCE
Status: COMPLETED | OUTPATIENT
Start: 2021-02-24 | End: 2021-02-24

## 2021-02-24 RX ORDER — DEXTROSE MONOHYDRATE 50 MG/ML
25 INJECTION, SOLUTION INTRAVENOUS CONTINUOUS
Status: DISPENSED | OUTPATIENT
Start: 2021-02-24 | End: 2021-02-24

## 2021-02-24 RX ORDER — PALONOSETRON 0.05 MG/ML
0.25 INJECTION, SOLUTION INTRAVENOUS ONCE
Status: COMPLETED | OUTPATIENT
Start: 2021-02-24 | End: 2021-02-24

## 2021-02-24 RX ORDER — SODIUM CHLORIDE 9 MG/ML
10 INJECTION INTRAMUSCULAR; INTRAVENOUS; SUBCUTANEOUS AS NEEDED
Status: ACTIVE | OUTPATIENT
Start: 2021-02-24 | End: 2021-02-24

## 2021-02-24 RX ADMIN — PALONOSETRON 0.25 MG: 0.05 INJECTION, SOLUTION INTRAVENOUS at 10:58

## 2021-02-24 RX ADMIN — DEXTROSE MONOHYDRATE 620 MG: 5 INJECTION, SOLUTION INTRAVENOUS at 11:58

## 2021-02-24 RX ADMIN — DEXAMETHASONE SODIUM PHOSPHATE 12 MG: 4 INJECTION, SOLUTION INTRA-ARTICULAR; INTRALESIONAL; INTRAMUSCULAR; INTRAVENOUS; SOFT TISSUE at 11:37

## 2021-02-24 RX ADMIN — POTASSIUM CHLORIDE 40 MEQ: 750 TABLET, FILM COATED, EXTENDED RELEASE ORAL at 10:54

## 2021-02-24 RX ADMIN — DEXTROSE MONOHYDRATE 25 ML/HR: 5 INJECTION, SOLUTION INTRAVENOUS at 10:58

## 2021-02-24 RX ADMIN — FLUOROURACIL 3720 MG: 50 INJECTION, SOLUTION INTRAVENOUS at 14:08

## 2021-02-24 RX ADMIN — FLUOROURACIL 620 MG: 50 INJECTION, SOLUTION INTRAVENOUS at 14:05

## 2021-02-24 RX ADMIN — OXALIPLATIN 132 MG: 5 INJECTION, SOLUTION, CONCENTRATE INTRAVENOUS at 11:58

## 2021-02-24 NOTE — PROGRESS NOTES
Tiffani Negron is a 72 y.o. female  Chief Complaint   Patient presents with    Chemotherapy      stage III colon cancer. 1. Have you been to the ER, urgent care clinic since your last visit? Hospitalized since your last visit? No.    2. Have you seen or consulted any other health care providers outside of the [address] [place] [address] since your last visit? Include any pap smears or colon screening.  No.

## 2021-02-24 NOTE — PROGRESS NOTES
OPIC Chemo Progress Note    Date: 2021    Name: Rose Cuellar    MRN: [id number]         : 1950    0800 Ms. Tatum Arrived to [facility] for  FOLFOX  ambulatory in stable condition. Assessment was completed, no acute issues at this time, no new complaints voiced. Port accessed with positive blood return. Labs drawn and sent for processing. Chemotherapy Flowsheet 2021   Cycle C7   Date 2021   Drug / Regimen Folfox   Pre Meds -   Notes -         Patient Denies SOB, fever, cough, general not feeling well. Patient Denies recent exposure to someone who has tested positive for COVID-19. Patient Denies having contact with anyone who has a pending COVID test.      Ms. Carlos Sarabia vitals were reviewed. Patient Vitals for the past 12 hrs:   Temp Pulse Resp BP   21 0859 (!) 96.7 °F (35.9 °C) 98 18 134/86         Pre-medications  were administered as ordered and chemotherapy was initiated.   Medications Administered     dexamethasone (DECADRON) 12 mg in 0.9% sodium chloride 50 mL, overfill volume 5 mL IVPB     Admin Date  2021 Action  Given Dose  12 mg Rate  232 mL/hr Route  IntraVENous Administered By  Becca Gonzalez RN          dextrose 5% infusion     Admin Date  2021 Action  New Bag Dose  25 mL/hr Rate  25 mL/hr Route  IntraVENous Administered By  Becca Gonzalez RN          fluorouraciL (ADRUCIL) 3,720 mg in 0.9% sodium chloride 100 mL CADD Cassette     Admin Date  2021 Action  New Bag Dose  3,720 mg Rate  2.2 mL/hr Route  IntraVENous Administered By  Becca Gonzalez RN          fluorouraciL (ADRUCIL) chemo syringe 620 mg     Admin Date  2021 Action  Given Dose  620 mg Rate  248 mL/hr Route  IntraVENous Administered By  Becca Gonzalez RN          leucovorin (WELLCOVORIN) 620 mg in dextrose 5% 250 mL, overfill volume 25 mL IVPB     Admin Date  2021 Action  New Bag Dose  620 mg Rate  153 mL/hr Route  IntraVENous Administered By  Jordan Glover RN          oxaliplatin (ELOXATIN) 132 mg in dextrose 5% 250 mL, overfill volume 25 mL chemo infusion     Admin Date  02/24/2021 Action  New Bag Dose  132 mg Rate  150.7 mL/hr Route  IntraVENous Administered By  Jordan Glover RN          palonosetron HCl (ALOXI) injection 0.25 mg     Admin Date  02/24/2021 Action  Given Dose  0.25 mg Route  IntraVENous Administered By  Jordan Glover RN          potassium chloride SR (KLOR-CON 10) tablet 40 mEq     Admin Date  02/24/2021 Action  Given Dose  40 mEq Route  Oral Administered By  Jordan Glover, ZAKIA                     Patient tolerated treatment well. Port maintained positive blood return throughout treatment.  Portflushed, CADD pump attached and patient discharged from [address] [place] [address]   Date Time Provider Yary Gomez   2/24/2021  9:45 AM Murry Goodell,  N [facility] AMB   2/26/2021  4:30 PM [facility]   3/10/2021  9:00 AM D3 WALKER LONG [address] [facility] H   3/10/2021  9:15 AM Murry Goodell,  N [facility] AMB   3/12/2021  4:30 PM H1 WALKER FASTRACK [facility] H   3/24/2021  9:00 AM D3 WALKER LONG [address] [facility]   3/26/2021  4:30 PM H1 WALKER [address] [facility], RN  February 24, 2021

## 2021-02-24 NOTE — PROGRESS NOTES
[facility] [place] [facility] [place] [address] [address], [address]  W: [phone]  F: [phone]    Reason for Visit:   Gt Nathan is a 72 y.o. female who is seen for stage III colon cancer. Treatment History:   · 9/10/2020 CT A/P - large, irregular pelvic mass measuring approximately 9cm likely representing neoplasm arising from the sigmoid colon, small amount of pelvic free fluid, colonic bowel wall thickening. Borderline enlarged retroperitoneal lymph nodes. 6 mm right lower lobe lung nodule. Mass effect from pelvic mass causing moderate right and minimal left hydronephrosis. · 9/14/2020: Colonoscopy - A large circumferential, ulcerated fungating mass was noted in proximal sigmoid colon. Mass was obstructing the lumen and could not be traversed. Roselynn Silva was present. · 9/15/2020: CT Chest - 7.5mm left lower lobe pulmonary nodule, small right pleural effusion, mild right basilar atelectasis  · 9/17/2020: Surgical resection - low anterior resection, mobilization of the splenic flexure, coloproctostomy, creation of loop ileostomy, total abdominal hysterectomy and left salpingo-oophorectomy, distal right ureterectomy, right ureteral re-implant with psoas hitch and placement of right ureteral stent  · 11/17/20: cycle 1 FOLFOX  · 2/17/20 CT CAP: pulmonary nodule stable, ROBE     History of Present Illness:   Patient is a 72 y.o. female seen for colon cancer    She presented to the ED on 9/11/2020 with complaints of right flank/back pain and RLQ abdominal pain. She states she has had several months of gas pain and \"blockages. \" She reports a weight loss of 12 lbs in the last 3 months. CT abd/pelv 9/10/2020 showed large, irregular pelvic mass measuring approximately 9cm likely representing neoplasm arising from the sigmoid colon. She also had hydronephrosis from the mass effect and urethral stent placed.  CEA 7.4 on 9/11and colonoscopy performed 9/14 with biopsy + adenocarcinoma. CT Chest performed showing 7.5mm left lower lobe pulmonary nodule. Mass found to be invading into the uterus and right ureter. Surgical resection performed on 9/17/2020 including low anterior resection, mobilization of the splenic flexure, coloproctostomy, creation of loop ileostomy, total abdominal hysterectomy and left salpingo-oophorectomy, distal right ureterectomy, right ureteral re-implant with psoas hitch and placement of right ureteral stent. She was to see me in clinic but was admitted 10/10/2020 with ileostomy prolapse. Had a revision of loop ileostomy 10/11/2020. Then needed ileostomy closure and anastomosis 10/27/2020. Today she presents for cycle 7 of FOLFOX. She overall feels well and tolerating treatment well. She has some numbness / tingling in extremities but this resolves after a few days of treatment. She denies nausea/vomiting or diarrhea/constipation. She denies SOB, CP, cough, fevers/chills, bleeding. No other complaints today. Family Hx:  Mother with hx of pancreatic cancer    Past Medical History:   Diagnosis Date    [facility] [address] [facility]     GERD (gastroesophageal reflux disease)     Ileostomy in place ([facility] [address] [facility]     I[facility]ostomy prolapse ([facility] [address] [facility]     Ill-defined condition     Spaulding's esophagus      Past Surgical History:   Procedure Laterality Date    COLONOSCOPY Left 9/14/2020    COLONOSCOPY performed by Wing Rogers MD at [address] [place] [address]; incomplete secondary to partial obstruction.  HX APPENDECTOMY  age 17    HX COLONOSCOPY  circa 2010    No neoplasms.  HX ENDOSCOPY  03/13/2017    Dr. Brie Anderson HX ENDOSCOPY  02/13/2020    Dr. Jorje Mclean oopherectomy for treatment of benign mass.     HX GYN  09/17/2020    Total abdominal hysterectomy and left salpingo-oophorectomy; Mario Arias MD.    HX OTHER SURGICAL  09/17/2020    Low anterior resection, mobilization of the splenic flexure, coloproctostomy, and creation of loop ileostomy;  Farrah Arzate.   OTHER SURGICAL  10/11/2020    Revision of loop ileostomy (resection and creation of divided loop ileostomy); Dr. Farrah Arzate.   UROLOGICAL  09/12/2020    Cystoscopy and placement of a right ureteral stent; Jiles Lennox, III, MD.     UROLOGICAL  09/17/2020    Cystoscopy and placement of a temporary left ureteral catheter; Jiles Lennox, III, MD.     UROLOGICAL  09/17/2020    Distal right ureterectomy; Jiles Lennox, III, MD.     UROLOGICAL  09/17/2020    Right ureteral re-implantation with psoas hitch and placement of a right ureteral stent; Kris Cosme MD.    IR INSERT TUNL CVAD W PORT LESS THAN 5 YR  10/14/2020    Right chest Port-a-Cath placement. Social History     Tobacco Use    Smoking status: Never Smoker    Smokeless tobacco: Never Used   Substance Use Topics    Alcohol use: Yes     Alcohol/week: 1.0 standard drinks     Types: 1 Glasses of wine per week      Family History   Problem Relation Age of Onset    Cancer Mother     Heart Disease Father     Diabetes Brother      Current Outpatient Medications   Medication Sig    lidocaine-prilocaine (EMLA) topical cream Apply  to affected area as needed for Pain. Apply over port a cath site 30-60 minutes prior to port access    dexAMETHasone (DECADRON) 4 mg tablet Take 2 tabs (8mg) once daily on days 2 & 3 of chemotherapy only    ondansetron (ZOFRAN ODT) 8 mg disintegrating tablet Take 1 Tab by mouth every eight (8) hours as needed for Nausea or Vomiting. Can start 72 hours after chemotherapy infusion    denosumab (PROLIA) 60 mg/mL injection 60 mg by SubCUTAneous route.  lansoprazole (PREVACID) 30 mg capsule Take 30 mg by mouth Daily (before breakfast).  multivitamin (ONE A DAY) tablet Take 1 Tab by mouth daily.  ascorbic acid, vitamin C, (VITAMIN C) 500 mg tablet Take 500 mg by mouth.  cholecalciferol (VITAMIN D3) 1,000 unit cap Take 1,000 Units by mouth daily.     Omega-3 Fatty Acids (FISH OIL) 500 mg cap Take  by mouth. No current facility-administered medications for this visit. No Known Allergies     Review of Systems: A complete review of systems was obtained, negative except as described above. Physical Exam:     Visit Vitals  /88 (BP 1 Location: Left upper arm, BP Patient Position: Sitting)   Pulse (!) 102   Temp (!) 96.3 °F (35.7 °C) (Temporal)   Ht 5' 6\" (1.676 m)   Wt 125 lb 1.6 oz (56.7 kg)   SpO2 98%   BMI 20.19 kg/m²     ECOG PS: 1  General: No distress but appears frail  Eyes: PERRLA, anicteric sclerae  HENT: Atraumatic  MS: Digits without clubbing or cyanosis. Skin: No rashes, ecchymoses, or petechiae. Psych: Alert, oriented, appropriate affect, normal judgment/insight    Results:     Lab Results   Component Value Date/Time    WBC 8.3 02/24/2021 09:01 AM    HGB 13.0 02/24/2021 09:01 AM    HCT 40.3 02/24/2021 09:01 AM    PLATELET 246 (L) 61/79/5613 09:01 AM    MCV 85.4 02/24/2021 09:01 AM    ABS. NEUTROPHILS 6.2 02/24/2021 09:01 AM    Hemoglobin (POC) 12.0 09/17/2020 06:35 PM     Lab Results   Component Value Date/Time    Sodium 137 02/10/2021 09:57 AM    Potassium 3.4 (L) 02/10/2021 09:57 AM    Chloride 106 02/10/2021 09:57 AM    CO2 25 02/10/2021 09:57 AM    Glucose 109 (H) 02/10/2021 09:57 AM    BUN 12 02/10/2021 09:57 AM    Creatinine 0.72 02/10/2021 09:57 AM    GFR est AA >60 02/10/2021 09:57 AM    GFR est non-AA >60 02/10/2021 09:57 AM    Calcium 9.8 02/10/2021 09:57 AM    Glucose (POC) 98 09/27/2020 11:39 PM    Creatinine (POC) 0.8 09/10/2020 02:32 PM     Lab Results   Component Value Date/Time    Bilirubin, total 0.9 02/10/2021 09:57 AM    ALT (SGPT) 58 02/10/2021 09:57 AM    Alk.  phosphatase 224 (H) 02/10/2021 09:57 AM    Protein, total 6.9 02/10/2021 09:57 AM    Albumin 3.6 02/10/2021 09:57 AM    Globulin 3.3 02/10/2021 09:57 AM     CEA:  Recent Labs     01/29/21  0807 01/12/21  0806 12/15/20  0806 11/17/20  0839 09/11/20  1455   CEA 1.7 1.8 1.5 1.0 7.4 CT A/P 9/23/2020  IMPRESSION:  Large, irregular pelvic mass measuring approximately 9 cm likely representing  neoplasm arising from the sigmoid colon. Adnexal neoplasm is a possibility. This  does appear to be separate from the uterus. Small amount of pelvic free fluid. Associated colonic bowel wall thickening. There is focal gas in the upper  presacral region which is unclear if this is intraluminal or contained  extraluminal collection.     No definite distant metastatic disease. Borderline enlarged retroperitoneal  lymph nodes. 6 mm right lower lobe lung nodule. Mass effect from the pelvic mass  causing moderate right and minimal left hydronephrosis. CT Chest 9/15/2020  IMPRESSION:  1. 7.5 mm left lower lobe pulmonary nodule. 2. Small right pleural effusion. 3. Mild right basilar atelectasis. 9/14/2020   Addendum Diagnosis   1. Sigmoid Mass, Biopsy:     Infiltrating adenocarcinoma, most consistent with colon (See comment)   Addendum Comment   Immunohistochemical stains reveal the following staining pattern:   CK7: Scattered cells with cytoplasmic membrane staining   CK20: Positive cytoplasmic staining in normal colonic mucosal epithelial cells and malignant epithelial cells   CDX-2: Diffuse strong nuclear decoration and all tumor cells and normal background colonic epithelial cells   PAX-8: Negative   Estrogen Receptor: Negative   Positive and negative controls stain appropriately. Due to radiologic findings suggesting the possibility of GYN involvement, ER and PAX-8 are performed, which lend support to the diagnosis of a colonic primary, over a tumor of müllerian origin. 9/17/2020   FINAL PATHOLOGIC DIAGNOSIS   1. Sigmoid colon and proximal rectum, uterus, left fallopian tube and ovary and right ureteral segment, low anterior resection:   Sigmoid colon and proximal rectum:  Invasive adenocarcinoma (see synoptic report and comment).    Metastatic adenocarcinoma in one of sixteen lymph nodes (1/16). Uterus: Invasive adenocarcinoma extending from adhesed colon into uterine serosa. Endometrial lining shows mucosal atrophy. Left ovary: Benign ovary with serosal inclusion cysts. Left fallopian tube: Benign fallopian tube. Right ureteral segment: Benign segment of ureter densely adhesed to colon. Nodule near right uterine cornu:  Nodular portion of benign smooth muscle consistent with leiomyoma with parenchymal hemorrhage. COLON AND RECTUM: Resection   SPECIMEN   Procedure: Low anterior resection   TUMOR   Tumor Site: Sigmoid colon   Histologic Type: Adenocarcinoma   Histologic Grade: G2: Moderately differentiated   Tumor Size: 8.0 cm   Tumor Extension: Tumor directly invades adjacent structures:   Posterior uterine serosa   Macroscopic Tumor Perforation: Tumor invades through serosa into surrounding soft tissue   Lymphovascular Invasion: Present   Perineural Invasion: Not identified   Treatment Effect: No known presurgical therapy   MARGINS   Margins: All margins are uninvolved by invasive carcinoma   Margins Examined: Proximal, Distal, Radial (circumferential) or   Mesenteric   Distance of Tumor from Radial (circumferential) Margin: See Comment   LYMPH NODES   Number of Lymph Nodes Involved: 1   Number of Lymph Nodes Examined: 16   Tumor Deposits: Not identified   PATHOLOGIC STAGE CLASSIFICATION (pTNM, AJCC 8th Edition)   Primary Tumor (pT): pT4b   Regional Lymph Nodes (pN): pN1a   2. Distal rectal margin:   Segment of benign large bowel. 3. Anastomotic doughnuts:   Two annular portions of benign large bowel. Comment   Tumor invades into the adherent soft tissue and to within 1.1 cm of the apparent right pelvic sidewall margin. CT CAP 2/17/21:  IMPRESSION  1. 8 mm left lower lobe pulmonary nodule, unchanged. 2. Trace fluid within the left hemipelvis. Records reviewed and summarized above. Pathology report(s) reviewed above. Radiology report(s) reviewed above.     Assessment:   1) Sigmoid Colon Adenocarcinoma - Stage IIIB- ERICKA  [id number]  CT abd/pelv 9/10/2020 showed large, irregular pelvic mass measuring approximately 9cm likely representing neoplasm arising from the sigmoid colon. CEA 7.4 on 9/11  Colonoscopy performed 9/14 and biopsy + adenocarcinoma  Mass found to be invading into the uterus and right ureter. Surgical resection on 9/17 with creation of loop ileostomy, total abdominal hysterectomy and left salpingo-oophorectomy, distal right ureterectomy  She had a revision of her ileostomy 10/11/2020  Then needed stoma closure and anastomosis on 10/27/2020    Today is cycle 7 of adjuvant FOLFOX x 6 months   5FU bolus was introduced with cycle 2 and she tolerated this well with grade 1 neuropathy and grade 1 thrombocytopenia  She developed grade 3 neutropenia after cycle 4 which resulted in a 2 week delay. Neulasta was added. CT imaging after 6 cycles was personally reviewed and shows a stable pulmonary nodule and otherwise ROBE. Will continue current therapy.      2) Anemia  Due to #1  Iron sat of 4% and ferritin of 20 on 9/11  S/p 2 units PRBCs on 9/15 and Venofer 200mg x3 doses  Anemia has resolved     4) Elevated transaminases  Monitor- if increases further will obtain an USG of abdomen    5) Neuropathy  Grade 1   Monitor     6) Neutropenia  Grade 3 after cycle 4   Added neulasta  Resolved     7) Management of high risk medications like chemotherapy  Previous grade 3 neutropenia & added neulasta   Otherwise tolerating well with grade 1 constipation & grade 1 neuropathy  & grade 1 thrombocytopenia     8) Pulmonary nodule  Stable  Will monitor with imaging every 3-6 months     Plan:     · Proceed today with cycle 7 of mFOLFOX (5FU CADD 2400mg/m2, 5FU bolus 400mg/m2, leucovorin 400mg/m2, oxaliplatin 85mg/m2) every 2 weeks for 6 months   · Neulasta given high risk for grade IV neutropenia   · Cbc, cmp every cycle, cea every other  · Zofran prn, dexamethasone days 2 and 3  · Follow with surgery as scheduled     RTC in 2 weeks      Carol Handler at [phone] [facility]    I appreciate the opportunity to participate in Ms. Jannie Cruz charissa. Signed By: Ebony English MD    I performed a history and physical examination of the patient and discussed his management with the NPP. I reviewed the NPP note and agree with the documented findings and plan of care. Scott Clements is tolerating chemotherapy, has grade 1 thrombocytopenia, scans ROBE reviewed and discussed- need to continue monitoring the lung nodule.  OK to proceed today with cycle 7 /12

## 2021-02-25 ENCOUNTER — APPOINTMENT (OUTPATIENT)
Dept: INFUSION THERAPY | Age: 71
End: 2021-02-25

## 2021-02-26 ENCOUNTER — TELEPHONE (OUTPATIENT)
Dept: ONCOLOGY | Age: 71
End: 2021-02-26

## 2021-02-26 ENCOUNTER — HOSPITAL ENCOUNTER (OUTPATIENT)
Dept: INFUSION THERAPY | Age: 71
Discharge: HOME | End: 2021-02-26
Payer: MEDICARE

## 2021-02-26 VITALS
TEMPERATURE: 97.5 F | DIASTOLIC BLOOD PRESSURE: 88 MMHG | SYSTOLIC BLOOD PRESSURE: 145 MMHG | RESPIRATION RATE: 18 BRPM | HEART RATE: 84 BPM

## 2021-02-26 DIAGNOSIS — C18.7 MALIGNANT NEOPLASM OF SIGMOID COLON (HCC): Primary | ICD-10-CM

## 2021-02-26 PROCEDURE — 96377 APPLICATON ON-BODY INJECTOR: CPT

## 2021-02-26 PROCEDURE — 74011250636 HC RX REV CODE- 250/636: Performed by: REGISTERED NURSE

## 2021-02-26 PROCEDURE — 96523 IRRIG DRUG DELIVERY DEVICE: CPT

## 2021-02-26 RX ORDER — SODIUM CHLORIDE 0.9 % (FLUSH) 0.9 %
10 SYRINGE (ML) INJECTION AS NEEDED
Status: DISCONTINUED | OUTPATIENT
Start: 2021-02-26 | End: 2021-02-27 | Stop reason: HOSPADM

## 2021-02-26 RX ORDER — HEPARIN 100 UNIT/ML
300-500 SYRINGE INTRAVENOUS AS NEEDED
Status: DISCONTINUED | OUTPATIENT
Start: 2021-02-26 | End: 2021-02-27 | Stop reason: HOSPADM

## 2021-02-26 RX ORDER — SODIUM CHLORIDE 9 MG/ML
10 INJECTION INTRAMUSCULAR; INTRAVENOUS; SUBCUTANEOUS AS NEEDED
Status: DISCONTINUED | OUTPATIENT
Start: 2021-02-26 | End: 2021-02-27 | Stop reason: HOSPADM

## 2021-02-26 RX ADMIN — PEGFILGRASTIM 6 MG: KIT SUBCUTANEOUS at 16:38

## 2021-02-26 RX ADMIN — Medication 10 ML: at 16:40

## 2021-02-26 RX ADMIN — HEPARIN 500 UNITS: 100 SYRINGE at 16:40

## 2021-02-26 NOTE — PROGRESS NOTES
[facility]patient [facility]fusion [facility] Progress Note    1630 Pt admit to [facility] for pump disconnect + Neulasta OBI ambulatory in stable condition. Assessment completed. No new concerns voiced. Patient continues to report constipation which is not new. Additionally continues with peripheral neuropathy. No other complaints voiced. CADD pump interrogated and shows 100mL given with 0mL remaining. Port maintained positive blood return throughout the course of treatment. Port flushed, heparinized and de-accessed without difficulty. OBI placed on RIGHT arm. SHe is aware of her next appointment.     Visit Vitals  BP (!) 145/88 (BP 1 Location: Right arm, BP Patient Position: Sitting)   Pulse 84   Temp 97.5 °F (36.4 °C)   Resp 18   Breastfeeding No       Medications Administered     heparin (porcine) pf 300-500 Units     Admin Date  02/26/2021 Action  Given Dose  500 Units Route  InterCATHeter Administered By  Ashwin Oneill          pegfilgrastim (NEULASTA) wearable SQ injector 6 mg     Admin Date  02/26/2021 Action  Given Dose  6 mg Route  SubCUTAneous Administered By  Ashwin Oneill          sodium chloride (NS) flush 10 mL     Admin Date  02/26/2021 Action  Given Dose  10 mL Route  IntraVENous Administered By  Margot Sotelo RN

## 2021-02-26 NOTE — TELEPHONE ENCOUNTER
Called pt (HIPPA verified) Informed pt on lab result. Pt verbalized understanding. No new questions or concerns.

## 2021-03-02 ENCOUNTER — APPOINTMENT (OUTPATIENT)
Dept: INFUSION THERAPY | Age: 71
End: 2021-03-02

## 2021-03-04 ENCOUNTER — APPOINTMENT (OUTPATIENT)
Dept: INFUSION THERAPY | Age: 71
End: 2021-03-04

## 2021-03-10 ENCOUNTER — OFFICE VISIT (OUTPATIENT)
Dept: ONCOLOGY | Age: 71
End: 2021-03-10
Payer: MEDICARE

## 2021-03-10 ENCOUNTER — HOSPITAL ENCOUNTER (OUTPATIENT)
Dept: INFUSION THERAPY | Age: 71
Discharge: HOME | End: 2021-03-10
Payer: MEDICARE

## 2021-03-10 VITALS
HEART RATE: 81 BPM | SYSTOLIC BLOOD PRESSURE: 114 MMHG | BODY MASS INDEX: 20.14 KG/M2 | DIASTOLIC BLOOD PRESSURE: 74 MMHG | OXYGEN SATURATION: 99 % | TEMPERATURE: 97.1 F | HEIGHT: 66 IN

## 2021-03-10 VITALS
DIASTOLIC BLOOD PRESSURE: 78 MMHG | HEART RATE: 88 BPM | HEIGHT: 66 IN | SYSTOLIC BLOOD PRESSURE: 119 MMHG | BODY MASS INDEX: 20.06 KG/M2 | TEMPERATURE: 97.1 F | RESPIRATION RATE: 18 BRPM | WEIGHT: 124.8 LBS

## 2021-03-10 DIAGNOSIS — C18.7 MALIGNANT NEOPLASM OF SIGMOID COLON (HCC): Primary | ICD-10-CM

## 2021-03-10 LAB
ALBUMIN SERPL-MCNC: 3.3 G/DL (ref 3.5–5)
ALBUMIN/GLOB SERPL: 1 {RATIO} (ref 1.1–2.2)
ALP SERPL-CCNC: 215 U/L (ref 45–117)
ALT SERPL-CCNC: 45 U/L (ref 12–78)
ANION GAP SERPL CALC-SCNC: 6 MMOL/L (ref 5–15)
AST SERPL-CCNC: 43 U/L (ref 15–37)
BASOPHILS # BLD: 0.1 K/UL (ref 0–0.1)
BASOPHILS NFR BLD: 1 % (ref 0–1)
BILIRUB SERPL-MCNC: 1.1 MG/DL (ref 0.2–1)
BUN SERPL-MCNC: 10 MG/DL (ref 6–20)
BUN/CREAT SERPL: 14 (ref 12–20)
CALCIUM SERPL-MCNC: 9.3 MG/DL (ref 8.5–10.1)
CEA SERPL-MCNC: 2 NG/ML
CHLORIDE SERPL-SCNC: 106 MMOL/L (ref 97–108)
CO2 SERPL-SCNC: 24 MMOL/L (ref 21–32)
CREAT SERPL-MCNC: 0.7 MG/DL (ref 0.55–1.02)
DIFFERENTIAL METHOD BLD: ABNORMAL
EOSINOPHIL # BLD: 0.2 K/UL (ref 0–0.4)
EOSINOPHIL NFR BLD: 3 % (ref 0–7)
ERYTHROCYTE [DISTWIDTH] IN BLOOD BY AUTOMATED COUNT: 18.1 % (ref 11.5–14.5)
GLOBULIN SER CALC-MCNC: 3.3 G/DL (ref 2–4)
GLUCOSE SERPL-MCNC: 121 MG/DL (ref 65–100)
HCT VFR BLD AUTO: 37.7 % (ref 35–47)
HGB BLD-MCNC: 12.2 G/DL (ref 11.5–16)
IMM GRANULOCYTES # BLD AUTO: 0.1 K/UL (ref 0–0.04)
IMM GRANULOCYTES NFR BLD AUTO: 1 % (ref 0–0.5)
LYMPHOCYTES # BLD: 1 K/UL (ref 0.8–3.5)
LYMPHOCYTES NFR BLD: 17 % (ref 12–49)
MCH RBC QN AUTO: 28.1 PG (ref 26–34)
MCHC RBC AUTO-ENTMCNC: 32.4 G/DL (ref 30–36.5)
MCV RBC AUTO: 86.9 FL (ref 80–99)
MONOCYTES # BLD: 0.7 K/UL (ref 0–1)
MONOCYTES NFR BLD: 11 % (ref 5–13)
NEUTS SEG # BLD: 3.9 K/UL (ref 1.8–8)
NEUTS SEG NFR BLD: 67 % (ref 32–75)
NRBC # BLD: 0 K/UL (ref 0–0.01)
NRBC BLD-RTO: 0 PER 100 WBC
PLATELET # BLD AUTO: 163 K/UL (ref 150–400)
PMV BLD AUTO: 9.4 FL (ref 8.9–12.9)
POTASSIUM SERPL-SCNC: 3.4 MMOL/L (ref 3.5–5.1)
PROT SERPL-MCNC: 6.6 G/DL (ref 6.4–8.2)
RBC # BLD AUTO: 4.34 M/UL (ref 3.8–5.2)
SODIUM SERPL-SCNC: 136 MMOL/L (ref 136–145)
WBC # BLD AUTO: 5.9 K/UL (ref 3.6–11)

## 2021-03-10 PROCEDURE — 77030012965 HC NDL HUBR BBMI -A

## 2021-03-10 PROCEDURE — G8427 DOCREV CUR MEDS BY ELIG CLIN: HCPCS | Performed by: INTERNAL MEDICINE

## 2021-03-10 PROCEDURE — 96415 CHEMO IV INFUSION ADDL HR: CPT

## 2021-03-10 PROCEDURE — 74011000258 HC RX REV CODE- 258: Performed by: REGISTERED NURSE

## 2021-03-10 PROCEDURE — 96375 TX/PRO/DX INJ NEW DRUG ADDON: CPT

## 2021-03-10 PROCEDURE — 85025 COMPLETE CBC W/AUTO DIFF WBC: CPT

## 2021-03-10 PROCEDURE — G8400 PT W/DXA NO RESULTS DOC: HCPCS | Performed by: INTERNAL MEDICINE

## 2021-03-10 PROCEDURE — 96413 CHEMO IV INFUSION 1 HR: CPT

## 2021-03-10 PROCEDURE — G8420 CALC BMI NORM PARAMETERS: HCPCS | Performed by: INTERNAL MEDICINE

## 2021-03-10 PROCEDURE — 96366 THER/PROPH/DIAG IV INF ADDON: CPT

## 2021-03-10 PROCEDURE — G9711 PT HX TOT COL OR COLON CA: HCPCS | Performed by: INTERNAL MEDICINE

## 2021-03-10 PROCEDURE — 74011250636 HC RX REV CODE- 250/636: Performed by: REGISTERED NURSE

## 2021-03-10 PROCEDURE — 36415 COLL VENOUS BLD VENIPUNCTURE: CPT

## 2021-03-10 PROCEDURE — 1090F PRES/ABSN URINE INCON ASSESS: CPT | Performed by: INTERNAL MEDICINE

## 2021-03-10 PROCEDURE — 1101F PT FALLS ASSESS-DOCD LE1/YR: CPT | Performed by: INTERNAL MEDICINE

## 2021-03-10 PROCEDURE — 99214 OFFICE O/P EST MOD 30 MIN: CPT | Performed by: INTERNAL MEDICINE

## 2021-03-10 PROCEDURE — G8432 DEP SCR NOT DOC, RNG: HCPCS | Performed by: INTERNAL MEDICINE

## 2021-03-10 PROCEDURE — G0498 CHEMO EXTEND IV INFUS W/PUMP: HCPCS

## 2021-03-10 PROCEDURE — G0463 HOSPITAL OUTPT CLINIC VISIT: HCPCS | Performed by: REGISTERED NURSE

## 2021-03-10 PROCEDURE — 82378 CARCINOEMBRYONIC ANTIGEN: CPT

## 2021-03-10 PROCEDURE — 80053 COMPREHEN METABOLIC PANEL: CPT

## 2021-03-10 PROCEDURE — 74011250637 HC RX REV CODE- 250/637: Performed by: REGISTERED NURSE

## 2021-03-10 PROCEDURE — G8536 NO DOC ELDER MAL SCRN: HCPCS | Performed by: INTERNAL MEDICINE

## 2021-03-10 PROCEDURE — 96368 THER/DIAG CONCURRENT INF: CPT

## 2021-03-10 RX ORDER — DEXTROSE MONOHYDRATE 50 MG/ML
25 INJECTION, SOLUTION INTRAVENOUS CONTINUOUS
Status: DISPENSED | OUTPATIENT
Start: 2021-03-10 | End: 2021-03-10

## 2021-03-10 RX ORDER — POTASSIUM CHLORIDE 750 MG/1
20 TABLET, FILM COATED, EXTENDED RELEASE ORAL
Status: COMPLETED | OUTPATIENT
Start: 2021-03-10 | End: 2021-03-10

## 2021-03-10 RX ORDER — FLUOROURACIL 50 MG/ML
400 INJECTION, SOLUTION INTRAVENOUS ONCE
Status: COMPLETED | OUTPATIENT
Start: 2021-03-10 | End: 2021-03-10

## 2021-03-10 RX ORDER — PALONOSETRON 0.05 MG/ML
0.25 INJECTION, SOLUTION INTRAVENOUS ONCE
Status: COMPLETED | OUTPATIENT
Start: 2021-03-10 | End: 2021-03-10

## 2021-03-10 RX ORDER — HEPARIN 100 UNIT/ML
300-500 SYRINGE INTRAVENOUS AS NEEDED
Status: ACTIVE | OUTPATIENT
Start: 2021-03-10 | End: 2021-03-10

## 2021-03-10 RX ORDER — SODIUM CHLORIDE 9 MG/ML
10 INJECTION INTRAMUSCULAR; INTRAVENOUS; SUBCUTANEOUS AS NEEDED
Status: ACTIVE | OUTPATIENT
Start: 2021-03-10 | End: 2021-03-10

## 2021-03-10 RX ORDER — SODIUM CHLORIDE 0.9 % (FLUSH) 0.9 %
10 SYRINGE (ML) INJECTION AS NEEDED
Status: DISPENSED | OUTPATIENT
Start: 2021-03-10 | End: 2021-03-10

## 2021-03-10 RX ADMIN — Medication 10 ML: at 09:00

## 2021-03-10 RX ADMIN — POTASSIUM CHLORIDE 20 MEQ: 750 TABLET, FILM COATED, EXTENDED RELEASE ORAL at 11:36

## 2021-03-10 RX ADMIN — FLUOROURACIL 3720 MG: 50 INJECTION, SOLUTION INTRAVENOUS at 14:12

## 2021-03-10 RX ADMIN — FLUOROURACIL 620 MG: 50 INJECTION, SOLUTION INTRAVENOUS at 14:08

## 2021-03-10 RX ADMIN — LEUCOVORIN CALCIUM 620 MG: 350 INJECTION, POWDER, LYOPHILIZED, FOR SOLUTION INTRAMUSCULAR; INTRAVENOUS at 12:04

## 2021-03-10 RX ADMIN — SODIUM CHLORIDE 10 ML: 9 INJECTION INTRAMUSCULAR; INTRAVENOUS; SUBCUTANEOUS at 14:12

## 2021-03-10 RX ADMIN — PALONOSETRON 0.25 MG: 0.05 INJECTION, SOLUTION INTRAVENOUS at 11:36

## 2021-03-10 RX ADMIN — DEXTROSE MONOHYDRATE 25 ML/HR: 5 INJECTION, SOLUTION INTRAVENOUS at 11:37

## 2021-03-10 RX ADMIN — DEXAMETHASONE SODIUM PHOSPHATE 12 MG: 4 INJECTION, SOLUTION INTRA-ARTICULAR; INTRALESIONAL; INTRAMUSCULAR; INTRAVENOUS; SOFT TISSUE at 11:36

## 2021-03-10 RX ADMIN — OXALIPLATIN 132 MG: 5 INJECTION, SOLUTION, CONCENTRATE INTRAVENOUS at 12:02

## 2021-03-10 NOTE — PROGRESS NOTES
[facility] Progress Note    Date: March 10, 2021    Name: Rajesh Vincent    MRN: [id number]         : 1950    Ms. Tatum Arrived ambulatory and in no distress for cycle 8 day 1 of Folfox regimen. Assessment was completed, no acute issues at this time. Patient reports that she has some swelling noted in bilateral ankles. Additionally reports that she is having occasional nose bleeds, but thinks this is related to dry air. No other complaints at this time. Port accessed without difficulty, labs drawn and processed. Port capped and patient left for her medical oncology follow uo. Prior to treatment, patient was screened for COVID 19. Denies any signs or symptoms of COVID. Denies any known physical contact with anyone exposed to, diagnosed with or with pending or positive COVID test. Denies any pending or positive COVID test herself. Chemotherapy Flowsheet 3/10/2021   Cycle C8D1   Date 3/10/2021   Drug / Regimen leucovorin/oxaliplatin/fluorouracil IVP/fluorouracil CADD   Dosage 620mg/132mg/620mg/3720mg   Time Up 1202   Time Down D/C with CADD   Pre Hydration none   Post Hydration none   Pre Meds given   Notes given         Ms. Tatum's vitals were reviewed. Patient Vitals for the past 12 hrs:   Temp Pulse Resp BP   03/10/21 1418 -- 88 18 119/78   03/10/21 0855 97.1 °F (36.2 °C) 81 18 114/74         Lab results were obtained and reviewed.   Recent Results (from the past 12 hour(s))   CBC WITH AUTOMATED DIFF    Collection Time: 03/10/21  9:19 AM   Result Value Ref Range    WBC 5.9 3.6 - 11.0 K/uL    RBC 4.34 3.80 - 5.20 M/uL    HGB 12.2 11.5 - 16.0 g/dL    HCT 37.7 35.0 - 47.0 %    MCV 86.9 80.0 - 99.0 FL    MCH 28.1 26.0 - 34.0 PG    MCHC 32.4 30.0 - 36.5 g/dL    RDW 18.1 (H) 11.5 - 14.5 %    PLATELET 993 770 - 307 K/uL    MPV 9.4 8.9 - 12.9 FL    NRBC 0.0 0  WBC    ABSOLUTE NRBC 0.00 0.00 - 0.01 K/uL    NEUTROPHILS 67 32 - 75 %    LYMPHOCYTES 17 12 - 49 %    MONOCYTES 11 5 - 13 %    EOSINOPHILS 3 0 - 7 %    BASOPHILS 1 0 - 1 %    IMMATURE GRANULOCYTES 1 (H) 0.0 - 0.5 %    ABS. NEUTROPHILS 3.9 1.8 - 8.0 K/UL    ABS. LYMPHOCYTES 1.0 0.8 - 3.5 K/UL    ABS. MONOCYTES 0.7 0.0 - 1.0 K/UL    ABS. EOSINOPHILS 0.2 0.0 - 0.4 K/UL    ABS. BASOPHILS 0.1 0.0 - 0.1 K/UL    ABS. IMM. GRANS. 0.1 (H) 0.00 - 0.04 K/UL    DF AUTOMATED     METABOLIC PANEL, COMPREHENSIVE    Collection Time: 03/10/21  9:19 AM   Result Value Ref Range    Sodium 136 136 - 145 mmol/L    Potassium 3.4 (L) 3.5 - 5.1 mmol/L    Chloride 106 97 - 108 mmol/L    CO2 24 21 - 32 mmol/L    Anion gap 6 5 - 15 mmol/L    Glucose 121 (H) 65 - 100 mg/dL    BUN 10 6 - 20 MG/DL    Creatinine 0.70 0.55 - 1.02 MG/DL    BUN/Creatinine ratio 14 12 - 20      GFR est AA >60 >60 ml/min/1.73m2    GFR est non-AA >60 >60 ml/min/1.73m2    Calcium 9.3 8.5 - 10.1 MG/DL    Bilirubin, total 1.1 (H) 0.2 - 1.0 MG/DL    ALT (SGPT) 45 12 - 78 U/L    AST (SGOT) 43 (H) 15 - 37 U/L    Alk.  phosphatase 215 (H) 45 - 117 U/L    Protein, total 6.6 6.4 - 8.2 g/dL    Albumin 3.3 (L) 3.5 - 5.0 g/dL    Globulin 3.3 2.0 - 4.0 g/dL    A-G Ratio 1.0 (L) 1.1 - 2.2     CEA    Collection Time: 03/10/21  9:19 AM   Result Value Ref Range    CEA 2.0 ng/mL       Medications Administered     0.9% sodium chloride injection 10 mL     Admin Date  03/10/2021 Action  Given Dose  10 mL Route  IntraVENous Administered By  Mary Castellon          dexamethasone (DECADRON) 12 mg in 0.9% sodium chloride 50 mL, overfill volume 5 mL IVPB     Admin Date  03/10/2021 Action  Given Dose  12 mg Rate  232 mL/hr Route  IntraVENous Administered By  Mary Castellon          dextrose 5% infusion     Admin Date  03/10/2021 Action  New Bag Dose  25 mL/hr Rate  25 mL/hr Route  IntraVENous Administered By  Mary Castellon          fluorouraciL (ADRUCIL) 3,720 mg in 0.9% sodium chloride 100 mL CADD Cassette     Admin Date  03/10/2021 Action  New Bag Dose  3,720 mg Rate  2.2 mL/hr Route  IntraVENous Administered By  Mary Castellon fluorouraciL (ADRUCIL) chemo syringe 620 mg     Admin Date  03/10/2021 Action  Given Dose  620 mg Rate  248 mL/hr Route  IntraVENous Administered By  Dianne Cordoba          leucovorin (WELLCOVORIN) 620 mg in dextrose 5% 250 mL, overfill volume 25 mL IVPB     Admin Date  03/10/2021 Action  New Bag Dose  620 mg Rate  153 mL/hr Route  IntraVENous Administered By  Dianne Cordoba          oxaliplatin (ELOXATIN) 132 mg in dextrose 5% 250 mL, overfill volume 25 mL chemo infusion     Admin Date  03/10/2021 Action  New Bag Dose  132 mg Rate  150.7 mL/hr Route  IntraVENous Administered By  Dianne Cordoba          palonosetron HCl (ALOXI) injection 0.25 mg     Admin Date  03/10/2021 Action  Given Dose  0.25 mg Route  IntraVENous Administered By  Dianne Cordoba          potassium chloride SR (KLOR-CON 10) tablet 20 mEq     Admin Date  03/10/2021 Action  Given Dose  20 mEq Route  Oral Administered By  Dianne Cordoba          sodium chloride (NS) flush 10 mL     Admin Date  03/10/2021 Action  Given Dose  10 mL Route  IntraVENous Administered By  Dianne Cordoba                      Ms. Delmar Valentino tolerated treatment well. Port maintained positive blood return throughout the course of treatment. CADD pump was double checked prior to administration and is in full working order prior to discharge. She is aware of her next appointment.     Future Appointments   Date Time Provider Yary Gomez   3/12/2021  2:00 PM H1 WALKER [facility]ACK [facility]   3/24/2021  9:00 AM D3 [address]   3/24/2021  9:30 AM Elinor Agrawal  [facility] AMB   3/26/2021  4:30 PM H1 WALKER [facility]STR[facility]   4/7/2021  9:00 AM D3 WALKER LONG [address] [place] [address]   4/7/2021  9:15 AM Elinor Agrawal  [facility] AMB   4/9/2021  4:30 PM [facility]         Amy Palmer  March 10, 2021

## 2021-03-10 NOTE — PROGRESS NOTES
[facility] [place] [address] [address]  W: [phone]  F: [phone]    Reason for Visit:   Bro Judge is a 72 y.o. female who is seen for stage III colon cancer. Treatment History:   · 9/10/2020 CT A/P - large, irregular pelvic mass measuring approximately 9cm likely representing neoplasm arising from the sigmoid colon, small amount of pelvic free fluid, colonic bowel wall thickening. Borderline enlarged retroperitoneal lymph nodes. 6 mm right lower lobe lung nodule. Mass effect from pelvic mass causing moderate right and minimal left hydronephrosis. · 9/14/2020: Colonoscopy - A large circumferential, ulcerated fungating mass was noted in proximal sigmoid colon. Mass was obstructing the lumen and could not be traversed. Euell Kersey was present. · 9/15/2020: CT Chest - 7.5mm left lower lobe pulmonary nodule, small right pleural effusion, mild right basilar atelectasis  · 9/17/2020: Surgical resection - low anterior resection, mobilization of the splenic flexure, coloproctostomy, creation of loop ileostomy, total abdominal hysterectomy and left salpingo-oophorectomy, distal right ureterectomy, right ureteral re-implant with psoas hitch and placement of right ureteral stent  · 11/17/20: cycle 1 FOLFOX  · 2/17/21 CT CAP: pulmonary nodule stable, ROBE     History of Present Illness:   Patient is a 72 y.o. female seen for colon cancer    She presented to the ED on 9/11/2020 with complaints of right flank/back pain and RLQ abdominal pain. She states she has had several months of gas pain and \"blockages. \" She reports a weight loss of 12 lbs in the last 3 months. CT abd/pelv 9/10/2020 showed large, irregular pelvic mass measuring approximately 9cm likely representing neoplasm arising from the sigmoid colon. She also had hydronephrosis from the mass effect and urethral stent placed.  CEA 7.4 on 9/11and colonoscopy performed 9/14 with biopsy + adenocarcinoma. CT Chest performed showing 7.5mm left lower lobe pulmonary nodule. Mass found to be invading into the uterus and right ureter. Surgical resection performed on 9/17/2020 including low anterior resection, mobilization of the splenic flexure, coloproctostomy, creation of loop ileostomy, total abdominal hysterectomy and left salpingo-oophorectomy, distal right ureterectomy, right ureteral re-implant with psoas hitch and placement of right ureteral stent. She was to see me in clinic but was admitted 10/10/2020 with ileostomy prolapse. Had a revision of loop ileostomy 10/11/2020. Then needed ileostomy closure and anastomosis 10/27/2020. Today she presents for cycle 8 of FOLFOX. Getting more tired, no nausea, she has noted cold sensitivity more so now, tingling resolves though. No pain. No bleeding, no diarrhea. Noted some ankle swelling bilaterally by the end of the day, no sob. Some of her LE findings are chronic    She overall feels well and tolerating treatment well. She has some numbness / tingling in extremities but this resolves after a few days of treatment. She denies nausea/vomiting or diarrhea/constipation. She denies SOB, CP, cough, fevers/chills, bleeding. No other complaints today. Family Hx:  Mother with hx of pancreatic cancer    Past Medical History:   Diagnosis Date    [facility] [facility]     GERD (gastroesophageal reflux disease)     Ileostomy in place ([address]     Ileostomy prolapse ([facility] [address]     Ill-defined condition     Spaulding's esophagus      Past Surgical History:   Procedure Laterality Date    COLONOSCOPY Left 9/14/2020    COLONOSCOPY performed by Clari Kulkarni MD at [address] [place] [address]; incomplete secondary to partial obstruction.  HX APPENDECTOMY  age 17    HX COLONOSCOPY  circa 2010    No neoplasms.  HX ENDOSCOPY  03/13/2017    Dr. Jenni Foster HX ENDOSCOPY  02/13/2020    Dr. My Causey oopherectomy for treatment of benign mass.     HX GYN  09/17/2020 Total abdominal hysterectomy and left salpingo-oophorectomy; Christen Huizar MD.     OTHER SURGICAL  09/17/2020    Low anterior resection, mobilization of the splenic flexure, coloproctostomy, and creation of loop ileostomy; Dr. Alireza Rosales.   OTHER SURGICAL  10/11/2020    Revision of loop ileostomy (resection and creation of divided loop ileostomy); Dr. Alireza Rosales.   UROLOGICAL  09/12/2020    Cystoscopy and placement of a right ureteral stent; Talha Soto III, MD.     UROLOGICAL  09/17/2020    Cystoscopy and placement of a temporary left ureteral catheter; Talha Soto III, MD.     UROLOGICAL  09/17/2020    Distal right ureterectomy; Talha Soto III, MD.     UROLOGICAL  09/17/2020    Right ureteral re-implantation with psoas hitch and placement of a right ureteral stent; Zac Alarcon MD.    IR INSERT TUNL CVAD W PORT LESS THAN 5 YR  10/14/2020    Right chest Port-a-Cath placement. Social History     Tobacco Use    Smoking status: Never Smoker    Smokeless tobacco: Never Used   Substance Use Topics    Alcohol use: Yes     Alcohol/week: 1.0 standard drinks     Types: 1 Glasses of wine per week      Family History   Problem Relation Age of Onset    Cancer Mother     Heart Disease Father     Diabetes Brother      Current Outpatient Medications   Medication Sig    lidocaine-prilocaine (EMLA) topical cream Apply  to affected area as needed for Pain. Apply over port a cath site 30-60 minutes prior to port access    dexAMETHasone (DECADRON) 4 mg tablet Take 2 tabs (8mg) once daily on days 2 & 3 of chemotherapy only    ondansetron (ZOFRAN ODT) 8 mg disintegrating tablet Take 1 Tab by mouth every eight (8) hours as needed for Nausea or Vomiting. Can start 72 hours after chemotherapy infusion    denosumab (PROLIA) 60 mg/mL injection 60 mg by SubCUTAneous route.  lansoprazole (PREVACID) 30 mg capsule Take 30 mg by mouth Daily (before breakfast).     multivitamin (ONE A DAY) tablet Take 1 Tab by mouth daily.  ascorbic acid, vitamin C, (VITAMIN C) 500 mg tablet Take 500 mg by mouth.  cholecalciferol (VITAMIN D3) 1,000 unit cap Take 1,000 Units by mouth daily.  Omega-3 Fatty Acids (FISH OIL) 500 mg cap Take  by mouth. No current facility-administered medications for this visit. No Known Allergies     Review of Systems: A complete review of systems was obtained, negative except as described above. Physical Exam:     Visit Vitals  /74 (BP 1 Location: Left upper arm, BP Patient Position: Sitting)   Pulse 81   Temp 97.1 °F (36.2 °C) (Temporal)   Ht 5' 6\" (1.676 m)   SpO2 99%   BMI 20.14 kg/m²     ECOG PS: 1  General: No distress but appears frail  Eyes: PERRLA, anicteric sclerae  HENT: Atraumatic  MS: Digits without clubbing or cyanosis. Skin: No rashes, ecchymoses, or petechiae. Psych: Alert, oriented, appropriate affect, normal judgment/insight    Results:     Lab Results   Component Value Date/Time    WBC 8.3 02/24/2021 09:01 AM    HGB 13.0 02/24/2021 09:01 AM    HCT 40.3 02/24/2021 09:01 AM    PLATELET 056 (L) 21/76/6192 09:01 AM    MCV 85.4 02/24/2021 09:01 AM    ABS. NEUTROPHILS 6.2 02/24/2021 09:01 AM    Hemoglobin (POC) 12.0 09/17/2020 06:35 PM     Lab Results   Component Value Date/Time    Sodium 139 02/24/2021 09:01 AM    Potassium 3.2 (L) 02/24/2021 09:01 AM    Chloride 106 02/24/2021 09:01 AM    CO2 27 02/24/2021 09:01 AM    Glucose 104 (H) 02/24/2021 09:01 AM    BUN 8 02/24/2021 09:01 AM    Creatinine 0.71 02/24/2021 09:01 AM    GFR est AA >60 02/24/2021 09:01 AM    GFR est non-AA >60 02/24/2021 09:01 AM    Calcium 9.2 02/24/2021 09:01 AM    Glucose (POC) 98 09/27/2020 11:39 PM    Creatinine (POC) 0.8 09/10/2020 02:32 PM     Lab Results   Component Value Date/Time    Bilirubin, total 1.1 (H) 02/24/2021 09:01 AM    ALT (SGPT) 47 02/24/2021 09:01 AM    Alk.  phosphatase 212 (H) 02/24/2021 09:01 AM    Protein, total 6.8 02/24/2021 09:01 AM Albumin 3.5 02/24/2021 09:01 AM    Globulin 3.3 02/24/2021 09:01 AM     CEA:  Recent Labs     01/29/21  0807 01/12/21  0806 12/15/20  0806 11/17/20  0839 09/11/20  1455   CEA 1.7 1.8 1.5 1.0 7.4       CT A/P 9/23/2020  IMPRESSION:  Large, irregular pelvic mass measuring approximately 9 cm likely representing  neoplasm arising from the sigmoid colon. Adnexal neoplasm is a possibility. This  does appear to be separate from the uterus. Small amount of pelvic free fluid. Associated colonic bowel wall thickening. There is focal gas in the upper  presacral region which is unclear if this is intraluminal or contained  extraluminal collection.     No definite distant metastatic disease. Borderline enlarged retroperitoneal  lymph nodes. 6 mm right lower lobe lung nodule. Mass effect from the pelvic mass  causing moderate right and minimal left hydronephrosis. CT Chest 9/15/2020  IMPRESSION:  1. 7.5 mm left lower lobe pulmonary nodule. 2. Small right pleural effusion. 3. Mild right basilar atelectasis. 9/14/2020   Addendum Diagnosis   1. Sigmoid Mass, Biopsy:     Infiltrating adenocarcinoma, most consistent with colon (See comment)   Addendum Comment   Immunohistochemical stains reveal the following staining pattern:   CK7: Scattered cells with cytoplasmic membrane staining   CK20: Positive cytoplasmic staining in normal colonic mucosal epithelial cells and malignant epithelial cells   CDX-2: Diffuse strong nuclear decoration and all tumor cells and normal background colonic epithelial cells   PAX-8: Negative   Estrogen Receptor: Negative   Positive and negative controls stain appropriately. Due to radiologic findings suggesting the possibility of GYN involvement, ER and PAX-8 are performed, which lend support to the diagnosis of a colonic primary, over a tumor of müllerian origin. 9/17/2020   FINAL PATHOLOGIC DIAGNOSIS   1.  Sigmoid colon and proximal rectum, uterus, left fallopian tube and ovary and right ureteral segment, low anterior resection:   Sigmoid colon and proximal rectum:  Invasive adenocarcinoma (see synoptic report and comment). Metastatic adenocarcinoma in one of sixteen lymph nodes (1/16). Uterus: Invasive adenocarcinoma extending from adhesed colon into uterine serosa. Endometrial lining shows mucosal atrophy. Left ovary: Benign ovary with serosal inclusion cysts. Left fallopian tube: Benign fallopian tube. Right ureteral segment: Benign segment of ureter densely adhesed to colon. Nodule near right uterine cornu:  Nodular portion of benign smooth muscle consistent with leiomyoma with parenchymal hemorrhage. COLON AND RECTUM: Resection   SPECIMEN   Procedure: Low anterior resection   TUMOR   Tumor Site: Sigmoid colon   Histologic Type: Adenocarcinoma   Histologic Grade: G2: Moderately differentiated   Tumor Size: 8.0 cm   Tumor Extension: Tumor directly invades adjacent structures:   Posterior uterine serosa   Macroscopic Tumor Perforation: Tumor invades through serosa into surrounding soft tissue   Lymphovascular Invasion: Present   Perineural Invasion: Not identified   Treatment Effect: No known presurgical therapy   MARGINS   Margins: All margins are uninvolved by invasive carcinoma   Margins Examined: Proximal, Distal, Radial (circumferential) or   Mesenteric   Distance of Tumor from Radial (circumferential) Margin: See Comment   LYMPH NODES   Number of Lymph Nodes Involved: 1   Number of Lymph Nodes Examined: 16   Tumor Deposits: Not identified   PATHOLOGIC STAGE CLASSIFICATION (pTNM, AJCC 8th Edition)   Primary Tumor (pT): pT4b   Regional Lymph Nodes (pN): pN1a   2. Distal rectal margin:   Segment of benign large bowel. 3. Anastomotic doughnuts:   Two annular portions of benign large bowel. Comment   Tumor invades into the adherent soft tissue and to within 1.1 cm of the apparent right pelvic sidewall margin.      CT CAP 2/17/21:  IMPRESSION  1. 8 mm left lower lobe pulmonary nodule, unchanged. 2. Trace fluid within the left hemipelvis. Records reviewed and summarized above. Pathology report(s) reviewed above. Radiology report(s) reviewed above. Assessment:   1) Sigmoid Colon Adenocarcinoma - Stage IIIB- ERICKA  [id number]  CT abd/pelv 9/10/2020 showed large, irregular pelvic mass measuring approximately 9cm likely representing neoplasm arising from the sigmoid colon. CEA 7.4 on 9/11  Colonoscopy performed 9/14 and biopsy + adenocarcinoma  Mass found to be invading into the uterus and right ureter. Surgical resection on 9/17 with creation of loop ileostomy, total abdominal hysterectomy and left salpingo-oophorectomy, distal right ureterectomy  She had a revision of her ileostomy 10/11/2020  Then needed stoma closure and anastomosis on 10/27/2020    Today is cycle 8 of adjuvant FOLFOX x 6 months   5FU bolus was introduced with cycle 2 and she tolerated this well with grade 1 neuropathy and grade 1 thrombocytopenia  She developed grade 3 neutropenia after cycle 4 which resulted in a 2 week delay. Neulasta was added. CT imaging after 6 cycles was personally reviewed and shows a stable pulmonary nodule and otherwise ROBE. Will continue current therapy.      2) Anemia  Due to #1  Iron sat of 4% and ferritin of 20 on 9/11  S/p 2 units PRBCs on 9/15 and Venofer 200mg x3 doses  Anemia has resolved     4) Elevated transaminases  Monitor- if increases further will obtain an USG of abdomen    5) Neuropathy  Grade 1   Monitor     6) Neutropenia  Grade 3 after cycle 4   Added neulasta  Resolved     7) Management of high risk medications like chemotherapy  Previous grade 3 neutropenia & added neulasta   Otherwise tolerating well with grade 1 constipation & grade 1 neuropathy  & grade 1 thrombocytopenia     8) Pulmonary nodule  Stable  Will monitor with imaging every 3-6 months     9) Abnormal LFTs  Noted a Bilirubin of 1.1 which has been stable for the last 2 cycles , will montor    Plan: · Proceed today with cycle 8 of mFOLFOX (5FU CADD 2400mg/m2, 5FU bolus 400mg/m2, leucovorin 400mg/m2, oxaliplatin 85mg/m2) every 2 weeks for 6 months   · Neulasta given high risk for grade IV neutropenia   · Cbc, cmp every cycle, cea every other  · Zofran prn, dexamethasone days 2 and 3  · Follow with surgery as scheduled     RTC in 2 weeks      Soo Soliman at [phone] [facility]    I appreciate the opportunity to participate in Ms. Ryley carrington.     Signed By: Michael Saez MD

## 2021-03-10 NOTE — PROGRESS NOTES
Bro Judge is a 72 y.o. female  Chief Complaint   Patient presents with    Chemotherapy     stage III colon cancer. 1. Have you been to the ER, urgent care clinic since your last visit? Hospitalized since your last visit? No.    2. Have you seen or consulted any other health care providers outside of the [address] [place] [address] since your last visit? Include any pap smears or colon screening.  No.

## 2021-03-12 ENCOUNTER — HOSPITAL ENCOUNTER (OUTPATIENT)
Dept: INFUSION THERAPY | Age: 71
Discharge: HOME | End: 2021-03-12
Payer: MEDICARE

## 2021-03-12 VITALS
RESPIRATION RATE: 18 BRPM | TEMPERATURE: 96.8 F | DIASTOLIC BLOOD PRESSURE: 64 MMHG | SYSTOLIC BLOOD PRESSURE: 120 MMHG | HEART RATE: 93 BPM

## 2021-03-12 DIAGNOSIS — C18.7 MALIGNANT NEOPLASM OF SIGMOID COLON (HCC): Primary | ICD-10-CM

## 2021-03-12 PROCEDURE — 96377 APPLICATON ON-BODY INJECTOR: CPT

## 2021-03-12 PROCEDURE — 74011250636 HC RX REV CODE- 250/636: Performed by: REGISTERED NURSE

## 2021-03-12 RX ORDER — SODIUM CHLORIDE 9 MG/ML
10 INJECTION INTRAMUSCULAR; INTRAVENOUS; SUBCUTANEOUS AS NEEDED
Status: DISCONTINUED | OUTPATIENT
Start: 2021-03-12 | End: 2021-03-13 | Stop reason: HOSPADM

## 2021-03-12 RX ORDER — HEPARIN 100 UNIT/ML
300-500 SYRINGE INTRAVENOUS AS NEEDED
Status: DISCONTINUED | OUTPATIENT
Start: 2021-03-12 | End: 2021-03-13 | Stop reason: HOSPADM

## 2021-03-12 RX ORDER — SODIUM CHLORIDE 0.9 % (FLUSH) 0.9 %
10 SYRINGE (ML) INJECTION AS NEEDED
Status: DISCONTINUED | OUTPATIENT
Start: 2021-03-12 | End: 2021-03-13 | Stop reason: HOSPADM

## 2021-03-12 RX ADMIN — HEPARIN 500 UNITS: 100 SYRINGE at 14:15

## 2021-03-12 RX ADMIN — SODIUM CHLORIDE 10 ML: 9 INJECTION INTRAMUSCULAR; INTRAVENOUS; SUBCUTANEOUS at 14:15

## 2021-03-12 RX ADMIN — PEGFILGRASTIM 6 MG: KIT SUBCUTANEOUS at 14:20

## 2021-03-12 NOTE — PROGRESS NOTES
[facility] Progress Note    Date: March 12, 2021      1410: Pt arrived ambulatory to [facility] for Pump Disconnection + Port Flush + OB Neulasta in stable condition. Assessment completed. No other complaints voiced at this time. Pump completed at 11:45 am per patient report. Pump stopped on arrival. Pump disconnected and port flushed with positive blood return. Patient denies any symptoms of COVID-19, including SOB, coughing, fever, or generally not feeling well. Patient denies any recent exposure to COVID-19. Patient denies any recent contact with family or friends that have a pending COVID-19 test.    Patient Vitals for the past 12 hrs:   Temp Pulse Resp BP   03/12/21 1415 96.8 °F (36 °C) 93 18 120/64       Medications Administered     0.9% sodium chloride injection 10 mL     Admin Date  03/12/2021 Action  Given Dose  10 mL Route  IntraVENous Administered By  Bear Leal          heparin (porcine) pf 300-500 Units     Admin Date  03/12/2021 Action  Given Dose  500 Units Route  InterCATHeter Administered By  Bear Leal          pegfilgrastim (NEULASTA) wearable SQ injector 6 mg     Admin Date  03/12/2021 Action  Given Dose  6 mg Route  SubCUTAneous Administered By  Bear Leal              OB Neulasta placed to left upper arm. Education provided to patient about Neulasta On Body Injector including: side effects, how/when to remove the device, as well as what to do in the event of device malfunction. Opportunity for questions was provided and all questions were answered. Patient verbalized understanding. 1425: Tolerated treatment well, no adverse reactions noted. Port flushed, heparinized and de- accessed per protocol. D/Cd from [facility] ambulatory and in no distress. Patient is aware of next scheduled [facility] appointment on 3/24/21.     Future Appointments   Date Time Provider Yary Gomez   3/24/2021  9:00 AM D3 WALKER LONG [address] [place] [address]   3/24/2021  9:30 AM VINCENT Lucas BS AMB 3/26/2021  4:30 PM H1 [facility]ALK[facility]   4/7/2021  9:00 AM D3 [facility]ALKER LONG [place] [facility]   4/7/2021  9:15 AM Blessing Haile,  [address] BS AMB   4/9/2021  4:30 PM H1 [facility]ER FASTRACK [facility]   4/21/2021  9:00 AM D3 WALKER LONG [facility]   4/23/2021  4:30 PM H1 WALKER FASTRACK [facility] [address] [facility]           Johnny Castro RN  March 12, 2021

## 2021-03-16 ENCOUNTER — APPOINTMENT (OUTPATIENT)
Dept: INFUSION THERAPY | Age: 71
End: 2021-03-16

## 2021-03-18 ENCOUNTER — APPOINTMENT (OUTPATIENT)
Dept: INFUSION THERAPY | Age: 71
End: 2021-03-18

## 2021-03-24 ENCOUNTER — HOSPITAL ENCOUNTER (OUTPATIENT)
Dept: INFUSION THERAPY | Age: 71
Discharge: HOME | End: 2021-03-24
Payer: MEDICARE

## 2021-03-24 ENCOUNTER — OFFICE VISIT (OUTPATIENT)
Dept: ONCOLOGY | Age: 71
End: 2021-03-24
Payer: MEDICARE

## 2021-03-24 VITALS
DIASTOLIC BLOOD PRESSURE: 72 MMHG | HEART RATE: 87 BPM | RESPIRATION RATE: 18 BRPM | SYSTOLIC BLOOD PRESSURE: 117 MMHG | HEIGHT: 66 IN | BODY MASS INDEX: 19.96 KG/M2 | TEMPERATURE: 97.2 F | WEIGHT: 124.2 LBS

## 2021-03-24 VITALS
HEART RATE: 99 BPM | DIASTOLIC BLOOD PRESSURE: 77 MMHG | WEIGHT: 124 LBS | SYSTOLIC BLOOD PRESSURE: 114 MMHG | RESPIRATION RATE: 18 BRPM | OXYGEN SATURATION: 98 % | BODY MASS INDEX: 20.01 KG/M2 | TEMPERATURE: 97.2 F

## 2021-03-24 DIAGNOSIS — C18.7 MALIGNANT NEOPLASM OF SIGMOID COLON (HCC): Primary | ICD-10-CM

## 2021-03-24 DIAGNOSIS — D70.2 OTHER DRUG-INDUCED NEUTROPENIA (HCC): ICD-10-CM

## 2021-03-24 DIAGNOSIS — Z95.828 PORT-A-CATH IN PLACE: ICD-10-CM

## 2021-03-24 DIAGNOSIS — G62.0 CHEMOTHERAPY-INDUCED NEUROPATHY (HCC): ICD-10-CM

## 2021-03-24 DIAGNOSIS — D50.9 IRON DEFICIENCY ANEMIA, UNSPECIFIED IRON DEFICIENCY ANEMIA TYPE: ICD-10-CM

## 2021-03-24 DIAGNOSIS — D69.6 THROMBOCYTOPENIA (HCC): ICD-10-CM

## 2021-03-24 DIAGNOSIS — T45.1X5A CHEMOTHERAPY-INDUCED NEUROPATHY (HCC): ICD-10-CM

## 2021-03-24 DIAGNOSIS — Z51.11 ENCOUNTER FOR ANTINEOPLASTIC CHEMOTHERAPY: ICD-10-CM

## 2021-03-24 LAB
ALBUMIN SERPL-MCNC: 3.3 G/DL (ref 3.5–5)
ALBUMIN/GLOB SERPL: 1.1 {RATIO} (ref 1.1–2.2)
ALP SERPL-CCNC: 255 U/L (ref 45–117)
ALT SERPL-CCNC: 58 U/L (ref 12–78)
ANION GAP SERPL CALC-SCNC: 7 MMOL/L (ref 5–15)
AST SERPL-CCNC: 52 U/L (ref 15–37)
BASOPHILS # BLD: 0 K/UL (ref 0–0.1)
BASOPHILS NFR BLD: 0 % (ref 0–1)
BILIRUB SERPL-MCNC: 1.1 MG/DL (ref 0.2–1)
BUN SERPL-MCNC: 11 MG/DL (ref 6–20)
BUN/CREAT SERPL: 17 (ref 12–20)
CALCIUM SERPL-MCNC: 9.1 MG/DL (ref 8.5–10.1)
CHLORIDE SERPL-SCNC: 104 MMOL/L (ref 97–108)
CO2 SERPL-SCNC: 25 MMOL/L (ref 21–32)
CREAT SERPL-MCNC: 0.63 MG/DL (ref 0.55–1.02)
DIFFERENTIAL METHOD BLD: ABNORMAL
EOSINOPHIL # BLD: 0.1 K/UL (ref 0–0.4)
EOSINOPHIL NFR BLD: 1 % (ref 0–7)
ERYTHROCYTE [DISTWIDTH] IN BLOOD BY AUTOMATED COUNT: 18.9 % (ref 11.5–14.5)
GLOBULIN SER CALC-MCNC: 3.1 G/DL (ref 2–4)
GLUCOSE SERPL-MCNC: 132 MG/DL (ref 65–100)
HCT VFR BLD AUTO: 36.1 % (ref 35–47)
HGB BLD-MCNC: 11.6 G/DL (ref 11.5–16)
IMM GRANULOCYTES # BLD AUTO: 0 K/UL
IMM GRANULOCYTES NFR BLD AUTO: 0 %
LYMPHOCYTES # BLD: 0.6 K/UL (ref 0.8–3.5)
LYMPHOCYTES NFR BLD: 8 % (ref 12–49)
MCH RBC QN AUTO: 27.9 PG (ref 26–34)
MCHC RBC AUTO-ENTMCNC: 32.1 G/DL (ref 30–36.5)
MCV RBC AUTO: 86.8 FL (ref 80–99)
METAMYELOCYTES NFR BLD MANUAL: 1 %
MONOCYTES # BLD: 0.5 K/UL (ref 0–1)
MONOCYTES NFR BLD: 6 % (ref 5–13)
NEUTS BAND NFR BLD MANUAL: 1 % (ref 0–6)
NEUTS SEG # BLD: 6.4 K/UL (ref 1.8–8)
NEUTS SEG NFR BLD: 83 % (ref 32–75)
NRBC # BLD: 0 K/UL (ref 0–0.01)
NRBC BLD-RTO: 0 PER 100 WBC
PLATELET # BLD AUTO: 133 K/UL (ref 150–400)
PMV BLD AUTO: 9.6 FL (ref 8.9–12.9)
POTASSIUM SERPL-SCNC: 3.6 MMOL/L (ref 3.5–5.1)
PROT SERPL-MCNC: 6.4 G/DL (ref 6.4–8.2)
RBC # BLD AUTO: 4.16 M/UL (ref 3.8–5.2)
RBC MORPH BLD: ABNORMAL
SODIUM SERPL-SCNC: 136 MMOL/L (ref 136–145)
WBC # BLD AUTO: 7.6 K/UL (ref 3.6–11)

## 2021-03-24 PROCEDURE — G8510 SCR DEP NEG, NO PLAN REQD: HCPCS | Performed by: INTERNAL MEDICINE

## 2021-03-24 PROCEDURE — G8420 CALC BMI NORM PARAMETERS: HCPCS | Performed by: INTERNAL MEDICINE

## 2021-03-24 PROCEDURE — 99215 OFFICE O/P EST HI 40 MIN: CPT | Performed by: INTERNAL MEDICINE

## 2021-03-24 PROCEDURE — 74011250636 HC RX REV CODE- 250/636: Performed by: REGISTERED NURSE

## 2021-03-24 PROCEDURE — 74011000258 HC RX REV CODE- 258: Performed by: REGISTERED NURSE

## 2021-03-24 PROCEDURE — G8400 PT W/DXA NO RESULTS DOC: HCPCS | Performed by: INTERNAL MEDICINE

## 2021-03-24 PROCEDURE — 96375 TX/PRO/DX INJ NEW DRUG ADDON: CPT

## 2021-03-24 PROCEDURE — 85025 COMPLETE CBC W/AUTO DIFF WBC: CPT

## 2021-03-24 PROCEDURE — 36415 COLL VENOUS BLD VENIPUNCTURE: CPT

## 2021-03-24 PROCEDURE — 1101F PT FALLS ASSESS-DOCD LE1/YR: CPT | Performed by: INTERNAL MEDICINE

## 2021-03-24 PROCEDURE — 96368 THER/DIAG CONCURRENT INF: CPT

## 2021-03-24 PROCEDURE — 80053 COMPREHEN METABOLIC PANEL: CPT

## 2021-03-24 PROCEDURE — 96366 THER/PROPH/DIAG IV INF ADDON: CPT

## 2021-03-24 PROCEDURE — 96413 CHEMO IV INFUSION 1 HR: CPT

## 2021-03-24 PROCEDURE — G8536 NO DOC ELDER MAL SCRN: HCPCS | Performed by: INTERNAL MEDICINE

## 2021-03-24 PROCEDURE — G0498 CHEMO EXTEND IV INFUS W/PUMP: HCPCS

## 2021-03-24 PROCEDURE — G9711 PT HX TOT COL OR COLON CA: HCPCS | Performed by: INTERNAL MEDICINE

## 2021-03-24 PROCEDURE — 77030012965 HC NDL HUBR BBMI -A

## 2021-03-24 PROCEDURE — 96415 CHEMO IV INFUSION ADDL HR: CPT

## 2021-03-24 PROCEDURE — G8427 DOCREV CUR MEDS BY ELIG CLIN: HCPCS | Performed by: INTERNAL MEDICINE

## 2021-03-24 PROCEDURE — 96411 CHEMO IV PUSH ADDL DRUG: CPT

## 2021-03-24 PROCEDURE — G0463 HOSPITAL OUTPT CLINIC VISIT: HCPCS | Performed by: REGISTERED NURSE

## 2021-03-24 PROCEDURE — 1090F PRES/ABSN URINE INCON ASSESS: CPT | Performed by: INTERNAL MEDICINE

## 2021-03-24 RX ORDER — HEPARIN 100 UNIT/ML
300-500 SYRINGE INTRAVENOUS AS NEEDED
Status: DISCONTINUED | OUTPATIENT
Start: 2021-03-24 | End: 2021-03-25 | Stop reason: HOSPADM

## 2021-03-24 RX ORDER — SODIUM CHLORIDE 9 MG/ML
10 INJECTION INTRAMUSCULAR; INTRAVENOUS; SUBCUTANEOUS AS NEEDED
Status: DISCONTINUED | OUTPATIENT
Start: 2021-03-24 | End: 2021-03-25 | Stop reason: HOSPADM

## 2021-03-24 RX ORDER — FLUOROURACIL 50 MG/ML
400 INJECTION, SOLUTION INTRAVENOUS ONCE
Status: COMPLETED | OUTPATIENT
Start: 2021-03-24 | End: 2021-03-24

## 2021-03-24 RX ORDER — PALONOSETRON 0.05 MG/ML
0.25 INJECTION, SOLUTION INTRAVENOUS ONCE
Status: COMPLETED | OUTPATIENT
Start: 2021-03-24 | End: 2021-03-24

## 2021-03-24 RX ORDER — SODIUM CHLORIDE 0.9 % (FLUSH) 0.9 %
10 SYRINGE (ML) INJECTION AS NEEDED
Status: DISCONTINUED | OUTPATIENT
Start: 2021-03-24 | End: 2021-03-25 | Stop reason: HOSPADM

## 2021-03-24 RX ORDER — DEXTROSE MONOHYDRATE 50 MG/ML
25 INJECTION, SOLUTION INTRAVENOUS CONTINUOUS
Status: DISCONTINUED | OUTPATIENT
Start: 2021-03-24 | End: 2021-03-25 | Stop reason: HOSPADM

## 2021-03-24 RX ADMIN — SODIUM CHLORIDE 10 ML: 9 INJECTION INTRAMUSCULAR; INTRAVENOUS; SUBCUTANEOUS at 16:10

## 2021-03-24 RX ADMIN — FLUOROURACIL 620 MG: 50 INJECTION, SOLUTION INTRAVENOUS at 15:58

## 2021-03-24 RX ADMIN — Medication 10 ML: at 09:20

## 2021-03-24 RX ADMIN — DEXAMETHASONE SODIUM PHOSPHATE 12 MG: 4 INJECTION, SOLUTION INTRA-ARTICULAR; INTRALESIONAL; INTRAMUSCULAR; INTRAVENOUS; SOFT TISSUE at 12:50

## 2021-03-24 RX ADMIN — DEXTROSE MONOHYDRATE 25 ML/HR: 5 INJECTION, SOLUTION INTRAVENOUS at 12:50

## 2021-03-24 RX ADMIN — OXALIPLATIN 132 MG: 5 INJECTION, SOLUTION, CONCENTRATE INTRAVENOUS at 13:47

## 2021-03-24 RX ADMIN — FLUOROURACIL 3720 MG: 50 INJECTION, SOLUTION INTRAVENOUS at 16:03

## 2021-03-24 RX ADMIN — PALONOSETRON 0.25 MG: 0.05 INJECTION, SOLUTION INTRAVENOUS at 12:50

## 2021-03-24 RX ADMIN — LEUCOVORIN CALCIUM 620 MG: 350 INJECTION, POWDER, LYOPHILIZED, FOR SOLUTION INTRAMUSCULAR; INTRAVENOUS at 13:47

## 2021-03-24 NOTE — PROGRESS NOTES
[facility] [place] [address] [address], [address]  W: [phone]  F: [phone]    Reason for Visit:   Heather Montoya is a 72 y.o. female who is seen for stage III colon cancer. Treatment History:   · 9/10/2020 CT A/P - large, irregular pelvic mass measuring approximately 9cm likely representing neoplasm arising from the sigmoid colon, small amount of pelvic free fluid, colonic bowel wall thickening. Borderline enlarged retroperitoneal lymph nodes. 6 mm right lower lobe lung nodule. Mass effect from pelvic mass causing moderate right and minimal left hydronephrosis. · 9/14/2020: Colonoscopy - A large circumferential, ulcerated fungating mass was noted in proximal sigmoid colon. Mass was obstructing the lumen and could not be traversed. Miki Savannah was present. · 9/15/2020: CT Chest - 7.5mm left lower lobe pulmonary nodule, small right pleural effusion, mild right basilar atelectasis  · 9/17/2020: Surgical resection - low anterior resection, mobilization of the splenic flexure, coloproctostomy, creation of loop ileostomy, total abdominal hysterectomy and left salpingo-oophorectomy, distal right ureterectomy, right ureteral re-implant with psoas hitch and placement of right ureteral stent  · 11/17/20: cycle 1 FOLFOX  · 2/17/21 CT CAP: pulmonary nodule stable, ROBE     History of Present Illness:   Patient is a 72 y.o. female seen for colon cancer    She presented to the ED on 9/11/2020 with complaints of right flank/back pain and RLQ abdominal pain. She states she has had several months of gas pain and \"blockages. \" She reports a weight loss of 12 lbs in the last 3 months. CT abd/pelv 9/10/2020 showed large, irregular pelvic mass measuring approximately 9cm likely representing neoplasm arising from the sigmoid colon. She also had hydronephrosis from the mass effect and urethral stent placed.  CEA 7.4 on 9/11and colonoscopy performed 9/14 with biopsy + adenocarcinoma. CT Chest performed showing 7.5mm left lower lobe pulmonary nodule. Mass found to be invading into the uterus and right ureter. Surgical resection performed on 9/17/2020 including low anterior resection, mobilization of the splenic flexure, coloproctostomy, creation of loop ileostomy, total abdominal hysterectomy and left salpingo-oophorectomy, distal right ureterectomy, right ureteral re-implant with psoas hitch and placement of right ureteral stent. She was to see me in clinic but was admitted 10/10/2020 with ileostomy prolapse. Had a revision of loop ileostomy 10/11/2020. Then needed ileostomy closure and anastomosis 10/27/2020. Today she presents for cycle 9 of FOLFOX. She is tolerating well. She continues with bilat LE edema that is unchanged - worsens throughout day and improves at night. Cold sensation improves a few days after chemotherapy. Denies nausea/vomting. States she is eating and drinking well. Denies constipation/diarrhea. She denies SOB, CP, cough, fevers/chills, bleeding. No other complaints today. Family Hx:  Mother with hx of pancreatic cancer    Past Medical History:   Diagnosis Date    Colon cancer ([address]     GERD (gastroesophageal reflux disease)     Ileostomy in place ([address]     Ileostomy prolapse ([address]     Ill-defined condition     Spaulding's esophagus      Past Surgical History:   Procedure Laterality Date    COLONOSCOPY Left 9/14/2020    COLONOSCOPY performed by Serafin Coe [facility]; incomplete secondary to partial obstruction.  HX APPENDECTOMY  age 17    HX COLONOSCOPY  circa 2010    No neoplasms.  HX ENDOSCOPY  03/13/2017    Dr. Reid López HX ENDOSCOPY  02/13/2020    Dr. Ivett Scanlon oopherectomy for treatment of benign mass.     HX GYN  09/17/2020    Total abdominal hysterectomy and left salpingo-oophorectomy; Patito Briceno MD.    HX OTHER SURGICAL  09/17/2020    Low anterior resection, mobilization of the splenic flexure, coloproctostomy, and creation of loop ileostomy; Dr. Bhavesh Glover.   OTHER SURGICAL  10/11/2020    Revision of loop ileostomy (resection and creation of divided loop ileostomy); Dr. Bhavesh Glover.   UROLOGICAL  09/12/2020    Cystoscopy and placement of a right ureteral stent; Reyes Arceo III, MD.     UROLOGICAL  09/17/2020    Cystoscopy and placement of a temporary left ureteral catheter; Reyes Arceo III, MD.     UROLOGICAL  09/17/2020    Distal right ureterectomy; Reyes Arceo III, MD.     UROLOGICAL  09/17/2020    Right ureteral re-implantation with psoas hitch and placement of a right ureteral stent; Norman Julio MD.    IR INSERT TUNL CVAD W PORT LESS THAN 5 YR  10/14/2020    Right chest Port-a-Cath placement. Social History     Tobacco Use    Smoking status: Never Smoker    Smokeless tobacco: Never Used   Substance Use Topics    Alcohol use: Yes     Alcohol/week: 1.0 standard drinks     Types: 1 Glasses of wine per week      Family History   Problem Relation Age of Onset    Cancer Mother     Heart Disease Father     Diabetes Brother      Current Outpatient Medications   Medication Sig    lidocaine-prilocaine (EMLA) topical cream Apply  to affected area as needed for Pain. Apply over port a cath site 30-60 minutes prior to port access    dexAMETHasone (DECADRON) 4 mg tablet Take 2 tabs (8mg) once daily on days 2 & 3 of chemotherapy only    ondansetron (ZOFRAN ODT) 8 mg disintegrating tablet Take 1 Tab by mouth every eight (8) hours as needed for Nausea or Vomiting. Can start 72 hours after chemotherapy infusion    denosumab (PROLIA) 60 mg/mL injection 60 mg by SubCUTAneous route.  lansoprazole (PREVACID) 30 mg capsule Take 30 mg by mouth Daily (before breakfast).  multivitamin (ONE A DAY) tablet Take 1 Tab by mouth daily.  ascorbic acid, vitamin C, (VITAMIN C) 500 mg tablet Take 500 mg by mouth.     cholecalciferol (VITAMIN D3) 1,000 unit cap Take 1,000 Units by mouth daily.   • Omega-3 Fatty Acids (FISH OIL) 500 mg cap Take  by mouth.     No current facility-administered medications for this visit.       No Known Allergies     Review of Systems: A complete review of systems was obtained, negative except as described above.    Physical Exam:     Visit Vitals  /77 (BP 1 Location: Left upper arm, BP Patient Position: Sitting)   Pulse 99   Temp 97.2 °F (36.2 °C)   Resp 18   Wt 124 lb (56.2 kg)   SpO2 98%   BMI 20.01 kg/m²     ECOG PS: 1  General: No distress but appears frail  Eyes: PERRL, anicteric sclerae  HENT: Atraumatic  RESP: normal respiratory effort  CV: port with no erythema or edema, 1+ bilat LE edema  MS: Digits without clubbing or cyanosis.  Skin: No rashes, ecchymoses, or petechiae.   Psych: Alert, oriented, appropriate affect, normal judgment/insight    Results:     Lab Results   Component Value Date/Time    WBC 5.9 03/10/2021 09:19 AM    HGB 12.2 03/10/2021 09:19 AM    HCT 37.7 03/10/2021 09:19 AM    PLATELET 163 03/10/2021 09:19 AM    MCV 86.9 03/10/2021 09:19 AM    ABS. NEUTROPHILS 3.9 03/10/2021 09:19 AM    Hemoglobin (POC) 12.0 09/17/2020 06:35 PM     Lab Results   Component Value Date/Time    Sodium 136 03/10/2021 09:19 AM    Potassium 3.4 (L) 03/10/2021 09:19 AM    Chloride 106 03/10/2021 09:19 AM    CO2 24 03/10/2021 09:19 AM    Glucose 121 (H) 03/10/2021 09:19 AM    BUN 10 03/10/2021 09:19 AM    Creatinine 0.70 03/10/2021 09:19 AM    GFR est AA >60 03/10/2021 09:19 AM    GFR est non-AA >60 03/10/2021 09:19 AM    Calcium 9.3 03/10/2021 09:19 AM    Glucose (POC) 98 09/27/2020 11:39 PM    Creatinine (POC) 0.8 09/10/2020 02:32 PM     Lab Results   Component Value Date/Time    Bilirubin, total 1.1 (H) 03/10/2021 09:19 AM    ALT (SGPT) 45 03/10/2021 09:19 AM    Alk. phosphatase 215 (H) 03/10/2021 09:19 AM    Protein, total 6.6 03/10/2021 09:19 AM    Albumin 3.3 (L) 03/10/2021 09:19 AM    Globulin 3.3 03/10/2021 09:19 AM  CEA:  Recent Labs     03/10/21  0919 01/29/21  0807 01/12/21  0806 12/15/20  0806 11/17/20  0839 09/11/20  1455   CEA 2.0 1.7 1.8 1.5 1.0 7.4       CT A/P 9/23/2020  IMPRESSION:  Large, irregular pelvic mass measuring approximately 9 cm likely representing  neoplasm arising from the sigmoid colon. Adnexal neoplasm is a possibility. This  does appear to be separate from the uterus. Small amount of pelvic free fluid. Associated colonic bowel wall thickening. There is focal gas in the upper  presacral region which is unclear if this is intraluminal or contained  extraluminal collection.     No definite distant metastatic disease. Borderline enlarged retroperitoneal  lymph nodes. 6 mm right lower lobe lung nodule. Mass effect from the pelvic mass  causing moderate right and minimal left hydronephrosis. CT Chest 9/15/2020  IMPRESSION:  1. 7.5 mm left lower lobe pulmonary nodule. 2. Small right pleural effusion. 3. Mild right basilar atelectasis. 9/14/2020   Addendum Diagnosis   1. Sigmoid Mass, Biopsy:     Infiltrating adenocarcinoma, most consistent with colon (See comment)   Addendum Comment   Immunohistochemical stains reveal the following staining pattern:   CK7: Scattered cells with cytoplasmic membrane staining   CK20: Positive cytoplasmic staining in normal colonic mucosal epithelial cells and malignant epithelial cells   CDX-2: Diffuse strong nuclear decoration and all tumor cells and normal background colonic epithelial cells   PAX-8: Negative   Estrogen Receptor: Negative   Positive and negative controls stain appropriately. Due to radiologic findings suggesting the possibility of GYN involvement, ER and PAX-8 are performed, which lend support to the diagnosis of a colonic primary, over a tumor of müllerian origin. 9/17/2020   FINAL PATHOLOGIC DIAGNOSIS   1.  Sigmoid colon and proximal rectum, uterus, left fallopian tube and ovary and right ureteral segment, low anterior resection:   Sigmoid colon and proximal rectum:  Invasive adenocarcinoma (see synoptic report and comment). Metastatic adenocarcinoma in one of sixteen lymph nodes (1/16). Uterus: Invasive adenocarcinoma extending from adhesed colon into uterine serosa. Endometrial lining shows mucosal atrophy. Left ovary: Benign ovary with serosal inclusion cysts. Left fallopian tube: Benign fallopian tube. Right ureteral segment: Benign segment of ureter densely adhesed to colon. Nodule near right uterine cornu:  Nodular portion of benign smooth muscle consistent with leiomyoma with parenchymal hemorrhage. COLON AND RECTUM: Resection   SPECIMEN   Procedure: Low anterior resection   TUMOR   Tumor Site: Sigmoid colon   Histologic Type: Adenocarcinoma   Histologic Grade: G2: Moderately differentiated   Tumor Size: 8.0 cm   Tumor Extension: Tumor directly invades adjacent structures:   Posterior uterine serosa   Macroscopic Tumor Perforation: Tumor invades through serosa into surrounding soft tissue   Lymphovascular Invasion: Present   Perineural Invasion: Not identified   Treatment Effect: No known presurgical therapy   MARGINS   Margins: All margins are uninvolved by invasive carcinoma   Margins Examined: Proximal, Distal, Radial (circumferential) or   Mesenteric   Distance of Tumor from Radial (circumferential) Margin: See Comment   LYMPH NODES   Number of Lymph Nodes Involved: 1   Number of Lymph Nodes Examined: 16   Tumor Deposits: Not identified   PATHOLOGIC STAGE CLASSIFICATION (pTNM, AJCC 8th Edition)   Primary Tumor (pT): pT4b   Regional Lymph Nodes (pN): pN1a   2. Distal rectal margin:   Segment of benign large bowel. 3. Anastomotic doughnuts:   Two annular portions of benign large bowel. Comment   Tumor invades into the adherent soft tissue and to within 1.1 cm of the apparent right pelvic sidewall margin. CT CAP 2/17/21:  IMPRESSION  1. 8 mm left lower lobe pulmonary nodule, unchanged.   2. Trace fluid within the left hemipelvis. Records reviewed and summarized above. Pathology report(s) reviewed above. Radiology report(s) reviewed above. Assessment:   1) Sigmoid Colon Adenocarcinoma - Stage IIIB- ERICKA  [id number]  CT abd/pelv 9/10/2020 showed large, irregular pelvic mass measuring approximately 9cm likely representing neoplasm arising from the sigmoid colon. CEA 7.4 on 9/11  Colonoscopy performed 9/14 and biopsy + adenocarcinoma  Mass found to be invading into the uterus and right ureter. Surgical resection on 9/17 with creation of loop ileostomy, total abdominal hysterectomy and left salpingo-oophorectomy, distal right ureterectomy  She had a revision of her ileostomy 10/11/2020  Then needed stoma closure and anastomosis on 10/27/2020    CT imaging after 6 cycles was personally reviewed and shows a stable pulmonary nodule and otherwise ROBE. Today is cycle 9 of adjuvant FOLFOX x 6 months. 5FU bolus was introduced with cycle 2 and she tolerated this well with grade 1 neuropathy and grade 1 thrombocytopenia  She developed grade 3 neutropenia after cycle 4 which resulted in a 2 week delay. Neulasta was added.      She has no complaints today and     2) Anemia  Due to #1  Iron sat of 4% and ferritin of 20 on 9/11  S/p 2 units PRBCs on 9/15 and Venofer 200mg x3 doses  Anemia has resolved     4) Elevated transaminases  Monitor- if increases further will obtain an USG of abdomen    5) Neuropathy  Grade 1   Monitor     6) Neutropenia  Grade 3 after cycle 4   Added neulasta  Resolved     7) Thrombocytopenia  Grade 1    8) Management of high risk medications like chemotherapy  Previous grade 3 neutropenia & added neulasta   Otherwise tolerating well with grade 1 constipation & grade 1 neuropathy  & grade 1 thrombocytopenia     9) Pulmonary nodule  Stable  Will monitor with imaging every 3-6 months      10) Abnormal LFTs  Noted a Bilirubin of 1.1 which has been stable for the last 3 cycles , will montor    Plan:     · Proceed today with cycle 9 of mFOLFOX (5FU CADD 2400mg/m2, 5FU bolus 400mg/m2, leucovorin 400mg/m2, oxaliplatin 85mg/m2) every 2 weeks for 6 months   · Neulasta given high risk for grade IV neutropenia   · Cbc, cmp every cycle, cea every other  · Zofran prn, dexamethasone days 2 and 3  · Follow with surgery as scheduled     RTC in 2 weeks      Cervantes Devin at [phone] [facility]    I appreciate the opportunity to participate in Ms. Lisa carrington. I performed a history and physical examination of the patient and discussed his management with the NPP.  I reviewed the NPP note and agree with the documented findings and plan of care  Colon cancer  On adjuvant FOLFOX  Grade 1 fatigue and grade 1 neuropathy  Labs reviewed  Proceed with cycle 9  Signed By: Beatrice Mei MD

## 2021-03-24 NOTE — PROGRESS NOTES
[facility] Progress Note    Date: 2021    Name: Rose Cuellar    MRN: [id number]         : 1950    Ms. Tatum Arrived ambulatory and in no distress for cycle 9 day 1 of Folfox regimen. Assessment was completed, no acute issues at this time, no new complaints voiced. Patient denies any changes in her assessment since her last cycle. States she continues to have some swelling in her feet and ankles, but only notices it at the end of the day. No other complaints or reports at this time. Port accessed without difficulty, labs drawn and processed. Port capped and patient left the department for her medical oncology follow up. Prior to treatment, patient was screened for COVID 19. Patient denies any signs or symptoms of COVID. Denies any known physical contact with anyone exposed to, diagnosed with or with pending or positive COVID test. Denies any pending or positive COVID test herself. Chemotherapy Flowsheet 3/24/2021   Cycle C9D1   Date 3/24/2021   Drug / Regimen leucovorin/oxaliplatin/fluorouracil IVP/fluorouracil CADD   Dosage 620mg/132mg/620mg/3720mg   Time Up 1347   Time Down D/C with CADD   Pre Hydration none   Post Hydration none   Pre Meds given   Notes given         Ms. Tatum's vitals were reviewed. Patient Vitals for the past 12 hrs:   Temp Pulse Resp BP   21 1610 -- 87 18 117/72   21 0912 97.2 °F (36.2 °C) 99 18 114/77         Lab results were obtained and reviewed.   Recent Results (from the past 12 hour(s))   CBC WITH AUTOMATED DIFF    Collection Time: 21  9:21 AM   Result Value Ref Range    WBC 7.6 3.6 - 11.0 K/uL    RBC 4.16 3.80 - 5.20 M/uL    HGB 11.6 11.5 - 16.0 g/dL    HCT 36.1 35.0 - 47.0 %    MCV 86.8 80.0 - 99.0 FL    MCH 27.9 26.0 - 34.0 PG    MCHC 32.1 30.0 - 36.5 g/dL    RDW 18.9 (H) 11.5 - 14.5 %    PLATELET 165 (L) 279 - 400 K/uL    MPV 9.6 8.9 - 12.9 FL    NRBC 0.0 0  WBC    ABSOLUTE NRBC 0.00 0.00 - 0.01 K/uL    NEUTROPHILS 83 (H) 32 - 75 % BAND NEUTROPHILS 1 0 - 6 %    LYMPHOCYTES 8 (L) 12 - 49 %    MONOCYTES 6 5 - 13 %    EOSINOPHILS 1 0 - 7 %    BASOPHILS 0 0 - 1 %    METAMYELOCYTES 1 (H) 0 %    IMMATURE GRANULOCYTES 0 %    ABS. NEUTROPHILS 6.4 1.8 - 8.0 K/UL    ABS. LYMPHOCYTES 0.6 (L) 0.8 - 3.5 K/UL    ABS. MONOCYTES 0.5 0.0 - 1.0 K/UL    ABS. EOSINOPHILS 0.1 0.0 - 0.4 K/UL    ABS. BASOPHILS 0.0 0.0 - 0.1 K/UL    ABS. IMM. GRANS. 0.0 K/UL    DF MANUAL      RBC COMMENTS ANISOCYTOSIS  1+       METABOLIC PANEL, COMPREHENSIVE    Collection Time: 03/24/21  9:21 AM   Result Value Ref Range    Sodium 136 136 - 145 mmol/L    Potassium 3.6 3.5 - 5.1 mmol/L    Chloride 104 97 - 108 mmol/L    CO2 25 21 - 32 mmol/L    Anion gap 7 5 - 15 mmol/L    Glucose 132 (H) 65 - 100 mg/dL    BUN 11 6 - 20 MG/DL    Creatinine 0.63 0.55 - 1.02 MG/DL    BUN/Creatinine ratio 17 12 - 20      GFR est AA >60 >60 ml/min/1.73m2    GFR est non-AA >60 >60 ml/min/1.73m2    Calcium 9.1 8.5 - 10.1 MG/DL    Bilirubin, total 1.1 (H) 0.2 - 1.0 MG/DL    ALT (SGPT) 58 12 - 78 U/L    AST (SGOT) 52 (H) 15 - 37 U/L    Alk.  phosphatase 255 (H) 45 - 117 U/L    Protein, total 6.4 6.4 - 8.2 g/dL    Albumin 3.3 (L) 3.5 - 5.0 g/dL    Globulin 3.1 2.0 - 4.0 g/dL    A-G Ratio 1.1 1.1 - 2.2         Medications Administered     0.9% sodium chloride injection 10 mL     Admin Date  03/24/2021 Action  Given Dose  10 mL Route  IntraVENous Administered By  Russ Martinez          dexamethasone (DECADRON) 12 mg in 0.9% sodium chloride 50 mL, overfill volume 5 mL IVPB     Admin Date  03/24/2021 Action  Given Dose  12 mg Rate  232 mL/hr Route  IntraVENous Administered By  Russ Martinez          dextrose 5% infusion     Admin Date  03/24/2021 Action  New Bag Dose  25 mL/hr Rate  25 mL/hr Route  IntraVENous Administered By  Russ Martinez          fluorouraciL (ADRUCIL) 3,720 mg in 0.9% sodium chloride 100 mL CADD Cassette     Admin Date  03/24/2021 Action  New Bag Dose  3,720 mg Rate  2.2 mL/hr Route  IntraVENous Administered By  Shawn Mendosa          fluorouraciL (ADRUCIL) chemo syringe 620 mg     Admin Date  03/24/2021 Action  Given Dose  620 mg Rate  248 mL/hr Route  IntraVENous Administered By  Shawn Mendosa          leucovorin (WELLCOVORIN) 620 mg in dextrose 5% 250 mL, overfill volume 25 mL IVPB     Admin Date  03/24/2021 Action  New Bag Dose  620 mg Rate  153 mL/hr Route  IntraVENous Administered By  Shawn Mendosa          oxaliplatin (ELOXATIN) 132 mg in dextrose 5% 250 mL, overfill volume 25 mL chemo infusion     Admin Date  03/24/2021 Action  New Bag Dose  132 mg Rate  150.7 mL/hr Route  IntraVENous Administered By  Shawn Mendosa          palonosetron HCl (ALOXI) injection 0.25 mg     Admin Date  03/24/2021 Action  Given Dose  0.25 mg Route  IntraVENous Administered By  Shawn Mendosa          sodium chloride (NS) flush 10 mL     Admin Date  03/24/2021 Action  Given Dose  10 mL Route  IntraVENous Administered By  Shawn Mendosa                  Ms. Rosa Elena Huston tolerated treatment well. Port maintained positive blood return throughout the course of treatment. CADD pump double checked and was infusing properly prior to discharge. Patient is aware of her next appointment. Future Appointments   Date Time Provider Yary Gomez   3/26/2021  4:30 PM H1 W[facility]ER [facility]ACK [facility]   4/7/2021  9:00 AM D3 [address]   4/7/2021  9:15 AM Denia Weber,  [address]  AMB   4/9/2021  4:30 PM H1 [facility]   4/21/2021  9:00 AM D3 [address]   4/23/2021  4:30 PM [facility]          Araceli Shelby  March 24, 2021    .

## 2021-03-24 NOTE — PROGRESS NOTES
Libia Chowdhury is a 72 y.o. female    No chief complaint on file. 1. Have you been to the ER, urgent care clinic since your last visit? Hospitalized since your last visit? No    2. Have you seen or consulted any other health care providers outside of the [address] [place] [address] since your last visit? Include any pap smears or colon screening.  No

## 2021-03-26 ENCOUNTER — HOSPITAL ENCOUNTER (OUTPATIENT)
Dept: INFUSION THERAPY | Age: 71
Discharge: HOME | End: 2021-03-26
Payer: MEDICARE

## 2021-03-26 VITALS
HEART RATE: 91 BPM | DIASTOLIC BLOOD PRESSURE: 60 MMHG | RESPIRATION RATE: 16 BRPM | SYSTOLIC BLOOD PRESSURE: 105 MMHG | TEMPERATURE: 98.4 F

## 2021-03-26 DIAGNOSIS — C18.7 MALIGNANT NEOPLASM OF SIGMOID COLON (HCC): Primary | ICD-10-CM

## 2021-03-26 PROCEDURE — 96377 APPLICATON ON-BODY INJECTOR: CPT

## 2021-03-26 PROCEDURE — 74011250636 HC RX REV CODE- 250/636: Performed by: NURSE PRACTITIONER

## 2021-03-26 PROCEDURE — 96523 IRRIG DRUG DELIVERY DEVICE: CPT

## 2021-03-26 PROCEDURE — 74011250636 HC RX REV CODE- 250/636: Performed by: REGISTERED NURSE

## 2021-03-26 RX ORDER — HEPARIN 100 UNIT/ML
300-500 SYRINGE INTRAVENOUS AS NEEDED
Status: DISCONTINUED | OUTPATIENT
Start: 2021-03-26 | End: 2021-03-27 | Stop reason: HOSPADM

## 2021-03-26 RX ORDER — SODIUM CHLORIDE 9 MG/ML
10 INJECTION INTRAMUSCULAR; INTRAVENOUS; SUBCUTANEOUS AS NEEDED
Status: DISCONTINUED | OUTPATIENT
Start: 2021-03-26 | End: 2021-03-27 | Stop reason: HOSPADM

## 2021-03-26 RX ORDER — SODIUM CHLORIDE 0.9 % (FLUSH) 0.9 %
10 SYRINGE (ML) INJECTION AS NEEDED
Status: DISCONTINUED | OUTPATIENT
Start: 2021-03-26 | End: 2021-03-27 | Stop reason: HOSPADM

## 2021-03-26 RX ADMIN — PEGFILGRASTIM 6 MG: KIT SUBCUTANEOUS at 15:55

## 2021-03-26 RX ADMIN — Medication 10 ML: at 15:53

## 2021-03-26 RX ADMIN — HEPARIN 500 UNITS: 100 SYRINGE at 15:53

## 2021-03-26 NOTE — PROGRESS NOTES
[facility] - Chemotherapy Progress Note    2832 Pt admit to [facility] for pump removal/Neulasta OBI ambulatory in stable condition. Assessment completed. No new concerns voiced. 5FU CADD pump completed and removed; port flushed with positive blood return, heparinized and de-accessed. Patient denies SOB, fever, cough, general not feeling well. Patient denies recent exposure to someone who has tested positive for COVID-19. Patient  denies having contact with anyone who has a pending COVID test.     Visit Vitals  /60   Pulse 91   Temp 98.4 °F (36.9 °C)   Resp 16     Medications Administered     heparin (porcine) pf 300-500 Units     Admin Date  03/26/2021 Action  Given Dose  500 Units Route  InterCATHeter Administered By  Christy Neff RN          pegfilgrastim (NEULASTA) wearable SQ injector 6 mg     Admin Date  03/26/2021 Action  Given Dose  6 mg Route  SubCUTAneous Administered By  Christy Neff RN          sodium chloride (NS) flush 10 mL     Admin Date  03/26/2021 Action  Given Dose  10 mL Route  IntraVENous Administered By  Christy Neff RN              (SC L arm)    1600 Pt tolerated treatment well. Discussed when to remove OBI device; verbalized understanding. D/c home ambulatory in no distress. Pt aware of next OPI appointment scheduled for 04/07/2021.

## 2021-04-01 RX ORDER — SODIUM CHLORIDE 9 MG/ML
10 INJECTION INTRAMUSCULAR; INTRAVENOUS; SUBCUTANEOUS AS NEEDED
Status: CANCELLED | OUTPATIENT
Start: 2021-05-14

## 2021-04-01 RX ORDER — HYDROCORTISONE SODIUM SUCCINATE 100 MG/2ML
100 INJECTION, POWDER, FOR SOLUTION INTRAMUSCULAR; INTRAVENOUS AS NEEDED
Status: CANCELLED | OUTPATIENT
Start: 2021-05-05

## 2021-04-01 RX ORDER — SODIUM CHLORIDE 0.9 % (FLUSH) 0.9 %
10 SYRINGE (ML) INJECTION AS NEEDED
Status: CANCELLED | OUTPATIENT
Start: 2021-04-23

## 2021-04-01 RX ORDER — PALONOSETRON 0.05 MG/ML
0.25 INJECTION, SOLUTION INTRAVENOUS ONCE
Status: CANCELLED | OUTPATIENT
Start: 2021-04-21 | End: 2021-04-21

## 2021-04-01 RX ORDER — HYDROCORTISONE SODIUM SUCCINATE 100 MG/2ML
100 INJECTION, POWDER, FOR SOLUTION INTRAMUSCULAR; INTRAVENOUS AS NEEDED
Status: CANCELLED | OUTPATIENT
Start: 2021-04-07

## 2021-04-01 RX ORDER — SODIUM CHLORIDE 0.9 % (FLUSH) 0.9 %
10 SYRINGE (ML) INJECTION AS NEEDED
Status: CANCELLED | OUTPATIENT
Start: 2021-04-21

## 2021-04-01 RX ORDER — PALONOSETRON 0.05 MG/ML
0.25 INJECTION, SOLUTION INTRAVENOUS ONCE
Status: CANCELLED | OUTPATIENT
Start: 2021-04-07 | End: 2021-04-07

## 2021-04-01 RX ORDER — SODIUM CHLORIDE 0.9 % (FLUSH) 0.9 %
10 SYRINGE (ML) INJECTION AS NEEDED
Status: CANCELLED | OUTPATIENT
Start: 2021-05-14

## 2021-04-01 RX ORDER — SODIUM CHLORIDE 0.9 % (FLUSH) 0.9 %
10 SYRINGE (ML) INJECTION AS NEEDED
Status: CANCELLED | OUTPATIENT
Start: 2021-05-05

## 2021-04-01 RX ORDER — ACETAMINOPHEN 325 MG/1
650 TABLET ORAL AS NEEDED
Status: CANCELLED
Start: 2021-04-07

## 2021-04-01 RX ORDER — HEPARIN 100 UNIT/ML
300-500 SYRINGE INTRAVENOUS AS NEEDED
Status: CANCELLED
Start: 2021-04-23

## 2021-04-01 RX ORDER — HEPARIN 100 UNIT/ML
300-500 SYRINGE INTRAVENOUS AS NEEDED
Status: CANCELLED
Start: 2021-04-09

## 2021-04-01 RX ORDER — SODIUM CHLORIDE 0.9 % (FLUSH) 0.9 %
10 SYRINGE (ML) INJECTION AS NEEDED
Status: CANCELLED | OUTPATIENT
Start: 2021-04-07

## 2021-04-01 RX ORDER — HEPARIN 100 UNIT/ML
300-500 SYRINGE INTRAVENOUS AS NEEDED
Status: CANCELLED
Start: 2021-04-21

## 2021-04-01 RX ORDER — DIPHENHYDRAMINE HYDROCHLORIDE 50 MG/ML
50 INJECTION, SOLUTION INTRAMUSCULAR; INTRAVENOUS AS NEEDED
Status: CANCELLED
Start: 2021-04-21

## 2021-04-01 RX ORDER — SODIUM CHLORIDE 9 MG/ML
10 INJECTION INTRAMUSCULAR; INTRAVENOUS; SUBCUTANEOUS AS NEEDED
Status: CANCELLED | OUTPATIENT
Start: 2021-05-05

## 2021-04-01 RX ORDER — PALONOSETRON 0.05 MG/ML
0.25 INJECTION, SOLUTION INTRAVENOUS ONCE
Status: CANCELLED | OUTPATIENT
Start: 2021-05-05 | End: 2021-05-05

## 2021-04-01 RX ORDER — EPINEPHRINE 1 MG/ML
0.3 INJECTION, SOLUTION, CONCENTRATE INTRAVENOUS AS NEEDED
Status: CANCELLED | OUTPATIENT
Start: 2021-05-05

## 2021-04-01 RX ORDER — ACETAMINOPHEN 325 MG/1
650 TABLET ORAL AS NEEDED
Status: CANCELLED
Start: 2021-05-05

## 2021-04-01 RX ORDER — DIPHENHYDRAMINE HYDROCHLORIDE 50 MG/ML
25 INJECTION, SOLUTION INTRAMUSCULAR; INTRAVENOUS AS NEEDED
Status: CANCELLED
Start: 2021-05-05

## 2021-04-01 RX ORDER — SODIUM CHLORIDE 9 MG/ML
10 INJECTION INTRAMUSCULAR; INTRAVENOUS; SUBCUTANEOUS AS NEEDED
Status: CANCELLED | OUTPATIENT
Start: 2021-04-09

## 2021-04-01 RX ORDER — DEXTROSE MONOHYDRATE 50 MG/ML
25 INJECTION, SOLUTION INTRAVENOUS CONTINUOUS
Status: CANCELLED
Start: 2021-05-05

## 2021-04-01 RX ORDER — HEPARIN 100 UNIT/ML
300-500 SYRINGE INTRAVENOUS AS NEEDED
Status: CANCELLED
Start: 2021-05-14

## 2021-04-01 RX ORDER — ACETAMINOPHEN 325 MG/1
650 TABLET ORAL AS NEEDED
Status: CANCELLED
Start: 2021-04-21

## 2021-04-01 RX ORDER — DEXTROSE MONOHYDRATE 50 MG/ML
25 INJECTION, SOLUTION INTRAVENOUS CONTINUOUS
Status: CANCELLED
Start: 2021-04-21

## 2021-04-01 RX ORDER — DEXTROSE MONOHYDRATE 50 MG/ML
25 INJECTION, SOLUTION INTRAVENOUS CONTINUOUS
Status: CANCELLED
Start: 2021-04-07

## 2021-04-01 RX ORDER — DIPHENHYDRAMINE HYDROCHLORIDE 50 MG/ML
25 INJECTION, SOLUTION INTRAMUSCULAR; INTRAVENOUS AS NEEDED
Status: CANCELLED
Start: 2021-04-07

## 2021-04-01 RX ORDER — FLUOROURACIL 50 MG/ML
400 INJECTION, SOLUTION INTRAVENOUS ONCE
Status: CANCELLED
Start: 2021-04-07 | End: 2021-04-07

## 2021-04-01 RX ORDER — SODIUM CHLORIDE 9 MG/ML
10 INJECTION INTRAMUSCULAR; INTRAVENOUS; SUBCUTANEOUS AS NEEDED
Status: CANCELLED | OUTPATIENT
Start: 2021-04-23

## 2021-04-01 RX ORDER — HEPARIN 100 UNIT/ML
300-500 SYRINGE INTRAVENOUS AS NEEDED
Status: CANCELLED
Start: 2021-05-05

## 2021-04-01 RX ORDER — DIPHENHYDRAMINE HYDROCHLORIDE 50 MG/ML
50 INJECTION, SOLUTION INTRAMUSCULAR; INTRAVENOUS AS NEEDED
Status: CANCELLED
Start: 2021-04-07

## 2021-04-01 RX ORDER — ONDANSETRON 2 MG/ML
8 INJECTION INTRAMUSCULAR; INTRAVENOUS AS NEEDED
Status: CANCELLED | OUTPATIENT
Start: 2021-04-07

## 2021-04-01 RX ORDER — HEPARIN 100 UNIT/ML
300-500 SYRINGE INTRAVENOUS AS NEEDED
Status: CANCELLED
Start: 2021-04-07

## 2021-04-01 RX ORDER — ALBUTEROL SULFATE 0.83 MG/ML
2.5 SOLUTION RESPIRATORY (INHALATION) AS NEEDED
Status: CANCELLED
Start: 2021-04-21

## 2021-04-01 RX ORDER — DIPHENHYDRAMINE HYDROCHLORIDE 50 MG/ML
50 INJECTION, SOLUTION INTRAMUSCULAR; INTRAVENOUS AS NEEDED
Status: CANCELLED
Start: 2021-05-05

## 2021-04-01 RX ORDER — SODIUM CHLORIDE 9 MG/ML
10 INJECTION INTRAMUSCULAR; INTRAVENOUS; SUBCUTANEOUS AS NEEDED
Status: CANCELLED | OUTPATIENT
Start: 2021-04-21

## 2021-04-01 RX ORDER — EPINEPHRINE 1 MG/ML
0.3 INJECTION, SOLUTION, CONCENTRATE INTRAVENOUS AS NEEDED
Status: CANCELLED | OUTPATIENT
Start: 2021-04-07

## 2021-04-01 RX ORDER — HYDROCORTISONE SODIUM SUCCINATE 100 MG/2ML
100 INJECTION, POWDER, FOR SOLUTION INTRAMUSCULAR; INTRAVENOUS AS NEEDED
Status: CANCELLED | OUTPATIENT
Start: 2021-04-21

## 2021-04-01 RX ORDER — DIPHENHYDRAMINE HYDROCHLORIDE 50 MG/ML
25 INJECTION, SOLUTION INTRAMUSCULAR; INTRAVENOUS AS NEEDED
Status: CANCELLED
Start: 2021-04-21

## 2021-04-01 RX ORDER — EPINEPHRINE 1 MG/ML
0.3 INJECTION, SOLUTION, CONCENTRATE INTRAVENOUS AS NEEDED
Status: CANCELLED | OUTPATIENT
Start: 2021-04-21

## 2021-04-01 RX ORDER — ALBUTEROL SULFATE 0.83 MG/ML
2.5 SOLUTION RESPIRATORY (INHALATION) AS NEEDED
Status: CANCELLED
Start: 2021-05-05

## 2021-04-01 RX ORDER — SODIUM CHLORIDE 9 MG/ML
10 INJECTION INTRAMUSCULAR; INTRAVENOUS; SUBCUTANEOUS AS NEEDED
Status: CANCELLED | OUTPATIENT
Start: 2021-04-07

## 2021-04-01 RX ORDER — SODIUM CHLORIDE 0.9 % (FLUSH) 0.9 %
10 SYRINGE (ML) INJECTION AS NEEDED
Status: CANCELLED | OUTPATIENT
Start: 2021-04-09

## 2021-04-01 RX ORDER — FLUOROURACIL 50 MG/ML
400 INJECTION, SOLUTION INTRAVENOUS ONCE
Status: CANCELLED
Start: 2021-04-21 | End: 2021-04-21

## 2021-04-01 RX ORDER — FLUOROURACIL 50 MG/ML
400 INJECTION, SOLUTION INTRAVENOUS ONCE
Status: CANCELLED
Start: 2021-05-05 | End: 2021-05-05

## 2021-04-01 RX ORDER — ONDANSETRON 2 MG/ML
8 INJECTION INTRAMUSCULAR; INTRAVENOUS AS NEEDED
Status: CANCELLED | OUTPATIENT
Start: 2021-04-21

## 2021-04-01 RX ORDER — ONDANSETRON 2 MG/ML
8 INJECTION INTRAMUSCULAR; INTRAVENOUS AS NEEDED
Status: CANCELLED | OUTPATIENT
Start: 2021-05-05

## 2021-04-01 RX ORDER — ALBUTEROL SULFATE 0.83 MG/ML
2.5 SOLUTION RESPIRATORY (INHALATION) AS NEEDED
Status: CANCELLED
Start: 2021-04-07

## 2021-04-07 ENCOUNTER — HOSPITAL ENCOUNTER (OUTPATIENT)
Dept: INFUSION THERAPY | Age: 71
Discharge: HOME | End: 2021-04-07
Payer: MEDICARE

## 2021-04-07 VITALS
SYSTOLIC BLOOD PRESSURE: 111 MMHG | RESPIRATION RATE: 16 BRPM | DIASTOLIC BLOOD PRESSURE: 69 MMHG | TEMPERATURE: 98 F | BODY MASS INDEX: 19.73 KG/M2 | WEIGHT: 122.8 LBS | HEART RATE: 84 BPM | HEIGHT: 66 IN

## 2021-04-07 DIAGNOSIS — R17 ELEVATED BILIRUBIN: Primary | ICD-10-CM

## 2021-04-07 DIAGNOSIS — C18.7 MALIGNANT NEOPLASM OF SIGMOID COLON (HCC): Primary | ICD-10-CM

## 2021-04-07 LAB
ALBUMIN SERPL-MCNC: 3.4 G/DL (ref 3.5–5)
ALBUMIN/GLOB SERPL: 1 {RATIO} (ref 1.1–2.2)
ALP SERPL-CCNC: 237 U/L (ref 45–117)
ALT SERPL-CCNC: 48 U/L (ref 12–78)
ANION GAP SERPL CALC-SCNC: 8 MMOL/L (ref 5–15)
AST SERPL-CCNC: 55 U/L (ref 15–37)
BASOPHILS # BLD: 0.1 K/UL (ref 0–0.1)
BASOPHILS NFR BLD: 1 % (ref 0–1)
BILIRUB SERPL-MCNC: 2.5 MG/DL (ref 0.2–1)
BUN SERPL-MCNC: 9 MG/DL (ref 6–20)
BUN/CREAT SERPL: 16 (ref 12–20)
CALCIUM SERPL-MCNC: 8.8 MG/DL (ref 8.5–10.1)
CEA SERPL-MCNC: 2.4 NG/ML
CHLORIDE SERPL-SCNC: 100 MMOL/L (ref 97–108)
CO2 SERPL-SCNC: 24 MMOL/L (ref 21–32)
CREAT SERPL-MCNC: 0.58 MG/DL (ref 0.55–1.02)
DIFFERENTIAL METHOD BLD: ABNORMAL
EOSINOPHIL # BLD: 0 K/UL (ref 0–0.4)
EOSINOPHIL NFR BLD: 0 % (ref 0–7)
ERYTHROCYTE [DISTWIDTH] IN BLOOD BY AUTOMATED COUNT: 19.3 % (ref 11.5–14.5)
GLOBULIN SER CALC-MCNC: 3.4 G/DL (ref 2–4)
GLUCOSE SERPL-MCNC: 103 MG/DL (ref 65–100)
HCT VFR BLD AUTO: 35.7 % (ref 35–47)
HGB BLD-MCNC: 11.6 G/DL (ref 11.5–16)
IMM GRANULOCYTES # BLD AUTO: 0 K/UL
IMM GRANULOCYTES NFR BLD AUTO: 0 %
LYMPHOCYTES # BLD: 0.7 K/UL (ref 0.8–3.5)
LYMPHOCYTES NFR BLD: 7 % (ref 12–49)
MCH RBC QN AUTO: 28.4 PG (ref 26–34)
MCHC RBC AUTO-ENTMCNC: 32.5 G/DL (ref 30–36.5)
MCV RBC AUTO: 87.3 FL (ref 80–99)
MONOCYTES # BLD: 1.2 K/UL (ref 0–1)
MONOCYTES NFR BLD: 12 % (ref 5–13)
MYELOCYTES NFR BLD MANUAL: 1 %
NEUTS BAND NFR BLD MANUAL: 1 % (ref 0–6)
NEUTS SEG # BLD: 7.7 K/UL (ref 1.8–8)
NEUTS SEG NFR BLD: 78 % (ref 32–75)
NRBC # BLD: 0 K/UL (ref 0–0.01)
NRBC BLD-RTO: 0 PER 100 WBC
PLATELET # BLD AUTO: 100 K/UL (ref 150–400)
PMV BLD AUTO: 8.8 FL (ref 8.9–12.9)
POTASSIUM SERPL-SCNC: 3.5 MMOL/L (ref 3.5–5.1)
PROT SERPL-MCNC: 6.8 G/DL (ref 6.4–8.2)
RBC # BLD AUTO: 4.09 M/UL (ref 3.8–5.2)
RBC MORPH BLD: ABNORMAL
SODIUM SERPL-SCNC: 132 MMOL/L (ref 136–145)
WBC # BLD AUTO: 9.7 K/UL (ref 3.6–11)

## 2021-04-07 PROCEDURE — 96368 THER/DIAG CONCURRENT INF: CPT

## 2021-04-07 PROCEDURE — 74011000258 HC RX REV CODE- 258: Performed by: REGISTERED NURSE

## 2021-04-07 PROCEDURE — 96413 CHEMO IV INFUSION 1 HR: CPT

## 2021-04-07 PROCEDURE — 96416 CHEMO PROLONG INFUSE W/PUMP: CPT

## 2021-04-07 PROCEDURE — 96411 CHEMO IV PUSH ADDL DRUG: CPT

## 2021-04-07 PROCEDURE — 96375 TX/PRO/DX INJ NEW DRUG ADDON: CPT

## 2021-04-07 PROCEDURE — 77030012965 HC NDL HUBR BBMI -A

## 2021-04-07 PROCEDURE — 80053 COMPREHEN METABOLIC PANEL: CPT

## 2021-04-07 PROCEDURE — 96415 CHEMO IV INFUSION ADDL HR: CPT

## 2021-04-07 PROCEDURE — 82378 CARCINOEMBRYONIC ANTIGEN: CPT

## 2021-04-07 PROCEDURE — 74011250636 HC RX REV CODE- 250/636: Performed by: REGISTERED NURSE

## 2021-04-07 PROCEDURE — 36415 COLL VENOUS BLD VENIPUNCTURE: CPT

## 2021-04-07 PROCEDURE — 85025 COMPLETE CBC W/AUTO DIFF WBC: CPT

## 2021-04-07 RX ORDER — DEXTROSE MONOHYDRATE 50 MG/ML
25 INJECTION, SOLUTION INTRAVENOUS CONTINUOUS
Status: DISPENSED | OUTPATIENT
Start: 2021-04-07 | End: 2021-04-07

## 2021-04-07 RX ORDER — FLUOROURACIL 50 MG/ML
400 INJECTION, SOLUTION INTRAVENOUS ONCE
Status: COMPLETED | OUTPATIENT
Start: 2021-04-07 | End: 2021-04-07

## 2021-04-07 RX ORDER — SODIUM CHLORIDE 9 MG/ML
10 INJECTION INTRAMUSCULAR; INTRAVENOUS; SUBCUTANEOUS AS NEEDED
Status: DISPENSED | OUTPATIENT
Start: 2021-04-07 | End: 2021-04-07

## 2021-04-07 RX ORDER — PALONOSETRON 0.05 MG/ML
0.25 INJECTION, SOLUTION INTRAVENOUS ONCE
Status: COMPLETED | OUTPATIENT
Start: 2021-04-07 | End: 2021-04-07

## 2021-04-07 RX ORDER — SODIUM CHLORIDE 0.9 % (FLUSH) 0.9 %
10 SYRINGE (ML) INJECTION AS NEEDED
Status: DISPENSED | OUTPATIENT
Start: 2021-04-07 | End: 2021-04-07

## 2021-04-07 RX ADMIN — OXALIPLATIN 132 MG: 5 INJECTION, SOLUTION, CONCENTRATE INTRAVENOUS at 13:15

## 2021-04-07 RX ADMIN — FLUOROURACIL 620 MG: 50 INJECTION, SOLUTION INTRAVENOUS at 15:26

## 2021-04-07 RX ADMIN — PALONOSETRON 0.25 MG: 0.05 INJECTION, SOLUTION INTRAVENOUS at 12:16

## 2021-04-07 RX ADMIN — Medication 10 ML: at 09:00

## 2021-04-07 RX ADMIN — DEXTROSE MONOHYDRATE 25 ML/HR: 5 INJECTION, SOLUTION INTRAVENOUS at 12:15

## 2021-04-07 RX ADMIN — DEXTROSE 620 MG: 5 SOLUTION INTRAVENOUS at 13:14

## 2021-04-07 RX ADMIN — FLUOROURACIL 3720 MG: 50 INJECTION, SOLUTION INTRAVENOUS at 15:25

## 2021-04-07 RX ADMIN — DEXAMETHASONE SODIUM PHOSPHATE 12 MG: 4 INJECTION, SOLUTION INTRAMUSCULAR; INTRAVENOUS at 12:17

## 2021-04-07 RX ADMIN — SODIUM CHLORIDE 10 ML: 9 INJECTION INTRAMUSCULAR; INTRAVENOUS; SUBCUTANEOUS at 09:00

## 2021-04-07 NOTE — PROGRESS NOTES
[facility] [place] [address] [address], [address]  W: [phone]  F: [phone]    Reason for Visit:   Hiwot Carroll is a 72 y.o. female who is seen for stage III colon cancer. Treatment History:   · 9/10/2020 CT A/P - large, irregular pelvic mass measuring approximately 9cm likely representing neoplasm arising from the sigmoid colon, small amount of pelvic free fluid, colonic bowel wall thickening. Borderline enlarged retroperitoneal lymph nodes. 6 mm right lower lobe lung nodule. Mass effect from pelvic mass causing moderate right and minimal left hydronephrosis. · 9/14/2020: Colonoscopy - A large circumferential, ulcerated fungating mass was noted in proximal sigmoid colon. Mass was obstructing the lumen and could not be traversed. Sharalexx Cornet was present. · 9/15/2020: CT Chest - 7.5mm left lower lobe pulmonary nodule, small right pleural effusion, mild right basilar atelectasis  · 9/17/2020: Surgical resection - low anterior resection, mobilization of the splenic flexure, coloproctostomy, creation of loop ileostomy, total abdominal hysterectomy and left salpingo-oophorectomy, distal right ureterectomy, right ureteral re-implant with psoas hitch and placement of right ureteral stent  · 11/17/20: cycle 1 FOLFOX  · 2/17/21 CT CAP: pulmonary nodule stable, ROBE     History of Present Illness:   Patient is a 72 y.o. female seen for colon cancer    She presented to the ED on 9/11/2020 with complaints of right flank/back pain and RLQ abdominal pain. She states she has had several months of gas pain and \"blockages. \" She reports a weight loss of 12 lbs in the last 3 months. CT abd/pelv 9/10/2020 showed large, irregular pelvic mass measuring approximately 9cm likely representing neoplasm arising from the sigmoid colon. She also had hydronephrosis from the mass effect and urethral stent placed.  CEA 7.4 on 9/11and colonoscopy performed 9/14 with biopsy + adenocarcinoma. CT Chest performed showing 7.5mm left lower lobe pulmonary nodule. Mass found to be invading into the uterus and right ureter. Surgical resection performed on 9/17/2020 including low anterior resection, mobilization of the splenic flexure, coloproctostomy, creation of loop ileostomy, total abdominal hysterectomy and left salpingo-oophorectomy, distal right ureterectomy, right ureteral re-implant with psoas hitch and placement of right ureteral stent. She was to see me in clinic but was admitted 10/10/2020 with ileostomy prolapse. Had a revision of loop ileostomy 10/11/2020. Then needed ileostomy closure and anastomosis 10/27/2020. Today she presents for cycle 10 of FOLFOX. She is tolerating well. Cold sensation improves a few days after chemotherapy. Denies nausea/vomting. States she is eating and drinking well. Denies constipation/diarrhea. She denies SOB, CP, cough, fevers/chills, bleeding. No other complaints today. Family Hx:  Mother with hx of pancreatic cancer    Past Medical History:   Diagnosis Date    [facility]     GERD (gastroesophageal reflux disease)     Ileostomy in place ([facility]     Ileostomy prolapse ([facility]     Ill-defined condition     Spaulding's esophagus      Past Surgical History:   Procedure Laterality Date    COLONOSCOPY Left 9/14/2020    COLONOSCOPY performed by Flavia Madrigal MD at [address] [place] [address]; incomplete secondary to partial obstruction.  HX APPENDECTOMY  age 17    HX COLONOSCOPY  circa 2010    No neoplasms.  HX ENDOSCOPY  03/13/2017    Dr. Hanna Moeller HX ENDOSCOPY  02/13/2020    Dr. Calixto Presley oopherectomy for treatment of benign mass.  HX GYN  09/17/2020    Total abdominal hysterectomy and left salpingo-oophorectomy; Alison Rae MD.    HX OTHER SURGICAL  09/17/2020    Low anterior resection, mobilization of the splenic flexure, coloproctostomy, and creation of loop ileostomy; Dr. Alcon Segovia.     HX OTHER SURGICAL 10/11/2020    Revision of loop ileostomy (resection and creation of divided loop ileostomy); Dr. Eloy Gramajo.   UROLOGICAL  09/12/2020    Cystoscopy and placement of a right ureteral stent; Sherie Alcaraz III, MD.     UROLOGICAL  09/17/2020    Cystoscopy and placement of a temporary left ureteral catheter; Sherie Alcaraz III, MD.     UROLOGICAL  09/17/2020    Distal right ureterectomy; Sherie Alcaraz III, MD.     UROLOGICAL  09/17/2020    Right ureteral re-implantation with psoas hitch and placement of a right ureteral stent; Fly Titus MD.    IR INSERT TUNL CVAD W PORT LESS THAN 5 YR  10/14/2020    Right chest Port-a-Cath placement. Social History     Tobacco Use    Smoking status: Never Smoker    Smokeless tobacco: Never Used   Substance Use Topics    Alcohol use: Yes     Alcohol/week: 1.0 standard drinks     Types: 1 Glasses of wine per week      Family History   Problem Relation Age of Onset    Cancer Mother     Heart Disease Father     Diabetes Brother      Current Outpatient Medications   Medication Sig    lidocaine-prilocaine (EMLA) topical cream Apply  to affected area as needed for Pain. Apply over port a cath site 30-60 minutes prior to port access    dexAMETHasone (DECADRON) 4 mg tablet Take 2 tabs (8mg) once daily on days 2 & 3 of chemotherapy only    ondansetron (ZOFRAN ODT) 8 mg disintegrating tablet Take 1 Tab by mouth every eight (8) hours as needed for Nausea or Vomiting. Can start 72 hours after chemotherapy infusion    denosumab (PROLIA) 60 mg/mL injection 60 mg by SubCUTAneous route.  lansoprazole (PREVACID) 30 mg capsule Take 30 mg by mouth Daily (before breakfast).  multivitamin (ONE A DAY) tablet Take 1 Tab by mouth daily.  ascorbic acid, vitamin C, (VITAMIN C) 500 mg tablet Take 500 mg by mouth.  cholecalciferol (VITAMIN D3) 1,000 unit cap Take 1,000 Units by mouth daily.  Omega-3 Fatty Acids (FISH OIL) 500 mg cap Take  by mouth. No current facility-administered medications for this encounter. No Known Allergies     Review of Systems: A complete review of systems was obtained, negative except as described above. Physical Exam:     Visit Vitals  /74   Pulse 99   Temp 98 °F (36.7 °C)   Resp 16     ECOG PS: 1  General: No distress but appears frail  Eyes: PERRL, anicteric sclerae  HENT: Atraumatic  RESP: normal respiratory effort  CV: port with no erythema or edema, 1+ bilat LE edema  MS: Digits without clubbing or cyanosis. Skin: No rashes, ecchymoses, or petechiae. Psych: Alert, oriented, appropriate affect, normal judgment/insight    Results:     Lab Results   Component Value Date/Time    WBC 7.6 03/24/2021 09:21 AM    HGB 11.6 03/24/2021 09:21 AM    HCT 36.1 03/24/2021 09:21 AM    PLATELET 207 (L) 13/92/4153 09:21 AM    MCV 86.8 03/24/2021 09:21 AM    ABS. NEUTROPHILS 6.4 03/24/2021 09:21 AM    Hemoglobin (POC) 12.0 09/17/2020 06:35 PM     Lab Results   Component Value Date/Time    Sodium 136 03/24/2021 09:21 AM    Potassium 3.6 03/24/2021 09:21 AM    Chloride 104 03/24/2021 09:21 AM    CO2 25 03/24/2021 09:21 AM    Glucose 132 (H) 03/24/2021 09:21 AM    BUN 11 03/24/2021 09:21 AM    Creatinine 0.63 03/24/2021 09:21 AM    GFR est AA >60 03/24/2021 09:21 AM    GFR est non-AA >60 03/24/2021 09:21 AM    Calcium 9.1 03/24/2021 09:21 AM    Glucose (POC) 98 09/27/2020 11:39 PM    Creatinine (POC) 0.8 09/10/2020 02:32 PM     Lab Results   Component Value Date/Time    Bilirubin, total 1.1 (H) 03/24/2021 09:21 AM    ALT (SGPT) 58 03/24/2021 09:21 AM    Alk.  phosphatase 255 (H) 03/24/2021 09:21 AM    Protein, total 6.4 03/24/2021 09:21 AM    Albumin 3.3 (L) 03/24/2021 09:21 AM    Globulin 3.1 03/24/2021 09:21 AM     CEA:  Recent Labs     03/10/21  0919 01/29/21  0807 01/12/21  0806 12/15/20  0806 11/17/20  0839 09/11/20  1455   CEA 2.0 1.7 1.8 1.5 1.0 7.4       CT A/P 9/23/2020  IMPRESSION:  Large, irregular pelvic mass measuring approximately 9 cm likely representing  neoplasm arising from the sigmoid colon. Adnexal neoplasm is a possibility. This  does appear to be separate from the uterus. Small amount of pelvic free fluid. Associated colonic bowel wall thickening. There is focal gas in the upper  presacral region which is unclear if this is intraluminal or contained  extraluminal collection.     No definite distant metastatic disease. Borderline enlarged retroperitoneal  lymph nodes. 6 mm right lower lobe lung nodule. Mass effect from the pelvic mass  causing moderate right and minimal left hydronephrosis. CT Chest 9/15/2020  IMPRESSION:  1. 7.5 mm left lower lobe pulmonary nodule. 2. Small right pleural effusion. 3. Mild right basilar atelectasis. 9/14/2020   Addendum Diagnosis   1. Sigmoid Mass, Biopsy:     Infiltrating adenocarcinoma, most consistent with colon (See comment)   Addendum Comment   Immunohistochemical stains reveal the following staining pattern:   CK7: Scattered cells with cytoplasmic membrane staining   CK20: Positive cytoplasmic staining in normal colonic mucosal epithelial cells and malignant epithelial cells   CDX-2: Diffuse strong nuclear decoration and all tumor cells and normal background colonic epithelial cells   PAX-8: Negative   Estrogen Receptor: Negative   Positive and negative controls stain appropriately. Due to radiologic findings suggesting the possibility of GYN involvement, ER and PAX-8 are performed, which lend support to the diagnosis of a colonic primary, over a tumor of müllerian origin. 9/17/2020   FINAL PATHOLOGIC DIAGNOSIS   1. Sigmoid colon and proximal rectum, uterus, left fallopian tube and ovary and right ureteral segment, low anterior resection:   Sigmoid colon and proximal rectum:  Invasive adenocarcinoma (see synoptic report and comment). Metastatic adenocarcinoma in one of sixteen lymph nodes (1/16).    Uterus: Invasive adenocarcinoma extending from adhesed colon into uterine serosa. Endometrial lining shows mucosal atrophy. Left ovary: Benign ovary with serosal inclusion cysts. Left fallopian tube: Benign fallopian tube. Right ureteral segment: Benign segment of ureter densely adhesed to colon. Nodule near right uterine cornu:  Nodular portion of benign smooth muscle consistent with leiomyoma with parenchymal hemorrhage. COLON AND RECTUM: Resection   SPECIMEN   Procedure: Low anterior resection   TUMOR   Tumor Site: Sigmoid colon   Histologic Type: Adenocarcinoma   Histologic Grade: G2: Moderately differentiated   Tumor Size: 8.0 cm   Tumor Extension: Tumor directly invades adjacent structures:   Posterior uterine serosa   Macroscopic Tumor Perforation: Tumor invades through serosa into surrounding soft tissue   Lymphovascular Invasion: Present   Perineural Invasion: Not identified   Treatment Effect: No known presurgical therapy   MARGINS   Margins: All margins are uninvolved by invasive carcinoma   Margins Examined: Proximal, Distal, Radial (circumferential) or   Mesenteric   Distance of Tumor from Radial (circumferential) Margin: See Comment   LYMPH NODES   Number of Lymph Nodes Involved: 1   Number of Lymph Nodes Examined: 16   Tumor Deposits: Not identified   PATHOLOGIC STAGE CLASSIFICATION (pTNM, AJCC 8th Edition)   Primary Tumor (pT): pT4b   Regional Lymph Nodes (pN): pN1a   2. Distal rectal margin:   Segment of benign large bowel. 3. Anastomotic doughnuts:   Two annular portions of benign large bowel. Comment   Tumor invades into the adherent soft tissue and to within 1.1 cm of the apparent right pelvic sidewall margin. CT CAP 2/17/21:  IMPRESSION  1. 8 mm left lower lobe pulmonary nodule, unchanged. 2. Trace fluid within the left hemipelvis. Records reviewed and summarized above. Pathology report(s) reviewed above. Radiology report(s) reviewed above.     Assessment:   1) Sigmoid Colon Adenocarcinoma - Stage IIIB- ERICKA  [id number]  CT abd/pelv 9/10/2020 showed large, irregular pelvic mass measuring approximately 9cm likely representing neoplasm arising from the sigmoid colon. CEA 7.4 on 9/11  Colonoscopy performed 9/14 and biopsy + adenocarcinoma  Mass found to be invading into the uterus and right ureter. Surgical resection on 9/17 with creation of loop ileostomy, total abdominal hysterectomy and left salpingo-oophorectomy, distal right ureterectomy  She had a revision of her ileostomy 10/11/2020  Then needed stoma closure and anastomosis on 10/27/2020    CT imaging after 6 cycles was personally reviewed and shows a stable pulmonary nodule and otherwise ROBE. Today is cycle 10 of adjuvant FOLFOX x 6 months. 5FU bolus was introduced with cycle 2 and she tolerated this well with grade 1 neuropathy and grade 1 thrombocytopenia  She developed grade 3 neutropenia after cycle 4 which resulted in a 2 week delay. Neulasta was added. She has no complaints today and tolerating treatment well.      2) Anemia  Due to #1  Iron sat of 4% and ferritin of 20 on 9/11  S/p 2 units PRBCs on 9/15 and Venofer 200mg x3 doses  Anemia has resolved     4) Elevated transaminases  Monitor- if increases further will obtain an USG of abdomen    5) Neuropathy  Grade 1   Monitor     6) Neutropenia  Grade 3 after cycle 4   Added neulasta  Resolved     7) Thrombocytopenia  Grade 1 - pending today     8) Management of high risk medications like chemotherapy  Previous grade 3 neutropenia & added neulasta   Otherwise tolerating well with grade 1 constipation & grade 1 neuropathy  & grade 1 thrombocytopenia     9) Pulmonary nodule  Stable  Will monitor with imaging every 3-6 months      10) Abnormal LFTs  Noted a Bilirubin of 1.1 which has been stable for the last 3 cycles , will marior    Plan:     · Proceed today with cycle 10 of mFOLFOX (5FU CADD 2400mg/m2, 5FU bolus 400mg/m2, leucovorin 400mg/m2, oxaliplatin 85mg/m2) every 2 weeks for 6 months   · Neulasta given high risk for grade IV neutropenia   · Cbc, cmp every cycle, cea every other  · Zofran prn, dexamethasone days 2 and 3  · Follow with surgery as scheduled     RTC in 2 weeks      Batsheva Pratt at [phone] [facility]    I appreciate the opportunity to participate in Ms. Mionr carrington.     Signed By: Lexis Mcgrath NP

## 2021-04-09 ENCOUNTER — HOSPITAL ENCOUNTER (OUTPATIENT)
Dept: INFUSION THERAPY | Age: 71
Discharge: HOME | End: 2021-04-09
Payer: MEDICARE

## 2021-04-09 VITALS
SYSTOLIC BLOOD PRESSURE: 112 MMHG | RESPIRATION RATE: 18 BRPM | DIASTOLIC BLOOD PRESSURE: 61 MMHG | HEART RATE: 81 BPM | TEMPERATURE: 97.7 F

## 2021-04-09 DIAGNOSIS — C18.7 MALIGNANT NEOPLASM OF SIGMOID COLON (HCC): Primary | ICD-10-CM

## 2021-04-09 PROCEDURE — 74011250636 HC RX REV CODE- 250/636: Performed by: REGISTERED NURSE

## 2021-04-09 PROCEDURE — 96377 APPLICATON ON-BODY INJECTOR: CPT

## 2021-04-09 RX ORDER — HEPARIN 100 UNIT/ML
300-500 SYRINGE INTRAVENOUS AS NEEDED
Status: DISCONTINUED | OUTPATIENT
Start: 2021-04-09 | End: 2021-04-10 | Stop reason: HOSPADM

## 2021-04-09 RX ORDER — SODIUM CHLORIDE 0.9 % (FLUSH) 0.9 %
10 SYRINGE (ML) INJECTION AS NEEDED
Status: DISCONTINUED | OUTPATIENT
Start: 2021-04-09 | End: 2021-04-10 | Stop reason: HOSPADM

## 2021-04-09 RX ORDER — SODIUM CHLORIDE 9 MG/ML
10 INJECTION INTRAMUSCULAR; INTRAVENOUS; SUBCUTANEOUS AS NEEDED
Status: DISCONTINUED | OUTPATIENT
Start: 2021-04-09 | End: 2021-04-10 | Stop reason: HOSPADM

## 2021-04-09 RX ADMIN — PEGFILGRASTIM 6 MG: KIT SUBCUTANEOUS at 15:12

## 2021-04-09 RX ADMIN — HEPARIN 500 UNITS: 100 SYRINGE at 15:10

## 2021-04-09 RX ADMIN — SODIUM CHLORIDE 10 ML: 9 INJECTION INTRAMUSCULAR; INTRAVENOUS; SUBCUTANEOUS at 15:10

## 2021-04-12 ENCOUNTER — HOSPITAL ENCOUNTER (OUTPATIENT)
Dept: ULTRASOUND IMAGING | Age: 71
Discharge: HOME | End: 2021-04-12
Attending: REGISTERED NURSE
Payer: MEDICARE

## 2021-04-12 DIAGNOSIS — R17 ELEVATED BILIRUBIN: ICD-10-CM

## 2021-04-12 PROCEDURE — 76700 US EXAM ABDOM COMPLETE: CPT

## 2021-04-15 DIAGNOSIS — K76.9 LIVER DISEASE: ICD-10-CM

## 2021-04-15 DIAGNOSIS — C18.7 MALIGNANT NEOPLASM OF SIGMOID COLON (HCC): Primary | ICD-10-CM

## 2021-04-15 NOTE — PROGRESS NOTES
Called pt regarding US showing liver disease. Hepatology referral placed. Will check Hep B and C with next [facility]. She had no additional questions/concerns and appreciated my call.

## 2021-04-16 ENCOUNTER — DOCUMENTATION ONLY (OUTPATIENT)
Dept: ONCOLOGY | Age: 71
End: 2021-04-16

## 2021-04-16 NOTE — PROGRESS NOTES
Patient has an appt scheduled with MD King Wong (Hepatology) on 5/25/21 @1:30 PM.  Patient is aware of appt date/time
No

## 2021-04-21 ENCOUNTER — HOSPITAL ENCOUNTER (OUTPATIENT)
Dept: INFUSION THERAPY | Age: 71
Discharge: HOME | End: 2021-04-21
Payer: MEDICARE

## 2021-04-21 ENCOUNTER — OFFICE VISIT (OUTPATIENT)
Dept: ONCOLOGY | Age: 71
End: 2021-04-21
Payer: MEDICARE

## 2021-04-21 VITALS
BODY MASS INDEX: 19.85 KG/M2 | DIASTOLIC BLOOD PRESSURE: 70 MMHG | RESPIRATION RATE: 18 BRPM | SYSTOLIC BLOOD PRESSURE: 116 MMHG | OXYGEN SATURATION: 98 % | TEMPERATURE: 96.7 F | HEART RATE: 94 BPM | WEIGHT: 123 LBS

## 2021-04-21 VITALS
RESPIRATION RATE: 18 BRPM | HEIGHT: 66 IN | HEART RATE: 86 BPM | BODY MASS INDEX: 19.8 KG/M2 | TEMPERATURE: 96.7 F | SYSTOLIC BLOOD PRESSURE: 112 MMHG | WEIGHT: 123.2 LBS | DIASTOLIC BLOOD PRESSURE: 70 MMHG

## 2021-04-21 DIAGNOSIS — C18.7 MALIGNANT NEOPLASM OF SIGMOID COLON (HCC): Primary | ICD-10-CM

## 2021-04-21 DIAGNOSIS — Z51.81 ENCOUNTER FOR MONITORING CARDIOTOXIC DRUG THERAPY: ICD-10-CM

## 2021-04-21 DIAGNOSIS — M79.89 LEG SWELLING: ICD-10-CM

## 2021-04-21 DIAGNOSIS — Z79.899 ENCOUNTER FOR MONITORING CARDIOTOXIC DRUG THERAPY: ICD-10-CM

## 2021-04-21 DIAGNOSIS — Z95.828 PORT-A-CATH IN PLACE: ICD-10-CM

## 2021-04-21 DIAGNOSIS — Z51.11 ENCOUNTER FOR ANTINEOPLASTIC CHEMOTHERAPY: ICD-10-CM

## 2021-04-21 LAB
ALBUMIN SERPL-MCNC: 3.4 G/DL (ref 3.5–5)
ALBUMIN/GLOB SERPL: 1 {RATIO} (ref 1.1–2.2)
ALP SERPL-CCNC: 206 U/L (ref 45–117)
ALT SERPL-CCNC: 39 U/L (ref 12–78)
ANION GAP SERPL CALC-SCNC: 7 MMOL/L (ref 5–15)
AST SERPL-CCNC: 37 U/L (ref 15–37)
BASOPHILS # BLD: 0.1 K/UL (ref 0–0.1)
BASOPHILS NFR BLD: 1 % (ref 0–1)
BILIRUB SERPL-MCNC: 1.6 MG/DL (ref 0.2–1)
BUN SERPL-MCNC: 10 MG/DL (ref 6–20)
BUN/CREAT SERPL: 18 (ref 12–20)
CALCIUM SERPL-MCNC: 9.6 MG/DL (ref 8.5–10.1)
CHLORIDE SERPL-SCNC: 106 MMOL/L (ref 97–108)
CO2 SERPL-SCNC: 24 MMOL/L (ref 21–32)
CREAT SERPL-MCNC: 0.56 MG/DL (ref 0.55–1.02)
DIFFERENTIAL METHOD BLD: ABNORMAL
EOSINOPHIL # BLD: 0.1 K/UL (ref 0–0.4)
EOSINOPHIL NFR BLD: 2 % (ref 0–7)
ERYTHROCYTE [DISTWIDTH] IN BLOOD BY AUTOMATED COUNT: 20.2 % (ref 11.5–14.5)
GLOBULIN SER CALC-MCNC: 3.4 G/DL (ref 2–4)
GLUCOSE SERPL-MCNC: 97 MG/DL (ref 65–100)
HBV SURFACE AG SER QL: <0.1 INDEX
HBV SURFACE AG SER QL: NEGATIVE
HCT VFR BLD AUTO: 34.1 % (ref 35–47)
HCV AB SERPL QL IA: NONREACTIVE
HCV COMMENT,HCGAC: NORMAL
HGB BLD-MCNC: 10.9 G/DL (ref 11.5–16)
IMM GRANULOCYTES # BLD AUTO: 0.1 K/UL (ref 0–0.04)
IMM GRANULOCYTES NFR BLD AUTO: 2 % (ref 0–0.5)
LYMPHOCYTES # BLD: 0.7 K/UL (ref 0.8–3.5)
LYMPHOCYTES NFR BLD: 14 % (ref 12–49)
MCH RBC QN AUTO: 28.8 PG (ref 26–34)
MCHC RBC AUTO-ENTMCNC: 32 G/DL (ref 30–36.5)
MCV RBC AUTO: 90 FL (ref 80–99)
MONOCYTES # BLD: 0.5 K/UL (ref 0–1)
MONOCYTES NFR BLD: 10 % (ref 5–13)
NEUTS SEG # BLD: 3.8 K/UL (ref 1.8–8)
NEUTS SEG NFR BLD: 71 % (ref 32–75)
NRBC # BLD: 0 K/UL (ref 0–0.01)
NRBC BLD-RTO: 0 PER 100 WBC
PLATELET # BLD AUTO: 101 K/UL (ref 150–400)
PMV BLD AUTO: 9.6 FL (ref 8.9–12.9)
POTASSIUM SERPL-SCNC: 3.3 MMOL/L (ref 3.5–5.1)
PROT SERPL-MCNC: 6.8 G/DL (ref 6.4–8.2)
RBC # BLD AUTO: 3.79 M/UL (ref 3.8–5.2)
RBC MORPH BLD: ABNORMAL
SODIUM SERPL-SCNC: 137 MMOL/L (ref 136–145)
WBC # BLD AUTO: 5.3 K/UL (ref 3.6–11)

## 2021-04-21 PROCEDURE — 74011250637 HC RX REV CODE- 250/637: Performed by: REGISTERED NURSE

## 2021-04-21 PROCEDURE — 96415 CHEMO IV INFUSION ADDL HR: CPT

## 2021-04-21 PROCEDURE — G9711 PT HX TOT COL OR COLON CA: HCPCS | Performed by: INTERNAL MEDICINE

## 2021-04-21 PROCEDURE — 99215 OFFICE O/P EST HI 40 MIN: CPT | Performed by: INTERNAL MEDICINE

## 2021-04-21 PROCEDURE — G8427 DOCREV CUR MEDS BY ELIG CLIN: HCPCS | Performed by: INTERNAL MEDICINE

## 2021-04-21 PROCEDURE — G8510 SCR DEP NEG, NO PLAN REQD: HCPCS | Performed by: INTERNAL MEDICINE

## 2021-04-21 PROCEDURE — 74011250636 HC RX REV CODE- 250/636: Performed by: REGISTERED NURSE

## 2021-04-21 PROCEDURE — 77030012965 HC NDL HUBR BBMI -A

## 2021-04-21 PROCEDURE — G8420 CALC BMI NORM PARAMETERS: HCPCS | Performed by: INTERNAL MEDICINE

## 2021-04-21 PROCEDURE — 87340 HEPATITIS B SURFACE AG IA: CPT

## 2021-04-21 PROCEDURE — 86803 HEPATITIS C AB TEST: CPT

## 2021-04-21 PROCEDURE — 80053 COMPREHEN METABOLIC PANEL: CPT

## 2021-04-21 PROCEDURE — 74011000258 HC RX REV CODE- 258: Performed by: REGISTERED NURSE

## 2021-04-21 PROCEDURE — 96413 CHEMO IV INFUSION 1 HR: CPT

## 2021-04-21 PROCEDURE — 96368 THER/DIAG CONCURRENT INF: CPT

## 2021-04-21 PROCEDURE — 96416 CHEMO PROLONG INFUSE W/PUMP: CPT

## 2021-04-21 PROCEDURE — 36415 COLL VENOUS BLD VENIPUNCTURE: CPT

## 2021-04-21 PROCEDURE — 96411 CHEMO IV PUSH ADDL DRUG: CPT

## 2021-04-21 PROCEDURE — G0463 HOSPITAL OUTPT CLINIC VISIT: HCPCS | Performed by: REGISTERED NURSE

## 2021-04-21 PROCEDURE — G8536 NO DOC ELDER MAL SCRN: HCPCS | Performed by: INTERNAL MEDICINE

## 2021-04-21 PROCEDURE — 1101F PT FALLS ASSESS-DOCD LE1/YR: CPT | Performed by: INTERNAL MEDICINE

## 2021-04-21 PROCEDURE — 85025 COMPLETE CBC W/AUTO DIFF WBC: CPT

## 2021-04-21 PROCEDURE — G8400 PT W/DXA NO RESULTS DOC: HCPCS | Performed by: INTERNAL MEDICINE

## 2021-04-21 PROCEDURE — 1090F PRES/ABSN URINE INCON ASSESS: CPT | Performed by: INTERNAL MEDICINE

## 2021-04-21 PROCEDURE — 96375 TX/PRO/DX INJ NEW DRUG ADDON: CPT

## 2021-04-21 RX ORDER — DEXTROSE MONOHYDRATE 50 MG/ML
25 INJECTION, SOLUTION INTRAVENOUS CONTINUOUS
Status: DISPENSED | OUTPATIENT
Start: 2021-04-21 | End: 2021-04-21

## 2021-04-21 RX ORDER — PALONOSETRON 0.05 MG/ML
0.25 INJECTION, SOLUTION INTRAVENOUS ONCE
Status: COMPLETED | OUTPATIENT
Start: 2021-04-21 | End: 2021-04-21

## 2021-04-21 RX ORDER — FLUOROURACIL 50 MG/ML
400 INJECTION, SOLUTION INTRAVENOUS ONCE
Status: COMPLETED | OUTPATIENT
Start: 2021-04-21 | End: 2021-04-21

## 2021-04-21 RX ORDER — SODIUM CHLORIDE 0.9 % (FLUSH) 0.9 %
10 SYRINGE (ML) INJECTION AS NEEDED
Status: DISPENSED | OUTPATIENT
Start: 2021-04-21 | End: 2021-04-21

## 2021-04-21 RX ORDER — HEPARIN 100 UNIT/ML
300-500 SYRINGE INTRAVENOUS AS NEEDED
Status: ACTIVE | OUTPATIENT
Start: 2021-04-21 | End: 2021-04-21

## 2021-04-21 RX ORDER — POTASSIUM CHLORIDE 750 MG/1
20 TABLET, FILM COATED, EXTENDED RELEASE ORAL
Status: COMPLETED | OUTPATIENT
Start: 2021-04-21 | End: 2021-04-21

## 2021-04-21 RX ORDER — SODIUM CHLORIDE 9 MG/ML
10 INJECTION INTRAMUSCULAR; INTRAVENOUS; SUBCUTANEOUS AS NEEDED
Status: ACTIVE | OUTPATIENT
Start: 2021-04-21 | End: 2021-04-21

## 2021-04-21 RX ADMIN — DEXAMETHASONE SODIUM PHOSPHATE 12 MG: 4 INJECTION, SOLUTION INTRAMUSCULAR; INTRAVENOUS at 11:34

## 2021-04-21 RX ADMIN — POTASSIUM CHLORIDE 20 MEQ: 750 TABLET, FILM COATED, EXTENDED RELEASE ORAL at 11:17

## 2021-04-21 RX ADMIN — LEUCOVORIN CALCIUM 620 MG: 350 INJECTION, POWDER, LYOPHILIZED, FOR SOLUTION INTRAMUSCULAR; INTRAVENOUS at 12:17

## 2021-04-21 RX ADMIN — OXALIPLATIN 132 MG: 5 INJECTION, SOLUTION, CONCENTRATE INTRAVENOUS at 12:17

## 2021-04-21 RX ADMIN — FLUOROURACIL 620 MG: 50 INJECTION, SOLUTION INTRAVENOUS at 15:09

## 2021-04-21 RX ADMIN — FLUOROURACIL 3720 MG: 50 INJECTION, SOLUTION INTRAVENOUS at 15:15

## 2021-04-21 RX ADMIN — DEXTROSE MONOHYDRATE 25 ML/HR: 5 INJECTION, SOLUTION INTRAVENOUS at 11:17

## 2021-04-21 RX ADMIN — PALONOSETRON 0.25 MG: 0.05 INJECTION, SOLUTION INTRAVENOUS at 11:17

## 2021-04-21 NOTE — PROGRESS NOTES
[facility] Progress Note    Date: 2021    Name: Bear Esqueda    MRN: [id number]         : 1950    Ms. Tatum Arrived ambulatory and in no distress for cycle 11 of Folfox regimen. Assessment was completed, no acute issues at this time, no new complaints voiced. Patient denies any changes in her assessment since her last cycle. Port accessed without difficulty, labs drawn and processed. Port capped and patient left the department for her medical oncology follow up. Prior to treatment, patient was screened for COVID 19. Patient denies any signs or symptoms of COVID. Denies any known physical contact with anyone exposed to, diagnosed with or with pending or positive COVID test. Denies any pending or positive COVID test herself. Chemotherapy Flowsheet 2021   Cycle C11   Date 2021   Drug / Regimen Folfox   Dosage -   Time Up -   Time Down -   Pre Hydration -   Post Hydration -   Pre Meds -   Notes -         Ms. Tatum's vitals were reviewed.   Patient Vitals for the past 12 hrs:   Temp Pulse Resp BP   21 0910 (!) 96.7 °F (35.9 °C) 94 18 116/70         Medications Administered     dexamethasone (DECADRON) 12 mg in 0.9% sodium chloride 50 mL, overfill volume 5 mL IVPB     Admin Date  2021 Action  Given Dose  12 mg Rate  232 mL/hr Route  IntraVENous Administered By  Lyndsay Ramirez RN          dextrose 5% infusion     Admin Date  2021 Action  New Bag Dose  25 mL/hr Rate  25 mL/hr Route  IntraVENous Administered By  Lyndsay Ramirez RN          fluorouraciL (ADRUCIL) 3,720 mg in 0.9% sodium chloride 100 mL CADD Cassette     Admin Date  2021 Action  New Bag Dose  3,720 mg Rate  2.2 mL/hr Route  IntraVENous Administered By  Lyndsay Ramirez RN          fluorouraciL (ADRUCIL) chemo syringe 620 mg     Admin Date  2021 Action  Given Dose  620 mg Rate  248 mL/hr Route  IntraVENous Administered By  Lyndsay Ramirez RN          leucovorin (Leanna Elizabeth) 620 mg in dextrose 5% 250 mL, overfill volume 25 mL IVPB     Admin Date  04/21/2021 Action  New Bag Dose  620 mg Rate  153 mL/hr Route  IntraVENous Administered By  Jahaira Liz RN          oxaliplatin (ELOXATIN) 132 mg in dextrose 5% 250 mL, overfill volume 25 mL chemo infusion     Admin Date  04/21/2021 Action  New Bag Dose  132 mg Rate  150.7 mL/hr Route  IntraVENous Administered By  Jahaira Liz RN          palonosetron HCl (ALOXI) injection 0.25 mg     Admin Date  04/21/2021 Action  Given Dose  0.25 mg Route  IntraVENous Administered By  Jahaira Liz RN          potassium chloride SR (KLOR-CON 10) tablet 20 mEq     Admin Date  04/21/2021 Action  Given Dose  20 mEq Route  Oral Administered By  Jahaira Liz RN                Ms. Omar Plunkett tolerated treatment well. Port maintained positive blood return throughout the course of treatment. CADD pump double checked and was infusing properly prior to discharge. Patient is aware of her next appointment. Future Appointments   Date Time Provider Yary Gomez   4/23/2021  4:30 PM [facility]    5/5/2021  9:00 AM D3 WALKER LONG [address] [place] [address]   5/7/2021  4:00 PM H1 WALKER FASTRACK [facility]    5/25/2021  1:30 PM Bne Lemus MD [facility] BITA Thomson RN  April 21, 2021    .

## 2021-04-21 NOTE — PROGRESS NOTES
Bear Esqueda is a 80 y.o. female    No chief complaint on file. 1. Have you been to the ER, urgent care clinic since your last visit? Hospitalized since your last visit? No    2. Have you seen or consulted any other health care providers outside of the [address] [place] [address] since your last visit? Include any pap smears or colon screening. Yes, MD. Brian Tiki for a recheck for TIA.

## 2021-04-21 NOTE — PROGRESS NOTES
[facility] [place] [address] [address], [address], [address] [place] [address]  W: [phone]  F: [phone]    Reason for Visit:   Marianna Beyer is a 80 y.o. female who is seen for stage III colon cancer. Treatment History:   · 9/10/2020 CT A/P - large, irregular pelvic mass measuring approximately 9cm likely representing neoplasm arising from the sigmoid colon, small amount of pelvic free fluid, colonic bowel wall thickening. Borderline enlarged retroperitoneal lymph nodes. 6 mm right lower lobe lung nodule. Mass effect from pelvic mass causing moderate right and minimal left hydronephrosis. · 9/14/2020: Colonoscopy - A large circumferential, ulcerated fungating mass was noted in proximal sigmoid colon. Mass was obstructing the lumen and could not be traversed. Murlean Frock was present. · 9/15/2020: CT Chest - 7.5mm left lower lobe pulmonary nodule, small right pleural effusion, mild right basilar atelectasis  · 9/17/2020: Surgical resection - low anterior resection, mobilization of the splenic flexure, coloproctostomy, creation of loop ileostomy, total abdominal hysterectomy and left salpingo-oophorectomy, distal right ureterectomy, right ureteral re-implant with psoas hitch and placement of right ureteral stent  · 11/17/20: cycle 1 FOLFOX  · 2/17/21 CT CAP: pulmonary nodule stable, ROBE     History of Present Illness:   Patient is a 80 y.o. female seen for colon cancer    She presented to the ED on 9/11/2020 with complaints of right flank/back pain and RLQ abdominal pain. She states she has had several months of gas pain and \"blockages. \" She reports a weight loss of 12 lbs in the last 3 months. CT abd/pelv 9/10/2020 showed large, irregular pelvic mass measuring approximately 9cm likely representing neoplasm arising from the sigmoid colon. She also had hydronephrosis from the mass effect and urethral stent placed.  CEA 7.4 on 9/11and colonoscopy performed 9/14 with biopsy + adenocarcinoma. CT Chest performed showing 7.5mm left lower lobe pulmonary nodule. Mass found to be invading into the uterus and right ureter. Surgical resection performed on 9/17/2020 including low anterior resection, mobilization of the splenic flexure, coloproctostomy, creation of loop ileostomy, total abdominal hysterectomy and left salpingo-oophorectomy, distal right ureterectomy, right ureteral re-implant with psoas hitch and placement of right ureteral stent. She was to see me in clinic but was admitted 10/10/2020 with ileostomy prolapse. Had a revision of loop ileostomy 10/11/2020. Then needed ileostomy closure and anastomosis 10/27/2020. Today she presents for cycle 11 of FOLFOX. She is tolerating well. Cold sensation improves a few days after chemotherapy. Denies nausea/vomting. States she is eating and drinking well. Denies constipation/diarrhea. She denies SOB, CP, cough, fevers/chills, bleeding. Has some LE swelling that improves with elevation. No other complaints today. Family Hx:  Mother with hx of pancreatic cancer    Past Medical History:   Diagnosis Date    [facility] ([facility]     GERD (gastroesophageal reflux disease)     Ileostomy in place ([facility]     Ileostomy prolapse ([facility]     Ill-defined condition     Spaulding's esophagus      Past Surgical History:   Procedure Laterality Date    COLONOSCOPY Left 9/14/2020    COLONOSCOPY performed by Serjio Sutherland MD at [address] [place] [address]; incomplete secondary to partial obstruction.  HX APPENDECTOMY  age 17    HX COLONOSCOPY  circa 2010    No neoplasms.  HX ENDOSCOPY  03/13/2017    Dr. Shayna Landon HX ENDOSCOPY  02/13/2020    Dr. Gianna Rodgers oopherectomy for treatment of benign mass.     HX GYN  09/17/2020    Total abdominal hysterectomy and left salpingo-oophorectomy; Erna Hurley MD.    HX OTHER SURGICAL  09/17/2020    Low anterior resection, mobilization of the splenic flexure, coloproctostomy, and creation of loop ileostomy; Dr. Semaj Haywood.   OTHER SURGICAL  10/11/2020    Revision of loop ileostomy (resection and creation of divided loop ileostomy); Dr. Semaj Haywood.   UROLOGICAL  09/12/2020    Cystoscopy and placement of a right ureteral stent; Yonatan Cintron III, MD.     UROLOGICAL  09/17/2020    Cystoscopy and placement of a temporary left ureteral catheter; Yonatan Cintron III, MD.     UROLOGICAL  09/17/2020    Distal right ureterectomy; Yonatan Cintron III, MD.     UROLOGICAL  09/17/2020    Right ureteral re-implantation with psoas hitch and placement of a right ureteral stent; Sandra Mcclain MD.    IR INSERT TUNL CVAD W PORT LESS THAN 5 YR  10/14/2020    Right chest Port-a-Cath placement. Social History     Tobacco Use    Smoking status: Never Smoker    Smokeless tobacco: Never Used   Substance Use Topics    Alcohol use: Yes     Alcohol/week: 1.0 standard drinks     Types: 1 Glasses of wine per week      Family History   Problem Relation Age of Onset    Cancer Mother     Heart Disease Father     Diabetes Brother      Current Outpatient Medications   Medication Sig    lidocaine-prilocaine (EMLA) topical cream Apply  to affected area as needed for Pain. Apply over port a cath site 30-60 minutes prior to port access    dexAMETHasone (DECADRON) 4 mg tablet Take 2 tabs (8mg) once daily on days 2 & 3 of chemotherapy only    ondansetron (ZOFRAN ODT) 8 mg disintegrating tablet Take 1 Tab by mouth every eight (8) hours as needed for Nausea or Vomiting. Can start 72 hours after chemotherapy infusion    denosumab (PROLIA) 60 mg/mL injection 60 mg by SubCUTAneous route.  lansoprazole (PREVACID) 30 mg capsule Take 30 mg by mouth Daily (before breakfast).  multivitamin (ONE A DAY) tablet Take 1 Tab by mouth daily.  ascorbic acid, vitamin C, (VITAMIN C) 500 mg tablet Take 500 mg by mouth.  cholecalciferol (VITAMIN D3) 1,000 unit cap Take 1,000 Units by mouth daily.     Omega-3 Fatty Acids (FISH OIL) 500 mg cap Take  by mouth. No current facility-administered medications for this visit. No Known Allergies     Review of Systems: A complete review of systems was obtained, negative except as described above. Physical Exam:     Visit Vitals  /70 (BP 1 Location: Left upper arm, BP Patient Position: Sitting)   Pulse 94   Temp (!) 96.7 °F (35.9 °C)   Resp 18   Wt 123 lb (55.8 kg)   SpO2 98%   BMI 19.85 kg/m²     ECOG PS: 1  General: No distress but appears frail  Eyes: PERRL, anicteric sclerae  HENT: Atraumatic  RESP: normal respiratory effort  CV: port with no erythema or edema, 1+ bilat LE edema  MS: Digits without clubbing or cyanosis. Skin: No rashes, ecchymoses, or petechiae. Psych: Alert, oriented, appropriate affect, normal judgment/insight    Results:     Lab Results   Component Value Date/Time    WBC 9.7 04/07/2021 09:19 AM    HGB 11.6 04/07/2021 09:19 AM    HCT 35.7 04/07/2021 09:19 AM    PLATELET 703 (L) 90/[birth year]/1941 09:19 AM    MCV 87.3 04/07/2021 09:19 AM    ABS. NEUTROPHILS 7.7 04/07/2021 09:19 AM    Hemoglobin (POC) 12.0 09/17/2020 06:35 PM     Lab Results   Component Value Date/Time    Sodium 132 (L) 04/07/2021 09:19 AM    Potassium 3.5 04/07/2021 09:19 AM    Chloride 100 04/07/2021 09:19 AM    CO2 24 04/07/2021 09:19 AM    Glucose 103 (H) 04/07/2021 09:19 AM    BUN 9 04/07/2021 09:19 AM    Creatinine 0.58 04/07/2021 09:19 AM    GFR est AA >60 04/07/2021 09:19 AM    GFR est non-AA >60 04/07/2021 09:19 AM    Calcium 8.8 04/07/2021 09:19 AM    Glucose (POC) 98 09/27/2020 11:39 PM    Creatinine (POC) 0.8 09/10/2020 02:32 PM     Lab Results   Component Value Date/Time    Bilirubin, total 2.5 (H) 04/07/2021 09:19 AM    ALT (SGPT) 48 04/07/2021 09:19 AM    Alk.  phosphatase 237 (H) 04/07/2021 09:19 AM    Protein, total 6.8 04/07/2021 09:19 AM    Albumin 3.4 (L) 04/07/2021 09:19 AM    Globulin 3.4 04/07/2021 09:19 AM     CEA:  Recent Labs     04/07/21 0919 03/10/21  0919 01/29/21  0807 01/12/21  0806 12/15/20  0806 11/17/20  0839 09/11/20  1455   CEA 2.4 2.0 1.7 1.8 1.5 1.0 7.4       CT A/P 9/23/2020  IMPRESSION:  Large, irregular pelvic mass measuring approximately 9 cm likely representing  neoplasm arising from the sigmoid colon. Adnexal neoplasm is a possibility. This  does appear to be separate from the uterus. Small amount of pelvic free fluid. Associated colonic bowel wall thickening. There is focal gas in the upper  presacral region which is unclear if this is intraluminal or contained  extraluminal collection.     No definite distant metastatic disease. Borderline enlarged retroperitoneal  lymph nodes. 6 mm right lower lobe lung nodule. Mass effect from the pelvic mass  causing moderate right and minimal left hydronephrosis. CT Chest 9/15/2020  IMPRESSION:  1. 7.5 mm left lower lobe pulmonary nodule. 2. Small right pleural effusion. 3. Mild right basilar atelectasis. 9/14/2020   Addendum Diagnosis   1. Sigmoid Mass, Biopsy:     Infiltrating adenocarcinoma, most consistent with colon (See comment)   Addendum Comment   Immunohistochemical stains reveal the following staining pattern:   CK7: Scattered cells with cytoplasmic membrane staining   CK20: Positive cytoplasmic staining in normal colonic mucosal epithelial cells and malignant epithelial cells   CDX-2: Diffuse strong nuclear decoration and all tumor cells and normal background colonic epithelial cells   PAX-8: Negative   Estrogen Receptor: Negative   Positive and negative controls stain appropriately. Due to radiologic findings suggesting the possibility of GYN involvement, ER and PAX-8 are performed, which lend support to the diagnosis of a colonic primary, over a tumor of müllerian origin. 9/17/2020   FINAL PATHOLOGIC DIAGNOSIS   1.  Sigmoid colon and proximal rectum, uterus, left fallopian tube and ovary and right ureteral segment, low anterior resection:   Sigmoid colon and proximal rectum:  Invasive adenocarcinoma (see synoptic report and comment). Metastatic adenocarcinoma in one of sixteen lymph nodes (1/16). Uterus: Invasive adenocarcinoma extending from adhesed colon into uterine serosa. Endometrial lining shows mucosal atrophy. Left ovary: Benign ovary with serosal inclusion cysts. Left fallopian tube: Benign fallopian tube. Right ureteral segment: Benign segment of ureter densely adhesed to colon. Nodule near right uterine cornu:  Nodular portion of benign smooth muscle consistent with leiomyoma with parenchymal hemorrhage. COLON AND RECTUM: Resection   SPECIMEN   Procedure: Low anterior resection   TUMOR   Tumor Site: Sigmoid colon   Histologic Type: Adenocarcinoma   Histologic Grade: G2: Moderately differentiated   Tumor Size: 8.0 cm   Tumor Extension: Tumor directly invades adjacent structures:   Posterior uterine serosa   Macroscopic Tumor Perforation: Tumor invades through serosa into surrounding soft tissue   Lymphovascular Invasion: Present   Perineural Invasion: Not identified   Treatment Effect: No known presurgical therapy   MARGINS   Margins: All margins are uninvolved by invasive carcinoma   Margins Examined: Proximal, Distal, Radial (circumferential) or   Mesenteric   Distance of Tumor from Radial (circumferential) Margin: See Comment   LYMPH NODES   Number of Lymph Nodes Involved: 1   Number of Lymph Nodes Examined: 16   Tumor Deposits: Not identified   PATHOLOGIC STAGE CLASSIFICATION (pTNM, AJCC 8th Edition)   Primary Tumor (pT): pT4b   Regional Lymph Nodes (pN): pN1a   2. Distal rectal margin:   Segment of benign large bowel. 3. Anastomotic doughnuts:   Two annular portions of benign large bowel. Comment   Tumor invades into the adherent soft tissue and to within 1.1 cm of the apparent right pelvic sidewall margin. CT CAP 2/17/21:  IMPRESSION  1. 8 mm left lower lobe pulmonary nodule, unchanged. 2. Trace fluid within the left hemipelvis.     Records reviewed and summarized above. Pathology report(s) reviewed above. Radiology report(s) reviewed above. Assessment:   1) Sigmoid Colon Adenocarcinoma - Stage IIIB- ERICKA  [id number]  CT abd/pelv 9/10/2020 showed large, irregular pelvic mass measuring approximately 9cm likely representing neoplasm arising from the sigmoid colon. CEA 7.4 on 9/11  Colonoscopy performed 9/14 and biopsy + adenocarcinoma  Mass found to be invading into the uterus and right ureter. Surgical resection on 9/17 with creation of loop ileostomy, total abdominal hysterectomy and left salpingo-oophorectomy, distal right ureterectomy  She had a revision of her ileostomy 10/11/2020  Then needed stoma closure and anastomosis on 10/27/2020    CT imaging after 6 cycles was personally reviewed and shows a stable pulmonary nodule and otherwise ROBE. Today is cycle 11 of adjuvant FOLFOX x 6 months. 5FU bolus was introduced with cycle 2 and she tolerated this well with grade 1 neuropathy and grade 1 thrombocytopenia  She developed grade 3 neutropenia after cycle 4 which resulted in a 2 week delay. Neulasta was added.      2) Anemia  Due to #1  Iron sat of 4% and ferritin of 20 on 9/11  S/p 2 units PRBCs on 9/15 and Venofer 200mg x3 doses  Anemia has resolved     4) Elevated transaminases  US of abd was obtained and shows possible liver disease  Referred to hepatology - apt not available until June   Hep panel pending today     5) Neuropathy  Grade 1   Monitor     6) Neutropenia  Grade 3 after cycle 4   Added neulasta  Resolved     7) Thrombocytopenia  Grade 1 - pending today     8) Management of high risk medications like chemotherapy  Previous grade 3 neutropenia & added neulasta   Otherwise tolerating well with grade 1 constipation & grade 1 neuropathy  & grade 1 thrombocytopenia     9) Pulmonary nodule  Stable  Will monitor with imaging every 3-6 months     10) LE swelling  Will obtain ECHO and US of lower extremities      Plan:     · Proceed today with cycle 11 of mFOLFOX (5FU CADD 2400mg/m2, 5FU bolus 400mg/m2, leucovorin 400mg/m2, oxaliplatin 85mg/m2) every 2 weeks for 6 months   · Neulasta given high risk for grade IV neutropenia   · Cbc, cmp every cycle, cea every other  · Zofran prn, dexamethasone days 2 and 3  · Follow with surgery as scheduled   · ECHO / venous duplex   · Hepatology apt scheduled in June   · Hep panel pending     RTC in 2 weeks      Britton Monroy at [phone] [facility]    I appreciate the opportunity to participate in Ms. Beni carrington. I performed a history and physical examination of the patient and discussed his management with the NPP. 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 Has some splenomegaly and resulting thrombocytopenia.  Additional serologies sent today and hepatology referral is pending  She has no neuropathy, she has no diarrhea, labs otherwise stable to proceed  Signed By: Rene Campbell MD

## 2021-04-23 ENCOUNTER — HOSPITAL ENCOUNTER (OUTPATIENT)
Dept: INFUSION THERAPY | Age: 71
Discharge: HOME | End: 2021-04-23
Payer: MEDICARE

## 2021-04-23 VITALS
SYSTOLIC BLOOD PRESSURE: 123 MMHG | TEMPERATURE: 97.7 F | HEART RATE: 96 BPM | DIASTOLIC BLOOD PRESSURE: 77 MMHG | RESPIRATION RATE: 18 BRPM

## 2021-04-23 DIAGNOSIS — C18.7 MALIGNANT NEOPLASM OF SIGMOID COLON (HCC): Primary | ICD-10-CM

## 2021-04-23 PROCEDURE — 74011250636 HC RX REV CODE- 250/636: Performed by: REGISTERED NURSE

## 2021-04-23 PROCEDURE — 96523 IRRIG DRUG DELIVERY DEVICE: CPT

## 2021-04-23 PROCEDURE — 96377 APPLICATON ON-BODY INJECTOR: CPT

## 2021-04-23 RX ORDER — SODIUM CHLORIDE 9 MG/ML
10 INJECTION INTRAMUSCULAR; INTRAVENOUS; SUBCUTANEOUS AS NEEDED
Status: DISCONTINUED | OUTPATIENT
Start: 2021-04-23 | End: 2021-04-24 | Stop reason: HOSPADM

## 2021-04-23 RX ORDER — SODIUM CHLORIDE 0.9 % (FLUSH) 0.9 %
10 SYRINGE (ML) INJECTION AS NEEDED
Status: DISCONTINUED | OUTPATIENT
Start: 2021-04-23 | End: 2021-04-24 | Stop reason: HOSPADM

## 2021-04-23 RX ORDER — HEPARIN 100 UNIT/ML
300-500 SYRINGE INTRAVENOUS AS NEEDED
Status: DISCONTINUED | OUTPATIENT
Start: 2021-04-23 | End: 2021-04-24 | Stop reason: HOSPADM

## 2021-04-23 RX ADMIN — PEGFILGRASTIM 6 MG: KIT SUBCUTANEOUS at 16:18

## 2021-04-23 RX ADMIN — HEPARIN 500 UNITS: 100 SYRINGE at 16:18

## 2021-04-23 RX ADMIN — SODIUM CHLORIDE 10 ML: 9 INJECTION INTRAMUSCULAR; INTRAVENOUS; SUBCUTANEOUS at 16:18

## 2021-04-23 NOTE — PROGRESS NOTES
[facility] Progress Note    Date: April 23, 2021      1610: Pt arrived ambulatory to [facility] for Pump Disconnection + Port Flush + OB Neulasta in stable condition. Assessment completed. No other complaints voiced at this time. Pump completed at 12:45 pm per patient report. Pump stopped on arrival. Pump removed. Port flushed with positive blood return. Patient denies any symptoms of COVID-19, including SOB, coughing, fever, or generally not feeling well. Patient denies any recent exposure to COVID-19. Patient denies any recent contact with family or friends that have a pending COVID-19 test.    Patient Vitals for the past 12 hrs:   Temp Pulse Resp BP   04/23/21 1613 97.7 °F (36.5 °C) 96 18 123/77       Medications Administered     0.9% sodium chloride injection 10 mL     Admin Date  04/23/2021 Action  Given Dose  10 mL Route  IntraVENous Administered By  Kenia Passpernell          heparin (porcine) pf 300-500 Units     Admin Date  04/23/2021 Action  Given Dose  500 Units Route  InterCATHeter Administered By  Kenia Passpernell          pegfilgrastim (NEULASTA) wearable SQ injector 6 mg     Admin Date  04/23/2021 Action  Given Dose  6 mg Route  SubCUTAneous Administered By  Kenia Kim                1625: Tolerated treatment well, no adverse reactions noted. Port flushed, heparinized and de- accessed per protocol. D/Cd from [facility] ambulatory and in no distress. Patient is aware of next scheduled [facility] appointment on 5/5/21.     Future Appointments   Date Time Provider Yary Gomez   4/27/2021  1:00 PM ECHO LAB 2 [facility]   4/27/2021  2:30 PM VASCULAR ROOM 1 [facility]   5/5/2021  9:00 AM D3 WALKER [address]   5/5/2021  9:15 AM Wily Bryson  [address] BS AMB   5/7/2021  4:00 PM [facility]   5/19/2021  9:00 AM D3 WALKER LONG [address] [place] [address]   5/21/2021  4:30 PM H1 WALKER [facility]   5/25/2021  1:30 PM Carlene Aleman MD ELAINE Suarez RN  April 23, 2021

## 2021-04-27 ENCOUNTER — HOSPITAL ENCOUNTER (OUTPATIENT)
Dept: NON INVASIVE DIAGNOSTICS | Age: 71
Discharge: HOME | End: 2021-04-27
Attending: REGISTERED NURSE
Payer: MEDICARE

## 2021-04-27 ENCOUNTER — HOSPITAL ENCOUNTER (OUTPATIENT)
Dept: VASCULAR SURGERY | Age: 71
Discharge: HOME | End: 2021-04-27
Attending: REGISTERED NURSE
Payer: MEDICARE

## 2021-04-27 VITALS
WEIGHT: 121.25 LBS | BODY MASS INDEX: 19.49 KG/M2 | DIASTOLIC BLOOD PRESSURE: 70 MMHG | HEIGHT: 66 IN | SYSTOLIC BLOOD PRESSURE: 116 MMHG

## 2021-04-27 DIAGNOSIS — Z51.81 ENCOUNTER FOR MONITORING CARDIOTOXIC DRUG THERAPY: ICD-10-CM

## 2021-04-27 DIAGNOSIS — Z79.899 ENCOUNTER FOR MONITORING CARDIOTOXIC DRUG THERAPY: ICD-10-CM

## 2021-04-27 DIAGNOSIS — M79.89 LEG SWELLING: ICD-10-CM

## 2021-04-27 DIAGNOSIS — C18.7 MALIGNANT NEOPLASM OF SIGMOID COLON (HCC): ICD-10-CM

## 2021-04-27 LAB
ECHO AO ROOT DIAM: 3.45 CM
ECHO AV PEAK GRADIENT: 8.18 MMHG
ECHO AV PEAK VELOCITY: 143.01 CM/S
ECHO LA MAJOR AXIS: 2.79 CM
ECHO LA MINOR AXIS: 1.73 CM
ECHO LV INTERNAL DIMENSION DIASTOLIC: 3.42 CM (ref 3.9–5.3)
ECHO LV INTERNAL DIMENSION SYSTOLIC: 2.48 CM
ECHO LV IVSD: 0.77 CM (ref 0.6–0.9)
ECHO LV MASS 2D: 75.1 G (ref 67–162)
ECHO LV MASS INDEX 2D: 46.5 G/M2 (ref 43–95)
ECHO LV POSTERIOR WALL DIASTOLIC: 0.87 CM (ref 0.6–0.9)
ECHO LVOT PEAK GRADIENT: 4.95 MMHG
ECHO LVOT PEAK VELOCITY: 111.26 CM/S
ECHO MV A VELOCITY: 83.57 CM/S
ECHO MV E DECELERATION TIME (DT): 125.88 MS
ECHO MV E VELOCITY: 79.93 CM/S
ECHO MV E/A RATIO: 0.96
ECHO PV PEAK INSTANTANEOUS GRADIENT SYSTOLIC: 2.95 MMHG
ECHO RV TAPSE: 1.94 CM (ref 1.5–2)

## 2021-04-27 PROCEDURE — 93970 EXTREMITY STUDY: CPT

## 2021-04-27 PROCEDURE — 93306 TTE W/DOPPLER COMPLETE: CPT

## 2021-05-05 ENCOUNTER — OFFICE VISIT (OUTPATIENT)
Dept: ONCOLOGY | Age: 71
End: 2021-05-05
Payer: MEDICARE

## 2021-05-05 ENCOUNTER — HOSPITAL ENCOUNTER (OUTPATIENT)
Dept: INFUSION THERAPY | Age: 71
Discharge: HOME | End: 2021-05-05
Payer: MEDICARE

## 2021-05-05 VITALS
SYSTOLIC BLOOD PRESSURE: 119 MMHG | RESPIRATION RATE: 18 BRPM | DIASTOLIC BLOOD PRESSURE: 77 MMHG | HEIGHT: 66 IN | HEART RATE: 98 BPM | BODY MASS INDEX: 19.54 KG/M2 | WEIGHT: 121.6 LBS | TEMPERATURE: 96.9 F

## 2021-05-05 VITALS
HEART RATE: 98 BPM | OXYGEN SATURATION: 98 % | WEIGHT: 121 LBS | RESPIRATION RATE: 18 BRPM | DIASTOLIC BLOOD PRESSURE: 77 MMHG | TEMPERATURE: 96.9 F | SYSTOLIC BLOOD PRESSURE: 119 MMHG | BODY MASS INDEX: 19.53 KG/M2

## 2021-05-05 DIAGNOSIS — T45.1X5A CHEMOTHERAPY-INDUCED NEUROPATHY (HCC): ICD-10-CM

## 2021-05-05 DIAGNOSIS — Z51.11 ENCOUNTER FOR ANTINEOPLASTIC CHEMOTHERAPY: ICD-10-CM

## 2021-05-05 DIAGNOSIS — C18.7 MALIGNANT NEOPLASM OF SIGMOID COLON (HCC): Primary | ICD-10-CM

## 2021-05-05 DIAGNOSIS — D69.6 THROMBOCYTOPENIA (HCC): ICD-10-CM

## 2021-05-05 DIAGNOSIS — Z95.828 PORT-A-CATH IN PLACE: ICD-10-CM

## 2021-05-05 DIAGNOSIS — G62.0 CHEMOTHERAPY-INDUCED NEUROPATHY (HCC): ICD-10-CM

## 2021-05-05 LAB
ALBUMIN SERPL-MCNC: 3.5 G/DL (ref 3.5–5)
ALBUMIN/GLOB SERPL: 1 {RATIO} (ref 1.1–2.2)
ALP SERPL-CCNC: 218 U/L (ref 45–117)
ALT SERPL-CCNC: 42 U/L (ref 12–78)
ANION GAP SERPL CALC-SCNC: 9 MMOL/L (ref 5–15)
AST SERPL-CCNC: 43 U/L (ref 15–37)
BASOPHILS # BLD: 0 K/UL (ref 0–0.1)
BASOPHILS NFR BLD: 1 % (ref 0–1)
BILIRUB SERPL-MCNC: 1.8 MG/DL (ref 0.2–1)
BUN SERPL-MCNC: 10 MG/DL (ref 6–20)
BUN/CREAT SERPL: 16 (ref 12–20)
CALCIUM SERPL-MCNC: 9.2 MG/DL (ref 8.5–10.1)
CEA SERPL-MCNC: 2.6 NG/ML
CHLORIDE SERPL-SCNC: 106 MMOL/L (ref 97–108)
CO2 SERPL-SCNC: 23 MMOL/L (ref 21–32)
CREAT SERPL-MCNC: 0.63 MG/DL (ref 0.55–1.02)
DIFFERENTIAL METHOD BLD: ABNORMAL
EOSINOPHIL # BLD: 0.1 K/UL (ref 0–0.4)
EOSINOPHIL NFR BLD: 2 % (ref 0–7)
ERYTHROCYTE [DISTWIDTH] IN BLOOD BY AUTOMATED COUNT: 20.2 % (ref 11.5–14.5)
GLOBULIN SER CALC-MCNC: 3.4 G/DL (ref 2–4)
GLUCOSE SERPL-MCNC: 117 MG/DL (ref 65–100)
HCT VFR BLD AUTO: 33.7 % (ref 35–47)
HGB BLD-MCNC: 10.6 G/DL (ref 11.5–16)
IMM GRANULOCYTES # BLD AUTO: 0.1 K/UL (ref 0–0.04)
IMM GRANULOCYTES NFR BLD AUTO: 2 % (ref 0–0.5)
LYMPHOCYTES # BLD: 0.7 K/UL (ref 0.8–3.5)
LYMPHOCYTES NFR BLD: 14 % (ref 12–49)
MCH RBC QN AUTO: 29.1 PG (ref 26–34)
MCHC RBC AUTO-ENTMCNC: 31.5 G/DL (ref 30–36.5)
MCV RBC AUTO: 92.6 FL (ref 80–99)
MONOCYTES # BLD: 0.5 K/UL (ref 0–1)
MONOCYTES NFR BLD: 11 % (ref 5–13)
NEUTS SEG # BLD: 3.3 K/UL (ref 1.8–8)
NEUTS SEG NFR BLD: 70 % (ref 32–75)
NRBC # BLD: 0 K/UL (ref 0–0.01)
NRBC BLD-RTO: 0 PER 100 WBC
PLATELET # BLD AUTO: 84 K/UL (ref 150–400)
PMV BLD AUTO: 9.9 FL (ref 8.9–12.9)
POTASSIUM SERPL-SCNC: 3.5 MMOL/L (ref 3.5–5.1)
PROT SERPL-MCNC: 6.9 G/DL (ref 6.4–8.2)
RBC # BLD AUTO: 3.64 M/UL (ref 3.8–5.2)
RBC MORPH BLD: ABNORMAL
RBC MORPH BLD: ABNORMAL
SODIUM SERPL-SCNC: 138 MMOL/L (ref 136–145)
WBC # BLD AUTO: 4.7 K/UL (ref 3.6–11)

## 2021-05-05 PROCEDURE — G8536 NO DOC ELDER MAL SCRN: HCPCS | Performed by: INTERNAL MEDICINE

## 2021-05-05 PROCEDURE — G0463 HOSPITAL OUTPT CLINIC VISIT: HCPCS | Performed by: REGISTERED NURSE

## 2021-05-05 PROCEDURE — 1090F PRES/ABSN URINE INCON ASSESS: CPT | Performed by: INTERNAL MEDICINE

## 2021-05-05 PROCEDURE — G8427 DOCREV CUR MEDS BY ELIG CLIN: HCPCS | Performed by: INTERNAL MEDICINE

## 2021-05-05 PROCEDURE — 85025 COMPLETE CBC W/AUTO DIFF WBC: CPT

## 2021-05-05 PROCEDURE — 99215 OFFICE O/P EST HI 40 MIN: CPT | Performed by: INTERNAL MEDICINE

## 2021-05-05 PROCEDURE — 1101F PT FALLS ASSESS-DOCD LE1/YR: CPT | Performed by: INTERNAL MEDICINE

## 2021-05-05 PROCEDURE — 80053 COMPREHEN METABOLIC PANEL: CPT

## 2021-05-05 PROCEDURE — G8420 CALC BMI NORM PARAMETERS: HCPCS | Performed by: INTERNAL MEDICINE

## 2021-05-05 PROCEDURE — 74011250636 HC RX REV CODE- 250/636: Performed by: INTERNAL MEDICINE

## 2021-05-05 PROCEDURE — G8400 PT W/DXA NO RESULTS DOC: HCPCS | Performed by: INTERNAL MEDICINE

## 2021-05-05 PROCEDURE — 36591 DRAW BLOOD OFF VENOUS DEVICE: CPT

## 2021-05-05 PROCEDURE — G8432 DEP SCR NOT DOC, RNG: HCPCS | Performed by: INTERNAL MEDICINE

## 2021-05-05 PROCEDURE — G9711 PT HX TOT COL OR COLON CA: HCPCS | Performed by: INTERNAL MEDICINE

## 2021-05-05 PROCEDURE — 82378 CARCINOEMBRYONIC ANTIGEN: CPT

## 2021-05-05 PROCEDURE — 77030012965 HC NDL HUBR BBMI -A

## 2021-05-05 RX ORDER — SODIUM CHLORIDE 9 MG/ML
10 INJECTION INTRAMUSCULAR; INTRAVENOUS; SUBCUTANEOUS AS NEEDED
Status: DISCONTINUED | OUTPATIENT
Start: 2021-05-05 | End: 2021-05-06 | Stop reason: HOSPADM

## 2021-05-05 RX ORDER — HEPARIN 100 UNIT/ML
500 SYRINGE INTRAVENOUS AS NEEDED
Status: DISCONTINUED | OUTPATIENT
Start: 2021-05-05 | End: 2021-05-06 | Stop reason: HOSPADM

## 2021-05-05 RX ORDER — SODIUM CHLORIDE 9 MG/ML
10 INJECTION INTRAMUSCULAR; INTRAVENOUS; SUBCUTANEOUS AS NEEDED
Status: CANCELLED | OUTPATIENT
Start: 2021-06-16

## 2021-05-05 RX ORDER — HEPARIN 100 UNIT/ML
500 SYRINGE INTRAVENOUS AS NEEDED
Status: CANCELLED | OUTPATIENT
Start: 2021-06-16

## 2021-05-05 RX ORDER — SODIUM CHLORIDE 0.9 % (FLUSH) 0.9 %
5-10 SYRINGE (ML) INJECTION AS NEEDED
Status: CANCELLED | OUTPATIENT
Start: 2021-06-16

## 2021-05-05 RX ADMIN — HEPARIN 500 UNITS: 100 SYRINGE at 10:30

## 2021-05-05 RX ADMIN — SODIUM CHLORIDE 10 ML: 9 INJECTION INTRAMUSCULAR; INTRAVENOUS; SUBCUTANEOUS at 10:30

## 2021-05-05 NOTE — PROGRESS NOTES
Hiwot Carroll is a 80 y.o. female    Chief Complaint   Patient presents with    Chemotherapy     stage III colon cancer       1. Have you been to the ER, urgent care clinic since your last visit? Hospitalized since your last visit? No    2. Have you seen or consulted any other health care providers outside of the [address] [place] [address] since your last visit? Include any pap smears or colon screening.  No

## 2021-05-05 NOTE — PROGRESS NOTES
[facility] [place] [address] [address], [address]  W: [phone]  F: [phone]    Reason for Visit:   Joy Petty is a 80 y.o. female who is seen for stage III colon cancer. Treatment History:   · 9/10/2020 CT A/P - large, irregular pelvic mass measuring approximately 9cm likely representing neoplasm arising from the sigmoid colon, small amount of pelvic free fluid, colonic bowel wall thickening. Borderline enlarged retroperitoneal lymph nodes. 6 mm right lower lobe lung nodule. Mass effect from pelvic mass causing moderate right and minimal left hydronephrosis. · 9/14/2020: Colonoscopy - A large circumferential, ulcerated fungating mass was noted in proximal sigmoid colon. Mass was obstructing the lumen and could not be traversed. Peter Elpidio was present. · 9/15/2020: CT Chest - 7.5mm left lower lobe pulmonary nodule, small right pleural effusion, mild right basilar atelectasis  · 9/17/2020: Surgical resection - low anterior resection, mobilization of the splenic flexure, coloproctostomy, creation of loop ileostomy, total abdominal hysterectomy and left salpingo-oophorectomy, distal right ureterectomy, right ureteral re-implant with psoas hitch and placement of right ureteral stent  · 11/17/20: cycle 1 FOLFOX  · 2/17/21 CT CAP: pulmonary nodule stable, ROBE     History of Present Illness:   Patient is a 80 y.o. female seen for colon cancer    She presented to the ED on 9/11/2020 with complaints of right flank/back pain and RLQ abdominal pain. She states she has had several months of gas pain and \"blockages. \" She reports a weight loss of 12 lbs in the last 3 months. CT abd/pelv 9/10/2020 showed large, irregular pelvic mass measuring approximately 9cm likely representing neoplasm arising from the sigmoid colon. She also had hydronephrosis from the mass effect and urethral stent placed.  CEA 7.4 on 9/11and colonoscopy performed 9/14 with biopsy + adenocarcinoma. CT Chest performed showing 7.5mm left lower lobe pulmonary nodule. Mass found to be invading into the uterus and right ureter. Surgical resection performed on 9/17/2020 including low anterior resection, mobilization of the splenic flexure, coloproctostomy, creation of loop ileostomy, total abdominal hysterectomy and left salpingo-oophorectomy, distal right ureterectomy, right ureteral re-implant with psoas hitch and placement of right ureteral stent. She was to see me in clinic but was admitted 10/10/2020 with ileostomy prolapse. Had a revision of loop ileostomy 10/11/2020. Then needed ileostomy closure and anastomosis 10/27/2020. Today she presents for cycle 12 of FOLFOX. She is tolerating well. Cold sensation improves a few days after chemotherapy. Denies nausea/vomting. States she is eating and drinking well. Denies constipation/diarrhea. She denies SOB, CP, cough, fevers/chills, bleeding. Has some LE swelling that improves with elevation. No other complaints today. Has appointment at the [place] [id number] at the end of the month. Family Hx:  Mother with hx of pancreatic cancer    Past Medical History:   Diagnosis Date    [facility] ([facility]     GERD (gastroesophageal reflux disease)     Ileostomy in place ([facility]     Ileostomy prolapse ([facility]     Ill-defined condition     Spaulding's esophagus      Past Surgical History:   Procedure Laterality Date    COLONOSCOPY Left 9/14/2020    COLONOSCOPY performed by Kat Vega MD at [address] [place] [address]; incomplete secondary to partial obstruction.  HX APPENDECTOMY  age 17    HX COLONOSCOPY  circa 2010    No neoplasms.  HX ENDOSCOPY  03/13/2017    Dr. Roxane Alcantara HX ENDOSCOPY  02/13/2020    Dr. Amarjit Warren oopherectomy for treatment of benign mass.     HX GYN  09/17/2020    Total abdominal hysterectomy and left salpingo-oophorectomy; Anaid Polanco MD.    HX OTHER SURGICAL  09/17/2020    Low anterior resection, mobilization of the splenic flexure, coloproctostomy, and creation of loop ileostomy; Dr. Willem Sierra.   OTHER SURGICAL  10/11/2020    Revision of loop ileostomy (resection and creation of divided loop ileostomy); Dr. Willem Sierra.   UROLOGICAL  09/12/2020    Cystoscopy and placement of a right ureteral stent; Jazzy Chan III, MD.     UROLOGICAL  09/17/2020    Cystoscopy and placement of a temporary left ureteral catheter; Jazzy Chan III, MD.     UROLOGICAL  09/17/2020    Distal right ureterectomy; Jazzy Chan III, MD.     UROLOGICAL  09/17/2020    Right ureteral re-implantation with psoas hitch and placement of a right ureteral stent; Lian Dickey MD.    IR INSERT TUNL CVAD W PORT LESS THAN 5 YR  10/14/2020    Right chest Port-a-Cath placement. Social History     Tobacco Use    Smoking status: Never Smoker    Smokeless tobacco: Never Used   Substance Use Topics    Alcohol use: Yes     Alcohol/week: 1.0 standard drinks     Types: 1 Glasses of wine per week      Family History   Problem Relation Age of Onset    Cancer Mother     Heart Disease Father     Diabetes Brother      Current Outpatient Medications   Medication Sig    lidocaine-prilocaine (EMLA) topical cream Apply  to affected area as needed for Pain. Apply over port a cath site 30-60 minutes prior to port access    dexAMETHasone (DECADRON) 4 mg tablet Take 2 tabs (8mg) once daily on days 2 & 3 of chemotherapy only    ondansetron (ZOFRAN ODT) 8 mg disintegrating tablet Take 1 Tab by mouth every eight (8) hours as needed for Nausea or Vomiting. Can start 72 hours after chemotherapy infusion    denosumab (PROLIA) 60 mg/mL injection 60 mg by SubCUTAneous route.  lansoprazole (PREVACID) 30 mg capsule Take 30 mg by mouth Daily (before breakfast).  multivitamin (ONE A DAY) tablet Take 1 Tab by mouth daily.  ascorbic acid, vitamin C, (VITAMIN C) 500 mg tablet Take 500 mg by mouth.     cholecalciferol (VITAMIN D3) 1,000 unit cap Take 1,000 Units by mouth daily.  Omega-3 Fatty Acids (FISH OIL) 500 mg cap Take  by mouth. No current facility-administered medications for this visit. No Known Allergies     Review of Systems: A complete review of systems was obtained, negative except as described above. Physical Exam:     Visit Vitals  /77 (BP 1 Location: Left upper arm, BP Patient Position: Sitting)   Pulse 98   Temp 96.9 °F (36.1 °C)   Resp 18   Wt 121 lb (54.9 kg)   SpO2 98%   BMI 19.53 kg/m²     ECOG PS: 1  General: No distress but appears frail  Eyes: PERRL, anicteric sclerae  HENT: Atraumatic  RESP: normal respiratory effort  CV: port with no erythema or edema, 1+ bilat LE edema  MS: Digits without clubbing or cyanosis. Skin: No rashes, ecchymoses, or petechiae. Psych: Alert, oriented, appropriate affect, normal judgment/insight    Results:     Lab Results   Component Value Date/Time    WBC 4.7 05/05/2021 09:11 AM    HGB 10.6 (L) 05/05/2021 09:11 AM    HCT 33.7 (L) 05/05/2021 09:11 AM    PLATELET 84 (L) 44/03/1603 09:11 AM    MCV 92.6 05/05/2021 09:11 AM    ABS. NEUTROPHILS PENDING 05/05/2021 09:11 AM    Hemoglobin (POC) 12.0 09/17/2020 06:35 PM     Lab Results   Component Value Date/Time    Sodium 137 04/21/2021 09:17 AM    Potassium 3.3 (L) 04/21/2021 09:17 AM    Chloride 106 04/21/2021 09:17 AM    CO2 24 04/21/2021 09:17 AM    Glucose 97 04/21/2021 09:17 AM    BUN 10 04/21/2021 09:17 AM    Creatinine 0.56 04/21/2021 09:17 AM    GFR est AA >60 04/21/2021 09:17 AM    GFR est non-AA >60 04/21/2021 09:17 AM    Calcium 9.6 04/21/2021 09:17 AM    Glucose (POC) 98 09/27/2020 11:39 PM    Creatinine (POC) 0.8 09/10/2020 02:32 PM     Lab Results   Component Value Date/Time    Bilirubin, total 1.6 (H) 04/21/2021 09:17 AM    ALT (SGPT) 39 04/21/2021 09:17 AM    Alk.  phosphatase 206 (H) 04/21/2021 09:17 AM    Protein, total 6.8 04/21/2021 09:17 AM    Albumin 3.4 (L) 04/21/2021 09:17 AM    Globulin 3.4 04/21/2021 09:17 AM     CEA:  Recent Labs     04/07/21  0919 03/10/21  0919 01/29/21  0807 01/12/21  0806 12/15/20  0806 11/17/20  0839 09/11/20  1455   CEA 2.4 2.0 1.7 1.8 1.5 1.0 7.4       CT A/P 9/23/2020  IMPRESSION:  Large, irregular pelvic mass measuring approximately 9 cm likely representing  neoplasm arising from the sigmoid colon. Adnexal neoplasm is a possibility. This  does appear to be separate from the uterus. Small amount of pelvic free fluid. Associated colonic bowel wall thickening. There is focal gas in the upper  presacral region which is unclear if this is intraluminal or contained  extraluminal collection.     No definite distant metastatic disease. Borderline enlarged retroperitoneal  lymph nodes. 6 mm right lower lobe lung nodule. Mass effect from the pelvic mass  causing moderate right and minimal left hydronephrosis. CT Chest 9/15/2020  IMPRESSION:  1. 7.5 mm left lower lobe pulmonary nodule. 2. Small right pleural effusion. 3. Mild right basilar atelectasis. 9/14/2020   Addendum Diagnosis   1. Sigmoid Mass, Biopsy:     Infiltrating adenocarcinoma, most consistent with colon (See comment)   Addendum Comment   Immunohistochemical stains reveal the following staining pattern:   CK7: Scattered cells with cytoplasmic membrane staining   CK20: Positive cytoplasmic staining in normal colonic mucosal epithelial cells and malignant epithelial cells   CDX-2: Diffuse strong nuclear decoration and all tumor cells and normal background colonic epithelial cells   PAX-8: Negative   Estrogen Receptor: Negative   Positive and negative controls stain appropriately. Due to radiologic findings suggesting the possibility of GYN involvement, ER and PAX-8 are performed, which lend support to the diagnosis of a colonic primary, over a tumor of müllerian origin. 9/17/2020   FINAL PATHOLOGIC DIAGNOSIS   1.  Sigmoid colon and proximal rectum, uterus, left fallopian tube and ovary and right ureteral segment, low anterior resection:   Sigmoid colon and proximal rectum:  Invasive adenocarcinoma (see synoptic report and comment). Metastatic adenocarcinoma in one of sixteen lymph nodes (1/16). Uterus: Invasive adenocarcinoma extending from adhesed colon into uterine serosa. Endometrial lining shows mucosal atrophy. Left ovary: Benign ovary with serosal inclusion cysts. Left fallopian tube: Benign fallopian tube. Right ureteral segment: Benign segment of ureter densely adhesed to colon. Nodule near right uterine cornu:  Nodular portion of benign smooth muscle consistent with leiomyoma with parenchymal hemorrhage. COLON AND RECTUM: Resection   SPECIMEN   Procedure: Low anterior resection   TUMOR   Tumor Site: Sigmoid colon   Histologic Type: Adenocarcinoma   Histologic Grade: G2: Moderately differentiated   Tumor Size: 8.0 cm   Tumor Extension: Tumor directly invades adjacent structures:   Posterior uterine serosa   Macroscopic Tumor Perforation: Tumor invades through serosa into surrounding soft tissue   Lymphovascular Invasion: Present   Perineural Invasion: Not identified   Treatment Effect: No known presurgical therapy   MARGINS   Margins: All margins are uninvolved by invasive carcinoma   Margins Examined: Proximal, Distal, Radial (circumferential) or   Mesenteric   Distance of Tumor from Radial (circumferential) Margin: See Comment   LYMPH NODES   Number of Lymph Nodes Involved: 1   Number of Lymph Nodes Examined: 16   Tumor Deposits: Not identified   PATHOLOGIC STAGE CLASSIFICATION (pTNM, AJCC 8th Edition)   Primary Tumor (pT): pT4b   Regional Lymph Nodes (pN): pN1a   2. Distal rectal margin:   Segment of benign large bowel. 3. Anastomotic doughnuts:   Two annular portions of benign large bowel. Comment   Tumor invades into the adherent soft tissue and to within 1.1 cm of the apparent right pelvic sidewall margin.      CT CAP 2/17/21:  IMPRESSION  1. 8 mm left lower lobe pulmonary nodule, unchanged. 2. Trace fluid within the left hemipelvis. Records reviewed and summarized above. Pathology report(s) reviewed above. Radiology report(s) reviewed above. Assessment:   1) Sigmoid Colon Adenocarcinoma - Stage IIIB- ERICKA  [id number]  Surgical resection on 9/17 with creation of loop ileostomy, total abdominal hysterectomy and left salpingo-oophorectomy, distal right ureterectomy  She had a revision of her ileostomy 10/11/2020 then needed stoma closure and anastomosis on 10/27/2020    CT imaging after 6 cycles was personally reviewed and shows a stable pulmonary nodule and otherwise ROBE. Today is cycle 12 of adjuvant FOLFOX x 6 months. 5FU bolus was introduced with cycle 2 and she tolerated this well with grade 1 neuropathy and grade 1 thrombocytopenia  She developed grade 3 neutropenia after cycle 4 which resulted in a 2 week delay. Neulasta was added. 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 We will hold chemotherapy today and she will go on surveillance. We received colon cancer surveillance guidelines which include H&P every 3-6 months for 2 years, then every 6 months for a total of 5 years. CEA every 3-6 months for 2 years, then every 6 months for a total of 5 years. CT AP every 6-12 months for a total of 5 years. Colonoscopy 1 year after surgery.      2) Anemia  Due to #1  Iron sat of 4% and ferritin of 20 on 9/11  S/p 2 units PRBCs on 9/15 and Venofer 200mg x3 doses  Anemia has resolved     4) Elevated transaminases  US of abd was obtained and shows possible liver disease  Referred to hepatology - apt scheduled end of May   Hep panel negative     5) Neuropathy  Grade 1   Monitor     6) Neutropenia  Grade 3 after cycle 4   Added neulasta  Resolved     7) Thrombocytopenia  Grade 1    8) Management of high risk medications like chemotherapy  Previous grade 3 neutropenia & added neulasta   Otherwise tolerating well with grade 1 constipation & grade 1 neuropathy  & grade 1 thrombocytopenia   No further chemotherapy     9) Pulmonary nodule  Stable  Will monitor with imaging every 3-6 months     10) LE swelling  Doppler negative  ECHO reviewed     11) Pain score  0    Plan:     · No further FOLFOX   · See [facility] at the end of May  · Repeat CT CAP in August 2020   · Colonoscopy 1 year after surgery  · Will retain port until next CT if ROBE with PF every 6 weeks with labs every 3 months     RTC in 3 months for scan review     I appreciate the opportunity to participate in [facility] charissa. I performed a history and physical examination of the patient and discussed his management with the NPP. I reviewed the NPP note and agree with the documented findings and plan of care  Amadou Hi has been on adjuvant FOLFOX for stage IIIB colon caner  Now has completed cycle 11, has recurrent thrombocytopenia, worsening LFTs and neuropathy.  Weighing the risks and benefits we will forgo cycle 12 and forgo surveillance as reviewed above  On exam has no icterus  Labs reviewed today persistent hyperbilirubinemia  Signed By: Pepe Montes MD

## 2021-05-05 NOTE — PROGRESS NOTES
[facility] Chemo Progress Note    Date: May 5, 2021      0900: Ms. Glo Melendez Arrived to [facility] for  C12 FOLFOX ambulatory in stable condition. Assessment was completed, no acute issues at this time, no new complaints voiced. Port accessed with positive blood return. Labs drawn and sent for processing. Went to provider appointment with Medical Oncology. Patient denies any symptoms of COVID-19, including SOB, coughing, fever, or generally not feeling well. Patient denies any recent exposure to COVID-19. Patient denies any recent contact with family or friends that have a pending COVID-19 test.    1025 Returned from provider appointment. Labs reviewed. Platelets = 44W. Treatment to be HELD. Chemotherapy Flowsheet 5/5/2021   Cycle C12   Date 5/5/2021   Drug / Regimen FOLFOX   Dosage -   Time Up -   Time Down -   Pre Hydration -   Post Hydration -   Pre Meds -   Notes HELD       Ms. Tatum's vitals were reviewed. Patient Vitals for the past 12 hrs:   Temp Pulse Resp BP   05/05/21 0902 96.9 °F (36.1 °C) 98 18 119/77       Lab results were obtained and reviewed. Recent Results (from the past 12 hour(s))   CBC WITH AUTOMATED DIFF    Collection Time: 05/05/21  9:11 AM   Result Value Ref Range    WBC 4.7 3.6 - 11.0 K/uL    RBC 3.64 (L) 3.80 - 5.20 M/uL    HGB 10.6 (L) 11.5 - 16.0 g/dL    HCT 33.7 (L) 35.0 - 47.0 %    MCV 92.6 80.0 - 99.0 FL    MCH 29.1 26.0 - 34.0 PG    MCHC 31.5 30.0 - 36.5 g/dL    RDW 20.2 (H) 11.5 - 14.5 %    PLATELET 84 (L) 929 - 400 K/uL    MPV 9.9 8.9 - 12.9 FL    NRBC 0.0 0  WBC    ABSOLUTE NRBC 0.00 0.00 - 0.01 K/uL    NEUTROPHILS 70 32 - 75 %    LYMPHOCYTES 14 12 - 49 %    MONOCYTES 11 5 - 13 %    EOSINOPHILS 2 0 - 7 %    BASOPHILS 1 0 - 1 %    IMMATURE GRANULOCYTES 2 (H) 0.0 - 0.5 %    ABS. NEUTROPHILS 3.3 1.8 - 8.0 K/UL    ABS. LYMPHOCYTES 0.7 (L) 0.8 - 3.5 K/UL    ABS. MONOCYTES 0.5 0.0 - 1.0 K/UL    ABS. EOSINOPHILS 0.1 0.0 - 0.4 K/UL    ABS. BASOPHILS 0.0 0.0 - 0.1 K/UL    ABS. IMM. GRANS. 0.1 (H) 0.00 - 0.04 K/UL    DF SMEAR SCANNED      RBC COMMENTS ANISOCYTOSIS  2+        RBC COMMENTS POLYCHROMASIA  PRESENT       METABOLIC PANEL, COMPREHENSIVE    Collection Time: 05/05/21  9:11 AM   Result Value Ref Range    Sodium 138 136 - 145 mmol/L    Potassium 3.5 3.5 - 5.1 mmol/L    Chloride 106 97 - 108 mmol/L    CO2 23 21 - 32 mmol/L    Anion gap 9 5 - 15 mmol/L    Glucose 117 (H) 65 - 100 mg/dL    BUN 10 6 - 20 MG/DL    Creatinine 0.63 0.55 - 1.02 MG/DL    BUN/Creatinine ratio 16 12 - 20      GFR est AA >60 >60 ml/min/1.73m2    GFR est non-AA >60 >60 ml/min/1.73m2    Calcium 9.2 8.5 - 10.1 MG/DL    Bilirubin, total 1.8 (H) 0.2 - 1.0 MG/DL    ALT (SGPT) 42 12 - 78 U/L    AST (SGOT) 43 (H) 15 - 37 U/L    Alk. phosphatase 218 (H) 45 - 117 U/L    Protein, total 6.9 6.4 - 8.2 g/dL    Albumin 3.5 3.5 - 5.0 g/dL    Globulin 3.4 2.0 - 4.0 g/dL    A-G Ratio 1.0 (L) 1.1 - 2.2     CEA    Collection Time: 05/05/21  9:11 AM   Result Value Ref Range    CEA 2.6 ng/mL       Medications Administered     0.9% sodium chloride injection 10 mL     Admin Date  05/05/2021 Action  Given Dose  10 mL Route  IntraVENous Administered By  [facility]          heparin (porcine) pf 500 Units     Admin Date  05/05/2021 Action  Given Dose  500 Units Route  IntraVENous Administered By  [address] [facility] [address] [place] [address] Patient tolerated treatment well. Port maintained positive blood return throughout treatment. Port flushed, heparinized, and de accessed per protocol. Patient was discharged in stable condition. Patient is aware of next scheduled [facility] appointment on 6/16/21 for Nicholas Foster.       Quan De La Rosa RN  May 5, 2021

## 2021-05-07 ENCOUNTER — APPOINTMENT (OUTPATIENT)
Dept: INFUSION THERAPY | Age: 71
End: 2021-05-07

## 2021-05-11 ENCOUNTER — HOSPITAL ENCOUNTER (OUTPATIENT)
Dept: CT IMAGING | Age: 71
Discharge: HOME | End: 2021-05-11
Attending: REGISTERED NURSE
Payer: MEDICARE

## 2021-05-11 ENCOUNTER — HOSPITAL ENCOUNTER (OUTPATIENT)
Dept: CT IMAGING | Age: 71
End: 2021-05-11
Attending: REGISTERED NURSE
Payer: MEDICARE

## 2021-05-11 DIAGNOSIS — C18.7 MALIGNANT NEOPLASM OF SIGMOID COLON (HCC): ICD-10-CM

## 2021-05-11 PROCEDURE — 74177 CT ABD & PELVIS W/CONTRAST: CPT

## 2021-05-11 PROCEDURE — 71260 CT THORAX DX C+: CPT

## 2021-05-11 PROCEDURE — 74011000636 HC RX REV CODE- 636: Performed by: RADIOLOGY

## 2021-05-11 RX ADMIN — IOHEXOL 50 ML: 240 INJECTION, SOLUTION INTRATHECAL; INTRAVASCULAR; INTRAVENOUS; ORAL at 14:02

## 2021-05-11 RX ADMIN — IOPAMIDOL 100 ML: 755 INJECTION, SOLUTION INTRAVENOUS at 14:02

## 2021-05-19 ENCOUNTER — APPOINTMENT (OUTPATIENT)
Dept: INFUSION THERAPY | Age: 71
End: 2021-05-19

## 2021-05-21 ENCOUNTER — APPOINTMENT (OUTPATIENT)
Dept: INFUSION THERAPY | Age: 71
End: 2021-05-21

## 2021-05-21 ENCOUNTER — TELEPHONE (OUTPATIENT)
Dept: ONCOLOGY | Age: 71
End: 2021-05-21

## 2021-05-24 NOTE — TELEPHONE ENCOUNTER
5/24/21 10am - Called patient, ID verified [phone]. This RN informed patient that her US showed no clots in her legs and CT showed no evidence of cancer. Patient was very grateful for this information. Patient also stated she needed to set up a 6 week port flush, this RN informed patient that she has a upcoming appointment 6/16/21 at 1pm in [facility] and will get a port flush then. Patient understood and had no other questions or concerns.          Please let her know US showed no clots in her legs and CT showed no evidence of cancer

## 2021-06-02 ENCOUNTER — OFFICE VISIT (OUTPATIENT)
Dept: HEMATOLOGY | Age: 71
End: 2021-06-02
Payer: MEDICARE

## 2021-06-02 ENCOUNTER — APPOINTMENT (OUTPATIENT)
Dept: INFUSION THERAPY | Age: 71
End: 2021-06-02

## 2021-06-02 VITALS
HEIGHT: 66 IN | WEIGHT: 119.8 LBS | OXYGEN SATURATION: 98 % | TEMPERATURE: 96.9 F | SYSTOLIC BLOOD PRESSURE: 108 MMHG | DIASTOLIC BLOOD PRESSURE: 74 MMHG | BODY MASS INDEX: 19.25 KG/M2 | RESPIRATION RATE: 16 BRPM | HEART RATE: 97 BPM

## 2021-06-02 DIAGNOSIS — D50.9 IRON DEFICIENCY ANEMIA, UNSPECIFIED IRON DEFICIENCY ANEMIA TYPE: Chronic | ICD-10-CM

## 2021-06-02 DIAGNOSIS — R94.5 NONSPECIFIC ABNORMAL RESULTS OF LIVER FUNCTION STUDY: ICD-10-CM

## 2021-06-02 DIAGNOSIS — R74.8 ELEVATED LIVER ENZYMES: Primary | ICD-10-CM

## 2021-06-02 DIAGNOSIS — E55.9 VITAMIN D DEFICIENCY: ICD-10-CM

## 2021-06-02 DIAGNOSIS — Z11.59 ENCOUNTER FOR SCREENING FOR OTHER VIRAL DISEASES: ICD-10-CM

## 2021-06-02 PROBLEM — K21.9 GASTROESOPHAGEAL REFLUX DISEASE WITHOUT ESOPHAGITIS: Status: ACTIVE | Noted: 2021-06-02

## 2021-06-02 PROBLEM — M85.89 OSTEOPENIA OF MULTIPLE SITES: Status: ACTIVE | Noted: 2021-06-02

## 2021-06-02 PROBLEM — G45.9 TIA (TRANSIENT ISCHEMIC ATTACK): Status: ACTIVE | Noted: 2021-06-02

## 2021-06-02 PROBLEM — Z90.49 HISTORY OF APPENDECTOMY: Status: ACTIVE | Noted: 2021-06-02

## 2021-06-02 PROBLEM — M41.20 IDIOPATHIC SCOLIOSIS: Status: ACTIVE | Noted: 2021-06-02

## 2021-06-02 LAB
25(OH)D3 SERPL-MCNC: 61.9 NG/ML (ref 30–100)
ALBUMIN SERPL-MCNC: 3.9 G/DL (ref 3.5–5)
ALBUMIN/GLOB SERPL: 1.1 {RATIO} (ref 1.1–2.2)
ALP SERPL-CCNC: 295 U/L (ref 45–117)
ALT SERPL-CCNC: 53 U/L (ref 12–78)
ANION GAP SERPL CALC-SCNC: 6 MMOL/L (ref 5–15)
AST SERPL-CCNC: 49 U/L (ref 15–37)
BASOPHILS # BLD: 0 K/UL (ref 0–0.1)
BASOPHILS NFR BLD: 1 % (ref 0–1)
BILIRUB DIRECT SERPL-MCNC: 0.5 MG/DL (ref 0–0.2)
BILIRUB SERPL-MCNC: 1.2 MG/DL (ref 0.2–1)
BUN SERPL-MCNC: 12 MG/DL (ref 6–20)
BUN/CREAT SERPL: 23 (ref 12–20)
CALCIUM SERPL-MCNC: 9.6 MG/DL (ref 8.5–10.1)
CHLORIDE SERPL-SCNC: 102 MMOL/L (ref 97–108)
CO2 SERPL-SCNC: 27 MMOL/L (ref 21–32)
CREAT SERPL-MCNC: 0.52 MG/DL (ref 0.55–1.02)
DIFFERENTIAL METHOD BLD: ABNORMAL
EOSINOPHIL # BLD: 0.1 K/UL (ref 0–0.4)
EOSINOPHIL NFR BLD: 4 % (ref 0–7)
ERYTHROCYTE [DISTWIDTH] IN BLOOD BY AUTOMATED COUNT: 16.3 % (ref 11.5–14.5)
FERRITIN SERPL-MCNC: 166 NG/ML (ref 8–252)
GLOBULIN SER CALC-MCNC: 3.7 G/DL (ref 2–4)
GLUCOSE SERPL-MCNC: 88 MG/DL (ref 65–100)
HBV SURFACE AB SER QL: NONREACTIVE
HBV SURFACE AB SER-ACNC: <3.1 MIU/ML
HCT VFR BLD AUTO: 38.1 % (ref 35–47)
HGB BLD-MCNC: 12 G/DL (ref 11.5–16)
IMM GRANULOCYTES # BLD AUTO: 0 K/UL (ref 0–0.04)
IMM GRANULOCYTES NFR BLD AUTO: 0 % (ref 0–0.5)
IRON SATN MFR SERPL: 16 % (ref 20–50)
IRON SERPL-MCNC: 71 UG/DL (ref 35–150)
LYMPHOCYTES # BLD: 0.5 K/UL (ref 0.8–3.5)
LYMPHOCYTES NFR BLD: 20 % (ref 12–49)
MCH RBC QN AUTO: 30.2 PG (ref 26–34)
MCHC RBC AUTO-ENTMCNC: 31.5 G/DL (ref 30–36.5)
MCV RBC AUTO: 95.7 FL (ref 80–99)
MONOCYTES # BLD: 0.3 K/UL (ref 0–1)
MONOCYTES NFR BLD: 11 % (ref 5–13)
NEUTS SEG # BLD: 1.6 K/UL (ref 1.8–8)
NEUTS SEG NFR BLD: 64 % (ref 32–75)
NRBC # BLD: 0 K/UL (ref 0–0.01)
NRBC BLD-RTO: 0 PER 100 WBC
PLATELET # BLD AUTO: 164 K/UL (ref 150–400)
PMV BLD AUTO: 10 FL (ref 8.9–12.9)
POTASSIUM SERPL-SCNC: 3.7 MMOL/L (ref 3.5–5.1)
PROT SERPL-MCNC: 7.6 G/DL (ref 6.4–8.2)
RBC # BLD AUTO: 3.98 M/UL (ref 3.8–5.2)
RBC MORPH BLD: ABNORMAL
SODIUM SERPL-SCNC: 135 MMOL/L (ref 136–145)
TIBC SERPL-MCNC: 453 UG/DL (ref 250–450)
WBC # BLD AUTO: 2.5 K/UL (ref 3.6–11)

## 2021-06-02 PROCEDURE — G0463 HOSPITAL OUTPT CLINIC VISIT: HCPCS | Performed by: NURSE PRACTITIONER

## 2021-06-02 PROCEDURE — G8432 DEP SCR NOT DOC, RNG: HCPCS | Performed by: NURSE PRACTITIONER

## 2021-06-02 PROCEDURE — 1090F PRES/ABSN URINE INCON ASSESS: CPT | Performed by: NURSE PRACTITIONER

## 2021-06-02 PROCEDURE — G8536 NO DOC ELDER MAL SCRN: HCPCS | Performed by: NURSE PRACTITIONER

## 2021-06-02 PROCEDURE — G8400 PT W/DXA NO RESULTS DOC: HCPCS | Performed by: NURSE PRACTITIONER

## 2021-06-02 PROCEDURE — G8427 DOCREV CUR MEDS BY ELIG CLIN: HCPCS | Performed by: NURSE PRACTITIONER

## 2021-06-02 PROCEDURE — 1101F PT FALLS ASSESS-DOCD LE1/YR: CPT | Performed by: NURSE PRACTITIONER

## 2021-06-02 PROCEDURE — G8420 CALC BMI NORM PARAMETERS: HCPCS | Performed by: NURSE PRACTITIONER

## 2021-06-02 PROCEDURE — 99204 OFFICE O/P NEW MOD 45 MIN: CPT | Performed by: NURSE PRACTITIONER

## 2021-06-02 NOTE — PROGRESS NOTES
Meño Simon MD, Ben Byrd MD, MPH      DAVID Adorno, ACNP-BC     Yaritza Birdworth, AGPCNP-BC   Beth Rojas FNLIZETH-JANE Hamm, [facility]NP-BC       Kyle Deputado Colin [address]    at [facility]    [facility], [address] [place] [address], [facility].    [phone]    FAX: [address] [place] [address]    at [address], [address]    [phone]    [address] [place] [address] Street: [phone]       Patient Care Team:  Nidhi Rodriguez MD as PCP - General (Family Medicine)  Merline Bruns, MD (Hematology and Oncology)  Batsheva Hummel MD (Colon and Rectal Surgery)  Harshad Cowart MD (Neurology)  Emelina Wiley MD (Obstetrics & Gynecology)  Richie Welsh MD (Urology)      Problem List  Date Reviewed: 5/5/2021        Codes Class Noted    TIA (transient ischemic attack) ICD-10-CM: G45.9  ICD-9-CM: 435.9  6/2/2021        Elevated liver enzymes ICD-10-CM: R74.8  ICD-9-CM: 790.5  6/2/2021        Gastroesophageal reflux disease without esophagitis ICD-10-CM: K21.9  ICD-9-CM: 530.81  6/2/2021        History of appendectomy ICD-10-CM: Z90.49  ICD-9-CM: V45.89  6/2/2021        Osteopenia of multiple sites ICD-10-CM: M85.89  ICD-9-CM: 733.90  6/2/2021        Idiopathic scoliosis ICD-10-CM: M41.20  ICD-9-CM: 737.30  6/2/2021        Acute intestinal ischemia [facility]) ICD-10-CM: K55.059  ICD-9-CM: 557.0  10/25/2020        Ileostomy prolapse ([facility] ICD-10-CM: K94.19  ICD-9-CM: 569.69  10/10/2020        Severe protein-calorie malnutrition ([facility] (Chronic) ICD-10-CM: K30  ICD-9-CM: 262  9/16/2020        Anemia (Chronic) ICD-10-CM: D64.9  ICD-9-CM: 285.9  9/16/2020        Malignant neoplasm of sigmoid colon ([facility] (Chronic) ICD-10-CM: C18.7  ICD-9-CM: 153.3 9/14/2020        Hydronephrosis ICD-10-CM: N13.30  ICD-9-CM: 362  9/11/2020              The clinicians listed above have asked me to see Reed Bee in consultation regarding elevated liver enzymes and its management. All medical records sent by the referring physicians were reviewed including imaging studies and pathology. The patient is a 80 y.o.  female who was found to have elevated  liver enzymes and alkaline phosphatase in 9/2020. The patient went to the ER with back pain 9/2020. A CT was performed and she was found to have a 9 cm pelvic mass. Surgical resection of the mass was performed 9/17/2020 which included a lower anterior resection with coloproctostomy with ileostomy, total hysterectomy with left oophorectomy, distal right uterectomy with right urethral re-implant. 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 FOLFOX 1 of 12 was initiated 11/17/2020. She completed 11 treatments with the last being 5/05/2021. Serologic evaluation for markers of chronic liver disease were negative for HBV and HCV. Ultrasound of the liver was performed in 4/2021. The results of the imaging suggested chronic liver disease. Mild splenomegaly. An assessment of liver fibrosis with biopsy, Fibroscan or elastography has not been performed. The patient does not have any symptoms which could be attributed to the liver disorder. The patient is not experiencing the following symptoms which are commonly seen in this liver disorder:  pain in the right side over the liver, yellowing of the eyes or skin, itching, or swelling of the abdomen. The patient completes all daily activities without any functional limitations. ASSESSMENT AND PLAN:  Elevated liver enzymes  Persistent elevation in Intermittent elevation in liver transaminases and alkaline phosphatase of unclear etiology at this time.      Liver transaminases ordered by referring provider 5/05/2021 reviewed in detail. The liver function is normal, platelet count is depressed. Serologic testing for causes of chronic liver disease were ordered. The most likely causes for the liver chemistry abnormalities were discussed with the patient and include fatty liver secondary to malnutrition, chemotherapy induced vascular disease of the liver. The need to perform an assessment of liver fibrosis was discussed with the patient. The Fibroscan can assess liver fibrosis and determine if a patient has advanced fibrosis or cirrhosis without the need for liver biopsy. This will be performed at the next office visit. If the Fibroscan suggests advanced fibrosis then a liver biopsy should be considered. The Fibroscan can be repeated annually or as often as clinically indicated to assess for fibrosis progression and/or regression. Will perform laboratory testing to monitor liver function and degree of liver injury. This included BMP, hepatic panel, and CBC with platelet count. Screening for Hepatocellular Carcinoma  HCC screening is not necessary if the patient has no evidence of cirrhosis. Treatment of other medical problems in patients with chronic liver disease  There are no contraindications for the patient to take most medications that are necessary for treatment of other medical issues. The patient can take: Any medications utilized for treatment of DM. Statins to treat hypercholesterolemia    Counseling for alcohol in patients with chronic liver disease  The patient was counseled regarding alcohol consumption and the effect of alcohol on chronic liver disease. The patient dose not drink. Vaccinations   The need for vaccination against viral hepatitis A and B will be assessed with serologic and instituted as appropriate. Routine vaccinations against other bacterial and viral agents can be performed as indicated. Annual flu vaccination should be administered if indicated.       ALLERGIES  No Known Allergies    MEDICATIONS  Current Outpatient Medications   Medication Sig    lidocaine-prilocaine (EMLA) topical cream Apply  to affected area as needed for Pain. Apply over port a cath site 30-60 minutes prior to port access    dexAMETHasone (DECADRON) 4 mg tablet Take 2 tabs (8mg) once daily on days 2 & 3 of chemotherapy only    ondansetron (ZOFRAN ODT) 8 mg disintegrating tablet Take 1 Tab by mouth every eight (8) hours as needed for Nausea or Vomiting. Can start 72 hours after chemotherapy infusion    denosumab (PROLIA) 60 mg/mL injection 60 mg by SubCUTAneous route.  lansoprazole (PREVACID) 30 mg capsule Take 30 mg by mouth Daily (before breakfast).  multivitamin (ONE A DAY) tablet Take 1 Tab by mouth daily.  ascorbic acid, vitamin C, (VITAMIN C) 500 mg tablet Take 500 mg by mouth.  cholecalciferol (VITAMIN D3) 1,000 unit cap Take 1,000 Units by mouth daily.  Omega-3 Fatty Acids (FISH OIL) 500 mg cap Take  by mouth. No current facility-administered medications for this visit. SYSTEM REVIEW NOT RELATED TO LIVER DISEASE OR REVIEWED ABOVE:  Constitution systems: Negative for fever, chills, weight gain, weight loss. Eyes: Negative for visual changes. ENT: Negative for sore throat, painful swallowing. Respiratory: Negative for cough, hemoptysis, SOB. Cardiology: Negative for chest pain, palpitations. GI:  Negative for constipation or diarrhea. : Negative for urinary frequency, dysuria, hematuria, nocturia. Skin: Negative for rash. Hematology: Negative for easy bruising, blood clots. Musculo-skelatal: Negative for back pain, muscle pain, weakness. Neurologic: Negative for headaches, dizziness, vertigo, memory problems not related to HE. Psychology: Negative for anxiety, depression. FAMILY HISTORY:  The father   of complications from a fall. History of heart disease and CVA. The mother  of pancreatic cancer.     There is no family history of liver disease. There is no family history of immune disorders. SOCIAL HISTORY:  The patient is . The patient hasno children. The patient has never used tobacco products. The patient does not consume significant amounts of alcohol. The patient retired in 2007. Previous employment in [employer]:  Visit Vitals  /74 (BP 1 Location: Left upper arm, BP Patient Position: Sitting, BP Cuff Size: Adult)   Pulse 97   Temp 96.9 °F (36.1 °C) (Temporal)   Resp 16   Ht 5' 6\" (1.676 m)   Wt 119 lb 12.8 oz (54.3 kg)   SpO2 98%   BMI 19.34 kg/m²     General: No acute distress. Eyes: Sclera anicteric. ENT: No oral lesions. Thyroid normal.  Nodes: No adenopathy. Skin: No spider angiomata. No jaundice. No palmar erythema. Respiratory: Lungs clear to auscultation. Cardiovascular: Regular heart rate. No murmurs. No JVD. Abdomen: Soft non-tender. Liver size normal to percussion/palpation. Spleen not palpable. No obvious ascites. Extremities: No edema. No muscle wasting. No gross arthritic changes. Neurologic: Alert and oriented. Cranial nerves grossly intact. No asterixis.     LABORATORY STUDIES:  Liver [place] of [address] & Units 6/2/2021   WBC 3.6 - 11.0 K/uL 2.5 (L)   ANC 1.8 - 8.0 K/UL 1.6 (L)   HGB 11.5 - 16.0 g/dL 12.0    - 400 K/uL 164   AST 15 - 37 U/L 49 (H)   ALT 12 - 78 U/L 53   Alk Phos 45 - 117 U/L 295 (H)   Bili, Total 0.2 - 1.0 MG/DL 1.2 (H)   Bili, Direct 0.0 - 0.2 MG/DL 0.5 (H)   Albumin 3.5 - 5.0 g/dL 3.9   BUN 6 - 20 MG/DL 12   Creat 0.55 - 1.02 MG/DL 0.52 (L)   Na 136 - 145 mmol/L 135 (L)   K 3.5 - 5.1 mmol/L 3.7   Cl 97 - 108 mmol/L 102   CO2 21 - 32 mmol/L 27   Glucose 65 - 100 mg/dL 88     Cancer Screening Latest Ref Rng & Units 5/5/2021 4/7/2021   CEA ng/mL 2.6 2.4     SEROLOGIES:  Serologies Latest Ref Rng & Units 6/2/2021   Hep A Ab, Total Negative   Negative   Hep B Surface Ag Index    Hep B Surface Ag Interp NEG      Hep B Core Ab, Total Negative   Negative   Hep B Surface Ab mIU/mL <3.10   Hep B Surface Ab Interp NONREACTIVE   NONREACTIVE   Hep C Ab NR      Ferritin 8 - 252 NG/   Iron % Saturation 20 - 50 % 16 (L)   STEPHANIE, IFA  Negative   C-ANCA Neg:<1:20 titer <1:20   P-ANCA Neg:<1:20 titer <1:20   ANCA Neg:<1:20 titer <1:20   ASMCA 0 - 19 Units 12   M2 Ab 0.0 - 20.0 Units <20.0   Alpha-1 antitrypsin level 101 - 187 mg/dL 198 (H)     Serologies Latest Ref Rng & Units 4/21/2021   Hep B Surface Ag Index <0.10   Hep B Surface Ag Interp NEG   Negative     Serologies Latest Ref Rng & Units 4/21/2021   Hep C Ab NR   NONREACTIVE     LIVER HISTOLOGY:  Not available or performed    ENDOSCOPIC PROCEDURES:  Not available or performed    RADIOLOGY:  4/2021. Ultrasound of liver. Increased echogenicity with irregular contour. No concerning liver mass or lesion. No ductal dilatation. OTHER TESTING:  Not available or performed    FOLLOW-UP:  All of the issues listed above in the Assessment and Plan were discussed with the patient. All questions were answered. The patient expressed a clear understanding of the above. [facility] 87 in 4 weeks for a Fibroscan. [facility] of [address] [id number] [address] [place], [facility].  [facility]

## 2021-06-02 NOTE — PROGRESS NOTES
Identified pt with two pt identifiers(name and ). Reviewed record in preparation for visit and have obtained necessary documentation. Chief Complaint   Patient presents with    New Patient     Establish care      Vitals:    21 0815   BP: 108/74   Pulse: 97   Resp: 16   Temp: 96.9 °F (36.1 °C)   TempSrc: Temporal   SpO2: 98%   Weight: 119 lb 12.8 oz (54.3 kg)   Height: 5' 6\" (1.676 m)   PainSc:   0 - No pain       Health Maintenance Review: Patient reminded of \"due or due soon\" health maintenance. I have asked the patient to contact his/her primary care provider (PCP) for follow-up on his/her health maintenance. Coordination of Care Questionnaire:  :   1) Have you been to an emergency room, urgent care, or hospitalized since your last visit? If yes, where when, and reason for visit? no       2. Have seen or consulted any other health care provider since your last visit? If yes, where when, and reason for visit? NO      Patient is accompanied by self I have received verbal consent from Yuval Learn to discuss any/all medical information while they are present in the room.

## 2021-06-03 LAB
A1AT SERPL-MCNC: 198 MG/DL (ref 101–187)
ACTIN IGG SERPL-ACNC: 12 UNITS (ref 0–19)
ANA TITR SER IF: NEGATIVE {TITER}
C-ANCA TITR SER IF: NORMAL TITER
HAV AB SER QL IA: NEGATIVE
HBV CORE AB SERPL QL IA: NEGATIVE
MITOCHONDRIA M2 IGG SER-ACNC: <20 UNITS (ref 0–20)
P-ANCA ATYPICAL TITR SER IF: NORMAL TITER
P-ANCA TITR SER IF: NORMAL TITER

## 2021-06-04 ENCOUNTER — APPOINTMENT (OUTPATIENT)
Dept: INFUSION THERAPY | Age: 71
End: 2021-06-04

## 2021-06-11 NOTE — PROGRESS NOTES
Letter sent to the patient regarding the blood work results. The liver numbers remain elevated. Testing for other causes of liver disease were all negative.

## 2021-06-16 ENCOUNTER — HOSPITAL ENCOUNTER (OUTPATIENT)
Dept: INFUSION THERAPY | Age: 71
Discharge: HOME | End: 2021-06-16
Payer: MEDICARE

## 2021-06-16 VITALS
DIASTOLIC BLOOD PRESSURE: 62 MMHG | HEART RATE: 100 BPM | TEMPERATURE: 97.7 F | SYSTOLIC BLOOD PRESSURE: 123 MMHG | RESPIRATION RATE: 18 BRPM

## 2021-06-16 DIAGNOSIS — C18.7 MALIGNANT NEOPLASM OF SIGMOID COLON (HCC): Primary | ICD-10-CM

## 2021-06-16 LAB
ALBUMIN SERPL-MCNC: 3.6 G/DL (ref 3.5–5)
ALBUMIN/GLOB SERPL: 1 {RATIO} (ref 1.1–2.2)
ALP SERPL-CCNC: 220 U/L (ref 45–117)
ALT SERPL-CCNC: 39 U/L (ref 12–78)
ANION GAP SERPL CALC-SCNC: 8 MMOL/L (ref 5–15)
AST SERPL-CCNC: 33 U/L (ref 15–37)
BASOPHILS # BLD: 0 K/UL (ref 0–0.1)
BASOPHILS NFR BLD: 0 % (ref 0–1)
BILIRUB SERPL-MCNC: 1.2 MG/DL (ref 0.2–1)
BUN SERPL-MCNC: 11 MG/DL (ref 6–20)
BUN/CREAT SERPL: 17 (ref 12–20)
CALCIUM SERPL-MCNC: 9.5 MG/DL (ref 8.5–10.1)
CEA SERPL-MCNC: 1.9 NG/ML
CHLORIDE SERPL-SCNC: 103 MMOL/L (ref 97–108)
CO2 SERPL-SCNC: 23 MMOL/L (ref 21–32)
CREAT SERPL-MCNC: 0.65 MG/DL (ref 0.55–1.02)
DIFFERENTIAL METHOD BLD: ABNORMAL
EOSINOPHIL # BLD: 0 K/UL (ref 0–0.4)
EOSINOPHIL NFR BLD: 1 % (ref 0–7)
ERYTHROCYTE [DISTWIDTH] IN BLOOD BY AUTOMATED COUNT: 14.9 % (ref 11.5–14.5)
GLOBULIN SER CALC-MCNC: 3.5 G/DL (ref 2–4)
GLUCOSE SERPL-MCNC: 161 MG/DL (ref 65–100)
HCT VFR BLD AUTO: 35.9 % (ref 35–47)
HGB BLD-MCNC: 11.5 G/DL (ref 11.5–16)
IMM GRANULOCYTES # BLD AUTO: 0 K/UL (ref 0–0.04)
IMM GRANULOCYTES NFR BLD AUTO: 0 % (ref 0–0.5)
LYMPHOCYTES # BLD: 0.4 K/UL (ref 0.8–3.5)
LYMPHOCYTES NFR BLD: 16 % (ref 12–49)
MCH RBC QN AUTO: 29.9 PG (ref 26–34)
MCHC RBC AUTO-ENTMCNC: 32 G/DL (ref 30–36.5)
MCV RBC AUTO: 93.2 FL (ref 80–99)
MONOCYTES # BLD: 0.2 K/UL (ref 0–1)
MONOCYTES NFR BLD: 8 % (ref 5–13)
NEUTS SEG # BLD: 1.8 K/UL (ref 1.8–8)
NEUTS SEG NFR BLD: 75 % (ref 32–75)
NRBC # BLD: 0 K/UL (ref 0–0.01)
NRBC BLD-RTO: 0 PER 100 WBC
PLATELET # BLD AUTO: 135 K/UL (ref 150–400)
PMV BLD AUTO: 9.4 FL (ref 8.9–12.9)
POTASSIUM SERPL-SCNC: 3.8 MMOL/L (ref 3.5–5.1)
PROT SERPL-MCNC: 7.1 G/DL (ref 6.4–8.2)
RBC # BLD AUTO: 3.85 M/UL (ref 3.8–5.2)
RBC MORPH BLD: ABNORMAL
SODIUM SERPL-SCNC: 134 MMOL/L (ref 136–145)
WBC # BLD AUTO: 2.4 K/UL (ref 3.6–11)

## 2021-06-16 PROCEDURE — 36591 DRAW BLOOD OFF VENOUS DEVICE: CPT

## 2021-06-16 PROCEDURE — 85025 COMPLETE CBC W/AUTO DIFF WBC: CPT

## 2021-06-16 PROCEDURE — 80053 COMPREHEN METABOLIC PANEL: CPT

## 2021-06-16 PROCEDURE — 82378 CARCINOEMBRYONIC ANTIGEN: CPT

## 2021-06-16 PROCEDURE — 74011250636 HC RX REV CODE- 250/636: Performed by: REGISTERED NURSE

## 2021-06-16 PROCEDURE — 77030012965 HC NDL HUBR BBMI -A

## 2021-06-16 RX ORDER — SODIUM CHLORIDE 9 MG/ML
10 INJECTION INTRAMUSCULAR; INTRAVENOUS; SUBCUTANEOUS AS NEEDED
Status: DISCONTINUED | OUTPATIENT
Start: 2021-06-16 | End: 2021-06-17 | Stop reason: HOSPADM

## 2021-06-16 RX ORDER — HEPARIN 100 UNIT/ML
500 SYRINGE INTRAVENOUS AS NEEDED
Status: DISCONTINUED | OUTPATIENT
Start: 2021-06-16 | End: 2021-06-17 | Stop reason: HOSPADM

## 2021-06-16 RX ORDER — SODIUM CHLORIDE 0.9 % (FLUSH) 0.9 %
5-10 SYRINGE (ML) INJECTION AS NEEDED
Status: DISCONTINUED | OUTPATIENT
Start: 2021-06-16 | End: 2021-06-17 | Stop reason: HOSPADM

## 2021-06-16 RX ADMIN — HEPARIN 500 UNITS: 100 SYRINGE at 13:33

## 2021-06-16 RX ADMIN — Medication 10 ML: at 13:30

## 2021-06-16 RX ADMIN — Medication 10 ML: at 13:31

## 2021-06-16 RX ADMIN — Medication 10 ML: at 13:32

## 2021-06-16 NOTE — PROGRESS NOTES
[facility] VISIT NOTE    1320  Pt arrived at [facility] ambulatory and in no distress for port flush and labs. No new complaints at this time. Pt denies all COVID symptoms and recent exposure. Port accessed with 1 in price no difficulty. Positive blood return noted and labs drawn. See CC for pending labs. Patient Vitals for the past 12 hrs:   Temp Pulse Resp BP   06/16/21 1322 97.7 °F (36.5 °C) 100 18 123/62     Port de-accessed and flushed per protocol. 1335  D/C'd from [facility] ambulatory and in no distress.

## 2021-06-18 ENCOUNTER — TELEPHONE (OUTPATIENT)
Dept: ONCOLOGY | Age: 71
End: 2021-06-18

## 2021-06-18 NOTE — TELEPHONE ENCOUNTER
Patient called wanting to follow up on labs that were done on Wednesday. Would like to know the results.      [phone]

## 2021-06-21 NOTE — TELEPHONE ENCOUNTER
6/21/21 1205pm - Called patient, ID verified x2. This RN informed patient that all her labs looked stable and her CEA was also in the normal range. Patient was grateful for the return phone call and had no other questions or concerns.

## 2021-07-23 RX ORDER — SODIUM CHLORIDE 0.9 % (FLUSH) 0.9 %
5-10 SYRINGE (ML) INJECTION AS NEEDED
Status: CANCELLED | OUTPATIENT
Start: 2021-07-28

## 2021-07-23 RX ORDER — SODIUM CHLORIDE 9 MG/ML
10 INJECTION INTRAMUSCULAR; INTRAVENOUS; SUBCUTANEOUS AS NEEDED
Status: CANCELLED | OUTPATIENT
Start: 2021-07-28

## 2021-07-23 RX ORDER — HEPARIN 100 UNIT/ML
500 SYRINGE INTRAVENOUS AS NEEDED
Status: CANCELLED | OUTPATIENT
Start: 2021-07-28

## 2021-07-28 ENCOUNTER — OFFICE VISIT (OUTPATIENT)
Dept: ONCOLOGY | Age: 71
End: 2021-07-28
Payer: MEDICARE

## 2021-07-28 ENCOUNTER — HOSPITAL ENCOUNTER (OUTPATIENT)
Dept: INFUSION THERAPY | Age: 71
Discharge: HOME | End: 2021-07-28
Payer: MEDICARE

## 2021-07-28 VITALS
TEMPERATURE: 97.4 F | WEIGHT: 118.8 LBS | RESPIRATION RATE: 18 BRPM | BODY MASS INDEX: 19.17 KG/M2 | SYSTOLIC BLOOD PRESSURE: 113 MMHG | DIASTOLIC BLOOD PRESSURE: 68 MMHG | HEART RATE: 91 BPM

## 2021-07-28 VITALS
BODY MASS INDEX: 19.21 KG/M2 | WEIGHT: 119 LBS | TEMPERATURE: 97.4 F | SYSTOLIC BLOOD PRESSURE: 113 MMHG | DIASTOLIC BLOOD PRESSURE: 68 MMHG | OXYGEN SATURATION: 97 % | RESPIRATION RATE: 18 BRPM | HEART RATE: 91 BPM

## 2021-07-28 DIAGNOSIS — Z95.828 PORT-A-CATH IN PLACE: ICD-10-CM

## 2021-07-28 DIAGNOSIS — Z51.11 ENCOUNTER FOR ANTINEOPLASTIC CHEMOTHERAPY: Primary | ICD-10-CM

## 2021-07-28 DIAGNOSIS — T45.1X5A CHEMOTHERAPY-INDUCED NEUROPATHY (HCC): ICD-10-CM

## 2021-07-28 DIAGNOSIS — C18.7 MALIGNANT NEOPLASM OF SIGMOID COLON (HCC): ICD-10-CM

## 2021-07-28 DIAGNOSIS — G62.0 CHEMOTHERAPY-INDUCED NEUROPATHY (HCC): ICD-10-CM

## 2021-07-28 DIAGNOSIS — C18.7 MALIGNANT NEOPLASM OF SIGMOID COLON (HCC): Primary | ICD-10-CM

## 2021-07-28 LAB
ALBUMIN SERPL-MCNC: 3.7 G/DL (ref 3.5–5)
ALBUMIN/GLOB SERPL: 1 {RATIO} (ref 1.1–2.2)
ALP SERPL-CCNC: 233 U/L (ref 45–117)
ALT SERPL-CCNC: 81 U/L (ref 12–78)
ANION GAP SERPL CALC-SCNC: 7 MMOL/L (ref 5–15)
AST SERPL-CCNC: 51 U/L (ref 15–37)
BASOPHILS # BLD: 0 K/UL (ref 0–0.1)
BASOPHILS NFR BLD: 1 % (ref 0–1)
BILIRUB SERPL-MCNC: 1.4 MG/DL (ref 0.2–1)
BUN SERPL-MCNC: 14 MG/DL (ref 6–20)
BUN/CREAT SERPL: 19 (ref 12–20)
CALCIUM SERPL-MCNC: 9.6 MG/DL (ref 8.5–10.1)
CEA SERPL-MCNC: 1.5 NG/ML
CHLORIDE SERPL-SCNC: 104 MMOL/L (ref 97–108)
CO2 SERPL-SCNC: 26 MMOL/L (ref 21–32)
CREAT SERPL-MCNC: 0.74 MG/DL (ref 0.55–1.02)
DIFFERENTIAL METHOD BLD: ABNORMAL
EOSINOPHIL # BLD: 0.2 K/UL (ref 0–0.4)
EOSINOPHIL NFR BLD: 6 % (ref 0–7)
ERYTHROCYTE [DISTWIDTH] IN BLOOD BY AUTOMATED COUNT: 14.5 % (ref 11.5–14.5)
GLOBULIN SER CALC-MCNC: 3.8 G/DL (ref 2–4)
GLUCOSE SERPL-MCNC: 104 MG/DL (ref 65–100)
HCT VFR BLD AUTO: 35.2 % (ref 35–47)
HGB BLD-MCNC: 11.6 G/DL (ref 11.5–16)
IMM GRANULOCYTES # BLD AUTO: 0 K/UL (ref 0–0.04)
IMM GRANULOCYTES NFR BLD AUTO: 0 % (ref 0–0.5)
LYMPHOCYTES # BLD: 0.7 K/UL (ref 0.8–3.5)
LYMPHOCYTES NFR BLD: 23 % (ref 12–49)
MCH RBC QN AUTO: 29.5 PG (ref 26–34)
MCHC RBC AUTO-ENTMCNC: 33 G/DL (ref 30–36.5)
MCV RBC AUTO: 89.6 FL (ref 80–99)
MONOCYTES # BLD: 0.3 K/UL (ref 0–1)
MONOCYTES NFR BLD: 11 % (ref 5–13)
NEUTS SEG # BLD: 1.9 K/UL (ref 1.8–8)
NEUTS SEG NFR BLD: 59 % (ref 32–75)
NRBC # BLD: 0 K/UL (ref 0–0.01)
NRBC BLD-RTO: 0 PER 100 WBC
PLATELET # BLD AUTO: 164 K/UL (ref 150–400)
PMV BLD AUTO: 9.7 FL (ref 8.9–12.9)
POTASSIUM SERPL-SCNC: 3.6 MMOL/L (ref 3.5–5.1)
PROT SERPL-MCNC: 7.5 G/DL (ref 6.4–8.2)
RBC # BLD AUTO: 3.93 M/UL (ref 3.8–5.2)
RBC MORPH BLD: ABNORMAL
RBC MORPH BLD: ABNORMAL
SODIUM SERPL-SCNC: 137 MMOL/L (ref 136–145)
WBC # BLD AUTO: 3.1 K/UL (ref 3.6–11)

## 2021-07-28 PROCEDURE — G8432 DEP SCR NOT DOC, RNG: HCPCS | Performed by: INTERNAL MEDICINE

## 2021-07-28 PROCEDURE — G9711 PT HX TOT COL OR COLON CA: HCPCS | Performed by: INTERNAL MEDICINE

## 2021-07-28 PROCEDURE — 74011250636 HC RX REV CODE- 250/636: Performed by: REGISTERED NURSE

## 2021-07-28 PROCEDURE — 85025 COMPLETE CBC W/AUTO DIFF WBC: CPT

## 2021-07-28 PROCEDURE — 80053 COMPREHEN METABOLIC PANEL: CPT

## 2021-07-28 PROCEDURE — G0463 HOSPITAL OUTPT CLINIC VISIT: HCPCS | Performed by: REGISTERED NURSE

## 2021-07-28 PROCEDURE — 99214 OFFICE O/P EST MOD 30 MIN: CPT | Performed by: INTERNAL MEDICINE

## 2021-07-28 PROCEDURE — 36591 DRAW BLOOD OFF VENOUS DEVICE: CPT

## 2021-07-28 PROCEDURE — 77030012965 HC NDL HUBR BBMI -A

## 2021-07-28 PROCEDURE — 1101F PT FALLS ASSESS-DOCD LE1/YR: CPT | Performed by: INTERNAL MEDICINE

## 2021-07-28 PROCEDURE — 82378 CARCINOEMBRYONIC ANTIGEN: CPT

## 2021-07-28 PROCEDURE — 1090F PRES/ABSN URINE INCON ASSESS: CPT | Performed by: INTERNAL MEDICINE

## 2021-07-28 PROCEDURE — G8536 NO DOC ELDER MAL SCRN: HCPCS | Performed by: INTERNAL MEDICINE

## 2021-07-28 PROCEDURE — G8400 PT W/DXA NO RESULTS DOC: HCPCS | Performed by: INTERNAL MEDICINE

## 2021-07-28 PROCEDURE — G8427 DOCREV CUR MEDS BY ELIG CLIN: HCPCS | Performed by: INTERNAL MEDICINE

## 2021-07-28 PROCEDURE — G8420 CALC BMI NORM PARAMETERS: HCPCS | Performed by: INTERNAL MEDICINE

## 2021-07-28 RX ORDER — SODIUM CHLORIDE 0.9 % (FLUSH) 0.9 %
5-10 SYRINGE (ML) INJECTION AS NEEDED
Status: DISCONTINUED | OUTPATIENT
Start: 2021-07-28 | End: 2021-07-29 | Stop reason: HOSPADM

## 2021-07-28 RX ORDER — SODIUM CHLORIDE 9 MG/ML
10 INJECTION INTRAMUSCULAR; INTRAVENOUS; SUBCUTANEOUS AS NEEDED
Status: DISCONTINUED | OUTPATIENT
Start: 2021-07-28 | End: 2021-07-29 | Stop reason: HOSPADM

## 2021-07-28 RX ORDER — HEPARIN 100 UNIT/ML
500 SYRINGE INTRAVENOUS AS NEEDED
Status: DISCONTINUED | OUTPATIENT
Start: 2021-07-28 | End: 2021-07-29 | Stop reason: HOSPADM

## 2021-07-28 RX ADMIN — HEPARIN 500 UNITS: 100 SYRINGE at 13:34

## 2021-07-28 RX ADMIN — SODIUM CHLORIDE 10 ML: 9 INJECTION INTRAMUSCULAR; INTRAVENOUS; SUBCUTANEOUS at 13:34

## 2021-07-28 NOTE — PROGRESS NOTES
[facility] [place] [facility] [place] [address] [address], [address]  W: [phone]  F: [phone]    Reason for Visit:   Rebecca Grant is a 80 y.o. female who is seen for stage III colon cancer. Treatment History:   · 9/10/2020 CT A/P - large, irregular pelvic mass measuring approximately 9cm likely representing neoplasm arising from the sigmoid colon, small amount of pelvic free fluid, colonic bowel wall thickening. Borderline enlarged retroperitoneal lymph nodes. 6 mm right lower lobe lung nodule. Mass effect from pelvic mass causing moderate right and minimal left hydronephrosis. · 9/14/2020: Colonoscopy - A large circumferential, ulcerated fungating mass was noted in proximal sigmoid colon. Mass was obstructing the lumen and could not be traversed. Maralyn Kugel was present. · 9/15/2020: CT Chest - 7.5mm left lower lobe pulmonary nodule, small right pleural effusion, mild right basilar atelectasis  · 9/17/2020: Surgical resection - low anterior resection, mobilization of the splenic flexure, coloproctostomy, creation of loop ileostomy, total abdominal hysterectomy and left salpingo-oophorectomy, distal right ureterectomy, right ureteral re-implant with psoas hitch and placement of right ureteral stent  · 11/17/20: cycle 1 FOLFOX  · 2/17/21 CT CAP: pulmonary nodule stable, ROBE   · 5/11/21 CT CAP: unchanged pulmonary nodule, ROBE     History of Present Illness:   Patient is a 80 y.o. female seen for colon cancer    She presented to the ED on 9/11/2020 with complaints of right flank/back pain and RLQ abdominal pain. She states she has had several months of gas pain and \"blockages. \" She reports a weight loss of 12 lbs in the last 3 months. CT abd/pelv 9/10/2020 showed large, irregular pelvic mass measuring approximately 9cm likely representing neoplasm arising from the sigmoid colon. She also had hydronephrosis from the mass effect and urethral stent placed.  CEA 7.4 on 9/11and colonoscopy performed 9/14 with biopsy + adenocarcinoma. CT Chest performed showing 7.5mm left lower lobe pulmonary nodule. Mass found to be invading into the uterus and right ureter. Surgical resection performed on 9/17/2020 including low anterior resection, mobilization of the splenic flexure, coloproctostomy, creation of loop ileostomy, total abdominal hysterectomy and left salpingo-oophorectomy, distal right ureterectomy, right ureteral re-implant with psoas hitch and placement of right ureteral stent. She was to see me in clinic but was admitted 10/10/2020 with ileostomy prolapse. Had a revision of loop ileostomy 10/11/2020. Then needed ileostomy closure and anastomosis 10/27/2020. She completed 12 cycles of FOLFOX on 4/21/21. She comes today for follow-up after completing 12 cycles of FOLFOX on 4/21/21. She has been feeling well. Trying to gain some weight. She has no pain. Reports normal BMs. Denies nausea/vomiting. No unexplained weight loss. She continues to have constant neuropathy but this is not painful. She has upcoming appointment with the [facility] next month. No other complaints today. Family Hx:  Mother with hx of pancreatic cancer    Past Medical History:   Diagnosis Date    Colon cancer ([address]     GERD (gastroesophageal reflux disease)     Ileostomy in place ([address]     Ileostomy prolapse ([address]     Ill-defined condition     Spaulding's esophagus      Past Surgical History:   Procedure Laterality Date    COLONOSCOPY Left 9/14/2020    COLONOSCOPY performed by Hailey Ferrari MD at [address] [place] [address]; incomplete secondary to partial obstruction.  HX APPENDECTOMY  age 17    HX COLONOSCOPY  circa 2010    No neoplasms.  HX ENDOSCOPY  03/13/2017    Dr. Genna Garces HX ENDOSCOPY  02/13/2020    Dr. Ivan Louise oopherectgosia for treatment of benign mass.     HX GYN  09/17/2020    Total abdominal hysterectomy and left salpingo-oophorectomy; Earle Valle MD.   Lavon Araujo OTHER SURGICAL  09/17/2020    Low anterior resection, mobilization of the splenic flexure, coloproctostomy, and creation of loop ileostomy; Dr. David Crespo.   OTHER SURGICAL  10/11/2020    Revision of loop ileostomy (resection and creation of divided loop ileostomy); Dr. David Crespo.  HX UROLOGICAL  09/12/2020    Cystoscopy and placement of a right ureteral stent; Lit Sandra III, MD.    HX UROLOGICAL  09/17/2020    Cystoscopy and placement of a temporary left ureteral catheter; Lit Sandra III, MD.    HX UROLOGICAL  09/17/2020    Distal right ureterectomy; Lit Sandra III, MD.     UROLOGICAL  09/17/2020    Right ureteral re-implantation with psoas hitch and placement of a right ureteral stent; Rosa Isela Aviles MD.    IR INSERT TUNL CVAD W PORT LESS THAN 5 YR  10/14/2020    Right chest Port-a-Cath placement. Social History     Tobacco Use    Smoking status: Never Smoker    Smokeless tobacco: Never Used   Substance Use Topics    Alcohol use: Not Currently     Alcohol/week: 1.0 standard drinks     Types: 1 Glasses of wine per week      Family History   Problem Relation Age of Onset    Cancer Mother     Heart Disease Father     Diabetes Brother      Current Outpatient Medications   Medication Sig    lidocaine-prilocaine (EMLA) topical cream Apply  to affected area as needed for Pain. Apply over port a cath site 30-60 minutes prior to port access    dexAMETHasone (DECADRON) 4 mg tablet Take 2 tabs (8mg) once daily on days 2 & 3 of chemotherapy only    ondansetron (ZOFRAN ODT) 8 mg disintegrating tablet Take 1 Tab by mouth every eight (8) hours as needed for Nausea or Vomiting. Can start 72 hours after chemotherapy infusion    denosumab (PROLIA) 60 mg/mL injection 60 mg by SubCUTAneous route.  lansoprazole (PREVACID) 30 mg capsule Take 30 mg by mouth Daily (before breakfast).  multivitamin (ONE A DAY) tablet Take 1 Tab by mouth daily.     ascorbic acid, vitamin C, (VITAMIN C) 500 mg tablet Take 500 mg by mouth.  cholecalciferol (VITAMIN D3) 1,000 unit cap Take 1,000 Units by mouth daily.  Omega-3 Fatty Acids (FISH OIL) 500 mg cap Take  by mouth. No current facility-administered medications for this visit. Facility-Administered Medications Ordered in Other Visits   Medication Dose Route Frequency    sodium chloride (NS) flush 5-10 mL  5-10 mL IntraVENous PRN    0.9% sodium chloride injection 10 mL  10 mL IntraVENous PRN    heparin (porcine) pf 500 Units  500 Units IntraVENous PRN      No Known Allergies     Review of Systems: A complete review of systems was obtained, negative except as described above. Physical Exam:     Visit Vitals  /68 (BP 1 Location: Left upper arm, BP Patient Position: Sitting)   Pulse 91   Temp 97.4 °F (36.3 °C)   Resp 18   Wt 119 lb (54 kg)   BMI 19.21 kg/m²     ECOG PS: 1  General: No distress but appears frail  Eyes: PERRL, anicteric sclerae  HENT: Atraumatic  RESP: normal respiratory effort  GI : no masses palpated , NTTP  MS: Digits without clubbing or cyanosis. Skin: No rashes, ecchymoses, or petechiae. Psych: Alert, oriented, appropriate affect, normal judgment/insight    Results:     Lab Results   Component Value Date/Time    WBC 2.4 (L) 06/16/2021 01:34 PM    HGB 11.5 06/16/2021 01:34 PM    HCT 35.9 06/16/2021 01:34 PM    PLATELET 143 (L) 43/91/2165 01:34 PM    MCV 93.2 06/16/2021 01:34 PM    ABS.  NEUTROPHILS 1.8 06/16/2021 01:34 PM    Hemoglobin (POC) 12.0 09/17/2020 06:35 PM     Lab Results   Component Value Date/Time    Sodium 134 (L) 06/16/2021 01:34 PM    Potassium 3.8 06/16/2021 01:34 PM    Chloride 103 06/16/2021 01:34 PM    CO2 23 06/16/2021 01:34 PM    Glucose 161 (H) 06/16/2021 01:34 PM    BUN 11 06/16/2021 01:34 PM    Creatinine 0.65 06/16/2021 01:34 PM    GFR est AA >60 06/16/2021 01:34 PM    GFR est non-AA >60 06/16/2021 01:34 PM    Calcium 9.5 06/16/2021 01:34 PM    Glucose (POC) 98 09/27/2020 11:39 PM Creatinine (POC) 0.8 09/10/2020 02:32 PM     Lab Results   Component Value Date/Time    Bilirubin, total 1.2 (H) 06/16/2021 01:34 PM    ALT (SGPT) 39 06/16/2021 01:34 PM    Alk. phosphatase 220 (H) 06/16/2021 01:34 PM    Protein, total 7.1 06/16/2021 01:34 PM    Albumin 3.6 06/16/2021 01:34 PM    Globulin 3.5 06/16/2021 01:34 PM     CEA:  Recent Labs     06/16/21  1334 05/05/21  0911 04/07/21  0919 03/10/21  0919 01/29/21  0807 01/12/21  0806 12/15/20  0806 11/17/20  0839   CEA 1.9 2.6 2.4 2.0 1.7 1.8 1.5 1.0       CT A/P 9/23/2020  IMPRESSION:  Large, irregular pelvic mass measuring approximately 9 cm likely representing  neoplasm arising from the sigmoid colon. Adnexal neoplasm is a possibility. This  does appear to be separate from the uterus. Small amount of pelvic free fluid. Associated colonic bowel wall thickening. There is focal gas in the upper  presacral region which is unclear if this is intraluminal or contained  extraluminal collection.     No definite distant metastatic disease. Borderline enlarged retroperitoneal  lymph nodes. 6 mm right lower lobe lung nodule. Mass effect from the pelvic mass  causing moderate right and minimal left hydronephrosis. CT Chest 9/15/2020  IMPRESSION:  1. 7.5 mm left lower lobe pulmonary nodule. 2. Small right pleural effusion. 3. Mild right basilar atelectasis. 9/14/2020   Addendum Diagnosis   1. Sigmoid Mass, Biopsy:     Infiltrating adenocarcinoma, most consistent with colon (See comment)   Addendum Comment   Immunohistochemical stains reveal the following staining pattern:   CK7: Scattered cells with cytoplasmic membrane staining   CK20: Positive cytoplasmic staining in normal colonic mucosal epithelial cells and malignant epithelial cells   CDX-2: Diffuse strong nuclear decoration and all tumor cells and normal background colonic epithelial cells   PAX-8: Negative   Estrogen Receptor: Negative   Positive and negative controls stain appropriately.    Due to radiologic findings suggesting the possibility of GYN involvement, ER and PAX-8 are performed, which lend support to the diagnosis of a colonic primary, over a tumor of müllerian origin. 9/17/2020   FINAL PATHOLOGIC DIAGNOSIS   1. Sigmoid colon and proximal rectum, uterus, left fallopian tube and ovary and right ureteral segment, low anterior resection:   Sigmoid colon and proximal rectum:  Invasive adenocarcinoma (see synoptic report and comment). Metastatic adenocarcinoma in one of sixteen lymph nodes (1/16). Uterus: Invasive adenocarcinoma extending from adhesed colon into uterine serosa. Endometrial lining shows mucosal atrophy. Left ovary: Benign ovary with serosal inclusion cysts. Left fallopian tube: Benign fallopian tube. Right ureteral segment: Benign segment of ureter densely adhesed to colon. Nodule near right uterine cornu:  Nodular portion of benign smooth muscle consistent with leiomyoma with parenchymal hemorrhage. COLON AND RECTUM: Resection   SPECIMEN   Procedure: Low anterior resection   TUMOR   Tumor Site: Sigmoid colon   Histologic Type: Adenocarcinoma   Histologic Grade: G2: Moderately differentiated   Tumor Size: 8.0 cm   Tumor Extension: Tumor directly invades adjacent structures:   Posterior uterine serosa   Macroscopic Tumor Perforation: Tumor invades through serosa into surrounding soft tissue   Lymphovascular Invasion: Present   Perineural Invasion: Not identified   Treatment Effect: No known presurgical therapy   MARGINS   Margins: All margins are uninvolved by invasive carcinoma   Margins Examined: Proximal, Distal, Radial (circumferential) or   Mesenteric   Distance of Tumor from Radial (circumferential) Margin: See Comment   LYMPH NODES   Number of Lymph Nodes Involved: 1   Number of Lymph Nodes Examined: 16   Tumor Deposits: Not identified   PATHOLOGIC STAGE CLASSIFICATION (pTNM, AJCC 8th Edition)   Primary Tumor (pT): pT4b   Regional Lymph Nodes (pN): pN1a   2. Distal rectal margin:   Segment of benign large bowel. 3. Anastomotic doughnuts:   Two annular portions of benign large bowel. Comment   Tumor invades into the adherent soft tissue and to within 1.1 cm of the apparent right pelvic sidewall margin. CT CAP 2/17/21:  IMPRESSION  1. 8 mm left lower lobe pulmonary nodule, unchanged. 2. Trace fluid within the left hemipelvis. Records reviewed and summarized above. Pathology report(s) reviewed above. Radiology report(s) reviewed above. Assessment:   1) Sigmoid Colon Adenocarcinoma - Stage IIIB- ERICKA  [id number]  Surgical resection on 9/17 with creation of loop ileostomy, total abdominal hysterectomy and left salpingo-oophorectomy, distal right ureterectomy  She had a revision of her ileostomy 10/11/2020 then needed stoma closure and anastomosis on 10/27/2020    Completed 12 cycles of adjuvant FOLFOX on 4/21/21 which she tolerated well with grade 1-2 neuropathy. CT imaging 5/2021 was personally reviewed & shows ROBE with a stable pulmonary nodule. We received colon cancer surveillance guidelines which include H&P every 3-6 months for 2 years, then every 6 months for a total of 5 years. CEA every 3-6 months for 2 years, then every 6 months for a total of 5 years. CT AP every 6-12 months for a total of 5 years. Colonoscopy 1 year after surgery.      2) Elevated transaminases  US of abd was obtained and shows possible liver disease  Following with hepatology and has upcoming appointment next week for further workup   Hep panel negative   Has upcoming fibroscan     3) Neuropathy  Continues , hope to see improvement off chemotherapy   Grade 1 -2 , not painful     4) Thrombocytopenia  CBC pending today     5) Pulmonary nodule  Stable on recent imaging     Plan:     · Continue surveillance  · CT CAP in 3 months ordered   · Colonoscopy due in September 2021 - pt has upcoming apt with GI to discuss   · See [facility] [employer] next month for fibroscan   · IR to remove port   · Labs in 3 months- cbc with diff, cmp, cea at [facility]     RTC in 3 months for scan / lab review     I appreciate the opportunity to participate in [facility]. I performed a history and physical examination of the patient and discussed his management with the NPP.  I reviewed the NPP note and agree with the documented findings and plan of care    Colon cancer s/p 6 months of FOLFOX on surveillance with no new pain, adenopathy and stable labs  She has elevated LFTs still and is following with hepatology  WBC counts are improving  Surveillance as above     Signed By: Keyla Jenkins MD

## 2021-07-28 NOTE — PROGRESS NOTES
Niall Tay is a 80 y.o. female    Chief Complaint   Patient presents with    Chemotherapy     stage III colon cancer       1. Have you been to the ER, urgent care clinic since your last visit? Hospitalized since your last visit? No    2. Have you seen or consulted any other health care providers outside of the [address] [place] [address] since your last visit? Include any pap smears or colon screening.  Yes, Neuro MD Mejia ([facility])

## 2021-07-28 NOTE — PROGRESS NOTES
[facility] Progress Note    Date: July 28, 2021      1325: Pt arrived ambulatory to [facility] [place] + Lab Work in stable condition. Assessment completed. No new complaints voiced at this time. Port accessed with positive blood return. Labs drawn and sent for processing. Patient denies any symptoms of COVID-19, including SOB, coughing, fever, or generally not feeling well. Patient denies any recent exposure to COVID-19. Patient denies any recent contact with family or friends that have a pending COVID-19 test.    Patient Vitals for the past 12 hrs:   Temp Pulse Resp BP   07/28/21 1326 97.4 °F (36.3 °C) 91 18 113/68       Medications Administered     0.9% sodium chloride injection 10 mL     Admin Date  07/28/2021 Action  Given Dose  10 mL Route  IntraVENous Administered By  Flordia Papa          heparin (porcine) pf 500 Units     Admin Date  07/28/2021 Action  Given Dose  500 Units Route  IntraVENous Administered By  Flordia Papa                1340: Tolerated treatment well, no adverse reactions noted. Port flushed, heparinized and de- accessed per protocol. D/Cd from [facility] ambulatory and in no distress. Patient is aware of next scheduled [facility] appointment on 9/8/21.     Future Appointments   Date Time Provider Yary Gomez   7/28/2021  1:45 PM Ashley Crowell  [address] BS AMB   8/30/2021 10:20 AM Yaritza Joshi NP [facility] BS AMB   9/8/2021  1:30 PM H2 WALKER [facility]   10/20/2021  1:30 PM H2 WALKRE [facility]   12/1/2021  1:30 PM H2 WALKER [facility]           Omar Barraza RN  July 28, 2021

## 2021-08-19 ENCOUNTER — HOSPITAL ENCOUNTER (OUTPATIENT)
Dept: INTERVENTIONAL RADIOLOGY/VASCULAR | Age: 71
Discharge: HOME | End: 2021-08-19
Attending: STUDENT IN AN ORGANIZED HEALTH CARE EDUCATION/TRAINING PROGRAM | Admitting: STUDENT IN AN ORGANIZED HEALTH CARE EDUCATION/TRAINING PROGRAM
Payer: MEDICARE

## 2021-08-19 VITALS
WEIGHT: 120 LBS | DIASTOLIC BLOOD PRESSURE: 79 MMHG | HEIGHT: 67 IN | RESPIRATION RATE: 16 BRPM | TEMPERATURE: 98 F | OXYGEN SATURATION: 97 % | BODY MASS INDEX: 18.83 KG/M2 | SYSTOLIC BLOOD PRESSURE: 140 MMHG | HEART RATE: 92 BPM

## 2021-08-19 DIAGNOSIS — C18.7 MALIGNANT NEOPLASM OF SIGMOID COLON (HCC): ICD-10-CM

## 2021-08-19 PROCEDURE — 77030031139 HC SUT VCRL2 J&J -A

## 2021-08-19 PROCEDURE — 36590 REMOVAL TUNNELED CV CATH: CPT

## 2021-08-19 PROCEDURE — 74011000250 HC RX REV CODE- 250: Performed by: STUDENT IN AN ORGANIZED HEALTH CARE EDUCATION/TRAINING PROGRAM

## 2021-08-19 PROCEDURE — 74011250636 HC RX REV CODE- 250/636: Performed by: STUDENT IN AN ORGANIZED HEALTH CARE EDUCATION/TRAINING PROGRAM

## 2021-08-19 PROCEDURE — 77030010507 HC ADH SKN DERMBND J&J -B

## 2021-08-19 PROCEDURE — 2709999900 HC NON-CHARGEABLE SUPPLY

## 2021-08-19 PROCEDURE — 74011000250 HC RX REV CODE- 250

## 2021-08-19 RX ORDER — LIDOCAINE HYDROCHLORIDE AND EPINEPHRINE 10; 10 MG/ML; UG/ML
10 INJECTION, SOLUTION INFILTRATION; PERINEURAL ONCE
Status: COMPLETED | OUTPATIENT
Start: 2021-08-19 | End: 2021-08-19

## 2021-08-19 RX ORDER — LIDOCAINE HYDROCHLORIDE 20 MG/ML
20 INJECTION, SOLUTION INFILTRATION; PERINEURAL
Status: COMPLETED | OUTPATIENT
Start: 2021-08-19 | End: 2021-08-19

## 2021-08-19 RX ORDER — LIDOCAINE HYDROCHLORIDE AND EPINEPHRINE 10; 10 MG/ML; UG/ML
INJECTION, SOLUTION INFILTRATION; PERINEURAL
Status: COMPLETED
Start: 2021-08-19 | End: 2021-08-19

## 2021-08-19 RX ORDER — FENTANYL CITRATE 50 UG/ML
100 INJECTION, SOLUTION INTRAMUSCULAR; INTRAVENOUS
Status: DISCONTINUED | OUTPATIENT
Start: 2021-08-19 | End: 2021-08-19 | Stop reason: HOSPADM

## 2021-08-19 RX ADMIN — LIDOCAINE HYDROCHLORIDE AND EPINEPHRINE 5 ML: 10; 10 INJECTION, SOLUTION INFILTRATION; PERINEURAL at 14:53

## 2021-08-19 RX ADMIN — WATER 2 G: 1 INJECTION INTRAMUSCULAR; INTRAVENOUS; SUBCUTANEOUS at 14:00

## 2021-08-19 RX ADMIN — FENTANYL CITRATE 50 MCG: 50 INJECTION, SOLUTION INTRAMUSCULAR; INTRAVENOUS at 14:35

## 2021-08-19 RX ADMIN — LIDOCAINE HYDROCHLORIDE 5 ML: 20 INJECTION, SOLUTION INFILTRATION; PERINEURAL at 14:49

## 2021-08-19 NOTE — PROGRESS NOTES
Discharge paperwork given to patient by this RN. All questions and concerns addressed. Patient was at baseline orientation. IV D/C'd. Site CDI. Patient wheeled off unit to POV, steady gate noted.

## 2021-08-19 NOTE — H&P
Radiology History and Physical    Patient: Rubi Mcknight 80 y.o. female       Chief Complaint: No chief complaint on file. History of Present Illness: Completed use of port, for port removal    History:    Past Medical History:   Diagnosis Date    Colon cancer ([facility]     GERD (gastroesophageal reflux disease)     [facility]     Ileostomy prolapse ([facility]     Ill-defined condition     Spaulding's esophagus     Family History   Problem Relation Age of Onset    Cancer Mother     Heart Disease Father     Diabetes Brother      Social History     Socioeconomic History    Marital status: SINGLE     Spouse name: Not on file    Number of children: Not on file    Years of education: Not on file    Highest education level: Not on file   Occupational History    Not on file   Tobacco Use    Smoking status: Never Smoker    Smokeless tobacco: Never Used   Vaping Use    Vaping Use: Never used   Substance and Sexual Activity    Alcohol use: Not Currently     Alcohol/week: 1.0 standard drinks     Types: 1 Glasses of wine per week    Drug use: No    Sexual activity: Not on file   Other Topics Concern    Not on file   Social History Narrative    Not on file     Social Determinants of Health     Financial Resource Strain:     Difficulty of Paying Living Expenses:    Food Insecurity:     Worried About Running Out of Food in the Last Year:     Ran Out of Food in the Last Year:    Transportation Needs:     Lack of Transportation (Medical):      Lack of Transportation (Non-Medical):    Physical Activity:     Days of Exercise per Week:     Minutes of Exercise per Session:    Stress:     Feeling of Stress :    Social Connections:     Frequency of Communication with Friends and Family:     Frequency of Social Gatherings with Friends and Family:     Attends Denominational Services:     Active Member of Clubs or Organizations:     Attends Club or Organization Meetings:     Marital Status:    Intimate Partner Violence:     Fear of Current or Ex-Partner:     Emotionally Abused:     Physically Abused:     Sexually Abused: Allergies: No Known Allergies    Current Medications:  Current Facility-Administered Medications   Medication Dose Route Frequency    fentaNYL citrate (PF) injection 100 mcg  100 mcg IntraVENous Multiple        Physical Exam:  Blood pressure (!) 140/79, pulse 92, temperature 98 °F (36.7 °C), resp. rate 16, height 5' 7\" (1.702 m), weight 54.4 kg (120 lb), SpO2 97 %, not currently breastfeeding. GENERAL: alert, cooperative, no distress, appears stated age  LUNG: clear to auscultation bilaterally  HEART: regular rate and rhythm  ABD: Non tender, non distended. Alerts:    Hospital Problems  Date Reviewed: 7/28/2021    None          Laboratory:    No results for input(s): HGB, HCT, WBC, PLT, INR, BUN, CREA, K, CRCLT, HGBEXT, HCTEXT, PLTEXT, INREXT in the last 72 hours. No lab exists for component: PTT, PT      Plan of Care/Planned Procedure:  Risks, benefits, and alternatives reviewed with patient and she agrees to proceed with the procedure.        Mandy Humphries MD

## 2021-08-19 NOTE — DISCHARGE INSTRUCTIONS
[address] of Interventional Radiology  [facility]      [facility]    General Information:     Your doctor has ordered for us to remove your por. This could be that you do not need it anymore, it is not doing its job, your physician has decided on another plan for your treatment and/or it may need replacing. Home Care Instructions: You can resume your regular diet and medication regimen. Do not drink alcohol, drive, or make any important legal decisions in the next 24 hours. Do not lift anything heavier than a gallon of milk, or do anything strenuous for the next 24 hours. You will notice a dressing over the site of the removal. This dressing should stay in place until the site is healed. The nurse who discharges you to home should review with you any wound care instructions. Resume your normal level of activity slowly. You may shower after 24 hours, but do not take a bath, swim or immerse yourself in water until the site has healed. Call If:     You should call your Physician and/or the Radiology Nurse if you have any bleeding other than a small spot on your bandage. Call if you have any signs of infection, fever, or increased pain at the site of the tube. Call if the oozing increases, if it changes color, or begins to have an odor. Follow-Up Instructions: Please see your ordering doctor as he/she has requested. To Reach Us:  Side effects of pain  medications and other medications used today have been reviewed. Notify us of nausea, itching, hives, dizziness, or anything else out of the ordinary. Should you experience any of these significant changes, please call [phone] between the hours of 7:30 am and 10 pm or [phone] after hours.  After hours, ask the  to page the [address] Technologist, and describe the problem to the technologist.          Patient Signature:  Date: 8/19/2021  Discharging Nurse: Humberto Means RN

## 2021-08-30 ENCOUNTER — OFFICE VISIT (OUTPATIENT)
Dept: HEMATOLOGY | Age: 71
End: 2021-08-30
Payer: MEDICARE

## 2021-08-30 VITALS
RESPIRATION RATE: 16 BRPM | DIASTOLIC BLOOD PRESSURE: 80 MMHG | HEIGHT: 67 IN | TEMPERATURE: 98.9 F | WEIGHT: 119.8 LBS | SYSTOLIC BLOOD PRESSURE: 128 MMHG | HEART RATE: 101 BPM | BODY MASS INDEX: 18.8 KG/M2 | OXYGEN SATURATION: 97 %

## 2021-08-30 DIAGNOSIS — R94.5 ABNORMAL RESULTS OF LIVER FUNCTION STUDIES: ICD-10-CM

## 2021-08-30 DIAGNOSIS — R74.8 ELEVATED LIVER ENZYMES: Primary | ICD-10-CM

## 2021-08-30 LAB
ALBUMIN SERPL-MCNC: 3.9 G/DL (ref 3.5–5)
ALBUMIN/GLOB SERPL: 1.1 {RATIO} (ref 1.1–2.2)
ALP SERPL-CCNC: 229 U/L (ref 45–117)
ALT SERPL-CCNC: 73 U/L (ref 12–78)
ANION GAP SERPL CALC-SCNC: 8 MMOL/L (ref 5–15)
AST SERPL-CCNC: 41 U/L (ref 15–37)
BASOPHILS # BLD: 0.1 K/UL (ref 0–0.1)
BASOPHILS NFR BLD: 1 % (ref 0–1)
BILIRUB DIRECT SERPL-MCNC: 0.5 MG/DL (ref 0–0.2)
BILIRUB SERPL-MCNC: 1.9 MG/DL (ref 0.2–1)
BUN SERPL-MCNC: 12 MG/DL (ref 6–20)
BUN/CREAT SERPL: 19 (ref 12–20)
CALCIUM SERPL-MCNC: 9.7 MG/DL (ref 8.5–10.1)
CHLORIDE SERPL-SCNC: 101 MMOL/L (ref 97–108)
CO2 SERPL-SCNC: 27 MMOL/L (ref 21–32)
COMMENT, HOLDF: NORMAL
CREAT SERPL-MCNC: 0.63 MG/DL (ref 0.55–1.02)
DIFFERENTIAL METHOD BLD: ABNORMAL
EOSINOPHIL # BLD: 0.1 K/UL (ref 0–0.4)
EOSINOPHIL NFR BLD: 2 % (ref 0–7)
ERYTHROCYTE [DISTWIDTH] IN BLOOD BY AUTOMATED COUNT: 15.3 % (ref 11.5–14.5)
GLOBULIN SER CALC-MCNC: 3.7 G/DL (ref 2–4)
GLUCOSE SERPL-MCNC: 89 MG/DL (ref 65–100)
HCT VFR BLD AUTO: 36.9 % (ref 35–47)
HGB BLD-MCNC: 12 G/DL (ref 11.5–16)
IMM GRANULOCYTES # BLD AUTO: 0 K/UL (ref 0–0.04)
IMM GRANULOCYTES NFR BLD AUTO: 0 % (ref 0–0.5)
INR PPP: 1 (ref 0.9–1.1)
LYMPHOCYTES # BLD: 0.6 K/UL (ref 0.8–3.5)
LYMPHOCYTES NFR BLD: 10 % (ref 12–49)
MCH RBC QN AUTO: 29 PG (ref 26–34)
MCHC RBC AUTO-ENTMCNC: 32.5 G/DL (ref 30–36.5)
MCV RBC AUTO: 89.1 FL (ref 80–99)
MONOCYTES # BLD: 0.5 K/UL (ref 0–1)
MONOCYTES NFR BLD: 9 % (ref 5–13)
NEUTS SEG # BLD: 4.4 K/UL (ref 1.8–8)
NEUTS SEG NFR BLD: 78 % (ref 32–75)
NRBC # BLD: 0 K/UL (ref 0–0.01)
NRBC BLD-RTO: 0 PER 100 WBC
PLATELET # BLD AUTO: 167 K/UL (ref 150–400)
PMV BLD AUTO: 9.8 FL (ref 8.9–12.9)
POTASSIUM SERPL-SCNC: 3.7 MMOL/L (ref 3.5–5.1)
PROT SERPL-MCNC: 7.6 G/DL (ref 6.4–8.2)
PROTHROMBIN TIME: 10.5 SEC (ref 9–11.1)
RBC # BLD AUTO: 4.14 M/UL (ref 3.8–5.2)
RBC MORPH BLD: ABNORMAL
SAMPLES BEING HELD,HOLD: NORMAL
SODIUM SERPL-SCNC: 136 MMOL/L (ref 136–145)
WBC # BLD AUTO: 5.7 K/UL (ref 3.6–11)

## 2021-08-30 PROCEDURE — 1090F PRES/ABSN URINE INCON ASSESS: CPT | Performed by: NURSE PRACTITIONER

## 2021-08-30 PROCEDURE — G8427 DOCREV CUR MEDS BY ELIG CLIN: HCPCS | Performed by: NURSE PRACTITIONER

## 2021-08-30 PROCEDURE — G8400 PT W/DXA NO RESULTS DOC: HCPCS | Performed by: NURSE PRACTITIONER

## 2021-08-30 PROCEDURE — G8420 CALC BMI NORM PARAMETERS: HCPCS | Performed by: NURSE PRACTITIONER

## 2021-08-30 PROCEDURE — G8432 DEP SCR NOT DOC, RNG: HCPCS | Performed by: NURSE PRACTITIONER

## 2021-08-30 PROCEDURE — G0463 HOSPITAL OUTPT CLINIC VISIT: HCPCS | Performed by: NURSE PRACTITIONER

## 2021-08-30 PROCEDURE — 1101F PT FALLS ASSESS-DOCD LE1/YR: CPT | Performed by: NURSE PRACTITIONER

## 2021-08-30 PROCEDURE — G8536 NO DOC ELDER MAL SCRN: HCPCS | Performed by: NURSE PRACTITIONER

## 2021-08-30 PROCEDURE — 99214 OFFICE O/P EST MOD 30 MIN: CPT | Performed by: NURSE PRACTITIONER

## 2021-08-30 PROCEDURE — G9711 PT HX TOT COL OR COLON CA: HCPCS | Performed by: NURSE PRACTITIONER

## 2021-08-30 PROCEDURE — 91200 LIVER ELASTOGRAPHY: CPT | Performed by: NURSE PRACTITIONER

## 2021-08-30 NOTE — PROGRESS NOTES
Dianna Foote MD, Sonu Gamino MD, MPH      Rafael Timmons, DAVID Neely, [facility]     Yaritza Joshi, [facility]   Todd Edmondson, P-JANE Lyons, [facility]       Kyle Shaw [address]    at [address], [address] [place] [address], [facility] 22.    [phone]    FAX: 2136 [address], [address]    [phone]    [address] [place] [address] Street: [phone]       Patient Care Team:  Brian Angela MD as PCP - General (Family Medicine)  Jan Gastelum MD (Hematology and Oncology)  Pati Retana MD (Colon and Rectal Surgery)  Miguel Marino MD (Neurology)  Chelsi Vogel MD (Obstetrics & Gynecology)  Nahid Tan MD (Urology)      Problem List  Date Reviewed: 7/28/2021        Codes Class Noted    TIA (transient ischemic attack) ICD-10-CM: G45.9  ICD-9-CM: 435.9  6/2/2021        Elevated liver enzymes ICD-10-CM: R74.8  ICD-9-CM: 790.5  6/2/2021        Gastroesophageal reflux disease without esophagitis ICD-10-CM: K21.9  ICD-9-CM: 530.81  6/2/2021        History of appendectomy ICD-10-CM: Z90.49  ICD-9-CM: V45.89  6/2/2021        Osteopenia of multiple sites ICD-10-CM: M85.89  ICD-9-CM: 733.90  6/2/2021        Idiopathic scoliosis ICD-10-CM: M41.20  ICD-9-CM: 737.30  6/2/2021        Acute intestinal ischemia [facility]) ICD-10-CM: K55.059  ICD-9-CM: 557.0  10/25/2020        Ileostomy prolapse ([facility] ICD-10-CM: K94.19  ICD-9-CM: 569.69  10/10/2020        Severe protein-calorie malnutrition ([address] (Chronic) ICD-10-CM: U46  ICD-9-CM: 262  9/16/2020        Anemia (Chronic) ICD-10-CM: D64.9  ICD-9-CM: 285.9  9/16/2020        Malignant neoplasm of sigmoid colon ([facility] (Chronic) ICD-10-CM: C18.7  ICD-9-CM: 153.3 9/14/2020        Hydronephrosis ICD-10-CM: N13.30  ICD-9-CM: 886  9/11/2020              Richard De Leon is being seen at [address] for management of elevated liver enzymes. The active problem list, all pertinent past medical history, medications, radiologic findings and laboratory findings related to the liver disorder were reviewed and discussed with the patient. The patient is a 80 y.o.  female who was found to have elevated  liver enzymes and alkaline phosphatase in 9/2020. The patient went to the ER with back pain 9/2020. A CT was performed and she was found to have a 9 cm pelvic mass. Surgical resection of the mass was performed 9/17/2020 which included a lower anterior resection with coloproctostomy with ileostomy, total hysterectomy with left oophorectomy, distal right uterectomy with right urethral re-implant. 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 FOLFOX 1 of 12 was initiated 11/17/2020. She completed 11 treatments with the last being 5/05/2021. Serologic evaluation for markers of chronic liver disease were negative for HBV and HCV. Ultrasound of the liver was performed in 4/2021. The results of the imaging suggested chronic liver disease. Mild splenomegaly. An assessment of liver fibrosis with biopsy, Fibroscan or elastography has not been performed. She returns for Fibroscan today. Since the last office visit the patient had her port removed and is scheduled for a follow-up colonoscopy 11/2021 and CT scan 10/2021. She notes eating well but is not gaining weight. The patient does not have any symptoms which could be attributed to the liver disorder. The patient is not experiencing the following symptoms which are commonly seen in this liver disorder:  pain in the right side over the liver, yellowing of the eyes or skin, itching, or swelling of the abdomen.      The patient completes all daily activities without any functional limitations. ASSESSMENT AND PLAN:  Elevated liver enzymes  Persistent elevation in Intermittent elevation in liver transaminases and alkaline phosphatase of unclear etiology at this time. Assessment of liver fibrosis was performed with Fibroscan in the office today. The result was 8.3 kPa which correlates with stage 2 septal fibrosis. Have performed laboratory testing to monitor liver function and degree of liver injury. This included BMP, hepatic panel, and CBC with platelet count. Laboratory testing from 6/02/2021 reviewed in detail. Follow-up testing ordered today. The liver transaminases and ALP are elevated. The liver function is normal The platelet count is intermittently depressed. Serologic testing for causes of chronic liver disease were negative. The most likely causes for the liver chemistry abnormalities were discussed with the patient and include:  Unknown etiology or chemotherapy induced vascular disease of the liver. The Fibroscan can be repeated annually or as often as clinically indicated to assess for fibrosis progression and/or regression. Screening for Hepatocellular Carcinoma  HCC screening is not necessary if the patient has no evidence of cirrhosis. Treatment of other medical problems in patients with chronic liver disease  There are no contraindications for the patient to take most medications that are necessary for treatment of other medical issues. The patient can take: Any medications utilized for treatment of DM. Statins to treat hypercholesterolemia    Counseling for alcohol in patients with chronic liver disease  The patient was counseled regarding alcohol consumption and the effect of alcohol on chronic liver disease. The patient dose not drink. Vaccinations   Vaccination for viral hepatitis A and B is recommended since the patient has no serologic evidence of previous exposure or vaccination with immunity.   Routine vaccinations against other bacterial and viral agents can be performed as indicated. Annual flu vaccination should be administered if indicated. ALLERGIES  No Known Allergies    MEDICATIONS  Current Outpatient Medications   Medication Sig    lidocaine-prilocaine (EMLA) topical cream Apply  to affected area as needed for Pain. Apply over port a cath site 30-60 minutes prior to port access    dexAMETHasone (DECADRON) 4 mg tablet Take 2 tabs (8mg) once daily on days 2 & 3 of chemotherapy only    denosumab (PROLIA) 60 mg/mL injection 60 mg by SubCUTAneous route.  lansoprazole (PREVACID) 30 mg capsule Take 30 mg by mouth Daily (before breakfast).  multivitamin (ONE A DAY) tablet Take 1 Tab by mouth daily.  ascorbic acid, vitamin C, (VITAMIN C) 500 mg tablet Take 500 mg by mouth.  cholecalciferol (VITAMIN D3) 1,000 unit cap Take 1,000 Units by mouth daily.  Omega-3 Fatty Acids (FISH OIL) 500 mg cap Take  by mouth. No current facility-administered medications for this visit. SYSTEM REVIEW NOT RELATED TO LIVER DISEASE OR REVIEWED ABOVE:  Constitution systems: Negative for fever, chills, weight gain, weight loss. Eyes: Negative for visual changes. ENT: Negative for sore throat, painful swallowing. Respiratory: Negative for cough, hemoptysis, SOB. Cardiology: Negative for chest pain, palpitations. GI:  Negative for constipation or diarrhea. : Negative for urinary frequency, dysuria, hematuria, nocturia. Skin: Negative for rash. Hematology: Negative for easy bruising, blood clots. Musculo-skeletal: Negative for back pain, muscle pain, weakness. Neurologic: Negative for headaches, dizziness, vertigo, memory problems not related to HE. Psychology: Negative for anxiety, depression. FAMILY HISTORY:  The father   of complications from a fall. History of heart disease and CVA. The mother  of pancreatic cancer. There is no family history of liver disease.     There is no family history of immune disorders. SOCIAL HISTORY:  The patient is . The patient hasno children. The patient has never used tobacco products. The patient does not consume significant amounts of alcohol. The patient retired in 2007. Previous employment in [employer]:  Visit Vitals  /80   Pulse (!) 101   Temp 98.9 °F (37.2 °C)   Resp 16   Ht 5' 7\" (1.702 m)   Wt 119 lb 12.8 oz (54.3 kg)   SpO2 97%   BMI 18.76 kg/m²       General: No acute distress. Eyes: Sclera anicteric. ENT: No oral lesions. Thyroid normal.  Nodes: No adenopathy. Skin: No spider angiomata. No jaundice. No palmar erythema. Respiratory: Lungs clear to auscultation. Cardiovascular: Regular heart rate. No murmurs. No JVD. Abdomen: Soft non-tender, liver size normal to percussion/palpation. Spleen not palpable. No obvious ascites. Extremities: No edema. No muscle wasting. No gross arthritic changes. Neurologic: Alert and oriented. Cranial nerves grossly intact. No asterixis.     LABORATORY STUDIES:  Liver [place] of [id number] & Units 7/28/2021 6/16/2021 6/2/2021   WBC 3.6 - 11.0 K/uL 3.1 (L) 2.4 (L) 2.5 (L)   ANC 1.8 - 8.0 K/UL 1.9 1.8 1.6 (L)   HGB 11.5 - 16.0 g/dL 11.6 11.5 12.0    - 400 K/uL 164 135 (L) 164   AST 15 - 37 U/L 51 (H) 33 49 (H)   ALT 12 - 78 U/L 81 (H) 39 53   Alk Phos 45 - 117 U/L 233 (H) 220 (H) 295 (H)   Bili, Total 0.2 - 1.0 MG/DL 1.4 (H) 1.2 (H) 1.2 (H)   Bili, Direct 0.0 - 0.2 MG/DL   0.5 (H)   Albumin 3.5 - 5.0 g/dL 3.7 3.6 3.9   BUN 6 - 20 MG/DL 14 11 12   Creat 0.55 - 1.02 MG/DL 0.74 0.65 0.52 (L)   Na 136 - 145 mmol/L 137 134 (L) 135 (L)   K 3.5 - 5.1 mmol/L 3.6 3.8 3.7   Cl 97 - 108 mmol/L 104 103 102   CO2 21 - 32 mmol/L 26 23 27   Glucose 65 - 100 mg/dL 104 (H) 161 (H) 88     Cancer Screening Latest Ref Rng & Units 9/11/2020   CA 19-9 0 - 35 U/mL 4     Cancer Screening Latest Ref Rng & Units 7/28/2021 6/16/2021 5/5/2021   CEA ng/mL 1.5 1.9 2.6     Laboratory testing from 6/02/2021 reviewed in detail. Additional testing included to evaluate progression or regression of disease. Laboratory testing results from today will be communicated by phone or mail. SEROLOGIES:  Serologies Latest Ref Rng & Units 6/2/2021   Hep A Ab, Total Negative   Negative   Hep B Surface Ag Index    Hep B Surface Ag Interp NEG      Hep B Core Ab, Total Negative   Negative   Hep B Surface Ab mIU/mL <3.10   Hep B Surface Ab Interp NONREACTIVE   NONREACTIVE   Hep C Ab NR      Ferritin 8 - 252 NG/   Iron % Saturation 20 - 50 % 16 (L)   STEPHANIE, IFA  Negative   C-ANCA Neg:<1:20 titer <1:20   P-ANCA Neg:<1:20 titer <1:20   ANCA Neg:<1:20 titer <1:20   ASMCA 0 - 19 Units 12   M2 Ab 0.0 - 20.0 Units <20.0   Alpha-1 antitrypsin level 101 - 187 mg/dL 198 (H)     Serologies Latest Ref Rng & Units 4/21/2021   Hep B Surface Ag Index <0.10   Hep B Surface Ag Interp NEG   Negative     Serologies Latest Ref Rng & Units 4/21/2021   Hep C Ab NR   NONREACTIVE     LIVER HISTOLOGY:  8/2021. Fib[facility]Scan [facility]. EkPa was 8.3. IQR/med 29%. . The results suggested a fibrosis level of F2. The CAP score suggests there is no significant hepatic steatosis. ENDOSCOPIC PROCEDURES:  Not available or performed    RADIOLOGY:  4/2021. Ultrasound of liver. Increased echogenicity with irregular contour. No concerning liver mass or lesion. No ductal dilatation. 5/2021. CT of Abdomen. No liver mass or lesion. No stones or ductal dilatation. OTHER TESTING:  Not available or performed    FOLLOW-UP:  All of the issues listed above in the Assessment and Plan were discussed with the patient. All questions were answered. The patient expressed a clear understanding of the above. [facility] 87 in 3 months. April [facility]  [address] [address] [address] [facility] [address]  22.  201 [facility]

## 2021-08-30 NOTE — PROGRESS NOTES
Lala Hood is a 80 y.o. female    Chief Complaint   Patient presents with    Follow-up     Fibroscan     1. Have you been to the ER, urgent care clinic since your last visit? Hospitalized since your last visit? No    2. Have you seen or consulted any other health care providers outside of the [address] [place] [address] since your last visit? Include any pap smears or colon screening.  No    Visit Vitals  /80   Pulse (!) 101   Temp 98.9 °F (37.2 °C)   Resp 16   Ht 5' 7\" (1.702 m)   Wt 119 lb 12.8 oz (54.3 kg)   SpO2 97%   BMI 18.76 kg/m²

## 2021-09-03 NOTE — PROGRESS NOTES
Letter sent to the patient regarding the blood work results. The liver numbers were slightly improved and the blood counts are stable.

## 2021-09-08 ENCOUNTER — APPOINTMENT (OUTPATIENT)
Dept: INFUSION THERAPY | Age: 71
End: 2021-09-08

## 2021-09-28 ENCOUNTER — OFFICE VISIT (OUTPATIENT)
Dept: SURGERY | Age: 71
End: 2021-09-28
Payer: MEDICARE

## 2021-09-28 VITALS
RESPIRATION RATE: 16 BRPM | HEIGHT: 67 IN | DIASTOLIC BLOOD PRESSURE: 79 MMHG | BODY MASS INDEX: 18.44 KG/M2 | TEMPERATURE: 98.1 F | HEART RATE: 82 BPM | SYSTOLIC BLOOD PRESSURE: 135 MMHG | OXYGEN SATURATION: 98 % | WEIGHT: 117.5 LBS

## 2021-09-28 DIAGNOSIS — K43.9 VENTRAL HERNIA WITHOUT OBSTRUCTION OR GANGRENE: Primary | ICD-10-CM

## 2021-09-28 PROCEDURE — G8427 DOCREV CUR MEDS BY ELIG CLIN: HCPCS | Performed by: SURGERY

## 2021-09-28 PROCEDURE — G8536 NO DOC ELDER MAL SCRN: HCPCS | Performed by: SURGERY

## 2021-09-28 PROCEDURE — G8419 CALC BMI OUT NRM PARAM NOF/U: HCPCS | Performed by: SURGERY

## 2021-09-28 PROCEDURE — 99202 OFFICE O/P NEW SF 15 MIN: CPT | Performed by: SURGERY

## 2021-09-28 PROCEDURE — G8510 SCR DEP NEG, NO PLAN REQD: HCPCS | Performed by: SURGERY

## 2021-09-28 PROCEDURE — 1101F PT FALLS ASSESS-DOCD LE1/YR: CPT | Performed by: SURGERY

## 2021-09-28 PROCEDURE — G8400 PT W/DXA NO RESULTS DOC: HCPCS | Performed by: SURGERY

## 2021-09-28 PROCEDURE — 1090F PRES/ABSN URINE INCON ASSESS: CPT | Performed by: SURGERY

## 2021-09-28 PROCEDURE — G9711 PT HX TOT COL OR COLON CA: HCPCS | Performed by: SURGERY

## 2021-09-28 NOTE — PROGRESS NOTES
Ora Shoemaker is a 80 y.o. female who is referred by Dr. Keri Carbajal for further evaluation of a ventral incisional hernia. Ms. 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 Ms. Emily Paulino received adjuvant chemotherapy. The ileostomy was subsequently closed in October, 2020. Ms. Emily Paulino recently noted a bulge at the superior aspect of the midline incision. No significant change in the size of the bulge. No abdominal pain. No nausea or vomitting. Found to have a ventral incisional hernia. She has otherwise been in her usual state of health. Past Medical History:   Diagnosis Date    Colon cancer ([address]     GERD (gastroesophageal reflux disease)     Ileostomy in place ([address]     Ileostomy prolapse ([address]     Ill-defined condition     Spaulding's esophagus    Ventral hernia without obstruction or gangrene 9/28/2021     Past Surgical History:   Procedure Laterality Date    COLONOSCOPY Left 9/14/2020    COLONOSCOPY performed by Mirella Fernandez MD at [address] [place] [address]; incomplete secondary to partial obstruction.  HX APPENDECTOMY  age 17    HX COLONOSCOPY  circa 2010    No neoplasms.  HX ENDOSCOPY  03/13/2017    Dr. Janet Jimenez HX ENDOSCOPY  02/13/2020    Dr. Cowan Good oopherectomy for treatment of benign mass.  HX GYN  09/17/2020    Total abdominal hysterectomy and left salpingo-oophorectomy; Anay Boyce MD.    HX OTHER SURGICAL  09/17/2020    Low anterior resection, mobilization of the splenic flexure, coloproctostomy, and creation of loop ileostomy; Dr. Keri Carbajal.  HX OTHER SURGICAL  10/11/2020    Revision of loop ileostomy (resection and creation of divided loop ileostomy); Dr. Keri Carbajal.      UROLOGICAL  09/12/2020    Cystoscopy and placement of a right ureteral stent; Ghassan Ma III, MD.     UROLOGICAL  09/17/2020    Cystoscopy and placement of a temporary left ureteral catheter; Ghassan Ma III, MD.     UROLOGICAL  09/17/2020    Distal right ureterectomy; Ghassan Ma III, MD.     UROLOGICAL  09/17/2020    Right ureteral re-implantation with psoas hitch and placement of a right ureteral stent; Simran Hernadez MD.    IR INSERT TUNL CVAD W PORT LESS THAN 5 YR  10/14/2020    Right chest Port-a-Cath placement.  IR REMOVE TUNL CVAD W PORT/PUMP  8/19/2021     Family History   Problem Relation Age of Onset    Cancer Mother     Heart Disease Father     Diabetes Brother      Social History     Socioeconomic History    Marital status: SINGLE     Spouse name: Not on file    Number of children: Not on file    Years of education: Not on file    Highest education level: Not on file   Tobacco Use    Smoking status: Never Smoker    Smokeless tobacco: Never Used   Vaping Use    Vaping Use: Never used   Substance and Sexual Activity    Alcohol use: Not Currently     Alcohol/week: 1.0 standard drinks     Types: 1 Glasses of wine per week    Drug use: No     Social Determinants of Health     Financial Resource Strain:     Difficulty of Paying Living Expenses:    Food Insecurity:     Worried About Running Out of Food in the Last Year:     Ran Out of Food in the Last Year:    Transportation Needs:     Lack of Transportation (Medical):      Lack of Transportation (Non-Medical):    Physical Activity:     Days of Exercise per Week:     Minutes of Exercise per Session:    Stress:     Feeling of Stress :    Social Connections:     Frequency of Communication with Friends and Family:     Frequency of Social Gatherings with Friends and Family:     Attends Confucianism Services:     Active Member of Clubs or Organizations:     Attends Club or Organization Meetings:     Marital Status:      Review of systems negative except as noted. Review of Systems   Constitutional: Negative for chills and fever. Respiratory: Negative for cough. Gastrointestinal: Negative for abdominal pain, nausea and vomiting. Physical Exam  Vitals reviewed. Constitutional:       General: She is not in acute distress. Appearance: Normal appearance. She is normal weight. HENT:      Head: Normocephalic and atraumatic. Eyes:      General: No scleral icterus. Cardiovascular:      Rate and Rhythm: Normal rate and regular rhythm. Pulmonary:      Effort: Pulmonary effort is normal.      Breath sounds: Normal breath sounds. Abdominal:      General: There is no distension. Palpations: Abdomen is soft. Tenderness: There is no abdominal tenderness. There is no guarding or rebound. Hernia: A hernia is present. Hernia is present in the ventral area (At the superior aspect of the midline incision. Reducible. No hernia at ileostomy site. ). Comments: Well healed surgical incisions. Musculoskeletal:      Cervical back: Neck supple. Lymphadenopathy:      Cervical: No cervical adenopathy. Neurological:      General: No focal deficit present. Mental Status: She is alert. ASSESSMENT and PLAN  Ms. Kathe Galindo is a 81 yo female with a reducible ventral incisional hernia. In view of the findings on H and P, do not believe that there is an urgent indication for hernia repair at this time as the hernia is reducible. Discussed mesh ventral incisional hernia repair with her including risks of bleeding, infection, hernia recurrence. At this point in time, Ms. Kathe Galindo does not wish to proceed with surgery as the hernia is not symptomatic. Explained to her that the hernia may become larger and more bothersome to her and that she may ultimately benefit from repair. Also discussed potential risk of strangulation and need for emergent surgery. Activity as tolerated.  Follow up with  Jenna and Dulce Bowman as scheduled. Will see as needed.       CC: MD Vanita Fernandez MD

## 2021-09-28 NOTE — PROGRESS NOTES
1. Have you been to the ER, urgent care clinic since your last visit? Hospitalized since your last visit? No    2. Have you seen or consulted any other health care providers outside of the [address] [place] [address] since your last visit? Include any pap smears or colon screening.  No

## 2021-10-02 LAB
ALBUMIN SERPL-MCNC: 4.9 G/DL (ref 3.8–4.8)
ALBUMIN/GLOB SERPL: 1.8 {RATIO} (ref 1.2–2.2)
ALP SERPL-CCNC: 216 IU/L (ref 44–121)
ALT SERPL-CCNC: 44 IU/L (ref 0–32)
AST SERPL-CCNC: 40 IU/L (ref 0–40)
BASOPHILS # BLD AUTO: 0 X10E3/UL (ref 0–0.2)
BASOPHILS NFR BLD AUTO: 1 %
BILIRUB SERPL-MCNC: 1.2 MG/DL (ref 0–1.2)
BUN SERPL-MCNC: 15 MG/DL (ref 8–27)
BUN/CREAT SERPL: 24 (ref 12–28)
CALCIUM SERPL-MCNC: 10.1 MG/DL (ref 8.7–10.3)
CEA SERPL-MCNC: 2.4 NG/ML (ref 0–4.7)
CHLORIDE SERPL-SCNC: 102 MMOL/L (ref 96–106)
CO2 SERPL-SCNC: 23 MMOL/L (ref 20–29)
CREAT SERPL-MCNC: 0.62 MG/DL (ref 0.57–1)
EOSINOPHIL # BLD AUTO: 0.2 X10E3/UL (ref 0–0.4)
EOSINOPHIL NFR BLD AUTO: 5 %
ERYTHROCYTE [DISTWIDTH] IN BLOOD BY AUTOMATED COUNT: 13 % (ref 11.7–15.4)
GLOBULIN SER CALC-MCNC: 2.7 G/DL (ref 1.5–4.5)
GLUCOSE SERPL-MCNC: 92 MG/DL (ref 65–99)
HCT VFR BLD AUTO: 39.8 % (ref 34–46.6)
HGB BLD-MCNC: 12.8 G/DL (ref 11.1–15.9)
IMM GRANULOCYTES # BLD AUTO: 0 X10E3/UL (ref 0–0.1)
IMM GRANULOCYTES NFR BLD AUTO: 0 %
LYMPHOCYTES # BLD AUTO: 0.8 X10E3/UL (ref 0.7–3.1)
LYMPHOCYTES NFR BLD AUTO: 24 %
MCH RBC QN AUTO: 28.4 PG (ref 26.6–33)
MCHC RBC AUTO-ENTMCNC: 32.2 G/DL (ref 31.5–35.7)
MCV RBC AUTO: 88 FL (ref 79–97)
MONOCYTES # BLD AUTO: 0.4 X10E3/UL (ref 0.1–0.9)
MONOCYTES NFR BLD AUTO: 11 %
NEUTROPHILS # BLD AUTO: 1.9 X10E3/UL (ref 1.4–7)
NEUTROPHILS NFR BLD AUTO: 59 %
PLATELET # BLD AUTO: 212 X10E3/UL (ref 150–450)
POTASSIUM SERPL-SCNC: 4.6 MMOL/L (ref 3.5–5.2)
PROT SERPL-MCNC: 7.6 G/DL (ref 6–8.5)
RBC # BLD AUTO: 4.51 X10E6/UL (ref 3.77–5.28)
SODIUM SERPL-SCNC: 140 MMOL/L (ref 134–144)
WBC # BLD AUTO: 3.3 X10E3/UL (ref 3.4–10.8)

## 2021-10-14 ENCOUNTER — HOSPITAL ENCOUNTER (OUTPATIENT)
Dept: CT IMAGING | Age: 71
Discharge: HOME | End: 2021-10-14
Attending: REGISTERED NURSE
Payer: MEDICARE

## 2021-10-14 ENCOUNTER — HOSPITAL ENCOUNTER (OUTPATIENT)
Dept: CT IMAGING | Age: 71
End: 2021-10-14
Attending: REGISTERED NURSE
Payer: MEDICARE

## 2021-10-14 DIAGNOSIS — C18.7 MALIGNANT NEOPLASM OF SIGMOID COLON (HCC): ICD-10-CM

## 2021-10-14 PROCEDURE — 71260 CT THORAX DX C+: CPT

## 2021-10-14 PROCEDURE — 74011000636 HC RX REV CODE- 636: Performed by: REGISTERED NURSE

## 2021-10-14 PROCEDURE — 74177 CT ABD & PELVIS W/CONTRAST: CPT

## 2021-10-14 PROCEDURE — 74011000636 HC RX REV CODE- 636

## 2021-10-14 RX ADMIN — IOPAMIDOL 100 ML: 755 INJECTION, SOLUTION INTRAVENOUS at 11:49

## 2021-10-14 RX ADMIN — IOHEXOL 50 ML: 240 INJECTION, SOLUTION INTRATHECAL; INTRAVASCULAR; INTRAVENOUS; ORAL at 11:49

## 2021-10-20 ENCOUNTER — APPOINTMENT (OUTPATIENT)
Dept: INFUSION THERAPY | Age: 71
End: 2021-10-20

## 2021-10-28 ENCOUNTER — OFFICE VISIT (OUTPATIENT)
Dept: ONCOLOGY | Age: 71
End: 2021-10-28
Payer: MEDICARE

## 2021-10-28 VITALS
DIASTOLIC BLOOD PRESSURE: 69 MMHG | HEART RATE: 95 BPM | OXYGEN SATURATION: 97 % | BODY MASS INDEX: 18.79 KG/M2 | SYSTOLIC BLOOD PRESSURE: 113 MMHG | WEIGHT: 120 LBS | RESPIRATION RATE: 18 BRPM | TEMPERATURE: 98.5 F

## 2021-10-28 DIAGNOSIS — C18.7 MALIGNANT NEOPLASM OF SIGMOID COLON (HCC): Primary | ICD-10-CM

## 2021-10-28 DIAGNOSIS — T45.1X5A CHEMOTHERAPY-INDUCED NEUROPATHY (HCC): ICD-10-CM

## 2021-10-28 DIAGNOSIS — G62.0 CHEMOTHERAPY-INDUCED NEUROPATHY (HCC): ICD-10-CM

## 2021-10-28 PROCEDURE — 99214 OFFICE O/P EST MOD 30 MIN: CPT | Performed by: INTERNAL MEDICINE

## 2021-10-28 PROCEDURE — G8432 DEP SCR NOT DOC, RNG: HCPCS | Performed by: INTERNAL MEDICINE

## 2021-10-28 PROCEDURE — G8420 CALC BMI NORM PARAMETERS: HCPCS | Performed by: INTERNAL MEDICINE

## 2021-10-28 PROCEDURE — G0463 HOSPITAL OUTPT CLINIC VISIT: HCPCS | Performed by: REGISTERED NURSE

## 2021-10-28 PROCEDURE — G8427 DOCREV CUR MEDS BY ELIG CLIN: HCPCS | Performed by: INTERNAL MEDICINE

## 2021-10-28 PROCEDURE — G8536 NO DOC ELDER MAL SCRN: HCPCS | Performed by: INTERNAL MEDICINE

## 2021-10-28 PROCEDURE — 1090F PRES/ABSN URINE INCON ASSESS: CPT | Performed by: INTERNAL MEDICINE

## 2021-10-28 PROCEDURE — G8400 PT W/DXA NO RESULTS DOC: HCPCS | Performed by: INTERNAL MEDICINE

## 2021-10-28 PROCEDURE — G9711 PT HX TOT COL OR COLON CA: HCPCS | Performed by: INTERNAL MEDICINE

## 2021-10-28 PROCEDURE — 1101F PT FALLS ASSESS-DOCD LE1/YR: CPT | Performed by: INTERNAL MEDICINE

## 2021-10-28 NOTE — PROGRESS NOTES
Roxy Marques is a 80 y.o. female    Chief Complaint   Patient presents with    Follow-up      stage III colon cancer       1. Have you been to the ER, urgent care clinic since your last visit? Hospitalized since your last visit? No    2. Have you seen or consulted any other health care providers outside of the [address] [place] [address] since your last visit? Include any pap smears or colon screening.  No

## 2021-10-28 NOTE — PROGRESS NOTES
[facility] [place] [facility] [place] [address] [address], [address] [address], [address] [place] [address]  W: [phone]  F: [phone]    Reason for Visit:   Rachelle Potts is a 80 y.o. female who is seen for stage III colon cancer. Treatment History:   · 9/10/2020 CT A/P - large, irregular pelvic mass measuring approximately 9cm likely representing neoplasm arising from the sigmoid colon, small amount of pelvic free fluid, colonic bowel wall thickening. Borderline enlarged retroperitoneal lymph nodes. 6 mm right lower lobe lung nodule. Mass effect from pelvic mass causing moderate right and minimal left hydronephrosis. · 9/14/2020: Colonoscopy - A large circumferential, ulcerated fungating mass was noted in proximal sigmoid colon. Mass was obstructing the lumen and could not be traversed. Candelario Lied was present. · 9/15/2020: CT Chest - 7.5mm left lower lobe pulmonary nodule, small right pleural effusion, mild right basilar atelectasis  · 9/17/2020: Surgical resection - low anterior resection, mobilization of the splenic flexure, coloproctostomy, creation of loop ileostomy, total abdominal hysterectomy and left salpingo-oophorectomy, distal right ureterectomy, right ureteral re-implant with psoas hitch and placement of right ureteral stent  · 11/17/20: cycle 1 FOLFOX  · 2/17/21 CT CAP: pulmonary nodule stable, ROBE   · 5/11/21 CT CAP: unchanged pulmonary nodule, ROBE  · 10/14/21 CT CAP: unchanged pulmonary nodule, ROBE     History of Present Illness:   Patient is a 80 y.o. female seen for colon cancer    She presented to the ED on 9/11/2020 with complaints of right flank/back pain and RLQ abdominal pain. She states she has had several months of gas pain and \"blockages. \" She reports a weight loss of 12 lbs in the last 3 months. CT abd/pelv 9/10/2020 showed large, irregular pelvic mass measuring approximately 9cm likely representing neoplasm arising from the sigmoid colon.  She also had hydronephrosis from the mass effect and urethral stent placed. CEA 7.4 on 9/11and colonoscopy performed 9/14 with biopsy + adenocarcinoma. CT Chest performed showing 7.5mm left lower lobe pulmonary nodule. Mass found to be invading into the uterus and right ureter. Surgical resection performed on 9/17/2020 including low anterior resection, mobilization of the splenic flexure, coloproctostomy, creation of loop ileostomy, total abdominal hysterectomy and left salpingo-oophorectomy, distal right ureterectomy, right ureteral re-implant with psoas hitch and placement of right ureteral stent. She was to see me in clinic but was admitted 10/10/2020 with ileostomy prolapse. Had a revision of loop ileostomy 10/11/2020. Then needed ileostomy closure and anastomosis 10/27/2020. She completed 12 cycles of FOLFOX on 4/21/21. She comes today for follow-up after completing 12 cycles of FOLFOX on 4/21/21. She has been feeling well. Has gained some weight. She continues to have some neuropathy that is not painful. She has no pain. Reports normal BMs. Denies nausea/vomiting. No unexplained weight loss. She has upcoming appointment with the [facility] next month. No other complaints today. Has colonoscopy on November 9th. Family Hx:  Mother with hx of pancreatic cancer    Past Medical History:   Diagnosis Date    Colon cancer ([address]     GERD (gastroesophageal reflux disease)     Ileostomy in place ([address]     [facility] prol[facility]     Ill-defined condition     Spaulding's esophagus    Ventral hernia without obstruction or gangrene 9/28/2021      Past Surgical History:   Procedure Laterality Date    COLONOSCOPY Left 9/14/2020    COLONOSCOPY performed by Melida Estrada MD at [address] [place] [address]; incomplete secondary to partial obstruction.  HX APPENDECTOMY  age 17    HX COLONOSCOPY  circa 2010    No neoplasms.     HX ENDOSCOPY  03/13/2017    Dr. Ronald Booker HX ENDOSCOPY  02/13/2020    Dr. Stephanie Vazquez oopherectomy for treatment of benign mass.  HX GYN  09/17/2020    Total abdominal hysterectomy and left salpingo-oophorectomy; Ricardo Salinas MD.     OTHER SURGICAL  09/17/2020    Low anterior resection, mobilization of the splenic flexure, coloproctostomy, and creation of loop ileostomy; Dr. Rita Mitchell.   OTHER SURGICAL  10/11/2020    Revision of loop ileostomy (resection and creation of divided loop ileostomy); Dr. Rita Mitchell.   UROLOGICAL  09/12/2020    Cystoscopy and placement of a right ureteral stent; Millie Pisano III, MD.     UROLOGICAL  09/17/2020    Cystoscopy and placement of a temporary left ureteral catheter; Millie Pisano III, MD.     UROLOGICAL  09/17/2020    Distal right ureterectomy; Millie Pisano III, MD.     UROLOGICAL  09/17/2020    Right ureteral re-implantation with psoas hitch and placement of a right ureteral stent; Samanta Murguia MD.    IR INSERT TUNL CVAD W PORT LESS THAN 5 YR  10/14/2020    Right chest Port-a-Cath placement.  IR REMOVE TUNL CVAD W PORT/PUMP  8/19/2021      Social History     Tobacco Use    Smoking status: Never Smoker    Smokeless tobacco: Never Used   Substance Use Topics    Alcohol use: Not Currently     Alcohol/week: 1.0 standard drinks     Types: 1 Glasses of wine per week      Family History   Problem Relation Age of Onset    Cancer Mother     Heart Disease Father     Diabetes Brother      Current Outpatient Medications   Medication Sig    lidocaine-prilocaine (EMLA) topical cream Apply  to affected area as needed for Pain. Apply over port a cath site 30-60 minutes prior to port access    dexAMETHasone (DECADRON) 4 mg tablet Take 2 tabs (8mg) once daily on days 2 & 3 of chemotherapy only    denosumab (PROLIA) 60 mg/mL injection 60 mg by SubCUTAneous route. Indications: Not due for another dose until December 2020.  lansoprazole (PREVACID) 30 mg capsule Take 30 mg by mouth Daily (before breakfast).     multivitamin (ONE A DAY) tablet Take 1 Tablet by mouth daily.  ascorbic acid, vitamin C, (VITAMIN C) 500 mg tablet Take 500 mg by mouth.  cholecalciferol (VITAMIN D3) 1,000 unit cap Take 1,000 Units by mouth daily.  Omega-3 Fatty Acids (FISH OIL) 500 mg cap Take  by mouth. No current facility-administered medications for this visit. No Known Allergies     Review of Systems: A complete review of systems was obtained, negative except as described above. Physical Exam:     Visit Vitals  /69 (BP 1 Location: Right arm, BP Patient Position: Sitting)   Pulse 95   Temp 98.5 °F (36.9 °C)   Resp 18   Wt 120 lb (54.4 kg)   SpO2 97%   BMI 18.79 kg/m²     ECOG PS: 1  General: No distress but appears frail  Eyes: PERRL, anicteric sclerae  HENT: Atraumatic  RESP: normal respiratory effort  GI : no masses palpated , NTTP  MS: Digits without clubbing or cyanosis. Skin: No rashes, ecchymoses, or petechiae. Psych: Alert, oriented, appropriate affect, normal judgment/insight    Results:     Lab Results   Component Value Date/Time    WBC 3.3 (L) 10/01/2021 11:20 AM    HGB 12.8 10/01/2021 11:20 AM    HCT 39.8 10/01/2021 11:20 AM    PLATELET 290 88/82/5560 11:20 AM    MCV 88 10/01/2021 11:20 AM    ABS. NEUTROPHILS 1.9 10/01/2021 11:20 AM    Hemoglobin (POC) 12.0 09/17/2020 06:35 PM     Lab Results   Component Value Date/Time    Sodium 140 10/01/2021 11:20 AM    Potassium 4.6 10/01/2021 11:20 AM    Chloride 102 10/01/2021 11:20 AM    CO2 23 10/01/2021 11:20 AM    Glucose 92 10/01/2021 11:20 AM    BUN 15 10/01/2021 11:20 AM    Creatinine 0.62 10/01/2021 11:20 AM    GFR est  10/01/2021 11:20 AM    GFR est non-AA 92 10/01/2021 11:20 AM    Calcium 10.1 10/01/2021 11:20 AM    Glucose (POC) 98 09/27/2020 11:39 PM    Creatinine (POC) 0.8 09/10/2020 02:32 PM     Lab Results   Component Value Date/Time    Bilirubin, total 1.2 10/01/2021 11:20 AM    ALT (SGPT) 44 (H) 10/01/2021 11:20 AM    Alk.  phosphatase 216 (H) 10/01/2021 11:20 AM    Protein, total 7.6 10/01/2021 11:20 AM    Albumin 4.9 (H) 10/01/2021 11:20 AM    Globulin 3.7 08/30/2021 11:00 AM     CEA:  Recent Labs     10/01/21  1120 07/28/21  1329 06/16/21  1334 05/05/21  0911 04/07/21  0919 03/10/21  0919 01/29/21  0807 01/12/21  0806   CEA  --  1.5 1.9 2.6 2.4 2.0 1.7 1.8   232908 2.4  --   --   --   --   --   --   --        CT A/P 9/23/2020  IMPRESSION:  Large, irregular pelvic mass measuring approximately 9 cm likely representing  neoplasm arising from the sigmoid colon. Adnexal neoplasm is a possibility. This  does appear to be separate from the uterus. Small amount of pelvic free fluid. Associated colonic bowel wall thickening. There is focal gas in the upper  presacral region which is unclear if this is intraluminal or contained  extraluminal collection.     No definite distant metastatic disease. Borderline enlarged retroperitoneal  lymph nodes. 6 mm right lower lobe lung nodule. Mass effect from the pelvic mass  causing moderate right and minimal left hydronephrosis. CT Chest 9/15/2020  IMPRESSION:  1. 7.5 mm left lower lobe pulmonary nodule. 2. Small right pleural effusion. 3. Mild right basilar atelectasis. 9/14/2020   Addendum Diagnosis   1. Sigmoid Mass, Biopsy:     Infiltrating adenocarcinoma, most consistent with colon (See comment)   Addendum Comment   Immunohistochemical stains reveal the following staining pattern:   CK7: Scattered cells with cytoplasmic membrane staining   CK20: Positive cytoplasmic staining in normal colonic mucosal epithelial cells and malignant epithelial cells   CDX-2: Diffuse strong nuclear decoration and all tumor cells and normal background colonic epithelial cells   PAX-8: Negative   Estrogen Receptor: Negative   Positive and negative controls stain appropriately.    Due to radiologic findings suggesting the possibility of GYN involvement, ER and PAX-8 are performed, which lend support to the diagnosis of a colonic primary, over a tumor of müllerian origin. 9/17/2020   FINAL PATHOLOGIC DIAGNOSIS   1. Sigmoid colon and proximal rectum, uterus, left fallopian tube and ovary and right ureteral segment, low anterior resection:   Sigmoid colon and proximal rectum:  Invasive adenocarcinoma (see synoptic report and comment). Metastatic adenocarcinoma in one of sixteen lymph nodes (1/16). Uterus: Invasive adenocarcinoma extending from adhesed colon into uterine serosa. Endometrial lining shows mucosal atrophy. Left ovary: Benign ovary with serosal inclusion cysts. Left fallopian tube: Benign fallopian tube. Right ureteral segment: Benign segment of ureter densely adhesed to colon. Nodule near right uterine cornu:  Nodular portion of benign smooth muscle consistent with leiomyoma with parenchymal hemorrhage. COLON AND RECTUM: Resection   SPECIMEN   Procedure: Low anterior resection   TUMOR   Tumor Site: Sigmoid colon   Histologic Type: Adenocarcinoma   Histologic Grade: G2: Moderately differentiated   Tumor Size: 8.0 cm   Tumor Extension: Tumor directly invades adjacent structures:   Posterior uterine serosa   Macroscopic Tumor Perforation: Tumor invades through serosa into surrounding soft tissue   Lymphovascular Invasion: Present   Perineural Invasion: Not identified   Treatment Effect: No known presurgical therapy   MARGINS   Margins: All margins are uninvolved by invasive carcinoma   Margins Examined: Proximal, Distal, Radial (circumferential) or   Mesenteric   Distance of Tumor from Radial (circumferential) Margin: See Comment   LYMPH NODES   Number of Lymph Nodes Involved: 1   Number of Lymph Nodes Examined: 16   Tumor Deposits: Not identified   PATHOLOGIC STAGE CLASSIFICATION (pTNM, AJCC 8th Edition)   Primary Tumor (pT): pT4b   Regional Lymph Nodes (pN): pN1a   2. Distal rectal margin:   Segment of benign large bowel. 3. Anastomotic doughnuts:   Two annular portions of benign large bowel.    Comment   Tumor invades into the adherent soft tissue and to within 1.1 cm of the apparent right pelvic sidewall margin. CT CAP 2/17/21:  IMPRESSION  1. 8 mm left lower lobe pulmonary nodule, unchanged. 2. Trace fluid within the left hemipelvis. Records reviewed and summarized above. Pathology report(s) reviewed above. Radiology report(s) reviewed above. Assessment:   1) Sigmoid Colon Adenocarcinoma - Stage IIIB- ERICKA  [id number]  Surgical resection on 9/17 with creation of loop ileostomy, total abdominal hysterectomy and left salpingo-oophorectomy, distal right ureterectomy  She had a revision of her ileostomy 10/11/2020 then needed stoma closure and anastomosis on 10/27/2020    Completed 12 cycles of adjuvant FOLFOX on 4/21/21 which she tolerated well with grade 1-2 neuropathy. CT imaging 10/2021 was personally reviewed & shows ROBE with a stable pulmonary nodule. We received colon cancer surveillance guidelines which include H&P every 3-6 months for 2 years, then every 6 months for a total of 5 years. CEA every 3-6 months for 2 years, then every 6 months for a total of 5 years. CT AP every 6-12 months for a total of 5 years. Colonoscopy 1 year after surgery. 2) Elevated transaminases  US of abd was obtained and shows possible liver disease  Hep panel negative   Following with [facility] institute     3) Neuropathy  Continues , hope to see improvement off chemotherapy   Grade 1 -2 , not painful     4) Thrombocytopenia  Resolved     5) Pulmonary nodule  Stable on recent imaging     Plan:     · Continue surveillance  · CT CAP in 6 months to be ordered at next visit   · Colonoscopy scheduled Nov 9th   · See [facility] [employer] next month  · Labs in 3 months- cbc with diff, cmp, cea at lab [facility] in 3 months for lab review     I appreciate the opportunity to participate in [facility] [address] [facility]. I performed a history and physical examination of the patient and discussed his management with the NPP.  I reviewed the NPP note and agree with the documented findings and plan of care    Miranda Whitehead had colon cancer and is now on surveillance with no adenopathy, CEA not elevated  Scans reviewed and ROBE  Continue surveillance per NCCN guidelines    Reviewed labs, scans     Signed By: Deyvi Wright MD

## 2021-11-05 ENCOUNTER — HOSPITAL ENCOUNTER (OUTPATIENT)
Dept: PREADMISSION TESTING | Age: 71
Discharge: HOME | End: 2021-11-05
Payer: MEDICARE

## 2021-11-05 ENCOUNTER — TRANSCRIBE ORDER (OUTPATIENT)
Dept: REGISTRATION | Age: 71
End: 2021-11-05

## 2021-11-05 DIAGNOSIS — Z01.812 PRE-PROCEDURE LAB EXAM: Primary | ICD-10-CM

## 2021-11-05 DIAGNOSIS — Z01.812 PRE-PROCEDURE LAB EXAM: ICD-10-CM

## 2021-11-05 PROCEDURE — U0005 INFEC AGEN DETEC AMPLI PROBE: HCPCS

## 2021-11-06 LAB
SARS-COV-2, XPLCVT: NOT DETECTED
SOURCE, COVRS: NORMAL

## 2021-11-09 ENCOUNTER — ANESTHESIA EVENT (OUTPATIENT)
Dept: ENDOSCOPY | Age: 71
End: 2021-11-09
Payer: MEDICARE

## 2021-11-09 ENCOUNTER — HOSPITAL ENCOUNTER (OUTPATIENT)
Age: 71
Setting detail: OUTPATIENT SURGERY
Discharge: HOME | End: 2021-11-09
Attending: SPECIALIST | Admitting: SPECIALIST
Payer: MEDICARE

## 2021-11-09 ENCOUNTER — ANESTHESIA (OUTPATIENT)
Dept: ENDOSCOPY | Age: 71
End: 2021-11-09
Payer: MEDICARE

## 2021-11-09 VITALS
HEIGHT: 67 IN | WEIGHT: 120 LBS | SYSTOLIC BLOOD PRESSURE: 122 MMHG | TEMPERATURE: 97.8 F | HEART RATE: 79 BPM | OXYGEN SATURATION: 97 % | BODY MASS INDEX: 18.83 KG/M2 | DIASTOLIC BLOOD PRESSURE: 81 MMHG | RESPIRATION RATE: 18 BRPM

## 2021-11-09 PROCEDURE — 74011250637 HC RX REV CODE- 250/637: Performed by: SPECIALIST

## 2021-11-09 PROCEDURE — 76040000019: Performed by: SPECIALIST

## 2021-11-09 PROCEDURE — 76060000031 HC ANESTHESIA FIRST 0.5 HR: Performed by: SPECIALIST

## 2021-11-09 PROCEDURE — 74011250636 HC RX REV CODE- 250/636: Performed by: SPECIALIST

## 2021-11-09 PROCEDURE — 74011250636 HC RX REV CODE- 250/636: Performed by: NURSE ANESTHETIST, CERTIFIED REGISTERED

## 2021-11-09 RX ORDER — SODIUM CHLORIDE 0.9 % (FLUSH) 0.9 %
5-40 SYRINGE (ML) INJECTION EVERY 8 HOURS
Status: DISCONTINUED | OUTPATIENT
Start: 2021-11-09 | End: 2021-11-09 | Stop reason: HOSPADM

## 2021-11-09 RX ORDER — SODIUM CHLORIDE 0.9 % (FLUSH) 0.9 %
5-40 SYRINGE (ML) INJECTION AS NEEDED
Status: DISCONTINUED | OUTPATIENT
Start: 2021-11-09 | End: 2021-11-09 | Stop reason: HOSPADM

## 2021-11-09 RX ORDER — ATROPINE SULFATE 0.1 MG/ML
0.5 INJECTION INTRAVENOUS
Status: DISCONTINUED | OUTPATIENT
Start: 2021-11-09 | End: 2021-11-09 | Stop reason: HOSPADM

## 2021-11-09 RX ORDER — SODIUM CHLORIDE 9 MG/ML
INJECTION, SOLUTION INTRAVENOUS
Status: DISCONTINUED | OUTPATIENT
Start: 2021-11-09 | End: 2021-11-09 | Stop reason: HOSPADM

## 2021-11-09 RX ORDER — SODIUM CHLORIDE 9 MG/ML
50 INJECTION, SOLUTION INTRAVENOUS CONTINUOUS
Status: DISCONTINUED | OUTPATIENT
Start: 2021-11-09 | End: 2021-11-09 | Stop reason: HOSPADM

## 2021-11-09 RX ORDER — FLUMAZENIL 0.1 MG/ML
0.2 INJECTION INTRAVENOUS
Status: DISCONTINUED | OUTPATIENT
Start: 2021-11-09 | End: 2021-11-09 | Stop reason: HOSPADM

## 2021-11-09 RX ORDER — PROPOFOL 10 MG/ML
INJECTION, EMULSION INTRAVENOUS AS NEEDED
Status: DISCONTINUED | OUTPATIENT
Start: 2021-11-09 | End: 2021-11-09 | Stop reason: HOSPADM

## 2021-11-09 RX ORDER — EPINEPHRINE 0.1 MG/ML
1 INJECTION INTRACARDIAC; INTRAVENOUS
Status: DISCONTINUED | OUTPATIENT
Start: 2021-11-09 | End: 2021-11-09 | Stop reason: HOSPADM

## 2021-11-09 RX ORDER — DEXTROMETHORPHAN/PSEUDOEPHED 2.5-7.5/.8
1.2 DROPS ORAL
Status: DISCONTINUED | OUTPATIENT
Start: 2021-11-09 | End: 2021-11-09 | Stop reason: HOSPADM

## 2021-11-09 RX ORDER — NALOXONE HYDROCHLORIDE 0.4 MG/ML
0.4 INJECTION, SOLUTION INTRAMUSCULAR; INTRAVENOUS; SUBCUTANEOUS
Status: DISCONTINUED | OUTPATIENT
Start: 2021-11-09 | End: 2021-11-09 | Stop reason: HOSPADM

## 2021-11-09 RX ADMIN — PROPOFOL 30 MG: 10 INJECTION, EMULSION INTRAVENOUS at 15:23

## 2021-11-09 RX ADMIN — PROPOFOL 30 MG: 10 INJECTION, EMULSION INTRAVENOUS at 15:26

## 2021-11-09 RX ADMIN — PROPOFOL 30 MG: 10 INJECTION, EMULSION INTRAVENOUS at 15:16

## 2021-11-09 RX ADMIN — SODIUM CHLORIDE 50 ML/HR: 9 INJECTION, SOLUTION INTRAVENOUS at 14:05

## 2021-11-09 RX ADMIN — PROPOFOL 30 MG: 10 INJECTION, EMULSION INTRAVENOUS at 15:29

## 2021-11-09 RX ADMIN — PROPOFOL 50 MG: 10 INJECTION, EMULSION INTRAVENOUS at 15:14

## 2021-11-09 RX ADMIN — SODIUM CHLORIDE: 900 INJECTION, SOLUTION INTRAVENOUS at 15:09

## 2021-11-09 RX ADMIN — PROPOFOL 30 MG: 10 INJECTION, EMULSION INTRAVENOUS at 15:32

## 2021-11-09 RX ADMIN — PROPOFOL 30 MG: 10 INJECTION, EMULSION INTRAVENOUS at 15:18

## 2021-11-09 NOTE — DISCHARGE INSTRUCTIONS
Radha Fair  [id number]  1950    COLON DISCHARGE INSTRUCTIONS  Discomfort:  Redness at IV site- apply warm compress to area; if redness or soreness persist- contact your physician  There may be a slight amount of blood passed from the rectum  Gaseous discomfort- walking, belching will help relieve any discomfort    DIET:   Regular diet. - however -  remember your colon is empty and a heavy meal will produce gas. Avoid these foods:  vegetables, fried / greasy foods, carbonated drinks for today. You may not drink alcoholic beverages for at least 12 hours    MEDICATIONS:   Regarding Aspirin or Nonsteroidal medications, please see below. ACTIVITY:  You may resume your normal daily activities it is recommended that you spend the remainder of the day resting -  avoid any strenuous activity. You may not operate a vehicle for 12 hours  You may not engage in an occupation involving machinery or appliances for rest of today  Avoid making any critical decisions for at least 24 hour    CALL M.D. ANY SIGN OF:  Increasing pain, nausea, vomiting  Abdominal distension (swelling)  New increased bleeding (oral or rectal)  Fever (chills)  Pain in chest area  Bloody discharge from nose or mouth  Shortness of breath    You may  take any Advil, Aspirin, Ibuprofen, Motrin, Aleve, or   Tylenol as needed for pain. Post procedure diagnosis: Normal post surgery colonoscopy      Follow-up Instructions:   Call Dr. Chandler Trisha  Results of procedure / biopsy in 10 days if biopsies were done. Colon in 2 years  Telephone #  [phone]      DISCHARGE SUMMARY from Nurse    The following personal items collected during your admission are returned to you:   Dental Appliance: Dental Appliances: None  Vision: Visual Aid: None  Hearing Aid:    Jewelry:    Clothing:    Other Valuables:    Valuables sent to safe:      Learning About Coronavirus (COVID-19)  Coronavirus (COVID-19): Overview  What is coronavirus (COVID-19)?   The coronavirus disease (COVID-19) is caused by a virus. It is an illness that was first found in [place], [place], in December 2019. It has since spread worldwide. The virus can cause fever, cough, and trouble breathing. In severe cases, it can cause pneumonia and make it hard to breathe without help. It can cause death. Coronaviruses are a large group of viruses. They cause the common cold. They also cause more serious illnesses like Middle East respiratory syndrome (MERS) and severe acute respiratory syndrome (SARS). COVID-19 is caused by a novel coronavirus. That means it's a new type that has not been seen in people before. This virus spreads person-to-person through droplets from coughing and sneezing. It can also spread when you are close to someone who is infected. And it can spread when you touch something that has the virus on it, such as a doorknob or a tabletop. What can you do to protect yourself from coronavirus (COVID-19)? The best way to protect yourself from getting sick is to:  · Avoid areas where there is an outbreak. · Avoid contact with people who may be infected. · Wash your hands often with soap or alcohol-based hand sanitizers. · Avoid crowds and try to stay at least 6 feet away from other people. · Wash your hands often, especially after you cough or sneeze. Use soap and water, and scrub for at least 20 seconds. If soap and water aren't available, use an alcohol-based hand . · Avoid touching your mouth, nose, and eyes. What can you do to avoid spreading the virus to others? To help avoid spreading the virus to others:  · Cover your mouth with a tissue when you cough or sneeze. Then throw the tissue in the trash. · Use a disinfectant to clean things that you touch often. · Stay home if you are sick or have been exposed to the virus. Don't go to school, work, or public areas. And don't use public transportation. · If you are sick:  ? Leave your home only if you need to get medical care. But call the doctor's office first so they know you're coming. And wear a face mask, if you have one.  ? If you have a face mask, wear it whenever you're around other people. It can help stop the spread of the virus when you cough or sneeze. ? Clean and disinfect your home every day. Use household  and disinfectant wipes or sprays. Take special care to clean things that you grab with your hands. These include doorknobs, remote controls, phones, and handles on your refrigerator and microwave. And don't forget countertops, tabletops, bathrooms, and computer keyboards. When to call for help  Call 911 anytime you think you may need emergency care. For example, call if:  · You have severe trouble breathing. (You can't talk at all.)  · You have constant chest pain or pressure. · You are severely dizzy or lightheaded. · You are confused or can't think clearly. · Your face and lips have a blue color. · You pass out (lose consciousness) or are very hard to wake up. Call your doctor now if you develop symptoms such as:  · Shortness of breath. · Fever. · Cough. If you need to get care, call ahead to the doctor's office for instructions before you go. Make sure you wear a face mask, if you have one, to prevent exposing other people to the virus. Where can you get the latest information? The following health organizations are tracking and studying this virus. Their websites contain the most up-to-date information. Rosalina Vito also learn what to do if you think you may have been exposed to the virus. · U.S. Centers for Disease Control and Prevention (CDC): The CDC provides updated news about the disease and travel advice. The website also tells you how to prevent the spread of infection. www.cdc.gov  · World Health Organization [facility]): WHO offers information about the virus outbreaks.  WHO also has travel advice. www.who.int  Current as of: April 1, 2020               Content Version: 12.4  © 8483-0638 [employer]. Care instructions adapted under license by your healthcare professional. If you have questions about a medical condition or this instruction, always ask your healthcare professional. [address] any warranty or liability for your use of this information.

## 2021-11-09 NOTE — H&P
Colonoscopy History and Physical      The patient was seen and examined. Date of last colonoscopy: 2020, Polyps  No      The airway was assessed and documented. The problem list, past medical history, and medications were reviewed. Patient Active Problem List   Diagnosis Code    Hydronephrosis N13.30    Malignant neoplasm of sigmoid colon ([facility] C18.7    Severe protein-calorie malnutrition ([facility] E43    Anemia D64.9    [facility] K94.19    Acute intestinal ischemia ([facility] K55.059    TIA (transient ischemic attack) G45.9    Elevated liver enzymes R74.8    Gastroesophageal reflux disease without esophagitis K21.9    History of appendectomy Z90.49    Osteopenia of multiple sites M85.89    Idiopathic scoliosis M41.20    Ventral hernia without obstruction or gangrene K43.9     Social History     Socioeconomic History    Marital status: SINGLE     Spouse name: Not on file    Number of children: Not on file    Years of education: Not on file    Highest education level: Not on file   Occupational History    Not on file   Tobacco Use    Smoking status: Never Smoker    Smokeless tobacco: Never Used   Vaping Use    Vaping Use: Never used   Substance and Sexual Activity    Alcohol use: Not Currently     Alcohol/week: 1.0 standard drink     Types: 1 Glasses of wine per week    Drug use: No    Sexual activity: Not on file   Other Topics Concern    Not on file   Social History Narrative    Not on file     Social Determinants of Health     Financial Resource Strain:     Difficulty of Paying Living Expenses: Not on file   Food Insecurity:     Worried About Running Out of Food in the Last Year: Not on file    Fabian of Food in the Last Year: Not on file   Transportation Needs:     Lack of Transportation (Medical): Not on file    Lack of Transportation (Non-Medical):  Not on file   Physical Activity:     Days of Exercise per Week: Not on file    Minutes of Exercise per Session: Not on file   Stress:     Feeling of Stress : Not on file   Social Connections:     Frequency of Communication with Friends and Family: Not on file    Frequency of Social Gatherings with Friends and Family: Not on file    Attends Sabianist Services: Not on file    Active Member of [address] [place] [address] or Organizations: Not on file    Attends Club or Organization Meetings: Not on file    Marital Status: Not on file   Intimate Partner Violence:     Fear of Current or Ex-Partner: Not on file    Emotionally Abused: Not on file    Physically Abused: Not on file    Sexually Abused: Not on file   Housing Stability:     Unable to Pay for Housing in the Last Year: Not on file    Number of [place] in the Last Year: Not on file    Unstable Housing in the Last Year: Not on file     Past Medical History:   Diagnosis Date    Colon cancer ([facility]     GERD (gastroesophageal reflux disease)     Ileostomy in place ([facility]     Ileostomy prolapse ([facility]     Ill-defined condition     [place]'s esophagus    Ventral hernia without obstruction or gangrene 2021         Prior to Admission Medications   Prescriptions Last Dose Informant Patient Reported? Taking? Omega-3 Fatty Acids (FISH OIL) 500 mg cap 2021 at Unknown time  Yes Yes   Sig: Take  by mouth. ascorbic acid, vitamin C, (VITAMIN C) 500 mg tablet 2021 at Unknown time  Yes Yes   Sig: Take 500 mg by mouth. cholecalciferol (VITAMIN D3) 1,000 unit cap 2021 at Unknown time  Yes Yes   Sig: Take 1,000 Units by mouth daily. denosumab (PROLIA) 60 mg/mL injection 2021  Yes No   Si mg by SubCUTAneous route. Indications: Not due for another dose until 2020.    dexAMETHasone (DECADRON) 4 mg tablet Not Taking at Unknown time  No No   Sig: Take 2 tabs (8mg) once daily on days 2 & 3 of chemotherapy only   Patient not taking: Reported on 2021   lansoprazole (PREVACID) 30 mg capsule 2021 at Unknown time  Yes Yes   Sig: Take 30 mg by mouth Daily (before breakfast). lidocaine-prilocaine (EMLA) topical cream Not Taking at Unknown time  No No   Sig: Apply  to affected area as needed for Pain. Apply over port a cath site 30-60 minutes prior to port access   Patient not taking: Reported on 11/9/2021   multivitamin (ONE A DAY) tablet 11/8/2021 at Unknown time  Yes Yes   Sig: Take 1 Tablet by mouth daily. Facility-Administered Medications: None       The patient was seen and examined in the endoscopy suite. The airway was assessed and documented. The problem list and medications were reviewed. Chief complaint, history of present illness, and review of systems and Past medical History are positive for: colon cancer status post partial  Colectomy, ileostomy and reversal of ileostomy    The heart, lungs and mental status were satisfactory for the administration of sedation and for the procedure. I discussed with the patient the objectives, risks, consequences and alternatives to the procedure. The patient was counseled at length about the risks of uriel Covid-19 in the dennis-operative and post-operative states including the recovery window of their procedure. The patient was made aware that uriel Covid-19 after a surgical procedure may worsen their prognosis for recovering from the virus and lend to a higher morbidity and or mortality risk. The patient was given the options of postponing their procedure. All of the risks, benefits, and alternatives were discussed. The patient does  wish to proceed with the procedure.     Plan: Endoscopic procedure with sedation     Adi Oswald MD   11/9/2021  3:10 PM

## 2021-11-09 NOTE — ANESTHESIA PREPROCEDURE EVALUATION
Relevant Problems   No relevant active problems       Anesthetic History   No history of anesthetic complications            Review of Systems / Medical History  Patient summary reviewed, nursing notes reviewed and pertinent labs reviewed    Pulmonary  Within defined limits                 Neuro/Psych   Within defined limits           Cardiovascular  Within defined limits                Exercise tolerance: >4 METS     GI/Hepatic/Renal     GERD: well controlled    Renal disease      Comments: hydroneph s/p stent on 9/10 Endo/Other  Within defined limits           Other Findings              Physical Exam    Airway  Mallampati: II  TM Distance: > 6 cm  Neck ROM: normal range of motion   Mouth opening: Normal     Cardiovascular  Regular rate and rhythm,  S1 and S2 normal,  no murmur, click, rub, or gallop             Dental  No notable dental hx       Pulmonary  Breath sounds clear to auscultation               Abdominal  GI exam deferred       Other Findings            Anesthetic Plan    ASA: 3  Anesthesia type: MAC          Induction: Intravenous  Anesthetic plan and risks discussed with: Patient

## 2021-11-09 NOTE — PROCEDURES
[address] [address] [address]                 Colonoscopy Procedure Note    Indications:   See Preoperative Diagnosis above  Referring Physician: Mariana Sutherland MD  Anesthesia/Sedation: MAC anesthesia Propofol  Endoscopist:  Dr. Hill Handler  Assistant:  Endoscopy RN-1: Adi Prabhakar RN  Preoperative diagnosis: Personal history of colon cancer [Z85.038]  Postoperative diagnosis: Normal post surgery colonoscopy    Procedure in Detail:  Informed consent was obtained for the procedure, including sedation. Risks of perforation, hemorrhage, adverse drug reaction, and aspiration were discussed. The patient was placed in the left lateral decubitus position. Based on the pre-procedure assessment, including review of the patient's medical history, medications, allergies, and review of systems, she had been deemed to be an appropriate candidate for moderate sedation; she was therefore sedated with the medications listed above. The patient was monitored continuously with ECG tracing, pulse oximetry, blood pressure monitoring, and direct observations. A rectal examination was performed. The [id number] was inserted into the rectum and advanced under direct vision to the terminal cecum, which was identified by the ileocecal valve and appendiceal orifice. The quality of the colonic preparation was good. A careful inspection was made as the colonoscope was withdrawn, including a retroflexed view of the rectum; findings and interventions are described below. Appropriate photodocumentation was obtained. Findings:  Rectum: Normal appearing recto-colonic anastomosis  Sigmoid: surgically absent  Descending Colon: normal  Transverse Colon: normal  Ascending Colon: normal  Cecum: normal  Terminal Ileum: normal    Specimens:    none    EBL: None    Complications: None; patient tolerated the procedure well.     Recommendations:      - Colon in 2 years      Signed By: Jimena Young Bina Mckay MD                        November 9, 2021

## 2021-11-11 NOTE — ANESTHESIA POSTPROCEDURE EVALUATION
Procedure(s):  COLONOSCOPY   :-.    MAC    Anesthesia Post Evaluation        Level of consciousness: responsive to verbal stimuli        INITIAL Post-op Vital signs:   Vitals Value Taken Time   /81 11/09/21 1600   Temp 36.6 °C (97.8 °F) 11/09/21 1559   Pulse 79 11/09/21 1610   Resp 18 11/09/21 1610   SpO2 97 % 11/09/21 1600

## 2021-11-30 ENCOUNTER — OFFICE VISIT (OUTPATIENT)
Dept: HEMATOLOGY | Age: 71
End: 2021-11-30
Payer: MEDICARE

## 2021-11-30 VITALS
HEIGHT: 67 IN | OXYGEN SATURATION: 98 % | TEMPERATURE: 96.7 F | HEART RATE: 86 BPM | DIASTOLIC BLOOD PRESSURE: 69 MMHG | WEIGHT: 121.8 LBS | RESPIRATION RATE: 16 BRPM | BODY MASS INDEX: 19.12 KG/M2 | SYSTOLIC BLOOD PRESSURE: 111 MMHG

## 2021-11-30 DIAGNOSIS — R74.8 ELEVATED LIVER ENZYMES: Primary | ICD-10-CM

## 2021-11-30 DIAGNOSIS — R63.4 WEIGHT LOSS: ICD-10-CM

## 2021-11-30 PROCEDURE — G8427 DOCREV CUR MEDS BY ELIG CLIN: HCPCS | Performed by: NURSE PRACTITIONER

## 2021-11-30 PROCEDURE — G8536 NO DOC ELDER MAL SCRN: HCPCS | Performed by: NURSE PRACTITIONER

## 2021-11-30 PROCEDURE — 1090F PRES/ABSN URINE INCON ASSESS: CPT | Performed by: NURSE PRACTITIONER

## 2021-11-30 PROCEDURE — G0463 HOSPITAL OUTPT CLINIC VISIT: HCPCS | Performed by: NURSE PRACTITIONER

## 2021-11-30 PROCEDURE — G8432 DEP SCR NOT DOC, RNG: HCPCS | Performed by: NURSE PRACTITIONER

## 2021-11-30 PROCEDURE — G8400 PT W/DXA NO RESULTS DOC: HCPCS | Performed by: NURSE PRACTITIONER

## 2021-11-30 PROCEDURE — G9711 PT HX TOT COL OR COLON CA: HCPCS | Performed by: NURSE PRACTITIONER

## 2021-11-30 PROCEDURE — 99213 OFFICE O/P EST LOW 20 MIN: CPT | Performed by: NURSE PRACTITIONER

## 2021-11-30 PROCEDURE — 1101F PT FALLS ASSESS-DOCD LE1/YR: CPT | Performed by: NURSE PRACTITIONER

## 2021-11-30 PROCEDURE — G8420 CALC BMI NORM PARAMETERS: HCPCS | Performed by: NURSE PRACTITIONER

## 2021-11-30 RX ORDER — CA/D3/MAG OX/ZINC/COP/MANG/BOR 600 MG-800
TABLET,CHEWABLE ORAL
COMMUNITY

## 2021-11-30 RX ORDER — CALCIUM CARB/VITAMIN D3/VIT K1 650MG-12.5
TABLET,CHEWABLE ORAL DAILY
COMMUNITY

## 2021-11-30 NOTE — PROGRESS NOTES
Identified pt with two pt identifiers(name and ). Reviewed record in preparation for visit and have obtained necessary documentation. Chief Complaint   Patient presents with    Elevated Liver Enzymes     3 mo f/u      Vitals:    21 1156   BP: 111/69   Pulse: 86   Resp: 16   Temp: (!) 96.7 °F (35.9 °C)   TempSrc: Temporal   SpO2: 98%   Weight: 121 lb 12.8 oz (55.2 kg)   Height: 5' 7\" (1.702 m)   PainSc:   0 - No pain       Health Maintenance Review: Patient reminded of \"due or due soon\" health maintenance. I have asked the patient to contact his/her primary care provider (PCP) for follow-up on his/her health maintenance. Coordination of Care Questionnaire:  :   1) Have you been to an emergency room, urgent care, or hospitalized since your last visit? If yes, where when, and reason for visit? no       2. Have seen or consulted any other health care provider since your last visit? If yes, where when, and reason for visit? YES, eye doctor f/u and oncologist f/u 10/28/21    Patient is accompanied by self I have received verbal consent from Lata Capellan to discuss any/all medical information while they are present in the room.

## 2021-11-30 NOTE — PROGRESS NOTES
[address] [facility]      Lucy Olson MD, Alfred Rosales, Louann Orellana MD, MPH      PATRICIA Headley-JANE Edward, ACNP-BC     Yaritza Joshi, [facility]   Branden George P-JANE Eastman, [facility]-BC       Kyle Araujo [address]    at [address], [address] [place] [address] 22.    [phone]    FAX: [address], [address]    [phone]    FAX: [phone]       Patient Care Team:  Mari Petty MD as PCP - General (Family Medicine)  Maximus Stallings MD (Hematology and Oncology)  Rosemary Coyne MD (Colon and Rectal Surgery)  Eliana Blackmon MD (Neurology)  Trevor Rahman MD (Obstetrics & Gynecology)  Lamine Gabriel MD (Urology)      Problem List  Date Reviewed: 10/28/2021          Codes Class Noted    Ventral hernia without obstruction or gangrene ICD-10-CM: K43.9  ICD-9-CM: 553.20  9/28/2021        TIA (transient ischemic attack) ICD-10-CM: G45.9  ICD-9-CM: 435.9  6/2/2021        Elevated liver enzymes ICD-10-CM: R74.8  ICD-9-CM: 790.5  6/2/2021        Gastroesophageal reflux disease without esophagitis ICD-10-CM: K21.9  ICD-9-CM: 530.81  6/2/2021        History of appendectomy ICD-10-CM: Z90.49  ICD-9-CM: V45.89  6/2/2021        Osteopenia of multiple sites ICD-10-CM: M85.89  ICD-9-CM: 733.90  6/2/2021        Idiopathic scoliosis ICD-10-CM: M41.20  ICD-9-CM: 737.30  6/2/2021        Acute intestinal ischemia [facility]) ICD-10-CM: K55.059  ICD-9-CM: 557.0  10/25/2020        Ileostomy prolapse ([facility] ICD-10-CM: K94.19  ICD-9-CM: 569.69  10/10/2020        Severe protein-calorie malnutrition ([facility] (Chronic) ICD-10-CM: U65  ICD-9-CM: 262  9/16/2020        Anemia (Chronic) ICD-10-CM: D64.9  ICD-9-CM: 285.9 9/16/2020        Malignant neoplasm of sigmoid colon (HCC) (Chronic) ICD-10-CM: C18.7  ICD-9-CM: 153.3  9/14/2020        Hydronephrosis ICD-10-CM: N13.30  ICD-9-CM: 809  9/11/2020              Rose Degroot is being seen at [facility] for management of elevated liver enzymes. The active problem list, all pertinent past medical history, medications, radiologic findings and laboratory findings related to the liver disorder were reviewed and discussed with the patient. The patient is a 80 y.o.  female who was found to have elevated  liver enzymes and alkaline phosphatase in 9/2020. The patient went to the ER with back pain 9/2020. A CT was performed and she was found to have a 9 cm pelvic mass. Surgical resection of the mass was performed 9/17/2020 which included a lower anterior resection with coloproctostomy with ileostomy, total hysterectomy with left oophorectomy, distal right uterectomy with right urethral re-implant. 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 FOLFOX 1 of 12 was initiated 11/17/2020. She completed 11 treatments with the last being 5/05/2021. Serologic evaluation for markers of chronic liver disease were negative for HBV and HCV. Ultrasound of the liver was performed in 4/2021. The results of the imaging suggested chronic liver disease. Mild splenomegaly. Assessment of liver fibrosis was performed with Fibroscan 8/2021. The result was 8.3 kPa which correlates with stage 2 septal fibrosis. Since the last office visit the patient has felt well overall. Recent imaging with CT 10/14/2021 demonstrates no new lesions. A colonoscopy was performed 11/09/2021 and was normal. She notes ongoing fatigue and inability to gain weight. The patient does not have any symptoms which could be attributed to the liver disorder.     The patient is not experiencing the following symptoms which are commonly seen in this liver disorder:  pain in the right side over the liver, yellowing of the eyes or skin, itching, or swelling of the abdomen. The patient completes all daily activities without any functional limitations. ASSESSMENT AND PLAN:  Elevated liver enzymes  Persistent elevation in Intermittent elevation in liver transaminases and alkaline phosphatase of unclear etiology at this time. Assessment of liver fibrosis was performed with Fibroscan 8/2021. The result was 8.3 kPa which correlates with stage 2 septal fibrosis. Have performed laboratory testing to monitor liver function and degree of liver injury. This included BMP, hepatic panel, and CBC with platelet count. Laboratory testing from 10/01/2021 reviewed in detail. Follow-up testing ordered today. The AST is normal. The ALT and ALP are elevated. The liver function and platelet count are normal.     Serologic testing for causes of chronic liver disease were negative. The most likely causes for the liver chemistry abnormalities were discussed with the patient and include: Unknown etiology or chemotherapy induced vascular disease of the liver. The Fibroscan can be repeated annually or as often as clinically indicated to assess for fibrosis progression and/or regression. This will be done at the next office visit. Screening for Hepatocellular Carcinoma  HCC screening is not necessary if the patient has no evidence of cirrhosis. Treatment of other medical problems in patients with chronic liver disease  There are no contraindications for the patient to take most medications that are necessary for treatment of other medical issues. The patient can take: Any medications utilized for treatment of DM. Statins to treat hypercholesterolemia    Counseling for alcohol in patients with chronic liver disease  The patient was counseled regarding alcohol consumption and the effect of alcohol on chronic liver disease. The patient dose not drink.      Vaccinations   Vaccination for viral hepatitis A and B is recommended since the patient has no serologic evidence of previous exposure or vaccination with immunity. Routine vaccinations against other bacterial and viral agents can be performed as indicated. Annual flu vaccination should be administered if indicated. ALLERGIES  No Known Allergies    MEDICATIONS  Current Outpatient Medications   Medication Sig    Ca-D3-mag-zinc--chi-boron (Caltrate 600-D Plus Minerals) 600 mg calcium- 800 unit-40 mg chew Take  by mouth.  calcium-vitamin D3-vitamin K (Viactiv) 650 mg-12.5 mcg-40 mcg chew Take  by mouth daily.  lansoprazole (PREVACID) 30 mg capsule Take 30 mg by mouth Daily (before breakfast).  multivitamin (ONE A DAY) tablet Take 1 Tablet by mouth daily.  ascorbic acid, vitamin C, (VITAMIN C) 500 mg tablet Take 1,000 mg by mouth daily.  cholecalciferol (VITAMIN D3) 1,000 unit cap Take 1,000 Units by mouth daily.  Omega-3 Fatty Acids (FISH OIL) 500 mg cap Take  by mouth. No current facility-administered medications for this visit. SYSTEM REVIEW NOT RELATED TO LIVER DISEASE OR REVIEWED ABOVE:  Constitution systems: Negative for fever, chills, weight gain, weight loss. Eyes: Negative for visual changes. ENT: Negative for sore throat, painful swallowing. Respiratory: Negative for cough, hemoptysis, SOB. Cardiology: Negative for chest pain, palpitations. GI:  Negative for constipation or diarrhea. : Negative for urinary frequency, dysuria, hematuria, nocturia. Skin: Negative for rash. Hematology: Negative for easy bruising, blood clots. Musculo-skeletal: Negative for back pain, muscle pain, weakness. Neurologic: Negative for headaches, dizziness, vertigo, memory problems not related to HE. Psychology: Negative for anxiety, depression. FAMILY HISTORY:  The father   of complications from a fall. History of heart disease and CVA. The mother  of pancreatic cancer.     There is no family history of liver disease. There is no family history of immune disorders. SOCIAL HISTORY:  The patient is . The patient hasno children. The patient has never used tobacco products. The patient does not consume significant amounts of alcohol. The patient retired in 2007. Previous employment in [employer]:  Visit Vitals  /69 (BP 1 Location: Right upper arm, BP Patient Position: Sitting, BP Cuff Size: Child)   Pulse 86   Temp (!) 96.7 °F (35.9 °C) (Temporal)   Resp 16   Ht 5' 7\" (1.702 m)   Wt 121 lb 12.8 oz (55.2 kg)   SpO2 98%   BMI 19.08 kg/m²       General: No acute distress. Eyes: Sclera anicteric. ENT: No oral lesions. Thyroid normal.  Nodes: No adenopathy. Skin: No spider angiomata. No jaundice. No palmar erythema. Respiratory: Lungs clear to auscultation. Cardiovascular: Regular heart rate. No murmurs. No JVD. Abdomen: Soft non-tender, liver size normal to percussion/palpation. Spleen not palpable. No obvious ascites. Extremities: No edema. No muscle wasting. No gross arthritic changes. Neurologic: Alert and oriented. Cranial nerves grossly intact. No asterixis.     LABORATORY STUDIES:  L[place] of [address] Units 10/1/2021 8/30/2021   WBC 3.4 - 10.8 x10E3/uL 3.3 (L) 5.7   ANC 1.4 - 7.0 x10E3/uL 1.9 4.4   HGB 11.1 - 15.9 g/dL 12.8 12.0    - 450 x10E3/uL 212 167   INR 0.9 - 1.1    1.0   AST 0 - 40 IU/L 40 41 (H)   ALT 0 - 32 IU/L 44 (H) 73   Alk Phos 44 - 121 IU/L 216 (H) 229 (H)   Bili, Total 0.0 - 1.2 mg/dL 1.2 1.9 (H)   Bili, Direct 0.0 - 0.2 MG/DL  0.5 (H)   Albumin 3.8 - 4.8 g/dL 4.9 (H) 3.9   BUN 8 - 27 mg/dL 15 12   Creat 0.57 - 1.00 mg/dL 0.62 0.63   Na 134 - 144 mmol/L 140 136   K 3.5 - 5.2 mmol/L 4.6 3.7   Cl 96 - 106 mmol/L 102 101   CO2 20 - 29 mmol/L 23 27   Glucose 65 - 99 mg/dL 92 89     L[place] [address] Ref Rng & Units 7/28/2021   WBC 3.4 - 10.8 x10E3/uL 3.1 (L)   ANC 1.4 - 7.0 x10E3/uL 1.9   HGB 11.1 - 15.9 g/dL 11.6    - 450 x10E3/uL 164   INR 0.9 - 1.1      AST 0 - 40 IU/L 51 (H)   ALT 0 - 32 IU/L 81 (H)   Alk Phos 44 - 121 IU/L 233 (H)   Bili, Total 0.0 - 1.2 mg/dL 1.4 (H)   Bili, Direct 0.0 - 0.2 MG/DL    Albumin 3.8 - 4.8 g/dL 3.7   BUN 8 - 27 mg/dL 14   Creat 0.57 - 1.00 mg/dL 0.74   Na 134 - 144 mmol/L 137   K 3.5 - 5.2 mmol/L 3.6   Cl 96 - 106 mmol/L 104   CO2 20 - 29 mmol/L 26   Glucose 65 - 99 mg/dL 104 (H)     Cancer Screening Latest Ref Rng & Units 9/11/2020   CA 19-9 0 - 35 U/mL 4     Cancer Screening Latest Ref Rng & Units 7/28/2021 6/16/2021 5/5/2021   CEA ng/mL 1.5 1.9 2.6     Cancer Screening Latest Ref Rng & Units 10/1/2021   CEA 0.0 - 4.7 ng/mL 2.4     Laboratory testing from 10/01/2021 reviewed in detail. Additional testing included to evaluate progression or regression of disease. Laboratory testing results from today will be communicated by My Chart. SEROLOGIES:  Serologies Latest Ref Rng & Units 6/2/2021   Hep A Ab, Total Negative   Negative   Hep B Surface Ag Index    Hep B Surface Ag Interp NEG      Hep B Core Ab, Total Negative   Negative   Hep B Surface Ab mIU/mL <3.10   Hep B Surface Ab Interp NONREACTIVE   NONREACTIVE   Hep C Ab NR      Ferritin 8 - 252 NG/   Iron % Saturation 20 - 50 % 16 (L)   STEPHANIE, IFA  Negative   C-ANCA Neg:<1:20 titer <1:20   P-ANCA Neg:<1:20 titer <1:20   ANCA Neg:<1:20 titer <1:20   ASMCA 0 - 19 Units 12   M2 Ab 0.0 - 20.0 Units <20.0   Alpha-1 antitrypsin level 101 - 187 mg/dL 198 (H)     Serologies Latest Ref Rng & Units 4/21/2021   Hep B Surface Ag Index <0.10   Hep B Surface Ag Interp NEG   Negative     Serologies Latest Ref Rng & Units 4/21/2021   Hep C Ab NR   NONREACTIVE     LIVER HISTOLOGY:  8/2021. FibroScan performed at [address]. EkPa was 8.3. IQR/med 29%. . The results suggested a fibrosis level of F2.  The CAP score suggests there is no significant hepatic steatosis. ENDOSCOPIC PROCEDURES:  Not available or performed    RADIOLOGY:  4/2021. Ultrasound of liver. Increased echogenicity with irregular contour. No concerning liver mass or lesion. No ductal dilatation. 5/2021. CT of Abdomen. No liver mass or lesion. No stones or ductal dilatation. 10/2021. Chest CT. Stable pulmonary nodules no new lesions. 10/2021. Abdominal CT. No new masses or lesions. OTHER TESTING:  Not available or performed  FOLLOW-UP:  All of the issues listed above in the Assessment and Plan were discussed with the patient. All questions were answered. The patient expressed a clear understanding of the above. [address] [address] in 4 months for a Fibroscan. April S.  Madelyn, [facility] of [address] [address] [facility], [address] [place] [address] 22.  [address] [facility]

## 2021-12-01 ENCOUNTER — APPOINTMENT (OUTPATIENT)
Dept: INFUSION THERAPY | Age: 71
End: 2021-12-01

## 2021-12-01 LAB
AFP L3 MFR SERPL: NORMAL % (ref 0–9.9)
AFP SERPL-MCNC: 1.8 NG/ML (ref 0–8)
ALBUMIN SERPL-MCNC: 4.3 G/DL (ref 3.8–4.8)
ALP SERPL-CCNC: 206 IU/L (ref 44–121)
ALT SERPL-CCNC: 37 IU/L (ref 0–32)
AST SERPL-CCNC: 26 IU/L (ref 0–40)
BASOPHILS # BLD AUTO: 0 X10E3/UL (ref 0–0.2)
BASOPHILS NFR BLD AUTO: 1 %
BILIRUB DIRECT SERPL-MCNC: 0.28 MG/DL (ref 0–0.4)
BILIRUB SERPL-MCNC: 0.8 MG/DL (ref 0–1.2)
BUN SERPL-MCNC: 13 MG/DL (ref 8–27)
BUN/CREAT SERPL: 21 (ref 12–28)
CALCIUM SERPL-MCNC: 9.5 MG/DL (ref 8.7–10.3)
CHLORIDE SERPL-SCNC: 102 MMOL/L (ref 96–106)
CO2 SERPL-SCNC: 26 MMOL/L (ref 20–29)
CREAT SERPL-MCNC: 0.62 MG/DL (ref 0.57–1)
EOSINOPHIL # BLD AUTO: 0.2 X10E3/UL (ref 0–0.4)
EOSINOPHIL NFR BLD AUTO: 4 %
ERYTHROCYTE [DISTWIDTH] IN BLOOD BY AUTOMATED COUNT: 13.1 % (ref 11.7–15.4)
GLUCOSE SERPL-MCNC: 60 MG/DL (ref 65–99)
HCT VFR BLD AUTO: 37.8 % (ref 34–46.6)
HGB BLD-MCNC: 12.3 G/DL (ref 11.1–15.9)
IMM GRANULOCYTES # BLD AUTO: 0 X10E3/UL (ref 0–0.1)
IMM GRANULOCYTES NFR BLD AUTO: 0 %
LYMPHOCYTES # BLD AUTO: 1 X10E3/UL (ref 0.7–3.1)
LYMPHOCYTES NFR BLD AUTO: 23 %
MCH RBC QN AUTO: 28.6 PG (ref 26.6–33)
MCHC RBC AUTO-ENTMCNC: 32.5 G/DL (ref 31.5–35.7)
MCV RBC AUTO: 88 FL (ref 79–97)
MONOCYTES # BLD AUTO: 0.5 X10E3/UL (ref 0.1–0.9)
MONOCYTES NFR BLD AUTO: 11 %
NEUTROPHILS # BLD AUTO: 2.7 X10E3/UL (ref 1.4–7)
NEUTROPHILS NFR BLD AUTO: 61 %
PLATELET # BLD AUTO: 204 X10E3/UL (ref 150–450)
POTASSIUM SERPL-SCNC: 3.8 MMOL/L (ref 3.5–5.2)
PROT SERPL-MCNC: 7 G/DL (ref 6–8.5)
RBC # BLD AUTO: 4.3 X10E6/UL (ref 3.77–5.28)
SODIUM SERPL-SCNC: 140 MMOL/L (ref 134–144)
TSH SERPL DL<=0.005 MIU/L-ACNC: 1.61 UIU/ML (ref 0.45–4.5)
WBC # BLD AUTO: 4.4 X10E3/UL (ref 3.4–10.8)

## 2021-12-03 NOTE — PROGRESS NOTES
My chart message sent to the patient regarding the blood work results. The liver enzymes continue to improve.  All other testing was normal.

## 2022-01-20 LAB
ALBUMIN SERPL-MCNC: 4.8 G/DL (ref 3.8–4.8)
ALBUMIN/GLOB SERPL: 1.8 {RATIO} (ref 1.2–2.2)
ALP SERPL-CCNC: 214 IU/L (ref 44–121)
ALT SERPL-CCNC: 68 IU/L (ref 0–32)
AST SERPL-CCNC: 49 IU/L (ref 0–40)
BASOPHILS # BLD AUTO: 0 X10E3/UL (ref 0–0.2)
BASOPHILS NFR BLD AUTO: 1 %
BILIRUB SERPL-MCNC: 1 MG/DL (ref 0–1.2)
BUN SERPL-MCNC: 11 MG/DL (ref 8–27)
BUN/CREAT SERPL: 14 (ref 12–28)
CALCIUM SERPL-MCNC: 9.8 MG/DL (ref 8.7–10.3)
CEA SERPL-MCNC: 1.7 NG/ML (ref 0–4.7)
CHLORIDE SERPL-SCNC: 100 MMOL/L (ref 96–106)
CO2 SERPL-SCNC: 25 MMOL/L (ref 20–29)
CREAT SERPL-MCNC: 0.79 MG/DL (ref 0.57–1)
EOSINOPHIL # BLD AUTO: 0.2 X10E3/UL (ref 0–0.4)
EOSINOPHIL NFR BLD AUTO: 4 %
ERYTHROCYTE [DISTWIDTH] IN BLOOD BY AUTOMATED COUNT: 12.8 % (ref 11.7–15.4)
GLOBULIN SER CALC-MCNC: 2.7 G/DL (ref 1.5–4.5)
GLUCOSE SERPL-MCNC: 88 MG/DL (ref 65–99)
HCT VFR BLD AUTO: 41.3 % (ref 34–46.6)
HGB BLD-MCNC: 13.5 G/DL (ref 11.1–15.9)
IMM GRANULOCYTES # BLD AUTO: 0 X10E3/UL (ref 0–0.1)
IMM GRANULOCYTES NFR BLD AUTO: 0 %
LYMPHOCYTES # BLD AUTO: 1.1 X10E3/UL (ref 0.7–3.1)
LYMPHOCYTES NFR BLD AUTO: 28 %
MCH RBC QN AUTO: 28.7 PG (ref 26.6–33)
MCHC RBC AUTO-ENTMCNC: 32.7 G/DL (ref 31.5–35.7)
MCV RBC AUTO: 88 FL (ref 79–97)
MONOCYTES # BLD AUTO: 0.5 X10E3/UL (ref 0.1–0.9)
MONOCYTES NFR BLD AUTO: 12 %
NEUTROPHILS # BLD AUTO: 2.2 X10E3/UL (ref 1.4–7)
NEUTROPHILS NFR BLD AUTO: 55 %
PLATELET # BLD AUTO: 217 X10E3/UL (ref 150–450)
POTASSIUM SERPL-SCNC: 4 MMOL/L (ref 3.5–5.2)
PROT SERPL-MCNC: 7.5 G/DL (ref 6–8.5)
RBC # BLD AUTO: 4.71 X10E6/UL (ref 3.77–5.28)
SODIUM SERPL-SCNC: 138 MMOL/L (ref 134–144)
WBC # BLD AUTO: 4 X10E3/UL (ref 3.4–10.8)

## 2022-01-27 ENCOUNTER — OFFICE VISIT (OUTPATIENT)
Dept: ONCOLOGY | Age: 72
End: 2022-01-27
Payer: MEDICARE

## 2022-01-27 VITALS
DIASTOLIC BLOOD PRESSURE: 78 MMHG | SYSTOLIC BLOOD PRESSURE: 137 MMHG | BODY MASS INDEX: 19.38 KG/M2 | OXYGEN SATURATION: 98 % | WEIGHT: 123.5 LBS | HEART RATE: 88 BPM | HEIGHT: 67 IN | TEMPERATURE: 96.5 F

## 2022-01-27 DIAGNOSIS — C18.7 MALIGNANT NEOPLASM OF SIGMOID COLON (HCC): ICD-10-CM

## 2022-01-27 DIAGNOSIS — T45.1X5A CHEMOTHERAPY-INDUCED NEUROPATHY (HCC): Primary | ICD-10-CM

## 2022-01-27 DIAGNOSIS — G62.0 CHEMOTHERAPY-INDUCED NEUROPATHY (HCC): Primary | ICD-10-CM

## 2022-01-27 PROCEDURE — G8510 SCR DEP NEG, NO PLAN REQD: HCPCS | Performed by: INTERNAL MEDICINE

## 2022-01-27 PROCEDURE — G0463 HOSPITAL OUTPT CLINIC VISIT: HCPCS | Performed by: INTERNAL MEDICINE

## 2022-01-27 PROCEDURE — 1101F PT FALLS ASSESS-DOCD LE1/YR: CPT | Performed by: INTERNAL MEDICINE

## 2022-01-27 PROCEDURE — 99214 OFFICE O/P EST MOD 30 MIN: CPT | Performed by: INTERNAL MEDICINE

## 2022-01-27 PROCEDURE — G9711 PT HX TOT COL OR COLON CA: HCPCS | Performed by: INTERNAL MEDICINE

## 2022-01-27 PROCEDURE — 1090F PRES/ABSN URINE INCON ASSESS: CPT | Performed by: INTERNAL MEDICINE

## 2022-01-27 PROCEDURE — G8536 NO DOC ELDER MAL SCRN: HCPCS | Performed by: INTERNAL MEDICINE

## 2022-01-27 PROCEDURE — G8427 DOCREV CUR MEDS BY ELIG CLIN: HCPCS | Performed by: INTERNAL MEDICINE

## 2022-01-27 PROCEDURE — G8420 CALC BMI NORM PARAMETERS: HCPCS | Performed by: INTERNAL MEDICINE

## 2022-01-27 PROCEDURE — G8400 PT W/DXA NO RESULTS DOC: HCPCS | Performed by: INTERNAL MEDICINE

## 2022-01-27 NOTE — PROGRESS NOTES
[facility] [place] [address] [address], [address]  W: [phone]  F: [phone]    Reason for Visit:   Alexandre Dumont is a 80 y.o. female who is seen for stage III colon cancer. Treatment History:   · 9/10/2020 CT A/P - large, irregular pelvic mass measuring approximately 9cm likely representing neoplasm arising from the sigmoid colon, small amount of pelvic free fluid, colonic bowel wall thickening. Borderline enlarged retroperitoneal lymph nodes. 6 mm right lower lobe lung nodule. Mass effect from pelvic mass causing moderate right and minimal left hydronephrosis. · 9/14/2020: Colonoscopy - A large circumferential, ulcerated fungating mass was noted in proximal sigmoid colon. Mass was obstructing the lumen and could not be traversed. Kreg Dawn was present. · 9/15/2020: CT Chest - 7.5mm left lower lobe pulmonary nodule, small right pleural effusion, mild right basilar atelectasis  · 9/17/2020: Surgical resection - low anterior resection, mobilization of the splenic flexure, coloproctostomy, creation of loop ileostomy, total abdominal hysterectomy and left salpingo-oophorectomy, distal right ureterectomy, right ureteral re-implant with psoas hitch and placement of right ureteral stent  · 11/17/20: cycle 1 FOLFOX  · 2/17/21 CT CAP: pulmonary nodule stable, ROBE   · 5/11/21 CT CAP: unchanged pulmonary nodule, ROBE  · 10/14/21 CT CAP: unchanged pulmonary nodule, ROBE     History of Present Illness:   Patient is a 80 y.o. female seen for colon cancer    She presented to the ED on 9/11/2020 with complaints of right flank/back pain and RLQ abdominal pain. She states she has had several months of gas pain and \"blockages. \" She reports a weight loss of 12 lbs in the last 3 months. CT abd/pelv 9/10/2020 showed large, irregular pelvic mass measuring approximately 9cm likely representing neoplasm arising from the sigmoid colon.  She also had hydronephrosis from the mass effect and urethral stent placed. CEA 7.4 on 9/11and colonoscopy performed 9/14 with biopsy + adenocarcinoma. CT Chest performed showing 7.5mm left lower lobe pulmonary nodule. Mass found to be invading into the uterus and right ureter. Surgical resection performed on 9/17/2020 including low anterior resection, mobilization of the splenic flexure, coloproctostomy, creation of loop ileostomy, total abdominal hysterectomy and left salpingo-oophorectomy, distal right ureterectomy, right ureteral re-implant with psoas hitch and placement of right ureteral stent. She was to see me in clinic but was admitted 10/10/2020 with ileostomy prolapse. Had a revision of loop ileostomy 10/11/2020. Then needed ileostomy closure and anastomosis 10/27/2020. She completed 12 cycles of FOLFOX on 4/21/21. On surveillance. She is well. She still has numbness, no fevers. Has no new cough, no bleeding nor pain  She had a colonoscopy in 11/2021 and has another one in 2 years. Family Hx:  Mother with hx of pancreatic cancer    Past Medical History:   Diagnosis Date    Colon cancer ([address]     GERD (gastroesophageal reflux disease)     Ileostomy in place ([address]     [facility]     Ill-defined condition     Spaulding's esophagus    Ventral hernia without obstruction or gangrene 9/28/2021      Past Surgical History:   Procedure Laterality Date    COLONOSCOPY Left 9/14/2020    COLONOSCOPY performed by Yovana Chisholm MD at [address] [place] [address]; incomplete secondary to partial obstruction.  COLONOSCOPY N/A 11/9/2021    COLONOSCOPY   :- performed by Galina Graham MD at [facility] ENDOSCOPY    HX APPENDECTOMY  age 17    HX COLONOSCOPY  circa 2010    No neoplasms.  HX ENDOSCOPY  03/13/2017    Dr. Dell Beck HX ENDOSCOPY  02/13/2020    Dr. Clark Prost oopherectomy for treatment of benign mass.     HX GYN  09/17/2020    Total abdominal hysterectomy and left salpingo-oophorectomy; Bria Amin MD.   Baldo Garza OTHER SURGICAL  09/17/2020    Low anterior resection, mobilization of the splenic flexure, coloproctostomy, and creation of loop ileostomy; Dr. Tjeal Maynard.   OTHER SURGICAL  10/11/2020    Revision of loop ileostomy (resection and creation of divided loop ileostomy); Dr. Tejal Maynard.  HX UROLOGICAL  09/12/2020    Cystoscopy and placement of a right ureteral stent; Brenda Belcher III, MD.     UROLOGICAL  09/17/2020    Cystoscopy and placement of a temporary left ureteral catheter; Brenda Belcher III, MD.    HX UROLOGICAL  09/17/2020    Distal right ureterectomy; Brenda Belcher III, MD.    HX UROLOGICAL  09/17/2020    Right ureteral re-implantation with psoas hitch and placement of a right ureteral stent; Leana Lackey MD.    IR INSERT TUNL CVAD W PORT LESS THAN 5 YR  10/14/2020    Right chest Port-a-Cath placement.  IR REMOVE TUNL CVAD W PORT/PUMP  8/19/2021      Social History     Tobacco Use    Smoking status: Never Smoker    Smokeless tobacco: Never Used   Substance Use Topics    Alcohol use: Not Currently     Alcohol/week: 1.0 standard drink     Types: 1 Glasses of wine per week      Family History   Problem Relation Age of Onset    Cancer Mother     Heart Disease Father     Diabetes Brother      Current Outpatient Medications   Medication Sig    Ca-D3-mag-zinc--chi-boron (Caltrate 600-D Plus Minerals) 600 mg calcium- 800 unit-40 mg chew Take  by mouth.  calcium-vitamin D3-vitamin K (Viactiv) 650 mg-12.5 mcg-40 mcg chew Take  by mouth daily.  lansoprazole (PREVACID) 30 mg capsule Take 30 mg by mouth Daily (before breakfast).  multivitamin (ONE A DAY) tablet Take 1 Tablet by mouth daily.  ascorbic acid, vitamin C, (VITAMIN C) 500 mg tablet Take 1,000 mg by mouth daily.  cholecalciferol (VITAMIN D3) 1,000 unit cap Take 1,000 Units by mouth daily.  Omega-3 Fatty Acids (FISH OIL) 500 mg cap Take  by mouth.      No current facility-administered medications for this visit. No Known Allergies     Review of Systems: A complete review of systems was obtained, negative except as described above. Physical Exam:     Visit Vitals  /78 (BP 1 Location: Left upper arm, BP Patient Position: Sitting)   Pulse 88   Temp (!) 96.5 °F (35.8 °C) (Temporal)   Ht 5' 7\" (1.702 m)   Wt 123 lb 8 oz (56 kg)   SpO2 98%   BMI 19.34 kg/m²     ECOG PS: 1  General: No distress but appears frail  Eyes: PERRL, anicteric sclerae  HENT: Atraumatic  RESP: normal respiratory effort  MS: Digits without clubbing or cyanosis. Skin: No rashes, ecchymoses, or petechiae. Psych: Alert, oriented, appropriate affect, normal judgment/insight    Results:     Lab Results   Component Value Date/Time    WBC 4.0 01/19/2022 12:58 PM    HGB 13.5 01/19/2022 12:58 PM    HCT 41.3 01/19/2022 12:58 PM    PLATELET 537 23/48/6510 12:58 PM    MCV 88 01/19/2022 12:58 PM    ABS. NEUTROPHILS 2.2 01/19/2022 12:58 PM    Hemoglobin (POC) 12.0 09/17/2020 06:35 PM     Lab Results   Component Value Date/Time    Sodium 138 01/19/2022 12:58 PM    Potassium 4.0 01/19/2022 12:58 PM    Chloride 100 01/19/2022 12:58 PM    CO2 25 01/19/2022 12:58 PM    Glucose 88 01/19/2022 12:58 PM    BUN 11 01/19/2022 12:58 PM    Creatinine 0.79 01/19/2022 12:58 PM    GFR est AA 88 01/19/2022 12:58 PM    GFR est non-AA 76 01/19/2022 12:58 PM    Calcium 9.8 01/19/2022 12:58 PM    Glucose (POC) 98 09/27/2020 11:39 PM    Creatinine (POC) 0.8 09/10/2020 02:32 PM     Lab Results   Component Value Date/Time    Bilirubin, total 1.0 01/19/2022 12:58 PM    ALT (SGPT) 68 (H) 01/19/2022 12:58 PM    Alk.  phosphatase 214 (H) 01/19/2022 12:58 PM    Protein, total 7.5 01/19/2022 12:58 PM    Albumin 4.8 01/19/2022 12:58 PM    Globulin 3.7 08/30/2021 11:00 AM     CEA:  Recent Labs     01/19/22  1258 10/01/21  1120 07/28/21  1329 06/16/21  1334 05/05/21  0911 04/07/21  0919   CEA  --   --  1.5 1.9 2.6 2.4   [id number] 1.7 2.4  --   --   --   --        CT A/P 9/23/2020  IMPRESSION:  Large, irregular pelvic mass measuring approximately 9 cm likely representing  neoplasm arising from the sigmoid colon. Adnexal neoplasm is a possibility. This  does appear to be separate from the uterus. Small amount of pelvic free fluid. Associated colonic bowel wall thickening. There is focal gas in the upper  presacral region which is unclear if this is intraluminal or contained  extraluminal collection.     No definite distant metastatic disease. Borderline enlarged retroperitoneal  lymph nodes. 6 mm right lower lobe lung nodule. Mass effect from the pelvic mass  causing moderate right and minimal left hydronephrosis. CT Chest 9/15/2020  IMPRESSION:  1. 7.5 mm left lower lobe pulmonary nodule. 2. Small right pleural effusion. 3. Mild right basilar atelectasis. CT 10/2021  IMPRESSION  1. Stable pulmonary nodules.     2. No new metastatic disease in the chest, abdomen, or pelvis.         9/14/2020   Addendum Diagnosis   1. Sigmoid Mass, Biopsy:     Infiltrating adenocarcinoma, most consistent with colon (See comment)   Addendum Comment   Immunohistochemical stains reveal the following staining pattern:   CK7: Scattered cells with cytoplasmic membrane staining   CK20: Positive cytoplasmic staining in normal colonic mucosal epithelial cells and malignant epithelial cells   CDX-2: Diffuse strong nuclear decoration and all tumor cells and normal background colonic epithelial cells   PAX-8: Negative   Estrogen Receptor: Negative   Positive and negative controls stain appropriately. Due to radiologic findings suggesting the possibility of GYN involvement, ER and PAX-8 are performed, which lend support to the diagnosis of a colonic primary, over a tumor of müllerian origin. 9/17/2020   FINAL PATHOLOGIC DIAGNOSIS   1.  Sigmoid colon and proximal rectum, uterus, left fallopian tube and ovary and right ureteral segment, low anterior resection:   Sigmoid colon and proximal rectum: Invasive adenocarcinoma (see synoptic report and comment). Metastatic adenocarcinoma in one of sixteen lymph nodes (1/16). Uterus: Invasive adenocarcinoma extending from adhesed colon into uterine serosa. Endometrial lining shows mucosal atrophy. Left ovary: Benign ovary with serosal inclusion cysts. Left fallopian tube: Benign fallopian tube. Right ureteral segment: Benign segment of ureter densely adhesed to colon. Nodule near right uterine cornu:  Nodular portion of benign smooth muscle consistent with leiomyoma with parenchymal hemorrhage. COLON AND RECTUM: Resection   SPECIMEN   Procedure: Low anterior resection   TUMOR   Tumor Site: Sigmoid colon   Histologic Type: Adenocarcinoma   Histologic Grade: G2: Moderately differentiated   Tumor Size: 8.0 cm   Tumor Extension: Tumor directly invades adjacent structures:   Posterior uterine serosa   Macroscopic Tumor Perforation: Tumor invades through serosa into surrounding soft tissue   Lymphovascular Invasion: Present   Perineural Invasion: Not identified   Treatment Effect: No known presurgical therapy   MARGINS   Margins: All margins are uninvolved by invasive carcinoma   Margins Examined: Proximal, Distal, Radial (circumferential) or   Mesenteric   Distance of Tumor from Radial (circumferential) Margin: See Comment   LYMPH NODES   Number of Lymph Nodes Involved: 1   Number of Lymph Nodes Examined: 16   Tumor Deposits: Not identified   PATHOLOGIC STAGE CLASSIFICATION (pTNM, AJCC 8th Edition)   Primary Tumor (pT): pT4b   Regional Lymph Nodes (pN): pN1a   2. Distal rectal margin:   Segment of benign large bowel. 3. Anastomotic doughnuts:   Two annular portions of benign large bowel. Comment   Tumor invades into the adherent soft tissue and to within 1.1 cm of the apparent right pelvic sidewall margin. Records reviewed and summarized above. Pathology report(s) reviewed above. Radiology report(s) reviewed above.     Assessment:   1) Sigmoid Colon Adenocarcinoma - Stage IIIB- ERICKA  [id number]  Surgical resection on 9/17 with creation of loop ileostomy, total abdominal hysterectomy and left salpingo-oophorectomy, distal right ureterectomy  She had a revision of her ileostomy 10/11/2020 then needed stoma closure and anastomosis on 10/27/2020  Completed 12 cycles of adjuvant FOLFOX on 4/21/21 which she tolerated well with grade 1-2 neuropathy. CT imaging 10/2021 was ROBE with a stable pulmonary nodule. She has no new symptoms, CEA not elevated, she had a colonoscopy 11/2021    We received colon cancer surveillance guidelines which include H&P every 3-6 months for 2 years, then every 6 months for a total of 5 years. CEA every 3-6 months for 2 years, then every 6 months for a total of 5 years. CT AP every 6-12 months for a total of 5 years. Colonoscopy now in 2 years . 2) Elevated transaminases  US of abd was obtained and shows possible liver disease  Hep panel negative   Following with [facility]     3) Neuropathy  Continues , hope to see improvement off chemotherapy   Grade 1 -2 , not painful     4) Thrombocytopenia  Resolved     5) Pulmonary nodule  Follow    Plan:     · Continue surveillance  · CT CAP in 3 months to be ordered at next visit   · Colonoscopy in 2 years   · See [facility] next month  · Labs in 3 months- cbc with diff, cmp, cea at [facility]     RTC in 3 months with scans     I appreciate the opportunity to participate in [facility].     Signed By: Leslie Watts MD

## 2022-01-27 NOTE — PROGRESS NOTES
Joe Castano is a 80 y.o. female  Chief Complaint   Patient presents with    Follow-up      stage III colon cancer. 1. Have you been to the ER, urgent care clinic since your last visit? Hospitalized since your last visit? No.    2. Have you seen or consulted any other health care providers outside of the [address] [place] [address] since your last visit? Include any pap smears or colon screening. Yes, patient went to see  (Neurology) and her PCP office.

## 2022-03-16 ENCOUNTER — OFFICE VISIT (OUTPATIENT)
Dept: HEMATOLOGY | Age: 72
End: 2022-03-16
Payer: MEDICARE

## 2022-03-16 VITALS
SYSTOLIC BLOOD PRESSURE: 124 MMHG | TEMPERATURE: 96.5 F | WEIGHT: 128.6 LBS | HEIGHT: 67 IN | RESPIRATION RATE: 16 BRPM | DIASTOLIC BLOOD PRESSURE: 71 MMHG | BODY MASS INDEX: 20.18 KG/M2 | HEART RATE: 93 BPM | OXYGEN SATURATION: 100 %

## 2022-03-16 DIAGNOSIS — R74.8 ELEVATED ALKALINE PHOSPHATASE LEVEL: ICD-10-CM

## 2022-03-16 DIAGNOSIS — R74.8 ELEVATED LIVER ENZYMES: Primary | ICD-10-CM

## 2022-03-16 LAB
BASOPHILS # BLD AUTO: 0 X10E3/UL (ref 0–0.2)
BASOPHILS NFR BLD AUTO: 1 %
EOSINOPHIL # BLD AUTO: 0.2 X10E3/UL (ref 0–0.4)
EOSINOPHIL NFR BLD AUTO: 4 %
ERYTHROCYTE [DISTWIDTH] IN BLOOD BY AUTOMATED COUNT: 13 % (ref 11.7–15.4)
HCT VFR BLD AUTO: 39.1 % (ref 34–46.6)
HGB BLD-MCNC: 12.6 G/DL (ref 11.1–15.9)
IMM GRANULOCYTES # BLD AUTO: 0 X10E3/UL (ref 0–0.1)
IMM GRANULOCYTES NFR BLD AUTO: 1 %
LYMPHOCYTES # BLD AUTO: 1 X10E3/UL (ref 0.7–3.1)
LYMPHOCYTES NFR BLD AUTO: 24 %
MCH RBC QN AUTO: 28.3 PG (ref 26.6–33)
MCHC RBC AUTO-ENTMCNC: 32.2 G/DL (ref 31.5–35.7)
MCV RBC AUTO: 88 FL (ref 79–97)
MONOCYTES # BLD AUTO: 0.4 X10E3/UL (ref 0.1–0.9)
MONOCYTES NFR BLD AUTO: 11 %
NEUTROPHILS # BLD AUTO: 2.5 X10E3/UL (ref 1.4–7)
NEUTROPHILS NFR BLD AUTO: 59 %
PLATELET # BLD AUTO: 207 X10E3/UL (ref 150–450)
RBC # BLD AUTO: 4.46 X10E6/UL (ref 3.77–5.28)
WBC # BLD AUTO: 4.2 X10E3/UL (ref 3.4–10.8)

## 2022-03-16 PROCEDURE — G0463 HOSPITAL OUTPT CLINIC VISIT: HCPCS | Performed by: NURSE PRACTITIONER

## 2022-03-16 PROCEDURE — G8400 PT W/DXA NO RESULTS DOC: HCPCS | Performed by: NURSE PRACTITIONER

## 2022-03-16 PROCEDURE — G8427 DOCREV CUR MEDS BY ELIG CLIN: HCPCS | Performed by: NURSE PRACTITIONER

## 2022-03-16 PROCEDURE — 99213 OFFICE O/P EST LOW 20 MIN: CPT | Performed by: NURSE PRACTITIONER

## 2022-03-16 PROCEDURE — G8432 DEP SCR NOT DOC, RNG: HCPCS | Performed by: NURSE PRACTITIONER

## 2022-03-16 PROCEDURE — 1101F PT FALLS ASSESS-DOCD LE1/YR: CPT | Performed by: NURSE PRACTITIONER

## 2022-03-16 PROCEDURE — 1090F PRES/ABSN URINE INCON ASSESS: CPT | Performed by: NURSE PRACTITIONER

## 2022-03-16 PROCEDURE — G8420 CALC BMI NORM PARAMETERS: HCPCS | Performed by: NURSE PRACTITIONER

## 2022-03-16 PROCEDURE — G8536 NO DOC ELDER MAL SCRN: HCPCS | Performed by: NURSE PRACTITIONER

## 2022-03-16 PROCEDURE — 91200 LIVER ELASTOGRAPHY: CPT | Performed by: NURSE PRACTITIONER

## 2022-03-16 NOTE — PROGRESS NOTES
Identified pt with two pt identifiers(name and ). Reviewed record in preparation for visit and have obtained necessary documentation. Chief Complaint   Patient presents with    Elevated Liver Enzymes     4 mo [facility] f/u      Vitals:    22 0848   BP: 124/71   Pulse: 93   Resp: 16   Temp: (!) 96.5 °F (35.8 °C)   TempSrc: Temporal   SpO2: 100%   Weight: 128 lb 9.6 oz (58.3 kg)   Height: 5' 7\" (1.702 m)   PainSc:   0 - No pain       Health Maintenance Review: Patient reminded of \"due or due soon\" health maintenance. I have asked the patient to contact his/her primary care provider (PCP) for follow-up on his/her health maintenance. Coordination of Care Questionnaire:  :   1) Have you been to an emergency room, urgent care, or hospitalized since your last visit? If yes, where when, and reason for visit? no       2. Have seen or consulted any other health care provider since your last visit? If yes, where when, and reason for visit? YES, f/u with Dr Jessica Gates, colon specialist 3/14/22      Patient is accompanied by self I have received verbal consent from Bonny Aldana to discuss any/all medical information while they are present in the room.

## 2022-03-16 NOTE — PROGRESS NOTES
[address] [facility], MD, FACP, [place], [place]      Shante Harris, DAVID Webster, ACNP-BC     Yaritza Joshi, [facility]   CHRIS Palm-JANE Noble, [facility]       Kyle Araujo [facility] [address] [place] [address]    at [address], [address] [place]  22.    [phone]    FAX: [address] [place] [address] [address], [address]    [phone]    FAX: [phone]     Patient Care Team:  Fela Kaufman MD as PCP - General (Family Medicine)  Vladislav Perez MD (Hematology and Oncology)  Patricio Gillespie MD (Colon and Rectal Surgery)  Klever Enrique MD (Neurology)  Jesus Santillan MD (Obstetrics & Gynecology)  Dharmesh Alves MD (Urology)      Problem List  Date Reviewed: 1/27/2022          Codes Class Noted    Chemotherapy-induced neuropathy ([facility] ICD-10-CM: G62.0, T45.1X5A  ICD-9-CM: 357.6, E933.1  1/27/2022        Ventral hernia without obstruction or gangrene ICD-10-CM: K43.9  ICD-9-CM: 553.20  9/28/2021        TIA (transient ischemic attack) ICD-10-CM: G45.9  ICD-9-CM: 435.9  6/2/2021        Elevated liver enzymes ICD-10-CM: R74.8  ICD-9-CM: 790.5  6/2/2021        Gastroesophageal reflux disease without esophagitis ICD-10-CM: K21.9  ICD-9-CM: 530.81  6/2/2021        History of appendectomy ICD-10-CM: Z90.49  ICD-9-CM: V45.89  6/2/2021        Osteopenia of multiple sites ICD-10-CM: M85.89  ICD-9-CM: 733.90  6/2/2021        Idiopathic scoliosis ICD-10-CM: M41.20  ICD-9-CM: 737.30  6/2/2021        Acute intestinal ischemia ([facility] ICD-10-CM: K55.059  ICD-9-CM: 557.0  10/25/2020        Ileostomy prolapse ([facility] ICD-10-CM: K94.19  ICD-9-CM: 569.69  10/10/2020        Severe protein-calorie malnutrition ([facility] (Chronic) ICD-10-CM: W82  ICD-9-CM: 262  9/16/2020        Anemia (Chronic) ICD-10-CM: D64.9  ICD-9-CM: 285.9  9/16/2020        Malignant neoplasm of sigmoid colon (HCC) (Chronic) ICD-10-CM: C18.7  ICD-9-CM: 153.3  9/14/2020        Hydronephrosis ICD-10-CM: N13.30  ICD-9-CM: 106  9/11/2020              Ardith Olszewski is being seen at [address] for management of elevated liver enzymes. The active problem list, all pertinent past medical history, medications, radiologic findings and laboratory findings related to the liver disorder were reviewed and discussed with the patient. The patient is a 80 y.o.  female who was found to have elevated  liver enzymes and alkaline phosphatase in 9/2020. The patient went to the ER with back pain 9/2020. A CT was performed and she was found to have a 9 cm pelvic mass. Surgical resection of the mass was performed 9/17/2020 which included a lower anterior resection with coloproctostomy with ileostomy, total hysterectomy with left oophorectomy, distal right uterectomy with right urethral re-implant. 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 FOLFOX 1 of 12 was initiated 11/17/2020. She completed 11 treatments with the last being 5/05/2021. Serologic evaluation for markers of chronic liver disease were negative for HBV and HCV. Ultrasound of the liver was performed in 4/2021. The results of the imaging suggested chronic liver disease. Mild splenomegaly. Assessment of liver fibrosis was performed with Fibroscan 8/2021. The result was 8.3 kPa which correlates with stage 2 septal fibrosis. She returns for Fibroscan today. Since the last office visit the patient has felt well overall with no new complications. The last imaging performed 10/2021 demonstrated no new lesions. The patient does not have any symptoms which could be attributed to the liver disorder.     The patient is not experiencing the following symptoms which are commonly seen in this liver disorder:  pain in the right side over the liver, yellowing of the eyes or skin, itching, or swelling of the abdomen. The patient completes all daily activities without any functional limitations. ASSESSMENT AND PLAN:  Elevated liver enzymes  Persistent elevation in Intermittent elevation in liver transaminases and alkaline phosphatase of unclear etiology at this time. Assessment of liver fibrosis was performed with Fibroscan 8/2021. The result was 8.3 kPa which correlates with stage 2 septal fibrosis. Assessment of liver fibrosis was performed with Fibroscan in the office today. The result was 7.8 kPa which correlates with stage 1 portal fibrosis to stage 2 septal fibrosis. The CAP score of 261 suggests hepatic steatosis. The AST is normal. The ALT is elevated but improved. The ALP is elevated but improved. The liver function is normal. The platelet count is normal.     The most likely causes for the liver chemistry abnormalities were discussed with the patient and include: Unknown etiology or chemotherapy induced vascular disease of the liver. The Fibroscan can be repeated annually or as often as clinically indicated to assess for fibrosis progression and/or regression. Screening for Hepatocellular Carcinoma  HCC screening is not necessary if the patient has no evidence of cirrhosis. Treatment of other medical problems in patients with chronic liver disease  There are no contraindications for the patient to take most medications that are necessary for treatment of other medical issues. The patient can take: Any medications utilized for treatment of DM. Statins to treat hypercholesterolemia    Counseling for alcohol in patients with chronic liver disease  The patient was counseled regarding alcohol consumption and the effect of alcohol on chronic liver disease. The patient dose not drink.      Vaccinations   Vaccination for viral hepatitis A and B is recommended since the patient has no serologic evidence of previous exposure or vaccination with immunity. Routine vaccinations against other bacterial and viral agents can be performed as indicated. Annual flu vaccination should be administered if indicated. ALLERGIES  No Known Allergies    MEDICATIONS  Current Outpatient Medications   Medication Sig    Ca-D3-mag-zinc--chi-boron (Caltrate 600-D Plus Minerals) 600 mg calcium- 800 unit-40 mg chew Take  by mouth.  calcium-vitamin D3-vitamin K (Viactiv) 650 mg-12.5 mcg-40 mcg chew Take  by mouth daily.  lansoprazole (PREVACID) 30 mg capsule Take 30 mg by mouth Daily (before breakfast).  multivitamin (ONE A DAY) tablet Take 1 Tablet by mouth daily.  ascorbic acid, vitamin C, (VITAMIN C) 500 mg tablet Take 1,000 mg by mouth daily.  cholecalciferol (VITAMIN D3) 1,000 unit cap Take 1,000 Units by mouth daily.  Omega-3 Fatty Acids (FISH OIL) 500 mg cap Take  by mouth. No current facility-administered medications for this visit. SYSTEM REVIEW NOT RELATED TO LIVER DISEASE OR REVIEWED ABOVE:  Constitution systems: Negative for fever, chills, weight gain, weight loss. Eyes: Negative for visual changes. ENT: Negative for sore throat, painful swallowing. Respiratory: Negative for cough, hemoptysis, SOB. Cardiology: Negative for chest pain, palpitations. GI:  Negative for constipation or diarrhea. : Negative for urinary frequency, dysuria, hematuria, nocturia. Skin: Negative for rash. Hematology: Negative for easy bruising, blood clots. Musculo-skeletal: Negative for back pain, muscle pain, weakness. Neurologic: Negative for headaches, dizziness, vertigo, memory problems not related to HE. Psychology: Negative for anxiety, depression. FAMILY HISTORY:  The father   of complications from a fall. History of heart disease and CVA. The mother  of pancreatic cancer. There is no family history of liver disease.     There is no family history of immune disorders. SOCIAL HISTORY:  The patient is . The patient hasno children. The patient has never used tobacco products. The patient does not consume significant amounts of alcohol. The patient retired in 2007. Previous employment in [employer]. PHYSICAL EXAMINATION:  Visit Vitals  /71 (BP 1 Location: Left upper arm, BP Patient Position: Sitting, BP Cuff Size: Adult)   Pulse 93   Temp (!) 96.5 °F (35.8 °C) (Temporal)   Resp 16   Ht 5' 7\" (1.702 m)   Wt 128 lb 9.6 oz (58.3 kg)   SpO2 100%   BMI 20.14 kg/m²       General: No acute distress. Eyes: Sclera anicteric. ENT: No oral lesions. Thyroid normal.  Nodes: No adenopathy. Skin: No spider angiomata. No jaundice. No palmar erythema. Respiratory: Lungs clear to auscultation. Cardiovascular: Regular heart rate. No murmurs. No JVD. Abdomen: Soft non-tender, liver size normal to percussion/palpation. Spleen not palpable. No obvious ascites. Extremities: No edema. No muscle wasting. No gross arthritic changes. Neurologic: Alert and oriented. Cranial nerves grossly intact. No asterixis.     LABORATORY STUDIES:  Liver [place] of [address] Units 3/16/2022 1/19/2022   WBC 3.4 - 10.8 x10E3/uL 4.2 4.0   ANC 1.4 - 7.0 x10E3/uL 2.5 2.2   HGB 11.1 - 15.9 g/dL 12.6 13.5    - 450 x10E3/uL 207 217   INR 0.9 - 1.1       AST 0 - 40 IU/L 33 49 (H)   ALT 0 - 32 IU/L 45 (H) 68 (H)   Alk Phos 44 - 121 IU/L 163 (H) 214 (H)   Bili, Total 0.0 - 1.2 mg/dL 0.7 1.0   Bili, Direct 0.00 - 0.40 mg/dL 0.23    Albumin 3.8 - 4.8 g/dL 4.5 4.8   BUN 8 - 27 mg/dL 12 11   Creat 0.57 - 1.00 mg/dL 0.70 0.79   Na 134 - 144 mmol/L 140 138   K 3.5 - 5.2 mmol/L 3.8 4.0   Cl 96 - 106 mmol/L 101 100   CO2 20 - 29 mmol/L 23 25   Glucose 65 - 99 mg/dL 64 (L) 88     Cancer Screening Latest Ref Rng & Units 9/11/2020   CA 19-9 0 - 35 U/mL 4     Cancer Screening Latest Ref Rng & Units 7/28/2021 6/16/2021 5/5/2021   CEA ng/mL 1.5 1.9 2.6     Cancer Screening Latest Ref Rng & Units 10/1/2021   CEA 0.0 - 4.7 ng/mL 2.4     Laboratory testing from 1/19/2022 reviewed in detail. Additional testing included to evaluate progression or regression of disease. Laboratory testing results from today will be communicated by My Chart. SEROLOGIES:  Serologies Latest Ref Rng & Units 6/2/2021   Hep A Ab, Total Negative   Negative   Hep B Surface Ag Index    Hep B Surface Ag Interp NEG      Hep B Core Ab, Total Negative   Negative   Hep B Surface Ab mIU/mL <3.10   Hep B Surface Ab Interp NONREACTIVE   NONREACTIVE   Hep C Ab NR      Ferritin 8 - 252 NG/   Iron % Saturation 20 - 50 % 16 (L)   STEPHANIE, IFA  Negative   C-ANCA Neg:<1:20 titer <1:20   P-ANCA Neg:<1:20 titer <1:20   ANCA Neg:<1:20 titer <1:20   ASMCA 0 - 19 Units 12   M2 Ab 0.0 - 20.0 Units <20.0   Alpha-1 antitrypsin level 101 - 187 mg/dL 198 (H)     Serologies Latest Ref Rng & Units 4/21/2021   Hep B Surface Ag Index <0.10   Hep B Surface Ag Interp NEG   Negative     Serologies Latest Ref Rng & Units 4/21/2021   Hep C Ab NR   NONREACTIVE     LIVER HISTOLOGY:  8/2021. FibroScan performed at [address]. EkPa was 8.3. IQR/med 29%. . The results suggested a fibrosis level of F2. The CAP score suggests there is no significant hepatic steatosis. 3/2022. FibroScan performed at [address]. EkPa was 7.8. IQR/med 9%. . The results suggested a fibrosis level of F1-2. The CAP score suggests there is hepatic steatosis. ENDOSCOPIC PROCEDURES:  Not available or performed    RADIOLOGY:  4/2021. Ultrasound of liver. Increased echogenicity with irregular contour. No concerning liver mass or lesion. No ductal dilatation. 5/2021. CT of Abdomen. No liver mass or lesion. No stones or ductal dilatation. 10/2021. Chest CT. Stable pulmonary nodules no new lesions. 10/2021. Abdominal CT. No new masses or lesions.      OTHER TESTING:  Not available or performed    FOLLOW-UP:  All of the issues listed above in the Assessment and Plan were discussed with the patient. All questions were answered. The patient expressed a clear understanding of the above. [address] [address] in 4 months for ongoing monitoring. Yaritza Joshi AGPCNP-BC  [facility] 13  [address] [address] [address] [place], [address] [place] [address] 22.  201 [facility]

## 2022-03-17 LAB
ACE SERPL-CCNC: 69 U/L (ref 14–82)
ALBUMIN SERPL-MCNC: 4.5 G/DL (ref 3.8–4.8)
ALP SERPL-CCNC: 163 IU/L (ref 44–121)
ALT SERPL-CCNC: 45 IU/L (ref 0–32)
AST SERPL-CCNC: 33 IU/L (ref 0–40)
BILIRUB DIRECT SERPL-MCNC: 0.23 MG/DL (ref 0–0.4)
BILIRUB SERPL-MCNC: 0.7 MG/DL (ref 0–1.2)
BUN SERPL-MCNC: 12 MG/DL (ref 8–27)
BUN/CREAT SERPL: 17 (ref 12–28)
CALCIUM SERPL-MCNC: 9.5 MG/DL (ref 8.7–10.3)
CHLORIDE SERPL-SCNC: 101 MMOL/L (ref 96–106)
CO2 SERPL-SCNC: 23 MMOL/L (ref 20–29)
CREAT SERPL-MCNC: 0.7 MG/DL (ref 0.57–1)
EGFR: 93 ML/MIN/1.73
GGT SERPL-CCNC: 97 IU/L (ref 0–60)
GLUCOSE SERPL-MCNC: 64 MG/DL (ref 65–99)
POTASSIUM SERPL-SCNC: 3.8 MMOL/L (ref 3.5–5.2)
PROT SERPL-MCNC: 6.8 G/DL (ref 6–8.5)
SODIUM SERPL-SCNC: 140 MMOL/L (ref 134–144)

## 2022-03-18 PROBLEM — G62.0 CHEMOTHERAPY-INDUCED NEUROPATHY (HCC): Status: ACTIVE | Noted: 2022-01-27

## 2022-03-18 PROBLEM — G45.9 TIA (TRANSIENT ISCHEMIC ATTACK): Status: ACTIVE | Noted: 2021-06-02

## 2022-03-18 PROBLEM — E43 SEVERE PROTEIN-CALORIE MALNUTRITION (HCC): Status: ACTIVE | Noted: 2020-09-16

## 2022-03-18 PROBLEM — T45.1X5A CHEMOTHERAPY-INDUCED NEUROPATHY (HCC): Status: ACTIVE | Noted: 2022-01-27

## 2022-03-18 PROBLEM — N13.30 HYDRONEPHROSIS: Status: ACTIVE | Noted: 2020-09-11

## 2022-03-18 LAB — IGG4 SER-MCNC: 14 MG/DL (ref 2–96)

## 2022-03-19 PROBLEM — C18.7 MALIGNANT NEOPLASM OF SIGMOID COLON (HCC): Status: ACTIVE | Noted: 2020-09-14

## 2022-03-19 PROBLEM — R74.8 ELEVATED LIVER ENZYMES: Status: ACTIVE | Noted: 2021-06-02

## 2022-03-19 PROBLEM — K43.9 VENTRAL HERNIA WITHOUT OBSTRUCTION OR GANGRENE: Status: ACTIVE | Noted: 2021-09-28

## 2022-03-19 PROBLEM — M85.89 OSTEOPENIA OF MULTIPLE SITES: Status: ACTIVE | Noted: 2021-06-02

## 2022-03-19 PROBLEM — K94.19 ILEOSTOMY PROLAPSE (HCC): Status: ACTIVE | Noted: 2020-10-10

## 2022-03-19 PROBLEM — D64.9 ANEMIA: Status: ACTIVE | Noted: 2020-09-16

## 2022-03-19 PROBLEM — M41.20 IDIOPATHIC SCOLIOSIS: Status: ACTIVE | Noted: 2021-06-02

## 2022-03-20 PROBLEM — Z90.49 HISTORY OF APPENDECTOMY: Status: ACTIVE | Noted: 2021-06-02

## 2022-03-20 PROBLEM — K55.059 ACUTE INTESTINAL ISCHEMIA (HCC): Status: ACTIVE | Noted: 2020-10-25

## 2022-03-20 PROBLEM — K21.9 GASTROESOPHAGEAL REFLUX DISEASE WITHOUT ESOPHAGITIS: Status: ACTIVE | Noted: 2021-06-02

## 2022-03-20 LAB
ALP BONE CFR SERPL: 24 % (ref 14–68)
ALP INTEST CFR SERPL: 10 % (ref 0–18)
ALP LIVER CFR SERPL: 66 % (ref 18–85)

## 2022-03-21 NOTE — PROGRESS NOTES
Letter sent via my chart regarding the blood work results. The liver enzymes have improved and testing for other causes of liver disease are negative. We will continue to monitor closely.

## 2022-04-14 ENCOUNTER — TELEPHONE (OUTPATIENT)
Dept: ONCOLOGY | Age: 72
End: 2022-04-14

## 2022-04-14 NOTE — TELEPHONE ENCOUNTER
1324  Returned pts call, HIPAA verified x2. I informed pt that she does have lab work that needs to be done before he next appt with Dr. Keny Thompson. Patient would like us to fax the lab slip to [facility]. Lab slip will be faxed today, pt has no further questions or concerns at this time.

## 2022-04-14 NOTE — TELEPHONE ENCOUNTER
Patient stated that she wanted to know if she needed labs done before her upcoming appointment.  Please advise     CB# [phone]

## 2022-04-20 ENCOUNTER — HOSPITAL ENCOUNTER (OUTPATIENT)
Dept: CT IMAGING | Age: 72
Discharge: HOME | End: 2022-04-20
Attending: INTERNAL MEDICINE
Payer: MEDICARE

## 2022-04-20 DIAGNOSIS — C18.7 MALIGNANT NEOPLASM OF SIGMOID COLON (HCC): ICD-10-CM

## 2022-04-20 PROCEDURE — 74177 CT ABD & PELVIS W/CONTRAST: CPT

## 2022-04-20 PROCEDURE — 74011000636 HC RX REV CODE- 636: Performed by: RADIOLOGY

## 2022-04-20 RX ADMIN — IOPAMIDOL 100 ML: 755 INJECTION, SOLUTION INTRAVENOUS at 11:17

## 2022-04-20 RX ADMIN — IOHEXOL 50 ML: 240 INJECTION, SOLUTION INTRATHECAL; INTRAVASCULAR; INTRAVENOUS; ORAL at 11:16

## 2022-04-21 LAB
ALBUMIN SERPL-MCNC: 4.6 G/DL (ref 3.7–4.7)
ALBUMIN/GLOB SERPL: 1.9 {RATIO} (ref 1.2–2.2)
ALP SERPL-CCNC: 140 IU/L (ref 44–121)
ALT SERPL-CCNC: 31 IU/L (ref 0–32)
AST SERPL-CCNC: 24 IU/L (ref 0–40)
BASOPHILS # BLD AUTO: 0 X10E3/UL (ref 0–0.2)
BASOPHILS NFR BLD AUTO: 1 %
BILIRUB SERPL-MCNC: 0.8 MG/DL (ref 0–1.2)
BUN SERPL-MCNC: 16 MG/DL (ref 8–27)
BUN/CREAT SERPL: 23 (ref 12–28)
CALCIUM SERPL-MCNC: 9.4 MG/DL (ref 8.7–10.3)
CEA SERPL-MCNC: 1.6 NG/ML (ref 0–4.7)
CHLORIDE SERPL-SCNC: 98 MMOL/L (ref 96–106)
CO2 SERPL-SCNC: 24 MMOL/L (ref 20–29)
CREAT SERPL-MCNC: 0.7 MG/DL (ref 0.57–1)
EGFR: 92 ML/MIN/1.73
EOSINOPHIL # BLD AUTO: 0.1 X10E3/UL (ref 0–0.4)
EOSINOPHIL NFR BLD AUTO: 3 %
ERYTHROCYTE [DISTWIDTH] IN BLOOD BY AUTOMATED COUNT: 12.6 % (ref 11.7–15.4)
GLOBULIN SER CALC-MCNC: 2.4 G/DL (ref 1.5–4.5)
GLUCOSE SERPL-MCNC: 80 MG/DL (ref 65–99)
HCT VFR BLD AUTO: 42.2 % (ref 34–46.6)
HGB BLD-MCNC: 13.5 G/DL (ref 11.1–15.9)
IMM GRANULOCYTES # BLD AUTO: 0 X10E3/UL (ref 0–0.1)
IMM GRANULOCYTES NFR BLD AUTO: 0 %
LYMPHOCYTES # BLD AUTO: 1.2 X10E3/UL (ref 0.7–3.1)
LYMPHOCYTES NFR BLD AUTO: 27 %
MCH RBC QN AUTO: 28.3 PG (ref 26.6–33)
MCHC RBC AUTO-ENTMCNC: 32 G/DL (ref 31.5–35.7)
MCV RBC AUTO: 89 FL (ref 79–97)
MONOCYTES # BLD AUTO: 0.4 X10E3/UL (ref 0.1–0.9)
MONOCYTES NFR BLD AUTO: 8 %
NEUTROPHILS # BLD AUTO: 2.6 X10E3/UL (ref 1.4–7)
NEUTROPHILS NFR BLD AUTO: 61 %
PLATELET # BLD AUTO: 241 X10E3/UL (ref 150–450)
POTASSIUM SERPL-SCNC: 4 MMOL/L (ref 3.5–5.2)
PROT SERPL-MCNC: 7 G/DL (ref 6–8.5)
RBC # BLD AUTO: 4.77 X10E6/UL (ref 3.77–5.28)
SODIUM SERPL-SCNC: 137 MMOL/L (ref 134–144)
WBC # BLD AUTO: 4.4 X10E3/UL (ref 3.4–10.8)

## 2022-04-27 NOTE — PROGRESS NOTES
[facility] [place] [address] [address], [address]  W: [phone]  F: [phone]    Reason for Visit:   Niall Tay is a 71 y.o. female who is seen for stage III colon cancer. Treatment History:   · 9/10/2020 CT A/P - large, irregular pelvic mass measuring approximately 9cm likely representing neoplasm arising from the sigmoid colon, small amount of pelvic free fluid, colonic bowel wall thickening. Borderline enlarged retroperitoneal lymph nodes. 6 mm right lower lobe lung nodule. Mass effect from pelvic mass causing moderate right and minimal left hydronephrosis. · 9/14/2020: Colonoscopy - A large circumferential, ulcerated fungating mass was noted in proximal sigmoid colon. Mass was obstructing the lumen and could not be traversed. Nancyann Manley was present. · 9/15/2020: CT Chest - 7.5mm left lower lobe pulmonary nodule, small right pleural effusion, mild right basilar atelectasis  · 9/17/2020: Surgical resection - low anterior resection, mobilization of the splenic flexure, coloproctostomy, creation of loop ileostomy, total abdominal hysterectomy and left salpingo-oophorectomy, distal right ureterectomy, right ureteral re-implant with psoas hitch and placement of right ureteral stent  · 11/17/20: cycle 1 FOLFOX  · 2/17/21 CT CAP: pulmonary nodule stable, ROBE   · 5/11/21 CT CAP: unchanged pulmonary nodule, ROBE  · 4/2022-CT chest abdomen pelvis: Stable. Fluctuating lung nodules    History of Present Illness:   Patient is a 71 y.o. female seen for colon cancer    She presented to the ED on 9/11/2020 with complaints of right flank/back pain and RLQ abdominal pain. She states she has had several months of gas pain and \"blockages. \" She reports a weight loss of 12 lbs in the last 3 months. CT abd/pelv 9/10/2020 showed large, irregular pelvic mass measuring approximately 9cm likely representing neoplasm arising from the sigmoid colon.  She also had hydronephrosis from the mass effect and urethral stent placed. CEA 7.4 on 9/11and colonoscopy performed 9/14 with biopsy + adenocarcinoma. CT Chest performed showing 7.5mm left lower lobe pulmonary nodule. Mass found to be invading into the uterus and right ureter. Surgical resection performed on 9/17/2020 including low anterior resection, mobilization of the splenic flexure, coloproctostomy, creation of loop ileostomy, total abdominal hysterectomy and left salpingo-oophorectomy, distal right ureterectomy, right ureteral re-implant with psoas hitch and placement of right ureteral stent. She was to see me in clinic but was admitted 10/10/2020 with ileostomy prolapse. Had a revision of loop ileostomy 10/11/2020. Then needed ileostomy closure and anastomosis 10/27/2020. She completed 12 cycles of FOLFOX on 4/21/21. On surveillance. She is well. She has some dry cough, no SOB, She still has neuropathy and has some cramps. Has no new cough, no bleeding nor pain  She had a colonoscopy in 11/2021 and has another one in 2 years. Family Hx:  Mother with hx of pancreatic cancer    Past Medical History:   Diagnosis Date    Colon cancer ([address]     GERD (gastroesophageal reflux disease)     [facility]     [facility] prolapse [facility]     Ill-defined condition     Spaulding's esophagus    Ventral hernia without obstruction or gangrene 9/28/2021      Past Surgical History:   Procedure Laterality Date    COLONOSCOPY Left 9/14/2020    COLONOSCOPY performed by Saumya Mckee MD at [address] [place] [address]; incomplete secondary to partial obstruction.  COLONOSCOPY N/A 11/9/2021    COLONOSCOPY   :- performed by Mercy Carballo MD at [facility] ENDOSCOPY    HX APPENDECTOMY  age 17    HX COLONOSCOPY  circa 2010    No neoplasms.  HX ENDOSCOPY  03/13/2017    Dr. Susanne Evans HX ENDOSCOPY  02/13/2020    Dr. Fatoumata Salguero oopherectomy for treatment of benign mass.     HX GYN  09/17/2020    Total abdominal hysterectomy and left salpingo-oophorectomy; Hailey Guzman MD.     OTHER SURGICAL  09/17/2020    Low anterior resection, mobilization of the splenic flexure, coloproctostomy, and creation of loop ileostomy; Dr. Chadwick Gregory.   OTHER SURGICAL  10/11/2020    Revision of loop ileostomy (resection and creation of divided loop ileostomy); Dr. Chadwick Gregory.   UROLOGICAL  09/12/2020    Cystoscopy and placement of a right ureteral stent; Radha Patrick III, MD.     UROLOGICAL  09/17/2020    Cystoscopy and placement of a temporary left ureteral catheter; Radha Patrick III, MD.     UROLOGICAL  09/17/2020    Distal right ureterectomy; Radha Patrick III, MD.     UROLOGICAL  09/17/2020    Right ureteral re-implantation with psoas hitch and placement of a right ureteral stent; Alisson Botello MD.    IR INSERT TUNL CVAD W PORT LESS THAN 5 YR  10/14/2020    Right chest Port-a-Cath placement.  IR REMOVE TUNL CVAD W PORT/PUMP  8/19/2021      Social History     Tobacco Use    Smoking status: Never Smoker    Smokeless tobacco: Never Used   Substance Use Topics    Alcohol use: Not Currently     Alcohol/week: 1.0 standard drink     Types: 1 Glasses of wine per week      Family History   Problem Relation Age of Onset    Cancer Mother     Heart Disease Father     Diabetes Brother      Current Outpatient Medications   Medication Sig    Ca-D3-mag-zinc--chi-boron (Caltrate 600-D Plus Minerals) 600 mg calcium- 800 unit-40 mg chew Take  by mouth.  calcium-vitamin D3-vitamin K (Viactiv) 650 mg-12.5 mcg-40 mcg chew Take  by mouth daily.  lansoprazole (PREVACID) 30 mg capsule Take 30 mg by mouth Daily (before breakfast).  multivitamin (ONE A DAY) tablet Take 1 Tablet by mouth daily.  ascorbic acid, vitamin C, (VITAMIN C) 500 mg tablet Take 1,000 mg by mouth daily.  cholecalciferol (VITAMIN D3) 1,000 unit cap Take 1,000 Units by mouth daily.     Omega-3 Fatty Acids (FISH OIL) 500 mg cap Take  by mouth. No current facility-administered medications for this visit. No Known Allergies     Review of Systems: A complete review of systems was obtained, negative except as described above. Physical Exam:     Visit Vitals  /72 (BP 1 Location: Right arm, BP Patient Position: Sitting)   Pulse 79   Temp 98.7 °F (37.1 °C)   Resp 18   Wt 128 lb (58.1 kg)   SpO2 98%   BMI 20.05 kg/m²     ECOG PS: 1  General: No distress but appears frail  Eyes: PERRL, anicteric sclerae  HENT: Atraumatic  RESP: normal respiratory effort  MS: Digits without clubbing or cyanosis. Skin: No rashes, ecchymoses, or petechiae. Psych: Alert, oriented, appropriate affect, normal judgment/insight    Results:     Lab Results   Component Value Date/Time    WBC 4.4 04/20/2022 11:35 AM    HGB 13.5 04/20/2022 11:35 AM    HCT 42.2 04/20/2022 11:35 AM    PLATELET 465 87/81/5765 11:35 AM    MCV 89 04/20/2022 11:35 AM    ABS. NEUTROPHILS 2.6 04/20/2022 11:35 AM    Hemoglobin (POC) 12.0 09/17/2020 06:35 PM     Lab Results   Component Value Date/Time    Sodium 137 04/20/2022 11:35 AM    Potassium 4.0 04/20/2022 11:35 AM    Chloride 98 04/20/2022 11:35 AM    CO2 24 04/20/2022 11:35 AM    Glucose 80 04/20/2022 11:35 AM    BUN 16 04/20/2022 11:35 AM    Creatinine 0.70 04/20/2022 11:35 AM    GFR est AA 88 01/19/2022 12:58 PM    GFR est non-AA 76 01/19/2022 12:58 PM    Calcium 9.4 04/20/2022 11:35 AM    Glucose (POC) 98 09/27/2020 11:39 PM    Creatinine (POC) 0.8 09/10/2020 02:32 PM     Lab Results   Component Value Date/Time    Bilirubin, total 0.8 04/20/2022 11:35 AM    ALT (SGPT) 31 04/20/2022 11:35 AM    Alk.  phosphatase 140 (H) 04/20/2022 11:35 AM    Protein, total 7.0 04/20/2022 11:35 AM    Albumin 4.6 04/20/2022 11:35 AM    Globulin 3.7 08/30/2021 11:00 AM     CEA:  Recent Labs     04/20/22  1135 01/19/22  1258 10/01/21  1120 07/28/21  1329   CEA  --   --   --  1.5   [id number] 1.6 1.7 2.4  --        CT A/P 9/23/2020  IMPRESSION:  Large, irregular pelvic mass measuring approximately 9 cm likely representing  neoplasm arising from the sigmoid colon. Adnexal neoplasm is a possibility. This  does appear to be separate from the uterus. Small amount of pelvic free fluid. Associated colonic bowel wall thickening. There is focal gas in the upper  presacral region which is unclear if this is intraluminal or contained  extraluminal collection.     No definite distant metastatic disease. Borderline enlarged retroperitoneal  lymph nodes. 6 mm right lower lobe lung nodule. Mass effect from the pelvic mass  causing moderate right and minimal left hydronephrosis. CT Chest 9/15/2020  IMPRESSION:  1. 7.5 mm left lower lobe pulmonary nodule. 2. Small right pleural effusion. 3. Mild right basilar atelectasis. CT 10/2021  IMPRESSION  1. Stable pulmonary nodules.     2. No new metastatic disease in the chest, abdomen, or pelvis.       CT 4/20/2022  IMPRESSION     1.  New 6 mm subpleural left apical pulmonary nodule with additional smaller  left upper lobe pulmonary nodules with possible tree-in-bud configuration may  reflect infectious or inflammatory process. Consider short interval follow-up  chest CT in 3-6 months.     2.  6 mm lower lobe pulmonary nodules appear stable.      3. No other evidence for metastatic disease in the chest, abdomen, or pelvis. 9/14/2020   Addendum Diagnosis   1.  Sigmoid Mass, Biopsy:     Infiltrating adenocarcinoma, most consistent with colon (See comment)   Addendum Comment   Immunohistochemical stains reveal the following staining pattern:   CK7: Scattered cells with cytoplasmic membrane staining   CK20: Positive cytoplasmic staining in normal colonic mucosal epithelial cells and malignant epithelial cells   CDX-2: Diffuse strong nuclear decoration and all tumor cells and normal background colonic epithelial cells   PAX-8: Negative   Estrogen Receptor: Negative   Positive and negative controls stain appropriately. Due to radiologic findings suggesting the possibility of GYN involvement, ER and PAX-8 are performed, which lend support to the diagnosis of a colonic primary, over a tumor of müllerian origin. 9/17/2020   FINAL PATHOLOGIC DIAGNOSIS   1. Sigmoid colon and proximal rectum, uterus, left fallopian tube and ovary and right ureteral segment, low anterior resection:   Sigmoid colon and proximal rectum:  Invasive adenocarcinoma (see synoptic report and comment). Metastatic adenocarcinoma in one of sixteen lymph nodes (1/16). Uterus: Invasive adenocarcinoma extending from adhesed colon into uterine serosa. Endometrial lining shows mucosal atrophy. Left ovary: Benign ovary with serosal inclusion cysts. Left fallopian tube: Benign fallopian tube. Right ureteral segment: Benign segment of ureter densely adhesed to colon. Nodule near right uterine cornu:  Nodular portion of benign smooth muscle consistent with leiomyoma with parenchymal hemorrhage. COLON AND RECTUM: Resection   SPECIMEN   Procedure: Low anterior resection   TUMOR   Tumor Site: Sigmoid colon   Histologic Type: Adenocarcinoma   Histologic Grade: G2: Moderately differentiated   Tumor Size: 8.0 cm   Tumor Extension: Tumor directly invades adjacent structures:   Posterior uterine serosa   Macroscopic Tumor Perforation: Tumor invades through serosa into surrounding soft tissue   Lymphovascular Invasion: Present   Perineural Invasion: Not identified   Treatment Effect: No known presurgical therapy   MARGINS   Margins:  All margins are uninvolved by invasive carcinoma   Margins Examined: Proximal, Distal, Radial (circumferential) or   Mesenteric   Distance of Tumor from Radial (circumferential) Margin: See Comment   LYMPH NODES   Number of Lymph Nodes Involved: 1   Number of Lymph Nodes Examined: 16   Tumor Deposits: Not identified   PATHOLOGIC STAGE CLASSIFICATION (pTNM, AJCC 8th Edition)   Primary Tumor (pT): pT4b Regional Lymph Nodes (pN): pN1a   2. Distal rectal margin:   Segment of benign large bowel. 3. Anastomotic doughnuts:   Two annular portions of benign large bowel. Comment   Tumor invades into the adherent soft tissue and to within 1.1 cm of the apparent right pelvic sidewall margin. Records reviewed and summarized above. Pathology report(s) reviewed above. Radiology report(s) reviewed above. Assessment:   1) Sigmoid Colon Adenocarcinoma - Stage IIIB- ERICKA  [id number]  Surgical resection on 9/17 with creation of loop ileostomy, total abdominal hysterectomy and left salpingo-oophorectomy, distal right ureterectomy  She had a revision of her ileostomy 10/11/2020 then needed stoma closure and anastomosis on 10/27/2020  Completed 12 cycles of adjuvant FOLFOX on 4/21/21 which she tolerated well with grade 1-2 neuropathy. CT imaging 4/2022 was reviewed personally. Overall this is ROBE. It is noted that she has subcentimeter lung nodules. Some of these are stable some of these are new, there is a suggestion this could be infectious process but close follow-up is required. Otherwise CEA is not elevated exam is reassuring. She had a colonoscopy 11/2021    We received colon cancer surveillance guidelines which include H&P every 3-6 months for 2 years (through 9/2022) , then every 6 months for a total of 5 years. CEA every 3-6 months for 2 years, then every 6 months for a total of 5 years. CT AP every 6-12 months for a total of 5 years. Colonoscopy now in 2 years .      2) Elevated transaminases  US of abd was obtained and shows possible liver disease  Hep panel negative   [facility]   These had improved    3) Neuropathy  Continues , hope to see improvement off chemotherapy   Grade 1 -2 , not painful     4) Thrombocytopenia  Resolved     5) Pulmonary nodules  Follow    Plan:     · Continue surveillance  · CT CAP in 3 months   · Colonoscopy in 2 years   · See [facility] next month  · Labs in 3 months- cbc with diff, cmp, cea at [facility]     RTC in 3 months with scans     I appreciate the opportunity to participate in [facility].     Signed By: Skinny Garcia MD

## 2022-04-28 ENCOUNTER — OFFICE VISIT (OUTPATIENT)
Dept: ONCOLOGY | Age: 72
End: 2022-04-28
Payer: MEDICARE

## 2022-04-28 VITALS
DIASTOLIC BLOOD PRESSURE: 72 MMHG | SYSTOLIC BLOOD PRESSURE: 124 MMHG | HEART RATE: 79 BPM | TEMPERATURE: 98.7 F | RESPIRATION RATE: 18 BRPM | BODY MASS INDEX: 20.05 KG/M2 | OXYGEN SATURATION: 98 % | WEIGHT: 128 LBS

## 2022-04-28 DIAGNOSIS — C18.7 MALIGNANT NEOPLASM OF SIGMOID COLON (HCC): ICD-10-CM

## 2022-04-28 DIAGNOSIS — T45.1X5A CHEMOTHERAPY-INDUCED NEUROPATHY (HCC): Primary | ICD-10-CM

## 2022-04-28 DIAGNOSIS — G62.0 CHEMOTHERAPY-INDUCED NEUROPATHY (HCC): Primary | ICD-10-CM

## 2022-04-28 PROCEDURE — 1101F PT FALLS ASSESS-DOCD LE1/YR: CPT | Performed by: INTERNAL MEDICINE

## 2022-04-28 PROCEDURE — G8420 CALC BMI NORM PARAMETERS: HCPCS | Performed by: INTERNAL MEDICINE

## 2022-04-28 PROCEDURE — 99214 OFFICE O/P EST MOD 30 MIN: CPT | Performed by: INTERNAL MEDICINE

## 2022-04-28 PROCEDURE — 1090F PRES/ABSN URINE INCON ASSESS: CPT | Performed by: INTERNAL MEDICINE

## 2022-04-28 PROCEDURE — G8427 DOCREV CUR MEDS BY ELIG CLIN: HCPCS | Performed by: INTERNAL MEDICINE

## 2022-04-28 PROCEDURE — G8536 NO DOC ELDER MAL SCRN: HCPCS | Performed by: INTERNAL MEDICINE

## 2022-04-28 PROCEDURE — G0463 HOSPITAL OUTPT CLINIC VISIT: HCPCS | Performed by: INTERNAL MEDICINE

## 2022-04-28 PROCEDURE — G9711 PT HX TOT COL OR COLON CA: HCPCS | Performed by: INTERNAL MEDICINE

## 2022-04-28 PROCEDURE — G8432 DEP SCR NOT DOC, RNG: HCPCS | Performed by: INTERNAL MEDICINE

## 2022-04-28 PROCEDURE — G8400 PT W/DXA NO RESULTS DOC: HCPCS | Performed by: INTERNAL MEDICINE

## 2022-04-28 NOTE — PROGRESS NOTES
Niall Tay is a 71 y.o. female    Chief Complaint   Patient presents with    Follow-up     stage III colon cancer       1. Have you been to the ER, urgent care clinic since your last visit? Hospitalized since your last visit? No    2. Have you seen or consulted any other health care providers outside of the [address] [place] [address] since your last visit? Include any pap smears or colon screening.  No

## 2022-07-19 ENCOUNTER — OFFICE VISIT (OUTPATIENT)
Dept: HEMATOLOGY | Age: 72
End: 2022-07-19
Payer: MEDICARE

## 2022-07-19 VITALS
DIASTOLIC BLOOD PRESSURE: 75 MMHG | HEART RATE: 83 BPM | WEIGHT: 129 LBS | OXYGEN SATURATION: 97 % | HEIGHT: 67 IN | SYSTOLIC BLOOD PRESSURE: 118 MMHG | BODY MASS INDEX: 20.25 KG/M2 | TEMPERATURE: 97.2 F

## 2022-07-19 DIAGNOSIS — R74.8 ELEVATED LIVER ENZYMES: Primary | ICD-10-CM

## 2022-07-19 PROCEDURE — G8432 DEP SCR NOT DOC, RNG: HCPCS | Performed by: NURSE PRACTITIONER

## 2022-07-19 PROCEDURE — G8400 PT W/DXA NO RESULTS DOC: HCPCS | Performed by: NURSE PRACTITIONER

## 2022-07-19 PROCEDURE — G0463 HOSPITAL OUTPT CLINIC VISIT: HCPCS | Performed by: NURSE PRACTITIONER

## 2022-07-19 PROCEDURE — G8536 NO DOC ELDER MAL SCRN: HCPCS | Performed by: NURSE PRACTITIONER

## 2022-07-19 PROCEDURE — 99214 OFFICE O/P EST MOD 30 MIN: CPT | Performed by: NURSE PRACTITIONER

## 2022-07-19 PROCEDURE — G8420 CALC BMI NORM PARAMETERS: HCPCS | Performed by: NURSE PRACTITIONER

## 2022-07-19 PROCEDURE — 1123F ACP DISCUSS/DSCN MKR DOCD: CPT | Performed by: NURSE PRACTITIONER

## 2022-07-19 PROCEDURE — 1101F PT FALLS ASSESS-DOCD LE1/YR: CPT | Performed by: NURSE PRACTITIONER

## 2022-07-19 PROCEDURE — G8427 DOCREV CUR MEDS BY ELIG CLIN: HCPCS | Performed by: NURSE PRACTITIONER

## 2022-07-19 PROCEDURE — 1090F PRES/ABSN URINE INCON ASSESS: CPT | Performed by: NURSE PRACTITIONER

## 2022-07-19 NOTE — PROGRESS NOTES
Identified pt with two pt identifiers(name and ). Reviewed record in preparation for visit and have obtained necessary documentation. Chief Complaint   Patient presents with    Follow-up      Vitals:    22 0932   BP: 118/75   Pulse: 83   Temp: 97.2 °F (36.2 °C)   TempSrc: Temporal   SpO2: 97%   Weight: 129 lb (58.5 kg)   Height: 5' 7\" (1.702 m)   PainSc:   0 - No pain       Health Maintenance Review: Patient reminded of \"due or due soon\" health maintenance. I have asked the patient to contact his/her primary care provider (PCP) for follow-up on his/her health maintenance. Coordination of Care Questionnaire:  :   1) Have you been to an emergency room, urgent care, or hospitalized since your last visit? If yes, where when, and reason for visit? no       2. Have seen or consulted any other health care provider since your last visit? If yes, where when, and reason for visit? {YES/ annual checkup      Patient is accompanied by/self  I have received verbal consent from Mercedez Reyes to discuss any/all medical information while they are present in the room.

## 2022-07-19 NOTE — PROGRESS NOTES
[address] [place] [address], MD, FACP, [facility], [place]      DAVID Jeffers, ACNP-BC     Yaritza Joshi [facility]   CHRIS Carr[facility]-BC       Kyle Shaw [address]    at [address], [address] [place] [address], [facility] 22.    [phone]    FAX: [address] [place] [address]    at [address], [address]    [phone]    FAX: [phone]     Patient Care Team:  Mindy Marcial MD as PCP - General (Family Medicine)  Jake Quan MD (Hematology and Oncology)  Cindie Phoenix, MD (Colon and Rectal Surgery)  Loli Wiley MD (Neurology)  Bill Spain MD (Obstetrics & Gynecology)  Johnny Ewing MD (Urology)      Problem List  Date Reviewed: 4/28/2022          Codes Class Noted    Chemotherapy-induced neuropathy ([facility] [address] ICD-10-CM: G62.0, T45.1X5A  ICD-9-CM: 357.6, E933.1  1/27/2022        Ventral hernia without obstruction or gangrene ICD-10-CM: K43.9  ICD-9-CM: 553.20  9/28/2021        TIA (transient ischemic attack) ICD-10-CM: G45.9  ICD-9-CM: 435.9  6/2/2021        Elevated liver enzymes ICD-10-CM: R74.8  ICD-9-CM: 790.5  6/2/2021        Gastroesophageal reflux disease without esophagitis ICD-10-CM: K21.9  ICD-9-CM: 530.81  6/2/2021        History of appendectomy ICD-10-CM: Z90.49  ICD-9-CM: V45.89  6/2/2021        Osteopenia of multiple sites ICD-10-CM: M85.89  ICD-9-CM: 733.90  6/2/2021        Idiopathic scoliosis ICD-10-CM: M41.20  ICD-9-CM: 737.30  6/2/2021        Acute intestinal ischemia ([address] ICD-10-CM: K55.059  ICD-9-CM: 557.0  10/25/2020        Ileostomy prolapse ([facility] ICD-10-CM: K94.19  ICD-9-CM: 569.69  10/10/2020        Severe protein-calorie malnutrition ([facility] (Chronic) ICD-10-CM: L83  ICD-9-CM: 262  9/16/2020        Anemia (Chronic) ICD-10-CM: D64.9  ICD-9-CM: 285.9  9/16/2020        Malignant neoplasm of sigmoid colon (HCC) (Chronic) ICD-10-CM: C18.7  ICD-9-CM: 153.3  9/14/2020        Hydronephrosis ICD-10-CM: N13.30  ICD-9-CM: 462  9/11/2020              Alberto Bueno is being seen at [address] for management of elevated liver enzymes. The active problem list, all pertinent past medical history, medications, radiologic findings and laboratory findings related to the liver disorder were reviewed and discussed with the patient. The patient is a 71 y.o.  female who was found to have elevated  liver enzymes and alkaline phosphatase in 9/2020. The patient went to the ER with back pain 9/2020. A CT was performed and she was found to have a 9 cm pelvic mass. Surgical resection of the mass was performed 9/17/2020 which included a lower anterior resection with coloproctostomy with ileostomy, total hysterectomy with left oophorectomy, distal right uterectomy with right urethral re-implant. 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 FOLFOX 1 of 12 was initiated 11/17/2020. She completed 11 treatments with the last being 5/05/2021. Serologic evaluation for markers of chronic liver disease were negative for HBV and HCV. Ultrasound of the liver was performed in 4/2021. The results of the imaging suggested chronic liver disease. Mild splenomegaly. Assessment of liver fibrosis was performed with Fibroscan 8/2021. The result was 8.3 kPa which correlates with stage 2 septal fibrosis. She returns for Fibroscan today. Assessment of liver fibrosis was performed with Fibroscan 3/2022. The result was 7.8 kPa which correlates with stage 1 portal fibrosis to stage 2 septal fibrosis. The CAP score of 261 suggests hepatic steatosis. Since the last office visit the patient has felt well overall with no new complications of liver disease.  The liver enzymes have improved over time. She notes eating mainly salmon and tuna for a year and was concerned about possible high mercury levels affecting the liver. The diet was changed and she is eating other meats and less tuna. The patient does not have any symptoms which could be attributed to the liver disorder. The patient is not experiencing the following symptoms which are commonly seen in this liver disorder:  pain in the right side over the liver, yellowing of the eyes or skin, itching, or swelling of the abdomen. The patient completes all daily activities without any functional limitations. ASSESSMENT AND PLAN:  Elevated liver enzymes  Persistent elevation in Intermittent elevation in liver transaminases and alkaline phosphatase of unclear etiology at this time. Assessment of liver fibrosis was performed with Fibroscan 8/2021. The result was 8.3 kPa which correlates with stage 2 septal fibrosis. Assessment of liver fibrosis was performed with Fibroscan 3/2022. The result was 7.8 kPa which correlates with stage 1 portal fibrosis to stage 2 septal fibrosis. The CAP score of 261 suggests hepatic steatosis. Have performed laboratory testing to monitor liver function and degree of liver injury. This included BMP, hepatic panel, and CBC with platelet count. Laboratory testing ordered by the PCP from 6/29/2022 reviewed in detail. The AST is normal. The ALT is elevated slightly. The ALP is elevated but improved. The liver function is normal. The platelet count is normal.     The most likely causes for the liver chemistry abnormalities were discussed with the patient and include: Unknown etiology or chemotherapy induced vascular disease of the liver. The Fibroscan can be repeated annually or as often as clinically indicated to assess for fibrosis progression and/or regression. This will be done at the next office visit.      The patient is scheduled for follow-up CT scan and blood work next week. Will order a mercury level. Screening for Hepatocellular Carcinoma  HCC screening is not necessary if the patient has no evidence of cirrhosis. Treatment of other medical problems in patients with chronic liver disease  There are no contraindications for the patient to take most medications that are necessary for treatment of other medical issues. The patient can take any medications utilized for treatment of DM. Statins to treat hypercholesterolemia    Counseling for alcohol in patients with chronic liver disease  The patient was counseled regarding alcohol consumption and the effect of alcohol on chronic liver disease. The patient dose not drink. Vaccinations   Vaccination for viral hepatitis A and B is recommended since the patient has no serologic evidence of previous exposure or vaccination with immunity. Routine vaccinations against other bacterial and viral agents can be performed as indicated. Annual flu vaccination should be administered if indicated. ALLERGIES  No Known Allergies    MEDICATIONS  Current Outpatient Medications   Medication Sig    Ca-D3-mag-zinc--chi-boron (Caltrate 600-D Plus Minerals) 600 mg calcium- 800 unit-40 mg chew Take  by mouth.  calcium-vitamin D3-vitamin K (Viactiv) 650 mg-12.5 mcg-40 mcg chew Take  by mouth daily.  lansoprazole (PREVACID) 30 mg capsule Take 30 mg by mouth Daily (before breakfast).  multivitamin (ONE A DAY) tablet Take 1 Tablet by mouth daily.  ascorbic acid, vitamin C, (VITAMIN C) 500 mg tablet Take 1,000 mg by mouth daily.  cholecalciferol (VITAMIN D3) 1,000 unit cap Take 1,000 Units by mouth daily.  Omega-3 Fatty Acids (FISH OIL) 500 mg cap Take  by mouth. No current facility-administered medications for this visit. SYSTEM REVIEW NOT RELATED TO LIVER DISEASE OR REVIEWED ABOVE:  Constitution systems: Negative for fever, chills, weight gain, weight loss. Eyes: Negative for visual changes.   ENT: Negative for sore throat, painful swallowing. Respiratory: Negative for cough, hemoptysis, SOB. Cardiology: Negative for chest pain, palpitations. GI:  Negative for constipation or diarrhea. : Negative for urinary frequency, dysuria, hematuria, nocturia. Skin: Negative for rash. Hematology: Negative for easy bruising, blood clots. Musculo-skeletal: Negative for back pain, muscle pain, weakness. Neurologic: Negative for headaches, dizziness, vertigo, memory problems not related to HE. Psychology: Negative for anxiety, depression. FAMILY HISTORY:  The father   of complications from a fall. History of heart disease and CVA. The mother  of pancreatic cancer. There is no family history of liver disease. There is no family history of immune disorders. SOCIAL HISTORY:  The patient is . The patient hasno children. The patient has never used tobacco products. The patient does not consume significant amounts of alcohol. The patient retired in . Previous employment in [employer]. PHYSICAL EXAMINATION:  Visit Vitals  /75 (BP 1 Location: Left arm, BP Patient Position: Sitting, BP Cuff Size: Adult)   Pulse 83   Temp 97.2 °F (36.2 °C) (Temporal)   Ht 5' 7\" (1.702 m)   Wt 129 lb (58.5 kg)   SpO2 97%   BMI 20.20 kg/m²       General: No acute distress. Eyes: Sclera anicteric. ENT: No oral lesions. Thyroid normal.  Nodes: No adenopathy. Skin: No spider angiomata. No jaundice. No palmar erythema. Respiratory: Lungs clear to auscultation. Cardiovascular: Regular heart rate. No murmurs. No JVD. Abdomen: Soft non-tender, liver size normal to percussion/palpation. Spleen not palpable. No obvious ascites. Extremities: No edema. No muscle wasting. No gross arthritic changes. Neurologic: Alert and oriented. Cranial nerves grossly intact. No asterixis.     LABORATORY STUDIES:  From: 2022   AST/ALT/ALP/T Bili/ALB:30/51/127/1.3/4.2  WBC/HB/PLT/INR:4.34/13.2/255  NA/BUN/CREAT:138/12/0.87    L[facility] Latest Ref Rng & Units 3/16/2022 1/19/2022   WBC 3.4 - 10.8 x10E3/uL 4.2 4.0   ANC 1.4 - 7.0 x10E3/uL 2.5 2.2   HGB 11.1 - 15.9 g/dL 12.6 13.5    - 450 x10E3/uL 207 217   INR 0.9 - 1.1       AST 0 - 40 IU/L 33 49 (H)   ALT 0 - 32 IU/L 45 (H) 68 (H)   Alk Phos 44 - 121 IU/L 163 (H) 214 (H)   Bili, Total 0.0 - 1.2 mg/dL 0.7 1.0   Bili, Direct 0.00 - 0.40 mg/dL 0.23    Albumin 3.8 - 4.8 g/dL 4.5 4.8   BUN 8 - 27 mg/dL 12 11   Creat 0.57 - 1.00 mg/dL 0.70 0.79   Na 134 - 144 mmol/L 140 138   K 3.5 - 5.2 mmol/L 3.8 4.0   Cl 96 - 106 mmol/L 101 100   CO2 20 - 29 mmol/L 23 25   Glucose 65 - 99 mg/dL 64 (L) 88     Cancer Screening Latest Ref Rng & Units 9/11/2020   CA 19-9 0 - 35 U/mL 4     Cancer Screening Latest Ref Rng & Units 7/28/2021 6/16/2021 5/5/2021   CEA ng/mL 1.5 1.9 2.6     Cancer Screening Latest Ref Rng & Units 10/1/2021   CEA 0.0 - 4.7 ng/mL 2.4     Laboratory testing from 6/29/2022 reviewed in detail. Additional testing included to evaluate progression or regression of disease. Laboratory testing results from today will be communicated by My Chart.      SEROLOGIES:  Serologies Latest Ref Rng & Units 6/2/2021   Hep A Ab, Total Negative   Negative   Hep B Surface Ag Index    Hep B Surface Ag Interp NEG      Hep B Core Ab, Total Negative   Negative   Hep B Surface Ab mIU/mL <3.10   Hep B Surface Ab Interp NONREACTIVE   NONREACTIVE   Hep C Ab NR      Ferritin 8 - 252 NG/   Iron % Saturation 20 - 50 % 16 (L)   STEPHANIE, IFA  Negative   C-ANCA Neg:<1:20 titer <1:20   P-ANCA Neg:<1:20 titer <1:20   ANCA Neg:<1:20 titer <1:20   ASMCA 0 - 19 Units 12   M2 Ab 0.0 - 20.0 Units <20.0   Alpha-1 antitrypsin level 101 - 187 mg/dL 198 (H)     Serologies Latest Ref Rng & Units 4/21/2021   Hep B Surface Ag Index <0.10   Hep B Surface Ag Interp NEG   Negative     Serologies Latest Ref Rng & Units 4/21/2021 Hep C Ab NR   NONREACTIVE     LIVER HISTOLOGY:  8/2021. FibroScan performed at [address]. EkPa was 8.3. IQR/med 29%. . The results suggested a fibrosis level of F2. The CAP score suggests there is no significant hepatic steatosis. 3/2022. FibroScan performed at [address]. EkPa was 7.8. IQR/med 9%. . The results suggested a fibrosis level of F1-2. The CAP score suggests there is hepatic steatosis. ENDOSCOPIC PROCEDURES:  Not available or performed    RADIOLOGY:  4/2021. Ultrasound of liver. Increased echogenicity with irregular contour. No concerning liver mass or lesion. No ductal dilatation. 5/2021. CT of Abdomen. No liver mass or lesion. No stones or ductal dilatation. 10/2021. Chest CT. Stable pulmonary nodules no new lesions. 10/2021. Abdominal CT. No new masses or lesions. OTHER TESTING:  Not available or performed    FOLLOW-UP:  All of the issues listed above in the Assessment and Plan were discussed with the patient. All questions were answered. The patient expressed a clear understanding of the above. [address] [address] in 4 months for a Fibroscan. April SPranav Joshi, DENISPCNP-BC  [facility] of [address] [address] [place], [address] [facility] 22.  [address] [facility]

## 2022-07-21 ENCOUNTER — HOSPITAL ENCOUNTER (OUTPATIENT)
Dept: CT IMAGING | Age: 72
Discharge: HOME | End: 2022-07-21
Attending: INTERNAL MEDICINE
Payer: MEDICARE

## 2022-07-21 DIAGNOSIS — C18.7 MALIGNANT NEOPLASM OF SIGMOID COLON (HCC): ICD-10-CM

## 2022-07-21 LAB
CREAT BLD-MCNC: 0.6 MG/DL (ref 0.6–1.3)
MERCURY BLD-MCNC: <1 UG/L (ref 0–14.9)

## 2022-07-21 PROCEDURE — 82565 ASSAY OF CREATININE: CPT

## 2022-07-21 PROCEDURE — 74011000636 HC RX REV CODE- 636: Performed by: RADIOLOGY

## 2022-07-21 PROCEDURE — 74177 CT ABD & PELVIS W/CONTRAST: CPT

## 2022-07-21 RX ADMIN — IOPAMIDOL 100 ML: 755 INJECTION, SOLUTION INTRAVENOUS at 12:55

## 2022-07-26 ENCOUNTER — DOCUMENTATION ONLY (OUTPATIENT)
Dept: ONCOLOGY | Age: 72
End: 2022-07-26

## 2022-08-18 LAB
ALBUMIN SERPL-MCNC: 5 G/DL (ref 3.7–4.7)
ALBUMIN/GLOB SERPL: 2.3 {RATIO} (ref 1.2–2.2)
ALP SERPL-CCNC: 139 IU/L (ref 44–121)
ALT SERPL-CCNC: 38 IU/L (ref 0–32)
AST SERPL-CCNC: 30 IU/L (ref 0–40)
BASOPHILS # BLD AUTO: 0.1 X10E3/UL (ref 0–0.2)
BASOPHILS NFR BLD AUTO: 1 %
BILIRUB SERPL-MCNC: 0.9 MG/DL (ref 0–1.2)
BUN SERPL-MCNC: 14 MG/DL (ref 8–27)
BUN/CREAT SERPL: 22 (ref 12–28)
CALCIUM SERPL-MCNC: 9.8 MG/DL (ref 8.7–10.3)
CEA SERPL-MCNC: 1.7 NG/ML (ref 0–4.7)
CHLORIDE SERPL-SCNC: 99 MMOL/L (ref 96–106)
CO2 SERPL-SCNC: 23 MMOL/L (ref 20–29)
CREAT SERPL-MCNC: 0.65 MG/DL (ref 0.57–1)
EGFR: 94 ML/MIN/1.73
EOSINOPHIL # BLD AUTO: 0.2 X10E3/UL (ref 0–0.4)
EOSINOPHIL NFR BLD AUTO: 4 %
ERYTHROCYTE [DISTWIDTH] IN BLOOD BY AUTOMATED COUNT: 12.8 % (ref 11.7–15.4)
GLOBULIN SER CALC-MCNC: 2.2 G/DL (ref 1.5–4.5)
GLUCOSE SERPL-MCNC: 92 MG/DL (ref 65–99)
HCT VFR BLD AUTO: 40.6 % (ref 34–46.6)
HGB BLD-MCNC: 13.1 G/DL (ref 11.1–15.9)
IMM GRANULOCYTES # BLD AUTO: 0 X10E3/UL (ref 0–0.1)
IMM GRANULOCYTES NFR BLD AUTO: 0 %
LYMPHOCYTES # BLD AUTO: 1.6 X10E3/UL (ref 0.7–3.1)
LYMPHOCYTES NFR BLD AUTO: 33 %
MCH RBC QN AUTO: 28.3 PG (ref 26.6–33)
MCHC RBC AUTO-ENTMCNC: 32.3 G/DL (ref 31.5–35.7)
MCV RBC AUTO: 88 FL (ref 79–97)
MONOCYTES # BLD AUTO: 0.4 X10E3/UL (ref 0.1–0.9)
MONOCYTES NFR BLD AUTO: 9 %
NEUTROPHILS # BLD AUTO: 2.5 X10E3/UL (ref 1.4–7)
NEUTROPHILS NFR BLD AUTO: 53 %
PLATELET # BLD AUTO: 284 X10E3/UL (ref 150–450)
POTASSIUM SERPL-SCNC: 4.6 MMOL/L (ref 3.5–5.2)
PROT SERPL-MCNC: 7.2 G/DL (ref 6–8.5)
RBC # BLD AUTO: 4.63 X10E6/UL (ref 3.77–5.28)
SODIUM SERPL-SCNC: 139 MMOL/L (ref 134–144)
WBC # BLD AUTO: 4.8 X10E3/UL (ref 3.4–10.8)

## 2022-08-23 NOTE — PROGRESS NOTES
[facility] [place] [address] [address], [address]  W: [phone]  F: [phone]    Reason for Visit:   Arsh Womack is a 71 y.o. female who is seen for stage III colon cancer. Treatment History:   9/10/2020 CT A/P - large, irregular pelvic mass measuring approximately 9cm likely representing neoplasm arising from the sigmoid colon, small amount of pelvic free fluid, colonic bowel wall thickening. Borderline enlarged retroperitoneal lymph nodes. 6 mm right lower lobe lung nodule. Mass effect from pelvic mass causing moderate right and minimal left hydronephrosis. 9/14/2020: Colonoscopy - A large circumferential, ulcerated fungating mass was noted in proximal sigmoid colon. Mass was obstructing the lumen and could not be traversed. Valeen Hiss was present. 9/15/2020: CT Chest - 7.5mm left lower lobe pulmonary nodule, small right pleural effusion, mild right basilar atelectasis  9/17/2020: Surgical resection - low anterior resection, mobilization of the splenic flexure, coloproctostomy, creation of loop ileostomy, total abdominal hysterectomy and left salpingo-oophorectomy, distal right ureterectomy, right ureteral re-implant with psoas hitch and placement of right ureteral stent  11/17/20: cycle 1 FOLFOX  2/17/21 CT CAP: pulmonary nodule stable, ROBE   5/11/21 CT CAP: unchanged pulmonary nodule, ROBE  4/2022-CT chest abdomen pelvis: Stable. Fluctuating lung nodules  7/2022: CT with increase in number and size of lung nodules     History of Present Illness:   Patient is a 71 y.o. female seen for colon cancer    She presented to the ED on 9/11/2020 with complaints of right flank/back pain and RLQ abdominal pain. She states she has had several months of gas pain and \"blockages. \" She reports a weight loss of 12 lbs in the last 3 months.  CT abd/pelv 9/10/2020 showed large, irregular pelvic mass measuring approximately 9cm likely representing neoplasm arising from the sigmoid colon. She also had hydronephrosis from the mass effect and urethral stent placed. CEA 7.4 on 9/11and colonoscopy performed 9/14 with biopsy + adenocarcinoma. CT Chest performed showing 7.5mm left lower lobe pulmonary nodule. Mass found to be invading into the uterus and right ureter. Surgical resection performed on 9/17/2020 including low anterior resection, mobilization of the splenic flexure, coloproctostomy, creation of loop ileostomy, total abdominal hysterectomy and left salpingo-oophorectomy, distal right ureterectomy, right ureteral re-implant with psoas hitch and placement of right ureteral stent. She was to see me in clinic but was admitted 10/10/2020 with ileostomy prolapse. Had a revision of loop ileostomy 10/11/2020. Then needed ileostomy closure and anastomosis 10/27/2020. She completed 12 cycles of FOLFOX on 4/21/21. On surveillance. She still has neuropathy, feet are tight. No sob and no cough  She had a colonoscopy in 11/2021 and has another one in 2 years. Family Hx:  Mother with hx of pancreatic cancer    Past Medical History:   Diagnosis Date    Colon cancer ([facility] [address]     GERD (gastroesophageal reflux disease)     Ileostomy in place [facility])     Ileostomy prolapse ([facility] [address]     Ill-defined condition     Spaulding's esophagus    Ventral hernia without obstruction or gangrene 9/28/2021      Past Surgical History:   Procedure Laterality Date    COLONOSCOPY Left 9/14/2020    COLONOSCOPY performed by Dayton Landon MD at [address] [place] [address]; incomplete secondary to partial obstruction. COLONOSCOPY N/A 11/9/2021    COLONOSCOPY   :- performed by Tonie Florez MD at [facility] ENDOSCOPY    HX APPENDECTOMY  age 17    HX COLONOSCOPY  circa 2010    No neoplasms. HX ENDOSCOPY  03/13/2017    Dr. Ashlie Faustin ENDOSCOPY  02/13/2020    Dr. Ashlie Faustin GYN      Left oopherectomy for treatment of benign mass.     HX GYN  09/17/2020    Total abdominal hysterectomy and left salpingo-oophorectomy; Roger Gee MD.     OTHER SURGICAL  09/17/2020    Low anterior resection, mobilization of the splenic flexure, coloproctostomy, and creation of loop ileostomy; Dr. Mindy Crocker.  OTHER SURGICAL  10/11/2020    Revision of loop ileostomy (resection and creation of divided loop ileostomy); Dr. Mindy Crocker.  UROLOGICAL  09/12/2020    Cystoscopy and placement of a right ureteral stent; Julien Lynn III, MD.     UROLOGICAL  09/17/2020    Cystoscopy and placement of a temporary left ureteral catheter; Julien Lynn III, MD.    [facility]  09[facility]    Distal right ureterectomy; Julien Lynn III, MD.     UROLOGICAL  09/17/2020    Right ureteral re-implantation with psoas hitch and placement of a right ureteral stent; Branden Calle MD.    IR INSERT TUNL CVAD W PORT LESS THAN 5 YR  10/14/2020    Right chest Port-a-Cath placement. IR REMOVE TUNL CVAD W PORT/PUMP  8/19/2021      Social History     Tobacco Use    Smoking status: Never    Smokeless tobacco: Never   Substance Use Topics    Alcohol use: Not Currently     Alcohol/week: 1.0 standard drink     Types: 1 Glasses of wine per week      Family History   Problem Relation Age of Onset    Cancer Mother     Heart Disease Father     Diabetes Brother      Current Outpatient Medications   Medication Sig    Ca-D3-mag-zinc--chi-boron (Caltrate 600-D Plus Minerals) 600 mg calcium- 800 unit-40 mg chew Take  by mouth.    calcium-vitamin D3-vitamin K (Viactiv) 650 mg-12.5 mcg-40 mcg chew Take  by mouth daily. lansoprazole (PREVACID) 30 mg capsule Take 30 mg by mouth Daily (before breakfast). multivitamin (ONE A DAY) tablet Take 1 Tablet by mouth daily. ascorbic acid, vitamin C, (VITAMIN C) 500 mg tablet Take 1,000 mg by mouth daily. cholecalciferol (VITAMIN D3) 1,000 unit cap Take 1,000 Units by mouth daily. Omega-3 Fatty Acids (FISH OIL) 500 mg cap Take  by mouth.      No current facility-administered medications for this visit. No Known Allergies     Review of Systems: A complete review of systems was obtained, negative except as described above. Physical Exam:     Visit Vitals  /74   Pulse 92   Temp 98.6 °F (37 °C)   Resp 18   Ht 5' 7\" (1.702 m)   Wt 139 lb (63 kg)   SpO2 97%   BMI 21.77 kg/m²       ECOG PS: 1  General: No distress but appears frail  Eyes: PERRL, anicteric sclerae  HENT: Atraumatic  RESP: normal respiratory effort  MS: Digits without clubbing or cyanosis. Skin: No rashes, ecchymoses, or petechiae. Psych: Alert, oriented, appropriate affect, normal judgment/insight    Results:     Lab Results   Component Value Date/Time    WBC 4.8 08/17/2022 11:37 AM    HGB 13.1 08/17/2022 11:37 AM    HCT 40.6 08/17/2022 11:37 AM    PLATELET 536 31/35/9336 11:37 AM    MCV 88 08/17/2022 11:37 AM    ABS. NEUTROPHILS 2.5 08/17/2022 11:37 AM    Hemoglobin (POC) 12.0 09/17/2020 06:35 PM     Lab Results   Component Value Date/Time    Sodium 139 08/17/2022 11:37 AM    Potassium 4.6 08/17/2022 11:37 AM    Chloride 99 08/17/2022 11:37 AM    CO2 23 08/17/2022 11:37 AM    Glucose 92 08/17/2022 11:37 AM    BUN 14 08/17/2022 11:37 AM    Creatinine 0.65 08/17/2022 11:37 AM    GFR est AA 88 01/19/2022 12:58 PM    GFR est non-AA 76 01/19/2022 12:58 PM    Calcium 9.8 08/17/2022 11:37 AM    Glucose (POC) 98 09/27/2020 11:39 PM    Creatinine (POC) 0.60 07/21/2022 12:34 PM     Lab Results   Component Value Date/Time    Bilirubin, total 0.9 08/17/2022 11:37 AM    ALT (SGPT) 38 (H) 08/17/2022 11:37 AM    Alk. phosphatase 139 (H) 08/17/2022 11:37 AM    Protein, total 7.2 08/17/2022 11:37 AM    Albumin 5.0 (H) 08/17/2022 11:37 AM    Globulin 3.7 08/30/2021 11:00 AM     CEA:  Recent Labs     08/17/22  1137 04/20/22  1135 01/19/22  1258   [id number] 1.7 1.6 1.7                  9/14/2020   Addendum Diagnosis   1.  Sigmoid Mass, Biopsy:     Infiltrating adenocarcinoma, most consistent with colon (See comment)   Addendum Comment Immunohistochemical stains reveal the following staining pattern:   CK7: Scattered cells with cytoplasmic membrane staining   CK20: Positive cytoplasmic staining in normal colonic mucosal epithelial cells and malignant epithelial cells   CDX-2: Diffuse strong nuclear decoration and all tumor cells and normal background colonic epithelial cells   PAX-8: Negative   Estrogen Receptor: Negative   Positive and negative controls stain appropriately. Due to radiologic findings suggesting the possibility of GYN involvement, ER and PAX-8 are performed, which lend support to the diagnosis of a colonic primary, over a tumor of müllerian origin. 9/17/2020   FINAL PATHOLOGIC DIAGNOSIS   1. Sigmoid colon and proximal rectum, uterus, left fallopian tube and ovary and right ureteral segment, low anterior resection:   Sigmoid colon and proximal rectum:  Invasive adenocarcinoma (see synoptic report and comment). Metastatic adenocarcinoma in one of sixteen lymph nodes (1/16). Uterus: Invasive adenocarcinoma extending from adhesed colon into uterine serosa. Endometrial lining shows mucosal atrophy. Left ovary: Benign ovary with serosal inclusion cysts. Left fallopian tube: Benign fallopian tube. Right ureteral segment: Benign segment of ureter densely adhesed to colon. Nodule near right uterine cornu:  Nodular portion of benign smooth muscle consistent with leiomyoma with parenchymal hemorrhage.    COLON AND RECTUM: Resection   SPECIMEN   Procedure: Low anterior resection   TUMOR   Tumor Site: Sigmoid colon   Histologic Type: Adenocarcinoma   Histologic Grade: G2: Moderately differentiated   Tumor Size: 8.0 cm   Tumor Extension: Tumor directly invades adjacent structures:   Posterior uterine serosa   Macroscopic Tumor Perforation: Tumor invades through serosa into surrounding soft tissue   Lymphovascular Invasion: Present   Perineural Invasion: Not identified   Treatment Effect: No known presurgical therapy MARGINS   Margins: All margins are uninvolved by invasive carcinoma   Margins Examined: Proximal, Distal, Radial (circumferential) or   Mesenteric   Distance of Tumor from Radial (circumferential) Margin: See Comment   LYMPH NODES   Number of Lymph Nodes Involved: 1   Number of Lymph Nodes Examined: 16   Tumor Deposits: Not identified   PATHOLOGIC STAGE CLASSIFICATION (pTNM, AJCC 8th Edition)   Primary Tumor (pT): pT4b   Regional Lymph Nodes (pN): pN1a   2. Distal rectal margin:   Segment of benign large bowel. 3. Anastomotic doughnuts:   Two annular portions of benign large bowel. Comment   Tumor invades into the adherent soft tissue and to within 1.1 cm of the apparent right pelvic sidewall margin. Records reviewed and summarized above. Pathology report(s) reviewed above. Radiology report(s) reviewed above. CT 4/20/2022  IMPRESSION     1. New 6 mm subpleural left apical pulmonary nodule with additional smaller  left upper lobe pulmonary nodules with possible tree-in-bud configuration may  reflect infectious or inflammatory process. Consider short interval follow-up  chest CT in 3-6 months. 2.  6 mm lower lobe pulmonary nodules appear stable. 3.  No other evidence for metastatic disease in the chest, abdomen, or pelvis. CT 7/2022  IMPRESSION     1. There is increased number and size of nodules in the apex of the left lung,  now measuring up to 10 mm in size, significance of the apical nodularity is  uncertain. Consider short-term imaging follow-up or PET CT.  2. Other findings are stable, including 6 to 7 mm right lower lobe pulmonary  nodule. .    Assessment:   1) Sigmoid Colon Adenocarcinoma - Stage IIIB- ERICKA  [id number]  Surgical resection on 9/17 with creation of loop ileostomy, total abdominal hysterectomy and left salpingo-oophorectomy, distal right ureterectomy  She had a revision of her ileostomy 10/11/2020 then needed stoma closure and anastomosis on 10/27/2020  Completed 12 cycles of adjuvant FOLFOX on 4/21/21 which she tolerated well with grade 1-2 neuropathy. CT imaging 4/2022 was reviewed personally. Overall this is ROBE. It is noted that she has subcentimeter lung nodules. Some of these are stable some of these are new. Ct 7/2022 reviewed personally and discussed today as well. This does show some increase in apical nodularity. Will get a PET , if + then will send for ml bronch and Bx as these are too central      2) Elevated transaminases  US of abd was obtained and shows possible liver disease  Hep panel negative   Following with [facility]   These had improved    3) Neuropathy  Continues , hope to see improvement off chemotherapy   Grade 1 -2 , not painful     4) Thrombocytopenia  Resolved     5) Pulmonary nodules  Follow    Plan:     PET  Review scans and then refer for Bronch bx if indicated  RTC 1-2 week       I appreciate the opportunity to participate in [facility].     Signed By: Kevin Orona MD

## 2022-08-24 ENCOUNTER — OFFICE VISIT (OUTPATIENT)
Dept: ONCOLOGY | Age: 72
End: 2022-08-24
Payer: MEDICARE

## 2022-08-24 VITALS
DIASTOLIC BLOOD PRESSURE: 74 MMHG | SYSTOLIC BLOOD PRESSURE: 128 MMHG | RESPIRATION RATE: 18 BRPM | WEIGHT: 139 LBS | HEIGHT: 67 IN | OXYGEN SATURATION: 97 % | HEART RATE: 92 BPM | BODY MASS INDEX: 21.82 KG/M2 | TEMPERATURE: 98.6 F

## 2022-08-24 DIAGNOSIS — C18.2 MALIGNANT NEOPLASM OF ASCENDING COLON (HCC): Primary | ICD-10-CM

## 2022-08-24 PROCEDURE — G8536 NO DOC ELDER MAL SCRN: HCPCS | Performed by: INTERNAL MEDICINE

## 2022-08-24 PROCEDURE — G8420 CALC BMI NORM PARAMETERS: HCPCS | Performed by: INTERNAL MEDICINE

## 2022-08-24 PROCEDURE — G0463 HOSPITAL OUTPT CLINIC VISIT: HCPCS | Performed by: INTERNAL MEDICINE

## 2022-08-24 PROCEDURE — 1101F PT FALLS ASSESS-DOCD LE1/YR: CPT | Performed by: INTERNAL MEDICINE

## 2022-08-24 PROCEDURE — G9711 PT HX TOT COL OR COLON CA: HCPCS | Performed by: INTERNAL MEDICINE

## 2022-08-24 PROCEDURE — G8510 SCR DEP NEG, NO PLAN REQD: HCPCS | Performed by: INTERNAL MEDICINE

## 2022-08-24 PROCEDURE — G8427 DOCREV CUR MEDS BY ELIG CLIN: HCPCS | Performed by: INTERNAL MEDICINE

## 2022-08-24 PROCEDURE — G8400 PT W/DXA NO RESULTS DOC: HCPCS | Performed by: INTERNAL MEDICINE

## 2022-08-24 PROCEDURE — 99214 OFFICE O/P EST MOD 30 MIN: CPT | Performed by: INTERNAL MEDICINE

## 2022-08-24 PROCEDURE — 1123F ACP DISCUSS/DSCN MKR DOCD: CPT | Performed by: INTERNAL MEDICINE

## 2022-08-24 PROCEDURE — 1090F PRES/ABSN URINE INCON ASSESS: CPT | Performed by: INTERNAL MEDICINE

## 2022-08-24 NOTE — PROGRESS NOTES
Humberto Dailey is a 71 y.o. female  Chief Complaint   Patient presents with    Follow-up    Colon Cancer     1. Have you been to the ER, urgent care clinic since your last visit? Hospitalized since your last visit? No    2. Have you seen or consulted any other health care providers outside of the [address] [place] [address] since your last visit? Include any pap smears or colon screening.  No

## 2022-09-07 ENCOUNTER — HOSPITAL ENCOUNTER (OUTPATIENT)
Dept: PET IMAGING | Age: 72
Discharge: HOME | End: 2022-09-07
Attending: INTERNAL MEDICINE
Payer: MEDICARE

## 2022-09-07 VITALS — WEIGHT: 132 LBS | BODY MASS INDEX: 21.21 KG/M2 | HEIGHT: 66 IN

## 2022-09-07 DIAGNOSIS — C18.2 MALIGNANT NEOPLASM OF ASCENDING COLON (HCC): ICD-10-CM

## 2022-09-07 LAB
GLUCOSE BLD STRIP.AUTO-MCNC: 90 MG/DL (ref 65–117)
SERVICE CMNT-IMP: NORMAL

## 2022-09-07 PROCEDURE — A9552 F18 FDG: HCPCS

## 2022-09-07 RX ORDER — FLUDEOXYGLUCOSE F-18 200 MCI/ML
10 INJECTION INTRAVENOUS ONCE
Status: COMPLETED | OUTPATIENT
Start: 2022-09-07 | End: 2022-09-07

## 2022-09-07 RX ADMIN — FLUDEOXYGLUCOSE F-18 10.05 MILLICURIE: 200 INJECTION INTRAVENOUS at 08:43

## 2022-09-12 NOTE — PROGRESS NOTES
[facility] [place] [facility] [place] [address] [address], [address], [address] [place] [address]  W: [phone]  F: [phone]    Reason for Visit:   Candido Andrew is a 71 y.o. female who is seen for stage III colon cancer. Treatment History:   9/10/2020 CT A/P - large, irregular pelvic mass measuring approximately 9cm likely representing neoplasm arising from the sigmoid colon, small amount of pelvic free fluid, colonic bowel wall thickening. Borderline enlarged retroperitoneal lymph nodes. 6 mm right lower lobe lung nodule. Mass effect from pelvic mass causing moderate right and minimal left hydronephrosis. 9/14/2020: Colonoscopy - A large circumferential, ulcerated fungating mass was noted in proximal sigmoid colon. Mass was obstructing the lumen and could not be traversed. Luly Avers was present. 9/15/2020: CT Chest - 7.5mm left lower lobe pulmonary nodule, small right pleural effusion, mild right basilar atelectasis  9/17/2020: Surgical resection - low anterior resection, mobilization of the splenic flexure, coloproctostomy, creation of loop ileostomy, total abdominal hysterectomy and left salpingo-oophorectomy, distal right ureterectomy, right ureteral re-implant with psoas hitch and placement of right ureteral stent  11/17/20: cycle 1 FOLFOX  2/17/21 CT CAP: pulmonary nodule stable, ROBE   5/11/21 CT CAP: unchanged pulmonary nodule, ROBE  4/2022-CT chest abdomen pelvis: Stable. Fluctuating lung nodules  7/2022: CT with increase in number and size of lung nodules - too small to bx  9/2022: PET with 1 FDG avid lung nodule    History of Present Illness:   Patient is a 71 y.o. female seen for colon cancer    She presented to the ED on 9/11/2020 with complaints of right flank/back pain and RLQ abdominal pain. She states she has had several months of gas pain and \"blockages. \" She reports a weight loss of 12 lbs in the last 3 months.  CT abd/pelv 9/10/2020 showed large, irregular pelvic mass measuring approximately 9cm likely representing neoplasm arising from the sigmoid colon. She also had hydronephrosis from the mass effect and urethral stent placed. CEA 7.4 on 9/11and colonoscopy performed 9/14 with biopsy + adenocarcinoma. CT Chest performed showing 7.5mm left lower lobe pulmonary nodule. Mass found to be invading into the uterus and right ureter. Surgical resection performed on 9/17/2020 including low anterior resection, mobilization of the splenic flexure, coloproctostomy, creation of loop ileostomy, total abdominal hysterectomy and left salpingo-oophorectomy, distal right ureterectomy, right ureteral re-implant with psoas hitch and placement of right ureteral stent. She was to see me in clinic but was admitted 10/10/2020 with ileostomy prolapse. Had a revision of loop ileostomy 10/11/2020. Then needed ileostomy closure and anastomosis 10/27/2020. She completed 12 cycles of FOLFOX on 4/21/21. On surveillance. Comes with a PET. She still has neuropathy, feet are tight. No sob and no cough  She had a colonoscopy in 11/2021 and has another one in 2 years. Family Hx:  Mother with hx of pancreatic cancer    Past Medical History:   Diagnosis Date    Colon cancer ([facility] [address]     GERD (gastroesophageal reflux disease)     Ileostomy in place [facility])     Ileostomy prolapse ([facility] [address]     Ill-defined condition     Spaulding's esophagus    Ventral hernia without obstruction or gangrene 9/28/2021      Past Surgical History:   Procedure Laterality Date    COLONOSCOPY Left 9/14/2020    COLONOSCOPY performed by Syed John MD at [address] [place] [address]; incomplete secondary to partial obstruction. COLONOSCOPY N/A 11/9/2021    COLONOSCOPY   :- performed by Magi Lester MD at [facility] ENDOSCOPY    HX APPENDECTOMY  age 17    HX COLONOSCOPY  circa 2010    No neoplasms. HX ENDOSCOPY  03/13/2017    Dr. Abdulkadir Sykes ENDOSCOPY  02/13/2020    Dr. Abdulkadir Sykes GYN      Left oopherectomy for treatment of benign mass.     HX GYN 09/17/2020    Total abdominal hysterectomy and left salpingo-oophorectomy; Sabra Radford MD.     OTHER SURGICAL  09/17/2020    Low anterior resection, mobilization of the splenic flexure, coloproctostomy, and creation of loop ileostomy; Dr. Natalya Camacho.  OTHER SURGICAL  10/11/2020    Revision of loop ileostomy (resection and creation of divided loop ileostomy); Dr. Natalya Camacho.  UROLOGICAL  09/12/2020    Cystoscopy and placement of a right ureteral stent; Beni Lassiter III, MD.     UROLOGICAL  09/17/2020    Cystoscopy and placement of a temporary left ureteral catheter; Beni Lassiter III, MD.    Milagros Fillers UROLOGICAL  09/17/2020    Distal right ureterectomy; Beni Lassiter III, MD.     UROLOGICAL  09/17/2020    Right ureteral re-implantation with psoas hitch and placement of a right ureteral stent; Rahul Duncan MD.    IR INSERT TUNL CVAD W PORT LESS THAN 5 YR  10/14/2020    Right chest Port-a-Cath placement. IR REMOVE TUNL CVAD W PORT/PUMP  8/19/2021      Social History     Tobacco Use    Smoking status: Never    Smokeless tobacco: Never   Substance Use Topics    Alcohol use: Not Currently     Alcohol/week: 1.0 standard drink     Types: 1 Glasses of wine per week      Family History   Problem Relation Age of Onset    Cancer Mother     Heart Disease Father     Diabetes Brother      Current Outpatient Medications   Medication Sig    Ca-D3-mag-zinc--chi-boron (Caltrate 600-D Plus Minerals) 600 mg calcium- 800 unit-40 mg chew Take  by mouth.    calcium-vitamin D3-vitamin K (Viactiv) 650 mg-12.5 mcg-40 mcg chew Take  by mouth daily. lansoprazole (PREVACID) 30 mg capsule Take 30 mg by mouth Daily (before breakfast). multivitamin (ONE A DAY) tablet Take 1 Tablet by mouth daily. ascorbic acid, vitamin C, (VITAMIN C) 500 mg tablet Take 1,000 mg by mouth daily. cholecalciferol (VITAMIN D3) 1,000 unit cap Take 1,000 Units by mouth daily.     Omega-3 Fatty Acids (FISH OIL) 500 mg cap Take  by mouth. No current facility-administered medications for this visit. Not on File     Review of Systems: A complete review of systems was obtained, negative except as described above. Physical Exam:     There were no vitals taken for this visit. ECOG PS: 1  General: No distress but appears frail  Eyes: PERRL, anicteric sclerae  HENT: Atraumatic  RESP: normal respiratory effort  MS: Digits without clubbing or cyanosis. Skin: No rashes, ecchymoses, or petechiae. Psych: Alert, oriented, appropriate affect, normal judgment/insight    Results:     Lab Results   Component Value Date/Time    WBC 4.8 08/17/2022 11:37 AM    HGB 13.1 08/17/2022 11:37 AM    HCT 40.6 08/17/2022 11:37 AM    PLATELET 788 42/51/5461 11:37 AM    MCV 88 08/17/2022 11:37 AM    ABS. NEUTROPHILS 2.5 08/17/2022 11:37 AM    Hemoglobin (POC) 12.0 09/17/2020 06:35 PM     Lab Results   Component Value Date/Time    Sodium 139 08/17/2022 11:37 AM    Potassium 4.6 08/17/2022 11:37 AM    Chloride 99 08/17/2022 11:37 AM    CO2 23 08/17/2022 11:37 AM    Glucose 92 08/17/2022 11:37 AM    BUN 14 08/17/2022 11:37 AM    Creatinine 0.65 08/17/2022 11:37 AM    GFR est AA 88 01/19/2022 12:58 PM    GFR est non-AA 76 01/19/2022 12:58 PM    Calcium 9.8 08/17/2022 11:37 AM    Glucose (POC) 90 09/07/2022 08:39 AM    Creatinine (POC) 0.60 07/21/2022 12:34 PM     Lab Results   Component Value Date/Time    Bilirubin, total 0.9 08/17/2022 11:37 AM    ALT (SGPT) 38 (H) 08/17/2022 11:37 AM    Alk. phosphatase 139 (H) 08/17/2022 11:37 AM    Protein, total 7.2 08/17/2022 11:37 AM    Albumin 5.0 (H) 08/17/2022 11:37 AM    Globulin 3.7 08/30/2021 11:00 AM     CEA:  Recent Labs     08/17/22  1137 04/20/22  1135 01/19/22  1258   [id number] 1.7 1.6 1.7                  9/14/2020   Addendum Diagnosis   1.  Sigmoid Mass, Biopsy:     Infiltrating adenocarcinoma, most consistent with colon (See comment)   Addendum Comment   Immunohistochemical stains reveal the following staining pattern:   CK7: Scattered cells with cytoplasmic membrane staining   CK20: Positive cytoplasmic staining in normal colonic mucosal epithelial cells and malignant epithelial cells   CDX-2: Diffuse strong nuclear decoration and all tumor cells and normal background colonic epithelial cells   PAX-8: Negative   Estrogen Receptor: Negative   Positive and negative controls stain appropriately. Due to radiologic findings suggesting the possibility of GYN involvement, ER and PAX-8 are performed, which lend support to the diagnosis of a colonic primary, over a tumor of müllerian origin. 9/17/2020   FINAL PATHOLOGIC DIAGNOSIS   1. Sigmoid colon and proximal rectum, uterus, left fallopian tube and ovary and right ureteral segment, low anterior resection:   Sigmoid colon and proximal rectum:  Invasive adenocarcinoma (see synoptic report and comment). Metastatic adenocarcinoma in one of sixteen lymph nodes (1/16). Uterus: Invasive adenocarcinoma extending from adhesed colon into uterine serosa. Endometrial lining shows mucosal atrophy. Left ovary: Benign ovary with serosal inclusion cysts. Left fallopian tube: Benign fallopian tube. Right ureteral segment: Benign segment of ureter densely adhesed to colon. Nodule near right uterine cornu:  Nodular portion of benign smooth muscle consistent with leiomyoma with parenchymal hemorrhage. COLON AND RECTUM: Resection   SPECIMEN   Procedure: Low anterior resection   TUMOR   Tumor Site: Sigmoid colon   Histologic Type: Adenocarcinoma   Histologic Grade: G2: Moderately differentiated   Tumor Size: 8.0 cm   Tumor Extension: Tumor directly invades adjacent structures:   Posterior uterine serosa   Macroscopic Tumor Perforation: Tumor invades through serosa into surrounding soft tissue   Lymphovascular Invasion: Present   Perineural Invasion: Not identified   Treatment Effect: No known presurgical therapy   MARGINS   Margins:  All margins are uninvolved by invasive carcinoma   Margins Examined: Proximal, Distal, Radial (circumferential) or   Mesenteric   Distance of Tumor from Radial (circumferential) Margin: See Comment   LYMPH NODES   Number of Lymph Nodes Involved: 1   Number of Lymph Nodes Examined: 16   Tumor Deposits: Not identified   PATHOLOGIC STAGE CLASSIFICATION (pTNM, AJCC 8th Edition)   Primary Tumor (pT): pT4b   Regional Lymph Nodes (pN): pN1a   2. Distal rectal margin:   Segment of benign large bowel. 3. Anastomotic doughnuts:   Two annular portions of benign large bowel. Comment   Tumor invades into the adherent soft tissue and to within 1.1 cm of the apparent right pelvic sidewall margin. Records reviewed and summarized above. Pathology report(s) reviewed above. Radiology report(s) reviewed above. CT 4/20/2022  IMPRESSION     1. New 6 mm subpleural left apical pulmonary nodule with additional smaller  left upper lobe pulmonary nodules with possible tree-in-bud configuration may  reflect infectious or inflammatory process. Consider short interval follow-up  chest CT in 3-6 months. 2.  6 mm lower lobe pulmonary nodules appear stable. 3.  No other evidence for metastatic disease in the chest, abdomen, or pelvis. CT 7/2022  IMPRESSION     1. There is increased number and size of nodules in the apex of the left lung,  now measuring up to 10 mm in size, significance of the apical nodularity is  uncertain. Consider short-term imaging follow-up or PET CT.  2. Other findings are stable, including 6 to 7 mm right lower lobe pulmonary  nodule. PET CT 9/7/2022      IMPRESSION  One of three left upper lobe nodules is likely neoplastic. Otherwise  negative PET.   Assessment:   1) Sigmoid Colon Adenocarcinoma - Stage IIIB- ERICKA  [id number]  Surgical resection on 9/17 with creation of loop ileostomy, total abdominal hysterectomy and left salpingo-oophorectomy, distal right ureterectomy  She had a revision of her ileostomy 10/11/2020 then needed stoma closure and anastomosis on 10/27/2020  Completed 12 cycles of adjuvant FOLFOX on 4/21/21 which she tolerated well with grade 1-2 neuropathy. Ct 7/2022 with some increase in apical nodularity. PET CT with atleast 1 suspicious nodule   See below     2) Elevated transaminases  US of abd was obtained and shows possible liver disease  Hep panel negative   Following with liver institute   These had improved    3) Neuropathy  Continues , hope to see improvement off chemotherapy   Grade 1 -2 , not painful     4) Thrombocytopenia  Resolved     5) Pulmonary nodules  Several  Atleast 1 on the Left is FDG avid and concerning  Still appears too small for a bx   Will discuss in tumor board    If this is metastatic colon cancer, I dont feel convinced that the other smaller nodules which are not FDG avid are benign either. I would likely consider systemic chemotherapy for 2-4 months prior to considering wedge/ SBRT    Plan:       Review scans and then refer for Bronch bx  Tempus to path 9/2020  RTC 1-2 week       I appreciate the opportunity to participate in [facility]. [address].     Signed By: Antwon Rojas MD

## 2022-09-14 ENCOUNTER — OFFICE VISIT (OUTPATIENT)
Dept: ONCOLOGY | Age: 72
End: 2022-09-14
Payer: MEDICARE

## 2022-09-14 VITALS
SYSTOLIC BLOOD PRESSURE: 122 MMHG | HEIGHT: 66 IN | OXYGEN SATURATION: 96 % | BODY MASS INDEX: 21.31 KG/M2 | DIASTOLIC BLOOD PRESSURE: 73 MMHG | HEART RATE: 87 BPM | TEMPERATURE: 98.1 F | WEIGHT: 132.6 LBS

## 2022-09-14 DIAGNOSIS — C18.7 MALIGNANT NEOPLASM OF SIGMOID COLON (HCC): Primary | ICD-10-CM

## 2022-09-14 PROCEDURE — G8400 PT W/DXA NO RESULTS DOC: HCPCS | Performed by: INTERNAL MEDICINE

## 2022-09-14 PROCEDURE — G0463 HOSPITAL OUTPT CLINIC VISIT: HCPCS | Performed by: INTERNAL MEDICINE

## 2022-09-14 PROCEDURE — 1123F ACP DISCUSS/DSCN MKR DOCD: CPT | Performed by: INTERNAL MEDICINE

## 2022-09-14 PROCEDURE — G8427 DOCREV CUR MEDS BY ELIG CLIN: HCPCS | Performed by: INTERNAL MEDICINE

## 2022-09-14 PROCEDURE — 1101F PT FALLS ASSESS-DOCD LE1/YR: CPT | Performed by: INTERNAL MEDICINE

## 2022-09-14 PROCEDURE — G8536 NO DOC ELDER MAL SCRN: HCPCS | Performed by: INTERNAL MEDICINE

## 2022-09-14 PROCEDURE — 99214 OFFICE O/P EST MOD 30 MIN: CPT | Performed by: INTERNAL MEDICINE

## 2022-09-14 PROCEDURE — G9711 PT HX TOT COL OR COLON CA: HCPCS | Performed by: INTERNAL MEDICINE

## 2022-09-14 PROCEDURE — G8510 SCR DEP NEG, NO PLAN REQD: HCPCS | Performed by: INTERNAL MEDICINE

## 2022-09-14 PROCEDURE — G8420 CALC BMI NORM PARAMETERS: HCPCS | Performed by: INTERNAL MEDICINE

## 2022-09-14 PROCEDURE — 1090F PRES/ABSN URINE INCON ASSESS: CPT | Performed by: INTERNAL MEDICINE

## 2022-09-14 NOTE — PROGRESS NOTES
Clark Salgado is a 71 y.o. female  Chief Complaint   Patient presents with    Follow-up     stage III colon cancer. 1. Have you been to the ER, urgent care clinic since your last visit? Hospitalized since your last visit? No    2. Have you seen or consulted any other health care providers outside of the [address] [place] [address] since your last visit? Include any pap smears or colon screening. Yes, patient went to see her Pediatrist monthly trim for toenails. Patient is also seeing her Aby Gauthier provider next week!

## 2022-09-15 ENCOUNTER — DOCUMENTATION ONLY (OUTPATIENT)
Dept: ONCOLOGY | Age: 72
End: 2022-09-15

## 2022-09-15 DIAGNOSIS — R91.1 LUNG NODULE: Primary | ICD-10-CM

## 2022-09-15 NOTE — PROGRESS NOTES
Reviewed scans in tumor board  CT guided bx of lung nodule recommended  Lorena to order and request Dr. Paola Bell

## 2022-09-16 ENCOUNTER — TELEPHONE (OUTPATIENT)
Dept: ONCOLOGY | Age: 72
End: 2022-09-16

## 2022-09-16 NOTE — TELEPHONE ENCOUNTER
[address] pt and left a VM notifying her per Dr. Ivon Coates \"Reviewed scans in tumor board  CT guided bx of lung nodule recommended. \"  I have already submitted the request through scheduling for SHAVONNE GUTIERREZ and the pt should be getting a call from their dept within a week with appt info. Pt is to contact office with any questions or concerns.

## 2022-10-03 ENCOUNTER — HOSPITAL ENCOUNTER (OUTPATIENT)
Dept: CT IMAGING | Age: 72
Discharge: HOME | End: 2022-10-03
Attending: REGISTERED NURSE
Payer: MEDICARE

## 2022-10-03 ENCOUNTER — HOSPITAL ENCOUNTER (OUTPATIENT)
Dept: GENERAL RADIOLOGY | Age: 72
Discharge: HOME | End: 2022-10-03
Attending: NURSE PRACTITIONER
Payer: MEDICARE

## 2022-10-03 ENCOUNTER — HOSPITAL ENCOUNTER (OUTPATIENT)
Dept: GENERAL RADIOLOGY | Age: 72
End: 2022-10-03
Attending: STUDENT IN AN ORGANIZED HEALTH CARE EDUCATION/TRAINING PROGRAM
Payer: MEDICARE

## 2022-10-03 ENCOUNTER — HOSPITAL ENCOUNTER (OUTPATIENT)
Dept: GENERAL RADIOLOGY | Age: 72
Discharge: HOME | End: 2022-10-03
Attending: STUDENT IN AN ORGANIZED HEALTH CARE EDUCATION/TRAINING PROGRAM
Payer: MEDICARE

## 2022-10-03 VITALS
HEART RATE: 90 BPM | SYSTOLIC BLOOD PRESSURE: 136 MMHG | OXYGEN SATURATION: 96 % | HEIGHT: 67 IN | BODY MASS INDEX: 20.72 KG/M2 | TEMPERATURE: 97.7 F | WEIGHT: 132 LBS | RESPIRATION RATE: 22 BRPM | DIASTOLIC BLOOD PRESSURE: 80 MMHG

## 2022-10-03 DIAGNOSIS — R91.1 LUNG NODULE: ICD-10-CM

## 2022-10-03 PROCEDURE — 88333 PATH CONSLTJ SURG CYTO XM 1: CPT

## 2022-10-03 PROCEDURE — 88305 TISSUE EXAM BY PATHOLOGIST: CPT

## 2022-10-03 PROCEDURE — 77030003666 HC NDL SPINAL BD -A

## 2022-10-03 PROCEDURE — 77030014115

## 2022-10-03 PROCEDURE — 74011000250 HC RX REV CODE- 250

## 2022-10-03 PROCEDURE — 74011250636 HC RX REV CODE- 250/636: Performed by: RADIOLOGY

## 2022-10-03 PROCEDURE — 71045 X-RAY EXAM CHEST 1 VIEW: CPT

## 2022-10-03 PROCEDURE — 74011000258 HC RX REV CODE- 258: Performed by: RADIOLOGY

## 2022-10-03 PROCEDURE — 32408 CORE NDL BX LNG/MED PERQ: CPT

## 2022-10-03 PROCEDURE — 77030020268 HC MISC GENERAL SUPPLY

## 2022-10-03 RX ORDER — NALOXONE HYDROCHLORIDE 0.4 MG/ML
0.4 INJECTION, SOLUTION INTRAMUSCULAR; INTRAVENOUS; SUBCUTANEOUS
Status: DISCONTINUED | OUTPATIENT
Start: 2022-10-03 | End: 2022-10-07 | Stop reason: HOSPADM

## 2022-10-03 RX ORDER — SODIUM BICARBONATE 42 MG/ML
INJECTION, SOLUTION INTRAVENOUS
Status: COMPLETED
Start: 2022-10-03 | End: 2022-10-03

## 2022-10-03 RX ORDER — MIDAZOLAM HYDROCHLORIDE 1 MG/ML
.5-5 INJECTION, SOLUTION INTRAMUSCULAR; INTRAVENOUS
Status: DISCONTINUED | OUTPATIENT
Start: 2022-10-03 | End: 2022-10-07 | Stop reason: HOSPADM

## 2022-10-03 RX ORDER — SODIUM CHLORIDE 9 MG/ML
50 INJECTION, SOLUTION INTRAVENOUS CONTINUOUS
Status: DISCONTINUED | OUTPATIENT
Start: 2022-10-03 | End: 2022-10-07 | Stop reason: HOSPADM

## 2022-10-03 RX ORDER — FLUMAZENIL 0.1 MG/ML
0.5 INJECTION INTRAVENOUS
Status: DISCONTINUED | OUTPATIENT
Start: 2022-10-03 | End: 2022-10-07 | Stop reason: HOSPADM

## 2022-10-03 RX ORDER — LIDOCAINE HYDROCHLORIDE 20 MG/ML
INJECTION, SOLUTION INFILTRATION; PERINEURAL
Status: COMPLETED
Start: 2022-10-03 | End: 2022-10-03

## 2022-10-03 RX ORDER — FENTANYL CITRATE 50 UG/ML
12.5-2 INJECTION, SOLUTION INTRAMUSCULAR; INTRAVENOUS
Status: DISCONTINUED | OUTPATIENT
Start: 2022-10-03 | End: 2022-10-07 | Stop reason: HOSPADM

## 2022-10-03 RX ADMIN — FENTANYL CITRATE 25 MCG: 50 INJECTION, SOLUTION INTRAMUSCULAR; INTRAVENOUS at 13:39

## 2022-10-03 RX ADMIN — LIDOCAINE HYDROCHLORIDE 10 ML: 20 INJECTION, SOLUTION INFILTRATION; PERINEURAL at 13:42

## 2022-10-03 RX ADMIN — SODIUM BICARBONATE 1 ML: 42 INJECTION, SOLUTION INTRAVENOUS at 13:42

## 2022-10-03 RX ADMIN — SODIUM CHLORIDE 50 ML/HR: 900 INJECTION, SOLUTION INTRAVENOUS at 12:54

## 2022-10-03 RX ADMIN — FENTANYL CITRATE 25 MCG: 50 INJECTION, SOLUTION INTRAMUSCULAR; INTRAVENOUS at 14:30

## 2022-10-03 RX ADMIN — FENTANYL CITRATE 25 MCG: 50 INJECTION, SOLUTION INTRAMUSCULAR; INTRAVENOUS at 13:43

## 2022-10-03 RX ADMIN — MIDAZOLAM 1 MG: 1 INJECTION INTRAMUSCULAR; INTRAVENOUS at 14:30

## 2022-10-03 RX ADMIN — MIDAZOLAM 1 MG: 1 INJECTION INTRAMUSCULAR; INTRAVENOUS at 13:38

## 2022-10-03 NOTE — DISCHARGE INSTRUCTIONS
You have received sedation medications today. YOU SHOULD NOT DRIVE FOR 24 HOURS, DO NOT OPERATE HEAVY MACHINERY, DO NOT MAKE ANY LEGAL DECISIONS OR SIGN LEGAL DOCUMENTS FOR 24 HOURS. DO NOT DRINK ALCOHOL, TAKE ANY MEDICATIONS UNLESS PRESCRIBED BY YOUR DOCTOR. IF YOU ARE A CAREGIVER, SOMEONE SHOULD TAKE THAT ROLE FOR 24 HOURS. Side effects of sedation medications and other medications used today have been reviewed  Those side effects can include but are not limited to: dizziness, drowsiness, poor balance, fatigue, sleepiness. Take precautions at home to prevent falls, such as assistance with walking or stairs if allowed and /or when needed or position changes. Allergic or adverse reactions could include nausea, itching, hives, dizziness, or anything else out of the ordinary. Should you experience any of these significant changes, please call [phone] between the hours of 7:30 am and 3:30 pm or [phone] after hours. After hours, ask the  to page the X-ray Technologist, and describe the problem to the technologist. If you are experiencing chest pain, shortness of breath, altered mental state, unusual bleeding or any other emergent symptom you should call 911 immediately.

## 2022-10-03 NOTE — ROUTINE PROCESS
Pt stable after lung biopsy. VSS. Had mild discomfort on deep breaths but pt states it is resolving. Verbalized understanding of all discharge instructions. Chest X-rays X 2 reviewed by PA. Pt at baseline following conscious sedation. Ready for discharge.

## 2022-10-03 NOTE — PROGRESS NOTES
Pt arrives ambulatory to angio department accompanied by self for CT lung biopsy procedure. All assessments completed and consent was reviewed. Education given was regarding procedure, moderate sedation, post-procedure care and  management/follow-up. Opportunity for questions was provided and all questions and concerns were addressed. 1240: Provider at bedside. 1300: Ambrosio Damian, patients friend updated on plan of care. 1425: Provider aware patient coughing up blood. Patient in NAD.    1600: Bedside and Verbal shift change report given to Rosalinda Hernandez RN (oncoming nurse) by Michelle Brown RN (offgoing nurse). Report included the following information SBAR, Procedure Summary, and MAR.

## 2022-10-03 NOTE — H&P
INTERVENTIONAL RADIOLOGY  Preoperative History and Physical      Patient:  Joanna Hurst  :  1950  Age:  71 y.o. MRN:  [id number]  Today's Date:  10/3/2022      CC / HPI   Joanna Hurst is a 71 y.o. female with a history of PET avid left pulmonary nodule who presents for CT guided pulmonary nodule biopsy. PAST MEDICAL HISTORY  Past Medical History:   Diagnosis Date    Colon cancer ([facility] [address]     GERD (gastroesophageal reflux disease)     Ileostomy in place [facility])     Ileostomy prolapse ([facility] [address]     Ill-defined condition     Spaulding's esophagus    Ventral hernia without obstruction or gangrene 2021       PAST SURGICAL HISTORY  Past Surgical History:   Procedure Laterality Date    COLONOSCOPY Left 2020    COLONOSCOPY performed by Jeanette Bills MD at [address] [place] [address]; incomplete secondary to partial obstruction. COLONOSCOPY N/A 2021    COLONOSCOPY   :- performed by Wing Tita MD at [facility] ENDOSCOPY    HX APPENDECTOMY  age 17    HX COLONOSCOPY  circa     No neoplasms. HX ENDOSCOPY  2017    Dr. Cook Flatmarcos ENDOSCOPY  2020    Dr. Joey Verduzco GYN      Left oopherectomy for treatment of benign mass. HX GYN  2020    Total abdominal hysterectomy and left salpingo-oophorectomy; Sharon Guerrero MD.     OTHER SURGICAL  2020    Low anterior resection, mobilization of the splenic flexure, coloproctostomy, and creation of loop ileostomy; Dr. Edmar Bailey.  OTHER SURGICAL  10/11/2020    Revision of loop ileostomy (resection and creation of divided loop ileostomy); Dr. Edmar Bailey.      UROLOGICAL  2020    Cystoscopy and placement of a right ureteral stent; Marvin Joe III, MD.     UROLOGICAL  2020    Cystoscopy and placement of a temporary left ureteral catheter; Marvin Joe III, MD.    [facility] UROLOGICAL  2020    Distal right ureterectomy; Marvin Joe III, MD.     UROLOGICAL  2020    Right ureteral re-implantation with psoas hitch and placement of a right ureteral stent; Cat Corona MD.    IR INSERT TUNL CVAD W PORT LESS THAN 5 YR  10/14/2020    Right chest Port-a-Cath placement. IR REMOVE TUNL CVAD W PORT/PUMP  8/19/2021       SOCIAL HISTORY  Social History     Socioeconomic History    Marital status: SINGLE     Spouse name: Not on file    Number of children: Not on file    Years of education: Not on file    Highest education level: Not on file   Occupational History    Not on file   Tobacco Use    Smoking status: Never    Smokeless tobacco: Never   Vaping Use    Vaping Use: Never used   Substance and Sexual Activity    Alcohol use: Not Currently     Alcohol/week: 1.0 standard drink     Types: 1 Glasses of wine per week    Drug use: No    Sexual activity: Not on file   Other Topics Concern    Not on file   Social History Narrative    Not on file     Social Determinants of Health     Financial Resource Strain: Not on file   Food Insecurity: Not on file   Transportation Needs: Not on file   Physical Activity: Not on file   Stress: Not on file   Social Connections: Not on file   Intimate Partner Violence: Not on file   Housing Stability: Not on file       FAMILY HISTORY  Family History   Problem Relation Age of Onset    Cancer Mother     Heart Disease Father     Diabetes Brother        CURRENT MEDICATIONS  Current Outpatient Medications   Medication Sig Dispense Refill    Ca-D3-mag-zinc--chi-boron (Caltrate 600-D Plus Minerals) 600 mg calcium- 800 unit-40 mg chew Take  by mouth.      calcium-vitamin D3-vitamin K 650 mg-12.5 mcg-40 mcg chew Take  by mouth daily. lansoprazole (PREVACID) 30 mg capsule Take 30 mg by mouth Daily (before breakfast). multivitamin (ONE A DAY) tablet Take 1 Tablet by mouth daily. ascorbic acid, vitamin C, (VITAMIN C) 500 mg tablet Take 1,000 mg by mouth daily. cholecalciferol (VITAMIN D3) 25 mcg (1,000 unit) cap Take 1,000 Units by mouth daily.       Omega-3 Fatty Acids 500 mg cap Take  by mouth.        Current Facility-Administered Medications   Medication Dose Route Frequency Provider Last Rate Last Admin    0.9% sodium chloride infusion  50 mL/hr IntraVENous CONTINUOUS Dee Dee Stanley MD        fentaNYL citrate (PF) injection 12.5-200 mcg  12.5-200 mcg IntraVENous Rad Kavita Stanley MD        flumazeniL (ROMAZICON) 0.1 mg/mL injection 0.5 mg  0.5 mg IntraVENous RAD PRN Dee Dee Stanley MD        midazolam (VERSED) injection 0.5-5 mg  0.5-5 mg IntraVENous Rad Kavita Stanley MD        naloxone [facility]) injection 0.4 mg  0.4 mg IntraVENous RAD PRN Dee Dee Stanley MD        sodium bicarbonate (4.2%) 4.2 % injection             lidocaine (XYLOCAINE) 20 mg/mL (2 %) injection                ALLERGIES  No Known Allergies    DIAGNOSTIC STUDIES   IMAGING STUDIES  Relevant Imaging studies reviewed    LABS  Lab Results   Component Value Date/Time    WBC 4.4 09/27/2022 10:56 AM    Hemoglobin (POC) 12.0 09/17/2020 06:35 PM    HGB 13.2 09/27/2022 10:56 AM    HCT 40.3 09/27/2022 10:56 AM    PLATELET 811 69/09/3711 10:56 AM    MCV 87 09/27/2022 10:56 AM     Lab Results   Component Value Date/Time    Sodium 139 08/17/2022 11:37 AM    Potassium 4.6 08/17/2022 11:37 AM    Chloride 99 08/17/2022 11:37 AM    CO2 23 08/17/2022 11:37 AM    Anion gap 8 08/30/2021 11:00 AM    Glucose 92 08/17/2022 11:37 AM    BUN 14 08/17/2022 11:37 AM    Creatinine 0.65 08/17/2022 11:37 AM    BUN/Creatinine ratio 22 08/17/2022 11:37 AM    GFR est AA 88 01/19/2022 12:58 PM    GFR est non-AA 76 01/19/2022 12:58 PM    Calcium 9.8 08/17/2022 11:37 AM     Lab Results   Component Value Date/Time    INR 0.9 09/27/2022 10:56 AM    Prothrombin time 10.0 09/27/2022 10:56 AM       PHYSICAL EXAM   /68 (BP 1 Location: Right upper arm)   Pulse 81   Temp 97.7 °F (36.5 °C)   Resp 16   Ht 5' 7\" (1.702 m)   Wt 59.9 kg (132 lb)   SpO2 100%   Breastfeeding No   BMI 20.67 kg/m²   General:  NAD  Heart: RRR  Lungs:  NWOB  Neurological:  AAOX3    PLAN   Procedure to be performed:  CT guided lung nodule biopsy  Plan for sedation:  moderate  Post procedure plan:  observation per protocol  Informed consent:  risks, benefits, and alternatives reviewed with the patient / family who agree to proceed  Code status:  Rajinder Fulton, VINCENT  [facility], [facility].

## 2022-10-06 ENCOUNTER — TELEPHONE (OUTPATIENT)
Dept: ONCOLOGY | Age: 72
End: 2022-10-06

## 2022-10-06 ENCOUNTER — VIRTUAL VISIT (OUTPATIENT)
Dept: ONCOLOGY | Age: 72
End: 2022-10-06
Payer: MEDICARE

## 2022-10-06 DIAGNOSIS — C18.2 MALIGNANT NEOPLASM OF ASCENDING COLON (HCC): Primary | ICD-10-CM

## 2022-10-06 DIAGNOSIS — R91.1 LUNG NODULE: Primary | ICD-10-CM

## 2022-10-06 PROCEDURE — 99214 OFFICE O/P EST MOD 30 MIN: CPT | Performed by: INTERNAL MEDICINE

## 2022-10-06 PROCEDURE — 1123F ACP DISCUSS/DSCN MKR DOCD: CPT | Performed by: INTERNAL MEDICINE

## 2022-10-06 PROCEDURE — 1090F PRES/ABSN URINE INCON ASSESS: CPT | Performed by: INTERNAL MEDICINE

## 2022-10-06 PROCEDURE — G0463 HOSPITAL OUTPT CLINIC VISIT: HCPCS | Performed by: INTERNAL MEDICINE

## 2022-10-06 PROCEDURE — 1101F PT FALLS ASSESS-DOCD LE1/YR: CPT | Performed by: INTERNAL MEDICINE

## 2022-10-06 PROCEDURE — G8427 DOCREV CUR MEDS BY ELIG CLIN: HCPCS | Performed by: INTERNAL MEDICINE

## 2022-10-06 PROCEDURE — G9711 PT HX TOT COL OR COLON CA: HCPCS | Performed by: INTERNAL MEDICINE

## 2022-10-06 PROCEDURE — G8400 PT W/DXA NO RESULTS DOC: HCPCS | Performed by: INTERNAL MEDICINE

## 2022-10-06 PROCEDURE — G8432 DEP SCR NOT DOC, RNG: HCPCS | Performed by: INTERNAL MEDICINE

## 2022-10-06 NOTE — PROGRESS NOTES
[facility] [place] [address] [address], [address]  W: [phone]  F: [phone]    Reason for Visit:   Joe Castano is a 71 y.o. female who is seen for stage III colon cancer. Seen by synchronous (real-time) audio-video technology on 10/6/2022 for follow up    Treatment History:   9/10/2020 CT A/P - large, irregular pelvic mass measuring approximately 9cm likely representing neoplasm arising from the sigmoid colon, small amount of pelvic free fluid, colonic bowel wall thickening. Borderline enlarged retroperitoneal lymph nodes. 6 mm right lower lobe lung nodule. Mass effect from pelvic mass causing moderate right and minimal left hydronephrosis. 9/14/2020: Colonoscopy - A large circumferential, ulcerated fungating mass was noted in proximal sigmoid colon. Mass was obstructing the lumen and could not be traversed. Earvin Levo was present. 9/15/2020: CT Chest - 7.5mm left lower lobe pulmonary nodule, small right pleural effusion, mild right basilar atelectasis  9/17/2020: Surgical resection - low anterior resection, mobilization of the splenic flexure, coloproctostomy, creation of loop ileostomy, total abdominal hysterectomy and left salpingo-oophorectomy, distal right ureterectomy, right ureteral re-implant with psoas hitch and placement of right ureteral stent  11/17/20: cycle 1 FOLFOX  2/17/21 CT CAP: pulmonary nodule stable, ROBE   5/11/21 CT CAP: unchanged pulmonary nodule, ROBE  4/2022-CT chest abdomen pelvis: Stable. Fluctuating lung nodules  7/2022: CT with increase in number and size of lung nodules - too small to bx  9/2022: PET with 1 FDG avid lung nodule- bx was non diagnostic    History of Present Illness:   Patient is a 71 y.o. female seen for colon cancer    She presented to the ED on 9/11/2020 with complaints of right flank/back pain and RLQ abdominal pain. She states she has had several months of gas pain and \"blockages. \" She reports a weight loss of 12 lbs in the last 3 months. CT abd/pelv 9/10/2020 showed large, irregular pelvic mass measuring approximately 9cm likely representing neoplasm arising from the sigmoid colon. She also had hydronephrosis from the mass effect and urethral stent placed. CEA 7.4 on 9/11and colonoscopy performed 9/14 with biopsy + adenocarcinoma. CT Chest performed showing 7.5mm left lower lobe pulmonary nodule. Mass found to be invading into the uterus and right ureter. Surgical resection performed on 9/17/2020 including low anterior resection, mobilization of the splenic flexure, coloproctostomy, creation of loop ileostomy, total abdominal hysterectomy and left salpingo-oophorectomy, distal right ureterectomy, right ureteral re-implant with psoas hitch and placement of right ureteral stent. She was to see me in clinic but was admitted 10/10/2020 with ileostomy prolapse. Had a revision of loop ileostomy 10/11/2020. Then needed ileostomy closure and anastomosis 10/27/2020. She completed 12 cycles of FOLFOX on 4/21/21. On surveillance. PET with a suspicious lung nodule, had a bx and comes to review results. Since the biopsy has had a cough  She had a colonoscopy in 11/2021 and has another one in 2 years. Family Hx:  Mother with hx of pancreatic cancer    Past Medical History:   Diagnosis Date    Colon cancer ([facility] [address]     GERD (gastroesophageal reflux disease)     Ileostomy in place [facility])     Ileostomy prolapse ([facility] [address]     Ill-defined condition     Spaulding's esophagus    Ventral hernia without obstruction or gangrene 9/28/2021      Past Surgical History:   Procedure Laterality Date    COLONOSCOPY Left 9/14/2020    COLONOSCOPY performed by Yovana Chisholm MD at [address] [place] [address]; incomplete secondary to partial obstruction. COLONOSCOPY N/A 11/9/2021    COLONOSCOPY   :- performed by Galina Graham MD at [facility] ENDOSCOPY    HX APPENDECTOMY  age 17    HX COLONOSCOPY  circa 2010    No neoplasms.     HX ENDOSCOPY 03/13/2017    Dr. Red Ortiz ENDOSCOPY  02/13/2020    Dr. Red Ortiz GYN      Left oopherectomy for treatment of benign mass.  GYN  09/17/2020    Total abdominal hysterectomy and left salpingo-oophorectomy; Alba Villasenor MD.     OTHER SURGICAL  09/17/2020    Low anterior resection, mobilization of the splenic flexure, coloproctostomy, and creation of loop ileostomy; Dr. Oral Graf.  OTHER SURGICAL  10/11/2020    Revision of loop ileostomy (resection and creation of divided loop ileostomy); Dr. Oral Graf.  UROLOGICAL  09/12/2020    Cystoscopy and placement of a right ureteral stent; Gaye Us III, MD.    HX UROLOGICAL  09/17/2020    Cystoscopy and placement of a temporary left ureteral catheter; Gaye Us III, MD.    Colin Bishop UROLOGICAL  09/17/2020    Distal right ureterectomy; Gaye Us III, MD.     UROLOGICAL  09/17/2020    Right ureteral re-implantation with psoas hitch and placement of a right ureteral stent; Caesar Arellano MD.    IR INSERT TUNL CVAD W PORT LESS THAN 5 YR  10/14/2020    Right chest Port-a-Cath placement. IR REMOVE TUNL CVAD W PORT/PUMP  8/19/2021      Social History     Tobacco Use    Smoking status: Never    Smokeless tobacco: Never   Substance Use Topics    Alcohol use: Not Currently     Alcohol/week: 1.0 standard drink     Types: 1 Glasses of wine per week      Family History   Problem Relation Age of Onset    Cancer Mother     Heart Disease Father     Diabetes Brother      Current Outpatient Medications   Medication Sig    Ca-D3-mag-zinc--chi-boron (Caltrate 600-D Plus Minerals) 600 mg calcium- 800 unit-40 mg chew Take  by mouth.    calcium-vitamin D3-vitamin K 650 mg-12.5 mcg-40 mcg chew Take  by mouth daily. lansoprazole (PREVACID) 30 mg capsule Take 30 mg by mouth Daily (before breakfast). multivitamin (ONE A DAY) tablet Take 1 Tablet by mouth daily.     ascorbic acid, vitamin C, (VITAMIN C) 500 mg tablet Take 1,000 mg by mouth daily.    cholecalciferol (VITAMIN D3) 25 mcg (1,000 unit) cap Take 1,000 Units by mouth daily. Omega-3 Fatty Acids 500 mg cap Take  by mouth. No current facility-administered medications for this visit. Facility-Administered Medications Ordered in Other Visits   Medication Dose Route Frequency    0.9% sodium chloride infusion  50 mL/hr IntraVENous CONTINUOUS    fentaNYL citrate (PF) injection 12.5-200 mcg  12.5-200 mcg IntraVENous Rad Multiple    flumazeniL (ROMAZICON) 0.1 mg/mL injection 0.5 mg  0.5 mg IntraVENous RAD PRN    midazolam (VERSED) injection 0.5-5 mg  0.5-5 mg IntraVENous Rad Multiple    naloxone (NARCAN) injection 0.4 mg  0.4 mg IntraVENous RAD PRN      No Known Allergies     Review of Systems: A complete review of systems was obtained, negative except as described above. Physical Exam:       ECOG PS: 1  General: No distress but appears frail  Eyes: PERRL, anicteric sclerae  HENT: Atraumatic  RESP: normal respiratory effort  MS: Digits without clubbing or cyanosis. Results:     Lab Results   Component Value Date/Time    WBC 4.4 09/27/2022 10:56 AM    HGB 13.2 09/27/2022 10:56 AM    HCT 40.3 09/27/2022 10:56 AM    PLATELET 940 94/48/7086 10:56 AM    MCV 87 09/27/2022 10:56 AM    ABS. NEUTROPHILS 2.4 09/27/2022 10:56 AM    Hemoglobin (POC) 12.0 09/17/2020 06:35 PM     Lab Results   Component Value Date/Time    Sodium 139 08/17/2022 11:37 AM    Potassium 4.6 08/17/2022 11:37 AM    Chloride 99 08/17/2022 11:37 AM    CO2 23 08/17/2022 11:37 AM    Glucose 92 08/17/2022 11:37 AM    BUN 14 08/17/2022 11:37 AM    Creatinine 0.65 08/17/2022 11:37 AM    GFR est AA 88 01/19/2022 12:58 PM    GFR est non-AA 76 01/19/2022 12:58 PM    Calcium 9.8 08/17/2022 11:37 AM    Glucose (POC) 90 09/07/2022 08:39 AM    Creatinine (POC) 0.60 07/21/2022 12:34 PM     Lab Results   Component Value Date/Time    Bilirubin, total 0.9 08/17/2022 11:37 AM    ALT (SGPT) 38 (H) 08/17/2022 11:37 AM    Alk.  phosphatase 139 (H) 08/17/2022 11:37 AM    Protein, total 7.2 08/17/2022 11:37 AM    Albumin 5.0 (H) 08/17/2022 11:37 AM    Globulin 3.7 08/30/2021 11:00 AM     CEA:  Recent Labs     08/17/22  1137 04/20/22  1135 01/19/22  1258   [id number] 1.7 1.6 1.7                  9/14/2020   Addendum Diagnosis   1. Sigmoid Mass, Biopsy:     Infiltrating adenocarcinoma, most consistent with colon (See comment)   Addendum Comment   Immunohistochemical stains reveal the following staining pattern:   CK7: Scattered cells with cytoplasmic membrane staining   CK20: Positive cytoplasmic staining in normal colonic mucosal epithelial cells and malignant epithelial cells   CDX-2: Diffuse strong nuclear decoration and all tumor cells and normal background colonic epithelial cells   PAX-8: Negative   Estrogen Receptor: Negative   Positive and negative controls stain appropriately. Due to radiologic findings suggesting the possibility of GYN involvement, ER and PAX-8 are performed, which lend support to the diagnosis of a colonic primary, over a tumor of müllerian origin. 9/17/2020   FINAL PATHOLOGIC DIAGNOSIS   1. Sigmoid colon and proximal rectum, uterus, left fallopian tube and ovary and right ureteral segment, low anterior resection:   Sigmoid colon and proximal rectum:  Invasive adenocarcinoma (see synoptic report and comment). Metastatic adenocarcinoma in one of sixteen lymph nodes (1/16). Uterus: Invasive adenocarcinoma extending from adhesed colon into uterine serosa. Endometrial lining shows mucosal atrophy. Left ovary: Benign ovary with serosal inclusion cysts. Left fallopian tube: Benign fallopian tube. Right ureteral segment: Benign segment of ureter densely adhesed to colon. Nodule near right uterine cornu:  Nodular portion of benign smooth muscle consistent with leiomyoma with parenchymal hemorrhage.    COLON AND RECTUM: Resection   SPECIMEN   Procedure: Low anterior resection   TUMOR   Tumor Site: Sigmoid colon Histologic Type: Adenocarcinoma   Histologic Grade: G2: Moderately differentiated   Tumor Size: 8.0 cm   Tumor Extension: Tumor directly invades adjacent structures:   Posterior uterine serosa   Macroscopic Tumor Perforation: Tumor invades through serosa into surrounding soft tissue   Lymphovascular Invasion: Present   Perineural Invasion: Not identified   Treatment Effect: No known presurgical therapy   MARGINS   Margins: All margins are uninvolved by invasive carcinoma   Margins Examined: Proximal, Distal, Radial (circumferential) or   Mesenteric   Distance of Tumor from Radial (circumferential) Margin: See Comment   LYMPH NODES   Number of Lymph Nodes Involved: 1   Number of Lymph Nodes Examined: 16   Tumor Deposits: Not identified   PATHOLOGIC STAGE CLASSIFICATION (pTNM, AJCC 8th Edition)   Primary Tumor (pT): pT4b   Regional Lymph Nodes (pN): pN1a   2. Distal rectal margin:   Segment of benign large bowel. 3. Anastomotic doughnuts:   Two annular portions of benign large bowel. Comment   Tumor invades into the adherent soft tissue and to within 1.1 cm of the apparent right pelvic sidewall margin. Records reviewed and summarized above. Pathology report(s) reviewed above. Radiology report(s) reviewed above. CT 4/20/2022  IMPRESSION     1. New 6 mm subpleural left apical pulmonary nodule with additional smaller  left upper lobe pulmonary nodules with possible tree-in-bud configuration may  reflect infectious or inflammatory process. Consider short interval follow-up  chest CT in 3-6 months. 2.  6 mm lower lobe pulmonary nodules appear stable. 3.  No other evidence for metastatic disease in the chest, abdomen, or pelvis. CT 7/2022  IMPRESSION     1. There is increased number and size of nodules in the apex of the left lung,  now measuring up to 10 mm in size, significance of the apical nodularity is  uncertain.  Consider short-term imaging follow-up or PET CT.  2. Other findings are stable, including 6 to 7 mm right lower lobe pulmonary  nodule. PET CT 9/7/2022      IMPRESSION  One of three left upper lobe nodules is likely neoplastic. Otherwise  negative PET. Assessment:   1) Sigmoid Colon Adenocarcinoma - Stage IIIB- ERICKA  [id number]  TEMPUS- CHEK1 (45%), TMB 11, SHANNAN and BRAF wild type  Surgical resection on 9/17 with creation of loop ileostomy, total abdominal hysterectomy and left salpingo-oophorectomy, distal right ureterectomy  She had a revision of her ileostomy 10/11/2020 then needed stoma closure and anastomosis on 10/27/2020  Completed 12 cycles of adjuvant FOLFOX on 4/21/21 which she tolerated well with grade 1-2 neuropathy. Ct 7/2022 with some increase in apical nodularity. PET CT with atleast 1 suspicious nodule   Bx non dx but remains suspicious for primary lung vs metastatic lesion  Reviewed in tumor board and a wedge resection was recommended        2) Elevated transaminases  US of abd was obtained and shows possible liver disease  Hep panel negative   Following with liver institute   These had improved    3) Neuropathy  Continues , hope to see improvement off chemotherapy   Grade 1 -2 , not painful     4) Thrombocytopenia  Resolved     5) Pulmonary nodules  Several most stable  See no 1    Plan:       Refer to [facility] thoracic surgery Dr. Victor Hugo Kohli - will review with her next visit- may need genetic testing  RTC 6 weeks       I appreciate the opportunity to participate in [facility]. Signed By: Dulce Severino MD      The patient was evaluated through a synchronous (real-time) audio-video encounter. The patient (or guardian if applicable) is aware that this is a billable service, which includes applicable co-pays. This Virtual Visit was conducted with patient's (and/or legal guardian's) consent.  The visit was conducted pursuant to the emergency declaration under the [facility] [place], [address] waiver authority and the Coronavirus Preparedness and Response Supplemental Appropriations Act. Patient identification was verified, and a caregiver was present when appropriate. The patient was located in a state where the provider was licensed to provide care.

## 2022-10-06 NOTE — TELEPHONE ENCOUNTER
[facility] call from [facility] Department stated they got the paperwork - [facility] Department stated they can't make appt until they been register in [employer]- please call [phone] with all Demographic and Insurance information.

## 2022-10-07 NOTE — TELEPHONE ENCOUNTER
[address] Katia Aragon pt HIPAA verified. Made pt aware [facility]  Thoracic  Department has received referral and all needed documentation. Pt will need to call and register with [address] in order to be scheduled. Pt given number to [facility] registration [phone]. Pt voiced understanding and has no questions or concerns at this time.

## 2022-10-18 ENCOUNTER — DOCUMENTATION ONLY (OUTPATIENT)
Dept: ONCOLOGY | Age: 72
End: 2022-10-18

## 2022-10-18 DIAGNOSIS — C18.2 MALIGNANT NEOPLASM OF ASCENDING COLON (HCC): Primary | ICD-10-CM

## 2022-10-18 NOTE — PROGRESS NOTES
1025:    Called patient and confirmed x2 identifiers   Informed patient of the following per MD Jayson request:    Talked to Dr. Brian Bachelor that lesions have gotten smaller  Recommended that we repeat a CT in 3 months     Patient verbalized understanding   No new questions or concerns at this time

## 2022-10-18 NOTE — PROGRESS NOTES
Talked to Dr. Adam Ortega that lesions have gotten smaller  Recommended that we repeat a CT in 3 months    Lorena please order CT chest abdomen and pelvis in 3 months and have her see me   Elisha please let patient know the plan

## 2023-01-04 ENCOUNTER — HOSPITAL ENCOUNTER (OUTPATIENT)
Dept: CT IMAGING | Age: 73
Discharge: HOME | End: 2023-01-04
Attending: REGISTERED NURSE
Payer: MEDICARE

## 2023-01-04 DIAGNOSIS — C18.2 MALIGNANT NEOPLASM OF ASCENDING COLON (HCC): ICD-10-CM

## 2023-01-04 LAB — CREAT BLD-MCNC: 0.9 MG/DL (ref 0.6–1.3)

## 2023-01-04 PROCEDURE — 71260 CT THORAX DX C+: CPT

## 2023-01-04 PROCEDURE — 74011000636 HC RX REV CODE- 636: Performed by: REGISTERED NURSE

## 2023-01-04 PROCEDURE — 82565 ASSAY OF CREATININE: CPT

## 2023-01-04 PROCEDURE — 74177 CT ABD & PELVIS W/CONTRAST: CPT

## 2023-01-04 RX ADMIN — IOPAMIDOL 100 ML: 755 INJECTION, SOLUTION INTRAVENOUS at 08:32

## 2023-01-09 ENCOUNTER — TRANSCRIBE ORDER (OUTPATIENT)
Dept: SCHEDULING | Age: 73
End: 2023-01-09

## 2023-01-09 DIAGNOSIS — M81.0 AGE-RELATED OSTEOPOROSIS WITHOUT CURRENT PATHOLOGICAL FRACTURE: Primary | ICD-10-CM

## 2023-01-17 ENCOUNTER — OFFICE VISIT (OUTPATIENT)
Dept: ONCOLOGY | Age: 73
End: 2023-01-17
Payer: MEDICARE

## 2023-01-17 ENCOUNTER — OFFICE VISIT (OUTPATIENT)
Dept: HEMATOLOGY | Age: 73
End: 2023-01-17
Payer: MEDICARE

## 2023-01-17 VITALS
HEART RATE: 96 BPM | RESPIRATION RATE: 18 BRPM | WEIGHT: 132 LBS | OXYGEN SATURATION: 95 % | BODY MASS INDEX: 20.67 KG/M2 | SYSTOLIC BLOOD PRESSURE: 116 MMHG | TEMPERATURE: 98.4 F | DIASTOLIC BLOOD PRESSURE: 71 MMHG

## 2023-01-17 VITALS
TEMPERATURE: 97.1 F | HEART RATE: 82 BPM | DIASTOLIC BLOOD PRESSURE: 71 MMHG | SYSTOLIC BLOOD PRESSURE: 124 MMHG | WEIGHT: 132 LBS | BODY MASS INDEX: 20.72 KG/M2 | HEIGHT: 67 IN

## 2023-01-17 DIAGNOSIS — R74.8 ELEVATED LIVER ENZYMES: Primary | ICD-10-CM

## 2023-01-17 DIAGNOSIS — C18.7 MALIGNANT NEOPLASM OF SIGMOID COLON (HCC): ICD-10-CM

## 2023-01-17 DIAGNOSIS — C18.7 MALIGNANT NEOPLASM OF SIGMOID COLON (HCC): Primary | ICD-10-CM

## 2023-01-17 PROCEDURE — G8427 DOCREV CUR MEDS BY ELIG CLIN: HCPCS | Performed by: NURSE PRACTITIONER

## 2023-01-17 PROCEDURE — 1123F ACP DISCUSS/DSCN MKR DOCD: CPT | Performed by: NURSE PRACTITIONER

## 2023-01-17 PROCEDURE — G8432 DEP SCR NOT DOC, RNG: HCPCS | Performed by: NURSE PRACTITIONER

## 2023-01-17 PROCEDURE — 99215 OFFICE O/P EST HI 40 MIN: CPT | Performed by: INTERNAL MEDICINE

## 2023-01-17 PROCEDURE — G8400 PT W/DXA NO RESULTS DOC: HCPCS | Performed by: INTERNAL MEDICINE

## 2023-01-17 PROCEDURE — G0463 HOSPITAL OUTPT CLINIC VISIT: HCPCS | Performed by: INTERNAL MEDICINE

## 2023-01-17 PROCEDURE — G9711 PT HX TOT COL OR COLON CA: HCPCS | Performed by: INTERNAL MEDICINE

## 2023-01-17 PROCEDURE — 1123F ACP DISCUSS/DSCN MKR DOCD: CPT | Performed by: INTERNAL MEDICINE

## 2023-01-17 PROCEDURE — G8536 NO DOC ELDER MAL SCRN: HCPCS | Performed by: NURSE PRACTITIONER

## 2023-01-17 PROCEDURE — G8400 PT W/DXA NO RESULTS DOC: HCPCS | Performed by: NURSE PRACTITIONER

## 2023-01-17 PROCEDURE — G8420 CALC BMI NORM PARAMETERS: HCPCS | Performed by: INTERNAL MEDICINE

## 2023-01-17 PROCEDURE — 1090F PRES/ABSN URINE INCON ASSESS: CPT | Performed by: INTERNAL MEDICINE

## 2023-01-17 PROCEDURE — G8420 CALC BMI NORM PARAMETERS: HCPCS | Performed by: NURSE PRACTITIONER

## 2023-01-17 PROCEDURE — 1101F PT FALLS ASSESS-DOCD LE1/YR: CPT | Performed by: NURSE PRACTITIONER

## 2023-01-17 PROCEDURE — G0463 HOSPITAL OUTPT CLINIC VISIT: HCPCS | Performed by: NURSE PRACTITIONER

## 2023-01-17 PROCEDURE — 1090F PRES/ABSN URINE INCON ASSESS: CPT | Performed by: NURSE PRACTITIONER

## 2023-01-17 PROCEDURE — G9711 PT HX TOT COL OR COLON CA: HCPCS | Performed by: NURSE PRACTITIONER

## 2023-01-17 PROCEDURE — 99214 OFFICE O/P EST MOD 30 MIN: CPT | Performed by: NURSE PRACTITIONER

## 2023-01-17 PROCEDURE — G8427 DOCREV CUR MEDS BY ELIG CLIN: HCPCS | Performed by: INTERNAL MEDICINE

## 2023-01-17 PROCEDURE — G8536 NO DOC ELDER MAL SCRN: HCPCS | Performed by: INTERNAL MEDICINE

## 2023-01-17 PROCEDURE — 1101F PT FALLS ASSESS-DOCD LE1/YR: CPT | Performed by: INTERNAL MEDICINE

## 2023-01-17 PROCEDURE — G8432 DEP SCR NOT DOC, RNG: HCPCS | Performed by: INTERNAL MEDICINE

## 2023-01-17 PROCEDURE — 91200 LIVER ELASTOGRAPHY: CPT | Performed by: NURSE PRACTITIONER

## 2023-01-17 RX ORDER — SULFAMETHOXAZOLE AND TRIMETHOPRIM 800; 160 MG/1; MG/1
1 TABLET ORAL 2 TIMES DAILY
COMMUNITY

## 2023-01-17 RX ORDER — GREEN TEA LEAF EXTRACT 250 MG
CAPSULE ORAL
COMMUNITY

## 2023-01-17 NOTE — PROGRESS NOTES
[facility] [place] [address] [address], [address]  W: [phone]  F: [phone]    Reason for Visit:   Yadiel Shields is a 71 y.o. female who is seen for stage III colon cancer. Seen by synchronous (real-time) audio-video technology on 1/17/2023 for follow up    Treatment History:   9/10/2020 CT A/P - large, irregular pelvic mass measuring approximately 9cm likely representing neoplasm arising from the sigmoid colon, small amount of pelvic free fluid, colonic bowel wall thickening. Borderline enlarged retroperitoneal lymph nodes. 6 mm right lower lobe lung nodule. Mass effect from pelvic mass causing moderate right and minimal left hydronephrosis. 9/14/2020: Colonoscopy - A large circumferential, ulcerated fungating mass was noted in proximal sigmoid colon. Mass was obstructing the lumen and could not be traversed. Arno Chung was present. 9/15/2020: CT Chest - 7.5mm left lower lobe pulmonary nodule, small right pleural effusion, mild right basilar atelectasis  9/17/2020: Surgical resection - low anterior resection, mobilization of the splenic flexure, coloproctostomy, creation of loop ileostomy, total abdominal hysterectomy and left salpingo-oophorectomy, distal right ureterectomy, right ureteral re-implant with psoas hitch and placement of right ureteral stent  11/17/20: cycle 1 FOLFOX  2/17/21 CT CAP: pulmonary nodule stable, ROBE   5/11/21 CT CAP: unchanged pulmonary nodule, ROBE  4/2022-CT chest abdomen pelvis: Stable.   Fluctuating lung nodules  7/2022: CT with increase in number and size of lung nodules - too small to bx  9/2022: PET with 1 FDG avid lung nodule- bx was non diagnostic- seen by Dr. Ann Sue Thoracic, observation recommended  1/2023: CT - dominant L lung nodule decreased     History of Present Illness:   Patient is a 71 y.o. female seen for colon cancer    She presented to the ED on 9/11/2020 with complaints of right flank/back pain and RLQ abdominal pain. She states she has had several months of gas pain and \"blockages. \" She reports a weight loss of 12 lbs in the last 3 months. CT abd/pelv 9/10/2020 showed large, irregular pelvic mass measuring approximately 9cm likely representing neoplasm arising from the sigmoid colon. She also had hydronephrosis from the mass effect and urethral stent placed. CEA 7.4 on 9/11and colonoscopy performed 9/14 with biopsy + adenocarcinoma. CT Chest performed showing 7.5mm left lower lobe pulmonary nodule. Mass found to be invading into the uterus and right ureter. Surgical resection performed on 9/17/2020 including low anterior resection, mobilization of the splenic flexure, coloproctostomy, creation of loop ileostomy, total abdominal hysterectomy and left salpingo-oophorectomy, distal right ureterectomy, right ureteral re-implant with psoas hitch and placement of right ureteral stent. She was to see me in clinic but was admitted 10/10/2020 with ileostomy prolapse. Had a revision of loop ileostomy 10/11/2020. Then needed ileostomy closure and anastomosis 10/27/2020. She completed 12 cycles of FOLFOX on 4/21/21. On surveillance. PET with a suspicious lung nodule, had a bx and that was non dx and comes with follow up CT   She is well. She has a UTI  She is on Bactrim  No cough, no fevers, no bleeding. She had a colonoscopy in 11/2021 and has another one in 2 years. Family Hx:  Mother with hx of pancreatic cancer    Past Medical History:   Diagnosis Date    Colon cancer ([facility] [address]     GERD (gastroesophageal reflux disease)     Ileostomy in place [facility])     Ileostomy prolapse ([facility] [address]     Ill-defined condition     Spaulding's esophagus    Ventral hernia without obstruction or gangrene 9/28/2021      Past Surgical History:   Procedure Laterality Date    COLONOSCOPY Left 9/14/2020    COLONOSCOPY performed by Tara Guajardo [facility]; incomplete secondary to partial obstruction.     COLONOSCOPY N/A 11/9/2021 COLONOSCOPY   :- performed by Mariely Luu MD at [facility] ENDOSCOPY    HX APPENDECTOMY  age 17    HX COLONOSCOPY  circa 2010    No neoplasms. HX ENDOSCOPY  03/13/2017    Dr. Vick Oneill ENDOSCOPY  02/13/2020    Dr. Vick Oneill GYN      Left oopherectomy for treatment of benign mass. HX GYN  09/17/2020    Total abdominal hysterectomy and left salpingo-oophorectomy; Suleman Lou MD.    HX OTHER SURGICAL  09/17/2020    Low anterior resection, mobilization of the splenic flexure, coloproctostomy, and creation of loop ileostomy; Dr. Romario An. HX OTHER SURGICAL  10/11/2020    Revision of loop ileostomy (resection and creation of divided loop ileostomy); Dr. Romario An.  UROLOGICAL  09/12/2020    Cystoscopy and placement of a right ureteral stent; Roxy Saavedra III, MD.     UROLOGICAL  09/17/2020    Cystoscopy and placement of a temporary left ureteral catheter; Roxy Saavedra III, MD.    [facility] UROLOGICAL  09/17/2020    Distal right ureterectomy; Roxy Saavedra III, MD.     UROLOGICAL  09/17/2020    Right ureteral re-implantation with psoas hitch and placement of a right ureteral stent; Branden Liao MD.    IR INSERT TUNL CVAD W PORT LESS THAN 5 YR  10/14/2020    Right chest Port-a-Cath placement. IR REMOVE TUNL CVAD W PORT/PUMP  8/19/2021      Social History     Tobacco Use    Smoking status: Never    Smokeless tobacco: Never   Substance Use Topics    Alcohol use: Not Currently     Alcohol/week: 1.0 standard drink     Types: 1 Glasses of wine per week      Family History   Problem Relation Age of Onset    Cancer Mother     Heart Disease Father     Diabetes Brother      Current Outpatient Medications   Medication Sig    trimethoprim-sulfamethoxazole (BACTRIM DS, SEPTRA DS) 160-800 mg per tablet Take 1 Tablet by mouth two (2) times a day.     COQ10, LIPOSOMAL UBIQUINOL, PO Take  by mouth.    pine bark/coQ10/vit A/herb 155 (PYCNOGENOL COMPLEX PO) Take  by mouth.    green tea leaf extract (Green Tea) 250 mg cap Take  by mouth. Ca-D3-mag-zinc--chi-boron (Caltrate 600-D Plus Minerals) 600 mg calcium- 800 unit-40 mg chew Take  by mouth.    calcium-vitamin D3-vitamin K 650 mg-12.5 mcg-40 mcg chew Take  by mouth daily. lansoprazole (PREVACID) 30 mg capsule Take 30 mg by mouth Daily (before breakfast). multivitamin (ONE A DAY) tablet Take 1 Tablet by mouth daily. ascorbic acid, vitamin C, (VITAMIN C) 500 mg tablet Take 1,000 mg by mouth daily. cholecalciferol (VITAMIN D3) 25 mcg (1,000 unit) cap Take 1,000 Units by mouth daily. Omega-3 Fatty Acids 500 mg cap Take  by mouth. No current facility-administered medications for this visit. No Known Allergies     Review of Systems: A complete review of systems was obtained, negative except as described above. Physical Exam:       ECOG PS: 1  General: No distress but appears frail  Eyes: PERRL, anicteric sclerae  HENT: Atraumatic  RESP: normal respiratory effort  MS: Digits without clubbing or cyanosis. Results:     Lab Results   Component Value Date/Time    WBC 4.4 09/27/2022 10:56 AM    HGB 13.2 09/27/2022 10:56 AM    HCT 40.3 09/27/2022 10:56 AM    PLATELET 845 06/11/5072 10:56 AM    MCV 87 09/27/2022 10:56 AM    ABS. NEUTROPHILS 2.4 09/27/2022 10:56 AM    Hemoglobin (POC) 12.0 09/17/2020 06:35 PM     Lab Results   Component Value Date/Time    Sodium 139 08/17/2022 11:37 AM    Potassium 4.6 08/17/2022 11:37 AM    Chloride 99 08/17/2022 11:37 AM    CO2 23 08/17/2022 11:37 AM    Glucose 92 08/17/2022 11:37 AM    BUN 14 08/17/2022 11:37 AM    Creatinine 0.65 08/17/2022 11:37 AM    GFR est AA 88 01/19/2022 12:58 PM    GFR est non-AA 76 01/19/2022 12:58 PM    Calcium 9.8 08/17/2022 11:37 AM    Glucose (POC) 90 09/07/2022 08:39 AM    Creatinine (POC) 0.90 01/04/2023 08:15 AM     Lab Results   Component Value Date/Time    Bilirubin, total 0.9 08/17/2022 11:37 AM    ALT (SGPT) 38 (H) 08/17/2022 11:37 AM    Alk.  phosphatase 139 (H) 08/17/2022 11:37 AM    Protein, total 7.2 08/17/2022 11:37 AM    Albumin 5.0 (H) 08/17/2022 11:37 AM    Globulin 3.7 08/30/2021 11:00 AM     CEA:  Recent Labs     08/17/22  1137 04/20/22  1135   [id number] 1.7 1.6        External labs 12/2022 reviewed           9/14/2020   Addendum Diagnosis   1. Sigmoid Mass, Biopsy:     Infiltrating adenocarcinoma, most consistent with colon (See comment)   Addendum Comment   Immunohistochemical stains reveal the following staining pattern:   CK7: Scattered cells with cytoplasmic membrane staining   CK20: Positive cytoplasmic staining in normal colonic mucosal epithelial cells and malignant epithelial cells   CDX-2: Diffuse strong nuclear decoration and all tumor cells and normal background colonic epithelial cells   PAX-8: Negative   Estrogen Receptor: Negative   Positive and negative controls stain appropriately. Due to radiologic findings suggesting the possibility of GYN involvement, ER and PAX-8 are performed, which lend support to the diagnosis of a colonic primary, over a tumor of müllerian origin. 9/17/2020   FINAL PATHOLOGIC DIAGNOSIS   1. Sigmoid colon and proximal rectum, uterus, left fallopian tube and ovary and right ureteral segment, low anterior resection:   Sigmoid colon and proximal rectum:  Invasive adenocarcinoma (see synoptic report and comment). Metastatic adenocarcinoma in one of sixteen lymph nodes (1/16). Uterus: Invasive adenocarcinoma extending from adhesed colon into uterine serosa. Endometrial lining shows mucosal atrophy. Left ovary: Benign ovary with serosal inclusion cysts. Left fallopian tube: Benign fallopian tube. Right ureteral segment: Benign segment of ureter densely adhesed to colon. Nodule near right uterine cornu:  Nodular portion of benign smooth muscle consistent with leiomyoma with parenchymal hemorrhage.    COLON AND RECTUM: Resection   SPECIMEN   Procedure: Low anterior resection   TUMOR   Tumor Site: Sigmoid colon Histologic Type: Adenocarcinoma   Histologic Grade: G2: Moderately differentiated   Tumor Size: 8.0 cm   Tumor Extension: Tumor directly invades adjacent structures:   Posterior uterine serosa   Macroscopic Tumor Perforation: Tumor invades through serosa into surrounding soft tissue   Lymphovascular Invasion: Present   Perineural Invasion: Not identified   Treatment Effect: No known presurgical therapy   MARGINS   Margins: All margins are uninvolved by invasive carcinoma   Margins Examined: Proximal, Distal, Radial (circumferential) or   Mesenteric   Distance of Tumor from Radial (circumferential) Margin: See Comment   LYMPH NODES   Number of Lymph Nodes Involved: 1   Number of Lymph Nodes Examined: 16   Tumor Deposits: Not identified   PATHOLOGIC STAGE CLASSIFICATION (pTNM, AJCC 8th Edition)   Primary Tumor (pT): pT4b   Regional Lymph Nodes (pN): pN1a   2. Distal rectal margin:   Segment of benign large bowel. 3. Anastomotic doughnuts:   Two annular portions of benign large bowel. Comment   Tumor invades into the adherent soft tissue and to within 1.1 cm of the apparent right pelvic sidewall margin. Records reviewed and summarized above. Pathology report(s) reviewed above. Radiology report(s) reviewed above. CT 4/20/2022  IMPRESSION     1. New 6 mm subpleural left apical pulmonary nodule with additional smaller  left upper lobe pulmonary nodules with possible tree-in-bud configuration may  reflect infectious or inflammatory process. Consider short interval follow-up  chest CT in 3-6 months. 2.  6 mm lower lobe pulmonary nodules appear stable. 3.  No other evidence for metastatic disease in the chest, abdomen, or pelvis. CT 7/2022  IMPRESSION     1. There is increased number and size of nodules in the apex of the left lung,  now measuring up to 10 mm in size, significance of the apical nodularity is  uncertain.  Consider short-term imaging follow-up or PET CT.  2. Other findings are stable, including 6 to 7 mm right lower lobe pulmonary  nodule. PET CT 9/7/2022      IMPRESSION  One of three left upper lobe nodules is likely neoplastic. Otherwise  negative PET. CT chest 1/4/2023    IMPRESSION  1. Dominant left lung apex nodularity has significantly decreased in size in  the interval. However, smaller nodules in the left lung apex more posteriorly  near the fissure have slightly increased in size in the interval. Recommend  continued interval follow-up. 2.  Additional smaller nodules are not significantly changed. 3.  No intra-abdominal disease. Assessment:   1) Sigmoid Colon Adenocarcinoma - Stage IIIB- ERICKA  [id number]  TEMPUS- CHEK1 (45%), TMB 11, SHANNAN and BRAF wild type  Surgical resection on 9/17 with creation of loop ileostomy, total abdominal hysterectomy and left salpingo-oophorectomy, distal right ureterectomy  She had a revision of her ileostomy 10/11/2020 then needed stoma closure and anastomosis on 10/27/2020  Completed 12 cycles of adjuvant FOLFOX on 4/21/21 which she tolerated well with grade 1-2 neuropathy. Ct 7/2022 with some increase in apical nodularity.    PET CT with atleast 1 suspicious nodule   Bx non dx but remains suspicious for primary lung vs metastatic lesion    Reviewed in tumor board and a wedge resection was recommended  Dr. Ozuna Labs thoracic recommended follow up  She had a CT 1/2023 which was reviewed personally and reassuring as the L lung 1 cm nodule has decreased to 4 mm , other nodules fluctuated a bit but this is most consistent with inflammation  Will continues surveillance     She is 2.5 years from Dx         2) Elevated transaminases  US of abd was obtained and shows possible liver disease  Hep panel negative   [facility] with [facility]   These had improved, alk phos has stabilized at 146    3) Neuropathy  Continues , hope to see improvement off chemotherapy   Grade 1 -2 , not painful     4) Thrombocytopenia  Resolved     5) Pulmonary nodules  Several most stable  See no 1    6) Osteoporosis    7) Abnormal TEMPUS  She needs genetic testing   Quite a bit of somatic CHEK2 amp      Plan:       RTC in 4 months cbc, cmp, cea, CT then move to biannual scans   Refer to genetic testing   RTC 4 months       I appreciate the opportunity to participate in Ms. [facility].     Signed By: Socorro Stephens MD

## 2023-01-17 NOTE — PROGRESS NOTES
Identified pt with two pt identifiers(name and ). Reviewed record in preparation for visit and have obtained necessary documentation. Chief Complaint   Patient presents with    Follow-up      Vitals:    23 1150   BP: 124/71   Pulse: 82   Temp: 97.1 °F (36.2 °C)   TempSrc: Temporal   Weight: 132 lb (59.9 kg)   Height: 5' 7\" (1.702 m)   PainSc:   0 - No pain       Health Maintenance Review: Patient reminded of \"due or due soon\" health maintenance. I have asked the patient to contact his/her primary care provider (PCP) for follow-up on his/her health maintenance. Coordination of Care Questionnaire:  :   1) Have you been to an emergency room, urgent care, or hospitalized since your last visit? If yes, where when, and reason for visit? no       2. Have seen or consulted any other health care provider since your last visit? If yes, where when, and reason for visit? NO      Patient is accompanied by self I have received verbal consent from Krystal Arreaga to discuss any/all medical information while they are present in the room.

## 2023-01-17 NOTE — PROGRESS NOTES
Samara Max is a 71 y.o. female    Chief Complaint   Patient presents with    Follow-up      stage III colon cancer. 1. Have you been to the ER, urgent care clinic since your last visit? Hospitalized since your last visit? No    2. Have you seen or consulted any other health care providers outside of the [address] [place] [address] since your last visit? Include any pap smears or colon screening.  Yes, PCP, BIB

## 2023-04-22 ENCOUNTER — TRANSCRIBE ORDERS (OUTPATIENT)
Facility: HOSPITAL | Age: 73
End: 2023-04-22

## 2023-04-22 DIAGNOSIS — C18.7 MALIGNANT NEOPLASM OF SIGMOID COLON (HCC): Primary | ICD-10-CM

## 2023-04-22 DIAGNOSIS — M81.0 AGE-RELATED OSTEOPOROSIS WITHOUT CURRENT PATHOLOGICAL FRACTURE: Primary | ICD-10-CM

## 2023-04-26 ENCOUNTER — TELEPHONE (OUTPATIENT)
Dept: ONCOLOGY | Age: 73
End: 2023-04-26

## 2023-04-30 DIAGNOSIS — C18.7 MALIGNANT NEOPLASM OF SIGMOID COLON (HCC): Primary | ICD-10-CM

## 2023-05-18 ENCOUNTER — HOSPITAL ENCOUNTER (OUTPATIENT)
Facility: HOSPITAL | Age: 73
Discharge: HOME | End: 2023-05-18
Payer: MEDICARE

## 2023-05-18 DIAGNOSIS — C18.7 MALIGNANT NEOPLASM OF SIGMOID COLON (HCC): ICD-10-CM

## 2023-05-18 PROCEDURE — 71260 CT THORAX DX C+: CPT

## 2023-05-18 PROCEDURE — 6360000004 HC RX CONTRAST MEDICATION: Performed by: STUDENT IN AN ORGANIZED HEALTH CARE EDUCATION/TRAINING PROGRAM

## 2023-05-18 RX ADMIN — IOPAMIDOL 100 ML: 755 INJECTION, SOLUTION INTRAVENOUS at 09:27

## 2023-05-26 ENCOUNTER — OFFICE VISIT (OUTPATIENT)
Age: 73
End: 2023-05-26
Payer: MEDICARE

## 2023-05-26 VITALS
BODY MASS INDEX: 21.14 KG/M2 | TEMPERATURE: 97.6 F | OXYGEN SATURATION: 98 % | RESPIRATION RATE: 18 BRPM | HEART RATE: 92 BPM | SYSTOLIC BLOOD PRESSURE: 127 MMHG | WEIGHT: 135 LBS | DIASTOLIC BLOOD PRESSURE: 76 MMHG

## 2023-05-26 DIAGNOSIS — C18.7 MALIGNANT NEOPLASM OF SIGMOID COLON (HCC): Primary | ICD-10-CM

## 2023-05-26 DIAGNOSIS — C18.7 MALIGNANT NEOPLASM OF SIGMOID COLON (HCC): ICD-10-CM

## 2023-05-26 PROCEDURE — 1123F ACP DISCUSS/DSCN MKR DOCD: CPT | Performed by: INTERNAL MEDICINE

## 2023-05-26 PROCEDURE — 99214 OFFICE O/P EST MOD 30 MIN: CPT | Performed by: INTERNAL MEDICINE

## 2023-05-26 PROCEDURE — G8420 CALC BMI NORM PARAMETERS: HCPCS | Performed by: INTERNAL MEDICINE

## 2023-05-26 PROCEDURE — 1090F PRES/ABSN URINE INCON ASSESS: CPT | Performed by: INTERNAL MEDICINE

## 2023-05-26 PROCEDURE — G8428 CUR MEDS NOT DOCUMENT: HCPCS | Performed by: INTERNAL MEDICINE

## 2023-05-26 PROCEDURE — 4004F PT TOBACCO SCREEN RCVD TLK: CPT | Performed by: INTERNAL MEDICINE

## 2023-05-26 PROCEDURE — G8400 PT W/DXA NO RESULTS DOC: HCPCS | Performed by: INTERNAL MEDICINE

## 2023-05-26 PROCEDURE — 3017F COLORECTAL CA SCREEN DOC REV: CPT | Performed by: INTERNAL MEDICINE

## 2023-05-26 RX ORDER — CALCIUM CARBONATE 500(1250)
500 TABLET ORAL DAILY
COMMUNITY

## 2023-05-26 NOTE — PROGRESS NOTES
Deidra Kaur is a 68 y.o. female    Chief Complaint   Patient presents with    Follow-up     stage III colon cancer       1. Have you been to the ER, urgent care clinic since your last visit? Hospitalized since your last visit? No    2. Have you seen or consulted any other health care providers outside of the [address] [place] [address] since your last visit? Include any pap smears or colon screening.  Yes, Dr.Peyndayl Tamara PCP
transaminases   US of abd was obtained and shows possible liver disease   Hep panel negative    [facility] with [facility]    These had improved, alk phos has normalized       3) Neuropathy   Continues , hope to see improvement off chemotherapy    Grade 1 -2 , not painful       4) Thrombocytopenia   Resolved       5) Pulmonary nodules  See no 1  She will see Dr. Freddy Paredes again and may need a Pulmonary eval , could have MAC?     6) Osteoporosis      7) Abnormal TEMPUS   She needs genetic testing    Quite a bit of somatic CHEK2 amp            Plan:            RTC in 6 months cbc, cmp, cea, CT   See Dr. Freddy Paredes again    Refer to genetic testing     RTC 6 months       I appreciate the opportunity to participate in Ms. Debby Huffman 's care.      Denys Brian MD, [facility]

## 2023-06-27 ENCOUNTER — TELEPHONE (OUTPATIENT)
Age: 73
End: 2023-06-27

## 2023-07-18 ENCOUNTER — OFFICE VISIT (OUTPATIENT)
Age: 73
End: 2023-07-18
Payer: MEDICARE

## 2023-07-18 VITALS
WEIGHT: 135 LBS | TEMPERATURE: 97 F | HEIGHT: 67 IN | SYSTOLIC BLOOD PRESSURE: 134 MMHG | HEART RATE: 99 BPM | DIASTOLIC BLOOD PRESSURE: 72 MMHG | OXYGEN SATURATION: 97 % | BODY MASS INDEX: 21.19 KG/M2

## 2023-07-18 DIAGNOSIS — R74.8 ELEVATED LIVER ENZYMES: Primary | ICD-10-CM

## 2023-07-18 DIAGNOSIS — M85.89 OSTEOPENIA OF MULTIPLE SITES: ICD-10-CM

## 2023-07-18 PROCEDURE — 4004F PT TOBACCO SCREEN RCVD TLK: CPT | Performed by: NURSE PRACTITIONER

## 2023-07-18 PROCEDURE — G8400 PT W/DXA NO RESULTS DOC: HCPCS | Performed by: NURSE PRACTITIONER

## 2023-07-18 PROCEDURE — G8427 DOCREV CUR MEDS BY ELIG CLIN: HCPCS | Performed by: NURSE PRACTITIONER

## 2023-07-18 PROCEDURE — 3017F COLORECTAL CA SCREEN DOC REV: CPT | Performed by: NURSE PRACTITIONER

## 2023-07-18 PROCEDURE — G8420 CALC BMI NORM PARAMETERS: HCPCS | Performed by: NURSE PRACTITIONER

## 2023-07-18 PROCEDURE — 1090F PRES/ABSN URINE INCON ASSESS: CPT | Performed by: NURSE PRACTITIONER

## 2023-07-18 PROCEDURE — 99214 OFFICE O/P EST MOD 30 MIN: CPT | Performed by: NURSE PRACTITIONER

## 2023-07-18 PROCEDURE — 1123F ACP DISCUSS/DSCN MKR DOCD: CPT | Performed by: NURSE PRACTITIONER

## 2023-07-19 PROBLEM — E43 SEVERE PROTEIN-CALORIE MALNUTRITION (HCC): Status: RESOLVED | Noted: 2020-09-16 | Resolved: 2023-07-19

## 2023-08-30 NOTE — PROGRESS NOTES
Bedside and Verbal shift change report given to [address] Elizabeth (oncoming nurse) by Gabriel Valenzuela RN (offgoing nurse). Report included the following information SBAR, Kardex, Procedure Summary, Intake/Output and MAR.     0800- assessment complete, no complaints of pain, nausea, or any discomfort .     0830- followed up on Urology consult from yesterday    0840- labs drawn    1300- Anesthesia notified of consult for tomorrow Male

## 2023-11-21 ENCOUNTER — HOSPITAL ENCOUNTER (OUTPATIENT)
Facility: HOSPITAL | Age: 73
Setting detail: OUTPATIENT SURGERY
Discharge: HOME | End: 2023-11-21
Attending: SPECIALIST | Admitting: SPECIALIST
Payer: MEDICARE

## 2023-11-21 ENCOUNTER — ANESTHESIA (OUTPATIENT)
Facility: HOSPITAL | Age: 73
End: 2023-11-21
Payer: MEDICARE

## 2023-11-21 ENCOUNTER — ANESTHESIA EVENT (OUTPATIENT)
Facility: HOSPITAL | Age: 73
End: 2023-11-21
Payer: MEDICARE

## 2023-11-21 VITALS
HEART RATE: 95 BPM | WEIGHT: 138 LBS | OXYGEN SATURATION: 98 % | DIASTOLIC BLOOD PRESSURE: 92 MMHG | SYSTOLIC BLOOD PRESSURE: 135 MMHG | RESPIRATION RATE: 19 BRPM | HEIGHT: 67 IN | BODY MASS INDEX: 21.66 KG/M2 | TEMPERATURE: 32 F

## 2023-11-21 PROCEDURE — 2580000003 HC RX 258: Performed by: SPECIALIST

## 2023-11-21 PROCEDURE — 88305 TISSUE EXAM BY PATHOLOGIST: CPT

## 2023-11-21 PROCEDURE — 3700000000 HC ANESTHESIA ATTENDED CARE: Performed by: SPECIALIST

## 2023-11-21 PROCEDURE — 7100000011 HC PHASE II RECOVERY - ADDTL 15 MIN: Performed by: SPECIALIST

## 2023-11-21 PROCEDURE — 2500000003 HC RX 250 WO HCPCS: Performed by: NURSE ANESTHETIST, CERTIFIED REGISTERED

## 2023-11-21 PROCEDURE — 2709999900 HC NON-CHARGEABLE SUPPLY: Performed by: SPECIALIST

## 2023-11-21 PROCEDURE — 6360000002 HC RX W HCPCS: Performed by: NURSE ANESTHETIST, CERTIFIED REGISTERED

## 2023-11-21 PROCEDURE — 3600007502: Performed by: SPECIALIST

## 2023-11-21 PROCEDURE — 3700000001 HC ADD 15 MINUTES (ANESTHESIA): Performed by: SPECIALIST

## 2023-11-21 PROCEDURE — 3600007512: Performed by: SPECIALIST

## 2023-11-21 PROCEDURE — 7100000010 HC PHASE II RECOVERY - FIRST 15 MIN: Performed by: SPECIALIST

## 2023-11-21 RX ORDER — SODIUM CHLORIDE 0.9 % (FLUSH) 0.9 %
5-40 SYRINGE (ML) INJECTION PRN
Status: DISCONTINUED | OUTPATIENT
Start: 2023-11-21 | End: 2023-11-21 | Stop reason: HOSPADM

## 2023-11-21 RX ORDER — LIDOCAINE HYDROCHLORIDE 20 MG/ML
INJECTION, SOLUTION EPIDURAL; INFILTRATION; INTRACAUDAL; PERINEURAL PRN
Status: DISCONTINUED | OUTPATIENT
Start: 2023-11-21 | End: 2023-11-21 | Stop reason: SDUPTHER

## 2023-11-21 RX ORDER — SODIUM CHLORIDE 9 MG/ML
INJECTION, SOLUTION INTRAVENOUS CONTINUOUS PRN
Status: COMPLETED | OUTPATIENT
Start: 2023-11-21 | End: 2023-11-21

## 2023-11-21 RX ORDER — SODIUM CHLORIDE 0.9 % (FLUSH) 0.9 %
5-40 SYRINGE (ML) INJECTION EVERY 12 HOURS SCHEDULED
Status: DISCONTINUED | OUTPATIENT
Start: 2023-11-21 | End: 2023-11-21 | Stop reason: HOSPADM

## 2023-11-21 RX ORDER — SODIUM CHLORIDE 9 MG/ML
25 INJECTION, SOLUTION INTRAVENOUS PRN
Status: DISCONTINUED | OUTPATIENT
Start: 2023-11-21 | End: 2023-11-21 | Stop reason: HOSPADM

## 2023-11-21 RX ORDER — SODIUM CHLORIDE 9 MG/ML
INJECTION, SOLUTION INTRAVENOUS CONTINUOUS
Status: DISCONTINUED | OUTPATIENT
Start: 2023-11-21 | End: 2023-11-21 | Stop reason: HOSPADM

## 2023-11-21 RX ADMIN — PROPOFOL 25 MG: 10 INJECTION, EMULSION INTRAVENOUS at 14:04

## 2023-11-21 RX ADMIN — PROPOFOL 25 MG: 10 INJECTION, EMULSION INTRAVENOUS at 13:53

## 2023-11-21 RX ADMIN — PROPOFOL 25 MG: 10 INJECTION, EMULSION INTRAVENOUS at 14:12

## 2023-11-21 RX ADMIN — PROPOFOL 25 MG: 10 INJECTION, EMULSION INTRAVENOUS at 14:10

## 2023-11-21 RX ADMIN — PROPOFOL 25 MG: 10 INJECTION, EMULSION INTRAVENOUS at 13:55

## 2023-11-21 RX ADMIN — PROPOFOL 25 MG: 10 INJECTION, EMULSION INTRAVENOUS at 13:47

## 2023-11-21 RX ADMIN — PROPOFOL 25 MG: 10 INJECTION, EMULSION INTRAVENOUS at 13:51

## 2023-11-21 RX ADMIN — PROPOFOL 50 MG: 10 INJECTION, EMULSION INTRAVENOUS at 13:46

## 2023-11-21 RX ADMIN — PROPOFOL 25 MG: 10 INJECTION, EMULSION INTRAVENOUS at 13:57

## 2023-11-21 RX ADMIN — PROPOFOL 25 MG: 10 INJECTION, EMULSION INTRAVENOUS at 14:07

## 2023-11-21 RX ADMIN — PROPOFOL 25 MG: 10 INJECTION, EMULSION INTRAVENOUS at 13:49

## 2023-11-21 RX ADMIN — LIDOCAINE HYDROCHLORIDE 50 MG: 20 INJECTION, SOLUTION EPIDURAL; INFILTRATION; INTRACAUDAL; PERINEURAL at 13:46

## 2023-11-21 RX ADMIN — PROPOFOL 25 MG: 10 INJECTION, EMULSION INTRAVENOUS at 13:59

## 2023-11-21 RX ADMIN — PROPOFOL 25 MG: 10 INJECTION, EMULSION INTRAVENOUS at 14:01

## 2023-11-21 ASSESSMENT — PAIN - FUNCTIONAL ASSESSMENT: PAIN_FUNCTIONAL_ASSESSMENT: NONE - DENIES PAIN

## 2023-11-21 NOTE — PROGRESS NOTES

## 2023-11-21 NOTE — OP NOTE
[facility]  [address], [address] [facility]                 Colonoscopy Procedure Note    Indications:   See Preoperative Diagnosis above  Referring Physician: Jame Quigley MD  Anesthesia/Sedation: MAC anesthesia Propofol  Endoscopist:  Dr. Navdeep Good  Assistant:  Circulator: Aura Hart RN  Endoscopy Technician: Keysha Melissa  Preoperative diagnosis: Screen for colon cancer [Z12.11]    Procedure in Detail:  Informed consent was obtained for the procedure, including sedation. Risks of perforation, hemorrhage, adverse drug reaction, and aspiration were discussed. The patient was placed in the left lateral decubitus position. Based on the pre-procedure assessment, including review of the patient's medical history, medications, allergies, and review of systems, she had been deemed to be an appropriate candidate for  sedation; she was therefore sedated with the medications listed above. The patient was monitored continuously with ECG tracing, pulse oximetry, blood pressure monitoring, and direct observations. A rectal examination was performed. The [id number] was inserted into the rectum and advanced under direct vision to the cecum, which was identified by the ileocecal valve and appendiceal orifice. The quality of the colonic preparation was fair. A careful inspection was made as the colonoscope was withdrawn, including a retroflexed view of the rectum; findings and interventions are described below. Appropriate photodocumentation was obtained. Findings:  Rectum: normal appearing anastomosis  Sigmoid: surgically absent  Descending Colon: normal  Transverse Colon: normal  Ascending Colon: normal  Cecum: normal  Specimens:    none    EBL: None    Complications: None; patient tolerated the procedure well. Recommendations:     - Repeat colonoscopy in 3 years.       Signed By: Navdeep Good MD                        November 21, 2023

## 2023-11-21 NOTE — DISCHARGE INSTRUCTIONS
in December 2019. It has since spread worldwide. The virus can cause fever, cough, and trouble breathing. In severe cases, it can cause pneumonia and make it hard to breathe without help. It can cause death. Coronaviruses are a large group of viruses. They cause the common cold. They also cause more serious illnesses like Middle East respiratory syndrome (MERS) and severe acute respiratory syndrome (SARS). COVID-19 is caused by a novel coronavirus. That means it's a new type that has not been seen in people before. This virus spreads person-to-person through droplets from coughing and sneezing. It can also spread when you are close to someone who is infected. And it can spread when you touch something that has the virus on it, such as a doorknob or a tabletop. What can you do to protect yourself from coronavirus (COVID-19)? The best way to protect yourself from getting sick is to: Avoid areas where there is an outbreak. Avoid contact with people who may be infected. Wash your hands often with soap or alcohol-based hand sanitizers. Avoid crowds and try to stay at least 6 feet away from other people. Wash your hands often, especially after you cough or sneeze. Use soap and water, and scrub for at least 20 seconds. If soap and water aren't available, use an alcohol-based hand . Avoid touching your mouth, nose, and eyes. What can you do to avoid spreading the virus to others? To help avoid spreading the virus to others:  Cover your mouth with a tissue when you cough or sneeze. Then throw the tissue in the trash. Use a disinfectant to clean things that you touch often. Stay home if you are sick or have been exposed to the virus. Don't go to school, work, or public areas. And don't use public transportation. If you are sick:  Leave your home only if you need to get medical care. But call the doctor's office first so they know you're coming. And wear a face mask, if you have one.   If you have a

## 2023-11-21 NOTE — H&P
Pre-endoscopy H and P for Colonoscopy    The patient was seen and examined. Date of last colonoscopy: 2021, Polyps  No      The airway was assessed and documented. The problem list, past medical history, and medications were reviewed.      Patient Active Problem List   Diagnosis    TIA (transient ischemic attack)    Chemotherapy-induced neuropathy (HCC)    Hydronephrosis    Idiopathic scoliosis    Osteopenia of multiple sites    Elevated liver enzymes    Ileostomy prolapse (HCC)    Ventral hernia without obstruction or gangrene    Anemia    Malignant neoplasm of sigmoid colon ([address] )    History of appendectomy    Acute intestinal ischemia ([address])    Gastroesophageal reflux disease without esophagitis    Malignant neoplasm of ascending colon (HCC)     Social History     Socioeconomic History    Marital status: Single     Spouse name: Not on file    Number of children: Not on file    Years of education: Not on file    Highest education level: Not on file   Occupational History    Not on file   Tobacco Use    Smoking status: Never    Smokeless tobacco: Never   Substance and Sexual Activity    Alcohol use: Not Currently     Alcohol/week: 1.0 standard drink of alcohol    Drug use: No    Sexual activity: Not on file   Other Topics Concern    Not on file   Social History Narrative    Not on file     Social Determinants of Health     Financial Resource Strain: Not on file   Food Insecurity: Not on file   Transportation Needs: Not on file   Physical Activity: Not on file   Stress: Not on file   Social Connections: Not on file   Intimate Partner Violence: Not on file   Housing Stability: Not on file     Past Medical History:   Diagnosis Date    Colon cancer ([address] )     GERD (gastroesophageal reflux disease)     Ileostomy in place [facility])     Ileostomy prolapse ([address] )     Ill-defined condition     Noriega's esophagus    Ventral hernia without obstruction or gangrene 9/28/2021     The patient has a family history of NA    Prior to

## 2023-11-21 NOTE — OP NOTE
[facility]  [address], [facility]                 NAME:  Pino Garcia   :   1950   MRN:   [id number]     Date/Time:  2023 2:25 PM    Esophagogastroduodenoscopy (EGD) Procedure Note    :  Svetlana Sharma MD    Staff: Circulator: Linda Rosado RN  Endoscopy Technician: Alon Barger     Referring Provider:  Gabriela Almanzar MD    Anethesia/Sedation:  MAC anesthesia Propofol    Preoperative diagnosis: Screen for colon cancer [Z12.11], Noriega esophagus      Procedure Details     After infom consent was obtained for the procedure, with all risks and benefits of procedure explained the patient was taken to the endoscopy suite and placed in the left lateral decubitus position. Following sequential administration of sedation as per above, the [id number] gastroscope was inserted into the mouth and advanced under direct vision to second portion of the duodenum. A careful inspection was made as the gastroscope was withdrawn, including a retroflexed view of the proximal stomach; findings and interventions are described below. Findings:  Esophagus: Moderate hiatal hernia , Noriega mucosa seen at 32 cm, four quadrant biopsies performed at 32, 34 36 cm. Stomach:normal   Duodenum/jejunum:normal      Therapies:  none    Specimens: esophagus at 32,34,36 cm           EBL: None    Complications:   None; patient tolerated the procedure well. Impression:    See Postoperative diagnosis above    Recommendations:  -Acid suppression with a proton pump inhibitor. , -Await pathology.     Discharge disposition:  Home in the company of  when able to ambulate    Svetlana Sharma MD

## 2023-12-06 LAB
BASOPHILS # BLD AUTO: 0.1 X10E3/UL (ref 0–0.2)
BASOPHILS NFR BLD AUTO: 1 %
EOSINOPHIL # BLD AUTO: 0.3 X10E3/UL (ref 0–0.4)
EOSINOPHIL NFR BLD AUTO: 6 %
ERYTHROCYTE [DISTWIDTH] IN BLOOD BY AUTOMATED COUNT: 12.6 % (ref 11.7–15.4)
HCT VFR BLD AUTO: 41.7 % (ref 34–46.6)
HGB BLD-MCNC: 13.6 G/DL (ref 11.1–15.9)
IMM GRANULOCYTES # BLD AUTO: 0 X10E3/UL (ref 0–0.1)
IMM GRANULOCYTES NFR BLD AUTO: 0 %
LYMPHOCYTES # BLD AUTO: 1.5 X10E3/UL (ref 0.7–3.1)
LYMPHOCYTES NFR BLD AUTO: 26 %
MCH RBC QN AUTO: 28.3 PG (ref 26.6–33)
MCHC RBC AUTO-ENTMCNC: 32.6 G/DL (ref 31.5–35.7)
MCV RBC AUTO: 87 FL (ref 79–97)
MONOCYTES # BLD AUTO: 0.5 X10E3/UL (ref 0.1–0.9)
MONOCYTES NFR BLD AUTO: 8 %
NEUTROPHILS # BLD AUTO: 3.6 X10E3/UL (ref 1.4–7)
NEUTROPHILS NFR BLD AUTO: 59 %
PLATELET # BLD AUTO: 346 X10E3/UL (ref 150–450)
RBC # BLD AUTO: 4.81 X10E6/UL (ref 3.77–5.28)
WBC # BLD AUTO: 6 X10E3/UL (ref 3.4–10.8)

## 2023-12-07 LAB
ALBUMIN SERPL-MCNC: 4.7 G/DL (ref 3.8–4.8)
ALBUMIN/GLOB SERPL: 2 {RATIO} (ref 1.2–2.2)
ALP SERPL-CCNC: 85 IU/L (ref 44–121)
ALT SERPL-CCNC: 27 IU/L (ref 0–32)
AST SERPL-CCNC: 25 IU/L (ref 0–40)
BILIRUB SERPL-MCNC: 0.7 MG/DL (ref 0–1.2)
BUN SERPL-MCNC: 18 MG/DL (ref 8–27)
BUN/CREAT SERPL: 26 (ref 12–28)
CALCIUM SERPL-MCNC: 9.9 MG/DL (ref 8.7–10.3)
CEA SERPL-MCNC: 1.7 NG/ML (ref 0–4.7)
CHLORIDE SERPL-SCNC: 99 MMOL/L (ref 96–106)
CO2 SERPL-SCNC: 24 MMOL/L (ref 20–29)
CREAT SERPL-MCNC: 0.7 MG/DL (ref 0.57–1)
EGFRCR SERPLBLD CKD-EPI 2021: 92 ML/MIN/1.73
GLOBULIN SER CALC-MCNC: 2.3 G/DL (ref 1.5–4.5)
GLUCOSE SERPL-MCNC: 92 MG/DL (ref 70–99)
POTASSIUM SERPL-SCNC: 4.9 MMOL/L (ref 3.5–5.2)
PROT SERPL-MCNC: 7 G/DL (ref 6–8.5)
SODIUM SERPL-SCNC: 139 MMOL/L (ref 134–144)

## 2023-12-11 NOTE — PROGRESS NOTES
[facility] [place] at [address] , [address]   W: [phone]  F: [phone]          Reason for Visit:     Neida Matias is a 71 y.o.  female who is seen for stage III colon cancer. Treatment History:      9/10/2020 CT A/P - large, irregular pelvic mass measuring approximately 9cm likely representing neoplasm arising from the sigmoid colon,  small amount of pelvic free fluid, colonic bowel wall thickening. Borderline enlarged retroperitoneal lymph nodes. 6 mm right lower lobe lung nodule. Mass effect from pelvic mass causing moderate right and minimal left hydronephrosis. 9/14/2020: Colonoscopy - A large circumferential, ulcerated fungating mass was noted in proximal sigmoid colon. Mass was obstructing the lumen and could not be traversed. Carolyne Jacquelyn was present. 9/15/2020: CT Chest - 7.5mm left lower lobe pulmonary nodule, small right pleural effusion, mild right basilar atelectasis    9/17/2020: Surgical resection - low anterior resection, mobilization of the splenic flexure, coloproctostomy, creation of loop ileostomy, total abdominal hysterectomy and left salpingo-oophorectomy, distal right ureterectomy, right ureteral re-implant  with psoas hitch and placement of right ureteral stent    11/17/20: cycle 1 FOLFOX    2/17/21 CT CAP: pulmonary nodule stable, SHARDA     5/11/21 CT CAP: unchanged pulmonary nodule, SHARDA    4/2022-CT chest abdomen pelvis: Stable. Fluctuating lung nodules    7/2022: CT with increase in number and size of lung nodules - too small to bx    9/2022: PET with 1 FDG avid lung nodule- bx was non diagnostic- seen by Dr. Army Cortez Thoracic, observation recommended    1/2023: CT - dominant L lung nodule decreased        History of Present Illness:     Patient is a 71 y.o. female seen for colon cancer      She presented to the ED on 9/11/2020 with complaints of right flank/back pain and RLQ abdominal pain.  She states she

## 2023-12-13 ENCOUNTER — OFFICE VISIT (OUTPATIENT)
Age: 73
End: 2023-12-13
Payer: MEDICARE

## 2023-12-13 VITALS
DIASTOLIC BLOOD PRESSURE: 85 MMHG | SYSTOLIC BLOOD PRESSURE: 126 MMHG | HEART RATE: 82 BPM | WEIGHT: 137.8 LBS | BODY MASS INDEX: 21.58 KG/M2 | RESPIRATION RATE: 18 BRPM | OXYGEN SATURATION: 97 % | TEMPERATURE: 97.4 F

## 2023-12-13 DIAGNOSIS — C18.7 MALIGNANT NEOPLASM OF SIGMOID COLON (HCC): Primary | ICD-10-CM

## 2023-12-13 DIAGNOSIS — D69.6 THROMBOCYTOPENIA, UNSPECIFIED (HCC): ICD-10-CM

## 2023-12-13 PROCEDURE — 3017F COLORECTAL CA SCREEN DOC REV: CPT | Performed by: INTERNAL MEDICINE

## 2023-12-13 PROCEDURE — G8428 CUR MEDS NOT DOCUMENT: HCPCS | Performed by: INTERNAL MEDICINE

## 2023-12-13 PROCEDURE — G8420 CALC BMI NORM PARAMETERS: HCPCS | Performed by: INTERNAL MEDICINE

## 2023-12-13 PROCEDURE — 1123F ACP DISCUSS/DSCN MKR DOCD: CPT | Performed by: INTERNAL MEDICINE

## 2023-12-13 PROCEDURE — 99214 OFFICE O/P EST MOD 30 MIN: CPT | Performed by: INTERNAL MEDICINE

## 2023-12-13 PROCEDURE — G8484 FLU IMMUNIZE NO ADMIN: HCPCS | Performed by: INTERNAL MEDICINE

## 2023-12-13 PROCEDURE — 1090F PRES/ABSN URINE INCON ASSESS: CPT | Performed by: INTERNAL MEDICINE

## 2023-12-13 PROCEDURE — 1036F TOBACCO NON-USER: CPT | Performed by: INTERNAL MEDICINE

## 2023-12-13 PROCEDURE — G8400 PT W/DXA NO RESULTS DOC: HCPCS | Performed by: INTERNAL MEDICINE

## 2023-12-13 NOTE — PROGRESS NOTES
Arleth Weiss is a 68 y.o. female     Chief Complaint   Patient presents with    Follow-up     stage III colon cancer       1. Have you been to the ER, urgent care clinic since your last visit? Hospitalized since your last visit? No    2. Have you seen or consulted any other health care providers outside of the [address] [place] [address] Avenue since your last visit? Include any pap smears or colon screening. Yes , Dr. Rohit Alicia , Eye doctor , [address] [place] [address].

## 2024-01-17 ENCOUNTER — HOSPITAL ENCOUNTER (OUTPATIENT)
Facility: HOSPITAL | Age: 74
Discharge: HOME | End: 2024-01-20
Attending: INTERNAL MEDICINE
Payer: MEDICARE

## 2024-01-17 DIAGNOSIS — C18.7 MALIGNANT NEOPLASM OF SIGMOID COLON (HCC): ICD-10-CM

## 2024-01-17 PROCEDURE — 74177 CT ABD & PELVIS W/CONTRAST: CPT

## 2024-01-17 PROCEDURE — 6360000004 HC RX CONTRAST MEDICATION: Performed by: RADIOLOGY

## 2024-01-17 RX ADMIN — IOPAMIDOL 100 ML: 755 INJECTION, SOLUTION INTRAVENOUS at 12:00

## 2024-05-28 ENCOUNTER — TELEPHONE (OUTPATIENT)
Age: 74
End: 2024-05-28

## 2024-05-28 NOTE — TELEPHONE ENCOUNTER
Patient LVM. Stated having an appt on 06/14. Pt stated wanting to know if Dr. Jimenez wants pt to get any lab work done prior to appt.    #[phone]

## 2024-06-04 DIAGNOSIS — C18.7 MALIGNANT NEOPLASM OF SIGMOID COLON (HCC): ICD-10-CM

## 2024-06-04 LAB
ALBUMIN SERPL-MCNC: 4 G/DL (ref 3.5–5)
ALBUMIN/GLOB SERPL: 1.3 (ref 1.1–2.2)
ALP SERPL-CCNC: 86 U/L (ref 45–117)
ALT SERPL-CCNC: 32 U/L (ref 12–78)
ANION GAP SERPL CALC-SCNC: 4 MMOL/L (ref 5–15)
AST SERPL-CCNC: 26 U/L (ref 15–37)
BASOPHILS # BLD: 0.1 K/UL (ref 0–0.1)
BASOPHILS NFR BLD: 1 % (ref 0–1)
BILIRUB SERPL-MCNC: 1 MG/DL (ref 0.2–1)
BUN SERPL-MCNC: 16 MG/DL (ref 6–20)
BUN/CREAT SERPL: 20 (ref 12–20)
CALCIUM SERPL-MCNC: 9.8 MG/DL (ref 8.5–10.1)
CEA SERPL-MCNC: 1.7 NG/ML
CHLORIDE SERPL-SCNC: 103 MMOL/L (ref 97–108)
CO2 SERPL-SCNC: 29 MMOL/L (ref 21–32)
CREAT SERPL-MCNC: 0.81 MG/DL (ref 0.55–1.02)
DIFFERENTIAL METHOD BLD: ABNORMAL
EOSINOPHIL # BLD: 0.4 K/UL (ref 0–0.4)
EOSINOPHIL NFR BLD: 6 % (ref 0–7)
ERYTHROCYTE [DISTWIDTH] IN BLOOD BY AUTOMATED COUNT: 13.8 % (ref 11.5–14.5)
GLOBULIN SER CALC-MCNC: 3.2 G/DL (ref 2–4)
GLUCOSE SERPL-MCNC: 89 MG/DL (ref 65–100)
HCT VFR BLD AUTO: 39.3 % (ref 35–47)
HGB BLD-MCNC: 13 G/DL (ref 11.5–16)
IMM GRANULOCYTES # BLD AUTO: 0 K/UL (ref 0–0.04)
IMM GRANULOCYTES NFR BLD AUTO: 1 % (ref 0–0.5)
LYMPHOCYTES # BLD: 1.4 K/UL (ref 0.8–3.5)
LYMPHOCYTES NFR BLD: 26 % (ref 12–49)
MCH RBC QN AUTO: 28.1 PG (ref 26–34)
MCHC RBC AUTO-ENTMCNC: 33.1 G/DL (ref 30–36.5)
MCV RBC AUTO: 84.9 FL (ref 80–99)
MONOCYTES # BLD: 0.5 K/UL (ref 0–1)
MONOCYTES NFR BLD: 9 % (ref 5–13)
NEUTS SEG # BLD: 3.2 K/UL (ref 1.8–8)
NEUTS SEG NFR BLD: 57 % (ref 32–75)
NRBC # BLD: 0 K/UL (ref 0–0.01)
NRBC BLD-RTO: 0 PER 100 WBC
PLATELET # BLD AUTO: 335 K/UL (ref 150–400)
PMV BLD AUTO: 9.3 FL (ref 8.9–12.9)
POTASSIUM SERPL-SCNC: 4.3 MMOL/L (ref 3.5–5.1)
PROT SERPL-MCNC: 7.2 G/DL (ref 6.4–8.2)
RBC # BLD AUTO: 4.63 M/UL (ref 3.8–5.2)
SODIUM SERPL-SCNC: 136 MMOL/L (ref 136–145)
WBC # BLD AUTO: 5.6 K/UL (ref 3.6–11)

## 2024-06-05 ENCOUNTER — TELEPHONE (OUTPATIENT)
Age: 74
End: 2024-06-05

## 2024-06-14 ENCOUNTER — OFFICE VISIT (OUTPATIENT)
Age: 74
End: 2024-06-14
Payer: MEDICARE

## 2024-06-14 VITALS
BODY MASS INDEX: 21.12 KG/M2 | HEIGHT: 67 IN | TEMPERATURE: 98.2 F | WEIGHT: 134.6 LBS | HEART RATE: 80 BPM | DIASTOLIC BLOOD PRESSURE: 85 MMHG | SYSTOLIC BLOOD PRESSURE: 132 MMHG | OXYGEN SATURATION: 97 % | RESPIRATION RATE: 17 BRPM

## 2024-06-14 DIAGNOSIS — C18.7 MALIGNANT NEOPLASM OF SIGMOID COLON (HCC): Primary | ICD-10-CM

## 2024-06-14 PROCEDURE — G8428 CUR MEDS NOT DOCUMENT: HCPCS | Performed by: INTERNAL MEDICINE

## 2024-06-14 PROCEDURE — G8420 CALC BMI NORM PARAMETERS: HCPCS | Performed by: INTERNAL MEDICINE

## 2024-06-14 PROCEDURE — 99214 OFFICE O/P EST MOD 30 MIN: CPT | Performed by: INTERNAL MEDICINE

## 2024-06-14 PROCEDURE — 1036F TOBACCO NON-USER: CPT | Performed by: INTERNAL MEDICINE

## 2024-06-14 PROCEDURE — G8400 PT W/DXA NO RESULTS DOC: HCPCS | Performed by: INTERNAL MEDICINE

## 2024-06-14 PROCEDURE — 1090F PRES/ABSN URINE INCON ASSESS: CPT | Performed by: INTERNAL MEDICINE

## 2024-06-14 PROCEDURE — 3017F COLORECTAL CA SCREEN DOC REV: CPT | Performed by: INTERNAL MEDICINE

## 2024-06-14 PROCEDURE — 1123F ACP DISCUSS/DSCN MKR DOCD: CPT | Performed by: INTERNAL MEDICINE

## 2024-06-14 RX ORDER — DENOSUMAB 60 MG/ML
INJECTION SUBCUTANEOUS
COMMUNITY

## 2024-06-14 RX ORDER — ASCORBIC ACID 1000 MG
TABLET ORAL
COMMUNITY

## 2024-06-14 ASSESSMENT — PATIENT HEALTH QUESTIONNAIRE - PHQ9
SUM OF ALL RESPONSES TO PHQ9 QUESTIONS 1 & 2: 0
1. LITTLE INTEREST OR PLEASURE IN DOING THINGS: NOT AT ALL
SUM OF ALL RESPONSES TO PHQ QUESTIONS 1-9: 0
SUM OF ALL RESPONSES TO PHQ QUESTIONS 1-9: 0
2. FEELING DOWN, DEPRESSED OR HOPELESS: NOT AT ALL
SUM OF ALL RESPONSES TO PHQ QUESTIONS 1-9: 0
SUM OF ALL RESPONSES TO PHQ QUESTIONS 1-9: 0

## 2024-06-14 NOTE — PROGRESS NOTES
Patient identified with two identification factors, Name and Date of Birth.    Chief Complaint   Patient presents with    Follow-up     Malignant neoplasm of sigmoid colon. Has been experiencing migraines think its has something to do with dehydration has been drinking electrolytes to help improve migraines for the last 2 days.        /85 (Site: Left Upper Arm, Position: Sitting, Cuff Size: Medium Adult)   Pulse 80   Temp 98.2 °F (36.8 °C) (Temporal)   Resp 17   Ht 1.702 m (5' 7\")   Wt 61.1 kg (134 lb 9.6 oz)   SpO2 97%   BMI 21.08 kg/m²       1. \"Have you been to the ER, urgent care clinic since your last visit?  Hospitalized since your last visit?\" No     2. \"Have you seen or consulted any other health care providers outside of the [facility] since your last visit?\" No     
12/2022 reviewed                9/14/2020    Addendum Diagnosis    1. Sigmoid Mass, Biopsy:       Infiltrating adenocarcinoma, most consistent with colon (See comment)    Addendum Comment    Immunohistochemical stains reveal the following staining pattern:    CK7: Scattered cells with cytoplasmic membrane staining    CK20: Positive cytoplasmic staining in normal colonic mucosal epithelial cells and malignant epithelial cells    CDX-2: Diffuse strong nuclear decoration and all tumor cells and normal background colonic epithelial cells    PAX-8: Negative    Estrogen Receptor: Negative    Positive and negative controls stain appropriately.    Due to radiologic findings suggesting the possibility of GYN involvement, ER and PAX-8 are performed, which lend support to the diagnosis of a colonic primary, over a tumor of müllerian origin.       9/17/2020    FINAL PATHOLOGIC DIAGNOSIS    1. Sigmoid colon and proximal rectum, uterus, left fallopian tube and ovary and right ureteral segment, low anterior resection:    Sigmoid colon and proximal rectum:  Invasive adenocarcinoma (see synoptic report and comment).    Metastatic adenocarcinoma in one of sixteen lymph nodes (1/16).    Uterus: Invasive adenocarcinoma extending from adhesed colon into uterine serosa.  Endometrial lining shows mucosal atrophy.    Left ovary: Benign ovary with serosal inclusion cysts.    Left fallopian tube: Benign fallopian tube.    Right ureteral segment: Benign segment of ureter densely adhesed to colon.    Nodule near right uterine cornu:  Nodular portion of benign smooth muscle consistent with leiomyoma with parenchymal hemorrhage.    COLON AND RECTUM: Resection    SPECIMEN    Procedure: Low anterior resection    TUMOR    Tumor Site: Sigmoid colon    Histologic Type: Adenocarcinoma    Histologic Grade: G2: Moderately differentiated    Tumor Size: 8.0 cm    Tumor Extension: Tumor directly invades adjacent structures:    Posterior uterine serosa

## 2024-08-12 ENCOUNTER — OFFICE VISIT (OUTPATIENT)
Age: 74
End: 2024-08-12
Payer: MEDICARE

## 2024-08-12 VITALS
HEIGHT: 67 IN | DIASTOLIC BLOOD PRESSURE: 76 MMHG | OXYGEN SATURATION: 95 % | HEART RATE: 82 BPM | TEMPERATURE: 97.2 F | BODY MASS INDEX: 21.19 KG/M2 | WEIGHT: 135 LBS | SYSTOLIC BLOOD PRESSURE: 126 MMHG

## 2024-08-12 DIAGNOSIS — R74.8 ELEVATED LIVER ENZYMES: Primary | ICD-10-CM

## 2024-08-12 PROCEDURE — 3017F COLORECTAL CA SCREEN DOC REV: CPT | Performed by: NURSE PRACTITIONER

## 2024-08-12 PROCEDURE — 1036F TOBACCO NON-USER: CPT | Performed by: NURSE PRACTITIONER

## 2024-08-12 PROCEDURE — 99214 OFFICE O/P EST MOD 30 MIN: CPT | Performed by: NURSE PRACTITIONER

## 2024-08-12 PROCEDURE — G8420 CALC BMI NORM PARAMETERS: HCPCS | Performed by: NURSE PRACTITIONER

## 2024-08-12 PROCEDURE — G8427 DOCREV CUR MEDS BY ELIG CLIN: HCPCS | Performed by: NURSE PRACTITIONER

## 2024-08-12 PROCEDURE — 91200 LIVER ELASTOGRAPHY: CPT | Performed by: NURSE PRACTITIONER

## 2024-08-12 PROCEDURE — 1090F PRES/ABSN URINE INCON ASSESS: CPT | Performed by: NURSE PRACTITIONER

## 2024-08-12 PROCEDURE — 1123F ACP DISCUSS/DSCN MKR DOCD: CPT | Performed by: NURSE PRACTITIONER

## 2024-08-12 PROCEDURE — G8400 PT W/DXA NO RESULTS DOC: HCPCS | Performed by: NURSE PRACTITIONER

## 2024-08-12 ASSESSMENT — ANXIETY QUESTIONNAIRES
5. BEING SO RESTLESS THAT IT IS HARD TO SIT STILL: NOT AT ALL
GAD7 TOTAL SCORE: 0
IF YOU CHECKED OFF ANY PROBLEMS ON THIS QUESTIONNAIRE, HOW DIFFICULT HAVE THESE PROBLEMS MADE IT FOR YOU TO DO YOUR WORK, TAKE CARE OF THINGS AT HOME, OR GET ALONG WITH OTHER PEOPLE: NOT DIFFICULT AT ALL
3. WORRYING TOO MUCH ABOUT DIFFERENT THINGS: NOT AT ALL
7. FEELING AFRAID AS IF SOMETHING AWFUL MIGHT HAPPEN: NOT AT ALL
2. NOT BEING ABLE TO STOP OR CONTROL WORRYING: NOT AT ALL
4. TROUBLE RELAXING: NOT AT ALL
6. BECOMING EASILY ANNOYED OR IRRITABLE: NOT AT ALL
1. FEELING NERVOUS, ANXIOUS, OR ON EDGE: NOT AT ALL

## 2024-08-12 ASSESSMENT — PATIENT HEALTH QUESTIONNAIRE - PHQ9
SUM OF ALL RESPONSES TO PHQ QUESTIONS 1-9: 0
DEPRESSION UNABLE TO ASSESS: FUNCTIONAL CAPACITY MOTIVATION LIMITS ACCURACY
SUM OF ALL RESPONSES TO PHQ QUESTIONS 1-9: 0
2. FEELING DOWN, DEPRESSED OR HOPELESS: NOT AT ALL
SUM OF ALL RESPONSES TO PHQ9 QUESTIONS 1 & 2: 0
1. LITTLE INTEREST OR PLEASURE IN DOING THINGS: NOT AT ALL

## 2024-08-12 NOTE — PROGRESS NOTES
Chief Complaint   Patient presents with    Follow-up     Vitals:    08/12/24 0902   BP: (!) 145/82   Site: Right Upper Arm   Position: Sitting   Pulse: 82   Temp: 97.2 °F (36.2 °C)   TempSrc: Temporal   SpO2: 95%   Weight: 61.2 kg (135 lb)   Height: 1.702 m (5' 7\")     .  \"Have you been to the ER, urgent care clinic since your last visit?  Hospitalized since your last visit?\"    NO    “Have you seen or consulted any other health care providers outside of [facility] since your last visit?”    NO    Have you had a mammogram?”   NO    No breast cancer screening on file             Click Here for Release of Records Request

## 2024-08-12 NOTE — PROGRESS NOTES
[facility]      Nadir Khalil MD, FACP, FACG, FAASLD      ELSA Portillo-DONNA Sharpe, [facility]   Doris Thomascolleennilesh, [facility]   Julissa Reza, [facility]-  Jatinder Hung, [facility]   Zhanna Hernandez, [facility]   Dayna Aston, [facility]   [address], [address]   [place]  [address] [phone]   FAX: [phone]  [facility]   [address], [address]   [place]  [address] [phone]   FAX: [phone]     Patient Care Team:  Esthela Bolivar MD as PCP - General  Elissa Key MD (Internal Med Rheumatology)  Megan Jimenez MD (Hematology and Oncology)  Jose Rafael Colindres MD (Colon and Rectal Surgery)    Patient Active Problem List   Diagnosis    TIA (transient ischemic attack)    Chemotherapy-induced neuropathy (HCC)    Hydronephrosis    Idiopathic scoliosis    Osteopenia of multiple sites    Elevated liver enzymes    Ileostomy prolapse (HCC)    Ventral hernia without obstruction or gangrene    Anemia    Malignant neoplasm of sigmoid colon (HCC)    History of appendectomy    Acute intestinal ischemia (HCC)    Gastroesophageal reflux disease without esophagitis    Malignant neoplasm of ascending colon (HCC)    Thrombocytopenia, unspecified (HCC)         Diana Clements is being seen at [facility] for management of elevated liver enzymes. The active problem list, all pertinent past medical history, medications, radiologic findings and laboratory findings related to the liver disorder were reviewed and discussed with the patient.      The patient is a 74 y.o.  female who was found to have elevated  liver enzymes and alkaline phosphatase in 9/2020.      The patient went to the ER with back pain 9/2020. A CT was performed and she was found to have a 9 cm

## 2024-12-14 DIAGNOSIS — C18.7 MALIGNANT NEOPLASM OF SIGMOID COLON (HCC): ICD-10-CM

## 2025-02-04 ENCOUNTER — HOSPITAL ENCOUNTER (OUTPATIENT)
Facility: HOSPITAL | Age: 75
Discharge: HOME | End: 2025-02-07
Attending: INTERNAL MEDICINE
Payer: MEDICARE

## 2025-02-04 DIAGNOSIS — C18.7 MALIGNANT NEOPLASM OF SIGMOID COLON (HCC): ICD-10-CM

## 2025-02-04 LAB
BASOPHILS # BLD AUTO: 0 X10E3/UL (ref 0–0.2)
BASOPHILS NFR BLD AUTO: 1 %
CREAT BLD-MCNC: 0.8 MG/DL (ref 0.6–1.3)
EOSINOPHIL # BLD AUTO: 0.3 X10E3/UL (ref 0–0.4)
EOSINOPHIL NFR BLD AUTO: 6 %
ERYTHROCYTE [DISTWIDTH] IN BLOOD BY AUTOMATED COUNT: 12.7 % (ref 11.7–15.4)
HCT VFR BLD AUTO: 39.8 % (ref 34–46.6)
HGB BLD-MCNC: 12.4 G/DL (ref 11.1–15.9)
IMM GRANULOCYTES # BLD AUTO: 0 X10E3/UL (ref 0–0.1)
IMM GRANULOCYTES NFR BLD AUTO: 0 %
LYMPHOCYTES # BLD AUTO: 1.4 X10E3/UL (ref 0.7–3.1)
LYMPHOCYTES NFR BLD AUTO: 25 %
MCH RBC QN AUTO: 27.5 PG (ref 26.6–33)
MCHC RBC AUTO-ENTMCNC: 31.2 G/DL (ref 31.5–35.7)
MCV RBC AUTO: 88 FL (ref 79–97)
MONOCYTES # BLD AUTO: 0.4 X10E3/UL (ref 0.1–0.9)
MONOCYTES NFR BLD AUTO: 8 %
NEUTROPHILS # BLD AUTO: 3.4 X10E3/UL (ref 1.4–7)
NEUTROPHILS NFR BLD AUTO: 60 %
PLATELET # BLD AUTO: 354 X10E3/UL (ref 150–450)
RBC # BLD AUTO: 4.51 X10E6/UL (ref 3.77–5.28)
WBC # BLD AUTO: 5.6 X10E3/UL (ref 3.4–10.8)

## 2025-02-04 PROCEDURE — 74177 CT ABD & PELVIS W/CONTRAST: CPT

## 2025-02-04 PROCEDURE — 6360000004 HC RX CONTRAST MEDICATION: Performed by: RADIOLOGY

## 2025-02-04 PROCEDURE — 82565 ASSAY OF CREATININE: CPT

## 2025-02-04 RX ORDER — IOPAMIDOL 755 MG/ML
100 INJECTION, SOLUTION INTRAVASCULAR
Status: COMPLETED | OUTPATIENT
Start: 2025-02-04 | End: 2025-02-04

## 2025-02-04 RX ADMIN — IOPAMIDOL 100 ML: 755 INJECTION, SOLUTION INTRAVENOUS at 11:26

## 2025-02-05 LAB
ALBUMIN SERPL-MCNC: 4.3 G/DL (ref 3.8–4.8)
ALP SERPL-CCNC: 75 IU/L (ref 44–121)
ALT SERPL-CCNC: 19 IU/L (ref 0–32)
AST SERPL-CCNC: 19 IU/L (ref 0–40)
BILIRUB SERPL-MCNC: 0.7 MG/DL (ref 0–1.2)
BUN SERPL-MCNC: 19 MG/DL (ref 8–27)
BUN/CREAT SERPL: 26 (ref 12–28)
CALCIUM SERPL-MCNC: 9.7 MG/DL (ref 8.7–10.3)
CEA SERPL-MCNC: 1.8 NG/ML (ref 0–4.7)
CHLORIDE SERPL-SCNC: 99 MMOL/L (ref 96–106)
CO2 SERPL-SCNC: 25 MMOL/L (ref 20–29)
CREAT SERPL-MCNC: 0.74 MG/DL (ref 0.57–1)
EGFRCR SERPLBLD CKD-EPI 2021: 85 ML/MIN/1.73
GLOBULIN SER CALC-MCNC: 2.1 G/DL (ref 1.5–4.5)
GLUCOSE SERPL-MCNC: 85 MG/DL (ref 70–99)
POTASSIUM SERPL-SCNC: 4.6 MMOL/L (ref 3.5–5.2)
PROT SERPL-MCNC: 6.4 G/DL (ref 6–8.5)
SODIUM SERPL-SCNC: 138 MMOL/L (ref 134–144)

## 2025-02-12 NOTE — PROGRESS NOTES
[facility] [place] at [facility]   [address], [address] [place] [address]   W: [phone]  F: [phone]          Reason for Visit:     Diana Clements is a 72 y.o.  female who is seen for stage III colon cancer.       Treatment History:      9/10/2020 CT A/P - large, irregular pelvic mass measuring approximately 9cm likely representing neoplasm arising from the sigmoid colon,  small amount of pelvic free fluid, colonic bowel wall thickening. Borderline enlarged retroperitoneal lymph nodes. 6 mm right lower lobe lung nodule. Mass effect from pelvic mass causing moderate right and minimal left hydronephrosis.     9/14/2020: Colonoscopy - A large circumferential, ulcerated fungating mass was noted in proximal sigmoid colon. Mass was obstructing the lumen and could not be traversed. Friability was present.    9/15/2020: CT Chest - 7.5mm left lower lobe pulmonary nodule, small right pleural effusion, mild right basilar atelectasis    9/17/2020: Surgical resection - low anterior resection, mobilization of the splenic flexure, coloproctostomy, creation of loop ileostomy, total abdominal hysterectomy and left salpingo-oophorectomy, distal right ureterectomy, right ureteral re-implant  with psoas hitch and placement of right ureteral stent    11/17/20: cycle 1 FOLFOX    2/17/21 CT CAP: pulmonary nodule stable, SHARDA     5/11/21 CT CAP: unchanged pulmonary nodule, SHARDA    4/2022-CT chest abdomen pelvis: Stable.  Fluctuating lung nodules    7/2022: CT with increase in number and size of lung nodules - too small to bx    9/2022: PET with 1 FDG avid lung nodule- bx was non diagnostic- seen by Dr. Borges [facility] Thor[facility], observation recommended    1/2023: CT - dominant L lung nodule decreased        History of Present Illness:     Patient is a 72 y.o. female seen for colon cancer      She presented to the ED on 9/11/2020 with complaints of right flank/back pain and RLQ abdominal pain. She states she

## 2025-02-13 ENCOUNTER — OFFICE VISIT (OUTPATIENT)
Age: 75
End: 2025-02-13
Payer: MEDICARE

## 2025-02-13 VITALS
SYSTOLIC BLOOD PRESSURE: 151 MMHG | OXYGEN SATURATION: 95 % | RESPIRATION RATE: 18 BRPM | WEIGHT: 131 LBS | HEART RATE: 85 BPM | BODY MASS INDEX: 20.52 KG/M2 | DIASTOLIC BLOOD PRESSURE: 92 MMHG | TEMPERATURE: 98.3 F

## 2025-02-13 DIAGNOSIS — C18.7 MALIGNANT NEOPLASM OF SIGMOID COLON (HCC): Primary | ICD-10-CM

## 2025-02-13 PROCEDURE — 99214 OFFICE O/P EST MOD 30 MIN: CPT | Performed by: INTERNAL MEDICINE

## 2025-02-13 PROCEDURE — 1123F ACP DISCUSS/DSCN MKR DOCD: CPT | Performed by: INTERNAL MEDICINE

## 2025-02-13 PROCEDURE — G8420 CALC BMI NORM PARAMETERS: HCPCS | Performed by: INTERNAL MEDICINE

## 2025-02-13 PROCEDURE — 1126F AMNT PAIN NOTED NONE PRSNT: CPT | Performed by: INTERNAL MEDICINE

## 2025-02-13 PROCEDURE — 1036F TOBACCO NON-USER: CPT | Performed by: INTERNAL MEDICINE

## 2025-02-13 PROCEDURE — 1090F PRES/ABSN URINE INCON ASSESS: CPT | Performed by: INTERNAL MEDICINE

## 2025-02-13 PROCEDURE — 3017F COLORECTAL CA SCREEN DOC REV: CPT | Performed by: INTERNAL MEDICINE

## 2025-02-13 PROCEDURE — G8400 PT W/DXA NO RESULTS DOC: HCPCS | Performed by: INTERNAL MEDICINE

## 2025-02-13 PROCEDURE — 1159F MED LIST DOCD IN RCRD: CPT | Performed by: INTERNAL MEDICINE

## 2025-02-13 PROCEDURE — G8427 DOCREV CUR MEDS BY ELIG CLIN: HCPCS | Performed by: INTERNAL MEDICINE

## 2025-02-13 NOTE — PROGRESS NOTES
Diana Clements is a 74 y.o. female    Chief Complaint   Patient presents with    Follow-up     Malignant neoplasm of sigmoid colon (HCC)     1. Have you been to the ER, urgent care clinic since your last visit?  Hospitalized since your last visit?No    2. Have you seen or consulted any other health care providers outside of the [facility] since your last visit?  Include any pap smears or colon screening. No

## 2025-06-25 ENCOUNTER — TRANSCRIBE ORDERS (OUTPATIENT)
Facility: HOSPITAL | Age: 75
End: 2025-06-25

## 2025-06-25 DIAGNOSIS — M81.0 AGE-RELATED OSTEOPOROSIS WITHOUT CURRENT PATHOLOGICAL FRACTURE: Primary | ICD-10-CM

## 2025-08-11 ENCOUNTER — TELEPHONE (OUTPATIENT)
Age: 75
End: 2025-08-11

## 2025-08-11 DIAGNOSIS — C18.7 MALIGNANT NEOPLASM OF SIGMOID COLON (HCC): Primary | ICD-10-CM

## 2025-08-11 DIAGNOSIS — C18.7 MALIGNANT NEOPLASM OF SIGMOID COLON (HCC): ICD-10-CM

## 2025-08-13 ENCOUNTER — OFFICE VISIT (OUTPATIENT)
Age: 75
End: 2025-08-13
Payer: MEDICARE

## 2025-08-13 VITALS
HEART RATE: 84 BPM | WEIGHT: 127.2 LBS | SYSTOLIC BLOOD PRESSURE: 141 MMHG | BODY MASS INDEX: 20.44 KG/M2 | HEIGHT: 66 IN | TEMPERATURE: 97.7 F | DIASTOLIC BLOOD PRESSURE: 87 MMHG | OXYGEN SATURATION: 98 %

## 2025-08-13 DIAGNOSIS — R74.8 ELEVATED LIVER ENZYMES: Primary | ICD-10-CM

## 2025-08-13 PROCEDURE — 91200 LIVER ELASTOGRAPHY: CPT | Performed by: NURSE PRACTITIONER

## 2025-08-13 PROCEDURE — 99214 OFFICE O/P EST MOD 30 MIN: CPT | Performed by: NURSE PRACTITIONER

## 2025-08-13 ASSESSMENT — PATIENT HEALTH QUESTIONNAIRE - PHQ9
DEPRESSION UNABLE TO ASSESS: FUNCTIONAL CAPACITY MOTIVATION LIMITS ACCURACY
SUM OF ALL RESPONSES TO PHQ QUESTIONS 1-9: 0
2. FEELING DOWN, DEPRESSED OR HOPELESS: NOT AT ALL
1. LITTLE INTEREST OR PLEASURE IN DOING THINGS: NOT AT ALL

## 2025-08-20 ENCOUNTER — HOSPITAL ENCOUNTER (OUTPATIENT)
Facility: HOSPITAL | Age: 75
Discharge: HOME | End: 2025-08-23
Attending: INTERNAL MEDICINE
Payer: MEDICARE

## 2025-08-20 DIAGNOSIS — C18.7 MALIGNANT NEOPLASM OF SIGMOID COLON (HCC): ICD-10-CM

## 2025-08-20 LAB — CREAT BLD-MCNC: 0.9 MG/DL (ref 0.6–1.3)

## 2025-08-20 PROCEDURE — 74177 CT ABD & PELVIS W/CONTRAST: CPT

## 2025-08-20 PROCEDURE — 6360000004 HC RX CONTRAST MEDICATION: Performed by: INTERNAL MEDICINE

## 2025-08-20 PROCEDURE — 82565 ASSAY OF CREATININE: CPT

## 2025-08-20 RX ORDER — IOPAMIDOL 755 MG/ML
100 INJECTION, SOLUTION INTRAVASCULAR
Status: COMPLETED | OUTPATIENT
Start: 2025-08-20 | End: 2025-08-20

## 2025-08-20 RX ADMIN — IOPAMIDOL 80 ML: 755 INJECTION, SOLUTION INTRAVENOUS at 14:42

## 2025-08-22 LAB
ALBUMIN SERPL-MCNC: 4.6 G/DL (ref 3.8–4.8)
ALP SERPL-CCNC: 75 IU/L (ref 44–121)
ALT SERPL-CCNC: 23 IU/L (ref 0–32)
AST SERPL-CCNC: 27 IU/L (ref 0–40)
BASOPHILS # BLD AUTO: 0.1 X10E3/UL (ref 0–0.2)
BASOPHILS NFR BLD AUTO: 1 %
BILIRUB SERPL-MCNC: 0.8 MG/DL (ref 0–1.2)
BUN SERPL-MCNC: 21 MG/DL (ref 8–27)
BUN/CREAT SERPL: 24 (ref 12–28)
CALCIUM SERPL-MCNC: 10.2 MG/DL (ref 8.7–10.3)
CHLORIDE SERPL-SCNC: 97 MMOL/L (ref 96–106)
CO2 SERPL-SCNC: 23 MMOL/L (ref 20–29)
CREAT SERPL-MCNC: 0.87 MG/DL (ref 0.57–1)
EGFRCR SERPLBLD CKD-EPI 2021: 70 ML/MIN/1.73
EOSINOPHIL # BLD AUTO: 0.4 X10E3/UL (ref 0–0.4)
EOSINOPHIL NFR BLD AUTO: 6 %
ERYTHROCYTE [DISTWIDTH] IN BLOOD BY AUTOMATED COUNT: 13.1 % (ref 11.7–15.4)
GLOBULIN SER CALC-MCNC: 2.3 G/DL (ref 1.5–4.5)
GLUCOSE SERPL-MCNC: 87 MG/DL (ref 70–99)
HCT VFR BLD AUTO: 41.2 % (ref 34–46.6)
HGB BLD-MCNC: 13.1 G/DL (ref 11.1–15.9)
IMM GRANULOCYTES # BLD AUTO: 0 X10E3/UL (ref 0–0.1)
IMM GRANULOCYTES NFR BLD AUTO: 0 %
LYMPHOCYTES # BLD AUTO: 1.4 X10E3/UL (ref 0.7–3.1)
LYMPHOCYTES NFR BLD AUTO: 22 %
MCH RBC QN AUTO: 28.4 PG (ref 26.6–33)
MCHC RBC AUTO-ENTMCNC: 31.8 G/DL (ref 31.5–35.7)
MCV RBC AUTO: 89 FL (ref 79–97)
MONOCYTES # BLD AUTO: 0.5 X10E3/UL (ref 0.1–0.9)
MONOCYTES NFR BLD AUTO: 8 %
NEUTROPHILS # BLD AUTO: 4.1 X10E3/UL (ref 1.4–7)
NEUTROPHILS NFR BLD AUTO: 63 %
PLATELET # BLD AUTO: 333 X10E3/UL (ref 150–450)
POTASSIUM SERPL-SCNC: 5 MMOL/L (ref 3.5–5.2)
PROT SERPL-MCNC: 6.9 G/DL (ref 6–8.5)
RBC # BLD AUTO: 4.61 X10E6/UL (ref 3.77–5.28)
SODIUM SERPL-SCNC: 136 MMOL/L (ref 134–144)
WBC # BLD AUTO: 6.5 X10E3/UL (ref 3.4–10.8)

## 2025-08-29 ENCOUNTER — OFFICE VISIT (OUTPATIENT)
Age: 75
End: 2025-08-29
Payer: MEDICARE

## 2025-08-29 VITALS
DIASTOLIC BLOOD PRESSURE: 82 MMHG | HEART RATE: 78 BPM | RESPIRATION RATE: 17 BRPM | BODY MASS INDEX: 20.66 KG/M2 | WEIGHT: 128 LBS | TEMPERATURE: 98.2 F | SYSTOLIC BLOOD PRESSURE: 136 MMHG | OXYGEN SATURATION: 97 %

## 2025-08-29 DIAGNOSIS — C18.7 MALIGNANT NEOPLASM OF SIGMOID COLON (HCC): Primary | ICD-10-CM

## 2025-08-29 PROCEDURE — 1036F TOBACCO NON-USER: CPT | Performed by: INTERNAL MEDICINE

## 2025-08-29 PROCEDURE — 1159F MED LIST DOCD IN RCRD: CPT | Performed by: INTERNAL MEDICINE

## 2025-08-29 PROCEDURE — G8420 CALC BMI NORM PARAMETERS: HCPCS | Performed by: INTERNAL MEDICINE

## 2025-08-29 PROCEDURE — 99214 OFFICE O/P EST MOD 30 MIN: CPT | Performed by: INTERNAL MEDICINE

## 2025-08-29 PROCEDURE — G8400 PT W/DXA NO RESULTS DOC: HCPCS | Performed by: INTERNAL MEDICINE

## 2025-08-29 PROCEDURE — 1126F AMNT PAIN NOTED NONE PRSNT: CPT | Performed by: INTERNAL MEDICINE

## 2025-08-29 PROCEDURE — 3017F COLORECTAL CA SCREEN DOC REV: CPT | Performed by: INTERNAL MEDICINE

## 2025-08-29 PROCEDURE — 1090F PRES/ABSN URINE INCON ASSESS: CPT | Performed by: INTERNAL MEDICINE

## 2025-08-29 PROCEDURE — G8427 DOCREV CUR MEDS BY ELIG CLIN: HCPCS | Performed by: INTERNAL MEDICINE

## 2025-08-29 PROCEDURE — 1123F ACP DISCUSS/DSCN MKR DOCD: CPT | Performed by: INTERNAL MEDICINE

## (undated) DEVICE — TUBING HYDR IRR --

## (undated) DEVICE — TOTAL TRAY, DB, 100% SILI FOLEY, 16FR 10: Brand: MEDLINE

## (undated) DEVICE — TOWEL SURG W17XL27IN STD BLU COT NONFENESTRATED PREWASHED

## (undated) DEVICE — SUTURE PERMAHAND SZ 2-0 L18IN NONABSORBABLE BLK L26MM SH C012D

## (undated) DEVICE — DEVICE SEAL L23CM NANO COAT MARYLAND JAW OPN DIV LIGASURE

## (undated) DEVICE — RESERVOIR,SUCTION,100CC,SILICONE: Brand: MEDLINE

## (undated) DEVICE — URETERAL STENT SET: Brand: CONTOUR™

## (undated) DEVICE — SURGICAL PROCEDURE PACK BASIN MAJ SET CUST NO CAUT

## (undated) DEVICE — BLADE ELECTRODE: Brand: EDGE

## (undated) DEVICE — SOLUTION IRRIG 1000ML H2O STRL BLT

## (undated) DEVICE — SUTURE PERMAHAND SZ 3-0 L18IN NONABSORBABLE BLK L26MM SH C013D

## (undated) DEVICE — MARKER,SKIN,WI/RULER AND LABELS: Brand: MEDLINE

## (undated) DEVICE — STERILE-Z MAYO STAND COVERS CLEAR POLYETHYLENE STERILE UNIVERSAL FIT 20 PER CASE: Brand: STERILE-Z

## (undated) DEVICE — Device: Brand: SENSURA MIO

## (undated) DEVICE — CATH IV AUTOGRD BC BLU 22GA 25 -- INSYTE

## (undated) DEVICE — JELLY,LUBE,STERILE,FLIP TOP,TUBE,4-OZ: Brand: MEDLINE

## (undated) DEVICE — TOTAL TRAY, 16FR 10ML SIL FOLEY, URN: Brand: MEDLINE

## (undated) DEVICE — SUTURE VCRL SZ 3-0 L27IN ABSRB UD L26MM SH 1/2 CIR J416H

## (undated) DEVICE — ROCKER SWITCH PENCIL BLADE ELECTRODE, HOLSTER: Brand: EDGE

## (undated) DEVICE — SUT SLK 2-0SH 30IN BLK --

## (undated) DEVICE — GARMENT,MEDLINE,DVT,INT,CALF,MED, GEN2: Brand: MEDLINE

## (undated) DEVICE — CONTAINER SPEC 20 ML LID NEUT BUFF FORMALIN 10 % POLYPR STS

## (undated) DEVICE — OPEN-END URETERAL CATHETER: Brand: COOK

## (undated) DEVICE — CANN NASAL O2 CAPNOGRAPHY AD -- FILTERLINE

## (undated) DEVICE — ENDO CARRY-ON PROCEDURE KIT INCLUDES ENZYMATIC SPONGE, GAUZE, BIOHAZARD LABEL, TRAY, LUBRICANT, DIRTY SCOPE LABEL, WATER LABEL, TRAY, DRAWSTRING PAD, AND DEFENDO 4-PIECE KIT.: Brand: ENDO CARRY-ON PROCEDURE KIT

## (undated) DEVICE — DRAPE FLD WRM W44XL66IN C6L FOR INTRATEMP SYS THERMABASIN

## (undated) DEVICE — SHEET, T, LAPAROTOMY, STERILE: Brand: MEDLINE

## (undated) DEVICE — SUTURE VCRL SZ 0 L36IN ABSRB VLT L36MM CT-1 1/2 CIR J346H

## (undated) DEVICE — STAPLER INT L75MM CUT LN L73MM STPL LN L77MM BLU B FRM 8

## (undated) DEVICE — SUTURE VCRL SZ 3-0 L27IN ABSRB VLT L26MM SH 1/2 CIR J316H

## (undated) DEVICE — LEGGINGS: Brand: CONVERTORS

## (undated) DEVICE — SURGICAL PROCEDURE PACK TISS 3X5 IN ABSORBABLE SEPRAFILM

## (undated) DEVICE — YANKAUER,POOLE TIP,STERILE,50/CS: Brand: MEDLINE

## (undated) DEVICE — Z INACTIVE USE 2527070 DRAPE SURG W40XL44IN UNDERBUTTOCK SMS POLYPR W/ PCH BK DISP

## (undated) DEVICE — SYRINGE IRRIG 60ML SFT PLIABLE BLB EZ TO GRP 1 HND USE W/

## (undated) DEVICE — INFECTION CONTROL KIT SYS

## (undated) DEVICE — CYSTOSCOPY PACK: Brand: CONVERTORS

## (undated) DEVICE — STAPLER INT DIA29MM CLS STPL H1.5-2.2MM OPN LEG L5.2MM 26

## (undated) DEVICE — SUTURE VCRL SZ 1 L27IN ABSRB VLT L36MM CT-1 1/2 CIR J341H

## (undated) DEVICE — DBD-PACK,LAPAROTOMY,2 REINFORCED GOWNS: Brand: MEDLINE

## (undated) DEVICE — SOLUTION SCRB 2OZ 10% POVIDONE IOD ANTIMIC BTL

## (undated) DEVICE — STAPLER INT 75MM CUT LN L73MM STPL LN L77MM LNAR B-FORM

## (undated) DEVICE — SUTURE VCRL SZ 2-0 L27IN ABSRB UD L26MM SH 1/2 CIR J417H

## (undated) DEVICE — Device

## (undated) DEVICE — SOLUTION IV 1000ML 0.9% SOD CHL

## (undated) DEVICE — SPONGE: SPECIALTY PEANUT XR 100/CS: Brand: MEDICAL ACTION INDUSTRIES

## (undated) DEVICE — PAD,NON-ADHERENT,3X8,STERILE,LF,1/PK: Brand: MEDLINE

## (undated) DEVICE — STRAP,POSITIONING,KNEE/BODY,FOAM,4X60": Brand: MEDLINE

## (undated) DEVICE — SUTURE PERMAHAND SZ 2-0 L30IN NONABSORBABLE BLK SILK W/O A305H

## (undated) DEVICE — SUTURE VCRL SZ 2-0 L36IN ABSRB UD L36MM CT-1 1/2 CIR J945H

## (undated) DEVICE — PENCIL SMK EVAC 10 FT BLADE ELECTRD ROCKER FOR TELSCP

## (undated) DEVICE — RELOAD STAPLER REGULAR TISSUE GREEN ECHELON CONTOUR GST --

## (undated) DEVICE — CATH URET 5FRX70CM POLYUR -- CONVERT TO ITEM 106403

## (undated) DEVICE — KIT IV STRT W CHLORAPREP PD 1ML

## (undated) DEVICE — 1000ML,PRESSURE INFUSER W/THUMBWHEEL: Brand: MEDLINE

## (undated) DEVICE — CATH URETH INTMIT ROB 16FR FUN -- CONVERT TO ITEM 179520

## (undated) DEVICE — GLOVE SURG SZ 75 L1212IN FNGR THK138MIL BRN LTX FREE

## (undated) DEVICE — SUTURE PROL SZ 2-0 L36IN NONABSORBABLE BLU SH L26MM 1/2 CIR 8523H

## (undated) DEVICE — SYRINGE,TOOMEY,IRRIGATION,70CC,STERILE: Brand: MEDLINE

## (undated) DEVICE — Z CONVERTED USE 2274299 CUFF BLD PRESSURE LNG MED AD 25-35 CM ARM FLEXIPORT DISP

## (undated) DEVICE — 1200 GUARD II KIT W/5MM TUBE W/O VAC TUBE: Brand: GUARDIAN

## (undated) DEVICE — STERILE NEOPRENE POWDER-FREE SURGICAL GLOVES WITH NITRILE COATING: Brand: PROTEXIS

## (undated) DEVICE — 40418 TRENDELENBURG ONE-STEP ARM PROTECTORS LARGE (1 PAIR): Brand: 40418 TRENDELENBURG ONE-STEP ARM PROTECTORS LARGE (1 PAIR)

## (undated) DEVICE — BW-412T DISP COMBO CLEANING BRUSH: Brand: SINGLE USE COMBINATION CLEANING BRUSH

## (undated) DEVICE — SUTURE PERMAHAND SZ 3-0 L30IN NONABSORBABLE BLK SILK BRAID A304H

## (undated) DEVICE — CYSTO/BLADDER IRRIGATION SET, REGULATING CLAMP

## (undated) DEVICE — FORCEPS BX L240CM JAW DIA2.8MM L CAP W/ NDL MIC MESH TOOTH

## (undated) DEVICE — ECHELON CONTOUR GST RELOAD (BLUE) --

## (undated) DEVICE — PACK,CYSTOSCOPY,PK III,SIRUS: Brand: MEDLINE

## (undated) DEVICE — SPONGE GZ W4XL4IN COT 12 PLY TYP VII WVN C FLD DSGN

## (undated) DEVICE — SET ADMIN 16ML TBNG L100IN 2 Y INJ SITE IV PIGGY BK DISP

## (undated) DEVICE — BAG BELONG PT PERS CLEAR HANDL

## (undated) DEVICE — SUT ETHLN 3-0 18IN PS2 BLK --

## (undated) DEVICE — SKIN TEMPERATURE SENSOR: Brand: DEROYAL

## (undated) DEVICE — DRAIN SURG W10XL20CM SIL SMOOTH FLAT 3/4 PERF DBL WRP

## (undated) DEVICE — ADPT CATH URETH 2IN LF --

## (undated) DEVICE — STERILE POLYISOPRENE POWDER-FREE SURGICAL GLOVES WITH EMOLLIENT COATING: Brand: PROTEXIS

## (undated) DEVICE — COVER LT HNDL PLAS RIG 1 PER PK

## (undated) DEVICE — KENDALL RADIOLUCENT FOAM MONITORING ELECTRODE -RECTANGULAR SHAPE: Brand: KENDALL

## (undated) DEVICE — REM POLYHESIVE ADULT PATIENT RETURN ELECTRODE: Brand: VALLEYLAB

## (undated) DEVICE — BAG SPEC BIOHZD LF 2MIL 6X10IN -- CONVERT TO ITEM 357326

## (undated) DEVICE — TRAY PREP DRY W/ PREM GLV 2 APPL 6 SPNG 2 UNDPD 1 OVERWRAP

## (undated) DEVICE — SUTURE PDS II SZ 1 L36IN ABSRB VLT L48MM CTX 1/2 CIR Z371T

## (undated) DEVICE — WOUND RETRACTOR AND PROTECTOR: Brand: ALEXIS WOUND PROTECTOR-RETRACTOR

## (undated) DEVICE — DRAPE, LAVH, STERILE: Brand: MEDLINE

## (undated) DEVICE — DRAINBAG,ANTI-REFLUX TOWER,L/F,2000ML,LL: Brand: MEDLINE

## (undated) DEVICE — GUIDEWIRE UROLOGY L150CM DIA0.035IN ANG TIP L3CM PTFE NIT

## (undated) DEVICE — SUTURE VCRL SZ 4-0 L27IN ABSRB UD L26MM SH 1/2 CIR J415H

## (undated) DEVICE — SPONGE LAP 18X18IN STRL -- 5/PK

## (undated) DEVICE — BITE BLK ENDOSCP AD 54FR GRN POLYETH ENDOSCP W STRP SLD

## (undated) DEVICE — PREP SKN CHLRAPRP APL 26ML STR --

## (undated) DEVICE — FORCEPS BX L240CM JAW DIA2.4MM ORNG L CAP W/ NDL DISP RAD

## (undated) DEVICE — COLON CLOSING PACK: Brand: MEDLINE INDUSTRIES, INC.

## (undated) DEVICE — SUTURE PDS II SZ 1 L96IN ABSRB VLT TP-1 L65MM 1/2 CIR Z880G

## (undated) DEVICE — SURGICAL PROCEDURE PACK GYN ONCOLOGY CUST ST MARYS LF

## (undated) DEVICE — SOLIDIFIER FLUID 3000 CC ABSORB

## (undated) DEVICE — PACK,BASIC,SIRUS,V: Brand: MEDLINE

## (undated) DEVICE — SYR 50ML LR LCK 1ML GRAD NSAF --

## (undated) DEVICE — WRAP SURG W1.31XL1.34M CARD FOR PT 165-172CM THERMOWRP

## (undated) DEVICE — SET EXTN TBNG L BOR 4 W STPCOCK ST 32IN PRIMING VOL 6ML

## (undated) DEVICE — STERILE POLYISOPRENE POWDER-FREE SURGICAL GLOVES: Brand: PROTEXIS

## (undated) DEVICE — GDWIRE UROL STR 150CM FLX TP -- BX/5 SENSOR

## (undated) DEVICE — NEEDLE HYPO 18GA L1.5IN PNK S STL HUB POLYPR SHLD REG BVL

## (undated) DEVICE — CATHETER URETH 26FR 30CC BLLN F 3 W SPEC M RND TIP TWO

## (undated) DEVICE — SOLUTION IRRIGATION H2O 0797305] ICU MEDICAL INC]

## (undated) DEVICE — GOWN,SIRUS,NONRNF,SETINSLV,XL,20/CS: Brand: MEDLINE

## (undated) DEVICE — PAD,SANITARY,11 IN,MAXI,N-STRL,IND WRAP: Brand: MEDLINE

## (undated) DEVICE — RELOAD STPL L75MM OPN H3.8MM CLS 1.5MM WIRE DIA0.2MM REG

## (undated) DEVICE — SYRINGE MED 20ML STD CLR PLAS LUERLOCK TIP N CTRL DISP